# Patient Record
Sex: FEMALE | Race: WHITE | NOT HISPANIC OR LATINO | Employment: UNEMPLOYED | URBAN - METROPOLITAN AREA
[De-identification: names, ages, dates, MRNs, and addresses within clinical notes are randomized per-mention and may not be internally consistent; named-entity substitution may affect disease eponyms.]

---

## 2019-03-07 ENCOUNTER — OFFICE VISIT (OUTPATIENT)
Dept: OBSTETRICS AND GYNECOLOGY | Facility: CLINIC | Age: 29
End: 2019-03-07

## 2019-03-07 VITALS
HEIGHT: 70 IN | DIASTOLIC BLOOD PRESSURE: 62 MMHG | WEIGHT: 190.6 LBS | SYSTOLIC BLOOD PRESSURE: 110 MMHG | BODY MASS INDEX: 27.29 KG/M2

## 2019-03-07 DIAGNOSIS — Z00.00 ANNUAL PHYSICAL EXAM: Primary | ICD-10-CM

## 2019-03-07 DIAGNOSIS — Z30.432 ENCOUNTER FOR IUD REMOVAL: ICD-10-CM

## 2019-03-07 PROCEDURE — 99385 PREV VISIT NEW AGE 18-39: CPT | Performed by: OBSTETRICS & GYNECOLOGY

## 2019-03-07 PROCEDURE — 58301 REMOVE INTRAUTERINE DEVICE: CPT | Performed by: OBSTETRICS & GYNECOLOGY

## 2019-03-07 NOTE — PROGRESS NOTES
HPI   Benea Vincent  is a 28 y.o. female who presents for 2 reasons.  First, she would like to establish care and have a routine gynecologic exam.  Second, she would like her Mirena IUD removed.  It has been in place for 6 years.  We discussed contraceptive options after removal and the patient would like to use condoms.    Chief Complaint   Patient presents with   • New Gyn     Patient is here for a new gyn annual and discuss removing iud.       Past Medical History:   Diagnosis Date   • Asthma        History reviewed. No pertinent surgical history.    Social History     Socioeconomic History   • Marital status: Single     Spouse name: Not on file   • Number of children: Not on file   • Years of education: Not on file   • Highest education level: Not on file   Social Needs   • Financial resource strain: Not on file   • Food insecurity - worry: Not on file   • Food insecurity - inability: Not on file   • Transportation needs - medical: Not on file   • Transportation needs - non-medical: Not on file   Occupational History   • Not on file   Tobacco Use   • Smoking status: Current Every Day Smoker   Substance and Sexual Activity   • Alcohol use: Yes     Comment: socially   • Drug use: No   • Sexual activity: No   Other Topics Concern   • Not on file   Social History Narrative   • Not on file       The following portions of the patient's history were reviewed and updated as appropriate: allergies, current medications, past family history, past medical history, past social history, past surgical history and problem list.    Review of Systems   Constitutional: Negative.    HENT: Negative.    Respiratory: Negative.    Cardiovascular: Negative.    Gastrointestinal: Negative.    Genitourinary: Negative.    Musculoskeletal: Negative.    Skin: Negative.    Allergic/Immunologic: Negative.    Psychiatric/Behavioral: Negative.              Physical Exam   Constitutional: She is oriented to person, place, and time. She appears  well-developed and well-nourished.   HENT:   Head: Normocephalic and atraumatic.   Cardiovascular: Normal rate and regular rhythm.   Pulmonary/Chest: Effort normal and breath sounds normal. She has no wheezes. She has no rales.   The breasts are homogeneous.  There are no palpable lumps.  Nipple discharge and axillary adenopathy are absent.   Abdominal: Soft. She exhibits no distension. There is no tenderness.   Genitourinary: Vagina normal and uterus normal. There is no lesion on the right labia. There is no lesion on the left labia. Cervix exhibits no motion tenderness. Right adnexum displays no mass and no tenderness. Left adnexum displays no mass and no tenderness. No vaginal discharge found.   Genitourinary Comments: Mirena IUD was removed without difficulty.  The strings were grasped with a ring forceps and the IUD was gently removed.  It was examined and it was completely intact.  The patient tolerated the procedure well.   Neurological: She is alert and oriented to person, place, and time.   Skin: Skin is warm and dry.   Nursing note and vitals reviewed.      Assessment    Beena was seen today for new gyn.    Diagnoses and all orders for this visit:    Annual physical exam    Encounter for IUD removal        Plan  1. Normal gynecologic exam  2. Contraceptive counseling.  We discussed options.  The patient would like to use condoms.  We discussed the benefits and risks of this.  I answered the patient's questions and she would like to proceed.  3. Mirena IUD was removed.  The patient tolerated the procedure well.    4. Return in about 1 year (around 3/7/2020).    Social History     Tobacco Use   Smoking Status Current Every Day Smoker   5.     6.

## 2019-03-11 LAB
CONV .: ABNORMAL
CYTOLOGIST CVX/VAG CYTO: ABNORMAL
CYTOLOGY CVX/VAG DOC THIN PREP: ABNORMAL
DX ICD CODE: ABNORMAL
DX ICD CODE: ABNORMAL
HIV 1 & 2 AB SER-IMP: ABNORMAL
OTHER STN SPEC: ABNORMAL
PATH REPORT.FINAL DX SPEC: ABNORMAL
PATHOLOGIST CVX/VAG CYTO: ABNORMAL
STAT OF ADQ CVX/VAG CYTO-IMP: ABNORMAL

## 2019-03-12 ENCOUNTER — TELEPHONE (OUTPATIENT)
Dept: OBSTETRICS AND GYNECOLOGY | Facility: CLINIC | Age: 29
End: 2019-03-12

## 2019-03-12 NOTE — TELEPHONE ENCOUNTER
----- Message from Arsh Ray MD sent at 3/12/2019 12:49 PM EDT -----  Please contact the patient and let her know that her Pap showed a high-grade abnormality.  These help her to schedule a colposcopy.  Thank you.

## 2019-03-12 NOTE — TELEPHONE ENCOUNTER
Patient is returning a phone call. She would like a call back and also needs information about colposcopy.       Thank you

## 2019-03-13 ENCOUNTER — DOCUMENTATION (OUTPATIENT)
Dept: OBSTETRICS AND GYNECOLOGY | Facility: CLINIC | Age: 29
End: 2019-03-13

## 2019-03-13 ENCOUNTER — TELEPHONE (OUTPATIENT)
Dept: OBSTETRICS AND GYNECOLOGY | Facility: CLINIC | Age: 29
End: 2019-03-13

## 2019-03-13 RX ORDER — KETOROLAC TROMETHAMINE 15.75 MG/1
SPRAY, METERED NASAL
Qty: 5 EACH | Refills: 0 | Status: SHIPPED | OUTPATIENT
Start: 2019-03-13 | End: 2019-03-29 | Stop reason: SDUPTHER

## 2019-03-13 NOTE — TELEPHONE ENCOUNTER
I spoke to the patient and answered her questions.  She had to leave the conversation early because she was at work.  Please contact her and help her to schedule her colposcopy.  I will send a prescription for Sprix.  Please remind her to make contact with [facility] for delivery.  Thank you.

## 2019-03-13 NOTE — PROGRESS NOTES
Phone call.  Pap results were reviewed with the patient and her questions were answered.  We discussed my rationale for recommending colposcopy.  I described the procedure to the patient and answered her questions.  She would like to proceed.  She will call the office to schedule this.

## 2019-03-13 NOTE — TELEPHONE ENCOUNTER
Patient is waiting for Dr Castro call about colpo and would like to have sprix for pain before colpo.

## 2019-03-19 ENCOUNTER — TELEPHONE (OUTPATIENT)
Dept: OBSTETRICS AND GYNECOLOGY | Facility: CLINIC | Age: 29
End: 2019-03-19

## 2019-03-22 ENCOUNTER — PROCEDURE VISIT (OUTPATIENT)
Dept: OBSTETRICS AND GYNECOLOGY | Facility: CLINIC | Age: 29
End: 2019-03-22

## 2019-03-22 VITALS
WEIGHT: 190.8 LBS | SYSTOLIC BLOOD PRESSURE: 121 MMHG | BODY MASS INDEX: 27.32 KG/M2 | DIASTOLIC BLOOD PRESSURE: 76 MMHG | HEIGHT: 70 IN

## 2019-03-22 DIAGNOSIS — R87.613 HGSIL (HIGH GRADE SQUAMOUS INTRAEPITHELIAL LESION) ON PAP SMEAR OF CERVIX: ICD-10-CM

## 2019-03-22 PROCEDURE — 57455 BIOPSY OF CERVIX W/SCOPE: CPT | Performed by: OBSTETRICS & GYNECOLOGY

## 2019-03-22 NOTE — PROGRESS NOTES
HPI   Beena Vincent  is a 28 y.o. female who presents for colposcopy due to high-grade WILLIAM on Pap smear.  I counseled the patient regarding the pathophysiology of this finding.  We discussed my recommendation for colposcopy.  We discussed the procedure, its risks, benefits and alternatives.  The patient would like to proceed.    Chief Complaint   Patient presents with   • Follow-up     Patient is here for a colpo.       Past Medical History:   Diagnosis Date   • Asthma        History reviewed. No pertinent surgical history.    Social History     Socioeconomic History   • Marital status: Single     Spouse name: Not on file   • Number of children: Not on file   • Years of education: Not on file   • Highest education level: Not on file   Tobacco Use   • Smoking status: Current Every Day Smoker   Substance and Sexual Activity   • Alcohol use: Yes     Comment: socially   • Drug use: No   • Sexual activity: No       The following portions of the patient's history were reviewed and updated as appropriate: allergies, current medications, past family history, past medical history, past social history, past surgical history and problem list.    Review of Systems          Physical Exam   Constitutional: She is oriented to person, place, and time. She appears well-developed and well-nourished.   Genitourinary:       Genitourinary Comments: Colposcopy was performed with good visualization of the transformation zone.  There are 2 areas of acetowhite epithelium.  There is one at the 12 o'clock position with the appearance of atypical vessels.  There is also one at the 7 o'clock position.  Both lesions were biopsied.  They were treated with Monsel's solution to hemostasis.  Specimens to pathology.  The patient tolerated the procedure well.   Neurological: She is alert and oriented to person, place, and time.   Skin: Skin is warm and dry.   Nursing note and vitals reviewed.      Assessment    Beena was seen today for  follow-up.    Diagnoses and all orders for this visit:    HGSIL (high grade squamous intraepithelial lesion) on Pap smear of cervix  -     Colposcopy        Plan  1. Colposcopy performed as described above.  Further workup pending the results of the colposcopically directed biopsies.  The patient tolerated the procedure well.    Social History     Tobacco Use   Smoking Status Current Every Day Smoker   2.     3.

## 2019-03-28 LAB
DX ICD CODE: NORMAL
PATH REPORT.COMMENTS IMP SPEC: NORMAL
PATH REPORT.FINAL DX SPEC: NORMAL
PATH REPORT.GROSS SPEC: NORMAL
PATH REPORT.SITE OF ORIGIN SPEC: NORMAL
PATHOLOGIST NAME: NORMAL
PAYMENT PROCEDURE: NORMAL

## 2019-03-29 ENCOUNTER — TELEPHONE (OUTPATIENT)
Dept: OBSTETRICS AND GYNECOLOGY | Facility: CLINIC | Age: 29
End: 2019-03-29

## 2019-03-29 ENCOUNTER — DOCUMENTATION (OUTPATIENT)
Dept: OBSTETRICS AND GYNECOLOGY | Facility: CLINIC | Age: 29
End: 2019-03-29

## 2019-03-29 ENCOUNTER — PREP FOR SURGERY (OUTPATIENT)
Dept: OTHER | Facility: HOSPITAL | Age: 29
End: 2019-03-29

## 2019-03-29 DIAGNOSIS — D06.9 CIN III (CERVICAL INTRAEPITHELIAL NEOPLASIA GRADE III) WITH SEVERE DYSPLASIA: Primary | ICD-10-CM

## 2019-03-29 RX ORDER — SODIUM CHLORIDE 0.9 % (FLUSH) 0.9 %
3 SYRINGE (ML) INJECTION EVERY 12 HOURS SCHEDULED
Status: CANCELLED | OUTPATIENT
Start: 2019-04-25

## 2019-03-29 RX ORDER — SODIUM CHLORIDE 0.9 % (FLUSH) 0.9 %
3-10 SYRINGE (ML) INJECTION AS NEEDED
Status: CANCELLED | OUTPATIENT
Start: 2019-04-25

## 2019-03-29 RX ORDER — KETOROLAC TROMETHAMINE 15.75 MG/1
SPRAY, METERED NASAL
Qty: 5 EACH | Refills: 0 | Status: SHIPPED | OUTPATIENT
Start: 2019-03-29 | End: 2019-04-24

## 2019-03-29 NOTE — TELEPHONE ENCOUNTER
Phone call.  Unable to reach the patient.  Please contact the patient and ask when would be a good time to reach her by phone.  If it is difficult to talk by phone, I would be happy to see her in person to discuss treatment options.  Thank you.

## 2019-03-29 NOTE — TELEPHONE ENCOUNTER
Patient called back. I ask when would be a good time for her to answer phone. She stated today is good, she will make sure she is by her phone and it is on.       Thank you

## 2019-03-29 NOTE — PROGRESS NOTES
Phone call.  Cervical biopsy results were reviewed and discussed with the patient.  This shows KYRA-2 to KYRA-3.  We discussed the meaning of this finding and options for further management.  The benefits and risks of LEEP versus cervical cryotherapy were discussed and the patient's questions were answered.  She is leaning towards the LEEP, but will call with a final decision within the next several days.  Also, we discussed periprocedural pain control.  The patient would like to have a refill of her Sprix.  This has been sent to her pharmacy.

## 2019-04-03 PROBLEM — D06.9 CIN III (CERVICAL INTRAEPITHELIAL NEOPLASIA GRADE III) WITH SEVERE DYSPLASIA: Status: ACTIVE | Noted: 2019-04-03

## 2019-04-24 RX ORDER — ALBUTEROL SULFATE 90 UG/1
2 AEROSOL, METERED RESPIRATORY (INHALATION) EVERY 4 HOURS PRN
COMMUNITY
End: 2022-02-07 | Stop reason: SDUPTHER

## 2019-04-25 ENCOUNTER — HOSPITAL ENCOUNTER (OUTPATIENT)
Facility: HOSPITAL | Age: 29
Discharge: HOME | End: 2019-04-25
Attending: OBSTETRICS & GYNECOLOGY | Admitting: OBSTETRICS & GYNECOLOGY

## 2019-04-25 ENCOUNTER — ANESTHESIA EVENT (OUTPATIENT)
Dept: PERIOP | Facility: HOSPITAL | Age: 29
End: 2019-04-25

## 2019-04-25 ENCOUNTER — ANESTHESIA (OUTPATIENT)
Dept: PERIOP | Facility: HOSPITAL | Age: 29
End: 2019-04-25

## 2019-04-25 VITALS
BODY MASS INDEX: 27.27 KG/M2 | HEIGHT: 70 IN | SYSTOLIC BLOOD PRESSURE: 112 MMHG | OXYGEN SATURATION: 96 % | HEART RATE: 90 BPM | RESPIRATION RATE: 16 BRPM | TEMPERATURE: 98.2 F | WEIGHT: 190.48 LBS | DIASTOLIC BLOOD PRESSURE: 71 MMHG

## 2019-04-25 DIAGNOSIS — D06.9 CIN III (CERVICAL INTRAEPITHELIAL NEOPLASIA GRADE III) WITH SEVERE DYSPLASIA: ICD-10-CM

## 2019-04-25 LAB
B-HCG UR QL: NEGATIVE
INTERNAL NEGATIVE CONTROL: NEGATIVE
INTERNAL POSITIVE CONTROL: POSITIVE
Lab: NORMAL

## 2019-04-25 PROCEDURE — 25010000002 ONDANSETRON PER 1 MG: Performed by: NURSE ANESTHETIST, CERTIFIED REGISTERED

## 2019-04-25 PROCEDURE — S0260 H&P FOR SURGERY: HCPCS | Performed by: OBSTETRICS & GYNECOLOGY

## 2019-04-25 PROCEDURE — 25010000002 KETOROLAC TROMETHAMINE PER 15 MG: Performed by: NURSE ANESTHETIST, CERTIFIED REGISTERED

## 2019-04-25 PROCEDURE — 25010000002 PROPOFOL 10 MG/ML EMULSION: Performed by: NURSE ANESTHETIST, CERTIFIED REGISTERED

## 2019-04-25 PROCEDURE — 25010000002 MIDAZOLAM PER 1 MG: Performed by: ANESTHESIOLOGY

## 2019-04-25 PROCEDURE — 88305 TISSUE EXAM BY PATHOLOGIST: CPT | Performed by: OBSTETRICS & GYNECOLOGY

## 2019-04-25 PROCEDURE — 57522 CONIZATION OF CERVIX: CPT | Performed by: OBSTETRICS & GYNECOLOGY

## 2019-04-25 PROCEDURE — 25010000002 DEXAMETHASONE PER 1 MG: Performed by: NURSE ANESTHETIST, CERTIFIED REGISTERED

## 2019-04-25 PROCEDURE — 88307 TISSUE EXAM BY PATHOLOGIST: CPT | Performed by: OBSTETRICS & GYNECOLOGY

## 2019-04-25 PROCEDURE — 81025 URINE PREGNANCY TEST: CPT | Performed by: ANESTHESIOLOGY

## 2019-04-25 PROCEDURE — 25010000002 FENTANYL CITRATE (PF) 100 MCG/2ML SOLUTION: Performed by: ANESTHESIOLOGY

## 2019-04-25 RX ORDER — LIDOCAINE HYDROCHLORIDE 10 MG/ML
0.5 INJECTION, SOLUTION EPIDURAL; INFILTRATION; INTRACAUDAL; PERINEURAL ONCE AS NEEDED
Status: DISCONTINUED | OUTPATIENT
Start: 2019-04-25 | End: 2019-04-25 | Stop reason: HOSPADM

## 2019-04-25 RX ORDER — DIPHENHYDRAMINE HCL 25 MG
25 CAPSULE ORAL
Status: DISCONTINUED | OUTPATIENT
Start: 2019-04-25 | End: 2019-04-25 | Stop reason: HOSPADM

## 2019-04-25 RX ORDER — FENTANYL CITRATE 50 UG/ML
50 INJECTION, SOLUTION INTRAMUSCULAR; INTRAVENOUS
Status: DISCONTINUED | OUTPATIENT
Start: 2019-04-25 | End: 2019-04-25 | Stop reason: HOSPADM

## 2019-04-25 RX ORDER — LIDOCAINE HYDROCHLORIDE 20 MG/ML
INJECTION, SOLUTION INFILTRATION; PERINEURAL AS NEEDED
Status: DISCONTINUED | OUTPATIENT
Start: 2019-04-25 | End: 2019-04-25 | Stop reason: SURG

## 2019-04-25 RX ORDER — SODIUM CHLORIDE, SODIUM LACTATE, POTASSIUM CHLORIDE, CALCIUM CHLORIDE 600; 310; 30; 20 MG/100ML; MG/100ML; MG/100ML; MG/100ML
9 INJECTION, SOLUTION INTRAVENOUS CONTINUOUS
Status: DISCONTINUED | OUTPATIENT
Start: 2019-04-25 | End: 2019-04-25 | Stop reason: HOSPADM

## 2019-04-25 RX ORDER — PROPOFOL 10 MG/ML
VIAL (ML) INTRAVENOUS AS NEEDED
Status: DISCONTINUED | OUTPATIENT
Start: 2019-04-25 | End: 2019-04-25 | Stop reason: SURG

## 2019-04-25 RX ORDER — MIDAZOLAM HYDROCHLORIDE 1 MG/ML
2 INJECTION INTRAMUSCULAR; INTRAVENOUS
Status: DISCONTINUED | OUTPATIENT
Start: 2019-04-25 | End: 2019-04-25 | Stop reason: HOSPADM

## 2019-04-25 RX ORDER — ACETAMINOPHEN 650 MG/1
650 SUPPOSITORY RECTAL ONCE AS NEEDED
Status: DISCONTINUED | OUTPATIENT
Start: 2019-04-25 | End: 2019-04-25 | Stop reason: HOSPADM

## 2019-04-25 RX ORDER — MAGNESIUM HYDROXIDE 1200 MG/15ML
LIQUID ORAL AS NEEDED
Status: DISCONTINUED | OUTPATIENT
Start: 2019-04-25 | End: 2019-04-25 | Stop reason: HOSPADM

## 2019-04-25 RX ORDER — PROMETHAZINE HYDROCHLORIDE 25 MG/ML
12.5 INJECTION, SOLUTION INTRAMUSCULAR; INTRAVENOUS ONCE AS NEEDED
Status: DISCONTINUED | OUTPATIENT
Start: 2019-04-25 | End: 2019-04-25 | Stop reason: HOSPADM

## 2019-04-25 RX ORDER — ONDANSETRON 2 MG/ML
4 INJECTION INTRAMUSCULAR; INTRAVENOUS ONCE AS NEEDED
Status: DISCONTINUED | OUTPATIENT
Start: 2019-04-25 | End: 2019-04-25 | Stop reason: HOSPADM

## 2019-04-25 RX ORDER — SODIUM CHLORIDE 0.9 % (FLUSH) 0.9 %
3 SYRINGE (ML) INJECTION EVERY 12 HOURS SCHEDULED
Status: DISCONTINUED | OUTPATIENT
Start: 2019-04-25 | End: 2019-04-25 | Stop reason: HOSPADM

## 2019-04-25 RX ORDER — DIPHENHYDRAMINE HYDROCHLORIDE 50 MG/ML
12.5 INJECTION INTRAMUSCULAR; INTRAVENOUS
Status: DISCONTINUED | OUTPATIENT
Start: 2019-04-25 | End: 2019-04-25 | Stop reason: HOSPADM

## 2019-04-25 RX ORDER — FAMOTIDINE 10 MG/ML
20 INJECTION, SOLUTION INTRAVENOUS ONCE
Status: COMPLETED | OUTPATIENT
Start: 2019-04-25 | End: 2019-04-25

## 2019-04-25 RX ORDER — OXYCODONE AND ACETAMINOPHEN 7.5; 325 MG/1; MG/1
1 TABLET ORAL ONCE AS NEEDED
Status: DISCONTINUED | OUTPATIENT
Start: 2019-04-25 | End: 2019-04-25 | Stop reason: HOSPADM

## 2019-04-25 RX ORDER — EPHEDRINE SULFATE 50 MG/ML
5 INJECTION, SOLUTION INTRAVENOUS ONCE AS NEEDED
Status: DISCONTINUED | OUTPATIENT
Start: 2019-04-25 | End: 2019-04-25 | Stop reason: HOSPADM

## 2019-04-25 RX ORDER — FLUMAZENIL 0.1 MG/ML
0.2 INJECTION INTRAVENOUS AS NEEDED
Status: DISCONTINUED | OUTPATIENT
Start: 2019-04-25 | End: 2019-04-25 | Stop reason: HOSPADM

## 2019-04-25 RX ORDER — FENTANYL CITRATE 50 UG/ML
INJECTION, SOLUTION INTRAMUSCULAR; INTRAVENOUS
Status: COMPLETED
Start: 2019-04-25 | End: 2019-04-25

## 2019-04-25 RX ORDER — PROMETHAZINE HYDROCHLORIDE 25 MG/1
25 TABLET ORAL ONCE AS NEEDED
Status: DISCONTINUED | OUTPATIENT
Start: 2019-04-25 | End: 2019-04-25 | Stop reason: HOSPADM

## 2019-04-25 RX ORDER — HYDROMORPHONE HYDROCHLORIDE 1 MG/ML
0.5 INJECTION, SOLUTION INTRAMUSCULAR; INTRAVENOUS; SUBCUTANEOUS
Status: DISCONTINUED | OUTPATIENT
Start: 2019-04-25 | End: 2019-04-25 | Stop reason: HOSPADM

## 2019-04-25 RX ORDER — HYDROCODONE BITARTRATE AND ACETAMINOPHEN 5; 325 MG/1; MG/1
1 TABLET ORAL EVERY 6 HOURS PRN
Qty: 10 TABLET | Refills: 0 | Status: SHIPPED | OUTPATIENT
Start: 2019-04-25 | End: 2020-04-02

## 2019-04-25 RX ORDER — PROMETHAZINE HYDROCHLORIDE 25 MG/1
25 SUPPOSITORY RECTAL ONCE AS NEEDED
Status: DISCONTINUED | OUTPATIENT
Start: 2019-04-25 | End: 2019-04-25 | Stop reason: HOSPADM

## 2019-04-25 RX ORDER — KETOROLAC TROMETHAMINE 30 MG/ML
INJECTION, SOLUTION INTRAMUSCULAR; INTRAVENOUS AS NEEDED
Status: DISCONTINUED | OUTPATIENT
Start: 2019-04-25 | End: 2019-04-25 | Stop reason: SURG

## 2019-04-25 RX ORDER — LABETALOL HYDROCHLORIDE 5 MG/ML
5 INJECTION, SOLUTION INTRAVENOUS
Status: DISCONTINUED | OUTPATIENT
Start: 2019-04-25 | End: 2019-04-25 | Stop reason: HOSPADM

## 2019-04-25 RX ORDER — NALOXONE HCL 0.4 MG/ML
0.2 VIAL (ML) INJECTION AS NEEDED
Status: DISCONTINUED | OUTPATIENT
Start: 2019-04-25 | End: 2019-04-25 | Stop reason: HOSPADM

## 2019-04-25 RX ORDER — DEXAMETHASONE SODIUM PHOSPHATE 10 MG/ML
INJECTION INTRAMUSCULAR; INTRAVENOUS AS NEEDED
Status: DISCONTINUED | OUTPATIENT
Start: 2019-04-25 | End: 2019-04-25 | Stop reason: SURG

## 2019-04-25 RX ORDER — SODIUM CHLORIDE 0.9 % (FLUSH) 0.9 %
3-10 SYRINGE (ML) INJECTION AS NEEDED
Status: DISCONTINUED | OUTPATIENT
Start: 2019-04-25 | End: 2019-04-25 | Stop reason: HOSPADM

## 2019-04-25 RX ORDER — HYDRALAZINE HYDROCHLORIDE 20 MG/ML
5 INJECTION INTRAMUSCULAR; INTRAVENOUS
Status: DISCONTINUED | OUTPATIENT
Start: 2019-04-25 | End: 2019-04-25 | Stop reason: HOSPADM

## 2019-04-25 RX ORDER — ONDANSETRON 2 MG/ML
INJECTION INTRAMUSCULAR; INTRAVENOUS AS NEEDED
Status: DISCONTINUED | OUTPATIENT
Start: 2019-04-25 | End: 2019-04-25 | Stop reason: SURG

## 2019-04-25 RX ORDER — HYDROCODONE BITARTRATE AND ACETAMINOPHEN 7.5; 325 MG/1; MG/1
1 TABLET ORAL ONCE AS NEEDED
Status: COMPLETED | OUTPATIENT
Start: 2019-04-25 | End: 2019-04-25

## 2019-04-25 RX ORDER — SODIUM CHLORIDE 0.9 % (FLUSH) 0.9 %
1-10 SYRINGE (ML) INJECTION AS NEEDED
Status: DISCONTINUED | OUTPATIENT
Start: 2019-04-25 | End: 2019-04-25 | Stop reason: HOSPADM

## 2019-04-25 RX ORDER — ACETAMINOPHEN 325 MG/1
650 TABLET ORAL ONCE AS NEEDED
Status: DISCONTINUED | OUTPATIENT
Start: 2019-04-25 | End: 2019-04-25 | Stop reason: HOSPADM

## 2019-04-25 RX ORDER — LIDOCAINE HYDROCHLORIDE AND EPINEPHRINE 10; 10 MG/ML; UG/ML
INJECTION, SOLUTION INFILTRATION; PERINEURAL AS NEEDED
Status: DISCONTINUED | OUTPATIENT
Start: 2019-04-25 | End: 2019-04-25 | Stop reason: HOSPADM

## 2019-04-25 RX ORDER — MIDAZOLAM HYDROCHLORIDE 1 MG/ML
1 INJECTION INTRAMUSCULAR; INTRAVENOUS
Status: DISCONTINUED | OUTPATIENT
Start: 2019-04-25 | End: 2019-04-25 | Stop reason: HOSPADM

## 2019-04-25 RX ADMIN — FENTANYL CITRATE 100 MCG: 50 INJECTION INTRAMUSCULAR; INTRAVENOUS at 13:28

## 2019-04-25 RX ADMIN — HYDROCODONE BITARTRATE AND ACETAMINOPHEN 1 TABLET: 7.5; 325 TABLET ORAL at 14:15

## 2019-04-25 RX ADMIN — DEXAMETHASONE SODIUM PHOSPHATE 8 MG: 10 INJECTION INTRAMUSCULAR; INTRAVENOUS at 13:37

## 2019-04-25 RX ADMIN — LIDOCAINE HYDROCHLORIDE 40 MG: 20 INJECTION, SOLUTION INFILTRATION; PERINEURAL at 13:31

## 2019-04-25 RX ADMIN — KETOROLAC TROMETHAMINE 30 MG: 30 INJECTION, SOLUTION INTRAMUSCULAR; INTRAVENOUS at 13:37

## 2019-04-25 RX ADMIN — FAMOTIDINE 20 MG: 10 INJECTION, SOLUTION INTRAVENOUS at 13:03

## 2019-04-25 RX ADMIN — MIDAZOLAM 2 MG: 1 INJECTION INTRAMUSCULAR; INTRAVENOUS at 13:23

## 2019-04-25 RX ADMIN — FENTANYL CITRATE 50 MCG: 50 INJECTION INTRAMUSCULAR; INTRAVENOUS at 13:50

## 2019-04-25 RX ADMIN — SODIUM CHLORIDE, POTASSIUM CHLORIDE, SODIUM LACTATE AND CALCIUM CHLORIDE 9 ML/HR: 600; 310; 30; 20 INJECTION, SOLUTION INTRAVENOUS at 12:30

## 2019-04-25 RX ADMIN — PROPOFOL 200 MG: 10 INJECTION, EMULSION INTRAVENOUS at 13:31

## 2019-04-25 RX ADMIN — ONDANSETRON 4 MG: 2 INJECTION INTRAMUSCULAR; INTRAVENOUS at 13:37

## 2019-04-25 NOTE — ANESTHESIA POSTPROCEDURE EVALUATION
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      (KYRA III (cervical intraepithelial neoplasia grade III) with severe dysplasia [D06.9])    Surgeon:  Arsh Ray MD Provider:  Julio César Parker MD    Anesthesia Type:  general ASA Status:  2          Anesthesia Type: general  Last vitals  BP   117/66 (04/25/19 1415)   Temp   36.8 °C (98.2 °F) (04/25/19 1404)   Pulse   85 (04/25/19 1415)   Resp   16 (04/25/19 1415)     SpO2   100 % (04/25/19 1415)     Post Anesthesia Care and Evaluation    Patient location during evaluation: PACU  Patient participation: complete - patient participated  Level of consciousness: awake and alert  Pain management: adequate  Airway patency: patent  Anesthetic complications: No anesthetic complications    Cardiovascular status: acceptable  Respiratory status: acceptable  Hydration status: acceptable    Comments: /66   Pulse 85   Temp 36.8 °C (98.2 °F) (Temporal)   Resp 16   Ht 177.8 cm (70\")   Wt 86.4 kg (190 lb 7.6 oz)   LMP 04/13/2019 Comment: urine hcg negative on 4/25/19  SpO2 100%   Breastfeeding? No   BMI 27.33 kg/m²               "

## 2019-04-25 NOTE — ANESTHESIA PREPROCEDURE EVALUATION
Anesthesia Evaluation     Patient summary reviewed and Nursing notes reviewed   NPO Solid Status: > 8 hours  NPO Liquid Status: > 2 hours           Airway   Mallampati: II  TM distance: >3 FB  Neck ROM: full  No difficulty expected  Dental      Pulmonary    (+) a smoker Current Smoked day of surgery, asthma,   Cardiovascular   Exercise tolerance: good (4-7 METS)    (-) angina      Neuro/Psych  GI/Hepatic/Renal/Endo    (-) GERD, PUD, hypothyroidism    Musculoskeletal     Abdominal    Substance History   (-) drug use     OB/GYN          Other                        Anesthesia Plan    ASA 2     general   (Adhesive tape allergy)  intravenous induction   Anesthetic plan, all risks, benefits, and alternatives have been provided, discussed and informed consent has been obtained with: patient.

## 2019-04-25 NOTE — ANESTHESIA PROCEDURE NOTES
Airway  Urgency: elective    Airway not difficult    General Information and Staff    Patient location during procedure: OR  Anesthesiologist: Julio César Parker MD  CRNA: Leonor Garza CRNA    Indications and Patient Condition  Indications for airway management: airway protection    Preoxygenated: yes  Mask difficulty assessment: 1 - vent by mask    Final Airway Details  Final airway type: supraglottic airway      Successful airway: unique  Size 4    Number of attempts at approach: 1    Additional Comments  Smooth IV/mask induction and placement of LMA. Atraumatic, lips/teeth/mouth intact, as preop. +ETCO2, bilateral breath sounds and equal.

## 2019-04-26 LAB
CYTO UR: NORMAL
LAB AP CASE REPORT: NORMAL
LAB AP DIAGNOSIS COMMENT: NORMAL
PATH REPORT.FINAL DX SPEC: NORMAL
PATH REPORT.GROSS SPEC: NORMAL

## 2019-05-03 ENCOUNTER — TELEPHONE (OUTPATIENT)
Dept: OBSTETRICS AND GYNECOLOGY | Facility: CLINIC | Age: 29
End: 2019-05-03

## 2019-05-03 NOTE — TELEPHONE ENCOUNTER
Miri    That is typically normal in most patient's who have a LEEP.  She should observe for fevers or heavy bleeding or severe pelvic pain.  Otherwise, schedule her to see Flor next week.    Thanks    Domi              (Flor pt)  Patient called and stated she had LEEP on 04/25/2019. She is now experiencing yellow, smelly discharge. She is afraid it is infection. Please advise.       Pharmacy: [address] [facility] [address] [facility] [phone] [facility] [phone] FX        Thank you

## 2019-05-07 ENCOUNTER — OFFICE VISIT (OUTPATIENT)
Dept: OBSTETRICS AND GYNECOLOGY | Facility: CLINIC | Age: 29
End: 2019-05-07

## 2019-05-07 VITALS
WEIGHT: 189.6 LBS | SYSTOLIC BLOOD PRESSURE: 118 MMHG | DIASTOLIC BLOOD PRESSURE: 72 MMHG | HEIGHT: 70 IN | BODY MASS INDEX: 27.14 KG/M2

## 2019-05-07 DIAGNOSIS — Z09 POSTOP CHECK: Primary | ICD-10-CM

## 2019-05-07 PROCEDURE — 99024 POSTOP FOLLOW-UP VISIT: CPT | Performed by: OBSTETRICS & GYNECOLOGY

## 2019-05-07 NOTE — PROGRESS NOTES
HPI   Beena Vincent  is a 28 y.o. female who presents for a postoperative checkup.  She is 2 weeks out from a LEEP.  She is feeling well.  Bowels and bladder are functioning normally.  There is been minimal bleeding.  No fever or chills.  Pain has resolved.  Pathology report was reviewed with the patient.    Chief Complaint   Patient presents with   • Post-op     Patient is here for a 2 week postop for leep.       Past Medical History:   Diagnosis Date   • Asthma    • Cervical dysplasia        Past Surgical History:   Procedure Laterality Date   • KNEE ARTHROSCOPY      AGE 6   • LEEP N/A 4/25/2019    Procedure: LOOP ELECTROCAUTERY EXCISION PROCEDURE;  Surgeon: Arsh Ray MD;  [facility] [facility] [place] [facility] [place];  Service: Obstetrics/Gynecology       Social History     Socioeconomic History   • Marital status: Single     Spouse name: Not on file   • Number of children: Not on file   • Years of education: Not on file   • Highest education level: Not on file   Tobacco Use   • Smoking status: Current Every Day Smoker     Packs/day: 0.50   Substance and Sexual Activity   • Alcohol use: Yes     Comment: socially   • Drug use: No   • Sexual activity: No       The following portions of the patient's history were reviewed and updated as appropriate: allergies, current medications, past family history, past medical history, past social history, past surgical history and problem list.    Review of Systems          Physical Exam   Constitutional: She is oriented to person, place, and time. She appears well-developed and well-nourished.   Abdominal: Soft. She exhibits no distension. There is no tenderness.   Genitourinary:   Genitourinary Comments: Normal female external genitalia  The vagina is estrogenized and without lesion  Cervix is healing properly.  There is no evidence of inflammation or infection  The uterus is normal in size.  It is mobile within the pelvis and nontender  No adnexal masses are palpable   Neurological: She is  alert and oriented to person, place, and time.   Skin: Skin is warm and dry.   Psychiatric: She has a normal mood and affect.   Nursing note and vitals reviewed.      Assessment    Beena was seen today for post-op.    Diagnoses and all orders for this visit:    Postop check        Plan  1. Appropriate progress, 2 weeks postop  2. Pathology report was reviewed with the patient.  There was high-grade WILLIAM, but all margins were free.  Questions answered.  3.     4. Return in about 4 weeks (around 6/4/2019).    Social History     Tobacco Use   Smoking Status Current Every Day Smoker   • Packs/day: 0.50   5.     6.

## 2019-05-23 ENCOUNTER — OFFICE VISIT (OUTPATIENT)
Dept: OBSTETRICS AND GYNECOLOGY | Facility: CLINIC | Age: 29
End: 2019-05-23

## 2019-05-23 VITALS
BODY MASS INDEX: 26.92 KG/M2 | DIASTOLIC BLOOD PRESSURE: 62 MMHG | WEIGHT: 188 LBS | SYSTOLIC BLOOD PRESSURE: 102 MMHG | HEIGHT: 70 IN

## 2019-05-23 DIAGNOSIS — Z09 POSTOP CHECK: Primary | ICD-10-CM

## 2019-05-23 PROCEDURE — 99024 POSTOP FOLLOW-UP VISIT: CPT | Performed by: OBSTETRICS & GYNECOLOGY

## 2019-05-23 NOTE — PROGRESS NOTES
HPI   Beena Vincent  is a 28 y.o. female who presents for her second postoperative checkup after a LEEP.  She is feeling well.  Pain has resolved.  There is no bleeding.  Bowels and bladder are functioning normally.    Chief Complaint   Patient presents with   • Post-op     Patient is here for a 2 week postop for a leep.       Past Medical History:   Diagnosis Date   • Asthma    • Cervical dysplasia        Past Surgical History:   Procedure Laterality Date   • KNEE ARTHROSCOPY      AGE 6   • LEEP N/A 4/25/2019    Procedure: LOOP ELECTROCAUTERY EXCISION PROCEDURE;  Surgeon: Arsh Ray MD;  Location: [facility] OR [facility] [place];  Service: Obstetrics/Gynecology       Social History     Socioeconomic History   • Marital status: Single     Spouse name: Not on file   • Number of children: Not on file   • Years of education: Not on file   • Highest education level: Not on file   Tobacco Use   • Smoking status: Current Every Day Smoker     Packs/day: 0.50   Substance and Sexual Activity   • Alcohol use: Yes     Comment: socially   • Drug use: No   • Sexual activity: No       The following portions of the patient's history were reviewed and updated as appropriate: allergies, current medications, past family history, past medical history, past social history, past surgical history and problem list.    Review of Systems          Physical Exam   Constitutional: She is oriented to person, place, and time. She appears well-developed and well-nourished.   Abdominal: Soft. She exhibits no distension and no mass. There is no tenderness.   Genitourinary:   Genitourinary Comments: Normal female external genitalia  The vagina is estrogenized and without lesion  The LEEP bed has completely healed cervix is normal in appearance  The uterus is mobile within the pelvis it is nontender  No adnexal masses or tenderness are present   Neurological: She is alert and oriented to person, place, and time.   Skin: Skin is warm and dry.   Psychiatric: She has  a normal mood and affect. Her behavior is normal.   Nursing note and vitals reviewed.      Assessment    Beena was seen today for post-op.    Diagnoses and all orders for this visit:    Postop check        Plan  1. Appropriate postoperative progress.  Okay to resume all normal activities of daily living  2. Pathology report was reviewed and discussed with the patient.  High-grade WILLIAM was contained within the specimen, but there was some low-grade WILLIAM at the margin.  The cervix was destroyed beyond the margin of excision.  I recommend a repeat Pap in August.  If this is negative, the patient will follow-up in 6 months afterwards for a second repeat Pap.  I answered her questions and she agrees with these recommendations.    3. Return in about 3 months (around 8/23/2019).    Social History     Tobacco Use   Smoking Status Current Every Day Smoker   • Packs/day: 0.50   4.     5.

## 2019-08-22 ENCOUNTER — OFFICE VISIT (OUTPATIENT)
Dept: OBSTETRICS AND GYNECOLOGY | Facility: CLINIC | Age: 29
End: 2019-08-22

## 2019-08-22 VITALS
BODY MASS INDEX: 27.35 KG/M2 | SYSTOLIC BLOOD PRESSURE: 116 MMHG | WEIGHT: 191 LBS | HEIGHT: 70 IN | DIASTOLIC BLOOD PRESSURE: 64 MMHG

## 2019-08-22 DIAGNOSIS — N87.9 CERVICAL DYSPLASIA: Primary | ICD-10-CM

## 2019-08-22 PROCEDURE — 99212 OFFICE O/P EST SF 10 MIN: CPT | Performed by: OBSTETRICS & GYNECOLOGY

## 2019-08-22 NOTE — PROGRESS NOTES
HPI   Beena Vincent  is a 29 y.o. female who presents for a repeat Pap smear.  We discussed the results of her LEEP.  This showed KYRA 3 with a focal area of KYRA-1 near the margin.  Patient is doing well.  She presents today for a repeat Pap smear.    Chief Complaint   Patient presents with   • Follow-up     Patient is here for 3 month f/u for a repeat pap.       Past Medical History:   Diagnosis Date   • Asthma    • Cervical dysplasia        Past Surgical History:   Procedure Laterality Date   • KNEE ARTHROSCOPY      AGE 6   • LEEP N/A 4/25/2019    Procedure: LOOP ELECTROCAUTERY EXCISION PROCEDURE;  Surgeon: Arsh Ray MD;  [facility]: [facility] [place] [facility] [place]  Service: Obstetrics/Gynecology       Social History     Socioeconomic History   • Marital status: Single     Spouse name: Not on file   • Number of children: Not on file   • Years of education: Not on file   • Highest education level: Not on file   Tobacco Use   • Smoking status: Current Every Day Smoker     Packs/day: 0.50   Substance and Sexual Activity   • Alcohol use: Yes     Comment: socially   • Drug use: No   • Sexual activity: No       The following portions of the patient's history were reviewed and updated as appropriate: allergies, current medications, past family history, past medical history, past social history, past surgical history and problem list.    Review of Systems          Physical Exam   Constitutional: She is oriented to person, place, and time. She appears well-developed and well-nourished.   Abdominal: Soft. She exhibits no distension and no mass. There is no tenderness.   Genitourinary:   Genitourinary Comments: Normal female external genitalia  The vagina is estrogenized and without lesion  The cervix is normal in appearance  The uterus is mobile within the pelvis.  It is nontender  No adnexal masses or tenderness are palpable   Neurological: She is alert and oriented to person, place, and time.   Skin: Skin is warm and dry.   Psychiatric:  She has a normal mood and affect.   Nursing note and vitals reviewed.      Assessment    Beena was seen today for follow-up.    Diagnoses and all orders for this visit:    Cervical dysplasia        Plan  1. Pap was repeated today.  I counseled the patient regarding my rationale for this recommendation and answered her questions.  If the Pap is normal, I recommend a repeat in 6 months.  If the next Pap is also normal, the patient can return to yearly screening.    2. Return in about 6 months (around 2/22/2020).    Social History     Tobacco Use   Smoking Status Current Every Day Smoker   • Packs/day: 0.50   3.     4.

## 2019-08-27 LAB
CONV .: ABNORMAL
CYTOLOGIST CVX/VAG CYTO: ABNORMAL
CYTOLOGY CVX/VAG DOC CYTO: ABNORMAL
CYTOLOGY CVX/VAG DOC THIN PREP: ABNORMAL
DX ICD CODE: ABNORMAL
DX ICD CODE: ABNORMAL
HIV 1 & 2 AB SER-IMP: ABNORMAL
OTHER STN SPEC: ABNORMAL
PATHOLOGIST CVX/VAG CYTO: ABNORMAL
RECOM F/U CVX/VAG CYTO: ABNORMAL
STAT OF ADQ CVX/VAG CYTO-IMP: ABNORMAL

## 2019-09-04 ENCOUNTER — TELEPHONE (OUTPATIENT)
Dept: OBSTETRICS AND GYNECOLOGY | Facility: CLINIC | Age: 29
End: 2019-09-04

## 2019-09-04 ENCOUNTER — DOCUMENTATION (OUTPATIENT)
Dept: OBSTETRICS AND GYNECOLOGY | Facility: CLINIC | Age: 29
End: 2019-09-04

## 2019-09-04 NOTE — PROGRESS NOTES
Phone call.  Results were reviewed and discussed with the patient.  This is a low-grade WILLIAM after LEEP.  LEEP pathology report was also reviewed with the patient.  This showed high-grade WILLIAM with a small amount of low-grade at the margin.  We discussed options.  The benefits and risks of further evaluation with a repeat colposcopy versus cervical cryotherapy versus repeat LEEP were discussed.  I answered the patient's questions.  She will consider her options and call with a decision within the next 1 to 2 weeks.

## 2019-09-19 ENCOUNTER — TELEPHONE (OUTPATIENT)
Dept: OBSTETRICS AND GYNECOLOGY | Facility: CLINIC | Age: 29
End: 2019-09-19

## 2019-10-08 ENCOUNTER — OFFICE VISIT (OUTPATIENT)
Dept: OBSTETRICS AND GYNECOLOGY | Facility: CLINIC | Age: 29
End: 2019-10-08

## 2019-10-08 VITALS
SYSTOLIC BLOOD PRESSURE: 116 MMHG | DIASTOLIC BLOOD PRESSURE: 66 MMHG | WEIGHT: 191 LBS | HEIGHT: 70 IN | BODY MASS INDEX: 27.35 KG/M2

## 2019-10-08 DIAGNOSIS — N87.0 MILD DYSPLASIA OF CERVIX (CIN I): Primary | ICD-10-CM

## 2019-10-08 PROCEDURE — 57455 BIOPSY OF CERVIX W/SCOPE: CPT | Performed by: OBSTETRICS & GYNECOLOGY

## 2019-10-08 NOTE — PROGRESS NOTES
HPI   Beena Vincent  is a 29 y.o. female who presents for colposcopy.  She underwent a LEEP in April.  Pathology report showed a high-grade WILLIAM, but there was some low-grade WILLIAM at the margins.  Repeat Pap was positive for low-grade WILLIAM.  I counseled the patient regarding her options and she chose to proceed with a repeat colposcopy to plan further treatment.    Chief Complaint   Patient presents with   • Colposcopy     Patient is here for a colpo.       Past Medical History:   Diagnosis Date   • Asthma    • Cervical dysplasia        Past Surgical History:   Procedure Laterality Date   • KNEE ARTHROSCOPY      AGE 6   • LEEP N/A 4/25/2019    Procedure: LOOP ELECTROCAUTERY EXCISION PROCEDURE;  Surgeon: Arsh Ray MD;  [place]: [facility] [place] [facility] [place]  Service: Obstetrics/Gynecology       Social History     Socioeconomic History   • Marital status: Single     Spouse name: Not on file   • Number of children: Not on file   • Years of education: Not on file   • Highest education level: Not on file   Tobacco Use   • Smoking status: Current Every Day Smoker     Packs/day: 0.50   Substance and Sexual Activity   • Alcohol use: Yes     Comment: socially   • Drug use: No   • Sexual activity: No       The following portions of the patient's history were reviewed and updated as appropriate: allergies, current medications, past family history, past medical history, past social history, past surgical history and problem list.    Review of Systems          Physical Exam   Constitutional: She is oriented to person, place, and time. She appears well-developed and well-nourished.   Genitourinary:       Genitourinary Comments: Colposcopy was performed with good visualization of the transformation zone.  There was mild acetowhite epithelium at the 11 o'clock position.  Biopsy performed.  This was treated with Monsel solution to hemostasis.  The patient tolerated the procedure well.   Neurological: She is alert and oriented to person, place,  and time.   Skin: Skin is warm and dry.   Nursing note and vitals reviewed.      Assessment    Beena was seen today for colposcopy.    Diagnoses and all orders for this visit:    Mild dysplasia of cervix (KYRA I)        Plan  1. Colposcopy was performed as described above.  The patient tolerated the procedure well.    Social History     Tobacco Use   Smoking Status Current Every Day Smoker   • Packs/day: 0.50   2.     3.

## 2019-10-12 LAB
DX ICD CODE: NORMAL
PATH REPORT.FINAL DX SPEC: NORMAL
PATH REPORT.GROSS SPEC: NORMAL
PATH REPORT.MICROSCOPIC SPEC OTHER STN: NORMAL
PATH REPORT.RELEVANT HX SPEC: NORMAL
PATH REPORT.SITE OF ORIGIN SPEC: NORMAL
PATHOLOGIST NAME: NORMAL
PAYMENT PROCEDURE: NORMAL

## 2019-10-15 ENCOUNTER — TELEPHONE (OUTPATIENT)
Dept: OBSTETRICS AND GYNECOLOGY | Facility: CLINIC | Age: 29
End: 2019-10-15

## 2019-10-15 ENCOUNTER — DOCUMENTATION (OUTPATIENT)
Dept: OBSTETRICS AND GYNECOLOGY | Facility: CLINIC | Age: 29
End: 2019-10-15

## 2019-10-15 NOTE — PROGRESS NOTES
Biopsy results were reviewed and discussed with the patient.  I recommend a repeat Pap in April.  The patient will call to schedule this.

## 2019-10-16 ENCOUNTER — TELEPHONE (OUTPATIENT)
Dept: OBSTETRICS AND GYNECOLOGY | Facility: CLINIC | Age: 29
End: 2019-10-16

## 2019-10-16 NOTE — TELEPHONE ENCOUNTER
----- Message from Arsh Ray MD sent at 10/15/2019 11:53 AM EDT -----  I could not reach the patient by phone to review her results.  Please review these with her and help her to schedule a repeat Pap for 6 months.  Thank you.

## 2019-10-23 ENCOUNTER — TELEPHONE (OUTPATIENT)
Dept: OBSTETRICS AND GYNECOLOGY | Facility: CLINIC | Age: 29
End: 2019-10-23

## 2019-12-18 ENCOUNTER — PROCEDURE VISIT (OUTPATIENT)
Dept: OBSTETRICS AND GYNECOLOGY | Facility: CLINIC | Age: 29
End: 2019-12-18

## 2019-12-18 ENCOUNTER — INITIAL PRENATAL (OUTPATIENT)
Dept: OBSTETRICS AND GYNECOLOGY | Facility: CLINIC | Age: 29
End: 2019-12-18

## 2019-12-18 VITALS — DIASTOLIC BLOOD PRESSURE: 67 MMHG | SYSTOLIC BLOOD PRESSURE: 118 MMHG | WEIGHT: 186.6 LBS | BODY MASS INDEX: 26.77 KG/M2

## 2019-12-18 DIAGNOSIS — Z34.91 UNCERTAIN DATES, ANTEPARTUM, FIRST TRIMESTER: Primary | ICD-10-CM

## 2019-12-18 DIAGNOSIS — Z34.91 INITIAL OBSTETRIC VISIT IN FIRST TRIMESTER: Primary | ICD-10-CM

## 2019-12-18 LAB
GLUCOSE UR STRIP-MCNC: NEGATIVE MG/DL
PROT UR STRIP-MCNC: NEGATIVE MG/DL

## 2019-12-18 PROCEDURE — 0501F PRENATAL FLOW SHEET: CPT | Performed by: OBSTETRICS & GYNECOLOGY

## 2019-12-18 PROCEDURE — 76817 TRANSVAGINAL US OBSTETRIC: CPT | Performed by: OBSTETRICS & GYNECOLOGY

## 2019-12-18 NOTE — PROGRESS NOTES
CC: Initial obstetric visit    HPI: 29-year-old  2 para 1 presents at 5-1/7 weeks by her certain last menstrual period for her initial obstetric visit.  DALTON is confirmed by today's ultrasound.  The ultrasound shows a gestational sac and yolk sac.  It does not yet show a fetus with heartbeat.  The patient is feeling well.  She does not have nausea or vomiting.  Does not have abdominal or pelvic pain.  Does not have vaginal bleeding.  Last pregnancy resulted in a spontaneous vaginal delivery.  The patient was induced at 38 weeks due to preeclampsia.    Review of systems    This is negative for fever or chills.  It is negative for nausea or vomiting.  It is negative for abdominal or pelvic pain.  It is negative for vaginal bleeding.  It is positive for green vaginal discharge.  All other systems are reviewed and are negative    Assessment and plan    1.  Intrauterine pregnancy at 5-1/7 weeks  2.  Pregnancy related counseling was undertaken and questions were answered  3.  Prenatal labs.  Counseled.  The patient plans to do this at the next visit  4.  Counseled regarding the ultrasound results.  The patient would like to repeat this in 4 weeks.  5.  Vaginal discharge.  There is a greenish discharge on examination.  Otherwise, the patient is asymptomatic.  Counseled.  Cultures were performed.  Treatment pending the results of these cultures.

## 2019-12-20 LAB
BACTERIA UR CULT: NORMAL
BACTERIA UR CULT: NORMAL

## 2019-12-21 LAB
A VAGINAE DNA VAG QL NAA+PROBE: ABNORMAL SCORE
BVAB2 DNA VAG QL NAA+PROBE: ABNORMAL SCORE
C ALBICANS DNA VAG QL NAA+PROBE: NEGATIVE
C GLABRATA DNA VAG QL NAA+PROBE: NEGATIVE
C TRACH RRNA SPEC QL NAA+PROBE: NEGATIVE
MEGA1 DNA VAG QL NAA+PROBE: ABNORMAL SCORE
N GONORRHOEA RRNA SPEC QL NAA+PROBE: NEGATIVE
T VAGINALIS RRNA SPEC QL NAA+PROBE: POSITIVE

## 2019-12-23 RX ORDER — METRONIDAZOLE 500 MG/1
2000 TABLET ORAL ONCE
Qty: 4 TABLET | Refills: 0 | Status: SHIPPED | OUTPATIENT
Start: 2019-12-23 | End: 2019-12-23

## 2020-01-14 ENCOUNTER — PROCEDURE VISIT (OUTPATIENT)
Dept: OBSTETRICS AND GYNECOLOGY | Facility: CLINIC | Age: 30
End: 2020-01-14

## 2020-01-14 ENCOUNTER — ROUTINE PRENATAL (OUTPATIENT)
Dept: OBSTETRICS AND GYNECOLOGY | Facility: CLINIC | Age: 30
End: 2020-01-14

## 2020-01-14 VITALS — SYSTOLIC BLOOD PRESSURE: 118 MMHG | BODY MASS INDEX: 26.26 KG/M2 | DIASTOLIC BLOOD PRESSURE: 72 MMHG | WEIGHT: 183 LBS

## 2020-01-14 DIAGNOSIS — Z36.89 CONFIRM FETAL CARDIAC ACTIVITY USING ULTRASOUND: Primary | ICD-10-CM

## 2020-01-14 DIAGNOSIS — Z3A.09 9 WEEKS GESTATION OF PREGNANCY: Primary | ICD-10-CM

## 2020-01-14 LAB
GLUCOSE UR STRIP-MCNC: NEGATIVE MG/DL
PROT UR STRIP-MCNC: ABNORMAL MG/DL

## 2020-01-14 PROCEDURE — 90471 IMMUNIZATION ADMIN: CPT | Performed by: OBSTETRICS & GYNECOLOGY

## 2020-01-14 PROCEDURE — 90674 CCIIV4 VAC NO PRSV 0.5 ML IM: CPT | Performed by: OBSTETRICS & GYNECOLOGY

## 2020-01-14 PROCEDURE — 0502F SUBSEQUENT PRENATAL CARE: CPT | Performed by: OBSTETRICS & GYNECOLOGY

## 2020-01-14 PROCEDURE — 76817 TRANSVAGINAL US OBSTETRIC: CPT | Performed by: OBSTETRICS & GYNECOLOGY

## 2020-01-14 RX ORDER — VITAMIN A ACETATE, BETA CAROTENE, ASCORBIC ACID, CHOLECALCIFEROL, .ALPHA.-TOCOPHEROL ACETATE, DL-, THIAMINE MONONITRATE, RIBOFLAVIN, NIACINAMIDE, PYRIDOXINE HYDROCHLORIDE, FOLIC ACID, CYANOCOBALAMIN, CALCIUM CARBONATE, FERROUS FUMARATE, ZINC OXIDE, CUPRIC OXIDE 3080; 12; 120; 400; 1; 1.84; 3; 20; 22; 920; 25; 200; 27; 10; 2 [IU]/1; UG/1; MG/1; [IU]/1; MG/1; MG/1; MG/1; MG/1; MG/1; [IU]/1; MG/1; MG/1; MG/1; MG/1; MG/1
1 TABLET, FILM COATED ORAL DAILY
Qty: 30 TABLET | Refills: 11 | Status: ON HOLD | OUTPATIENT
Start: 2020-01-14 | End: 2021-10-01

## 2020-01-16 LAB
ABO GROUP BLD: ABNORMAL
BASOPHILS # BLD AUTO: 0 X10E3/UL (ref 0–0.2)
BASOPHILS NFR BLD AUTO: 0 %
BLD GP AB SCN SERPL QL: NEGATIVE
EOSINOPHIL # BLD AUTO: 0.2 X10E3/UL (ref 0–0.4)
EOSINOPHIL NFR BLD AUTO: 3 %
ERYTHROCYTE [DISTWIDTH] IN BLOOD BY AUTOMATED COUNT: 12.7 % (ref 11.7–15.4)
HBV SURFACE AG SERPL QL IA: NEGATIVE
HCT VFR BLD AUTO: 40.7 % (ref 34–46.6)
HCV AB S/CO SERPL IA: <0.1 S/CO RATIO (ref 0–0.9)
HGB BLD-MCNC: 13.7 G/DL (ref 11.1–15.9)
HIV 1+2 AB+HIV1 P24 AG SERPL QL IA: NON REACTIVE
IMM GRANULOCYTES # BLD AUTO: 0 X10E3/UL (ref 0–0.1)
IMM GRANULOCYTES NFR BLD AUTO: 0 %
LYMPHOCYTES # BLD AUTO: 2.3 X10E3/UL (ref 0.7–3.1)
LYMPHOCYTES NFR BLD AUTO: 33 %
MCH RBC QN AUTO: 28.4 PG (ref 26.6–33)
MCHC RBC AUTO-ENTMCNC: 33.7 G/DL (ref 31.5–35.7)
MCV RBC AUTO: 84 FL (ref 79–97)
MONOCYTES # BLD AUTO: 0.9 X10E3/UL (ref 0.1–0.9)
MONOCYTES NFR BLD AUTO: 12 %
NEUTROPHILS # BLD AUTO: 3.6 X10E3/UL (ref 1.4–7)
NEUTROPHILS NFR BLD AUTO: 52 %
PLATELET # BLD AUTO: 264 X10E3/UL (ref 150–450)
RBC # BLD AUTO: 4.82 X10E6/UL (ref 3.77–5.28)
RH BLD: NEGATIVE
RPR SER QL: NON REACTIVE
RUBV IGG SERPL IA-ACNC: <0.9 INDEX
WBC # BLD AUTO: 7 X10E3/UL (ref 3.4–10.8)

## 2020-02-11 ENCOUNTER — ROUTINE PRENATAL (OUTPATIENT)
Dept: OBSTETRICS AND GYNECOLOGY | Facility: CLINIC | Age: 30
End: 2020-02-11

## 2020-02-11 ENCOUNTER — PROCEDURE VISIT (OUTPATIENT)
Dept: OBSTETRICS AND GYNECOLOGY | Facility: CLINIC | Age: 30
End: 2020-02-11

## 2020-02-11 VITALS — SYSTOLIC BLOOD PRESSURE: 114 MMHG | DIASTOLIC BLOOD PRESSURE: 64 MMHG | BODY MASS INDEX: 26.29 KG/M2 | WEIGHT: 183.2 LBS

## 2020-02-11 DIAGNOSIS — O36.80X0 PREGNANCY WITH UNCERTAIN FETAL VIABILITY, SINGLE OR UNSPECIFIED FETUS: Primary | ICD-10-CM

## 2020-02-11 DIAGNOSIS — Z3A.13 13 WEEKS GESTATION OF PREGNANCY: Primary | ICD-10-CM

## 2020-02-11 LAB
GLUCOSE UR STRIP-MCNC: NEGATIVE MG/DL
PROT UR STRIP-MCNC: NEGATIVE MG/DL

## 2020-02-11 PROCEDURE — 0502F SUBSEQUENT PRENATAL CARE: CPT | Performed by: OBSTETRICS & GYNECOLOGY

## 2020-02-11 PROCEDURE — 76801 OB US < 14 WKS SINGLE FETUS: CPT | Performed by: OBSTETRICS & GYNECOLOGY

## 2020-02-11 NOTE — PROGRESS NOTES
CC: Obstetric visit    HPI: 29-year-old  2 para 1 presents at 13-0/7 weeks for scheduled obstetric visit.  She is feeling well.  She has some low back pain.  No nausea or vomiting.  No vaginal bleeding.    Assessment and plan    1.  Intrauterine pregnancy at 13-0/7 weeks  2.  Prenatal labs were reviewed and discussed with the patient.  The patient is rubella nonimmune.  Also, she has a negative.  She will attempt to determine the father of the baby's blood type.  If this cannot be determined, RhoGam could be given at 28 weeks.  3.  History of recent treatment for trichomonas.  Counseled.  Test of cure was performed today.  4.  Unable to Doppler fetal heart tones.  On examination, the uterus is retroverted.  I recommend an ultrasound to confirm fetal viability.

## 2020-02-14 LAB
A VAGINAE DNA VAG QL NAA+PROBE: NORMAL SCORE
BVAB2 DNA VAG QL NAA+PROBE: NORMAL SCORE
C ALBICANS DNA VAG QL NAA+PROBE: NEGATIVE
C GLABRATA DNA VAG QL NAA+PROBE: NEGATIVE
C TRACH RRNA SPEC QL NAA+PROBE: NEGATIVE
MEGA1 DNA VAG QL NAA+PROBE: NORMAL SCORE
N GONORRHOEA RRNA SPEC QL NAA+PROBE: NEGATIVE
T VAGINALIS RRNA SPEC QL NAA+PROBE: NEGATIVE

## 2020-03-11 ENCOUNTER — ROUTINE PRENATAL (OUTPATIENT)
Dept: OBSTETRICS AND GYNECOLOGY | Facility: CLINIC | Age: 30
End: 2020-03-11

## 2020-03-11 VITALS — SYSTOLIC BLOOD PRESSURE: 116 MMHG | WEIGHT: 191.4 LBS | DIASTOLIC BLOOD PRESSURE: 64 MMHG | BODY MASS INDEX: 27.46 KG/M2

## 2020-03-11 DIAGNOSIS — Z3A.17 17 WEEKS GESTATION OF PREGNANCY: Primary | ICD-10-CM

## 2020-03-11 LAB
GLUCOSE UR STRIP-MCNC: NEGATIVE MG/DL
PROT UR STRIP-MCNC: NEGATIVE MG/DL

## 2020-03-11 PROCEDURE — 0502F SUBSEQUENT PRENATAL CARE: CPT | Performed by: OBSTETRICS & GYNECOLOGY

## 2020-03-11 NOTE — PROGRESS NOTES
CC: Obstetric visit    HPI: 29-year-old  2 para 1 presents at 17-1/7 weeks for her scheduled obstetric visit.  She is feeling well.  She has not yet begun to appreciate fetal movement.    Assessment and plan    1.  Intrauterine pregnancy at 17-1/7 weeks  2.  Blood type is A-.  Counseled and questions answered.  The patient is not able to obtain the baby's father's blood type.  For this reason, I recommend treating her is positive and giving RhoGam at 28 weeks.  The patient agrees with this recommendation.  3.  Test of cure last month was negative.  Counseled and questions answered.  4.  Influenza vaccine has been given earlier this season.  5.  Screening ultrasound at the next visit.

## 2020-04-02 ENCOUNTER — ROUTINE PRENATAL (OUTPATIENT)
Dept: OBSTETRICS AND GYNECOLOGY | Facility: CLINIC | Age: 30
End: 2020-04-02

## 2020-04-02 ENCOUNTER — PROCEDURE VISIT (OUTPATIENT)
Dept: OBSTETRICS AND GYNECOLOGY | Facility: CLINIC | Age: 30
End: 2020-04-02

## 2020-04-02 VITALS — SYSTOLIC BLOOD PRESSURE: 123 MMHG | DIASTOLIC BLOOD PRESSURE: 77 MMHG | BODY MASS INDEX: 28.27 KG/M2 | WEIGHT: 197 LBS

## 2020-04-02 DIAGNOSIS — Z3A.20 20 WEEKS GESTATION OF PREGNANCY: Primary | ICD-10-CM

## 2020-04-02 DIAGNOSIS — Z36.89 ENCOUNTER FOR FETAL ANATOMIC SURVEY: Primary | ICD-10-CM

## 2020-04-02 LAB
GLUCOSE UR STRIP-MCNC: NEGATIVE MG/DL
PROT UR STRIP-MCNC: NEGATIVE MG/DL

## 2020-04-02 PROCEDURE — 76805 OB US >/= 14 WKS SNGL FETUS: CPT | Performed by: OBSTETRICS & GYNECOLOGY

## 2020-04-02 PROCEDURE — 0502F SUBSEQUENT PRENATAL CARE: CPT | Performed by: OBSTETRICS & GYNECOLOGY

## 2020-04-02 PROCEDURE — 81002 URINALYSIS NONAUTO W/O SCOPE: CPT | Performed by: OBSTETRICS & GYNECOLOGY

## 2020-04-02 NOTE — PROGRESS NOTES
CC: Obstetric visit    HPI: 29-year-old  2 para 1 presents at 20-2/7 weeks for her scheduled obstetric visit.  She has begun to appreciate fetal movement.  She reports ankle swelling at the end of a long shift at work.  Otherwise, she is feeling well.    Assessment and plan    1.  Intrauterine pregnancy at 20-2/7 weeks  2.  Screening ultrasound was reviewed and discussed with the patient.  This is a normal screen.  3.  1 hour glucose tolerance test at the next visit.  Counseled.  4.  Counseled regarding the pathophysiology of ankle swelling after standing for long periods of time.  I recommend frequent breaks at work, at least every 2 hours, with the opportunity to sit down or walk.  Also, we discussed the use of graduated pressure stockings.  I answered the patient's questions and she agrees with these recommendations.

## 2020-05-02 ENCOUNTER — RESULTS ENCOUNTER (OUTPATIENT)
Dept: OBSTETRICS AND GYNECOLOGY | Facility: CLINIC | Age: 30
End: 2020-05-02

## 2020-05-02 DIAGNOSIS — Z3A.20 20 WEEKS GESTATION OF PREGNANCY: ICD-10-CM

## 2020-05-05 ENCOUNTER — ROUTINE PRENATAL (OUTPATIENT)
Dept: OBSTETRICS AND GYNECOLOGY | Facility: CLINIC | Age: 30
End: 2020-05-05

## 2020-05-05 VITALS — WEIGHT: 214.4 LBS | SYSTOLIC BLOOD PRESSURE: 118 MMHG | DIASTOLIC BLOOD PRESSURE: 70 MMHG | BODY MASS INDEX: 30.76 KG/M2

## 2020-05-05 DIAGNOSIS — Z3A.25 25 WEEKS GESTATION OF PREGNANCY: Primary | ICD-10-CM

## 2020-05-05 LAB
GLUCOSE UR STRIP-MCNC: NEGATIVE MG/DL
PROT UR STRIP-MCNC: NEGATIVE MG/DL

## 2020-05-05 PROCEDURE — 0502F SUBSEQUENT PRENATAL CARE: CPT | Performed by: OBSTETRICS & GYNECOLOGY

## 2020-05-05 NOTE — PROGRESS NOTES
CC: Obstetric visit    HPI: 29-year-old  2 para 1 presents at 25-0/7 weeks for her scheduled obstetric visit.  She is feeling well.  She still has episodes of ankle swelling after a long day at work.  Otherwise, she is doing well.  Baby is moving actively.    Assessment and plan    1.  Intrauterine pregnancy at 25-0/7 weeks  2.  Episodes of bilateral ankle swelling after standing for long periods of time.  We discussed strategies to minimize this.  3.  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  We discussed the use of a mask and other strategies to minimize the risk of COVID.  4.  1 hour glucose tolerance test before the next visit.  5.  Rh- blood type.  RhoGam at the next visit.

## 2020-05-08 LAB
BASOPHILS # BLD AUTO: 0.04 10*3/MM3 (ref 0–0.2)
BASOPHILS NFR BLD AUTO: 0.3 % (ref 0–1.5)
BLD GP AB SCN SERPL QL: NEGATIVE
EOSINOPHIL # BLD AUTO: 0.28 10*3/MM3 (ref 0–0.4)
EOSINOPHIL NFR BLD AUTO: 2.3 % (ref 0.3–6.2)
ERYTHROCYTE [DISTWIDTH] IN BLOOD BY AUTOMATED COUNT: 12.6 % (ref 12.3–15.4)
GLUCOSE 1H P 50 G GLC PO SERPL-MCNC: 124 MG/DL (ref 65–179)
HCT VFR BLD AUTO: 30.9 % (ref 34–46.6)
HGB BLD-MCNC: 10.3 G/DL (ref 12–15.9)
IMM GRANULOCYTES # BLD AUTO: 0.06 10*3/MM3 (ref 0–0.05)
IMM GRANULOCYTES NFR BLD AUTO: 0.5 % (ref 0–0.5)
LYMPHOCYTES # BLD AUTO: 2.51 10*3/MM3 (ref 0.7–3.1)
LYMPHOCYTES NFR BLD AUTO: 20.9 % (ref 19.6–45.3)
MCH RBC QN AUTO: 28.9 PG (ref 26.6–33)
MCHC RBC AUTO-ENTMCNC: 33.3 G/DL (ref 31.5–35.7)
MCV RBC AUTO: 86.8 FL (ref 79–97)
MONOCYTES # BLD AUTO: 0.83 10*3/MM3 (ref 0.1–0.9)
MONOCYTES NFR BLD AUTO: 6.9 % (ref 5–12)
NEUTROPHILS # BLD AUTO: 8.3 10*3/MM3 (ref 1.7–7)
NEUTROPHILS NFR BLD AUTO: 69.1 % (ref 42.7–76)
NRBC BLD AUTO-RTO: 0 /100 WBC (ref 0–0.2)
PLATELET # BLD AUTO: 230 10*3/MM3 (ref 140–450)
RBC # BLD AUTO: 3.56 10*6/MM3 (ref 3.77–5.28)
WBC # BLD AUTO: 12.02 10*3/MM3 (ref 3.4–10.8)

## 2020-05-08 RX ORDER — FERROUS SULFATE 325(65) MG
325 TABLET ORAL
Qty: 30 TABLET | Refills: 6 | Status: ON HOLD | OUTPATIENT
Start: 2020-05-08 | End: 2021-10-01

## 2020-05-11 ENCOUNTER — TELEPHONE (OUTPATIENT)
Dept: OBSTETRICS AND GYNECOLOGY | Facility: CLINIC | Age: 30
End: 2020-05-11

## 2020-05-11 NOTE — TELEPHONE ENCOUNTER
Patient notified that 1 hour glucose was normal but that Hgb had dropped and rx for iron had been sent to the pharmacy per Dr. Ray.

## 2020-05-26 ENCOUNTER — ROUTINE PRENATAL (OUTPATIENT)
Dept: OBSTETRICS AND GYNECOLOGY | Facility: CLINIC | Age: 30
End: 2020-05-26

## 2020-05-26 VITALS — DIASTOLIC BLOOD PRESSURE: 73 MMHG | SYSTOLIC BLOOD PRESSURE: 107 MMHG | BODY MASS INDEX: 31.57 KG/M2 | WEIGHT: 220 LBS

## 2020-05-26 DIAGNOSIS — Z3A.28 28 WEEKS GESTATION OF PREGNANCY: Primary | ICD-10-CM

## 2020-05-26 PROCEDURE — 0502F SUBSEQUENT PRENATAL CARE: CPT | Performed by: OBSTETRICS & GYNECOLOGY

## 2020-05-26 PROCEDURE — 96372 THER/PROPH/DIAG INJ SC/IM: CPT | Performed by: OBSTETRICS & GYNECOLOGY

## 2020-05-26 NOTE — PROGRESS NOTES
CC: Obstetric visit    HPI: 29-year-old  2 para 1 presents at 28-0/7 weeks for her scheduled obstetric visit.  She is feeling well.  Her baby is moving actively.  Her lower extremity edema has improved with frequent breaks.    Assessment and plan    1.  Intrauterine pregnancy at 28-0/7 weeks  2.  RhoGam was given today.  3.  Anemia.  Counseled.  The patient is taking iron sulfate.  I recommend a hemoglobin and hematocrit at the next visit.

## 2020-06-08 ENCOUNTER — HOSPITAL ENCOUNTER (EMERGENCY)
Facility: HOSPITAL | Age: 30
Discharge: HOME | End: 2020-06-08
Attending: OBSTETRICS & GYNECOLOGY | Admitting: OBSTETRICS & GYNECOLOGY

## 2020-06-08 ENCOUNTER — TELEPHONE (OUTPATIENT)
Dept: OBSTETRICS AND GYNECOLOGY | Facility: CLINIC | Age: 30
End: 2020-06-08

## 2020-06-08 VITALS
DIASTOLIC BLOOD PRESSURE: 62 MMHG | BODY MASS INDEX: 30.64 KG/M2 | HEART RATE: 84 BPM | RESPIRATION RATE: 16 BRPM | TEMPERATURE: 98.1 F | HEIGHT: 70 IN | SYSTOLIC BLOOD PRESSURE: 113 MMHG | OXYGEN SATURATION: 98 % | WEIGHT: 214 LBS

## 2020-06-08 PROBLEM — O12.03: Status: ACTIVE | Noted: 2020-06-08

## 2020-06-08 LAB
ALBUMIN SERPL-MCNC: 3.7 G/DL (ref 3.5–5.2)
ALBUMIN/GLOB SERPL: 1.5 G/DL
ALP SERPL-CCNC: 51 U/L (ref 39–117)
ALT SERPL W P-5'-P-CCNC: 16 U/L (ref 1–33)
ANION GAP SERPL CALCULATED.3IONS-SCNC: 8.2 MMOL/L (ref 5–15)
AST SERPL-CCNC: 21 U/L (ref 1–32)
BASOPHILS # BLD AUTO: 0.03 10*3/MM3 (ref 0–0.2)
BASOPHILS NFR BLD AUTO: 0.3 % (ref 0–1.5)
BILIRUB SERPL-MCNC: 0.2 MG/DL (ref 0.2–1.2)
BILIRUB UR QL STRIP: NEGATIVE
BUN BLD-MCNC: 8 MG/DL (ref 6–20)
BUN/CREAT SERPL: 11 (ref 7–25)
CALCIUM SPEC-SCNC: 8.9 MG/DL (ref 8.6–10.5)
CHLORIDE SERPL-SCNC: 102 MMOL/L (ref 98–107)
CLARITY UR: ABNORMAL
CO2 SERPL-SCNC: 24.8 MMOL/L (ref 22–29)
COLOR UR: YELLOW
CREAT BLD-MCNC: 0.73 MG/DL (ref 0.57–1)
CREAT UR-MCNC: 94.7 MG/DL
DEPRECATED RDW RBC AUTO: 41.2 FL (ref 37–54)
EOSINOPHIL # BLD AUTO: 0.23 10*3/MM3 (ref 0–0.4)
EOSINOPHIL NFR BLD AUTO: 2 % (ref 0.3–6.2)
ERYTHROCYTE [DISTWIDTH] IN BLOOD BY AUTOMATED COUNT: 12.9 % (ref 12.3–15.4)
GFR SERPL CREATININE-BSD FRML MDRD: 96 ML/MIN/1.73
GLOBULIN UR ELPH-MCNC: 2.5 GM/DL
GLUCOSE BLD-MCNC: 111 MG/DL (ref 65–99)
GLUCOSE UR STRIP-MCNC: NEGATIVE MG/DL
HCT VFR BLD AUTO: 29.2 % (ref 34–46.6)
HGB BLD-MCNC: 10 G/DL (ref 12–15.9)
HGB UR QL STRIP.AUTO: NEGATIVE
IMM GRANULOCYTES # BLD AUTO: 0.08 10*3/MM3 (ref 0–0.05)
IMM GRANULOCYTES NFR BLD AUTO: 0.7 % (ref 0–0.5)
KETONES UR QL STRIP: NEGATIVE
LEUKOCYTE ESTERASE UR QL STRIP.AUTO: NEGATIVE
LYMPHOCYTES # BLD AUTO: 2.29 10*3/MM3 (ref 0.7–3.1)
LYMPHOCYTES NFR BLD AUTO: 20.2 % (ref 19.6–45.3)
MCH RBC QN AUTO: 29.7 PG (ref 26.6–33)
MCHC RBC AUTO-ENTMCNC: 34.2 G/DL (ref 31.5–35.7)
MCV RBC AUTO: 86.6 FL (ref 79–97)
MONOCYTES # BLD AUTO: 0.84 10*3/MM3 (ref 0.1–0.9)
MONOCYTES NFR BLD AUTO: 7.4 % (ref 5–12)
NEUTROPHILS # BLD AUTO: 7.86 10*3/MM3 (ref 1.7–7)
NEUTROPHILS NFR BLD AUTO: 69.4 % (ref 42.7–76)
NITRITE UR QL STRIP: NEGATIVE
NRBC BLD AUTO-RTO: 0 /100 WBC (ref 0–0.2)
PH UR STRIP.AUTO: 7.5 [PH] (ref 5–8)
PLATELET # BLD AUTO: 213 10*3/MM3 (ref 140–450)
PMV BLD AUTO: 9.5 FL (ref 6–12)
POTASSIUM BLD-SCNC: 3.7 MMOL/L (ref 3.5–5.2)
PROT SERPL-MCNC: 6.2 G/DL (ref 6–8.5)
PROT UR QL STRIP: NEGATIVE
PROT UR-MCNC: 10 MG/DL
PROT/CREAT UR: 105.6 MG/G CREA (ref 0–200)
RBC # BLD AUTO: 3.37 10*6/MM3 (ref 3.77–5.28)
SODIUM BLD-SCNC: 135 MMOL/L (ref 136–145)
SP GR UR STRIP: 1.02 (ref 1–1.03)
UROBILINOGEN UR QL STRIP: ABNORMAL
WBC NRBC COR # BLD: 11.33 10*3/MM3 (ref 3.4–10.8)

## 2020-06-08 PROCEDURE — 85025 COMPLETE CBC W/AUTO DIFF WBC: CPT | Performed by: OBSTETRICS & GYNECOLOGY

## 2020-06-08 PROCEDURE — 81003 URINALYSIS AUTO W/O SCOPE: CPT | Performed by: OBSTETRICS & GYNECOLOGY

## 2020-06-08 PROCEDURE — 87086 URINE CULTURE/COLONY COUNT: CPT | Performed by: OBSTETRICS & GYNECOLOGY

## 2020-06-08 PROCEDURE — 84156 ASSAY OF PROTEIN URINE: CPT | Performed by: OBSTETRICS & GYNECOLOGY

## 2020-06-08 PROCEDURE — 59025 FETAL NON-STRESS TEST: CPT

## 2020-06-08 PROCEDURE — 82570 ASSAY OF URINE CREATININE: CPT | Performed by: OBSTETRICS & GYNECOLOGY

## 2020-06-08 PROCEDURE — 99283 EMERGENCY DEPT VISIT LOW MDM: CPT

## 2020-06-08 PROCEDURE — 80053 COMPREHEN METABOLIC PANEL: CPT | Performed by: OBSTETRICS & GYNECOLOGY

## 2020-06-08 NOTE — TELEPHONE ENCOUNTER
Patient calling, she is having swelling in right side, foot and right hand, both hurt.  Has been swollen since yesterday.  What should she do?

## 2020-06-08 NOTE — OBED NOTES
[facility] Provider Note        2020 15:26    Beena Vincent is a 29 y.o.  at 29w6d DALTON  2020, by Last Menstrual Period who presents for : Swelling (Swelling started in R leg/arm/hand yesterday 6/7 evening. Pt denies pain, +FM, and denies LOF/VB. )          HPI:     30yo  at 29w6d presenting with complaint of swelling of RIGHT upper AND RIGHT lower extremities with some pain on the anterior lower leg near the ankle.  She was induced around 38 weeks in last pregnancy secondary to preeclampsia.  She has had no issues with hypertension this pregnancy and denies all preeclampsia signs, symptoms.     No VB/LOF/HA/visual changes/N/V/F/C/dysuria     Active fetus was   denies ROM  deniescontractions, cramping   denies bleeding    Prenatal care with Arsh Ray MD uncomplicated    Patient Active Problem List    Diagnosis   • KYRA III (cervical intraepithelial neoplasia grade III) with severe dysplasia [D06.9]         POB:   OB History    Para Term  AB Living   2 0 0 0 0 1   SAB TAB Ectopic Molar Multiple Live Births   0 0 0 0 0 0      # Outcome Date GA Lbr Matthias/2nd Weight Sex Delivery Anes PTL Lv   2 Current            1                Obstetric Comments    x1.  Induced for preeclampsia      PMH:   Past Medical History:   Diagnosis Date   • Asthma    • Cervical dysplasia      PSH:   Past Surgical History:   Procedure Laterality Date   • KNEE ARTHROSCOPY      AGE 6   • LEEP N/A 2019    Procedure: LOOP ELECTROCAUTERY EXCISION PROCEDURE;  Surgeon: Arsh Ray MD;  Location: [facility] [place];  Service: Obstetrics/Gynecology     FH:    Family History   Problem Relation Age of Onset   • Diabetes Father    • Malig Hyperthermia Neg Hx      SH:   Social History     Tobacco Use   • Smoking status: Former Smoker     Packs/day: 0.50     Last attempt to quit: 2020     Years since quittin.3   Substance Use Topics   • Alcohol use: Yes     Comment: socially   • Drug use: No      "      Allergies: Penicillins and Adhesive tape    Medications:   Medications Prior to Admission   Medication Sig Dispense Refill Last Dose   • albuterol sulfate  (90 Base) MCG/ACT inhaler Inhale 2 puffs Every 4 (Four) Hours As Needed for Wheezing.   Past Month at Unknown time   • ferrous sulfate 325 (65 FE) MG tablet Take 1 tablet by mouth Daily With Breakfast. 30 tablet 6 6/7/2020 at 1900   • prenatal vitamins (PRENATAL 27-1) 27-1 MG tablet tablet Take 1 tablet by mouth Daily. 30 tablet 11 6/7/2020 at 1900       Review of Systems  A 14 system review was completed and negative except for what is noted in the HPI or below  Pertinent items are noted in HPI, all other systems reviewed and negative  Denies facial droop, lid lag or difficulty swallowing/eating.  Denies foot droop or leg drag  Denies urinary or fecal incontinence.  Denies difficulty standing , walking or using extrremities though there is some subjective weakness of her right hand.    Physical exam    Temp:  [98.1 °F (36.7 °C)] 98.1 °F (36.7 °C)  Heart Rate:  [96] 96  Resp:  [16] 16  BP: (139)/(75) 139/75  Estimated body mass index is 30.71 kg/m² as calculated from the following:    Height as of this encounter: 177.8 cm (70\").    Weight as of this encounter: 97.1 kg (214 lb).    General appearance - alert, well appearing, and in no distress and oriented to person, place, and time  Eyes - pupils equal and reactive, extraocular eye movements intact  Mouth - mucous membranes moist, pharynx normal without lesions and mimic muscles are symmetric. No facial droop or lid lag  Neck - supple, no significant adenopathy  Chest - clear to auscultation, no wheezes, rales or rhonchi, symmetric air entry  Heart - normal rate, regular rhythm, normal S1, S2, no murmurs, rubs, clicks or gallops  Abdomen - soft, nontender, nondistended, no masses or organomegaly  Gravid, nontender.  Neurological - alert, oriented, normal speech, no focal findings or movement disorder " noted, cranial nerves II through XII intact, DTR's normal and symmetric, motor and sensory grossly normal bilaterally, normal muscle tone, no tremors, strength 5/5  Musculoskeletal - no joint tenderness, deformity or swelling  Extremities - pedal edema 1+ bilateral, Upper and lower extremities with NORMAL neurological examination.  Skin - normal coloration and turgor, no rashes, no suspicious skin lesions noted     Membranes: Intact                    U/S reviewed: not performed.    External Prenatal Results     Pregnancy Outside Results - Transcribed From Office Records - See Scanned Records For Details     Test Value Date Time    Hgb 10.3 g/dL 05/07/20 1058      13.7 g/dL 01/14/20 1155    Hct 30.9 % 05/07/20 1058      40.7 % 01/14/20 1155    ABO A  01/14/20 1155    Rh Negative  01/14/20 1155    Antibody Screen Negative  05/07/20 1058      Negative  01/14/20 1155    Glucose Fasting GTT       Glucose Tolerance Test 1 hour       Glucose Tolerance Test 3 hour       Gonorrhea (discrete) Negative  02/11/20 1228      Negative  12/18/19 1527    Chlamydia (discrete) Negative  02/11/20 1228      Negative  12/18/19 1527    RPR Non Reactive  01/14/20 1155    VDRL       Syphilis Antibody       Rubella <0.90 index 01/14/20 1155    HBsAg Negative  01/14/20 1155    Herpes Simplex Virus PCR       Herpes Simplex VIrus Culture       HIV Non Reactive  01/14/20 1155    Hep C RNA Quant PCR       Hep C Antibody <0.1 s/co ratio 01/14/20 1155    AFP       Group B Strep       GBS Susceptibility to Clindamycin       GBS Susceptibility to Erythromycin       Fetal Fibronectin       Genetic Testing, Maternal Blood             Drug Screening     Test Value Date Time    Urine Drug Screen       Amphetamine Screen       Barbiturate Screen       Benzodiazepine Screen       Methadone Screen       Phencyclidine Screen       Opiates Screen       THC Screen       Cocaine Screen       Propoxyphene Screen       Buprenorphine Screen       Methamphetamine  Screen       Oxycodone Screen       Tricyclic Antidepressants Screen                 CBC, CMP, UA, Urine Protein/Cr ratio reviewed.       NST: Reactive, no contractions    Impression:   @ 29w6d .  Final Diagnosis:    1. Right-sided swelling not substantiated on examination. Normal exam bilaterally  2. Borderline hypertension on arrival. Repeat Bps normal. NO PIH si/symptoms, normal labs           CBC, CMP, UA, Urine Protein/Cr ratio ordered - no evidence of hypertensive pathology.    Plan:  1. Discharge to home.    2. Plan of care has been reviewed with patient and mother  3.  Risks, benefits of treatment plan have been discussed.  4.  All questions have been answered.  5.  Call OB with any increasing signs or symptoms  6. Keep next scheduled prenatal appointment    Jakub Case MD  2020  15:26

## 2020-06-08 NOTE — NURSING NOTE
Discharge instructions reviewed with patient. Discussed fetal kick counts, LOF, and VB. Informed to keep all regularly scheduled appointments. Told patient to call MD or return back to L&D with any questions and concerns. Patient verbalizes understanding.

## 2020-06-09 LAB — BACTERIA SPEC AEROBE CULT: NO GROWTH

## 2020-06-23 ENCOUNTER — ROUTINE PRENATAL (OUTPATIENT)
Dept: OBSTETRICS AND GYNECOLOGY | Facility: CLINIC | Age: 30
End: 2020-06-23

## 2020-06-23 VITALS — DIASTOLIC BLOOD PRESSURE: 67 MMHG | BODY MASS INDEX: 33.12 KG/M2 | SYSTOLIC BLOOD PRESSURE: 118 MMHG | WEIGHT: 230.8 LBS

## 2020-06-23 DIAGNOSIS — O99.019 IRON DEFICIENCY ANEMIA DURING PREGNANCY: ICD-10-CM

## 2020-06-23 DIAGNOSIS — Z3A.32 32 WEEKS GESTATION OF PREGNANCY: Primary | ICD-10-CM

## 2020-06-23 DIAGNOSIS — D50.9 IRON DEFICIENCY ANEMIA DURING PREGNANCY: ICD-10-CM

## 2020-06-23 LAB
GLUCOSE UR STRIP-MCNC: NEGATIVE MG/DL
PROT UR STRIP-MCNC: NEGATIVE MG/DL

## 2020-06-23 PROCEDURE — 0502F SUBSEQUENT PRENATAL CARE: CPT | Performed by: OBSTETRICS & GYNECOLOGY

## 2020-06-23 NOTE — PROGRESS NOTES
CC: 29-year-old  2 para 1 presents at 32-0/7 weeks for her scheduled obstetric visit.  Her baby is moving actively.  She has no complaints today.  She was evaluated in labor and delivery for right-sided swelling on .  Work-up was negative.  The patient reports no further episodes.    Assessment and plan    1.  Intrauterine pregnancy at 32-0/7 weeks  2.  Iron deficiency anemia.  This has worsened despite oral iron replacement.  Counseled and questions answered.  I recommend a hematology consult regarding possible IV iron.  The patient agrees with this recommendation.  A referral has been sent.  3.  No further episodes of manual edema.

## 2020-06-29 ENCOUNTER — LAB (OUTPATIENT)
Dept: LAB | Facility: HOSPITAL | Age: 30
End: 2020-06-29

## 2020-06-29 ENCOUNTER — CONSULT (OUTPATIENT)
Dept: ONCOLOGY | Facility: CLINIC | Age: 30
End: 2020-06-29

## 2020-06-29 VITALS
TEMPERATURE: 97.3 F | HEIGHT: 69 IN | WEIGHT: 232 LBS | RESPIRATION RATE: 12 BRPM | OXYGEN SATURATION: 97 % | DIASTOLIC BLOOD PRESSURE: 77 MMHG | HEART RATE: 95 BPM | BODY MASS INDEX: 34.36 KG/M2 | SYSTOLIC BLOOD PRESSURE: 110 MMHG

## 2020-06-29 DIAGNOSIS — O99.019 ANTEPARTUM ANEMIA: Primary | ICD-10-CM

## 2020-06-29 DIAGNOSIS — O99.013 ANEMIA AFFECTING PREGNANCY IN THIRD TRIMESTER: Primary | ICD-10-CM

## 2020-06-29 LAB
BASOPHILS # BLD AUTO: 0.05 10*3/MM3 (ref 0–0.2)
BASOPHILS NFR BLD AUTO: 0.4 % (ref 0–1.5)
DEPRECATED RDW RBC AUTO: 38.3 FL (ref 37–54)
EOSINOPHIL # BLD AUTO: 0.27 10*3/MM3 (ref 0–0.4)
EOSINOPHIL NFR BLD AUTO: 2.1 % (ref 0.3–6.2)
ERYTHROCYTE [DISTWIDTH] IN BLOOD BY AUTOMATED COUNT: 12.6 % (ref 12.3–15.4)
FERRITIN SERPL-MCNC: 22.2 NG/ML (ref 13–150)
FOLATE SERPL-MCNC: >20 NG/ML (ref 4.78–24.2)
HCT VFR BLD AUTO: 33 % (ref 34–46.6)
HGB BLD-MCNC: 11.4 G/DL (ref 12–15.9)
IMM GRANULOCYTES # BLD AUTO: 0.17 10*3/MM3 (ref 0–0.05)
IMM GRANULOCYTES NFR BLD AUTO: 1.3 % (ref 0–0.5)
IRON 24H UR-MRATE: 90 MCG/DL (ref 37–145)
IRON SATN MFR SERPL: 15 % (ref 20–50)
LDH SERPL-CCNC: 180 U/L (ref 135–214)
LYMPHOCYTES # BLD AUTO: 3.06 10*3/MM3 (ref 0.7–3.1)
LYMPHOCYTES NFR BLD AUTO: 24 % (ref 19.6–45.3)
MCH RBC QN AUTO: 29.2 PG (ref 26.6–33)
MCHC RBC AUTO-ENTMCNC: 34.5 G/DL (ref 31.5–35.7)
MCV RBC AUTO: 84.6 FL (ref 79–97)
MONOCYTES # BLD AUTO: 0.98 10*3/MM3 (ref 0.1–0.9)
MONOCYTES NFR BLD AUTO: 7.7 % (ref 5–12)
NEUTROPHILS # BLD AUTO: 8.22 10*3/MM3 (ref 1.7–7)
NEUTROPHILS NFR BLD AUTO: 64.5 % (ref 42.7–76)
NRBC BLD AUTO-RTO: 0 /100 WBC (ref 0–0.2)
PLATELET # BLD AUTO: 211 10*3/MM3 (ref 140–450)
PMV BLD AUTO: 10.1 FL (ref 6–12)
RBC # BLD AUTO: 3.9 10*6/MM3 (ref 3.77–5.28)
TIBC SERPL-MCNC: 599 MCG/DL (ref 298–536)
TRANSFERRIN SERPL-MCNC: 402 MG/DL (ref 200–360)
VIT B12 BLD-MCNC: 238 PG/ML (ref 211–946)
WBC NRBC COR # BLD: 12.75 10*3/MM3 (ref 3.4–10.8)

## 2020-06-29 PROCEDURE — 36415 COLL VENOUS BLD VENIPUNCTURE: CPT

## 2020-06-29 PROCEDURE — 85025 COMPLETE CBC W/AUTO DIFF WBC: CPT

## 2020-06-29 PROCEDURE — 99244 OFF/OP CNSLTJ NEW/EST MOD 40: CPT | Performed by: INTERNAL MEDICINE

## 2020-06-29 PROCEDURE — 82728 ASSAY OF FERRITIN: CPT | Performed by: INTERNAL MEDICINE

## 2020-06-29 PROCEDURE — 82746 ASSAY OF FOLIC ACID SERUM: CPT | Performed by: INTERNAL MEDICINE

## 2020-06-29 PROCEDURE — 83615 LACTATE (LD) (LDH) ENZYME: CPT | Performed by: INTERNAL MEDICINE

## 2020-06-29 PROCEDURE — 84466 ASSAY OF TRANSFERRIN: CPT | Performed by: INTERNAL MEDICINE

## 2020-06-29 PROCEDURE — 82607 VITAMIN B-12: CPT | Performed by: INTERNAL MEDICINE

## 2020-06-29 PROCEDURE — 83540 ASSAY OF IRON: CPT | Performed by: INTERNAL MEDICINE

## 2020-06-29 NOTE — PROGRESS NOTES
Subjective     REASON FOR CONSULTATION: Anemia  Provide an opinion on any further workup or treatment                             REQUESTING PHYSICIAN:  Dr. Ray    RECORDS OBTAINED:  Records of the patients history including those obtained from the referring provider were reviewed and summarized in detail.    History of Present Illness   This is a 29-year-old woman currently 33 weeks gestation with her second pregnancy.  The patient is referred for evaluation of anemia.  Reviewing the CBCs available, the patient had a normal hemoglobin on 1/14/2020 at 13.7 with MCV 84.0.  On 5/7/2020 the hemoglobin declined to 10.3 and on 6/8/2020 10.0.  The MCV on those dates were 86.  She has had a mild leukocytosis during pregnancy with neutrophilia.  Her platelet count has been normal.  CMP on 6/8/2020 was normal except for glucose 111 and sodium 135.  The total protein was 6.2 and bilirubin 0.2.  No nutritional studies are available in her chart.    The patient has been taking oral iron tablet ferrous sulfate 325 mg daily for approximately 6 weeks with no significant nausea or constipation.  She denies lightheadedness, dizziness or shortness of breath.  She has mild ankle edema at the end of day.  She denies history of heavy menstrual cycles.    The patient has had 1 prior pregnancy uncomplicated by anemia or the need of blood transfusions or iron infusions.    Past Medical History:   Diagnosis Date   • Asthma    • Cervical dysplasia    • Iron deficiency anemia         Past Surgical History:   Procedure Laterality Date   • KNEE ARTHROSCOPY      AGE 6   • LEEP N/A 4/25/2019    Procedure: LOOP ELECTROCAUTERY EXCISION PROCEDURE;  Surgeon: Arsh Ray MD;  [place] [facility] [place] [facility] [place]  Service: Obstetrics/Gynecology        Current Outpatient Medications on File Prior to Visit   Medication Sig Dispense Refill   • albuterol sulfate  (90 Base) MCG/ACT inhaler Inhale 2 puffs Every 4 (Four) Hours As Needed for Wheezing.     •  "ferrous sulfate 325 (65 FE) MG tablet Take 1 tablet by mouth Daily With Breakfast. 30 tablet 6   • prenatal vitamins (PRENATAL 27-1) 27-1 MG tablet tablet Take 1 tablet by mouth Daily. 30 tablet 11     No current facility-administered medications on file prior to visit.         ALLERGIES:    Allergies   Allergen Reactions   • Penicillins Nausea And Vomiting   • Adhesive Tape Rash     \"SURGICAL TAPE\"  AS A CHILD        Social History     Socioeconomic History   • Marital status: Single     Spouse name: Not on file   • Number of children: Not on file   • Years of education: Not on file   • Highest education level: Not on file   Tobacco Use   • Smoking status: Former Smoker     Packs/day: 0.50     Years: 4.00     Pack years: 2.00     Last attempt to quit: 2020     Years since quittin.3   Substance and Sexual Activity   • Alcohol use: Yes     Comment: socially   • Drug use: No   • Sexual activity: Never        Family History   Problem Relation Age of Onset   • Diabetes Father    • Malig Hyperthermia Neg Hx         Review of Systems   HENT: Negative.    Eyes: Negative.    Respiratory: Negative.    Cardiovascular: Negative.    Gastrointestinal: Negative.    Genitourinary: Negative.    Musculoskeletal: Negative.    Skin: Negative.    Allergic/Immunologic: Negative.    Neurological: Negative.    Hematological: Negative.    Psychiatric/Behavioral: Negative.           Objective     Vitals:    20 0802   BP: 110/77   Pulse: 95   Resp: 12   Temp: 97.3 °F (36.3 °C)   TempSrc: Tympanic   SpO2: 97%   Weight: 105 kg (232 lb)   Height: 176.3 cm (69.41\")   PainSc: 0-No pain     Current Status 2020   ECOG score 0       Physical Exam    CON: pleasant well-developed adult young woman  HEENT: no icterus, no thrush, moist membranes  NECK: no jvd  LYMPH: no cervical or supraclavicular lad  CV: RRR, S1S2, no murmur  RESP: cta bilat, no wheezing, no rales  GI: soft, non-tender, no splenomegaly, +bs  : gravid uterus c/w " gestational age  MUSC: trace ankle edema, normal gait  NEURO: alert and oriented x3, normal strength  PSYCH: normal mood and affect    RECENT LABS:  Hematology WBC   Date Value Ref Range Status   06/29/2020 12.75 (H) 3.40 - 10.80 10*3/mm3 Final   05/07/2020 12.02 (H) 3.40 - 10.80 10*3/mm3 Final     RBC   Date Value Ref Range Status   06/29/2020 3.90 3.77 - 5.28 10*6/mm3 Final   05/07/2020 3.56 (L) 3.77 - 5.28 10*6/mm3 Final     Hemoglobin   Date Value Ref Range Status   06/29/2020 11.4 (L) 12.0 - 15.9 g/dL Final     Hematocrit   Date Value Ref Range Status   06/29/2020 33.0 (L) 34.0 - 46.6 % Final     Platelets   Date Value Ref Range Status   06/29/2020 211 140 - 450 10*3/mm3 Final          Assessment/Plan     Anemia affecting the third trimester of pregnancy: The patient has developed mild anemia during her pregnancy with the lowest hemoglobin 10.0 on 6/8/2020.  She has been taking ferrous sulfate 325 mg daily with good tolerance for approximately 6 weeks and also on a prenatal multivitamin.  Her hemoglobin has currently improved to 11.4.  She is asymptomatic from anemia.  Today I will check her ferritin, iron profile, folate and LDH.  She seems to be responding well to the oral iron and has minimal anemia at this point.  If she is severely iron deficient IV iron can be considered, but I think likely oral iron is going to be okay for her given her hemoglobin of 11.4.  Since she has a normal MCV also important to rule out B12 deficiency.    She has mild leukocytosis, likely physiologic of pregnancy.    Thank you for allowing me to participate in this pleasant patient's care.

## 2020-06-30 ENCOUNTER — TELEPHONE (OUTPATIENT)
Dept: ONCOLOGY | Facility: CLINIC | Age: 30
End: 2020-06-30

## 2020-06-30 NOTE — TELEPHONE ENCOUNTER
Called Ms. Vincent and let her know that her iron levels are low but her hemoglobin is improving and Dr. Schmitz feels it's okay to stay on the oral iron tablet for now.  He also recommends she stay on iron the rest of her pregnancy and then at least 3 months after delivery to replace her iron stores.  I let her know that her B12 was normal.    ----- Message from Arsh Schmitz MD sent at 6/29/2020 11:44 AM EDT -----  Please let the patient know that her iron levels are low but since her hemoglobin is improving I think it is okay to stay on the oral iron tablet for now.  I would recommend she stay on iron throughout the remainder of her pregnancy and then at least 3 months after delivery to replace her iron stores.  Her B12 level was normal.

## 2020-07-01 ENCOUNTER — APPOINTMENT (OUTPATIENT)
Dept: LAB | Facility: HOSPITAL | Age: 30
End: 2020-07-01

## 2020-07-07 ENCOUNTER — ROUTINE PRENATAL (OUTPATIENT)
Dept: OBSTETRICS AND GYNECOLOGY | Facility: CLINIC | Age: 30
End: 2020-07-07

## 2020-07-07 VITALS — DIASTOLIC BLOOD PRESSURE: 72 MMHG | BODY MASS INDEX: 34.59 KG/M2 | SYSTOLIC BLOOD PRESSURE: 118 MMHG | WEIGHT: 237 LBS

## 2020-07-07 DIAGNOSIS — Z3A.34 34 WEEKS GESTATION OF PREGNANCY: Primary | ICD-10-CM

## 2020-07-07 LAB
GLUCOSE UR STRIP-MCNC: NEGATIVE MG/DL
PROT UR STRIP-MCNC: NEGATIVE MG/DL

## 2020-07-07 PROCEDURE — 0502F SUBSEQUENT PRENATAL CARE: CPT | Performed by: OBSTETRICS & GYNECOLOGY

## 2020-07-07 NOTE — PROGRESS NOTES
CC: Obstetric visit    HPI: 29-year-old  2 para 1 presents at 34-0/7 weeks for her scheduled obstetric visit.  Her baby is moving actively.  She had a visit with hematology.  They felt that her hemoglobin was improving adequately with oral iron supplementation.    Assessment and plan    1.  Intrauterine pregnancy at 34-0/7 weeks  2.  Anemia.  Continue oral supplementation with iron.  3.  Ultrasound next visit to assess estimated fetal weight.  Fundal height is greater than dates.  4.  Group B strep cultures at the next visit.  Counseled and questions answered.  5.  Fatigue and swelling while at work.  Counseled.  The patient would like to continue working, but will need the opportunity to sit down more frequently.

## 2020-07-21 ENCOUNTER — ROUTINE PRENATAL (OUTPATIENT)
Dept: OBSTETRICS AND GYNECOLOGY | Facility: CLINIC | Age: 30
End: 2020-07-21

## 2020-07-21 ENCOUNTER — PROCEDURE VISIT (OUTPATIENT)
Dept: OBSTETRICS AND GYNECOLOGY | Facility: CLINIC | Age: 30
End: 2020-07-21

## 2020-07-21 VITALS — DIASTOLIC BLOOD PRESSURE: 72 MMHG | BODY MASS INDEX: 35.2 KG/M2 | WEIGHT: 241.2 LBS | SYSTOLIC BLOOD PRESSURE: 118 MMHG

## 2020-07-21 DIAGNOSIS — O26.849 FETAL SIZE INCONSISTENT WITH DATES: Primary | ICD-10-CM

## 2020-07-21 DIAGNOSIS — Z3A.36 36 WEEKS GESTATION OF PREGNANCY: Primary | ICD-10-CM

## 2020-07-21 PROCEDURE — 76816 OB US FOLLOW-UP PER FETUS: CPT | Performed by: OBSTETRICS & GYNECOLOGY

## 2020-07-21 PROCEDURE — 0502F SUBSEQUENT PRENATAL CARE: CPT | Performed by: OBSTETRICS & GYNECOLOGY

## 2020-07-21 NOTE — PROGRESS NOTES
CC: Obstetric visit    HPI: 29-year-old  2 para 1 presents at 36-0/7 weeks for her scheduled obstetric visit.  Her baby is moving actively.  She has no contractions.    Assessment and plan    1.  Intrauterine pregnancy at 36-0/7 weeks  2.  Ultrasound was reviewed and discussed with the patient.  Estimated fetal weight was 6 pounds 13 ounces.  Counseled and questions answered.  The patient's last baby was 7 pounds 13 ounces.  She did not have any difficulties with labor or delivery.  3.  Group B strep cultures were performed   normal...

## 2020-07-25 LAB
B-HEM STREP SPEC QL CULT: NEGATIVE
SPECIMEN STATUS: NORMAL

## 2020-07-29 ENCOUNTER — HOSPITAL ENCOUNTER (OUTPATIENT)
Facility: HOSPITAL | Age: 30
Setting detail: OBSERVATION
Discharge: HOME | End: 2020-07-29
Attending: OBSTETRICS & GYNECOLOGY | Admitting: OBSTETRICS & GYNECOLOGY

## 2020-07-29 ENCOUNTER — ROUTINE PRENATAL (OUTPATIENT)
Dept: OBSTETRICS AND GYNECOLOGY | Facility: CLINIC | Age: 30
End: 2020-07-29

## 2020-07-29 VITALS — BODY MASS INDEX: 36.22 KG/M2 | SYSTOLIC BLOOD PRESSURE: 128 MMHG | WEIGHT: 248.2 LBS | DIASTOLIC BLOOD PRESSURE: 88 MMHG

## 2020-07-29 VITALS
DIASTOLIC BLOOD PRESSURE: 82 MMHG | HEIGHT: 70 IN | HEART RATE: 95 BPM | TEMPERATURE: 98 F | SYSTOLIC BLOOD PRESSURE: 124 MMHG | RESPIRATION RATE: 16 BRPM | WEIGHT: 247.8 LBS | BODY MASS INDEX: 35.48 KG/M2

## 2020-07-29 DIAGNOSIS — Z3A.37 37 WEEKS GESTATION OF PREGNANCY: Primary | ICD-10-CM

## 2020-07-29 LAB
ALBUMIN SERPL-MCNC: 3.5 G/DL (ref 3.5–5.2)
ALBUMIN/GLOB SERPL: 1.2 G/DL
ALP SERPL-CCNC: 99 U/L (ref 39–117)
ALT SERPL W P-5'-P-CCNC: 20 U/L (ref 1–33)
ANION GAP SERPL CALCULATED.3IONS-SCNC: 11.5 MMOL/L (ref 5–15)
AST SERPL-CCNC: 25 U/L (ref 1–32)
BASOPHILS # BLD AUTO: 0.04 10*3/MM3 (ref 0–0.2)
BASOPHILS NFR BLD AUTO: 0.3 % (ref 0–1.5)
BILIRUB SERPL-MCNC: 0.3 MG/DL (ref 0–1.2)
BUN SERPL-MCNC: 6 MG/DL (ref 6–20)
BUN/CREAT SERPL: 9.7 (ref 7–25)
CALCIUM SPEC-SCNC: 9 MG/DL (ref 8.6–10.5)
CHLORIDE SERPL-SCNC: 100 MMOL/L (ref 98–107)
CO2 SERPL-SCNC: 20.5 MMOL/L (ref 22–29)
CREAT SERPL-MCNC: 0.62 MG/DL (ref 0.57–1)
CREAT UR-MCNC: 105.3 MG/DL
DEPRECATED RDW RBC AUTO: 39.7 FL (ref 37–54)
EOSINOPHIL # BLD AUTO: 0.22 10*3/MM3 (ref 0–0.4)
EOSINOPHIL NFR BLD AUTO: 1.8 % (ref 0.3–6.2)
ERYTHROCYTE [DISTWIDTH] IN BLOOD BY AUTOMATED COUNT: 12.9 % (ref 12.3–15.4)
GFR SERPL CREATININE-BSD FRML MDRD: 115 ML/MIN/1.73
GLOBULIN UR ELPH-MCNC: 2.9 GM/DL
GLUCOSE SERPL-MCNC: 106 MG/DL (ref 65–99)
GLUCOSE UR STRIP-MCNC: NEGATIVE MG/DL
HCT VFR BLD AUTO: 31.6 % (ref 34–46.6)
HGB BLD-MCNC: 10.8 G/DL (ref 12–15.9)
IMM GRANULOCYTES # BLD AUTO: 0.04 10*3/MM3 (ref 0–0.05)
IMM GRANULOCYTES NFR BLD AUTO: 0.3 % (ref 0–0.5)
LYMPHOCYTES # BLD AUTO: 2.51 10*3/MM3 (ref 0.7–3.1)
LYMPHOCYTES NFR BLD AUTO: 20.9 % (ref 19.6–45.3)
MCH RBC QN AUTO: 29.3 PG (ref 26.6–33)
MCHC RBC AUTO-ENTMCNC: 34.2 G/DL (ref 31.5–35.7)
MCV RBC AUTO: 85.9 FL (ref 79–97)
MONOCYTES # BLD AUTO: 0.82 10*3/MM3 (ref 0.1–0.9)
MONOCYTES NFR BLD AUTO: 6.8 % (ref 5–12)
NEUTROPHILS NFR BLD AUTO: 69.9 % (ref 42.7–76)
NEUTROPHILS NFR BLD AUTO: 8.36 10*3/MM3 (ref 1.7–7)
NRBC BLD AUTO-RTO: 0 /100 WBC (ref 0–0.2)
PLATELET # BLD AUTO: 208 10*3/MM3 (ref 140–450)
PMV BLD AUTO: 10.1 FL (ref 6–12)
POTASSIUM SERPL-SCNC: 3.3 MMOL/L (ref 3.5–5.2)
PROT SERPL-MCNC: 6.4 G/DL (ref 6–8.5)
PROT UR STRIP-MCNC: NEGATIVE MG/DL
PROT UR-MCNC: 18 MG/DL
PROT/CREAT UR: 170.9 MG/G CREA (ref 0–200)
RBC # BLD AUTO: 3.68 10*6/MM3 (ref 3.77–5.28)
SODIUM SERPL-SCNC: 132 MMOL/L (ref 136–145)
WBC # BLD AUTO: 11.99 10*3/MM3 (ref 3.4–10.8)

## 2020-07-29 PROCEDURE — 84156 ASSAY OF PROTEIN URINE: CPT | Performed by: OBSTETRICS & GYNECOLOGY

## 2020-07-29 PROCEDURE — 0502F SUBSEQUENT PRENATAL CARE: CPT | Performed by: OBSTETRICS & GYNECOLOGY

## 2020-07-29 PROCEDURE — 85025 COMPLETE CBC W/AUTO DIFF WBC: CPT | Performed by: OBSTETRICS & GYNECOLOGY

## 2020-07-29 PROCEDURE — G0378 HOSPITAL OBSERVATION PER HR: HCPCS

## 2020-07-29 PROCEDURE — 59025 FETAL NON-STRESS TEST: CPT

## 2020-07-29 PROCEDURE — 82570 ASSAY OF URINE CREATININE: CPT | Performed by: OBSTETRICS & GYNECOLOGY

## 2020-07-29 PROCEDURE — 80053 COMPREHEN METABOLIC PANEL: CPT | Performed by: OBSTETRICS & GYNECOLOGY

## 2020-07-29 NOTE — PROGRESS NOTES
CC: Obstetric visit    HPI: 29-year-old  2 para 1 presents at 37-1/7 weeks for her scheduled obstetric visit.  Her baby is moving actively.  She is not experiencing contractions.  She has no complaints today.    Assessment and plan    1.  Intrauterine pregnancy at 37-1/7 weeks  2.  Group B strep cultures are negative.  Counseled.  3.  Elevation in blood pressures to 128/88.  Counseled and questions answered.  The patient denies headaches or vision changes.  Denies upper abdominal pain.  No change in edema of the hands over the last several weeks.  We discussed the signs and symptoms of preeclampsia.  The patient was induced for preeclampsia 8 years ago.  We discussed the possibility of observation overnight in the hospital with serial blood pressures and a  24-hour urine.  The patient would like to avoid this if at all possible.  Instead, she will monitor her blood pressures at home.  She has a blood pressure cuff and will check her pressures 3 times daily.  She will call for systolics greater than 140 or diastolics greater than 90.  Also, she will call for symptoms as described above.  If none of this occurs, we will repeat the patient's physical examination and an office blood pressure check at the beginning of next week.  The patient agrees with this plan.

## 2020-08-04 ENCOUNTER — PROCEDURE VISIT (OUTPATIENT)
Dept: OBSTETRICS AND GYNECOLOGY | Facility: CLINIC | Age: 30
End: 2020-08-04

## 2020-08-04 ENCOUNTER — ROUTINE PRENATAL (OUTPATIENT)
Dept: OBSTETRICS AND GYNECOLOGY | Facility: CLINIC | Age: 30
End: 2020-08-04

## 2020-08-04 VITALS — BODY MASS INDEX: 35.38 KG/M2 | WEIGHT: 246.6 LBS | SYSTOLIC BLOOD PRESSURE: 118 MMHG | DIASTOLIC BLOOD PRESSURE: 70 MMHG

## 2020-08-04 DIAGNOSIS — Z3A.38 38 WEEKS GESTATION OF PREGNANCY: Primary | ICD-10-CM

## 2020-08-04 DIAGNOSIS — O36.8130 DECREASED FETAL MOVEMENTS IN THIRD TRIMESTER, SINGLE OR UNSPECIFIED FETUS: Primary | ICD-10-CM

## 2020-08-04 LAB
GLUCOSE UR STRIP-MCNC: NEGATIVE MG/DL
PROT UR STRIP-MCNC: NEGATIVE MG/DL

## 2020-08-04 PROCEDURE — 76819 FETAL BIOPHYS PROFIL W/O NST: CPT | Performed by: OBSTETRICS & GYNECOLOGY

## 2020-08-04 PROCEDURE — 0502F SUBSEQUENT PRENATAL CARE: CPT | Performed by: OBSTETRICS & GYNECOLOGY

## 2020-08-04 NOTE — PROGRESS NOTES
CC: Obstetric visit    HPI: 29-year-old  2 para 1 presents at 38-0/7 weeks for her scheduled obstetric visit.  She has been followed for elevated blood pressures.  She was observed in the hospital last week.  She checks her blood pressures at home.  They have been running mostly in the 120s to 130s over 80s.  She denies headaches or vision changes.  Denies upper abdominal pain.  Her baby is moving.    Assessment and plan    1.  Intrauterine pregnancy at 38-0/7 weeks  2.  Gestational hypertension.  Today, blood pressures are normal at 118/70.  The patient has actually lost 2 pounds since last week.  She is feeling well.  We discussed management of the remainder of the pregnancy.  Because the patient is not continuing to gain water weight and her blood pressures have improved, I do not feel that induction of labor is indicated.  Instead, the patient will continue checking her blood pressures at home.  She will call for elevations.  Preeclampsia warnings were also given.  The patient will call for headaches, vision changes, upper abdominal pain or any other concern.  We will reassess next week.  Biophysical profile will be performed today.  I answered the patient's questions and she agrees with these recommendations.

## 2020-08-06 ENCOUNTER — TELEPHONE (OUTPATIENT)
Dept: OBSTETRICS AND GYNECOLOGY | Facility: CLINIC | Age: 30
End: 2020-08-06

## 2020-08-06 NOTE — TELEPHONE ENCOUNTER
Patient states that her bottom number for her blood pressure has been over the 90's all day. She has not felt to well and states that she has had a headache all day. She would like to know if she needs to be seen, or what is your suggestion.  Please advise?

## 2020-08-07 ENCOUNTER — PROCEDURE VISIT (OUTPATIENT)
Dept: OBSTETRICS AND GYNECOLOGY | Facility: CLINIC | Age: 30
End: 2020-08-07

## 2020-08-07 ENCOUNTER — TELEPHONE (OUTPATIENT)
Dept: OBSTETRICS AND GYNECOLOGY | Facility: CLINIC | Age: 30
End: 2020-08-07

## 2020-08-07 ENCOUNTER — ROUTINE PRENATAL (OUTPATIENT)
Dept: OBSTETRICS AND GYNECOLOGY | Facility: CLINIC | Age: 30
End: 2020-08-07

## 2020-08-07 ENCOUNTER — DOCUMENTATION (OUTPATIENT)
Dept: OBSTETRICS AND GYNECOLOGY | Facility: CLINIC | Age: 30
End: 2020-08-07

## 2020-08-07 ENCOUNTER — HOSPITAL ENCOUNTER (OUTPATIENT)
Facility: HOSPITAL | Age: 30
Discharge: HOME | End: 2020-08-07
Attending: OBSTETRICS & GYNECOLOGY | Admitting: OBSTETRICS & GYNECOLOGY

## 2020-08-07 VITALS
HEIGHT: 70 IN | TEMPERATURE: 98.3 F | HEART RATE: 102 BPM | SYSTOLIC BLOOD PRESSURE: 114 MMHG | OXYGEN SATURATION: 99 % | BODY MASS INDEX: 35.24 KG/M2 | DIASTOLIC BLOOD PRESSURE: 71 MMHG | WEIGHT: 246.2 LBS | RESPIRATION RATE: 16 BRPM

## 2020-08-07 VITALS — DIASTOLIC BLOOD PRESSURE: 88 MMHG | BODY MASS INDEX: 35.53 KG/M2 | WEIGHT: 247.6 LBS | SYSTOLIC BLOOD PRESSURE: 128 MMHG

## 2020-08-07 DIAGNOSIS — O13.3 GESTATIONAL HYPERTENSION, THIRD TRIMESTER: ICD-10-CM

## 2020-08-07 DIAGNOSIS — O99.210 OBESITY IN PREGNANCY, ANTEPARTUM: ICD-10-CM

## 2020-08-07 DIAGNOSIS — Z3A.38 38 WEEKS GESTATION OF PREGNANCY: Primary | ICD-10-CM

## 2020-08-07 DIAGNOSIS — O13.3 GESTATIONAL HYPERTENSION, THIRD TRIMESTER: Primary | ICD-10-CM

## 2020-08-07 PROBLEM — Z34.90 PREGNANCY: Status: ACTIVE | Noted: 2020-08-07

## 2020-08-07 LAB
A1 MICROGLOB PLACENTAL VAG QL: NEGATIVE
ALBUMIN SERPL-MCNC: 3.2 G/DL (ref 3.5–5.2)
ALBUMIN/GLOB SERPL: 0.9 G/DL
ALP SERPL-CCNC: 123 U/L (ref 39–117)
ALT SERPL W P-5'-P-CCNC: 22 U/L (ref 1–33)
ANION GAP SERPL CALCULATED.3IONS-SCNC: 12.5 MMOL/L (ref 5–15)
AST SERPL-CCNC: 22 U/L (ref 1–32)
BASOPHILS # BLD AUTO: 0.04 10*3/MM3 (ref 0–0.2)
BASOPHILS NFR BLD AUTO: 0.3 % (ref 0–1.5)
BILIRUB SERPL-MCNC: 0.4 MG/DL (ref 0–1.2)
BUN SERPL-MCNC: 6 MG/DL (ref 6–20)
BUN/CREAT SERPL: 9.7 (ref 7–25)
CALCIUM SPEC-SCNC: 9.4 MG/DL (ref 8.6–10.5)
CHLORIDE SERPL-SCNC: 104 MMOL/L (ref 98–107)
CO2 SERPL-SCNC: 20.5 MMOL/L (ref 22–29)
CREAT SERPL-MCNC: 0.62 MG/DL (ref 0.57–1)
CREAT UR-MCNC: 49.6 MG/DL
DEPRECATED RDW RBC AUTO: 39.8 FL (ref 37–54)
EOSINOPHIL # BLD AUTO: 0.2 10*3/MM3 (ref 0–0.4)
EOSINOPHIL NFR BLD AUTO: 1.7 % (ref 0.3–6.2)
ERYTHROCYTE [DISTWIDTH] IN BLOOD BY AUTOMATED COUNT: 13 % (ref 12.3–15.4)
GFR SERPL CREATININE-BSD FRML MDRD: 115 ML/MIN/1.73
GLOBULIN UR ELPH-MCNC: 3.4 GM/DL
GLUCOSE SERPL-MCNC: 71 MG/DL (ref 65–99)
HCT VFR BLD AUTO: 33.3 % (ref 34–46.6)
HGB BLD-MCNC: 11.5 G/DL (ref 12–15.9)
IMM GRANULOCYTES # BLD AUTO: 0.08 10*3/MM3 (ref 0–0.05)
IMM GRANULOCYTES NFR BLD AUTO: 0.7 % (ref 0–0.5)
LYMPHOCYTES # BLD AUTO: 2.48 10*3/MM3 (ref 0.7–3.1)
LYMPHOCYTES NFR BLD AUTO: 20.5 % (ref 19.6–45.3)
MCH RBC QN AUTO: 29 PG (ref 26.6–33)
MCHC RBC AUTO-ENTMCNC: 34.5 G/DL (ref 31.5–35.7)
MCV RBC AUTO: 84.1 FL (ref 79–97)
MONOCYTES # BLD AUTO: 1.01 10*3/MM3 (ref 0.1–0.9)
MONOCYTES NFR BLD AUTO: 8.3 % (ref 5–12)
NEUTROPHILS NFR BLD AUTO: 68.5 % (ref 42.7–76)
NEUTROPHILS NFR BLD AUTO: 8.29 10*3/MM3 (ref 1.7–7)
NRBC BLD AUTO-RTO: 0 /100 WBC (ref 0–0.2)
PLATELET # BLD AUTO: 196 10*3/MM3 (ref 140–450)
PMV BLD AUTO: 10.3 FL (ref 6–12)
POTASSIUM SERPL-SCNC: 4.1 MMOL/L (ref 3.5–5.2)
PROT SERPL-MCNC: 6.6 G/DL (ref 6–8.5)
PROT UR-MCNC: 8 MG/DL
PROT/CREAT UR: 161.3 MG/G CREA (ref 0–200)
RBC # BLD AUTO: 3.96 10*6/MM3 (ref 3.77–5.28)
SODIUM SERPL-SCNC: 137 MMOL/L (ref 136–145)
WBC # BLD AUTO: 12.1 10*3/MM3 (ref 3.4–10.8)

## 2020-08-07 PROCEDURE — 85025 COMPLETE CBC W/AUTO DIFF WBC: CPT | Performed by: OBSTETRICS & GYNECOLOGY

## 2020-08-07 PROCEDURE — G0463 HOSPITAL OUTPT CLINIC VISIT: HCPCS

## 2020-08-07 PROCEDURE — 0502F SUBSEQUENT PRENATAL CARE: CPT | Performed by: OBSTETRICS & GYNECOLOGY

## 2020-08-07 PROCEDURE — 76816 OB US FOLLOW-UP PER FETUS: CPT | Performed by: OBSTETRICS & GYNECOLOGY

## 2020-08-07 PROCEDURE — G0378 HOSPITAL OBSERVATION PER HR: HCPCS

## 2020-08-07 PROCEDURE — 84156 ASSAY OF PROTEIN URINE: CPT | Performed by: OBSTETRICS & GYNECOLOGY

## 2020-08-07 PROCEDURE — 84112 EVAL AMNIOTIC FLUID PROTEIN: CPT | Performed by: OBSTETRICS & GYNECOLOGY

## 2020-08-07 PROCEDURE — 80053 COMPREHEN METABOLIC PANEL: CPT | Performed by: OBSTETRICS & GYNECOLOGY

## 2020-08-07 PROCEDURE — 82570 ASSAY OF URINE CREATININE: CPT | Performed by: OBSTETRICS & GYNECOLOGY

## 2020-08-07 PROCEDURE — 76819 FETAL BIOPHYS PROFIL W/O NST: CPT | Performed by: OBSTETRICS & GYNECOLOGY

## 2020-08-07 RX ORDER — ONDANSETRON 2 MG/ML
4 INJECTION INTRAMUSCULAR; INTRAVENOUS ONCE AS NEEDED
Status: DISCONTINUED | OUTPATIENT
Start: 2020-08-07 | End: 2020-08-07 | Stop reason: HOSPADM

## 2020-08-07 RX ORDER — EPHEDRINE SULFATE 50 MG/ML
5 INJECTION, SOLUTION INTRAVENOUS
Status: DISCONTINUED | OUTPATIENT
Start: 2020-08-07 | End: 2020-08-07 | Stop reason: HOSPADM

## 2020-08-07 RX ORDER — DIPHENHYDRAMINE HYDROCHLORIDE 50 MG/ML
12.5 INJECTION INTRAMUSCULAR; INTRAVENOUS EVERY 8 HOURS PRN
Status: DISCONTINUED | OUTPATIENT
Start: 2020-08-07 | End: 2020-08-07 | Stop reason: HOSPADM

## 2020-08-07 RX ORDER — FAMOTIDINE 10 MG/ML
20 INJECTION, SOLUTION INTRAVENOUS ONCE AS NEEDED
Status: DISCONTINUED | OUTPATIENT
Start: 2020-08-07 | End: 2020-08-07 | Stop reason: HOSPADM

## 2020-08-07 NOTE — NURSING NOTE
Discharge instructions reviewed with patient. Patient will return to labor and delivery with signs of elevated BPs/preeclampsia. Will return with decreased fetal movement, loss of fluid, signs of labor, vaginal bleeding, or any other concerns for self or baby. Patient ambulated off unit with mother. Appears comfortable. Will keep office visit for 8/11.

## 2020-08-07 NOTE — PROGRESS NOTES
Phone call.  Patient is feeling better this morning.  She has not yet checked her blood pressure, but no longer has a headache.  She will follow her blood pressures this morning and call if they remain elevated.  I offered her an office visit and biophysical profile this morning.  The patient declines this.  Instead, she will call if she has headaches, vision changes, abdominal pain or elevated blood pressures.

## 2020-08-07 NOTE — PROGRESS NOTES
CC: Obstetric visit    HPI: 29-year-old  2 para 1 presents at 38-3/7 weeks for follow-up of elevated blood pressures.  She reports that her diastolics have been in the 90s overnight.  She had a headache which did not resolve with Tylenol.  This did resolve overnight and the patient feels better this morning.  She reports active fetal movement.  Biophysical profile was performed today.  This shows an amniotic fluid index of 15.8 and a biophysical profile of 8 out of 8.  Estimated fetal weight is 7 pounds 8 ounces.  Also, the patient reports a gush of fluid earlier today.    Physical examination    The abdomen is soft and gravid.  There is no fundal tenderness.  No epigastric tenderness.  No CVA tenderness.  No suprapubic tenderness.  Speculum examination was performed.  There is no pooled in the vaginal vault.  There is no expression of fluid with Valsalva.  The cervix was checked without gel.  It is 0.5 cm dilated.  Extremities are equal in size with 1+ bipedal edema    Assessment and plan    1.  Intrauterine pregnancy at 38-3/7 weeks  2.  Gestational hypertension which has required observation in the hospital.  Most recently, diastolic blood pressures have been elevated and the patient has experienced headaches.  Also, the patient had a gush of fluid earlier today.  I counseled the patient and answered her questions.  On speculum examination there is not evidence of rupture of membranes and amniotic fluid index is normal.  I recommend that the patient go to labor and delivery from here.  AmniSure testing will be performed.  If the patient's water is broken, she will require induction.  Under any circumstances, because of gestational hypertension which appears to be worsening, I recommend that the patient have cervical ripening and induction of labor.  She understands the risk of prematurity at 38-1/2 weeks.  Also, we discussed risks associated with gestational hypertension.  The patient strongly desires  delivery.  If her water is not broken, she could have cervical ripening, followed by induction.

## 2020-08-07 NOTE — TELEPHONE ENCOUNTER
Pt called stating that she still was not feeling good. Dr delcid said it was ok for her to come into office for appt with him and requested BPP ultrasound. Ok with Luisa.  Pt was scheduled for US and fu and was told to come now.

## 2020-08-11 ENCOUNTER — HOSPITAL ENCOUNTER (OUTPATIENT)
Dept: LABOR AND DELIVERY | Facility: HOSPITAL | Age: 30
Discharge: HOME | End: 2020-08-11

## 2020-08-11 ENCOUNTER — PREP FOR SURGERY (OUTPATIENT)
Dept: OTHER | Facility: HOSPITAL | Age: 30
End: 2020-08-11

## 2020-08-11 ENCOUNTER — ROUTINE PRENATAL (OUTPATIENT)
Dept: OBSTETRICS AND GYNECOLOGY | Facility: CLINIC | Age: 30
End: 2020-08-11

## 2020-08-11 ENCOUNTER — HOSPITAL ENCOUNTER (INPATIENT)
Facility: HOSPITAL | Age: 30
LOS: 3 days | Discharge: HOME | End: 2020-08-14
Attending: OBSTETRICS & GYNECOLOGY | Admitting: OBSTETRICS & GYNECOLOGY

## 2020-08-11 VITALS — BODY MASS INDEX: 35.64 KG/M2 | WEIGHT: 248.4 LBS | SYSTOLIC BLOOD PRESSURE: 126 MMHG | DIASTOLIC BLOOD PRESSURE: 86 MMHG

## 2020-08-11 DIAGNOSIS — O13.3 GESTATIONAL HYPERTENSION, THIRD TRIMESTER: Primary | ICD-10-CM

## 2020-08-11 DIAGNOSIS — O13.3 GESTATIONAL HYPERTENSION, THIRD TRIMESTER: ICD-10-CM

## 2020-08-11 DIAGNOSIS — Z3A.39 39 WEEKS GESTATION OF PREGNANCY: Primary | ICD-10-CM

## 2020-08-11 PROBLEM — O13.9 GESTATIONAL HTN: Status: ACTIVE | Noted: 2020-08-11

## 2020-08-11 LAB
ABO GROUP BLD: NORMAL
BLD GP AB SCN SERPL QL: NEGATIVE
DEPRECATED RDW RBC AUTO: 39 FL (ref 37–54)
ERYTHROCYTE [DISTWIDTH] IN BLOOD BY AUTOMATED COUNT: 12.8 % (ref 12.3–15.4)
GLUCOSE UR STRIP-MCNC: NEGATIVE MG/DL
HCT VFR BLD AUTO: 33 % (ref 34–46.6)
HGB BLD-MCNC: 11.2 G/DL (ref 12–15.9)
MCH RBC QN AUTO: 28.9 PG (ref 26.6–33)
MCHC RBC AUTO-ENTMCNC: 33.9 G/DL (ref 31.5–35.7)
MCV RBC AUTO: 85.1 FL (ref 79–97)
PLATELET # BLD AUTO: 212 10*3/MM3 (ref 140–450)
PMV BLD AUTO: 10.3 FL (ref 6–12)
PROT UR STRIP-MCNC: NEGATIVE MG/DL
RBC # BLD AUTO: 3.88 10*6/MM3 (ref 3.77–5.28)
RH BLD: NEGATIVE
SARS-COV-2 RDRP RESP QL NAA+PROBE: NOT DETECTED
T&S EXPIRATION DATE: NORMAL
WBC # BLD AUTO: 10.77 10*3/MM3 (ref 3.4–10.8)

## 2020-08-11 PROCEDURE — 86850 RBC ANTIBODY SCREEN: CPT | Performed by: OBSTETRICS & GYNECOLOGY

## 2020-08-11 PROCEDURE — 85027 COMPLETE CBC AUTOMATED: CPT | Performed by: OBSTETRICS & GYNECOLOGY

## 2020-08-11 PROCEDURE — 86900 BLOOD TYPING SEROLOGIC ABO: CPT | Performed by: OBSTETRICS & GYNECOLOGY

## 2020-08-11 PROCEDURE — 0502F SUBSEQUENT PRENATAL CARE: CPT | Performed by: OBSTETRICS & GYNECOLOGY

## 2020-08-11 PROCEDURE — 86901 BLOOD TYPING SEROLOGIC RH(D): CPT | Performed by: OBSTETRICS & GYNECOLOGY

## 2020-08-11 PROCEDURE — 87635 SARS-COV-2 COVID-19 AMP PRB: CPT | Performed by: OBSTETRICS & GYNECOLOGY

## 2020-08-11 PROCEDURE — 3E0P7VZ INTRODUCTION OF HORMONE INTO FEMALE REPRODUCTIVE, VIA NATURAL OR ARTIFICIAL OPENING: ICD-10-PCS | Performed by: OBSTETRICS & GYNECOLOGY

## 2020-08-11 RX ORDER — MISOPROSTOL 100 MCG
25 TABLET ORAL
Status: COMPLETED | OUTPATIENT
Start: 2020-08-11 | End: 2020-08-12

## 2020-08-11 RX ORDER — OXYTOCIN-SODIUM CHLORIDE 0.9% IV SOLN 30 UNIT/500ML 30-0.9/5 UT/ML-%
125 SOLUTION INTRAVENOUS CONTINUOUS PRN
Status: CANCELLED | OUTPATIENT
Start: 2020-08-11

## 2020-08-11 RX ORDER — METHYLERGONOVINE MALEATE 0.2 MG/ML
200 INJECTION INTRAVENOUS ONCE AS NEEDED
Status: CANCELLED | OUTPATIENT
Start: 2020-08-11

## 2020-08-11 RX ORDER — OXYTOCIN-SODIUM CHLORIDE 0.9% IV SOLN 30 UNIT/500ML 30-0.9/5 UT/ML-%
999 SOLUTION INTRAVENOUS ONCE
Status: CANCELLED | OUTPATIENT
Start: 2020-08-11

## 2020-08-11 RX ORDER — MISOPROSTOL 100 MCG
25 TABLET ORAL
Status: CANCELLED | OUTPATIENT
Start: 2020-08-11 | End: 2020-08-12

## 2020-08-11 RX ORDER — MAGNESIUM CARB/ALUMINUM HYDROX 105-160MG
30 TABLET,CHEWABLE ORAL ONCE
Status: CANCELLED | OUTPATIENT
Start: 2020-08-11 | End: 2020-08-11

## 2020-08-11 RX ORDER — LIDOCAINE HYDROCHLORIDE 10 MG/ML
5 INJECTION, SOLUTION EPIDURAL; INFILTRATION; INTRACAUDAL; PERINEURAL AS NEEDED
Status: CANCELLED | OUTPATIENT
Start: 2020-08-11

## 2020-08-11 RX ORDER — MISOPROSTOL 200 UG/1
800 TABLET ORAL AS NEEDED
Status: CANCELLED | OUTPATIENT
Start: 2020-08-11

## 2020-08-11 RX ORDER — OXYTOCIN-SODIUM CHLORIDE 0.9% IV SOLN 30 UNIT/500ML 30-0.9/5 UT/ML-%
250 SOLUTION INTRAVENOUS CONTINUOUS
Status: CANCELLED | OUTPATIENT
Start: 2020-08-11 | End: 2020-08-11

## 2020-08-11 RX ORDER — SODIUM CHLORIDE 0.9 % (FLUSH) 0.9 %
10 SYRINGE (ML) INJECTION AS NEEDED
Status: CANCELLED | OUTPATIENT
Start: 2020-08-11

## 2020-08-11 RX ORDER — SODIUM CHLORIDE 0.9 % (FLUSH) 0.9 %
3 SYRINGE (ML) INJECTION EVERY 12 HOURS SCHEDULED
Status: DISCONTINUED | OUTPATIENT
Start: 2020-08-11 | End: 2020-08-12 | Stop reason: HOSPADM

## 2020-08-11 RX ORDER — SODIUM CHLORIDE 0.9 % (FLUSH) 0.9 %
3 SYRINGE (ML) INJECTION EVERY 12 HOURS SCHEDULED
Status: CANCELLED | OUTPATIENT
Start: 2020-08-11

## 2020-08-11 RX ORDER — CARBOPROST TROMETHAMINE 250 UG/ML
250 INJECTION, SOLUTION INTRAMUSCULAR AS NEEDED
Status: CANCELLED | OUTPATIENT
Start: 2020-08-11

## 2020-08-11 RX ORDER — LIDOCAINE HYDROCHLORIDE 10 MG/ML
5 INJECTION, SOLUTION EPIDURAL; INFILTRATION; INTRACAUDAL; PERINEURAL AS NEEDED
Status: DISCONTINUED | OUTPATIENT
Start: 2020-08-11 | End: 2020-08-12 | Stop reason: HOSPADM

## 2020-08-11 RX ORDER — SODIUM CHLORIDE 0.9 % (FLUSH) 0.9 %
10 SYRINGE (ML) INJECTION AS NEEDED
Status: DISCONTINUED | OUTPATIENT
Start: 2020-08-11 | End: 2020-08-12 | Stop reason: HOSPADM

## 2020-08-11 RX ORDER — SODIUM CHLORIDE, SODIUM LACTATE, POTASSIUM CHLORIDE, CALCIUM CHLORIDE 600; 310; 30; 20 MG/100ML; MG/100ML; MG/100ML; MG/100ML
125 INJECTION, SOLUTION INTRAVENOUS CONTINUOUS
Status: DISCONTINUED | OUTPATIENT
Start: 2020-08-11 | End: 2020-08-13

## 2020-08-11 RX ORDER — MAGNESIUM CARB/ALUMINUM HYDROX 105-160MG
30 TABLET,CHEWABLE ORAL ONCE
Status: DISCONTINUED | OUTPATIENT
Start: 2020-08-11 | End: 2020-08-12 | Stop reason: HOSPADM

## 2020-08-11 RX ORDER — FAMOTIDINE 10 MG/ML
20 INJECTION, SOLUTION INTRAVENOUS 2 TIMES DAILY PRN
Status: DISCONTINUED | OUTPATIENT
Start: 2020-08-11 | End: 2020-08-13

## 2020-08-11 RX ADMIN — SODIUM CHLORIDE, POTASSIUM CHLORIDE, SODIUM LACTATE AND CALCIUM CHLORIDE 125 ML/HR: 600; 310; 30; 20 INJECTION, SOLUTION INTRAVENOUS at 19:43

## 2020-08-11 RX ADMIN — MISOPROSTOL 25 MCG: 100 TABLET ORAL at 20:24

## 2020-08-11 NOTE — PROGRESS NOTES
C: Obstetric visit    HPI: 29-year-old  2 para 1 presents at 39-0/7 weeks for her scheduled obstetric visit.  Her blood pressures have been better since last week.  She does not have any further headaches.  She does complain of some persistent bipedal edema    Assessment and plan    1.  Intrauterine pregnancy at 39-0/7 weeks  2.  Gestational hypertension.  Blood pressures have improved recently.  Counseled regarding options.  The patient would like to be induced.  We discussed the benefits, risks and alternatives to induction of labor.  I answered the patient's questions.  She would like to proceed.  Because her cervix is unfavorable, I recommend Cytotec tonight for cervical ripening.  We discussed the off label nature of Cytotec for this purpose.  I answered the patient's questions.  She would like to proceed.

## 2020-08-12 ENCOUNTER — ANESTHESIA EVENT (OUTPATIENT)
Dept: LABOR AND DELIVERY | Facility: HOSPITAL | Age: 30
End: 2020-08-12

## 2020-08-12 ENCOUNTER — ANESTHESIA (OUTPATIENT)
Dept: LABOR AND DELIVERY | Facility: HOSPITAL | Age: 30
End: 2020-08-12

## 2020-08-12 PROCEDURE — S0260 H&P FOR SURGERY: HCPCS | Performed by: OBSTETRICS & GYNECOLOGY

## 2020-08-12 PROCEDURE — 88307 TISSUE EXAM BY PATHOLOGIST: CPT

## 2020-08-12 PROCEDURE — C1755 CATHETER, INTRASPINAL: HCPCS

## 2020-08-12 PROCEDURE — 25010000002 ROPIVACAINE PER 1 MG: Performed by: ANESTHESIOLOGY

## 2020-08-12 PROCEDURE — 0KQM0ZZ REPAIR PERINEUM MUSCLE, OPEN APPROACH: ICD-10-PCS | Performed by: OBSTETRICS & GYNECOLOGY

## 2020-08-12 PROCEDURE — 3E033VJ INTRODUCTION OF OTHER HORMONE INTO PERIPHERAL VEIN, PERCUTANEOUS APPROACH: ICD-10-PCS | Performed by: OBSTETRICS & GYNECOLOGY

## 2020-08-12 PROCEDURE — 25010000002 ONDANSETRON PER 1 MG: Performed by: ANESTHESIOLOGY

## 2020-08-12 PROCEDURE — C1755 CATHETER, INTRASPINAL: HCPCS | Performed by: ANESTHESIOLOGY

## 2020-08-12 PROCEDURE — 10907ZC DRAINAGE OF AMNIOTIC FLUID, THERAPEUTIC FROM PRODUCTS OF CONCEPTION, VIA NATURAL OR ARTIFICIAL OPENING: ICD-10-PCS | Performed by: OBSTETRICS & GYNECOLOGY

## 2020-08-12 PROCEDURE — 59400 OBSTETRICAL CARE: CPT | Performed by: OBSTETRICS & GYNECOLOGY

## 2020-08-12 RX ORDER — DIPHENHYDRAMINE HYDROCHLORIDE 50 MG/ML
12.5 INJECTION INTRAMUSCULAR; INTRAVENOUS EVERY 8 HOURS PRN
Status: DISCONTINUED | OUTPATIENT
Start: 2020-08-12 | End: 2020-08-12 | Stop reason: HOSPADM

## 2020-08-12 RX ORDER — HYDROCODONE BITARTRATE AND ACETAMINOPHEN 5; 325 MG/1; MG/1
1 TABLET ORAL EVERY 4 HOURS PRN
Status: DISCONTINUED | OUTPATIENT
Start: 2020-08-12 | End: 2020-08-14 | Stop reason: HOSPADM

## 2020-08-12 RX ORDER — ACETAMINOPHEN 325 MG/1
650 TABLET ORAL EVERY 4 HOURS PRN
Status: DISCONTINUED | OUTPATIENT
Start: 2020-08-12 | End: 2020-08-12 | Stop reason: HOSPADM

## 2020-08-12 RX ORDER — ONDANSETRON 2 MG/ML
4 INJECTION INTRAMUSCULAR; INTRAVENOUS ONCE AS NEEDED
Status: COMPLETED | OUTPATIENT
Start: 2020-08-12 | End: 2020-08-12

## 2020-08-12 RX ORDER — LIDOCAINE HYDROCHLORIDE AND EPINEPHRINE 15; 5 MG/ML; UG/ML
INJECTION, SOLUTION EPIDURAL AS NEEDED
Status: DISCONTINUED | OUTPATIENT
Start: 2020-08-12 | End: 2020-08-12 | Stop reason: SURG

## 2020-08-12 RX ORDER — OXYTOCIN-SODIUM CHLORIDE 0.9% IV SOLN 30 UNIT/500ML 30-0.9/5 UT/ML-%
250 SOLUTION INTRAVENOUS CONTINUOUS
Status: DISPENSED | OUTPATIENT
Start: 2020-08-12 | End: 2020-08-12

## 2020-08-12 RX ORDER — OXYTOCIN-SODIUM CHLORIDE 0.9% IV SOLN 30 UNIT/500ML 30-0.9/5 UT/ML-%
125 SOLUTION INTRAVENOUS CONTINUOUS PRN
Status: COMPLETED | OUTPATIENT
Start: 2020-08-12 | End: 2020-08-12

## 2020-08-12 RX ORDER — MISOPROSTOL 200 UG/1
800 TABLET ORAL AS NEEDED
Status: DISCONTINUED | OUTPATIENT
Start: 2020-08-12 | End: 2020-08-12 | Stop reason: HOSPADM

## 2020-08-12 RX ORDER — OXYTOCIN-SODIUM CHLORIDE 0.9% IV SOLN 30 UNIT/500ML 30-0.9/5 UT/ML-%
999 SOLUTION INTRAVENOUS ONCE
Status: COMPLETED | OUTPATIENT
Start: 2020-08-12 | End: 2020-08-12

## 2020-08-12 RX ORDER — CARBOPROST TROMETHAMINE 250 UG/ML
250 INJECTION, SOLUTION INTRAMUSCULAR AS NEEDED
Status: DISCONTINUED | OUTPATIENT
Start: 2020-08-12 | End: 2020-08-12 | Stop reason: HOSPADM

## 2020-08-12 RX ORDER — ERYTHROMYCIN 5 MG/G
OINTMENT OPHTHALMIC
Status: DISPENSED
Start: 2020-08-12 | End: 2020-08-13

## 2020-08-12 RX ORDER — HYDROCORTISONE 25 MG/G
1 CREAM TOPICAL AS NEEDED
Status: DISCONTINUED | OUTPATIENT
Start: 2020-08-12 | End: 2020-08-14 | Stop reason: HOSPADM

## 2020-08-12 RX ORDER — ROPIVACAINE HYDROCHLORIDE 2 MG/ML
INJECTION, SOLUTION EPIDURAL; INFILTRATION; PERINEURAL AS NEEDED
Status: DISCONTINUED | OUTPATIENT
Start: 2020-08-12 | End: 2020-08-12 | Stop reason: SURG

## 2020-08-12 RX ORDER — IBUPROFEN 600 MG/1
600 TABLET ORAL EVERY 8 HOURS PRN
Status: DISCONTINUED | OUTPATIENT
Start: 2020-08-12 | End: 2020-08-14 | Stop reason: HOSPADM

## 2020-08-12 RX ORDER — FAMOTIDINE 10 MG/ML
20 INJECTION, SOLUTION INTRAVENOUS ONCE AS NEEDED
Status: DISCONTINUED | OUTPATIENT
Start: 2020-08-12 | End: 2020-08-12 | Stop reason: HOSPADM

## 2020-08-12 RX ORDER — BISACODYL 10 MG
10 SUPPOSITORY, RECTAL RECTAL DAILY PRN
Status: DISCONTINUED | OUTPATIENT
Start: 2020-08-13 | End: 2020-08-14 | Stop reason: HOSPADM

## 2020-08-12 RX ORDER — MISOPROSTOL 200 UG/1
600 TABLET ORAL ONCE AS NEEDED
Status: DISCONTINUED | OUTPATIENT
Start: 2020-08-12 | End: 2020-08-14 | Stop reason: HOSPADM

## 2020-08-12 RX ORDER — SODIUM CHLORIDE 0.9 % (FLUSH) 0.9 %
1-10 SYRINGE (ML) INJECTION AS NEEDED
Status: DISCONTINUED | OUTPATIENT
Start: 2020-08-12 | End: 2020-08-14 | Stop reason: HOSPADM

## 2020-08-12 RX ORDER — OXYTOCIN-SODIUM CHLORIDE 0.9% IV SOLN 30 UNIT/500ML 30-0.9/5 UT/ML-%
2-20 SOLUTION INTRAVENOUS
Status: DISCONTINUED | OUTPATIENT
Start: 2020-08-12 | End: 2020-08-12 | Stop reason: HOSPADM

## 2020-08-12 RX ORDER — PRENATAL VIT/IRON FUM/FOLIC AC 27MG-0.8MG
1 TABLET ORAL DAILY
Status: DISCONTINUED | OUTPATIENT
Start: 2020-08-13 | End: 2020-08-14 | Stop reason: HOSPADM

## 2020-08-12 RX ORDER — DIPHENHYDRAMINE HCL 25 MG
25 CAPSULE ORAL NIGHTLY PRN
Status: DISCONTINUED | OUTPATIENT
Start: 2020-08-12 | End: 2020-08-14 | Stop reason: HOSPADM

## 2020-08-12 RX ORDER — METHYLERGONOVINE MALEATE 0.2 MG/ML
200 INJECTION INTRAVENOUS ONCE AS NEEDED
Status: DISCONTINUED | OUTPATIENT
Start: 2020-08-12 | End: 2020-08-12 | Stop reason: HOSPADM

## 2020-08-12 RX ORDER — DOCUSATE SODIUM 100 MG/1
100 CAPSULE, LIQUID FILLED ORAL 2 TIMES DAILY
Status: DISCONTINUED | OUTPATIENT
Start: 2020-08-13 | End: 2020-08-14 | Stop reason: HOSPADM

## 2020-08-12 RX ORDER — ONDANSETRON 4 MG/1
4 TABLET, FILM COATED ORAL EVERY 8 HOURS PRN
Status: DISCONTINUED | OUTPATIENT
Start: 2020-08-12 | End: 2020-08-14 | Stop reason: HOSPADM

## 2020-08-12 RX ORDER — LANOLIN
CREAM (ML) TOPICAL
Status: DISCONTINUED | OUTPATIENT
Start: 2020-08-12 | End: 2020-08-14 | Stop reason: HOSPADM

## 2020-08-12 RX ORDER — EPHEDRINE SULFATE 50 MG/ML
5 INJECTION, SOLUTION INTRAVENOUS AS NEEDED
Status: DISCONTINUED | OUTPATIENT
Start: 2020-08-12 | End: 2020-08-12 | Stop reason: HOSPADM

## 2020-08-12 RX ORDER — PHYTONADIONE 1 MG/.5ML
INJECTION, EMULSION INTRAMUSCULAR; INTRAVENOUS; SUBCUTANEOUS
Status: DISPENSED
Start: 2020-08-12 | End: 2020-08-13

## 2020-08-12 RX ORDER — ALBUTEROL SULFATE 2.5 MG/3ML
2.5 SOLUTION RESPIRATORY (INHALATION) EVERY 4 HOURS PRN
Status: DISCONTINUED | OUTPATIENT
Start: 2020-08-12 | End: 2020-08-14 | Stop reason: HOSPADM

## 2020-08-12 RX ADMIN — ACETAMINOPHEN 650 MG: 325 TABLET, FILM COATED ORAL at 18:22

## 2020-08-12 RX ADMIN — LIDOCAINE HYDROCHLORIDE AND EPINEPHRINE 3 ML: 15; 5 INJECTION, SOLUTION EPIDURAL at 13:12

## 2020-08-12 RX ADMIN — OXYTOCIN 2 MILLI-UNITS/MIN: 10 INJECTION INTRAVENOUS at 09:30

## 2020-08-12 RX ADMIN — ROPIVACAINE HYDROCHLORIDE 4 ML: 2 INJECTION, SOLUTION EPIDURAL; INFILTRATION at 13:15

## 2020-08-12 RX ADMIN — OXYTOCIN 125 ML/HR: 10 INJECTION INTRAVENOUS at 22:24

## 2020-08-12 RX ADMIN — SODIUM CHLORIDE, POTASSIUM CHLORIDE, SODIUM LACTATE AND CALCIUM CHLORIDE 125 ML/HR: 600; 310; 30; 20 INJECTION, SOLUTION INTRAVENOUS at 03:32

## 2020-08-12 RX ADMIN — OXYTOCIN 999 ML/HR: 10 INJECTION INTRAVENOUS at 20:56

## 2020-08-12 RX ADMIN — ROPIVACAINE HYDROCHLORIDE 5 ML: 2 INJECTION, SOLUTION EPIDURAL; INFILTRATION at 13:20

## 2020-08-12 RX ADMIN — SODIUM CHLORIDE, POTASSIUM CHLORIDE, SODIUM LACTATE AND CALCIUM CHLORIDE 999 ML/HR: 600; 310; 30; 20 INJECTION, SOLUTION INTRAVENOUS at 13:59

## 2020-08-12 RX ADMIN — Medication: at 23:37

## 2020-08-12 RX ADMIN — MISOPROSTOL 25 MCG: 100 TABLET ORAL at 04:49

## 2020-08-12 RX ADMIN — FAMOTIDINE 20 MG: 10 INJECTION INTRAVENOUS at 00:17

## 2020-08-12 RX ADMIN — SODIUM CHLORIDE, POTASSIUM CHLORIDE, SODIUM LACTATE AND CALCIUM CHLORIDE 125 ML/HR: 600; 310; 30; 20 INJECTION, SOLUTION INTRAVENOUS at 10:56

## 2020-08-12 RX ADMIN — ONDANSETRON 4 MG: 2 INJECTION INTRAMUSCULAR; INTRAVENOUS at 21:13

## 2020-08-12 RX ADMIN — IBUPROFEN 600 MG: 600 TABLET, FILM COATED ORAL at 23:37

## 2020-08-12 RX ADMIN — MISOPROSTOL 25 MCG: 100 TABLET ORAL at 00:24

## 2020-08-12 RX ADMIN — EPHEDRINE SULFATE 5 MG: 50 INJECTION INTRAVENOUS at 13:52

## 2020-08-12 RX ADMIN — Medication 9 ML/HR: at 13:20

## 2020-08-12 NOTE — ANESTHESIA PREPROCEDURE EVALUATION
Anesthesia Evaluation     Patient summary reviewed and Nursing notes reviewed   no history of anesthetic complications:               Airway   Mallampati: II  TM distance: >3 FB  Neck ROM: full  no difficulty expected  Dental - normal exam     Pulmonary     breath sounds clear to auscultation  (+) asthma,  (-) shortness of breath, sleep apnea, decreased breath sounds, wheezes  Cardiovascular - normal exam  Exercise tolerance: good (4-7 METS)    Rhythm: regular  Rate: normal    (+) hypertension,   (-) past MI, angina, CHF, orthopnea, PND, VELEZ, PVD      Neuro/Psych  (+) psychiatric history,     (-) seizures, neuromuscular disease, TIA, CVA, dizziness/light headedness, weakness, numbness  GI/Hepatic/Renal/Endo    (+) obesity,     (-) liver disease, diabetes    Musculoskeletal (-) negative ROS    Abdominal  - normal exam   Substance History - negative use  (-) alcohol use, drug use     OB/GYN    (+) Pregnant,         Other - negative ROS                       Anesthesia Plan    ASA 3     epidural   (  39w1d  )    Anesthetic plan, all risks, benefits, and alternatives have been provided, discussed and informed consent has been obtained with: patient and spouse.

## 2020-08-12 NOTE — PLAN OF CARE
Problem: Patient Care Overview  Goal: Plan of Care Review  Outcome: Ongoing (interventions implemented as appropriate)  Flowsheets (Taken 8/12/2020 0513 by Nimisha Church, RN)  Progress: improving  Plan of Care Reviewed With: patient  Outcome Summary: Pt admitted for term cytotec induction. Resting comfortably overnight. Continue toward vaginal delivery.  Goal: Individualization and Mutuality  Outcome: Ongoing (interventions implemented as appropriate)  Flowsheets (Taken 8/12/2020 0513 by Nimisha Church, RN)  Patient Specific Goals (Include Timeframe): healthy mom and baby at time of delivery  Patient Specific Interventions: epidural for pain, betsy, breastfeeding, maybe mirror for pushing  Patient Specific Preferences: vaginal delivery with epidural, betsy, breastfeeding  Goal: Discharge Needs Assessment  Outcome: Ongoing (interventions implemented as appropriate)  Flowsheets  Taken 8/12/2020 1830 by Kinga Santana RN  Equipment Needed After Discharge: none  Anticipated Changes Related to Illness: none  Concerns to be Addressed: no discharge needs identified  Readmission Within the Last 30 Days: no previous admission in last 30 days  Offered/Gave Vendor List: no  Taken 8/11/2020 1946 by Nimisha Church, RN  Equipment Currently Used at Home: none  Taken 8/11/2020 1943 by Nimisha Church, RN  Transportation Anticipated: family or friend will provide  Transportation Concerns: car, none  Patient/Family Anticipated Services at Transition: none  Patient/Family Anticipates Transition to: home  Goal: Interprofessional Rounds/Family Conf  Outcome: Ongoing (interventions implemented as appropriate)  Flowsheets (Taken 8/12/2020 1830)  Participants: family; patient; physician; nursing     Problem: Labor (Cervical Ripen, Induct, Augment) (Adult,Obstetrics,Pediatric)  Goal: Signs and Symptoms of Listed Potential Problems Will be Absent, Minimized or Managed (Labor)  Outcome: Ongoing (interventions implemented as appropriate)  Flowsheets (Taken  2020 0513 by Nimisha Church RN)  Problems Assessed (Labor): all  Problems Present (Labor): none     Problem: Fall Risk,  (Adult,Obstetrics,Pediatric)  Goal: Identify Related Risk Factors and Signs and Symptoms  Outcome: Ongoing (interventions implemented as appropriate)  Flowsheets (Taken 2020)  Related Risk Factors (Fall Risk, ): balance change; regional anesthesia; unable to visualize feet; pregnancy weight gain  Signs and Symptoms (Fall Risk, ): presence of fall risk factors  Goal: Absence of Maternal Fall  Outcome: Ongoing (interventions implemented as appropriate)  Flowsheets (Taken 2020)  Absence of Maternal Fall: achieves outcome     Problem: Skin Injury Risk (Adult)  Goal: Identify Related Risk Factors and Signs and Symptoms  Outcome: Ongoing (interventions implemented as appropriate)  Flowsheets (Taken 2020)  Related Risk Factors (Skin Injury Risk): moisture; medication; mobility impaired; tissue perfusion altered  Goal: Skin Health and Integrity  Outcome: Ongoing (interventions implemented as appropriate)  Flowsheets (Taken 2020)  Skin Health and Integrity: achieves outcome     Problem: Anesthesia/Analgesia, Neuraxial (Obstetrics)  Goal: Signs and Symptoms of Listed Potential Problems Will be Absent, Minimized or Managed (Anesthesia/Analgesia, Neuraxial)  Outcome: Ongoing (interventions implemented as appropriate)  Flowsheets (Taken 2020)  Problems Assessed (Neuraxial Anesthesia/Analgesia, OB): all  Problems Present (Neuraxial Anesth OB): none

## 2020-08-12 NOTE — PLAN OF CARE
Pt admitted for term cytotec induction. Resting comfortably overnight, reports mild intermittent cramping. Continue toward vaginal delivery.

## 2020-08-12 NOTE — NURSING NOTE
Patient reported feeling lightheaded and tingling to arms and shoulders.  Patient was lying to slight left tilt only so pt turned more to left side.  BP rechecked and was low.  IVF bolus started.

## 2020-08-12 NOTE — H&P
"H&P Note    Patient Identification:  Name: Beena Vincent  Age: 30 y.o.  Sex: female  :  1990  MRN: [id number]                       Chief Complaint: Induction of labor    History of Present Illness:   Ms. Espinosa is a 30-year-old  2 para 1 who presents at 39-1/7 weeks for induction of labor.  She has experienced gestational hypertension in the late third trimester.  Does not have preeclampsia.  Group B strep status is negative.    Problem List:  [facility]  Past Medical History:  Past Medical History:   Diagnosis Date   • Asthma    • Cervical dysplasia    • Depression    • Iron deficiency anemia      Past Surgical History:  Past Surgical History:   Procedure Laterality Date   • KNEE ARTHROSCOPY      AGE 6   • LEEP N/A 2019    Procedure: LOOP ELECTROCAUTERY EXCISION PROCEDURE;  Surgeon: Arsh Ray MD;  Location: [facility] [place];  Service: Obstetrics/Gynecology   • WISDOM TOOTH EXTRACTION        Home Meds:  Medications Prior to Admission   Medication Sig Dispense Refill Last Dose   • albuterol sulfate  (90 Base) MCG/ACT inhaler Inhale 2 puffs Every 4 (Four) Hours As Needed for Wheezing.   Taking   • ferrous sulfate 325 (65 FE) MG tablet Take 1 tablet by mouth Daily With Breakfast. 30 tablet 6 Taking   • prenatal vitamins (PRENATAL 27-1) 27-1 MG tablet tablet Take 1 tablet by mouth Daily. 30 tablet 11 Taking     Current Meds:   [unfilled]  Allergies:  Allergies   Allergen Reactions   • Penicillins Nausea And Vomiting   • Adhesive Tape Rash     \"SURGICAL TAPE\"  AS A CHILD     Immunizations:  Immunization History   Administered Date(s) Administered   • Rho (D) Immune Globulin 2020   • flucelvax quad pfs =>4 YRS 2020     Social History:   Social History     Tobacco Use   • Smoking status: Former Smoker     Packs/day: 0.50     Years: 4.00     Pack years: 2.00     Last attempt to quit: 2020     Years since quittin.5   • Smokeless tobacco: Never Used   Substance Use Topics   • " Alcohol use: Not Currently     Comment: socially      Family History:  Family History   Problem Relation Age of Onset   • Diabetes Father    • Arthritis Maternal Grandmother    • Lung cancer Maternal Grandfather    • Heart attack Maternal Grandfather    • Heart disease Maternal Grandfather    • Stroke Maternal Grandfather    • Hyperlipidemia Maternal Grandfather    • Malig Hyperthermia Neg Hx         Review of Systems  Pertinent items are noted in HPI.    Objective:  tMax 24 hrs: Temp (24hrs), Av.1 °F (36.7 °C), Min:98 °F (36.7 °C), Max:98.2 °F (36.8 °C)    Vitals Ranges:   Temp:  [98 °F (36.7 °C)-98.2 °F (36.8 °C)] 98.2 °F (36.8 °C)  Heart Rate:  [] 91  Resp:  [16] 16  BP: (118-133)/(73-85) 123/73  Intake and Output Last 3 Shifts:   I/O last 3 completed shifts:  In: -   Out: 800 [Urine:800]    Exam:     General Appearance:    Alert, cooperative, no distress, appears stated age   Head:    Normocephalic, without obvious abnormality, atraumatic   Back:     Symmetric, no curvature, ROM normal, no CVA tenderness   Lungs:     Clear to auscultation bilaterally, respirations unlabored   Chest Wall:    No tenderness or deformity    Heart:    Regular rate and rhythm, S1 and S2 normal, no murmur, rub   or gallop       Abdomen:     Soft, non-tender, bowel sounds active all four quadrants,     no masses, no organomegaly   Genitalia:   Cervix is 70% effaced and 1 cm dilated.  Amniotomy performed with return of clear fluid       Extremities:   Extremities normal, atraumatic, no cyanosis or edema   Skin:   Skin color, texture, turgor normal, no rashes or lesions   Lymph nodes:   Cervical, supraclavicular, and axillary nodes normal       Data Review:    Lab Results (last 24 hours)     Procedure Component Value Units Date/Time    COVID PRE-OP / PRE-PROCEDURE SCREENING ORDER (NO ISOLATION) - Swab, Nasopharynx [[id number]] Collected:  20    Specimen:  Swab from Nasopharynx Updated:  20    Narrative:          The following orders were created for panel order COVID PRE-OP / PRE-PROCEDURE SCREENING ORDER (NO ISOLATION) - Swab, Nasopharynx.  Procedure                               Abnormality         Status                     ---------                               -----------         ------                     COVID-19, [facility]BBOTT IN-HOUS...[[id number]]  Normal              Final result                 Please view results for these tests on the individual orders.    COVID-19, ABBOTT IN-HOUSE,NP Swab (NO TRANSPORT MEDIA) 2 HR TAT - Swab, Nasopharynx [[id number]]  (Normal) Collected:  08/11/20 1925    Specimen:  Swab from Nasopharynx Updated:  08/11/20 1958     COVID19 Not Detected    Narrative:       Fact sheet for providers: https://www.fda.gov/media/759369/download     Fact sheet for patients: https://www.fda.gov/media/125295/download    CBC (No Diff) [[id number]]  (Abnormal) Collected:  08/11/20 1941    Specimen:  Blood Updated:  08/11/20 1957     WBC 10.77 10*3/mm3      RBC 3.88 10*6/mm3      Hemoglobin 11.2 g/dL      Hematocrit 33.0 %      MCV 85.1 fL      MCH 28.9 pg      MCHC 33.9 g/dL      RDW 12.8 %      RDW-SD 39.0 fl      MPV 10.3 fL      Platelets 212 10*3/mm3         Assessment:    Gestational HTN      1.  Intrauterine pregnancy at 39-1/7 weeks  2.  Gestational hypertension    Plan:  Induction of labor.  The patient has been counseled regarding the benefits, risks and alternatives to the planned procedure.  She agrees to proceed with induction of labor.  Amniotomy was performed at 1 cm with return of clear fluid.    Arsh Ray MD  8/12/2020

## 2020-08-12 NOTE — NURSING NOTE
Patient reported arms and shoulders still tingling but patient denies any SOA.  Patient BP normotensive now.  Patient reported lightheaded feeling better now.  Patient turned to left lateral and HOB elevated.  Patient moving arms well.

## 2020-08-12 NOTE — ANESTHESIA PROCEDURE NOTES
Labor Epidural      Patient location during procedure: OB  Indication:at surgeon's request  Performed By  Anesthesiologist: Juan Pablo Victor MD  Preanesthetic Checklist  Completed: patient identified, site marked, surgical consent, pre-op evaluation, timeout performed, IV checked, risks and benefits discussed and monitors and equipment checked  Prep:  Pt Position:sitting  Sterile Tech:cap, gloves, mask and sterile barrier  Prep:chlorhexidine gluconate and isopropyl alcohol  Monitoring:blood pressure monitoring and EKG  Epidural Block Procedure:  Approach:midline  Guidance:landmark technique  Location:L2-L3  Needle Type:Tuohy  Needle Gauge:17  Loss of Resistance Medium: air  Cath Depth at skin:12 cm  Paresthesia: none  Aspiration:negative  Test Dose:negative  Number of Attempts: 2  Post Assessment:  Dressing:occlusive dressing applied, secured with tape and biopatch applied  Pt Tolerance:patient tolerated the procedure well with no apparent complications  Complications:no

## 2020-08-13 LAB
BASOPHILS # BLD AUTO: 0.04 10*3/MM3 (ref 0–0.2)
BASOPHILS NFR BLD AUTO: 0.2 % (ref 0–1.5)
DEPRECATED RDW RBC AUTO: 38.9 FL (ref 37–54)
EOSINOPHIL # BLD AUTO: 0.11 10*3/MM3 (ref 0–0.4)
EOSINOPHIL NFR BLD AUTO: 0.7 % (ref 0.3–6.2)
ERYTHROCYTE [DISTWIDTH] IN BLOOD BY AUTOMATED COUNT: 12.9 % (ref 12.3–15.4)
HCT VFR BLD AUTO: 29.5 % (ref 34–46.6)
HGB BLD-MCNC: 10.3 G/DL (ref 12–15.9)
IMM GRANULOCYTES # BLD AUTO: 0.09 10*3/MM3 (ref 0–0.05)
IMM GRANULOCYTES NFR BLD AUTO: 0.5 % (ref 0–0.5)
LYMPHOCYTES # BLD AUTO: 2.53 10*3/MM3 (ref 0.7–3.1)
LYMPHOCYTES NFR BLD AUTO: 15.4 % (ref 19.6–45.3)
MCH RBC QN AUTO: 29.3 PG (ref 26.6–33)
MCHC RBC AUTO-ENTMCNC: 34.9 G/DL (ref 31.5–35.7)
MCV RBC AUTO: 84 FL (ref 79–97)
MONOCYTES # BLD AUTO: 1.46 10*3/MM3 (ref 0.1–0.9)
MONOCYTES NFR BLD AUTO: 8.9 % (ref 5–12)
NEUTROPHILS NFR BLD AUTO: 12.18 10*3/MM3 (ref 1.7–7)
NEUTROPHILS NFR BLD AUTO: 74.3 % (ref 42.7–76)
NRBC BLD AUTO-RTO: 0 /100 WBC (ref 0–0.2)
PLATELET # BLD AUTO: 175 10*3/MM3 (ref 140–450)
PMV BLD AUTO: 10.7 FL (ref 6–12)
RBC # BLD AUTO: 3.51 10*6/MM3 (ref 3.77–5.28)
WBC # BLD AUTO: 16.41 10*3/MM3 (ref 3.4–10.8)

## 2020-08-13 PROCEDURE — 85025 COMPLETE CBC W/AUTO DIFF WBC: CPT | Performed by: OBSTETRICS & GYNECOLOGY

## 2020-08-13 PROCEDURE — 0503F POSTPARTUM CARE VISIT: CPT | Performed by: OBSTETRICS & GYNECOLOGY

## 2020-08-13 RX ADMIN — Medication 1 TABLET: at 09:13

## 2020-08-13 RX ADMIN — IBUPROFEN 600 MG: 600 TABLET, FILM COATED ORAL at 09:13

## 2020-08-13 RX ADMIN — HYDROCODONE BITARTRATE AND ACETAMINOPHEN 1 TABLET: 5; 325 TABLET ORAL at 16:11

## 2020-08-13 RX ADMIN — HYDROCODONE BITARTRATE AND ACETAMINOPHEN 1 TABLET: 5; 325 TABLET ORAL at 10:38

## 2020-08-13 RX ADMIN — HYDROCODONE BITARTRATE AND ACETAMINOPHEN 1 TABLET: 5; 325 TABLET ORAL at 21:04

## 2020-08-13 RX ADMIN — DOCUSATE SODIUM 100 MG: 100 CAPSULE, LIQUID FILLED ORAL at 11:28

## 2020-08-13 RX ADMIN — IBUPROFEN 600 MG: 600 TABLET, FILM COATED ORAL at 17:24

## 2020-08-13 RX ADMIN — DOCUSATE SODIUM 100 MG: 100 CAPSULE, LIQUID FILLED ORAL at 21:04

## 2020-08-13 NOTE — PROGRESS NOTES
"[facility]  Vaginal Delivery Progress Note    Subjective   Subjective  Postpartum Day 1: Vaginal Delivery    The patient feels well.  Her pain is well controlled with nonsteroidal anti-inflammatory drugs and opioid analgesics.   She is ambulating well.  Patient describes her bleeding as thin lochia.    Breastfeeding: infant latching without difficulty without pain.    Objective     Objective   Vital Signs Range for the last 24 hours  Temperature: Temp:  [97.8 °F (36.6 °C)-98.7 °F (37.1 °C)] 97.8 °F (36.6 °C)   Temp Source: Temp src: Oral   BP: BP: ()/(52-93) 119/79   Pulse: Heart Rate:  [] 88   Respirations: Resp:  [16-18] 18   SPO2:     O2 Amount (l/min):     O2 Devices Device (Oxygen Therapy): room air   Weight:       Admit Height:  Height: 177.8 cm (70\")    Physical Exam:  General:  no acute distresss.  Abdomen: abdomen is soft without significant tenderness, masses, organomegaly or guarding. Fundus: appropriate, firm, non tender  Extremities: normal, atraumatic, no cyanosis, and trace edema.       Lab results reviewed:  Yes   Rubella:  No results found for: RUBELLAIGGIN Nurse Transcribed from prenatal record --    Rubella Antibodies, IgG   Date Value Ref Range Status   2020 <0.90 (L) Immune >0.99 index Final     Comment:                                     Non-immune       <0.90                                  Equivocal  0.90 - 0.99                                  Immune           >0.99       Rh Status:    RH type   Date Value Ref Range Status   2020 Negative  Final     Rh Factor   Date Value Ref Range Status   2020 Negative  Final     Comment:     Please note: Prior records for this patient's ABO / Rh type are not  available for additional verification.       Immunizations:   Immunization History   Administered Date(s) Administered   • Rho (D) Immune Globulin 2020   • flucelvax quad pfs =>4 YRS 2020       Assessment/Plan   Assessment & Plan     (normal " spontaneous vaginal delivery)    Gestational HTN      Beena Vincent is Day 1  post-partum  Vaginal, Spontaneous    .      Plan:  Continue current care , baby A neg.  Home in am.      Ayo Mayers MD  8/13/2020  10:39

## 2020-08-13 NOTE — PLAN OF CARE
Problem: Patient Care Overview  Goal: Plan of Care Review  Outcome: Ongoing (interventions implemented as appropriate)  Flowsheets (Taken 2020 0542)  Progress: improving  Plan of Care Reviewed With: patient  Note:   Vitals WDL; patient voiding without difficulty and ambulating to the bathroom; uterus firm at U with light bleeding; pain controlled with PO Motrin at this time; +2/+3 pitting edema on ankles/feet; breastfeeding baby  Goal: Individualization and Mutuality  Outcome: Ongoing (interventions implemented as appropriate)  Goal: Discharge Needs Assessment  Outcome: Ongoing (interventions implemented as appropriate)  Goal: Interprofessional Rounds/Family Conf  Outcome: Ongoing (interventions implemented as appropriate)     Problem: Fall Risk,  (Adult,Obstetrics,Pediatric)  Goal: Identify Related Risk Factors and Signs and Symptoms  Outcome: Ongoing (interventions implemented as appropriate)  Goal: Absence of Maternal Fall  Outcome: Ongoing (interventions implemented as appropriate)  Goal: Absence of  Fall/Drop  Outcome: Ongoing (interventions implemented as appropriate)     Problem: Skin Injury Risk (Adult)  Goal: Identify Related Risk Factors and Signs and Symptoms  Outcome: Ongoing (interventions implemented as appropriate)  Goal: Skin Health and Integrity  Outcome: Ongoing (interventions implemented as appropriate)     Problem: Anesthesia/Analgesia, Neuraxial (Obstetrics)  Goal: Signs and Symptoms of Listed Potential Problems Will be Absent, Minimized or Managed (Anesthesia/Analgesia, Neuraxial)  Outcome: Ongoing (interventions implemented as appropriate)     Problem: Postpartum (Vaginal Delivery) (Adult,Obstetrics,Pediatric)  Goal: Signs and Symptoms of Listed Potential Problems Will be Absent, Minimized or Managed (Postpartum)  Outcome: Ongoing (interventions implemented as appropriate)

## 2020-08-13 NOTE — L&D DELIVERY NOTE
Delivery Note    Obstetrician:  Arsh Ray MD      Pre-Delivery Diagnosis: 1.  Intrauterine pregnancy at 39-1/7 weeks  2.  Gestational hypertension    Post-Delivery Diagnosis: Same    Procedure: Spontaneous vaginal delivery       30-year-old  2 para 1 presented for induction of labor at 39-1/7 weeks.  Fetal heart tones were reactive.  Group B strep status was negative.  Cervical ripening was undertaken with Cytotec.      Amniotomy was performed at 1 cm with return of clear fluid.  Induction was then continued with Pitocin.  An epidural catheter was placed for pain control.  The patient progressed along an adequate labor curve to complete effacement of agitation as well as +2 station of the presenting part.      She then began to push.  She pushed to spontaneous vaginal delivery of the fetal head from direct occiput anterior presentation.  Mouth and naris were suctioned with a bulb while on the perineum.  The shoulders were delivered without difficulty, followed by the remainder of the infant.  After delay of 30 seconds, the cord was doubly clamped and divided.  The infant was then placed on the mother's chest for Kangaroo care.  This is a vigorous female.  Apgars of 8 and 9 were assigned.  The infant has not been weighed at the time of this dictation.      There was a second-degree midline laceration.  This was repaired with 3-0 Monocryl in layers.      After approximately 25 minutes, the placenta  spontaneously.  It was intact and had a three-vessel cord.  The perineum was inspected.  It was hemostatic.  The cervix was examined.  It was also hemostatic, as was the rectum.  Estimated blood loss 400 cc.  Episiotomy or Incision: none        Apgars: 1' 8  /  5' 9     Placenta and Cord:          Mechanism: spontaneous        Description:  complete    Estimated Blood Loss:  400cc                             Complications:  None           Condition: Stable    Female       2020  Arsh FAIRCHILD  MD Flor

## 2020-08-13 NOTE — LACTATION NOTE
This note was copied from a baby's chart.  P2 term baby, first baby to breast feed per Mom. She reports he is nursing well. Baby started crying and offered assistance with nursing, she declines. She has breast pump at home. Encouraged to call for any assist and discussed ways to know baby is getting enough milk.

## 2020-08-13 NOTE — PLAN OF CARE
Problem: Patient Care Overview  Goal: Plan of Care Review  Outcome: Ongoing (interventions implemented as appropriate)  Flowsheets  Taken 8/12/2020 0513 by Nimisha Church RN  Progress: improving  Taken 8/12/2020 1910 by Rosalba Martinez RN  Plan of Care Reviewed With: patient;mother  Taken 8/12/2020 2208 by Rosalba Martinez RN  Outcome Summary: Patient delivered at 2031. VSS.  Goal: Individualization and Mutuality  Outcome: Ongoing (interventions implemented as appropriate)  Flowsheets (Taken 8/12/2020 2208)  Patient Specific Goals (Include Timeframe): Healthy mom and baby during stay  How to Address Anxieties/Fears: Not applicable  Patient Specific Interventions: HÉCTOR and breastfeeding  What Information Would Help Us Give You More Personalized Care?: First time trying to breastfeed. Will like lactation to help.  How Would You and/or Your Support Person Like to Participate in Your Care?: Remain involved in POC  What Anxieties, Fears, Concerns, or Questions Do You Have About Your Care?: None  Patient Specific Preferences: HÉCTOR, Breastfeeding.  Goal: Discharge Needs Assessment  Outcome: Ongoing (interventions implemented as appropriate)  Flowsheets  Taken 8/12/2020 1830 by Kinga Santana, RN  Equipment Needed After Discharge: none  Anticipated Changes Related to Illness: none  Concerns to be Addressed: no discharge needs identified  Readmission Within the Last 30 Days: no previous admission in last 30 days  Offered/Gave Vendor List: no  Taken 8/11/2020 1946 by Nimisha Church, RN  Equipment Currently Used at Home: none  Taken 8/11/2020 1943 by Nimisha Church, RN  Transportation Anticipated: family or friend will provide  Transportation Concerns: car, none  Patient/Family Anticipated Services at Transition: none  Patient/Family Anticipates Transition to: home  Goal: Interprofessional Rounds/Family Conf  Outcome: Ongoing (interventions implemented as appropriate)  Flowsheets (Taken 8/12/2020 1830 by Kinga Santana RN)  Participants:  family;patient;physician;nursing     Problem: Fall Risk,  (Adult,Obstetrics,Pediatric)  Goal: Identify Related Risk Factors and Signs and Symptoms  Outcome: Ongoing (interventions implemented as appropriate)  Flowsheets (Taken 2020 by Kinga Santana RN)  Related Risk Factors (Fall Risk, ): balance change;regional anesthesia;unable to visualize feet;pregnancy weight gain  Signs and Symptoms (Fall Risk, ): presence of fall risk factors  Goal: Absence of Maternal Fall  Outcome: Ongoing (interventions implemented as appropriate)  Flowsheets (Taken 2020)  Absence of Maternal Fall: making progress toward outcome  Goal: Absence of  Fall/Drop  Outcome: Ongoing (interventions implemented as appropriate)  Flowsheets (Taken 2020)  Absence of McNeil Fall/Drop: making progress toward outcome     Problem: Skin Injury Risk (Adult)  Goal: Identify Related Risk Factors and Signs and Symptoms  Outcome: Ongoing (interventions implemented as appropriate)  Flowsheets (Taken 2020 by Kinga Santana RN)  Related Risk Factors (Skin Injury Risk): moisture;medication;mobility impaired;tissue perfusion altered  Goal: Skin Health and Integrity  Outcome: Ongoing (interventions implemented as appropriate)  Flowsheets (Taken 2020)  Skin Health and Integrity: making progress toward outcome     Problem: Anesthesia/Analgesia, Neuraxial (Obstetrics)  Goal: Signs and Symptoms of Listed Potential Problems Will be Absent, Minimized or Managed (Anesthesia/Analgesia, Neuraxial)  Outcome: Ongoing (interventions implemented as appropriate)  Flowsheets (Taken 2020 by Kinga Santana RN)  Problems Assessed (Neuraxial Anesthesia/Analgesia, OB): all  Problems Present (Neuraxial Anesth OB): none

## 2020-08-14 VITALS
TEMPERATURE: 97.9 F | DIASTOLIC BLOOD PRESSURE: 84 MMHG | BODY MASS INDEX: 35.5 KG/M2 | HEIGHT: 70 IN | RESPIRATION RATE: 18 BRPM | OXYGEN SATURATION: 97 % | WEIGHT: 248 LBS | HEART RATE: 81 BPM | SYSTOLIC BLOOD PRESSURE: 128 MMHG

## 2020-08-14 PROCEDURE — 90471 IMMUNIZATION ADMIN: CPT | Performed by: NURSE PRACTITIONER

## 2020-08-14 PROCEDURE — 25010000002 TDAP 5-2.5-18.5 LF-MCG/0.5 SUSPENSION: Performed by: NURSE PRACTITIONER

## 2020-08-14 PROCEDURE — 90472 IMMUNIZATION ADMIN EACH ADD: CPT | Performed by: NURSE PRACTITIONER

## 2020-08-14 PROCEDURE — 90707 MMR VACCINE SC: CPT | Performed by: NURSE PRACTITIONER

## 2020-08-14 PROCEDURE — 90715 TDAP VACCINE 7 YRS/> IM: CPT | Performed by: NURSE PRACTITIONER

## 2020-08-14 PROCEDURE — 0503F POSTPARTUM CARE VISIT: CPT | Performed by: NURSE PRACTITIONER

## 2020-08-14 PROCEDURE — 25010000002 MEASLES, MUMPS & RUBELLA VAC RECONSTITUTED SOLUTION: Performed by: NURSE PRACTITIONER

## 2020-08-14 RX ORDER — HYDROCODONE BITARTRATE AND ACETAMINOPHEN 5; 325 MG/1; MG/1
1 TABLET ORAL EVERY 4 HOURS PRN
Qty: 10 TABLET | Refills: 0 | Status: SHIPPED | OUTPATIENT
Start: 2020-08-14 | End: 2020-08-22

## 2020-08-14 RX ORDER — IBUPROFEN 600 MG/1
600 TABLET ORAL EVERY 6 HOURS PRN
Qty: 45 TABLET | Refills: 0 | Status: ON HOLD | OUTPATIENT
Start: 2020-08-14 | End: 2021-10-01

## 2020-08-14 RX ADMIN — Medication 1 TABLET: at 08:41

## 2020-08-14 RX ADMIN — HYDROCODONE BITARTRATE AND ACETAMINOPHEN 1 TABLET: 5; 325 TABLET ORAL at 08:41

## 2020-08-14 RX ADMIN — TETANUS TOXOID, REDUCED DIPHTHERIA TOXOID AND ACELLULAR PERTUSSIS VACCINE, ADSORBED 0.5 ML: 5; 2.5; 8; 8; 2.5 SUSPENSION INTRAMUSCULAR at 12:06

## 2020-08-14 RX ADMIN — DOCUSATE SODIUM 100 MG: 100 CAPSULE, LIQUID FILLED ORAL at 08:41

## 2020-08-14 RX ADMIN — MEASLES, MUMPS, AND RUBELLA VIRUS VACCINE LIVE 0.5 ML: 1000; 12500; 1000 INJECTION, POWDER, LYOPHILIZED, FOR SUSPENSION SUBCUTANEOUS at 12:03

## 2020-08-14 RX ADMIN — IBUPROFEN 600 MG: 600 TABLET, FILM COATED ORAL at 12:29

## 2020-08-14 RX ADMIN — IBUPROFEN 600 MG: 600 TABLET, FILM COATED ORAL at 03:44

## 2020-08-14 RX ADMIN — HYDROCODONE BITARTRATE AND ACETAMINOPHEN 1 TABLET: 5; 325 TABLET ORAL at 03:44

## 2020-08-14 NOTE — PROGRESS NOTES
Postpartum Progress Note      Status post Vaginal Delivery: Doing well postoperatively.     1) postpartum care immediately following delivery :    Routine care, plan discharge home today  2) GHTN  BP normal range    Rh status: A+  Rubella: Not immune, give rubella vaccine prior to discharge  Gender: Female    Subjective     Postpartum Day 2: Vaginal delivery    The patient feels well. The patient denies emotional concerns. Pain is well controlled with current medications. The baby is well. The patient is ambulating well. The patient is tolerating a normal diet.     Objective     Vital signs in last 24 hours:  Temp:  [97.9 °F (36.6 °C)-98 °F (36.7 °C)] 97.9 °F (36.6 °C)  Heart Rate:  [80-99] 81  Resp:  [18] 18  BP: (128-136)/(82-86) 128/84      General:    alert, appears stated age and cooperative   CV: RRR, no m/r/g   Lungs: CTAB   Abdomen:  Soft, Non-tender, bowel sounds active   Lochia:  appropriate   Uterine Fundus:   firm   Ext    Edema +1   DVT Evaluation:  No evidence of DVT seen on physical exam.     Lab Results   Component Value Date    WBC 16.41 (H) 08/13/2020    HGB 10.3 (L) 08/13/2020    HCT 29.5 (L) 08/13/2020    MCV 84.0 08/13/2020     08/13/2020       Beena Villafana, APRN  8/14/2020  09:42

## 2020-09-23 ENCOUNTER — POSTPARTUM VISIT (OUTPATIENT)
Dept: OBSTETRICS AND GYNECOLOGY | Facility: CLINIC | Age: 30
End: 2020-09-23

## 2020-09-23 VITALS
SYSTOLIC BLOOD PRESSURE: 120 MMHG | WEIGHT: 221 LBS | BODY MASS INDEX: 31.64 KG/M2 | HEIGHT: 70 IN | DIASTOLIC BLOOD PRESSURE: 80 MMHG

## 2020-09-23 DIAGNOSIS — Z30.09 CONTRACEPTIVE EDUCATION: ICD-10-CM

## 2020-09-23 DIAGNOSIS — R87.612 LGSIL ON PAP SMEAR OF CERVIX: ICD-10-CM

## 2020-09-23 PROCEDURE — 0503F POSTPARTUM CARE VISIT: CPT | Performed by: OBSTETRICS & GYNECOLOGY

## 2020-09-23 RX ORDER — MEDROXYPROGESTERONE ACETATE 150 MG/ML
150 INJECTION, SUSPENSION INTRAMUSCULAR
Qty: 150 MG | Refills: 3 | Status: SHIPPED | OUTPATIENT
Start: 2020-09-23 | End: 2020-09-24 | Stop reason: SDUPTHER

## 2020-09-23 NOTE — PROGRESS NOTES
HPI   Beena Vincent  is a 30 y.o. female who presents for 6-week postpartum checkup.  She had a vaginal delivery of a vigorous .  Her baby is bottlefeeding.  The baby is doing well.  The patient is also doing well.  She had her last menstrual cycle last week.  Bowels and bladder functioning normally.  She does not have abdominal or pelvic pain.  Chief Complaint   Patient presents with   • Postpartum Care     Patient delivered 20-vag, female-bottlefeeding       Past Medical History:   Diagnosis Date   • Asthma    • Cervical dysplasia    • Depression    • Iron deficiency anemia        Past Surgical History:   Procedure Laterality Date   • KNEE ARTHROSCOPY      AGE 6   • LEEP N/A 2019    Procedure: LOOP ELECTROCAUTERY EXCISION PROCEDURE;  Surgeon: Arsh Ray MD;  Location: [facility] OR [facility];  Service: Obstetrics/Gynecology   • WISDOM TOOTH EXTRACTION         Social History     Socioeconomic History   • Marital status: Single     Spouse name: Not on file   • Number of children: Not on file   • Years of education: Not on file   • Highest education level: Not on file   Occupational History     Employer: [employer]   Tobacco Use   • Smoking status: Former Smoker     Packs/day: 0.50     Years: 4.00     Pack years: 2.00     Quit date: 2020     Years since quittin.6   • Smokeless tobacco: Never Used   Substance and Sexual Activity   • Alcohol use: Not Currently     Comment: socially   • Drug use: No   • Sexual activity: Not Currently       The following portions of the patient's history were reviewed and updated as appropriate: allergies, current medications, past family history, past medical history, past social history, past surgical history and problem list.    Review of Systems   Constitutional: Negative.    HENT: Negative.    Respiratory: Negative.    Cardiovascular: Negative.    Gastrointestinal: Negative.    Genitourinary: Negative.    Musculoskeletal: Negative.    Skin: Negative.     Allergic/Immunologic: Negative.    Psychiatric/Behavioral: Negative.              Physical Exam  Vitals signs and nursing note reviewed.   Constitutional:       Appearance: She is well-developed.   HENT:      Head: Normocephalic and atraumatic.   Cardiovascular:      Rate and Rhythm: Normal rate and regular rhythm.   Pulmonary:      Effort: Pulmonary effort is normal.      Breath sounds: Normal breath sounds. No wheezing or rales.   Chest:      Comments: The breasts are homogeneous.  There are no palpable lumps.  Nipple discharge and axillary adenopathy are absent.  Abdominal:      General: There is no distension.      Palpations: Abdomen is soft.      Tenderness: There is no abdominal tenderness.   Genitourinary:     Labia:         Right: No lesion.         Left: No lesion.       Vagina: Normal. No vaginal discharge.      Cervix: No cervical motion tenderness.      Uterus: Normal. Not enlarged and not tender.       Adnexa:         Right: No mass or tenderness.          Left: No mass or tenderness.     Skin:     General: Skin is warm and dry.   Neurological:      Mental Status: She is alert and oriented to person, place, and time.         Assessment    Beena was seen today for postpartum care.    Diagnoses and all orders for this visit:    Routine postpartum follow-up    LGSIL on Pap smear of cervix    Contraceptive education    Other orders  -     medroxyPROGESTERone (Depo-Provera) 150 MG/ML injection; Inject 1 mL into the appropriate muscle as directed by prescriber Every 3 (Three) Months.        Plan  1. Appropriate progress, 6 weeks postpartum.  Okay to resume all normal activities of daily living  2. History of low-grade WILLIAM.  Counseled and questions answered.  Pap was repeated today.  If it is negative, recheck in 1 year.  3. Contraceptive counseling.  Patient would like to consider Depo-Provera.  We discussed the benefits, risks and alternatives to this.  Answered the patient's questions and she would like  to proceed.  A prescription has been sent.  The patient will arrange injections at checkout today.    Return in about 1 year (around 2021).    Social History     Tobacco Use   Smoking Status Former Smoker   • Packs/day: 0.50   • Years: 4.00   • Pack years: 2.00   • Quit date: 2020   • Years since quittin.6

## 2020-09-24 ENCOUNTER — CLINICAL SUPPORT (OUTPATIENT)
Dept: OBSTETRICS AND GYNECOLOGY | Facility: CLINIC | Age: 30
End: 2020-09-24

## 2020-09-24 VITALS
WEIGHT: 221 LBS | SYSTOLIC BLOOD PRESSURE: 118 MMHG | HEIGHT: 70 IN | BODY MASS INDEX: 31.64 KG/M2 | DIASTOLIC BLOOD PRESSURE: 74 MMHG

## 2020-09-24 DIAGNOSIS — Z30.42 ENCOUNTER FOR MANAGEMENT AND INJECTION OF DEPO-PROVERA: Primary | ICD-10-CM

## 2020-09-24 PROCEDURE — 96372 THER/PROPH/DIAG INJ SC/IM: CPT | Performed by: OBSTETRICS & GYNECOLOGY

## 2020-09-24 RX ORDER — MEDROXYPROGESTERONE ACETATE 150 MG/ML
150 INJECTION, SUSPENSION INTRAMUSCULAR ONCE
Status: COMPLETED | OUTPATIENT
Start: 2020-09-24 | End: 2020-09-24

## 2020-09-24 RX ADMIN — MEDROXYPROGESTERONE ACETATE 150 MG: 150 INJECTION, SUSPENSION INTRAMUSCULAR at 12:15

## 2020-09-24 NOTE — PROGRESS NOTES
Patient here for DepoProvera 150mg injection-given right deltoid IM.  Patient tolerated well with no reaction. Patient supplied.  NDC: [address]-2  LOT [id number]  EXP 8/2022

## 2020-09-27 LAB
CONV .: NORMAL
CYTOLOGIST CVX/VAG CYTO: NORMAL
CYTOLOGY CVX/VAG DOC CYTO: NORMAL
CYTOLOGY CVX/VAG DOC THIN PREP: NORMAL
DX ICD CODE: NORMAL
HIV 1 & 2 AB SER-IMP: NORMAL
Lab: NORMAL
OTHER STN SPEC: NORMAL
STAT OF ADQ CVX/VAG CYTO-IMP: NORMAL

## 2020-09-29 ENCOUNTER — OFFICE VISIT (OUTPATIENT)
Dept: FAMILY MEDICINE CLINIC | Facility: CLINIC | Age: 30
End: 2020-09-29

## 2020-09-29 VITALS
DIASTOLIC BLOOD PRESSURE: 68 MMHG | SYSTOLIC BLOOD PRESSURE: 122 MMHG | WEIGHT: 219 LBS | BODY MASS INDEX: 31.35 KG/M2 | OXYGEN SATURATION: 99 % | HEART RATE: 66 BPM | TEMPERATURE: 97.7 F | HEIGHT: 70 IN | RESPIRATION RATE: 18 BRPM

## 2020-09-29 DIAGNOSIS — Z86.2 HISTORY OF ANEMIA: ICD-10-CM

## 2020-09-29 DIAGNOSIS — F41.8 ANXIETY WITH DEPRESSION: Primary | ICD-10-CM

## 2020-09-29 DIAGNOSIS — F41.1 GENERALIZED ANXIETY DISORDER: ICD-10-CM

## 2020-09-29 PROBLEM — J45.901 ASTHMA EXACERBATION: Status: ACTIVE | Noted: 2017-03-09

## 2020-09-29 PROBLEM — O12.03: Status: RESOLVED | Noted: 2020-06-08 | Resolved: 2020-09-29

## 2020-09-29 PROBLEM — Z34.90 PREGNANCY: Status: RESOLVED | Noted: 2020-08-07 | Resolved: 2020-09-29

## 2020-09-29 PROBLEM — O99.013 ANEMIA AFFECTING PREGNANCY IN THIRD TRIMESTER: Status: RESOLVED | Noted: 2020-06-29 | Resolved: 2020-09-29

## 2020-09-29 PROCEDURE — 99214 OFFICE O/P EST MOD 30 MIN: CPT | Performed by: FAMILY MEDICINE

## 2020-09-29 RX ORDER — ESCITALOPRAM OXALATE 10 MG/1
20 TABLET ORAL DAILY
Qty: 30 TABLET | Refills: 3 | Status: ON HOLD | OUTPATIENT
Start: 2020-09-29 | End: 2021-10-01

## 2020-09-29 RX ORDER — HYDROXYZINE HYDROCHLORIDE 25 MG/1
25 TABLET, FILM COATED ORAL 3 TIMES DAILY PRN
Qty: 30 TABLET | Refills: 3 | Status: ON HOLD | OUTPATIENT
Start: 2020-09-29 | End: 2021-10-01

## 2020-09-29 NOTE — PROGRESS NOTES
Subjective   Beena Vincent is a 30 y.o. female.     Vitals:    20 1245   BP: 122/68   Pulse: 66   Resp: 18   Temp: 97.7 °F (36.5 °C)   SpO2: 99%        Chief Complaint   Patient presents with   • Anxiety     patient is needing to discuss anxiety and depression after having a child.    • Depression        History of Present Illness    Here to get established and complaints of uncontrolled anxiety and depression  LOV with me at [facility] greater than 3 to 5 years ago, no records available as the chart has not been signed or released yet.    8 weeks S/P .  No complications except for gestational hypertension.  She has been seen back in follow-up for her 6-week postpartum check and cleared.  Prior history of anxiety with depression treated in past (approximately ) with Prozac, Xanax and gabapentin after the unexpected death of her son's father.  Apparently she had weaned herself off these medications several years ago and most recently had been self-medicating with THC until she found out she was pregnant in 2019.  No medications, drug use or abuse, or alcohol during her pregnancy.  Currently, she remains on no medications except for her asthma inhaler and iron.  Denies any drug or alcohol abuse.  She has noticed a return of her anxiety, panic, and depressed mood approximately 2 weeks after the birth of her  child.  Sleep is okay as she is exhausted.  Expresses decreased motivation.  Denies SI or HI.  Currently needing to file for unemployment due to no childcare/ given the pandemic and her older child (age 8) needing to stay home for NTI.  Not breast-feeding.    History of iron deficiency anemia.  Currently on ferrous sulfate 325 mg 1 daily.  Last LMP was last week, regular cycle.    The following portions of the patient's history were reviewed and updated as appropriate: allergies, current medications, past family history, past medical history, past social history, past surgical  history and problem list.    Review of Systems   Constitutional: Negative for fever, unexpected weight gain and unexpected weight loss.   Respiratory: Negative for shortness of breath.    Cardiovascular: Negative for chest pain.   Psychiatric/Behavioral: Positive for sleep disturbance and stress.   All other systems reviewed and are negative.      Objective   Physical Exam  Vitals signs and nursing note reviewed.   Constitutional:       Appearance: Normal appearance. She is well-developed. She is obese.   HENT:      Head: Normocephalic and atraumatic.      Nose: Nose normal.   Eyes:      Conjunctiva/sclera: Conjunctivae normal.      Pupils: Pupils are equal, round, and reactive to light.   Neck:      Musculoskeletal: Normal range of motion and neck supple.      Thyroid: No thyromegaly.   Cardiovascular:      Rate and Rhythm: Normal rate and regular rhythm.      Heart sounds: Normal heart sounds. No murmur.   Pulmonary:      Effort: Pulmonary effort is normal.      Breath sounds: Normal breath sounds.   Abdominal:      General: Abdomen is flat. Bowel sounds are normal. There is no distension.      Palpations: Abdomen is soft. There is no hepatomegaly, splenomegaly or mass.      Tenderness: There is no abdominal tenderness. There is no guarding or rebound.      Hernia: No hernia is present.   Musculoskeletal: Normal range of motion.      Right lower leg: No edema.      Left lower leg: No edema.   Lymphadenopathy:      Cervical: No cervical adenopathy.   Skin:     General: Skin is warm.   Neurological:      General: No focal deficit present.      Mental Status: She is alert.   Psychiatric:         Mood and Affect: Mood normal.         Behavior: Behavior normal.         Thought Content: Thought content normal.         Judgment: Judgment normal.          LABS/STUDIES --August 2020 hemoglobin 10.3    Procedures     Assessment/Plan   Beena was seen today for anxiety and depression.    Diagnoses and all orders for this  visit:    Anxiety with depression recurrent/uncontrolled.  Previous history of anxiety with depression compounded by postpartum depression?  Will start Lexapro 10 mg 1 p.o. daily.  Check TSH to rule out hypothyroidism compounding to uncontrolled anxiety/depression.  Have encouraged patient to try to obtain regular daily cardio exercise, with a goal of greater than 150 minutes weekly  -     TSH    Generalized anxiety disorder -uncontrolled.  Will check TSH.  Will prescribe Vistaril 25 mg 1 p.o. every 8 hours as needed anxiety, primarily to use during interim  While waiting for SSRI to work in approximately 3 to 4 weeks.  -     Comprehensive Metabolic Panel  -     TSH    History of anemia --history of iron deficiency anemia.  Recent .  Will recheck CBC today, may need increased dose of iron?  -     CBC & Differential  -     Comprehensive Metabolic Panel    Other orders  -     escitalopram (LEXAPRO) 10 MG tablet; Take 2 tablets by mouth Daily.  -     hydrOXYzine (ATARAX) 25 MG tablet; Take 1 tablet by mouth 3 (Three) Times a Day As Needed for Itching.                 Wore goggles and face mask during entire visit.    Return in about 3 months (around 2020) for Recheck.

## 2020-09-29 NOTE — PATIENT INSTRUCTIONS
Start Lexapro 10 mg daily  May start Vistaril as needed anxiety  Recommend low fat/low calorie diet and exercise greater than 150 minutes of cardio per week.   If not better follow-up sooner than next regular appointment.

## 2020-09-30 LAB
ALBUMIN SERPL-MCNC: 5.1 G/DL (ref 3.5–5.2)
ALBUMIN/GLOB SERPL: 2.3 G/DL
ALP SERPL-CCNC: 78 U/L (ref 39–117)
ALT SERPL-CCNC: 25 U/L (ref 1–33)
AST SERPL-CCNC: 26 U/L (ref 1–32)
BASOPHILS # BLD AUTO: 0.04 10*3/MM3 (ref 0–0.2)
BASOPHILS NFR BLD AUTO: 0.6 % (ref 0–1.5)
BILIRUB SERPL-MCNC: 0.8 MG/DL (ref 0–1.2)
BUN SERPL-MCNC: 12 MG/DL (ref 6–20)
BUN/CREAT SERPL: 13.2 (ref 7–25)
CALCIUM SERPL-MCNC: 9.5 MG/DL (ref 8.6–10.5)
CHLORIDE SERPL-SCNC: 100 MMOL/L (ref 98–107)
CO2 SERPL-SCNC: 24.3 MMOL/L (ref 22–29)
CREAT SERPL-MCNC: 0.91 MG/DL (ref 0.57–1)
EOSINOPHIL # BLD AUTO: 0.15 10*3/MM3 (ref 0–0.4)
EOSINOPHIL NFR BLD AUTO: 2.1 % (ref 0.3–6.2)
ERYTHROCYTE [DISTWIDTH] IN BLOOD BY AUTOMATED COUNT: 12.9 % (ref 12.3–15.4)
GLOBULIN SER CALC-MCNC: 2.2 GM/DL
GLUCOSE SERPL-MCNC: 84 MG/DL (ref 65–99)
HCT VFR BLD AUTO: 39.3 % (ref 34–46.6)
HGB BLD-MCNC: 13.2 G/DL (ref 12–15.9)
IMM GRANULOCYTES # BLD AUTO: 0.02 10*3/MM3 (ref 0–0.05)
IMM GRANULOCYTES NFR BLD AUTO: 0.3 % (ref 0–0.5)
LYMPHOCYTES # BLD AUTO: 3.13 10*3/MM3 (ref 0.7–3.1)
LYMPHOCYTES NFR BLD AUTO: 43.4 % (ref 19.6–45.3)
MCH RBC QN AUTO: 28.3 PG (ref 26.6–33)
MCHC RBC AUTO-ENTMCNC: 33.6 G/DL (ref 31.5–35.7)
MCV RBC AUTO: 84.2 FL (ref 79–97)
MONOCYTES # BLD AUTO: 0.61 10*3/MM3 (ref 0.1–0.9)
MONOCYTES NFR BLD AUTO: 8.4 % (ref 5–12)
NEUTROPHILS # BLD AUTO: 3.27 10*3/MM3 (ref 1.7–7)
NEUTROPHILS NFR BLD AUTO: 45.2 % (ref 42.7–76)
NRBC BLD AUTO-RTO: 0 /100 WBC (ref 0–0.2)
PLATELET # BLD AUTO: 295 10*3/MM3 (ref 140–450)
POTASSIUM SERPL-SCNC: 4.2 MMOL/L (ref 3.5–5.2)
PROT SERPL-MCNC: 7.3 G/DL (ref 6–8.5)
RBC # BLD AUTO: 4.67 10*6/MM3 (ref 3.77–5.28)
SODIUM SERPL-SCNC: 137 MMOL/L (ref 136–145)
TSH SERPL DL<=0.005 MIU/L-ACNC: 1.56 UIU/ML (ref 0.27–4.2)
WBC # BLD AUTO: 7.22 10*3/MM3 (ref 3.4–10.8)

## 2021-04-16 ENCOUNTER — BULK ORDERING (OUTPATIENT)
Dept: CASE MANAGEMENT | Facility: OTHER | Age: 31
End: 2021-04-16

## 2021-04-16 DIAGNOSIS — Z23 IMMUNIZATION DUE: ICD-10-CM

## 2021-10-01 ENCOUNTER — TELEPHONE (OUTPATIENT)
Dept: FAMILY MEDICINE CLINIC | Facility: CLINIC | Age: 31
End: 2021-10-01

## 2021-10-01 ENCOUNTER — ANESTHESIA EVENT (OUTPATIENT)
Dept: PERIOP | Facility: HOSPITAL | Age: 31
End: 2021-10-01

## 2021-10-01 ENCOUNTER — ANESTHESIA (OUTPATIENT)
Dept: PERIOP | Facility: HOSPITAL | Age: 31
End: 2021-10-01

## 2021-10-01 ENCOUNTER — APPOINTMENT (OUTPATIENT)
Dept: CT IMAGING | Facility: HOSPITAL | Age: 31
End: 2021-10-01

## 2021-10-01 ENCOUNTER — HOSPITAL ENCOUNTER (OUTPATIENT)
Facility: HOSPITAL | Age: 31
Discharge: HOME | End: 2021-10-02
Attending: EMERGENCY MEDICINE | Admitting: SURGERY

## 2021-10-01 DIAGNOSIS — K35.30 ACUTE APPENDICITIS WITH LOCALIZED PERITONITIS, WITHOUT PERFORATION, ABSCESS, OR GANGRENE: Primary | ICD-10-CM

## 2021-10-01 LAB
ALBUMIN SERPL-MCNC: 4.7 G/DL (ref 3.5–5.2)
ALBUMIN/GLOB SERPL: 1.9 G/DL
ALP SERPL-CCNC: 61 U/L (ref 39–117)
ALT SERPL W P-5'-P-CCNC: 30 U/L (ref 1–33)
ANION GAP SERPL CALCULATED.3IONS-SCNC: 12 MMOL/L (ref 5–15)
AST SERPL-CCNC: 30 U/L (ref 1–32)
BASOPHILS # BLD AUTO: 0.06 10*3/MM3 (ref 0–0.2)
BASOPHILS NFR BLD AUTO: 0.3 % (ref 0–1.5)
BILIRUB SERPL-MCNC: 0.5 MG/DL (ref 0–1.2)
BILIRUB UR QL STRIP: NEGATIVE
BUN SERPL-MCNC: 8 MG/DL (ref 6–20)
BUN/CREAT SERPL: 8.8 (ref 7–25)
CALCIUM SPEC-SCNC: 9.2 MG/DL (ref 8.6–10.5)
CHLORIDE SERPL-SCNC: 103 MMOL/L (ref 98–107)
CLARITY UR: ABNORMAL
CO2 SERPL-SCNC: 21 MMOL/L (ref 22–29)
COLOR UR: YELLOW
CREAT SERPL-MCNC: 0.91 MG/DL (ref 0.57–1)
DEPRECATED RDW RBC AUTO: 41.9 FL (ref 37–54)
EOSINOPHIL # BLD AUTO: 0.19 10*3/MM3 (ref 0–0.4)
EOSINOPHIL NFR BLD AUTO: 1.1 % (ref 0.3–6.2)
ERYTHROCYTE [DISTWIDTH] IN BLOOD BY AUTOMATED COUNT: 13.3 % (ref 12.3–15.4)
GFR SERPL CREATININE-BSD FRML MDRD: 73 ML/MIN/1.73
GLOBULIN UR ELPH-MCNC: 2.5 GM/DL
GLUCOSE SERPL-MCNC: 126 MG/DL (ref 65–99)
GLUCOSE UR STRIP-MCNC: NEGATIVE MG/DL
HCG SERPL QL: NEGATIVE
HCT VFR BLD AUTO: 40.5 % (ref 34–46.6)
HGB BLD-MCNC: 13.5 G/DL (ref 12–15.9)
HGB UR QL STRIP.AUTO: NEGATIVE
HOLD SPECIMEN: NORMAL
IMM GRANULOCYTES # BLD AUTO: 0.09 10*3/MM3 (ref 0–0.05)
IMM GRANULOCYTES NFR BLD AUTO: 0.5 % (ref 0–0.5)
KETONES UR QL STRIP: ABNORMAL
LEUKOCYTE ESTERASE UR QL STRIP.AUTO: NEGATIVE
LIPASE SERPL-CCNC: 26 U/L (ref 13–60)
LYMPHOCYTES # BLD AUTO: 1.8 10*3/MM3 (ref 0.7–3.1)
LYMPHOCYTES NFR BLD AUTO: 10.2 % (ref 19.6–45.3)
MCH RBC QN AUTO: 28.4 PG (ref 26.6–33)
MCHC RBC AUTO-ENTMCNC: 33.3 G/DL (ref 31.5–35.7)
MCV RBC AUTO: 85.1 FL (ref 79–97)
MONOCYTES # BLD AUTO: 0.94 10*3/MM3 (ref 0.1–0.9)
MONOCYTES NFR BLD AUTO: 5.3 % (ref 5–12)
NEUTROPHILS NFR BLD AUTO: 14.64 10*3/MM3 (ref 1.7–7)
NEUTROPHILS NFR BLD AUTO: 82.6 % (ref 42.7–76)
NITRITE UR QL STRIP: NEGATIVE
NRBC BLD AUTO-RTO: 0 /100 WBC (ref 0–0.2)
PH UR STRIP.AUTO: 5.5 [PH] (ref 5–8)
PLATELET # BLD AUTO: 273 10*3/MM3 (ref 140–450)
PMV BLD AUTO: 10.7 FL (ref 6–12)
POTASSIUM SERPL-SCNC: 3.6 MMOL/L (ref 3.5–5.2)
PROT SERPL-MCNC: 7.2 G/DL (ref 6–8.5)
PROT UR QL STRIP: ABNORMAL
RBC # BLD AUTO: 4.76 10*6/MM3 (ref 3.77–5.28)
SARS-COV-2 RNA RESP QL NAA+PROBE: NOT DETECTED
SODIUM SERPL-SCNC: 136 MMOL/L (ref 136–145)
SP GR UR STRIP: 1.03 (ref 1–1.03)
UROBILINOGEN UR QL STRIP: ABNORMAL
WBC # BLD AUTO: 17.72 10*3/MM3 (ref 3.4–10.8)
WHOLE BLOOD HOLD SPECIMEN: NORMAL
WHOLE BLOOD HOLD SPECIMEN: NORMAL

## 2021-10-01 PROCEDURE — 25010000002 NEOSTIGMINE 5 MG/10ML SOLUTION: Performed by: ANESTHESIOLOGY

## 2021-10-01 PROCEDURE — 99284 EMERGENCY DEPT VISIT MOD MDM: CPT

## 2021-10-01 PROCEDURE — 25010000002 DEXAMETHASONE PER 1 MG: Performed by: ANESTHESIOLOGY

## 2021-10-01 PROCEDURE — C1889 IMPLANT/INSERT DEVICE, NOC: HCPCS | Performed by: SURGERY

## 2021-10-01 PROCEDURE — 96365 THER/PROPH/DIAG IV INF INIT: CPT

## 2021-10-01 PROCEDURE — 25010000002 FENTANYL CITRATE (PF) 50 MCG/ML SOLUTION: Performed by: ANESTHESIOLOGY

## 2021-10-01 PROCEDURE — 25010000002 PIPERACILLIN SOD-TAZOBACTAM PER 1 G: Performed by: EMERGENCY MEDICINE

## 2021-10-01 PROCEDURE — 88304 TISSUE EXAM BY PATHOLOGIST: CPT | Performed by: SURGERY

## 2021-10-01 PROCEDURE — 25010000002 ONDANSETRON PER 1 MG: Performed by: PHYSICIAN ASSISTANT

## 2021-10-01 PROCEDURE — G0378 HOSPITAL OBSERVATION PER HR: HCPCS

## 2021-10-01 PROCEDURE — 25010000002 KETOROLAC TROMETHAMINE PER 15 MG: Performed by: ANESTHESIOLOGY

## 2021-10-01 PROCEDURE — 25010000002 IOPAMIDOL 61 % SOLUTION: Performed by: EMERGENCY MEDICINE

## 2021-10-01 PROCEDURE — U0003 INFECTIOUS AGENT DETECTION BY NUCLEIC ACID (DNA OR RNA); SEVERE ACUTE RESPIRATORY SYNDROME CORONAVIRUS 2 (SARS-COV-2) (CORONAVIRUS DISEASE [COVID-19]), AMPLIFIED PROBE TECHNIQUE, MAKING USE OF HIGH THROUGHPUT TECHNOLOGIES AS DESCRIBED BY CMS-2020-01-R: HCPCS | Performed by: EMERGENCY MEDICINE

## 2021-10-01 PROCEDURE — 99283 EMERGENCY DEPT VISIT LOW MDM: CPT

## 2021-10-01 PROCEDURE — 96376 TX/PRO/DX INJ SAME DRUG ADON: CPT

## 2021-10-01 PROCEDURE — 25010000002 MIDAZOLAM PER 1 MG: Performed by: ANESTHESIOLOGY

## 2021-10-01 PROCEDURE — C9803 HOPD COVID-19 SPEC COLLECT: HCPCS

## 2021-10-01 PROCEDURE — 25010000002 HYDROMORPHONE 1 MG/ML SOLUTION: Performed by: SURGERY

## 2021-10-01 PROCEDURE — 83690 ASSAY OF LIPASE: CPT | Performed by: EMERGENCY MEDICINE

## 2021-10-01 PROCEDURE — 25010000002 PROPOFOL 10 MG/ML EMULSION: Performed by: ANESTHESIOLOGY

## 2021-10-01 PROCEDURE — 99218 PR INITIAL OBSERVATION CARE/DAY 30 MINUTES: CPT | Performed by: SURGERY

## 2021-10-01 PROCEDURE — 74177 CT ABD & PELVIS W/CONTRAST: CPT

## 2021-10-01 PROCEDURE — 84703 CHORIONIC GONADOTROPIN ASSAY: CPT | Performed by: EMERGENCY MEDICINE

## 2021-10-01 PROCEDURE — 25010000002 MORPHINE PER 10 MG: Performed by: EMERGENCY MEDICINE

## 2021-10-01 PROCEDURE — 96375 TX/PRO/DX INJ NEW DRUG ADDON: CPT

## 2021-10-01 PROCEDURE — 81003 URINALYSIS AUTO W/O SCOPE: CPT | Performed by: EMERGENCY MEDICINE

## 2021-10-01 PROCEDURE — 44970 LAPAROSCOPY APPENDECTOMY: CPT | Performed by: SURGERY

## 2021-10-01 PROCEDURE — 85025 COMPLETE CBC W/AUTO DIFF WBC: CPT | Performed by: EMERGENCY MEDICINE

## 2021-10-01 PROCEDURE — 25010000002 ONDANSETRON PER 1 MG: Performed by: ANESTHESIOLOGY

## 2021-10-01 PROCEDURE — 80053 COMPREHEN METABOLIC PANEL: CPT | Performed by: EMERGENCY MEDICINE

## 2021-10-01 DEVICE — HORIZON TI ML 6 CLIPS/CART
Type: IMPLANTABLE DEVICE | Site: ABDOMEN | Status: FUNCTIONAL
Brand: WECK

## 2021-10-01 DEVICE — ETS45 RELOAD STANDARD 45MM
Type: IMPLANTABLE DEVICE | Site: ABDOMEN | Status: FUNCTIONAL
Brand: ENDOPATH

## 2021-10-01 RX ORDER — OXYCODONE HYDROCHLORIDE AND ACETAMINOPHEN 5; 325 MG/1; MG/1
1 TABLET ORAL EVERY 4 HOURS PRN
Status: DISCONTINUED | OUTPATIENT
Start: 2021-10-01 | End: 2021-10-02 | Stop reason: HOSPADM

## 2021-10-01 RX ORDER — HYDROMORPHONE HYDROCHLORIDE 1 MG/ML
0.5 INJECTION, SOLUTION INTRAMUSCULAR; INTRAVENOUS; SUBCUTANEOUS
Status: DISCONTINUED | OUTPATIENT
Start: 2021-10-01 | End: 2021-10-01 | Stop reason: HOSPADM

## 2021-10-01 RX ORDER — EPHEDRINE SULFATE 50 MG/ML
5 INJECTION, SOLUTION INTRAVENOUS ONCE AS NEEDED
Status: DISCONTINUED | OUTPATIENT
Start: 2021-10-01 | End: 2021-10-01 | Stop reason: HOSPADM

## 2021-10-01 RX ORDER — SODIUM CHLORIDE 0.9 % (FLUSH) 0.9 %
10 SYRINGE (ML) INJECTION AS NEEDED
Status: DISCONTINUED | OUTPATIENT
Start: 2021-10-01 | End: 2021-10-02 | Stop reason: HOSPADM

## 2021-10-01 RX ORDER — ESCITALOPRAM OXALATE 20 MG/1
20 TABLET ORAL NIGHTLY
COMMUNITY
End: 2021-10-08 | Stop reason: SDUPTHER

## 2021-10-01 RX ORDER — OXYCODONE AND ACETAMINOPHEN 10; 325 MG/1; MG/1
1 TABLET ORAL EVERY 4 HOURS PRN
Status: DISCONTINUED | OUTPATIENT
Start: 2021-10-01 | End: 2021-10-01 | Stop reason: HOSPADM

## 2021-10-01 RX ORDER — GLYCOPYRROLATE 0.2 MG/ML
INJECTION INTRAMUSCULAR; INTRAVENOUS AS NEEDED
Status: DISCONTINUED | OUTPATIENT
Start: 2021-10-01 | End: 2021-10-01 | Stop reason: SURG

## 2021-10-01 RX ORDER — ONDANSETRON 2 MG/ML
INJECTION INTRAMUSCULAR; INTRAVENOUS AS NEEDED
Status: DISCONTINUED | OUTPATIENT
Start: 2021-10-01 | End: 2021-10-01 | Stop reason: SURG

## 2021-10-01 RX ORDER — BUPIVACAINE HYDROCHLORIDE AND EPINEPHRINE 5; 5 MG/ML; UG/ML
INJECTION, SOLUTION EPIDURAL; INTRACAUDAL; PERINEURAL AS NEEDED
Status: DISCONTINUED | OUTPATIENT
Start: 2021-10-01 | End: 2021-10-01 | Stop reason: HOSPADM

## 2021-10-01 RX ORDER — DEXAMETHASONE SODIUM PHOSPHATE 10 MG/ML
INJECTION INTRAMUSCULAR; INTRAVENOUS AS NEEDED
Status: DISCONTINUED | OUTPATIENT
Start: 2021-10-01 | End: 2021-10-01 | Stop reason: SURG

## 2021-10-01 RX ORDER — FENTANYL CITRATE 50 UG/ML
50 INJECTION, SOLUTION INTRAMUSCULAR; INTRAVENOUS
Status: DISCONTINUED | OUTPATIENT
Start: 2021-10-01 | End: 2021-10-01 | Stop reason: HOSPADM

## 2021-10-01 RX ORDER — HYDROCODONE BITARTRATE AND ACETAMINOPHEN 7.5; 325 MG/1; MG/1
1 TABLET ORAL ONCE AS NEEDED
Status: DISCONTINUED | OUTPATIENT
Start: 2021-10-01 | End: 2021-10-01 | Stop reason: HOSPADM

## 2021-10-01 RX ORDER — NALOXONE HCL 0.4 MG/ML
0.2 VIAL (ML) INJECTION AS NEEDED
Status: DISCONTINUED | OUTPATIENT
Start: 2021-10-01 | End: 2021-10-01 | Stop reason: HOSPADM

## 2021-10-01 RX ORDER — MAGNESIUM HYDROXIDE 1200 MG/15ML
LIQUID ORAL AS NEEDED
Status: DISCONTINUED | OUTPATIENT
Start: 2021-10-01 | End: 2021-10-01 | Stop reason: HOSPADM

## 2021-10-01 RX ORDER — HYDRALAZINE HYDROCHLORIDE 20 MG/ML
5 INJECTION INTRAMUSCULAR; INTRAVENOUS
Status: DISCONTINUED | OUTPATIENT
Start: 2021-10-01 | End: 2021-10-01 | Stop reason: HOSPADM

## 2021-10-01 RX ORDER — ONDANSETRON 2 MG/ML
4 INJECTION INTRAMUSCULAR; INTRAVENOUS ONCE AS NEEDED
Status: DISCONTINUED | OUTPATIENT
Start: 2021-10-01 | End: 2021-10-01 | Stop reason: HOSPADM

## 2021-10-01 RX ORDER — PROMETHAZINE HYDROCHLORIDE 25 MG/1
25 SUPPOSITORY RECTAL ONCE AS NEEDED
Status: DISCONTINUED | OUTPATIENT
Start: 2021-10-01 | End: 2021-10-01 | Stop reason: HOSPADM

## 2021-10-01 RX ORDER — DIPHENHYDRAMINE HCL 25 MG
25 CAPSULE ORAL
Status: DISCONTINUED | OUTPATIENT
Start: 2021-10-01 | End: 2021-10-01 | Stop reason: HOSPADM

## 2021-10-01 RX ORDER — SODIUM CHLORIDE, SODIUM LACTATE, POTASSIUM CHLORIDE, CALCIUM CHLORIDE 600; 310; 30; 20 MG/100ML; MG/100ML; MG/100ML; MG/100ML
9 INJECTION, SOLUTION INTRAVENOUS CONTINUOUS
Status: DISCONTINUED | OUTPATIENT
Start: 2021-10-01 | End: 2021-10-01

## 2021-10-01 RX ORDER — ONDANSETRON 2 MG/ML
4 INJECTION INTRAMUSCULAR; INTRAVENOUS ONCE
Status: COMPLETED | OUTPATIENT
Start: 2021-10-01 | End: 2021-10-01

## 2021-10-01 RX ORDER — SODIUM CHLORIDE 0.9 % (FLUSH) 0.9 %
3 SYRINGE (ML) INJECTION EVERY 12 HOURS SCHEDULED
Status: DISCONTINUED | OUTPATIENT
Start: 2021-10-01 | End: 2021-10-01 | Stop reason: HOSPADM

## 2021-10-01 RX ORDER — PROPOFOL 10 MG/ML
VIAL (ML) INTRAVENOUS AS NEEDED
Status: DISCONTINUED | OUTPATIENT
Start: 2021-10-01 | End: 2021-10-01 | Stop reason: SURG

## 2021-10-01 RX ORDER — DIPHENHYDRAMINE HYDROCHLORIDE 50 MG/ML
12.5 INJECTION INTRAMUSCULAR; INTRAVENOUS
Status: DISCONTINUED | OUTPATIENT
Start: 2021-10-01 | End: 2021-10-01 | Stop reason: HOSPADM

## 2021-10-01 RX ORDER — ONDANSETRON 4 MG/1
4 TABLET, FILM COATED ORAL EVERY 6 HOURS PRN
Status: DISCONTINUED | OUTPATIENT
Start: 2021-10-01 | End: 2021-10-02 | Stop reason: HOSPADM

## 2021-10-01 RX ORDER — ROCURONIUM BROMIDE 10 MG/ML
INJECTION, SOLUTION INTRAVENOUS AS NEEDED
Status: DISCONTINUED | OUTPATIENT
Start: 2021-10-01 | End: 2021-10-01 | Stop reason: SURG

## 2021-10-01 RX ORDER — NEOSTIGMINE METHYLSULFATE 0.5 MG/ML
INJECTION, SOLUTION INTRAVENOUS AS NEEDED
Status: DISCONTINUED | OUTPATIENT
Start: 2021-10-01 | End: 2021-10-01 | Stop reason: SURG

## 2021-10-01 RX ORDER — IBUPROFEN 600 MG/1
600 TABLET ORAL ONCE AS NEEDED
Status: DISCONTINUED | OUTPATIENT
Start: 2021-10-01 | End: 2021-10-01 | Stop reason: HOSPADM

## 2021-10-01 RX ORDER — SODIUM CHLORIDE, SODIUM LACTATE, POTASSIUM CHLORIDE, CALCIUM CHLORIDE 600; 310; 30; 20 MG/100ML; MG/100ML; MG/100ML; MG/100ML
75 INJECTION, SOLUTION INTRAVENOUS CONTINUOUS
Status: DISCONTINUED | OUTPATIENT
Start: 2021-10-01 | End: 2021-10-02 | Stop reason: HOSPADM

## 2021-10-01 RX ORDER — FLUMAZENIL 0.1 MG/ML
0.2 INJECTION INTRAVENOUS AS NEEDED
Status: DISCONTINUED | OUTPATIENT
Start: 2021-10-01 | End: 2021-10-01 | Stop reason: HOSPADM

## 2021-10-01 RX ORDER — MORPHINE SULFATE 2 MG/ML
4 INJECTION, SOLUTION INTRAMUSCULAR; INTRAVENOUS ONCE
Status: COMPLETED | OUTPATIENT
Start: 2021-10-01 | End: 2021-10-01

## 2021-10-01 RX ORDER — LABETALOL HYDROCHLORIDE 5 MG/ML
5 INJECTION, SOLUTION INTRAVENOUS
Status: DISCONTINUED | OUTPATIENT
Start: 2021-10-01 | End: 2021-10-01 | Stop reason: HOSPADM

## 2021-10-01 RX ORDER — ALBUTEROL SULFATE 2.5 MG/3ML
2.5 SOLUTION RESPIRATORY (INHALATION) EVERY 4 HOURS PRN
Status: DISCONTINUED | OUTPATIENT
Start: 2021-10-01 | End: 2021-10-02 | Stop reason: HOSPADM

## 2021-10-01 RX ORDER — SODIUM CHLORIDE 0.9 % (FLUSH) 0.9 %
3-10 SYRINGE (ML) INJECTION AS NEEDED
Status: DISCONTINUED | OUTPATIENT
Start: 2021-10-01 | End: 2021-10-01 | Stop reason: HOSPADM

## 2021-10-01 RX ORDER — FAMOTIDINE 10 MG/ML
20 INJECTION, SOLUTION INTRAVENOUS ONCE
Status: COMPLETED | OUTPATIENT
Start: 2021-10-01 | End: 2021-10-01

## 2021-10-01 RX ORDER — PROMETHAZINE HYDROCHLORIDE 25 MG/1
25 TABLET ORAL ONCE AS NEEDED
Status: DISCONTINUED | OUTPATIENT
Start: 2021-10-01 | End: 2021-10-01 | Stop reason: HOSPADM

## 2021-10-01 RX ORDER — ONDANSETRON 2 MG/ML
4 INJECTION INTRAMUSCULAR; INTRAVENOUS EVERY 6 HOURS PRN
Status: DISCONTINUED | OUTPATIENT
Start: 2021-10-01 | End: 2021-10-02 | Stop reason: HOSPADM

## 2021-10-01 RX ORDER — LIDOCAINE HYDROCHLORIDE 10 MG/ML
0.5 INJECTION, SOLUTION EPIDURAL; INFILTRATION; INTRACAUDAL; PERINEURAL ONCE AS NEEDED
Status: DISCONTINUED | OUTPATIENT
Start: 2021-10-01 | End: 2021-10-01 | Stop reason: HOSPADM

## 2021-10-01 RX ORDER — ESCITALOPRAM OXALATE 20 MG/1
20 TABLET ORAL NIGHTLY
Status: DISCONTINUED | OUTPATIENT
Start: 2021-10-01 | End: 2021-10-02 | Stop reason: HOSPADM

## 2021-10-01 RX ORDER — MIDAZOLAM HYDROCHLORIDE 1 MG/ML
1 INJECTION INTRAMUSCULAR; INTRAVENOUS
Status: DISCONTINUED | OUTPATIENT
Start: 2021-10-01 | End: 2021-10-01 | Stop reason: HOSPADM

## 2021-10-01 RX ORDER — NALOXONE HCL 0.4 MG/ML
0.1 VIAL (ML) INJECTION
Status: DISCONTINUED | OUTPATIENT
Start: 2021-10-01 | End: 2021-10-02 | Stop reason: HOSPADM

## 2021-10-01 RX ORDER — ALBUTEROL SULFATE 2.5 MG/3ML
2.5 SOLUTION RESPIRATORY (INHALATION) ONCE
Status: DISCONTINUED | OUTPATIENT
Start: 2021-10-01 | End: 2021-10-01 | Stop reason: HOSPADM

## 2021-10-01 RX ORDER — LIDOCAINE HYDROCHLORIDE 20 MG/ML
INJECTION, SOLUTION INFILTRATION; PERINEURAL AS NEEDED
Status: DISCONTINUED | OUTPATIENT
Start: 2021-10-01 | End: 2021-10-01 | Stop reason: SURG

## 2021-10-01 RX ORDER — HYDROMORPHONE HYDROCHLORIDE 1 MG/ML
0.5 INJECTION, SOLUTION INTRAMUSCULAR; INTRAVENOUS; SUBCUTANEOUS
Status: DISCONTINUED | OUTPATIENT
Start: 2021-10-01 | End: 2021-10-02 | Stop reason: HOSPADM

## 2021-10-01 RX ORDER — KETOROLAC TROMETHAMINE 30 MG/ML
INJECTION, SOLUTION INTRAMUSCULAR; INTRAVENOUS AS NEEDED
Status: DISCONTINUED | OUTPATIENT
Start: 2021-10-01 | End: 2021-10-01 | Stop reason: SURG

## 2021-10-01 RX ADMIN — PROPOFOL 200 MG: 10 INJECTION, EMULSION INTRAVENOUS at 18:19

## 2021-10-01 RX ADMIN — FENTANYL CITRATE 50 MCG: 0.05 INJECTION, SOLUTION INTRAMUSCULAR; INTRAVENOUS at 16:48

## 2021-10-01 RX ADMIN — KETOROLAC TROMETHAMINE 30 MG: 30 INJECTION, SOLUTION INTRAMUSCULAR at 18:55

## 2021-10-01 RX ADMIN — ONDANSETRON 4 MG: 2 INJECTION INTRAMUSCULAR; INTRAVENOUS at 10:37

## 2021-10-01 RX ADMIN — ROCURONIUM BROMIDE 35 MG: 50 INJECTION INTRAVENOUS at 18:19

## 2021-10-01 RX ADMIN — HYDROMORPHONE HYDROCHLORIDE 0.5 MG: 1 INJECTION, SOLUTION INTRAMUSCULAR; INTRAVENOUS; SUBCUTANEOUS at 21:42

## 2021-10-01 RX ADMIN — DEXAMETHASONE SODIUM PHOSPHATE 8 MG: 10 INJECTION INTRAMUSCULAR; INTRAVENOUS at 18:21

## 2021-10-01 RX ADMIN — GLYCOPYRROLATE 0.4 MG: 0.2 INJECTION INTRAMUSCULAR; INTRAVENOUS at 19:18

## 2021-10-01 RX ADMIN — TAZOBACTAM SODIUM AND PIPERACILLIN SODIUM 4.5 G: 500; 4 INJECTION, SOLUTION INTRAVENOUS at 12:25

## 2021-10-01 RX ADMIN — MORPHINE SULFATE 4 MG: 2 INJECTION, SOLUTION INTRAMUSCULAR; INTRAVENOUS at 10:37

## 2021-10-01 RX ADMIN — ONDANSETRON 4 MG: 2 INJECTION INTRAMUSCULAR; INTRAVENOUS at 18:55

## 2021-10-01 RX ADMIN — FAMOTIDINE 20 MG: 10 INJECTION INTRAVENOUS at 16:48

## 2021-10-01 RX ADMIN — NEOSTIGMINE METHYLSULFATE 2 MG: 0.5 INJECTION INTRAVENOUS at 19:18

## 2021-10-01 RX ADMIN — LIDOCAINE HYDROCHLORIDE 80 MG: 20 INJECTION, SOLUTION INFILTRATION; PERINEURAL at 18:19

## 2021-10-01 RX ADMIN — MIDAZOLAM 1 MG: 1 INJECTION INTRAMUSCULAR; INTRAVENOUS at 16:48

## 2021-10-01 RX ADMIN — MORPHINE SULFATE 4 MG: 2 INJECTION, SOLUTION INTRAMUSCULAR; INTRAVENOUS at 13:11

## 2021-10-01 RX ADMIN — FENTANYL CITRATE 50 MCG: 0.05 INJECTION, SOLUTION INTRAMUSCULAR; INTRAVENOUS at 18:19

## 2021-10-01 RX ADMIN — SODIUM CHLORIDE, POTASSIUM CHLORIDE, SODIUM LACTATE AND CALCIUM CHLORIDE 9 ML/HR: 600; 310; 30; 20 INJECTION, SOLUTION INTRAVENOUS at 16:48

## 2021-10-01 RX ADMIN — SODIUM CHLORIDE 1000 ML: 9 INJECTION, SOLUTION INTRAVENOUS at 10:37

## 2021-10-01 RX ADMIN — IOPAMIDOL 85 ML: 612 INJECTION, SOLUTION INTRAVENOUS at 12:01

## 2021-10-01 NOTE — ANESTHESIA PREPROCEDURE EVALUATION
" Anesthesia Evaluation     Patient summary reviewed and Nursing notes reviewed   no history of anesthetic complications:  NPO Solid Status: > 8 hours  NPO Liquid Status: > 2 hours           Airway   Mallampati: II  TM distance: >3 FB  Neck ROM: full  No difficulty expected  Dental - normal exam     Pulmonary    (+) a smoker (vape with nictoine), asthma (freq inhaler use),wheezes,   Cardiovascular - normal exam    (+) hypertension,       Neuro/Psych  (+) psychiatric history Anxiety and Depression,     GI/Hepatic/Renal/Endo      Musculoskeletal     Abdominal   (+) obese,    Substance History      OB/GYN          Other                        Anesthesia Plan    ASA 2     general   (PONV prophylaxis    I have reviewed the patient's history with the patient and the chart, including all pertinent laboratory results and imaging. I have explained the risks of anesthesia including but not limited to dental damage, corneal abrasion, nerve injury, MI, stroke, and death.    /81 (BP Location: Left arm, Patient Position: Lying)   Pulse 71   Temp 36.1 °C (96.9 °F) (Tympanic)   Resp 22   Ht 177.8 cm (70\")   Wt 99.3 kg (219 lb)   LMP 09/13/2021 (Approximate)   SpO2 96%   Breastfeeding No   BMI 31.42 kg/m²   )  intravenous induction     Anesthetic plan, all risks, benefits, and alternatives have been provided, discussed and informed consent has been obtained with: patient.    Plan discussed with CRNA.      "

## 2021-10-01 NOTE — ED PROVIDER NOTES
Pt presents to the ED c/o  abdominal pain.  The pain began last night and is sharp and stabbing in nature.  It is localized to the right lower abdomen.  She has had vomiting and dry heaving.  No previous abdominal surgeries.     On exam,   Awake and alert, no acute distress.  Focal tenderness in the right lower quadrant with rebound tenderness present.      ED Course as of Oct 01 1942   Fri Oct 01, 2021   1020 The patient presents with acute right-sided abdominal pain.  On physical exam, she has right lower quadrant rebound tenderness with a positive obturator sign and positive Rovsing sign, concerning for acute appendicitis.  I will order a CT scan to evaluate for this.    [AR]   1031 WBC(!): 17.72 [AR]   1209 CT abdomen independently interpreted in PACS and demonstrates acute appendicitis.    [JR]   1229 Discussed with Dr. Paul Dong, general surgery, who will take the patient to the operating room today.    [JR]   1300 COVID19: Not Detected [JR]      ED Course User Index  [AR] Leonarda Redd PA  [JR] Seth Rivers MD          Plan: Sent to the OR    Final diagnoses:   Acute appendicitis with localized peritonitis, without perforation, abscess, or gangrene           I wore an N95 mask, face shield, and gloves during this patient encounter.  Patient also wearing a surgical mask.  Hand hygeine performed before and after seeing the patient.     Attestation:  The ESME and I have discussed this patient's history, physical exam, and treatment plan.  I have reviewed the documentation and personally had a face to face interaction with the patient. I affirm the documentation and agree with the treatment and plan.  The attached note describes my personal findings.            Seth Rivers MD  10/01/21 1943

## 2021-10-01 NOTE — TELEPHONE ENCOUNTER
Caller: DIEGO RASCON    Relationship to patient: Mother    Best call back number: [phone]     Chief complaint: SERVE VOMITING     PATIENTS MOTHER STATES THEY ARE GOING TO ER BUT WANTED RECOMMENDATIONS ON WHICH ER. I DIRECTED HER TO THE CLOSET ER AFTER CONTACTING THE PRACTICE   PATIENTS MOTHER WANTED TO INFORM NIKOLAY

## 2021-10-01 NOTE — ED PROVIDER NOTES
"EMERGENCY DEPARTMENT ENCOUNTER    Room Number: 25/25  Date seen: 10/1/2021  Time seen: 10:05 EDT  PCP: Sissy Garibay MD    Spoken Language:  English  Language interpretation services not needed     CHIEF COMPLAINT: Abdominal pain    HPI: Beena Vincent is a 31 y.o. female 2G2P with past medical history pertinent for anxiety and depression presenting to the ED for evaluation of abdominal pain. The history is being obtained by the patient and by review of the medical chart.  The patient complains of right-sided abdominal pain which onset last night.  She describes the pain as \"stabbing\" in nature.  It is constant.  She rates the pain is 8/10 in severity.  She admits to associated nausea and vomiting, as well as multiple episodes of dry heaving.  She estimates 4 episodes of vomiting since her pain onset.  The patient denies any specific aggravating or relieving factors.  She does admit to having diarrhea over the past week, but denies fever, sore throat, cough, chest pain or shortness of breath.  She denies any prior abdominal surgeries.  She denies any pelvic pain, abnormal vaginal bleeding, abnormal vaginal discharge or back pain.    MEDICAL RECORD REVIEW:  Reviewed in [place].      PAST MEDICAL HISTORY  Past Medical History:   Diagnosis Date   • Asthma    • Cervical dysplasia    • Depression    • Iron deficiency anemia        PAST SURGICAL HISTORY  Past Surgical History:   Procedure Laterality Date   • KNEE ARTHROSCOPY      AGE 6   • LEEP N/A 4/25/2019    Procedure: LOOP ELECTROCAUTERY EXCISION PROCEDURE;  Surgeon: Arsh Ray MD;  [facility]: [facility] OR [facility] [place];  Service: Obstetrics/Gynecology   • WISDOM TOOTH EXTRACTION         FAMILY HISTORY  Family History   Problem Relation Age of Onset   • Diabetes Father    • Arthritis Maternal Grandmother    • Lung cancer Maternal Grandfather    • Heart attack Maternal Grandfather    • Heart disease Maternal Grandfather    • Stroke Maternal Grandfather    • Hyperlipidemia " Maternal Grandfather    • Malig Hyperthermia Neg Hx    • Breast cancer Neg Hx    • Ovarian cancer Neg Hx    • Uterine cancer Neg Hx    • Colon cancer Neg Hx        SOCIAL HISTORY  Social History     Socioeconomic History   • Marital status: Single     Spouse name: Not on file   • Number of children: Not on file   • Years of education: Not on file   • Highest education level: Not on file   Tobacco Use   • Smoking status: Former Smoker     Packs/day: 0.50     Years: 4.00     Pack years: 2.00     Quit date: 2020     Years since quittin.6   • Smokeless tobacco: Never Used   Vaping Use   • Vaping Use: Every day   Substance and Sexual Activity   • Alcohol use: Not Currently     Comment: socially   • Drug use: No   • Sexual activity: Not Currently       CURRENT MEDICATIONS  Prior to Admission medications    Medication Sig Start Date End Date Taking? Authorizing Provider   albuterol sulfate  (90 Base) MCG/ACT inhaler Inhale 2 puffs Every 4 (Four) Hours As Needed for Wheezing.   Yes Provider, MD Rufina   escitalopram (LEXAPRO) 10 MG tablet Take 2 tablets by mouth Daily. 20  Yes Sissy Garibay MD   ferrous sulfate 325 (65 FE) MG tablet Take 1 tablet by mouth Daily With Breakfast. 20   Arsh Ray MD   hydrOXYzine (ATARAX) 25 MG tablet Take 1 tablet by mouth 3 (Three) Times a Day As Needed for Itching. 20   Sissy Garibay MD   ibuprofen (ADVIL,MOTRIN) 600 MG tablet Take 1 tablet by mouth Every 6 (Six) Hours As Needed for Mild Pain . 20   Beena Villafana, APRN   prenatal vitamins (PRENATAL 27-1) 27-1 MG tablet tablet Take 1 tablet by mouth Daily. 20   Arsh Ray MD       ALLERGIES  Penicillins and Adhesive tape    REVIEW OF SYSTEMS  All systems reviewed and negative except for those discussed in HPI.     PHYSICAL EXAM  ED Triage Vitals   Temp Heart Rate Resp BP SpO2   10/01/21 0940 10/01/21 0940 10/01/21 0940 10/01/21 1002 10/01/21 09   96.9 °F (36.1 °C) 73 18 131/87  98 %      Temp src Heart Rate Source Patient Position BP Location FiO2 (%)   10/01/21 0940 10/01/21 1002 10/01/21 1002 10/01/21 1002 --   Tympanic Monitor Lying Right arm        Physical Exam  Constitutional:       Appearance: Normal appearance.      Comments: Appears uncomfortable   HENT:      Head: Normocephalic and atraumatic.      Mouth/Throat:      Mouth: Mucous membranes are moist.   Eyes:      Extraocular Movements: Extraocular movements intact.      Pupils: Pupils are equal, round, and reactive to light.   Cardiovascular:      Rate and Rhythm: Normal rate and regular rhythm.   Pulmonary:      Effort: Pulmonary effort is normal.      Breath sounds: Normal breath sounds.   Abdominal:      General: There is no distension.      Palpations: Abdomen is soft.      Tenderness: There is abdominal tenderness in the right lower quadrant and suprapubic area. There is guarding. Positive signs include Rovsing's sign and psoas sign. Negative signs include Montano's sign.   Musculoskeletal:      Cervical back: Normal range of motion.   Skin:     General: Skin is warm and dry.   Neurological:      General: No focal deficit present.      Mental Status: She is alert and oriented to person, place, and time.   Psychiatric:         Mood and Affect: Mood normal.         Behavior: Behavior normal.         Thought Content: Thought content normal.         Judgment: Judgment normal.         PROCEDURES  Procedures  None    LABS  Recent Results (from the past 24 hour(s))   Urinalysis With Microscopic If Indicated (No Culture) - Urine, Clean Catch    Collection Time: 10/01/21  9:49 AM    Specimen: Urine, Clean Catch   Result Value Ref Range    Color, UA Yellow Yellow, Straw    Appearance, UA Turbid (A) Clear    pH, UA 5.5 5.0 - 8.0    Specific Gravity, UA 1.026 1.005 - 1.030    Glucose, UA Negative Negative    Ketones, UA Trace (A) Negative    Bilirubin, UA Negative Negative    Blood, UA Negative Negative    Protein, UA Trace (A) Negative     Leuk Esterase, UA Negative Negative    Nitrite, UA Negative Negative    Urobilinogen, UA 0.2 E.U./dL 0.2 - 1.0 E.U./dL   Comprehensive Metabolic Panel    Collection Time: 10/01/21  9:59 AM    Specimen: Blood   Result Value Ref Range    Glucose 126 (H) 65 - 99 mg/dL    BUN 8 6 - 20 mg/dL    Creatinine 0.91 0.57 - 1.00 mg/dL    Sodium 136 136 - 145 mmol/L    Potassium 3.6 3.5 - 5.2 mmol/L    Chloride 103 98 - 107 mmol/L    CO2 21.0 (L) 22.0 - 29.0 mmol/L    Calcium 9.2 8.6 - 10.5 mg/dL    Total Protein 7.2 6.0 - 8.5 g/dL    Albumin 4.70 3.50 - 5.20 g/dL    ALT (SGPT) 30 1 - 33 U/L    AST (SGOT) 30 1 - 32 U/L    Alkaline Phosphatase 61 39 - 117 U/L    Total Bilirubin 0.5 0.0 - 1.2 mg/dL    eGFR Non African Amer 73 >60 mL/min/1.73    Globulin 2.5 gm/dL    A/G Ratio 1.9 g/dL    BUN/Creatinine Ratio 8.8 7.0 - 25.0    Anion Gap 12.0 5.0 - 15.0 mmol/L   Lipase    Collection Time: 10/01/21  9:59 AM    Specimen: Blood   Result Value Ref Range    Lipase 26 13 - 60 U/L   hCG, Serum, Qualitative    Collection Time: 10/01/21  9:59 AM    Specimen: Blood   Result Value Ref Range    HCG Qualitative Negative Negative   Green Top (Gel)    Collection Time: 10/01/21  9:59 AM   Result Value Ref Range    Extra Tube Hold for add-ons.    Lavender Top    Collection Time: 10/01/21  9:59 AM   Result Value Ref Range    Extra Tube hold for add-on    Light Blue Top    Collection Time: 10/01/21  9:59 AM   Result Value Ref Range    Extra Tube hold for add-on    CBC Auto Differential    Collection Time: 10/01/21  9:59 AM    Specimen: Blood   Result Value Ref Range    WBC 17.72 (H) 3.40 - 10.80 10*3/mm3    RBC 4.76 3.77 - 5.28 10*6/mm3    Hemoglobin 13.5 12.0 - 15.9 g/dL    Hematocrit 40.5 34.0 - 46.6 %    MCV 85.1 79.0 - 97.0 fL    MCH 28.4 26.6 - 33.0 pg    MCHC 33.3 31.5 - 35.7 g/dL    RDW 13.3 12.3 - 15.4 %    RDW-SD 41.9 37.0 - 54.0 fl    MPV 10.7 6.0 - 12.0 fL    Platelets 273 140 - 450 10*3/mm3    Neutrophil % 82.6 (H) 42.7 - 76.0 %     Lymphocyte % 10.2 (L) 19.6 - 45.3 %    Monocyte % 5.3 5.0 - 12.0 %    Eosinophil % 1.1 0.3 - 6.2 %    Basophil % 0.3 0.0 - 1.5 %    Immature Grans % 0.5 0.0 - 0.5 %    Neutrophils, Absolute 14.64 (H) 1.70 - 7.00 10*3/mm3    Lymphocytes, Absolute 1.80 0.70 - 3.10 10*3/mm3    Monocytes, Absolute 0.94 (H) 0.10 - 0.90 10*3/mm3    Eosinophils, Absolute 0.19 0.00 - 0.40 10*3/mm3    Basophils, Absolute 0.06 0.00 - 0.20 10*3/mm3    Immature Grans, Absolute 0.09 (H) 0.00 - 0.05 10*3/mm3    nRBC 0.0 0.0 - 0.2 /100 WBC       RADIOLOGY  CT Abdomen Pelvis With Contrast    (Results Pending)       MEDICATIONS GIVEN IN THE ER  Medications   sodium chloride 0.9 % flush 10 mL (has no administration in time range)   piperacillin-tazobactam (ZOSYN) 4.5 g in iso-osmotic dextrose 100 mL IVPB (premix) (4.5 g Intravenous New Bag 10/1/21 1225)   morphine injection 4 mg (4 mg Intravenous Given 10/1/21 1037)   sodium chloride 0.9 % bolus 1,000 mL (1,000 mL Intravenous New Bag 10/1/21 1037)   ondansetron (ZOFRAN) injection 4 mg (4 mg Intravenous Given 10/1/21 1037)   iopamidol (ISOVUE-300) 61 % injection 100 mL (85 mL Intravenous Given by Other 10/1/21 1201)       MEDICAL DECISION MAKING, CONSULTS AND PROGRESS NOTES  All labs and all radiology studies were have been viewed and interpreted by me.   Discussion below represents my analysis of pertinent findings related to patient's condition, differential diagnosis, treatment plan and final disposition.    Differential diagnosis includes but is not limited to:  - Hepatobiliary pathology such as cholecystitis, cholangitis, and symptomatic cholelithiasis  - Pancreatitis  - Dyspepsia  - Small bowel obstruction  - Appendicitis  - Diverticulitis  - UTI including pyelonephritis  - Ureteral stone  - Zoster  - Colitis, including infectious and ischemic  - Atypical ACS    PPE: The patient was placed in a face mask in first look. Patient was wearing facemask when I entered the room and throughout our  encounter. I wore full protective equipment throughout this patient encounter including a face mask, eye protection and gloves. Hand hygiene was performed before donning protective equipment and after removal when leaving the room.    AS OF 12:33 EDT VITALS:    BP - 114/72  HR - 76  TEMP - 96.9 °F (36.1 °C) (Tympanic)  O2 SATS - 97%    ED Course as of Oct 01 1233   Fri Oct 01, 2021   1020 The patient presents with acute right-sided abdominal pain.  On physical exam, she has right lower quadrant rebound tenderness with a positive obturator sign and positive Rovsing sign, concerning for acute appendicitis.  I will order a CT scan to evaluate for this.    [AR]   1031 WBC(!): 17.72 [AR]   1209 CT abdomen independently interpreted in PACS and demonstrates acute appendicitis.    [JR]   1229 Discussed with Dr. Paul Dong, general surgery, who will take the patient to the operating room today.    [JR]      ED Course User Index  [AR] Leonarda Redd PA  [JR] Seth Rivers MD     Based on the patient's lab findings and presenting symptoms, the doctor and I feel it is appropriate to admit the patient for further management, evaluation, and treatment.  I have discussed this with the admitting team.  I have also discussed this with the patient/family.  They are in agreement with admission.      DIAGNOSIS   Diagnosis Plan   1. Acute appendicitis with localized peritonitis, without perforation, abscess, or gangrene  Case request    Case request       DISPOSITION  ED Disposition     ED Disposition Condition Comment    Send to OR          RX  Medications   sodium chloride 0.9 % flush 10 mL (has no administration in time range)   piperacillin-tazobactam (ZOSYN) 4.5 g in iso-osmotic dextrose 100 mL IVPB (premix) (4.5 g Intravenous New Bag 10/1/21 1225)   morphine injection 4 mg (4 mg Intravenous Given 10/1/21 1037)   sodium chloride 0.9 % bolus 1,000 mL (1,000 mL Intravenous New Bag 10/1/21 1037)   ondansetron  (ZOFRAN) injection 4 mg (4 mg Intravenous Given 10/1/21 1037)   iopamidol (ISOVUE-300) 61 % injection 100 mL (85 mL Intravenous Given by Other 10/1/21 1201)          Medication List      No changes were made to your prescriptions during this visit.       Provider Attestation:  I personally reviewed the past medical history, past surgical history, social history, family history, current medications and allergies as they appear in the chart. I reviewed the patient's history, physical, lab/imaging results and overall care with Dr. Rivers who is in agreement with the patient's treatment plan.    EMR Dragon/Transcription disclaimer:  Some of this encounter note is an electronic transcription/translation of spoken language to printed text. The electronic translation of spoken language may permit erroneous, or at times, nonsensical words or phrases to be inadvertently transcribed; Although I have reviewed the note for such errors, some may still exist.    Provider note signed by:         Leonarda Redd PA  10/01/21 1422

## 2021-10-01 NOTE — H&P
[facility]   HISTORY AND PHYSICAL    Patient Name: Beena Vincent  : 1990  MRN: [id number]  Primary Care Physician:  Sissy Garibay MD  Date of admission: 10/1/2021    Subjective   Subjective     Chief Complaint: Abdominal pain    History of Present Illness     The patient is a very pleasant 31-year-old female who presented to the emergency room with a 1 day history of abdominal pain.  She began having right lower quadrant abdominal pain yesterday afternoon and into the evening that was somewhat intermittent.  Over the course of the night the abdominal pain worsened and by this morning was severe.  She also had numerous episodes of nausea and vomiting.  A CT scan of the abdomen and pelvis was performed that shows acute appendicitis.    Review of Systems   Constitutional: Negative for fatigue and fever.   HENT: Negative for trouble swallowing and voice change.    Respiratory: Negative for chest tightness and shortness of breath.    Cardiovascular: Negative for chest pain and palpitations.   Gastrointestinal: Positive for abdominal pain, nausea and vomiting. Negative for blood in stool, constipation and diarrhea.   Psychiatric/Behavioral: Negative for agitation and confusion.        Personal History     Past Medical History:   Diagnosis Date   • Asthma    • Cervical dysplasia    • Depression    • Iron deficiency anemia        Past Surgical History:   Procedure Laterality Date   • KNEE ARTHROSCOPY      AGE 6   • LEEP N/A 2019    Procedure: LOOP ELECTROCAUTERY EXCISION PROCEDURE;  Surgeon: Arsh Ray MD;  Location: [facility] OR [facility] [place];  Service: Obstetrics/Gynecology   • WISDOM TOOTH EXTRACTION         Family History: family history includes Arthritis in her maternal grandmother; Diabetes in her father; Heart attack in her maternal grandfather; Heart disease in her maternal grandfather; Hyperlipidemia in her maternal grandfather; Lung cancer in her maternal grandfather; Stroke in her maternal  "grandfather. Otherwise pertinent FHx was reviewed and not pertinent to current issue.    Social History:  reports that she quit smoking about 19 months ago. She has a 2.00 pack-year smoking history. She has never used smokeless tobacco. She reports previous alcohol use. She reports that she does not use drugs.    Home Medications:  albuterol sulfate HFA, escitalopram, ferrous sulfate, hydrOXYzine, ibuprofen, and prenatal vitamins    Allergies:  Allergies   Allergen Reactions   • Penicillins Nausea And Vomiting   • Adhesive Tape Rash     \"SURGICAL TAPE\"  AS A CHILD       Objective    Objective     Vitals:   Temp:  [96.9 °F (36.1 °C)] 96.9 °F (36.1 °C)  Heart Rate:  [70-76] 76  Resp:  [14-18] 14  BP: (114-147)/(72-95) 114/72    Physical Exam  Constitutional:       Appearance: Normal appearance. She is well-developed. She is not toxic-appearing.   HENT:      Head: Normocephalic and atraumatic.   Eyes:      General: No scleral icterus.  Pulmonary:      Effort: Pulmonary effort is normal. No respiratory distress.   Abdominal:      Palpations: Abdomen is soft.      Tenderness: There is abdominal tenderness (Very tender) in the right lower quadrant. There is guarding. There is no rebound.   Skin:     General: Skin is warm and dry.   Neurological:      Mental Status: She is alert and oriented to person, place, and time.   Psychiatric:         Behavior: Behavior normal.         Thought Content: Thought content normal.         Judgment: Judgment normal.         Result Review    Result Review:  I have personally reviewed the results from the time of this admission to 10/1/2021 12:46 EDT and agree with these findings:  [x]  Laboratory  []  Microbiology  [x]  Radiology  []  EKG/Telemetry   []  Cardiology/Vascular   []  Pathology  [x]  Old records  []  Other:    Assessment / Plan     Brief Patient Summary:  Beena Vincent is a 31 y.o. female who presents to the emergency room with right lower quadrant abdominal pain.  CT scan of " the abdomen and pelvis shows acute appendicitis.    Active Hospital Problems:  Active Hospital Problems    Diagnosis    • Acute appendicitis with localized peritonitis, without perforation, abscess, or gangrene      Plan: Laparoscopic appendectomy.  The patient understands the indications, alternatives, risks, and benefits of the procedure and wishes to proceed.      Electronically signed by Ahmet Dong Jr., MD, 10/01/21, 12:46 PM EDT.

## 2021-10-01 NOTE — ED NOTES
Pt comes to ED from home for c/o abdominal pain and vomiting. Pain is 8/10, constant, stabbing pain on right anterior side. Pt started having stomach pain last night and has thrown up 4 times (mostly dry heaving). Pt has been feeling dizzy, hot flashes and chills. Pt denies SOA and CP.      Millie Whyte, RN  10/01/21 0913

## 2021-10-01 NOTE — ANESTHESIA PROCEDURE NOTES
Airway  Urgency: elective    Airway not difficult    General Information and Staff    Patient location during procedure: OR  Anesthesiologist: Tiana Helms MD    Indications and Patient Condition  Indications for airway management: airway protection    Preoxygenated: yes  Mask difficulty assessment: 1 - vent by mask    Final Airway Details  Final airway type: endotracheal airway      Successful airway: ETT  Cuffed: yes   Successful intubation technique: direct laryngoscopy  Endotracheal tube insertion site: oral  Blade: Aaron  Blade size: 3  ETT size (mm): 7.0  Cormack-Lehane Classification: grade I - full view of glottis  Placement verified by: chest auscultation and capnometry   Number of attempts at approach: 1  Assessment: lips, teeth, and gum same as pre-op and atraumatic intubation

## 2021-10-01 NOTE — OP NOTE
Surgeon: Ahmet Dong Jr., M.D.    Pre-Operative Diagnosis:     Acute appendicitis with localized peritonitis, without perforation, abscess, or gangrene [K30.45]    Post-Operative Diagnosis:    Acute appendicitis    Procedure Performed:     Laparoscopic appendectomy    Indications:     The patient is a pleasant 31-year-old female who presented to the emergency room with right lower quadrant abdominal pain.  CT scan of the abdomen and pelvis shows acute appendicitis.  She presents for laparoscopic appendectomy.  The patient understands the indications, alternatives, risks, and benefits of the procedure and wishes to proceed.    Procedure:     The patient was taken to the operating room where she was placed in the supine position on the operating table.  Monitors were placed and she underwent general endotracheal anesthesia and was appropriately monitored throughout the case by the anesthesia personnel.  The abdomen was prepped and draped in the standard surgical fashion.  Each incision was infiltrated with half percent Marcaine with epinephrine prior to making incision.  A supraumbilical incision was made with a scalpel carried through the skin into the subcutaneous tissue.  The abdominal wall was then elevated with skin hooks and a Verees needle was placed through the incision into the abdomen without difficulty.  The abdomen was then insufflated with low pressures encountered initially.  Once fully insufflated, the Veress needle was removed and a 5 mm port was placed through the same incision, again with traction applied to the abdominal wall using skin hooks.  The laparoscope was then inserted to the port and the area of the Veress needle and port insertion was inspected with no injury present.  Attention was turned to the left lower quadrant.  A 5 mm port was placed in the left lower quadrant by making a skin incision with a scalpel carried through the skin into the subcutaneous tissue and inserting the port  through the incision into the abdomen with direct visualization intra-abdominal using a laparoscope.  A 12 mm port was placed in the the epigastrium in the same fashion.  Attention was then turned to the right lower quadrant.  The appendix was visualized and consistent with a suppurative acute appendicitis.  There was no perforation nor gangrenous changes.  The appendix was elevated and the mesoappendix was divided using the Enseal device.  It was densely adherent to the terminal ileum and this was .  In the process of  the appendix from the terminal ileum, the avascular fold of Treves was removed from the terminal ileum and left attached to the appendix due to the density of the adhesions.  The appendix was mobilized to the cecum and then divided at the cecal margin with a SHERINE stapling device using a blue load.    The appendix was then placed in an Endo Catch bag and removed from the epigastric port site.  The appendiceal bed was then carefully inspected noted to be hemostatic.  The abdomen was deflated and the ports are removed.  All skin incisions were closed with a 4-0 Monocryl in a subcuticular fashion followed by Mastisol and Steri-Strips.  The sponge, needle, and instrument count were correct at the end the case.  The patient tolerated the procedure well and was transferred to the recovery room in stable condition.    Estimated Blood Loss:      minimal    Specimens:     Order Name Source Comment Collection Info Order Time   TISSUE PATHOLOGY EXAM Large Intestine, Appendix  Collected By: Ahmet Dong Jr., MD 10/1/2021  6:59 PM     Release to patient   Immediate            Ahmet Dong Jr., M.D.

## 2021-10-01 NOTE — ED NOTES
Told pt to undress completely for Pre Op. She is ready for her procedure.      Millie Whyte, RN  10/01/21 4748

## 2021-10-01 NOTE — ED TRIAGE NOTES
Pt report rt sided abd. Pain with nausea and vomiting started last night.    Pt arrives in triage with mask on. Triage staff wearing N95 masks and goggles.

## 2021-10-01 NOTE — DISCHARGE INSTRUCTIONS
Dr. Ahmet Dong  [address]  [place], [place] [address] [phone]    Discharge Instructions for Laparoscopic Appendectomy    1. Go home, rest and take it easy today; however, you should get up and move about several times today to reduce the risk of developing a clot in your legs.      2. You may experience some dizziness or memory loss from the anesthesia.  This may last for the next 24 hours.  Someone should plan on staying with you for the first 24 hours for your safety.    3. Do not make any important legal decisions or sign any legal papers for the next 24 hours.      4. Eat and drink lightly today.  Start off with liquids, jello, soup, crackers or other bland foods at first. You may advance your diet tomorrow as tolerated as long as you do not experience any nausea or vomiting.     5. You may remove your outer dressings in 2 days.  The white tapes called steri-strips should stay in place.  They will fall off on their own in 1-2 weeks.  Do not worry if they come off sooner.      6. You may notice some bleeding/drainage on your outer dressings. A little bloody drainage is normal. If the bleeding/drainage is such that the bandage cannot absorb it, remove the dressing, apply clean gauze and apply firm pressure for a full 15 minutes.  If the bleeding continues, please call me.    7. You may shower tomorrow.  No tub baths until your incisions are completely healed.         8. You have received a prescription for a narcotic pain medicine, as you will have some pain following surgery.   You will not be totally pain free, but your pain medicine should make the pain tolerable.  Please take your pain medicine as prescribed and always take your pills with food to prevent nausea. If you are having severe pain that cannot be controlled by the pain medicine, please contact me.      9. It is not unusual to experience pain/discomfort in your shoulders or under your ribs after surgery.  It is from the gas used  during the laparoscopic procedure and usually lasts 1-3 days.  The prescription pain medicine is used to treat the surgical pain and does not typically alleviate this “gassy” pain.     10. No driving for 24 hours and for as long as you are taking your prescription pain medicine.      11. You will need to call the office at [phone] to schedule a follow-up appointment in 2 weeks.     12. Remember to contact me for any of the following:    • Fever > 101 degrees  • Severe pain that cannot be controlled by taking your pain pills  • Severe nausea or vomiting   • Significant bleeding of your incisions  • Drainage that has a bad smell or is yellow or green in appearance  • Any other questions or concerns

## 2021-10-02 ENCOUNTER — READMISSION MANAGEMENT (OUTPATIENT)
Dept: CALL CENTER | Facility: HOSPITAL | Age: 31
End: 2021-10-02

## 2021-10-02 VITALS
HEIGHT: 70 IN | WEIGHT: 219 LBS | DIASTOLIC BLOOD PRESSURE: 75 MMHG | TEMPERATURE: 98.6 F | HEART RATE: 76 BPM | RESPIRATION RATE: 16 BRPM | OXYGEN SATURATION: 98 % | SYSTOLIC BLOOD PRESSURE: 115 MMHG | BODY MASS INDEX: 31.35 KG/M2

## 2021-10-02 LAB
ANION GAP SERPL CALCULATED.3IONS-SCNC: 10.8 MMOL/L (ref 5–15)
BASOPHILS # BLD AUTO: 0.01 10*3/MM3 (ref 0–0.2)
BASOPHILS NFR BLD AUTO: 0.1 % (ref 0–1.5)
BUN SERPL-MCNC: 7 MG/DL (ref 6–20)
BUN/CREAT SERPL: 9.2 (ref 7–25)
CALCIUM SPEC-SCNC: 8.9 MG/DL (ref 8.6–10.5)
CHLORIDE SERPL-SCNC: 103 MMOL/L (ref 98–107)
CO2 SERPL-SCNC: 23.2 MMOL/L (ref 22–29)
CREAT SERPL-MCNC: 0.76 MG/DL (ref 0.57–1)
DEPRECATED RDW RBC AUTO: 41.3 FL (ref 37–54)
EOSINOPHIL # BLD AUTO: 0 10*3/MM3 (ref 0–0.4)
EOSINOPHIL NFR BLD AUTO: 0 % (ref 0.3–6.2)
ERYTHROCYTE [DISTWIDTH] IN BLOOD BY AUTOMATED COUNT: 12.9 % (ref 12.3–15.4)
GFR SERPL CREATININE-BSD FRML MDRD: 90 ML/MIN/1.73
GLUCOSE SERPL-MCNC: 114 MG/DL (ref 65–99)
HCT VFR BLD AUTO: 38.5 % (ref 34–46.6)
HGB BLD-MCNC: 12.3 G/DL (ref 12–15.9)
IMM GRANULOCYTES # BLD AUTO: 0.05 10*3/MM3 (ref 0–0.05)
IMM GRANULOCYTES NFR BLD AUTO: 0.4 % (ref 0–0.5)
LYMPHOCYTES # BLD AUTO: 1.28 10*3/MM3 (ref 0.7–3.1)
LYMPHOCYTES NFR BLD AUTO: 11.3 % (ref 19.6–45.3)
MCH RBC QN AUTO: 27.8 PG (ref 26.6–33)
MCHC RBC AUTO-ENTMCNC: 31.9 G/DL (ref 31.5–35.7)
MCV RBC AUTO: 87.1 FL (ref 79–97)
MONOCYTES # BLD AUTO: 0.52 10*3/MM3 (ref 0.1–0.9)
MONOCYTES NFR BLD AUTO: 4.6 % (ref 5–12)
NEUTROPHILS NFR BLD AUTO: 83.6 % (ref 42.7–76)
NEUTROPHILS NFR BLD AUTO: 9.46 10*3/MM3 (ref 1.7–7)
NRBC BLD AUTO-RTO: 0 /100 WBC (ref 0–0.2)
PLATELET # BLD AUTO: 263 10*3/MM3 (ref 140–450)
PMV BLD AUTO: 11.1 FL (ref 6–12)
POTASSIUM SERPL-SCNC: 4.3 MMOL/L (ref 3.5–5.2)
RBC # BLD AUTO: 4.42 10*6/MM3 (ref 3.77–5.28)
SODIUM SERPL-SCNC: 137 MMOL/L (ref 136–145)
WBC # BLD AUTO: 11.32 10*3/MM3 (ref 3.4–10.8)

## 2021-10-02 PROCEDURE — 85025 COMPLETE CBC W/AUTO DIFF WBC: CPT | Performed by: SURGERY

## 2021-10-02 PROCEDURE — 25010000002 HYDROMORPHONE PER 4 MG: Performed by: SURGERY

## 2021-10-02 PROCEDURE — 80048 BASIC METABOLIC PNL TOTAL CA: CPT | Performed by: SURGERY

## 2021-10-02 PROCEDURE — G0378 HOSPITAL OBSERVATION PER HR: HCPCS

## 2021-10-02 PROCEDURE — 99024 POSTOP FOLLOW-UP VISIT: CPT | Performed by: SURGERY

## 2021-10-02 RX ORDER — OXYCODONE HYDROCHLORIDE AND ACETAMINOPHEN 5; 325 MG/1; MG/1
TABLET ORAL
Qty: 20 TABLET | Refills: 0 | Status: SHIPPED | OUTPATIENT
Start: 2021-10-02 | End: 2021-10-19

## 2021-10-02 RX ADMIN — OXYCODONE AND ACETAMINOPHEN 1 TABLET: 5; 325 TABLET ORAL at 12:31

## 2021-10-02 RX ADMIN — OXYCODONE AND ACETAMINOPHEN 1 TABLET: 5; 325 TABLET ORAL at 08:31

## 2021-10-02 RX ADMIN — ESCITALOPRAM 20 MG: 20 TABLET, FILM COATED ORAL at 00:42

## 2021-10-02 RX ADMIN — HYDROMORPHONE HYDROCHLORIDE 0.5 MG: 1 INJECTION, SOLUTION INTRAMUSCULAR; INTRAVENOUS; SUBCUTANEOUS at 00:42

## 2021-10-02 NOTE — PLAN OF CARE
Goal Outcome Evaluation:  Plan of Care Reviewed With: patient        Progress: improving  Outcome Summary: Went over the plan of care with the patient and answered all questions. Vitals stable and pain well controlled. All medications given without complications. No other issues this shift.

## 2021-10-02 NOTE — DISCHARGE SUMMARY
Discharge Summary    Date of admission: 10/1/2021    Date of discharge: 10/2/2021    Final diagnosis:    1.  Acute appendicitis    Procedures performed during admission:    1.  Laparoscopic appendectomy on 10/1/2021    Consulting physicians:    1.  None    Reason for admission:    The patient is a pleasant 31-year-old female who presented to the emergency room with abdominal pain.  CT scan of the abdomen and pelvis showed acute appendicitis.  She was admitted for further management.    Hospital course:    The patient was admitted on 10/1/2021 and taken to the operating room where she underwent a laparoscopic appendectomy.  Postoperatively she was transferred to the floor where she did very well.  She was afebrile and hemodynamically stable.  Her diet was advanced and she tolerated this well.  She was felt ready for discharge to home.    Prescriptions:    She was given a prescription for Percocet.    Follow-up:    She will follow-up with me in 2 weeks.    Ahmet Dong Jr., M.D.

## 2021-10-02 NOTE — ANESTHESIA POSTPROCEDURE EVALUATION
"Patient: Beena Vincent    Procedure Summary     Date: 10/01/21 Room / Location: [facility] OR [address] [place] [address] MAIN OR    Anesthesia Start: 1814 Anesthesia Stop: 1936    Procedure: APPENDECTOMY LAPAROSCOPIC (N/A Abdomen) Diagnosis:       Acute appendicitis with localized peritonitis, without perforation, abscess, or gangrene      (Acute appendicitis with localized peritonitis, without perforation, abscess, or gangrene [K35.30])    Surgeons: Ahmet Dong Jr., MD Provider: Tiana Helms MD    Anesthesia Type: general ASA Status: 2          Anesthesia Type: general    Vitals  Vitals Value Taken Time   /71 10/01/21 2011   Temp 37.6 °C (99.7 °F) 10/01/21 1933   Pulse 61 10/01/21 2015   Resp 18 10/01/21 2010   SpO2 97 % 10/01/21 2015   Vitals shown include unvalidated device data.        Post Anesthesia Care and Evaluation    Patient location during evaluation: bedside  Patient participation: complete - patient participated  Level of consciousness: awake and alert  Pain management: adequate  Airway patency: patent  Anesthetic complications: No anesthetic complications    Cardiovascular status: acceptable  Respiratory status: acceptable  Hydration status: acceptable    Comments: /71   Pulse 68   Temp 37.6 °C (99.7 °F) (Oral)   Resp 18   Ht 177.8 cm (70\")   Wt 99.3 kg (219 lb)   LMP 09/13/2021 (Approximate)   SpO2 98%   Breastfeeding No   BMI 31.42 kg/m²       "

## 2021-10-02 NOTE — PLAN OF CARE
Goal Outcome Evaluation:  Plan of Care Reviewed With: patient           Outcome Summary: VSS, pt arrived from PACU, c/o pain addressed with dilaudid per MAR, ambulated in the soares, voiding freely, no nausea, IVF infusing, 3 lap sites with steri strips (reinforcd navel with gauze), adjust diet as tolerated, slept between care.

## 2021-10-02 NOTE — OUTREACH NOTE
Prep Survey      Responses   [facility] patient discharged from?  [place]   Is LACE score < 7 ?  Yes   Emergency Room discharge w/ pulse ox?  No   Eligibility  [facility]   Date of Admission  10/01/21   Date of Discharge  10/02/21   Discharge Disposition  Home or Self Care   Discharge diagnosis  Laparoscopic appendectomy    Does the patient have one of the following disease processes/diagnoses(primary or secondary)?  General Surgery   Does the patient have Home health ordered?  No   Is there a DME ordered?  No   Prep survey completed?  Yes          Lexus Corrigan RN

## 2021-10-04 ENCOUNTER — TRANSITIONAL CARE MANAGEMENT TELEPHONE ENCOUNTER (OUTPATIENT)
Dept: CALL CENTER | Facility: HOSPITAL | Age: 31
End: 2021-10-04

## 2021-10-04 LAB
LAB AP CASE REPORT: NORMAL
PATH REPORT.FINAL DX SPEC: NORMAL
PATH REPORT.GROSS SPEC: NORMAL

## 2021-10-04 NOTE — OUTREACH NOTE
Call Center TCM Note      Responses   [facility] patient discharged from?  [place]   Does the patient have one of the following disease processes/diagnoses(primary or secondary)?  General Surgery   TCM attempt successful?  Yes   Call start time  1335   Call end time  1338   Discharge diagnosis  Laparoscopic appendectomy    Meds reviewed with patient/caregiver?  Yes   Is the patient having any side effects they believe may be caused by any medication additions or changes?  No   Does the patient have all medications related to this admission filled (includes all antibiotics, pain medications, etc.)  Yes   Is the patient taking all medications as directed (includes completed medication regime)?  Yes   Does the patient have a follow up appointment scheduled with their surgeon?  No   What is preventing the patient from scheduling follow up appointments?  Haven't had time   Nursing Interventions  Educated patient on importance of making appointment, Advised patient to make appointment   Has the patient kept scheduled appointments due by today?  N/A   Comments  ATTMPTED TO SCHEDULE PCP FOLLOW UP APPOINTMENT DURING THIS CALL. FIRST AVAILABLE APPOINTMENT WAS 11/2/21 AT 1300. PATIENT STATES SHE NEEDS AN EARLY MORNING APPOINTMENT. NO EARLY APPOINTMENTS NOTED IN [facility] THROUGH JANUARY 2022. CALL NOTE TO BE ROUTED TO OFFICE TO SCHEDULE PATIENT'S APPOINTMENT.    Has home health visited the patient within 72 hours of discharge?  N/A   Psychosocial issues?  No   Did the patient receive a copy of their discharge instructions?  Yes   Nursing interventions  Reviewed instructions with patient   What is the patient's perception of their health status since discharge?  Improving   Nursing interventions  Nurse provided patient education   Is the patient /caregiver able to teach back basic post-op care?  Practice 'cough and deep breath', Continue use of incentive spirometry at least 1 week post discharge, Drive as instructed by MD  in discharge instructions, Take showers only when approved by MD-sponge bathe until then, No tub bath, swimming, or hot tub until instructed by MD, Keep incision areas clean,dry and protected, Do not remove steri-strips, Lifting as instructed by MD in discharge instructions   Is the patient/caregiver able to teach back signs and symptoms of incisional infection?  Increased redness, swelling or pain at the incisonal site, Increased drainage or bleeding, Incisional warmth, Pus or odor from incision, Fever   Is the patient/caregiver able to teach back steps to recovery at home?  Set small, achievable goals for return to baseline health, Rest and rebuild strength, gradually increase activity, Make a list of questions for surgeon's appointment, Eat a well-balance diet, Practice good oral hygiene   If the patient is a current smoker, are they able to teach back resources for cessation?  Not a smoker   Is the patient/caregiver able to teach back the hierarchy of who to call/visit for symptoms/problems? PCP, Specialist, Home health nurse, Urgent Care, ED, 911  Yes   TCM call completed?  Yes          Kiana Herrera LPN    10/4/2021, 13:44 EDT

## 2021-10-04 NOTE — PAYOR COMM NOTE
"Beena Vincent (31 y.o. Female)     REQ FOR OBS AUTH FOR MEMBER [id number]    CONTACT CLAUDIA VILLARREAL  P# [phone]  F# [phone]      Date of Birth Social Security Number Address Home Phone MRN    1990  [id number] [id number] [phone] [id number]    Advent Marital Status          None Single       Admission Date Admission Type Admitting Provider Attending Provider Department, Room/Bed    10/1/21 Emergency Ahmet Dong Jr., MD  [facility] [address], P691/1    Discharge Date Discharge Disposition Discharge Destination        10/2/2021 Home or Self Care              Attending Provider: (none)   Allergies: Penicillins, Adhesive Tape    Isolation: None   Infection: None   Code Status: Prior    Ht: 177.8 cm (70\")   Wt: 99.3 kg (219 lb)    Admission Cmt: None   Principal Problem: None                Active Insurance as of 10/1/2021     Primary Coverage     Payor Plan Insurance Group Employer/Plan Group    [employer] [place] [employer] [place]ICA[place]      Payor Plan Address Payor Plan Phone Number Payor Plan Fax Number Effective Dates    [address] [address] [phone]  10/1/2021 - None Entered    [facility] [id number]       Subscriber Name Subscriber Birth Date Member ID       BEENA VINCENT 1990 [id number]                 Emergency Contacts      (Rel.) Home Phone Work Phone Mobile Phone    DIEGO VINCENT (Mother) [phone] -- [phone]               History & Physical      Ahmet Dong Jr., MD at 10/01/21 [address] [place] [address]   HISTORY AND PHYSICAL    Patient Name: Beena Vincent  : 1990  MRN: [id number]  Primary Care Physician:  Sissy Garibay MD  Date of admission: 10/1/2021    Subjective   Subjective     Chief Complaint: Abdominal pain    History of Present Illness     The patient is a very pleasant 31-year-old female who presented to the emergency room with a 1 day history of abdominal pain.  She began having right lower quadrant abdominal pain " yesterday afternoon and into the evening that was somewhat intermittent.  Over the course of the night the abdominal pain worsened and by this morning was severe.  She also had numerous episodes of nausea and vomiting.  A CT scan of the abdomen and pelvis was performed that shows acute appendicitis.    Review of Systems   Constitutional: Negative for fatigue and fever.   HENT: Negative for trouble swallowing and voice change.    Respiratory: Negative for chest tightness and shortness of breath.    Cardiovascular: Negative for chest pain and palpitations.   Gastrointestinal: Positive for abdominal pain, nausea and vomiting. Negative for blood in stool, constipation and diarrhea.   Psychiatric/Behavioral: Negative for agitation and confusion.        Personal History     Past Medical History:   Diagnosis Date   • Asthma    • Cervical dysplasia    • Depression    • Iron deficiency anemia        Past Surgical History:   Procedure Laterality Date   • KNEE ARTHROSCOPY      AGE 6   • LEEP N/A 4/25/2019    Procedure: LOOP ELECTROCAUTERY EXCISION PROCEDURE;  Surgeon: Arsh Ray MD;  [place] [facility] [place] [facility] [place]  Service: Obstetrics/Gynecology   • WISDOM TOOTH EXTRACTION         Family History: family history includes Arthritis in her maternal grandmother; Diabetes in her father; Heart attack in her maternal grandfather; Heart disease in her maternal grandfather; Hyperlipidemia in her maternal grandfather; Lung cancer in her maternal grandfather; Stroke in her maternal grandfather. Otherwise pertinent FHx was reviewed and not pertinent to current issue.    Social History:  reports that she quit smoking about 19 months ago. She has a 2.00 pack-year smoking history. She has never used smokeless tobacco. She reports previous alcohol use. She reports that she does not use drugs.    Home Medications:  albuterol sulfate HFA, escitalopram, ferrous sulfate, hydrOXYzine, ibuprofen, and prenatal vitamins    Allergies:  Allergies  "  Allergen Reactions   • Penicillins Nausea And Vomiting   • Adhesive Tape Rash     \"SURGICAL TAPE\"  AS A CHILD       Objective    Objective     Vitals:   Temp:  [96.9 °F (36.1 °C)] 96.9 °F (36.1 °C)  Heart Rate:  [70-76] 76  Resp:  [14-18] 14  BP: (114-147)/(72-95) 114/72    Physical Exam  Constitutional:       Appearance: Normal appearance. She is well-developed. She is not toxic-appearing.   HENT:      Head: Normocephalic and atraumatic.   Eyes:      General: No scleral icterus.  Pulmonary:      Effort: Pulmonary effort is normal. No respiratory distress.   Abdominal:      Palpations: Abdomen is soft.      Tenderness: There is abdominal tenderness (Very tender) in the right lower quadrant. There is guarding. There is no rebound.   Skin:     General: Skin is warm and dry.   Neurological:      Mental Status: She is alert and oriented to person, place, and time.   Psychiatric:         Behavior: Behavior normal.         Thought Content: Thought content normal.         Judgment: Judgment normal.         Result Review    Result Review:  I have personally reviewed the results from the time of this admission to 10/1/2021 12:46 EDT and agree with these findings:  [x]  Laboratory  []  Microbiology  [x]  Radiology  []  EKG/Telemetry   []  Cardiology/Vascular   []  Pathology  [x]  Old records  []  Other:    Assessment / Plan     Brief Patient Summary:  Beena Vincent is a 31 y.o. female who presents to the emergency room with right lower quadrant abdominal pain.  CT scan of the abdomen and pelvis shows acute appendicitis.    Active Hospital Problems:  Active Hospital Problems    Diagnosis    • Acute appendicitis with localized peritonitis, without perforation, abscess, or gangrene      Plan: Laparoscopic appendectomy.  The patient understands the indications, alternatives, risks, and benefits of the procedure and wishes to proceed.      Electronically signed by Ahmet Dong Jr., MD, 10/01/21, 12:46 PM EDT.    Electronically " "signed by Ahmet Dong Jr., MD at 10/01/21 1250          Emergency Department Notes      Nadir Garnett, RN at 10/01/21 0939        Pt report rt sided abd. Pain with nausea and vomiting started last night.    Pt arrives in triage with mask on. Triage staff wearing N95 masks and goggles.       Electronically signed by Nadir Garnett RN at 10/01/21 0939     Millie Whyte RN at 10/01/21 0916        Pt comes to ED from home for c/o abdominal pain and vomiting. Pain is 8/10, constant, stabbing pain on right anterior side. Pt started having stomach pain last night and has thrown up 4 times (mostly dry heaving). Pt has been feeling dizzy, hot flashes and chills. Pt denies SOA and CP.      Millie Whyte RN  10/01/21 0952      Electronically signed by Millie Whyte RN at 10/01/21 0952     Leonarda Redd PA at 10/01/21 1005     Attestation signed by Seth Rivers MD at 10/01/21 1946          For this patient encounter, I reviewed the NP or PA documentation, treatment plan, and medical decision making. Seth Rivers MD 10/1/2021 19:46 EDT                  EMERGENCY DEPARTMENT ENCOUNTER    Room Number: 25/25  Date seen: 10/1/2021  Time seen: 10:05 EDT  PCP: Sissy Garibay MD    Spoken Language:  English  Language interpretation services not needed     CHIEF COMPLAINT: Abdominal pain    HPI: Beena Vincent is a 31 y.o. female 2G2P with past medical history pertinent for anxiety and depression presenting to the ED for evaluation of abdominal pain. The history is being obtained by the patient and by review of the medical chart.  The patient complains of right-sided abdominal pain which onset last night.  She describes the pain as \"stabbing\" in nature.  It is constant.  She rates the pain is 8/10 in severity.  She admits to associated nausea and vomiting, as well as multiple episodes of dry heaving.  She estimates 4 episodes of vomiting since her pain onset.  The patient denies any specific aggravating " or relieving factors.  She does admit to having diarrhea over the past week, but denies fever, sore throat, cough, chest pain or shortness of breath.  She denies any prior abdominal surgeries.  She denies any pelvic pain, abnormal vaginal bleeding, abnormal vaginal discharge or back pain.    MEDICAL RECORD REVIEW:  Reviewed in [facility].      PAST MEDICAL HISTORY  Past Medical History:   Diagnosis Date   • Asthma    • Cervical dysplasia    • Depression    • Iron deficiency anemia        PAST SURGICAL HISTORY  Past Surgical History:   Procedure Laterality Date   • KNEE ARTHROSCOPY      AGE 6   • LEEP N/A 2019    Procedure: LOOP ELECTROCAUTERY EXCISION PROCEDURE;  Surgeon: Arsh Ray MD;  [place] [facility] [place] [facility] [place]  Service: Obstetrics/Gynecology   • WISDOM TOOTH EXTRACTION         FAMILY HISTORY  Family History   Problem Relation Age of Onset   • Diabetes Father    • Arthritis Maternal Grandmother    • Lung cancer Maternal Grandfather    • Heart attack Maternal Grandfather    • Heart disease Maternal Grandfather    • Stroke Maternal Grandfather    • Hyperlipidemia Maternal Grandfather    • Malig Hyperthermia Neg Hx    • Breast cancer Neg Hx    • Ovarian cancer Neg Hx    • Uterine cancer Neg Hx    • Colon cancer Neg Hx        SOCIAL HISTORY  Social History     Socioeconomic History   • Marital status: Single     Spouse name: Not on file   • Number of children: Not on file   • Years of education: Not on file   • Highest education level: Not on file   Tobacco Use   • Smoking status: Former Smoker     Packs/day: 0.50     Years: 4.00     Pack years: 2.00     Quit date: 2020     Years since quittin.6   • Smokeless tobacco: Never Used   Vaping Use   • Vaping Use: Every day   Substance and Sexual Activity   • Alcohol use: Not Currently     Comment: socially   • Drug use: No   • Sexual activity: Not Currently       CURRENT MEDICATIONS  Prior to Admission medications    Medication Sig Start Date End Date Taking?  Authorizing Provider   albuterol sulfate  (90 Base) MCG/ACT inhaler Inhale 2 puffs Every 4 (Four) Hours As Needed for Wheezing.   Yes Provider, MD Rufina   escitalopram (LEXAPRO) 10 MG tablet Take 2 tablets by mouth Daily. 9/29/20  Yes Sissy Garibay MD   ferrous sulfate 325 (65 FE) MG tablet Take 1 tablet by mouth Daily With Breakfast. 5/8/20   Arsh Ray MD   hydrOXYzine (ATARAX) 25 MG tablet Take 1 tablet by mouth 3 (Three) Times a Day As Needed for Itching. 9/29/20   Sissy Garibay MD   ibuprofen (ADVIL,MOTRIN) 600 MG tablet Take 1 tablet by mouth Every 6 (Six) Hours As Needed for Mild Pain . 8/14/20   Beena Villafana, BERTHA   prenatal vitamins (PRENATAL 27-1) 27-1 MG tablet tablet Take 1 tablet by mouth Daily. 1/14/20   Arsh Ray MD       ALLERGIES  Penicillins and Adhesive tape    REVIEW OF SYSTEMS  All systems reviewed and negative except for those discussed in HPI.     PHYSICAL EXAM  ED Triage Vitals   Temp Heart Rate Resp BP SpO2   10/01/21 0940 10/01/21 0940 10/01/21 0940 10/01/21 1002 10/01/21 0940   96.9 °F (36.1 °C) 73 18 131/87 98 %      Temp src Heart Rate Source Patient Position BP Location FiO2 (%)   10/01/21 0940 10/01/21 1002 10/01/21 1002 10/01/21 1002 --   Tympanic Monitor Lying Right arm        Physical Exam  Constitutional:       Appearance: Normal appearance.      Comments: Appears uncomfortable   HENT:      Head: Normocephalic and atraumatic.      Mouth/Throat:      Mouth: Mucous membranes are moist.   Eyes:      Extraocular Movements: Extraocular movements intact.      Pupils: Pupils are equal, round, and reactive to light.   Cardiovascular:      Rate and Rhythm: Normal rate and regular rhythm.   Pulmonary:      Effort: Pulmonary effort is normal.      Breath sounds: Normal breath sounds.   Abdominal:      General: There is no distension.      Palpations: Abdomen is soft.      Tenderness: There is abdominal tenderness in the right lower quadrant and suprapubic  area. There is guarding. Positive signs include Rovsing's sign and psoas sign. Negative signs include Montano's sign.   Musculoskeletal:      Cervical back: Normal range of motion.   Skin:     General: Skin is warm and dry.   Neurological:      General: No focal deficit present.      Mental Status: She is alert and oriented to person, place, and time.   Psychiatric:         Mood and Affect: Mood normal.         Behavior: Behavior normal.         Thought Content: Thought content normal.         Judgment: Judgment normal.         PROCEDURES  Procedures  None    LABS  Recent Results (from the past 24 hour(s))   Urinalysis With Microscopic If Indicated (No Culture) - Urine, Clean Catch    Collection Time: 10/01/21  9:49 AM    Specimen: Urine, Clean Catch   Result Value Ref Range    Color, UA Yellow Yellow, Straw    Appearance, UA Turbid (A) Clear    pH, UA 5.5 5.0 - 8.0    Specific Gravity, UA 1.026 1.005 - 1.030    Glucose, UA Negative Negative    Ketones, UA Trace (A) Negative    Bilirubin, UA Negative Negative    Blood, UA Negative Negative    Protein, UA Trace (A) Negative    Leuk Esterase, UA Negative Negative    Nitrite, UA Negative Negative    Urobilinogen, UA 0.2 E.U./dL 0.2 - 1.0 E.U./dL   Comprehensive Metabolic Panel    Collection Time: 10/01/21  9:59 AM    Specimen: Blood   Result Value Ref Range    Glucose 126 (H) 65 - 99 mg/dL    BUN 8 6 - 20 mg/dL    Creatinine 0.91 0.57 - 1.00 mg/dL    Sodium 136 136 - 145 mmol/L    Potassium 3.6 3.5 - 5.2 mmol/L    Chloride 103 98 - 107 mmol/L    CO2 21.0 (L) 22.0 - 29.0 mmol/L    Calcium 9.2 8.6 - 10.5 mg/dL    Total Protein 7.2 6.0 - 8.5 g/dL    Albumin 4.70 3.50 - 5.20 g/dL    ALT (SGPT) 30 1 - 33 U/L    AST (SGOT) 30 1 - 32 U/L    Alkaline Phosphatase 61 39 - 117 U/L    Total Bilirubin 0.5 0.0 - 1.2 mg/dL    eGFR Non African Amer 73 >60 mL/min/1.73    Globulin 2.5 gm/dL    A/G Ratio 1.9 g/dL    BUN/Creatinine Ratio 8.8 7.0 - 25.0    Anion Gap 12.0 5.0 - 15.0 mmol/L    Lipase    Collection Time: 10/01/21  9:59 AM    Specimen: Blood   Result Value Ref Range    Lipase 26 13 - 60 U/L   hCG, Serum, Qualitative    Collection Time: 10/01/21  9:59 AM    Specimen: Blood   Result Value Ref Range    HCG Qualitative Negative Negative   Green Top (Gel)    Collection Time: 10/01/21  9:59 AM   Result Value Ref Range    Extra Tube Hold for add-ons.    Lavender Top    Collection Time: 10/01/21  9:59 AM   Result Value Ref Range    Extra Tube hold for add-on    Light Blue Top    Collection Time: 10/01/21  9:59 AM   Result Value Ref Range    Extra Tube hold for add-on    CBC Auto Differential    Collection Time: 10/01/21  9:59 AM    Specimen: Blood   Result Value Ref Range    WBC 17.72 (H) 3.40 - 10.80 10*3/mm3    RBC 4.76 3.77 - 5.28 10*6/mm3    Hemoglobin 13.5 12.0 - 15.9 g/dL    Hematocrit 40.5 34.0 - 46.6 %    MCV 85.1 79.0 - 97.0 fL    MCH 28.4 26.6 - 33.0 pg    MCHC 33.3 31.5 - 35.7 g/dL    RDW 13.3 12.3 - 15.4 %    RDW-SD 41.9 37.0 - 54.0 fl    MPV 10.7 6.0 - 12.0 fL    Platelets 273 140 - 450 10*3/mm3    Neutrophil % 82.6 (H) 42.7 - 76.0 %    Lymphocyte % 10.2 (L) 19.6 - 45.3 %    Monocyte % 5.3 5.0 - 12.0 %    Eosinophil % 1.1 0.3 - 6.2 %    Basophil % 0.3 0.0 - 1.5 %    Immature Grans % 0.5 0.0 - 0.5 %    Neutrophils, Absolute 14.64 (H) 1.70 - 7.00 10*3/mm3    Lymphocytes, Absolute 1.80 0.70 - 3.10 10*3/mm3    Monocytes, Absolute 0.94 (H) 0.10 - 0.90 10*3/mm3    Eosinophils, Absolute 0.19 0.00 - 0.40 10*3/mm3    Basophils, Absolute 0.06 0.00 - 0.20 10*3/mm3    Immature Grans, Absolute 0.09 (H) 0.00 - 0.05 10*3/mm3    nRBC 0.0 0.0 - 0.2 /100 WBC       RADIOLOGY  CT Abdomen Pelvis With Contrast    (Results Pending)       MEDICATIONS GIVEN IN THE ER  Medications   sodium chloride 0.9 % flush 10 mL (has no administration in time range)   piperacillin-tazobactam (ZOSYN) 4.5 g in iso-osmotic dextrose 100 mL IVPB (premix) (4.5 g Intravenous New Bag 10/1/21 1225)   morphine injection 4 mg (4 mg  Intravenous Given 10/1/21 1037)   sodium chloride 0.9 % bolus 1,000 mL (1,000 mL Intravenous New Bag 10/1/21 1037)   ondansetron (ZOFRAN) injection 4 mg (4 mg Intravenous Given 10/1/21 1037)   iopamidol (ISOVUE-300) 61 % injection 100 mL (85 mL Intravenous Given by Other 10/1/21 1201)       MEDICAL DECISION MAKING, CONSULTS AND PROGRESS NOTES  All labs and all radiology studies were have been viewed and interpreted by me.   Discussion below represents my analysis of pertinent findings related to patient's condition, differential diagnosis, treatment plan and final disposition.    Differential diagnosis includes but is not limited to:  - Hepatobiliary pathology such as cholecystitis, cholangitis, and symptomatic cholelithiasis  - Pancreatitis  - Dyspepsia  - Small bowel obstruction  - Appendicitis  - Diverticulitis  - UTI including pyelonephritis  - Ureteral stone  - Zoster  - Colitis, including infectious and ischemic  - Atypical ACS    PPE: The patient was placed in a face mask in first look. Patient was wearing facemask when I entered the room and throughout our encounter. I wore full protective equipment throughout this patient encounter including a face mask, eye protection and gloves. Hand hygiene was performed before donning protective equipment and after removal when leaving the room.    AS OF 12:33 EDT VITALS:    BP - 114/72  HR - 76  TEMP - 96.9 °F (36.1 °C) (Tympanic)  O2 SATS - 97%    ED Course as of Oct 01 1233   Fri Oct 01, 2021   1020 The patient presents with acute right-sided abdominal pain.  On physical exam, she has right lower quadrant rebound tenderness with a positive obturator sign and positive Rovsing sign, concerning for acute appendicitis.  I will order a CT scan to evaluate for this.    [AR]   1031 WBC(!): 17.72 [AR]   1209 CT abdomen independently interpreted in PACS and demonstrates acute appendicitis.    [JR]   1229 Discussed with Dr. Paul Dong, general surgery, who will take the  patient to the operating room today.    [JR]      ED Course User Index  [AR] Leonarda Redd PA  [JR] Seth Rivers MD     Based on the patient's lab findings and presenting symptoms, the doctor and I feel it is appropriate to admit the patient for further management, evaluation, and treatment.  I have discussed this with the admitting team.  I have also discussed this with the patient/family.  They are in agreement with admission.      DIAGNOSIS   Diagnosis Plan   1. Acute appendicitis with localized peritonitis, without perforation, abscess, or gangrene  Case request    Case request       DISPOSITION  ED Disposition     ED Disposition Condition Comment    Send to OR          RX  Medications   sodium chloride 0.9 % flush 10 mL (has no administration in time range)   piperacillin-tazobactam (ZOSYN) 4.5 g in iso-osmotic dextrose 100 mL IVPB (premix) (4.5 g Intravenous New Bag 10/1/21 1225)   morphine injection 4 mg (4 mg Intravenous Given 10/1/21 1037)   sodium chloride 0.9 % bolus 1,000 mL (1,000 mL Intravenous New Bag 10/1/21 1037)   ondansetron (ZOFRAN) injection 4 mg (4 mg Intravenous Given 10/1/21 1037)   iopamidol (ISOVUE-300) 61 % injection 100 mL (85 mL Intravenous Given by Other 10/1/21 1201)          Medication List      No changes were made to your prescriptions during this visit.       Provider Attestation:  I personally reviewed the past medical history, past surgical history, social history, family history, current medications and allergies as they appear in the chart. I reviewed the patient's history, physical, lab/imaging results and overall care with Dr. Rivers who is in agreement with the patient's treatment plan.    EMR Dragon/Transcription disclaimer:  Some of this encounter note is an electronic transcription/translation of spoken language to printed text. The electronic translation of spoken language may permit erroneous, or at times, nonsensical words or phrases to be  inadvertently transcribed; Although I have reviewed the note for such errors, some may still exist.    Provider note signed by:         Leonarda Redd PA  10/01/21 1234      Electronically signed by Seth Rivers MD at 10/01/21 1946     Seth Rivers MD at 10/01/21 1134          Pt presents to the ED c/o  abdominal pain.  The pain began last night and is sharp and stabbing in nature.  It is localized to the right lower abdomen.  She has had vomiting and dry heaving.  No previous abdominal surgeries.     On exam,   Awake and alert, no acute distress.  Focal tenderness in the right lower quadrant with rebound tenderness present.      ED Course as of Oct 01 1942   Fri Oct 01, 2021   1020 The patient presents with acute right-sided abdominal pain.  On physical exam, she has right lower quadrant rebound tenderness with a positive obturator sign and positive Rovsing sign, concerning for acute appendicitis.  I will order a CT scan to evaluate for this.    [AR]   1031 WBC(!): 17.72 [AR]   1209 CT abdomen independently interpreted in PACS and demonstrates acute appendicitis.    [JR]   1229 Discussed with Dr. Paul Dong, general surgery, who will take the patient to the operating room today.    [JR]   1300 COVID19: Not Detected [JR]      ED Course User Index  [AR] Leonarda Redd PA  [JR] Seth Rivers MD          Plan: Sent to the OR    Final diagnoses:   Acute appendicitis with localized peritonitis, without perforation, abscess, or gangrene           I wore an N95 mask, face shield, and gloves during this patient encounter.  Patient also wearing a surgical mask.  Hand hygeine performed before and after seeing the patient.     Attestation:  The ESME and I have discussed this patient's history, physical exam, and treatment plan.  I have reviewed the documentation and personally had a face to face interaction with the patient. I affirm the documentation and agree with the treatment  and plan.  The attached note describes my personal findings.            Seth Rivers MD  10/01/21 1943      Electronically signed by Seth Rivers MD at 10/01/21 1943     Millie Whyte RN at 10/01/21 1328        Told pt to undress completely for Pre Op. She is ready for her procedure.      Millie Whyte RN  10/01/21 1328      Electronically signed by Millie Whyte RN at 10/01/21 1328          Operative/Procedure Notes (last 72 hours) (Notes from 10/01/21 1013 through 10/04/21 1013)      Ahmet Dong Jr., MD at 10/01/21 1842          Surgeon: Ahmet Dong Jr., M.D.    Pre-Operative Diagnosis:     Acute appendicitis with localized peritonitis, without perforation, abscess, or gangrene [K35.30]    Post-Operative Diagnosis:    Acute appendicitis    Procedure Performed:     Laparoscopic appendectomy    Indications:     The patient is a pleasant 31-year-old female who presented to the emergency room with right lower quadrant abdominal pain.  CT scan of the abdomen and pelvis shows acute appendicitis.  She presents for laparoscopic appendectomy.  The patient understands the indications, alternatives, risks, and benefits of the procedure and wishes to proceed.    Procedure:     The patient was taken to the operating room where she was placed in the supine position on the operating table.  Monitors were placed and she underwent general endotracheal anesthesia and was appropriately monitored throughout the case by the anesthesia personnel.  The abdomen was prepped and draped in the standard surgical fashion.  Each incision was infiltrated with half percent Marcaine with epinephrine prior to making incision.  A supraumbilical incision was made with a scalpel carried through the skin into the subcutaneous tissue.  The abdominal wall was then elevated with skin hooks and a Verees needle was placed through the incision into the abdomen without difficulty.  The abdomen was then insufflated with low  pressures encountered initially.  Once fully insufflated, the Veress needle was removed and a 5 mm port was placed through the same incision, again with traction applied to the abdominal wall using skin hooks.  The laparoscope was then inserted to the port and the area of the Veress needle and port insertion was inspected with no injury present.  Attention was turned to the left lower quadrant.  A 5 mm port was placed in the left lower quadrant by making a skin incision with a scalpel carried through the skin into the subcutaneous tissue and inserting the port through the incision into the abdomen with direct visualization intra-abdominal using a laparoscope.  A 12 mm port was placed in the the epigastrium in the same fashion.  Attention was then turned to the right lower quadrant.  The appendix was visualized and consistent with a suppurative acute appendicitis.  There was no perforation nor gangrenous changes.  The appendix was elevated and the mesoappendix was divided using the Enseal device.  It was densely adherent to the terminal ileum and this was .  In the process of  the appendix from the terminal ileum, the avascular fold of Treves was removed from the terminal ileum and left attached to the appendix due to the density of the adhesions.  The appendix was mobilized to the cecum and then divided at the cecal margin with a SHERINE stapling device using a blue load.    The appendix was then placed in an Endo Catch bag and removed from the epigastric port site.  The appendiceal bed was then carefully inspected noted to be hemostatic.  The abdomen was deflated and the ports are removed.  All skin incisions were closed with a 4-0 Monocryl in a subcuticular fashion followed by Mastisol and Steri-Strips.  The sponge, needle, and instrument count were correct at the end the case.  The patient tolerated the procedure well and was transferred to the recovery room in stable condition.    Estimated Blood  Loss:      minimal    Specimens:     Order Name Source Comment Collection Info Order Time   TISSUE PATHOLOGY EXAM Large Intestine, Appendix  Collected By: Ahmet Dong Jr., MD 10/1/2021  6:59 PM     Release to patient   Immediate            Ahmet Dong Jr., M.D.    Electronically signed by Ahmet Dong Jr., MD at 10/01/21 1938       Physician Progress Notes (last 72 hours) (Notes from 10/01/21 1013 through 10/04/21 1013)    No notes of this type exist for this encounter.         Consult Notes (last 72 hours) (Notes from 10/01/21 1013 through 10/04/21 1013)    No notes of this type exist for this encounter.            Discharge Summary      Ahmet Dong Jr., MD at 10/02/21 1012        Discharge Summary    Date of admission: 10/1/2021    Date of discharge: 10/2/2021    Final diagnosis:    1.  Acute appendicitis    Procedures performed during admission:    1.  Laparoscopic appendectomy on 10/1/2021    Consulting physicians:    1.  None    Reason for admission:    The patient is a pleasant 31-year-old female who presented to the emergency room with abdominal pain.  CT scan of the abdomen and pelvis showed acute appendicitis.  She was admitted for further management.    Hospital course:    The patient was admitted on 10/1/2021 and taken to the operating room where she underwent a laparoscopic appendectomy.  Postoperatively she was transferred to the floor where she did very well.  She was afebrile and hemodynamically stable.  Her diet was advanced and she tolerated this well.  She was felt ready for discharge to home.    Prescriptions:    She was given a prescription for Percocet.    Follow-up:    She will follow-up with me in 2 weeks.    Ahmet Dong Jr., M.D.    Electronically signed by Ahmet Dong Jr., MD at 10/02/21 1013

## 2021-10-04 NOTE — PROGRESS NOTES
Case Management Discharge Note      Final Note: Discharged home.   Brigitte Gallegos, IVONE         Transportation Services  Private: Car    Final Discharge Disposition Code: 01 - home or self-care

## 2021-10-05 ENCOUNTER — TELEPHONE (OUTPATIENT)
Dept: FAMILY MEDICINE CLINIC | Facility: CLINIC | Age: 31
End: 2021-10-05

## 2021-10-05 NOTE — TELEPHONE ENCOUNTER
Caller: Beena Vincent    Relationship to patient: Self    Best call back number: [phone]    Chief complaint: FOLLOW UP APPENDICITIS     Type of visit: HOSPITAL FOLLOW UP    Requested date: ASAP    If rescheduling, when is the original appointment: 10/05/2021    Additional notes:PATIENT WAS UNABLE TO MAKE THE APPOINTMENT TODAY.  IT WAS UNABLE TO BE SCHEDULED WITHIN A WEEK OF DISCHARGE WITH HER PRIMARY CARE PROVIDER.      ATTEMPTED TO WARM TRANSFER.      PLEASE CALL AND RESCHEDULE.

## 2021-10-08 ENCOUNTER — OFFICE VISIT (OUTPATIENT)
Dept: FAMILY MEDICINE CLINIC | Facility: CLINIC | Age: 31
End: 2021-10-08

## 2021-10-08 VITALS
HEART RATE: 74 BPM | RESPIRATION RATE: 16 BRPM | TEMPERATURE: 97.1 F | OXYGEN SATURATION: 97 % | WEIGHT: 203 LBS | BODY MASS INDEX: 29.06 KG/M2 | DIASTOLIC BLOOD PRESSURE: 80 MMHG | SYSTOLIC BLOOD PRESSURE: 108 MMHG | HEIGHT: 70 IN

## 2021-10-08 DIAGNOSIS — B37.9 ANTIBIOTIC-INDUCED YEAST INFECTION: ICD-10-CM

## 2021-10-08 DIAGNOSIS — F41.8 ANXIETY WITH DEPRESSION: ICD-10-CM

## 2021-10-08 DIAGNOSIS — T36.95XA ANTIBIOTIC-INDUCED YEAST INFECTION: ICD-10-CM

## 2021-10-08 DIAGNOSIS — D72.829 LEUKOCYTOSIS, UNSPECIFIED TYPE: ICD-10-CM

## 2021-10-08 DIAGNOSIS — R73.09 ELEVATED GLUCOSE: ICD-10-CM

## 2021-10-08 DIAGNOSIS — Z09 HOSPITAL DISCHARGE FOLLOW-UP: Primary | ICD-10-CM

## 2021-10-08 PROCEDURE — 99214 OFFICE O/P EST MOD 30 MIN: CPT

## 2021-10-08 RX ORDER — FLUCONAZOLE 150 MG/1
150 TABLET ORAL ONCE
Qty: 1 TABLET | Refills: 0 | Status: SHIPPED | OUTPATIENT
Start: 2021-10-08 | End: 2021-10-08

## 2021-10-08 RX ORDER — ESCITALOPRAM OXALATE 20 MG/1
20 TABLET ORAL NIGHTLY
Qty: 30 TABLET | Refills: 3 | Status: SHIPPED | OUTPATIENT
Start: 2021-10-08 | End: 2022-11-10

## 2021-10-08 NOTE — PROGRESS NOTES
Transitional Care Follow Up Visit  Subjective     Beena Vincent is a 31 y.o. female who presents for a transitional care management visit.    Within 48 business hours after discharge our office contacted her via telephone to coordinate her care and needs.      I reviewed and discussed the details of that call along with the discharge summary, hospital problems, inpatient lab results, inpatient diagnostic studies, and consultation reports with Beena.     Current outpatient and discharge medications have been reconciled for the patient.  Reviewed by: BERTHA Holden      Date of TCM Phone Call 10/2/2021   [facility]   Date of Admission 10/1/2021   Date of Discharge 10/2/2021   Discharge Disposition Home or Self Care     Risk for Readmission (LACE) Score: 3 (10/2/2021  6:01 AM)      History of Present Illness   Course During Hospital Stay:      Patient said that at the beginning of last week she started to have diarrhea and nausea but as the week progressed she felt ago starting it better.  However starting last Thursday night she started to have a stomachache and went to bed.  Woke up Friday, October 1 at 4 AM with severe abdominal pain and went to the bathroom and had to throw up.  Tried to go back to bed but then went back in the bathroom with nausea and vomiting.  Patient called her mother who immediately took her to the hospital.  Patient presented to the [facility] ER on 10/1/2021.  CT showed acute appendicitis and CBC with a white blood cell count of 17.  Patient was immediately referred to surgery and Dr. Ahmet Dong performed a laparoscopic appendectomy.  The next day follow-up lab work showed decreasing white blood cell count and patient was feeling better and vital signs were stable.  She was discharged on 10/2/2021.  Patient was sent home with Percocet for pain management.  Patient has a follow-up with Dr. Dong on 10/19/2021.    Overall right now patient feels much better.   A little bit sore but no general abdominal pain.  No fever, chills, muscle aches.  Has had some minor nausea but she is also still getting her appetite back.  Trying to minimize taking the pain pills as much as possible.  Patient has noticed that she has developed a yeast infection since she had the antibiotics in the hospital.  Patient said that she normally does get a yeast infection with antibiotic usage.  Patient has had normal bowel bladder habits.  No abnormal discharge.  Patient has 3 incisions on the abdomen.  Two to midline in the upper abdomen with sutures that are 2 cm in length and 1 left lower quadrant of the abdomen 2 cm in length.  Healing well and just a little bit tender to touch.  No signs of infection noted.    Patient also wants to discuss her anxiety and depression medication.  On 9/29/2020 patient was started on Lexapro 10 mg by Dr. Garibay.  At that time she said she was told that if she tolerates that dosage she could increase to 20 mg.  Patient said about 3 months ago she started taking the 20 mg and this has greatly helped her anxiety and depression.  No suicidal thoughts.  Anxiety is still there but much more manageable now.  Patient requesting refill of Lexapro 20 mg.       The following portions of the patient's history were reviewed and updated as appropriate: allergies, current medications, past family history, past medical history, past social history, past surgical history and problem list.    Review of Systems   Constitutional: Negative.    HENT: Negative.    Respiratory: Negative.    Cardiovascular: Negative.    Gastrointestinal: Positive for nausea. Negative for abdominal distention, abdominal pain, diarrhea and vomiting.   Endocrine: Negative.    Genitourinary: Negative.    Musculoskeletal: Negative.    Skin: Negative.    Allergic/Immunologic: Negative.    Hematological: Negative.    Psychiatric/Behavioral: Negative.        Objective   Physical Exam  Vitals reviewed.   HENT:       Head: Normocephalic.   Cardiovascular:      Rate and Rhythm: Normal rate and regular rhythm.      Pulses: Normal pulses.      Heart sounds: Normal heart sounds.   Pulmonary:      Effort: Pulmonary effort is normal.      Breath sounds: Normal breath sounds.   Abdominal:      General: Bowel sounds are normal.      Palpations: Abdomen is soft.          Comments: See diagram above for location of abdominal incisions.  All incisions sutured.  No redness or erythema noted.  No warmth port.  No discharge.  Seem to be healing well with no signs of infection.   Musculoskeletal:         General: Normal range of motion.      Cervical back: Normal range of motion.   Skin:     General: Skin is warm and dry.   Neurological:      Mental Status: She is alert.   Psychiatric:         Mood and Affect: Mood normal.         Assessment/Plan   Problems Addressed this Visit        Mental Health    Anxiety with depression    Relevant Medications    escitalopram (LEXAPRO) 20 MG tablet      Other Visit Diagnoses     Hospital discharge follow-up    -  Primary    Relevant Orders    CBC w AUTO Differential    Basic metabolic panel    Elevated glucose        Relevant Orders    Basic metabolic panel    Leukocytosis, unspecified type        Relevant Orders    CBC w AUTO Differential    Antibiotic-induced yeast infection        Relevant Medications    fluconazole (Diflucan) 150 MG tablet      Diagnoses       Codes Comments    Hospital discharge follow-up    -  Primary ICD-10-CM: Z09  ICD-9-CM: V67.59     Anxiety with depression     ICD-10-CM: F41.8  ICD-9-CM: 300.4     Elevated glucose     ICD-10-CM: R73.09  ICD-9-CM: 790.29     Leukocytosis, unspecified type     ICD-10-CM: D72.829  ICD-9-CM: 288.60     Antibiotic-induced yeast infection     ICD-10-CM: B37.9, T36.95XA  ICD-9-CM: 112.9, E930.9           Pleasant 31-year-old female presents to clinic today for hospital discharge follow-up.  Patient was admitted on 10/1/2021 and discharged on 10/2/2021  for acute appendicitis and post laparoscopic appendectomy.  Patient seems to be healing well with 3 incisions to the abdomen.  See diagram above.  No signs of infection at this time.  Patient did complain of yeast infection post antibiotic use.  Ordering one-time Diflucan to treat.  Following up on lab work since she did have elevated glucose in the hospital.  This is probably due to the infection and  the medication she was given in the hospital,  however patient was wanting follow up labs done to make sure that the levels have come back down.  Patient also wanted to get a refill on her Lexapro.  Patient is on Lexapro 20 mg.  She has been taking the 20 mg dose for the past few months. Patient said she was told on her prior visit on 9/29/2020 that she could increase to 20 mg if needed.  Patient is tolerating this dose well with no side effects.      Overall patient is doing well today.  Counseled patient if she starts to experience fever, chills, muscle aches to let us know or come back to the clinic.  Any signs of infection from the incisions to come back as well.  Otherwise follow-up on 10/19/2021 with Dr. Dong for surgery follow-up and in 3 months with Dr. Garibay to reassess anxiety and depression.    Medical assistant and I wore mask and eyewear protection during entire encounter.  Patient wore mask.      Electronically signed by BERTHA Holden, 10/08/21, 11:37 AM EDT.

## 2021-10-09 LAB
BASOPHILS # BLD AUTO: 0.06 10*3/MM3 (ref 0–0.2)
BASOPHILS NFR BLD AUTO: 0.6 % (ref 0–1.5)
BUN SERPL-MCNC: 11 MG/DL (ref 6–20)
BUN/CREAT SERPL: 11.8 (ref 7–25)
CALCIUM SERPL-MCNC: 10.2 MG/DL (ref 8.6–10.5)
CHLORIDE SERPL-SCNC: 102 MMOL/L (ref 98–107)
CO2 SERPL-SCNC: 23.6 MMOL/L (ref 22–29)
CREAT SERPL-MCNC: 0.93 MG/DL (ref 0.57–1)
EOSINOPHIL # BLD AUTO: 0.47 10*3/MM3 (ref 0–0.4)
EOSINOPHIL NFR BLD AUTO: 4.7 % (ref 0.3–6.2)
ERYTHROCYTE [DISTWIDTH] IN BLOOD BY AUTOMATED COUNT: 12.6 % (ref 12.3–15.4)
GLUCOSE SERPL-MCNC: 96 MG/DL (ref 65–99)
HCT VFR BLD AUTO: 40.9 % (ref 34–46.6)
HGB BLD-MCNC: 13.2 G/DL (ref 12–15.9)
IMM GRANULOCYTES # BLD AUTO: 0.04 10*3/MM3 (ref 0–0.05)
IMM GRANULOCYTES NFR BLD AUTO: 0.4 % (ref 0–0.5)
LYMPHOCYTES # BLD AUTO: 3.22 10*3/MM3 (ref 0.7–3.1)
LYMPHOCYTES NFR BLD AUTO: 32.4 % (ref 19.6–45.3)
MCH RBC QN AUTO: 27.3 PG (ref 26.6–33)
MCHC RBC AUTO-ENTMCNC: 32.3 G/DL (ref 31.5–35.7)
MCV RBC AUTO: 84.7 FL (ref 79–97)
MONOCYTES # BLD AUTO: 0.86 10*3/MM3 (ref 0.1–0.9)
MONOCYTES NFR BLD AUTO: 8.6 % (ref 5–12)
NEUTROPHILS # BLD AUTO: 5.3 10*3/MM3 (ref 1.7–7)
NEUTROPHILS NFR BLD AUTO: 53.3 % (ref 42.7–76)
NRBC BLD AUTO-RTO: 0 /100 WBC (ref 0–0.2)
PLATELET # BLD AUTO: 280 10*3/MM3 (ref 140–450)
POTASSIUM SERPL-SCNC: 4.2 MMOL/L (ref 3.5–5.2)
RBC # BLD AUTO: 4.83 10*6/MM3 (ref 3.77–5.28)
SODIUM SERPL-SCNC: 137 MMOL/L (ref 136–145)
WBC # BLD AUTO: 9.95 10*3/MM3 (ref 3.4–10.8)

## 2021-10-19 ENCOUNTER — OFFICE VISIT (OUTPATIENT)
Dept: SURGERY | Facility: CLINIC | Age: 31
End: 2021-10-19

## 2021-10-19 VITALS — WEIGHT: 203 LBS | BODY MASS INDEX: 29.06 KG/M2 | HEIGHT: 70 IN

## 2021-10-19 DIAGNOSIS — Z48.89 POSTOPERATIVE VISIT: Primary | ICD-10-CM

## 2021-10-19 PROCEDURE — 99024 POSTOP FOLLOW-UP VISIT: CPT | Performed by: SURGERY

## 2021-10-19 NOTE — PROGRESS NOTES
Subjective   Beena Vincent is a 31 y.o. female who returns to the office after undergoing a laparoscopic appendectomy on 10/1/2021.     History of Present Illness     The patient is recovering well with no significant postop symptoms.  She is having no abdominal pain.  She has a good appetite and normal bowel function.  Her energy level is good.      Review of Systems   Constitutional: Negative for activity change, appetite change, fatigue and fever.   Respiratory: Negative for chest tightness and shortness of breath.    Cardiovascular: Negative for chest pain and palpitations.   Gastrointestinal: Negative for abdominal pain, constipation, diarrhea and nausea.   Skin: Negative for rash and wound.   Psychiatric/Behavioral: Negative for agitation and confusion.       Objective   Physical Exam  Constitutional:       General: She is not in acute distress.     Appearance: Normal appearance. She is not ill-appearing or toxic-appearing.   Pulmonary:      Effort: Pulmonary effort is normal. No respiratory distress.   Abdominal:      Palpations: Abdomen is soft.      Tenderness: There is no abdominal tenderness.   Skin:     Comments: Incision: intact with no evidence of infection.   Neurological:      Mental Status: She is alert.   Psychiatric:         Behavior: Behavior normal.         Assessment/Plan   The encounter diagnosis was Postoperative visit.    The patient is recovering well from her laparoscopic appendectomy.  At this point she has no limitations.  She will follow-up on an as-needed basis.

## 2022-02-07 ENCOUNTER — OFFICE VISIT (OUTPATIENT)
Dept: FAMILY MEDICINE CLINIC | Facility: CLINIC | Age: 32
End: 2022-02-07

## 2022-02-07 VITALS
OXYGEN SATURATION: 98 % | DIASTOLIC BLOOD PRESSURE: 72 MMHG | HEIGHT: 70 IN | HEART RATE: 65 BPM | TEMPERATURE: 97 F | SYSTOLIC BLOOD PRESSURE: 110 MMHG | WEIGHT: 176 LBS | BODY MASS INDEX: 25.2 KG/M2

## 2022-02-07 DIAGNOSIS — M79.89 RIGHT LEG SWELLING: Primary | ICD-10-CM

## 2022-02-07 PROCEDURE — 99213 OFFICE O/P EST LOW 20 MIN: CPT | Performed by: NURSE PRACTITIONER

## 2022-02-07 RX ORDER — ALBUTEROL SULFATE 90 UG/1
2 AEROSOL, METERED RESPIRATORY (INHALATION) EVERY 4 HOURS PRN
Qty: 18 G | Refills: 0 | Status: SHIPPED | OUTPATIENT
Start: 2022-02-07

## 2022-02-07 RX ORDER — METHYLPREDNISOLONE 4 MG/1
TABLET ORAL
Qty: 21 EACH | Refills: 0 | Status: SHIPPED | OUTPATIENT
Start: 2022-02-07 | End: 2022-06-02

## 2022-02-07 NOTE — PROGRESS NOTES
"Chief Complaint  Mass (Inferior lateral lump R knee ~1.5m not tender to touch, uncomfortable when knee flexes )    Masks/face shield/appropriate PPE were worn for the entirety of the visit by the patient, MA, and provider.     Subjective          Beena Vincent presents to [facility]  History of Present Illness  Beena Vincent is a 31 y.o. female who presents to the clinic today with complaints of a mass behind her right knee. She first noticed this about a month and a half ago. This bump did not start bothering her until about 3 weeks ago. She denies an initial injury or changes in activity. She would rate her pain a 6/10. She states her knee is not more warm than her other leg and she has not noticed a change in color. She would describe her symptoms as tingling. She notices her symptoms most when she bends her legs and with driving she notices her symptoms more. Nothing really improves her symptoms. She would say the bump has gotten bigger. She is not exercising. She denies a personal or family history of clotting disorders. She did have surgery in September 2021 and had her appendix removed. She did have surgery on her right knee when she was 6 years old and also had staph and strep in that knee at the age of 6.     Review of Systems   Constitutional: Negative.    HENT: Negative.    Eyes: Negative.    Respiratory: Negative.    Cardiovascular: Negative.    Gastrointestinal: Negative.    Endocrine: Negative.    Genitourinary: Negative.    Musculoskeletal: Negative.    Skin: Negative.    Allergic/Immunologic: Negative.    Neurological: Negative.    Hematological: Negative.    Psychiatric/Behavioral: Negative.       Objective   Vital Signs:   /72   Pulse 65   Temp 97 °F (36.1 °C)   Ht 177.8 cm (70\")   Wt 79.8 kg (176 lb)   SpO2 98%   BMI 25.25 kg/m²     Physical Exam  Musculoskeletal:        Legs:         Result Review :                 Assessment and Plan    Diagnoses and all " orders for this visit:    1. Right leg swelling (Primary)  -     Duplex Venous Lower Extremity - Right CAR; Future  -     CBC w AUTO Differential  -     Sedimentation rate, automated  -     C-reactive protein    Other orders  -     albuterol sulfate  (90 Base) MCG/ACT inhaler; Inhale 2 puffs Every 4 (Four) Hours As Needed for Wheezing.  Dispense: 18 g; Refill: 0  -     methylPREDNISolone (MEDROL) 4 MG dose pack; Take as directed on package instructions.  Dispense: 21 each; Refill: 0      Based on patient's presentation and physical examination. I am suspecting a baker's cyst to her knee. An ultrasound has been ordered today. She was encouraged to use a compression sleeve, ice, rest, and the medrol dose prescribed. Will also likely need to refer her to orthopedic surgery fo evaluation. An ultrasound has been ordered today to further evaluate. Patient advised to go to the ED for worsening symptoms.     Follow Up   Return if symptoms worsen or fail to improve.  Patient was given instructions and counseling regarding her condition or for health maintenance advice. Please see specific information pulled into the AVS if appropriate.     Electronically signed by BERTHA Livingston, 02/14/22, 7:03 AM EST.

## 2022-02-08 LAB
BASOPHILS # BLD AUTO: 0.1 X10E3/UL (ref 0–0.2)
BASOPHILS NFR BLD AUTO: 1 %
CRP SERPL-MCNC: 6 MG/L (ref 0–10)
EOSINOPHIL # BLD AUTO: 0.3 X10E3/UL (ref 0–0.4)
EOSINOPHIL NFR BLD AUTO: 3 %
ERYTHROCYTE [DISTWIDTH] IN BLOOD BY AUTOMATED COUNT: 12.2 % (ref 11.7–15.4)
ERYTHROCYTE [SEDIMENTATION RATE] IN BLOOD BY WESTERGREN METHOD: 4 MM/HR (ref 0–32)
HCT VFR BLD AUTO: 42.9 % (ref 34–46.6)
HGB BLD-MCNC: 14.7 G/DL (ref 11.1–15.9)
IMM GRANULOCYTES # BLD AUTO: 0 X10E3/UL (ref 0–0.1)
IMM GRANULOCYTES NFR BLD AUTO: 0 %
LYMPHOCYTES # BLD AUTO: 2.8 X10E3/UL (ref 0.7–3.1)
LYMPHOCYTES NFR BLD AUTO: 34 %
MCH RBC QN AUTO: 28.3 PG (ref 26.6–33)
MCHC RBC AUTO-ENTMCNC: 34.3 G/DL (ref 31.5–35.7)
MCV RBC AUTO: 83 FL (ref 79–97)
MONOCYTES # BLD AUTO: 0.7 X10E3/UL (ref 0.1–0.9)
MONOCYTES NFR BLD AUTO: 8 %
NEUTROPHILS # BLD AUTO: 4.4 X10E3/UL (ref 1.4–7)
NEUTROPHILS NFR BLD AUTO: 54 %
PLATELET # BLD AUTO: 321 X10E3/UL (ref 150–450)
RBC # BLD AUTO: 5.19 X10E6/UL (ref 3.77–5.28)
WBC # BLD AUTO: 8.3 X10E3/UL (ref 3.4–10.8)

## 2022-02-11 DIAGNOSIS — M71.21 POPLITEAL CYST, RIGHT: Primary | ICD-10-CM

## 2022-02-16 ENCOUNTER — APPOINTMENT (OUTPATIENT)
Dept: CARDIOLOGY | Facility: HOSPITAL | Age: 32
End: 2022-02-16

## 2022-02-17 ENCOUNTER — OFFICE VISIT (OUTPATIENT)
Dept: ORTHOPEDIC SURGERY | Facility: CLINIC | Age: 32
End: 2022-02-17

## 2022-02-17 ENCOUNTER — APPOINTMENT (OUTPATIENT)
Dept: CARDIOLOGY | Facility: HOSPITAL | Age: 32
End: 2022-02-17

## 2022-02-17 VITALS — HEIGHT: 70 IN | BODY MASS INDEX: 25.2 KG/M2 | WEIGHT: 176 LBS | TEMPERATURE: 98.6 F

## 2022-02-17 DIAGNOSIS — M71.21 BAKER'S CYST OF KNEE, RIGHT: ICD-10-CM

## 2022-02-17 DIAGNOSIS — M25.561 RIGHT KNEE PAIN, UNSPECIFIED CHRONICITY: Primary | ICD-10-CM

## 2022-02-17 PROCEDURE — 73564 X-RAY EXAM KNEE 4 OR MORE: CPT | Performed by: ORTHOPAEDIC SURGERY

## 2022-02-17 PROCEDURE — 99203 OFFICE O/P NEW LOW 30 MIN: CPT | Performed by: ORTHOPAEDIC SURGERY

## 2022-02-17 NOTE — PROGRESS NOTES
Patient Name: Beena Vincent   YOB: 1990  Referring Primary Care Physician: Sissy Garibay MD  BMI: Body mass index is 25.25 kg/m².    Chief Complaint:    Chief Complaint   Patient presents with   • Right Knee - Pain        HPI:     Beena Vincent is a 31 y.o. female who presents today for evaluation of   Chief Complaint   Patient presents with   • Right Knee - Pain   . Beena is seen today complaining of some fullness posterior laterally in her right knee this been going on for quite some time. She says it does not really hurt is more of a nuisance than anything in the knee does not typically lock. But it does come and go. It hurts her when she has been up on it. She was formally working in [employer] but is currently between jobs. When she was up on it all day is when she would notice it sometimes it would count a buckle. History obtained also is that is a 6-year-old she had a staph and strep infection had arthroscopic surgery on her right knee and says is gotten little sore off and on over the years. Says her mother is currently dealing with an arthritic knee with medial lateral meniscus tears and she urged her to get her knee checked as well.      Subjective   Medications:   Home Medications:  Current Outpatient Medications on File Prior to Visit   Medication Sig   • albuterol sulfate  (90 Base) MCG/ACT inhaler Inhale 2 puffs Every 4 (Four) Hours As Needed for Wheezing.   • escitalopram (LEXAPRO) 20 MG tablet Take 1 tablet by mouth Every Night.   • methylPREDNISolone (MEDROL) 4 MG dose pack Take as directed on package instructions.     No current facility-administered medications on file prior to visit.     Current Medications:  Scheduled Meds:  Continuous Infusions:No current facility-administered medications for this visit.    PRN Meds:.    I have reviewed the patient's medical history in detail and updated the computerized patient record.  Review and summarization of old records includes:   "  Past Medical History:   Diagnosis Date   • Asthma    • Cervical dysplasia    • Depression    • Iron deficiency anemia         Past Surgical History:   Procedure Laterality Date   • APPENDECTOMY N/A 10/1/2021    Procedure: APPENDECTOMY LAPAROSCOPIC;  Surgeon: Ahmet Dong Jr., MD;  Location: [facility];  Service: General;  Laterality: N/A;   • KNEE ARTHROSCOPY      AGE 6   • LEEP N/A 2019    Procedure: LOOP ELECTROCAUTERY EXCISION PROCEDURE;  Surgeon: Arsh Ray MD;  Location: [facility];  Service: Obstetrics/Gynecology   • WISDOM TOOTH EXTRACTION          Social History     Occupational History     Employer: [employer]   Tobacco Use   • Smoking status: Former Smoker     Packs/day: 0.50     Years: 4.00     Pack years: 2.00     Quit date: 2020     Years since quittin.0   • Smokeless tobacco: Never Used   Vaping Use   • Vaping Use: Every day   • Substances: Nicotine   • Devices: Disposable   Substance and Sexual Activity   • Alcohol use: Not Currently     Comment: socially   • Drug use: No   • Sexual activity: Not Currently      Social History     Social History Narrative   • Not on file        Family History   Problem Relation Age of Onset   • Diabetes Father    • Arthritis Maternal Grandmother    • Lung cancer Maternal Grandfather    • Heart attack Maternal Grandfather    • Heart disease Maternal Grandfather    • Stroke Maternal Grandfather    • Hyperlipidemia Maternal Grandfather    • Malig Hyperthermia Neg Hx    • Breast cancer Neg Hx    • Ovarian cancer Neg Hx    • Uterine cancer Neg Hx    • Colon cancer Neg Hx        ROS: 14 point review of systems was performed and all other systems were reviewed and are negative except for documented findings in HPI and today's encounter.     Allergies:   Allergies   Allergen Reactions   • Penicillins Nausea And Vomiting   • Adhesive Tape Rash     \"SURGICAL TAPE\"  AS A CHILD     Constitutional:  Denies fever, shaking or chills   Eyes:  " "Denies change in visual acuity   HENT:  Denies nasal congestion or sore throat   Respiratory:  Denies cough or shortness of breath   Cardiovascular:  Denies chest pain or severe LE edema   GI:  Denies abdominal pain, nausea, vomiting, bloody stools or diarrhea   Musculoskeletal:  Numbness, tingling, pain, or loss of motor function only as noted above in history of present illness.  : Denies painful urination or hematuria  Integument:  Denies rash, lesion or ulceration   Neurologic:  Denies headache or focal weakness  Endocrine:  Denies lymphadenopathy  Psych:  Denies confusion or change in mental status   Hem:  Denies active bleeding    OBJECTIVE:  Physical Exam: 31 y.o. female  Wt Readings from Last 3 Encounters:   02/17/22 79.8 kg (176 lb)   02/07/22 79.8 kg (176 lb)   10/19/21 92.1 kg (203 lb)     Ht Readings from Last 1 Encounters:   02/17/22 177.8 cm (70\")     Body mass index is 25.25 kg/m².  Vitals:    02/17/22 1049   Temp: 98.6 °F (37 °C)     Vital signs reviewed.     General Appearance:    Alert, cooperative, in no acute distress                  Eyes: conjunctiva clear  ENT: external ears and nose atraumatic  CV: no peripheral edema  Resp: normal respiratory effort  Skin: no rashes or wounds; normal turgor  Psych: mood and affect appropriate  Lymph: no nodes appreciated  Neuro: gross sensation intact  Vascular:  Palpable peripheral pulse in noted extremity  Musculoskeletal Extremities: Examination today shows pleasant young woman she has good range of motion but has some Gee femoral crepitation she has posterior lateral swelling that is consistent with a Baker's cyst but also kind of wraps down near the fibular head posteriorly her ligamentous exam feels good she does have a little slight patellofemoral crepitation and Larissa's is equivocal    Radiology:   AP lateral 40 degree PA and sunrise view x-rays right knee taken the office today for complaints of pain without comparison views available do show " some narrowing in the patellofemoral joint with slight squaring of the condyles.        Assessment:     ICD-10-CM ICD-9-CM   1. Right knee pain, unspecified chronicity  M25.561 719.46   2. Baker's cyst of knee, right  M71.21 727.51        MDM/Plan:   The diagnosis(es), natural history, pathophysiology and treatment for diagnosis(es) were discussed. Opportunity given and questions answered.  Biomechanics of pertinent body areas discussed.  When appropriate, the use of ambulatory aids discussed.    BMI:  The concept of BMI body mass index and its importance and implications discussed.    MEDICATIONS:  The risks, benefits, warnings,side effects and alternatives of medications discussed.  Inflammation/pain control; with cold, heat, elevation and/or liniments discussed as appropriate  MRI.  With her history of fullness and giving way 1 to get an MRI of her knee. She said she supposed to have an ultrasound today but I think probably the MRI would give us the information we need with the detail we need. Have her follow-up thereafter.    2/17/2022    Dictated utilizing Dragon dictation

## 2022-03-11 ENCOUNTER — HOSPITAL ENCOUNTER (OUTPATIENT)
Dept: MRI IMAGING | Facility: HOSPITAL | Age: 32
Discharge: HOME | End: 2022-03-11
Admitting: ORTHOPAEDIC SURGERY

## 2022-03-11 DIAGNOSIS — M71.21 BAKER'S CYST OF KNEE, RIGHT: ICD-10-CM

## 2022-03-11 DIAGNOSIS — M25.561 RIGHT KNEE PAIN, UNSPECIFIED CHRONICITY: ICD-10-CM

## 2022-03-11 PROCEDURE — A9577 INJ MULTIHANCE: HCPCS | Performed by: ORTHOPAEDIC SURGERY

## 2022-03-11 PROCEDURE — 0 GADOBENATE DIMEGLUMINE 529 MG/ML SOLUTION: Performed by: ORTHOPAEDIC SURGERY

## 2022-03-11 PROCEDURE — 73723 MRI JOINT LWR EXTR W/O&W/DYE: CPT

## 2022-03-11 RX ADMIN — GADOBENATE DIMEGLUMINE 16 ML: 529 INJECTION, SOLUTION INTRAVENOUS at 11:25

## 2022-03-14 ENCOUNTER — TELEPHONE (OUTPATIENT)
Dept: ORTHOPEDIC SURGERY | Facility: CLINIC | Age: 32
End: 2022-03-14

## 2022-03-14 DIAGNOSIS — R22.41 MASS OF RIGHT KNEE: Primary | ICD-10-CM

## 2022-03-14 NOTE — TELEPHONE ENCOUNTER
Have left a message for the patient to contact me at the office regarding her appt with Dr. Chris Randall.

## 2022-03-14 NOTE — TELEPHONE ENCOUNTER
----- Message from Marika Paz MA sent at 3/14/2022 11:09 AM EDT -----  Regarding: Dr Randall  SPM would like to get this pt in to see Dr Randall for a soft tissue mass lesion.  CIM said something about you calling him directly???

## 2022-03-15 ENCOUNTER — TELEPHONE (OUTPATIENT)
Dept: ORTHOPEDIC SURGERY | Facility: CLINIC | Age: 32
End: 2022-03-15

## 2022-03-15 NOTE — TELEPHONE ENCOUNTER
Have spoken with the patient regarding her appointment with Dr. Randall.  Dr. Randall did call and stated that they would try to get her into the office today or later this week.  Patient states that she did have an appointment this morning however she had some car trouble and was not able to get there.  She is, call them to reschedule

## 2022-03-22 DIAGNOSIS — M79.89 SOFT TISSUE MASS: Primary | ICD-10-CM

## 2022-03-23 ENCOUNTER — HOSPITAL ENCOUNTER (OUTPATIENT)
Facility: HOSPITAL | Age: 32
Setting detail: HOSPITAL OUTPATIENT SURGERY
Discharge: HOME | End: 2022-03-23
Attending: ORTHOPAEDIC SURGERY | Admitting: ORTHOPAEDIC SURGERY

## 2022-03-23 ENCOUNTER — ANESTHESIA EVENT (OUTPATIENT)
Dept: PERIOP | Facility: HOSPITAL | Age: 32
End: 2022-03-23

## 2022-03-23 ENCOUNTER — ANESTHESIA (OUTPATIENT)
Dept: PERIOP | Facility: HOSPITAL | Age: 32
End: 2022-03-23

## 2022-03-23 VITALS
HEIGHT: 70 IN | OXYGEN SATURATION: 99 % | DIASTOLIC BLOOD PRESSURE: 78 MMHG | TEMPERATURE: 98.1 F | SYSTOLIC BLOOD PRESSURE: 113 MMHG | WEIGHT: 161.9 LBS | BODY MASS INDEX: 23.18 KG/M2 | RESPIRATION RATE: 16 BRPM | HEART RATE: 77 BPM

## 2022-03-23 DIAGNOSIS — M79.89 SOFT TISSUE MASS: ICD-10-CM

## 2022-03-23 LAB
B-HCG UR QL: NEGATIVE
EXPIRATION DATE: NORMAL
INTERNAL NEGATIVE CONTROL: NORMAL
INTERNAL POSITIVE CONTROL: NORMAL
Lab: NORMAL
SARS-COV-2 RNA PNL SPEC NAA+PROBE: NOT DETECTED

## 2022-03-23 PROCEDURE — 25010000002 CEFAZOLIN IN DEXTROSE 2-4 GM/100ML-% SOLUTION: Performed by: ORTHOPAEDIC SURGERY

## 2022-03-23 PROCEDURE — 88341 IMHCHEM/IMCYTCHM EA ADD ANTB: CPT | Performed by: ORTHOPAEDIC SURGERY

## 2022-03-23 PROCEDURE — 88305 TISSUE EXAM BY PATHOLOGIST: CPT | Performed by: ORTHOPAEDIC SURGERY

## 2022-03-23 PROCEDURE — C9803 HOPD COVID-19 SPEC COLLECT: HCPCS

## 2022-03-23 PROCEDURE — 88321 CONSLTJ&REPRT SLD PREP ELSWR: CPT

## 2022-03-23 PROCEDURE — 81025 URINE PREGNANCY TEST: CPT | Performed by: ANESTHESIOLOGY

## 2022-03-23 PROCEDURE — 25010000002 MIDAZOLAM PER 1 MG: Performed by: ANESTHESIOLOGY

## 2022-03-23 PROCEDURE — 88360 TUMOR IMMUNOHISTOCHEM/MANUAL: CPT | Performed by: ORTHOPAEDIC SURGERY

## 2022-03-23 PROCEDURE — 87635 SARS-COV-2 COVID-19 AMP PRB: CPT | Performed by: ORTHOPAEDIC SURGERY

## 2022-03-23 PROCEDURE — 25010000002 PROPOFOL 10 MG/ML EMULSION: Performed by: NURSE ANESTHETIST, CERTIFIED REGISTERED

## 2022-03-23 PROCEDURE — 88331 PATH CONSLTJ SURG 1 BLK 1SPC: CPT | Performed by: ORTHOPAEDIC SURGERY

## 2022-03-23 PROCEDURE — 88342 IMHCHEM/IMCYTCHM 1ST ANTB: CPT | Performed by: ORTHOPAEDIC SURGERY

## 2022-03-23 RX ORDER — CEFAZOLIN SODIUM 2 G/100ML
2 INJECTION, SOLUTION INTRAVENOUS ONCE
Status: COMPLETED | OUTPATIENT
Start: 2022-03-23 | End: 2022-03-23

## 2022-03-23 RX ORDER — NALOXONE HCL 0.4 MG/ML
0.2 VIAL (ML) INJECTION AS NEEDED
Status: DISCONTINUED | OUTPATIENT
Start: 2022-03-23 | End: 2022-03-23 | Stop reason: HOSPADM

## 2022-03-23 RX ORDER — EPHEDRINE SULFATE 50 MG/ML
INJECTION, SOLUTION INTRAVENOUS AS NEEDED
Status: DISCONTINUED | OUTPATIENT
Start: 2022-03-23 | End: 2022-03-23 | Stop reason: SURG

## 2022-03-23 RX ORDER — MIDAZOLAM HYDROCHLORIDE 1 MG/ML
1 INJECTION INTRAMUSCULAR; INTRAVENOUS
Status: DISCONTINUED | OUTPATIENT
Start: 2022-03-23 | End: 2022-03-23 | Stop reason: HOSPADM

## 2022-03-23 RX ORDER — PROMETHAZINE HYDROCHLORIDE 12.5 MG/1
25 TABLET ORAL ONCE AS NEEDED
Status: DISCONTINUED | OUTPATIENT
Start: 2022-03-23 | End: 2022-03-23 | Stop reason: HOSPADM

## 2022-03-23 RX ORDER — HYDROCODONE BITARTRATE AND ACETAMINOPHEN 7.5; 325 MG/1; MG/1
1 TABLET ORAL ONCE AS NEEDED
Status: DISCONTINUED | OUTPATIENT
Start: 2022-03-23 | End: 2022-03-23 | Stop reason: HOSPADM

## 2022-03-23 RX ORDER — FAMOTIDINE 10 MG/ML
20 INJECTION, SOLUTION INTRAVENOUS ONCE
Status: COMPLETED | OUTPATIENT
Start: 2022-03-23 | End: 2022-03-23

## 2022-03-23 RX ORDER — DIPHENHYDRAMINE HCL 25 MG
25 CAPSULE ORAL
Status: DISCONTINUED | OUTPATIENT
Start: 2022-03-23 | End: 2022-03-23 | Stop reason: HOSPADM

## 2022-03-23 RX ORDER — DIPHENHYDRAMINE HYDROCHLORIDE 50 MG/ML
12.5 INJECTION INTRAMUSCULAR; INTRAVENOUS
Status: DISCONTINUED | OUTPATIENT
Start: 2022-03-23 | End: 2022-03-23 | Stop reason: HOSPADM

## 2022-03-23 RX ORDER — HYDRALAZINE HYDROCHLORIDE 20 MG/ML
5 INJECTION INTRAMUSCULAR; INTRAVENOUS
Status: DISCONTINUED | OUTPATIENT
Start: 2022-03-23 | End: 2022-03-23 | Stop reason: HOSPADM

## 2022-03-23 RX ORDER — SODIUM CHLORIDE 0.9 % (FLUSH) 0.9 %
3-10 SYRINGE (ML) INJECTION AS NEEDED
Status: DISCONTINUED | OUTPATIENT
Start: 2022-03-23 | End: 2022-03-23 | Stop reason: HOSPADM

## 2022-03-23 RX ORDER — FLUMAZENIL 0.1 MG/ML
0.2 INJECTION INTRAVENOUS AS NEEDED
Status: DISCONTINUED | OUTPATIENT
Start: 2022-03-23 | End: 2022-03-23 | Stop reason: HOSPADM

## 2022-03-23 RX ORDER — IBUPROFEN 600 MG/1
600 TABLET ORAL ONCE AS NEEDED
Status: DISCONTINUED | OUTPATIENT
Start: 2022-03-23 | End: 2022-03-23 | Stop reason: HOSPADM

## 2022-03-23 RX ORDER — IBUPROFEN 600 MG/1
600 TABLET ORAL EVERY 6 HOURS PRN
Qty: 30 TABLET | Refills: 0 | Status: SHIPPED | OUTPATIENT
Start: 2022-03-23 | End: 2022-11-10

## 2022-03-23 RX ORDER — SODIUM CHLORIDE 0.9 % (FLUSH) 0.9 %
3 SYRINGE (ML) INJECTION EVERY 12 HOURS SCHEDULED
Status: DISCONTINUED | OUTPATIENT
Start: 2022-03-23 | End: 2022-03-23 | Stop reason: HOSPADM

## 2022-03-23 RX ORDER — PROMETHAZINE HYDROCHLORIDE 25 MG/1
25 SUPPOSITORY RECTAL ONCE AS NEEDED
Status: DISCONTINUED | OUTPATIENT
Start: 2022-03-23 | End: 2022-03-23 | Stop reason: HOSPADM

## 2022-03-23 RX ORDER — LABETALOL HYDROCHLORIDE 5 MG/ML
5 INJECTION, SOLUTION INTRAVENOUS
Status: DISCONTINUED | OUTPATIENT
Start: 2022-03-23 | End: 2022-03-23 | Stop reason: HOSPADM

## 2022-03-23 RX ORDER — ONDANSETRON 2 MG/ML
4 INJECTION INTRAMUSCULAR; INTRAVENOUS ONCE AS NEEDED
Status: DISCONTINUED | OUTPATIENT
Start: 2022-03-23 | End: 2022-03-23 | Stop reason: HOSPADM

## 2022-03-23 RX ORDER — EPHEDRINE SULFATE 50 MG/ML
5 INJECTION, SOLUTION INTRAVENOUS ONCE AS NEEDED
Status: DISCONTINUED | OUTPATIENT
Start: 2022-03-23 | End: 2022-03-23 | Stop reason: HOSPADM

## 2022-03-23 RX ORDER — PROPOFOL 10 MG/ML
VIAL (ML) INTRAVENOUS AS NEEDED
Status: DISCONTINUED | OUTPATIENT
Start: 2022-03-23 | End: 2022-03-23 | Stop reason: SURG

## 2022-03-23 RX ORDER — FENTANYL CITRATE 50 UG/ML
50 INJECTION, SOLUTION INTRAMUSCULAR; INTRAVENOUS
Status: DISCONTINUED | OUTPATIENT
Start: 2022-03-23 | End: 2022-03-23 | Stop reason: HOSPADM

## 2022-03-23 RX ORDER — LIDOCAINE HYDROCHLORIDE 10 MG/ML
0.5 INJECTION, SOLUTION EPIDURAL; INFILTRATION; INTRACAUDAL; PERINEURAL ONCE AS NEEDED
Status: DISCONTINUED | OUTPATIENT
Start: 2022-03-23 | End: 2022-03-23 | Stop reason: HOSPADM

## 2022-03-23 RX ORDER — SODIUM CHLORIDE, SODIUM LACTATE, POTASSIUM CHLORIDE, CALCIUM CHLORIDE 600; 310; 30; 20 MG/100ML; MG/100ML; MG/100ML; MG/100ML
9 INJECTION, SOLUTION INTRAVENOUS CONTINUOUS
Status: DISCONTINUED | OUTPATIENT
Start: 2022-03-23 | End: 2022-03-23 | Stop reason: HOSPADM

## 2022-03-23 RX ORDER — LIDOCAINE HYDROCHLORIDE 20 MG/ML
INJECTION, SOLUTION INFILTRATION; PERINEURAL AS NEEDED
Status: DISCONTINUED | OUTPATIENT
Start: 2022-03-23 | End: 2022-03-23 | Stop reason: SURG

## 2022-03-23 RX ORDER — OXYCODONE AND ACETAMINOPHEN 7.5; 325 MG/1; MG/1
1 TABLET ORAL EVERY 4 HOURS PRN
Status: DISCONTINUED | OUTPATIENT
Start: 2022-03-23 | End: 2022-03-23 | Stop reason: HOSPADM

## 2022-03-23 RX ORDER — HYDROMORPHONE HYDROCHLORIDE 1 MG/ML
0.5 INJECTION, SOLUTION INTRAMUSCULAR; INTRAVENOUS; SUBCUTANEOUS
Status: DISCONTINUED | OUTPATIENT
Start: 2022-03-23 | End: 2022-03-23 | Stop reason: HOSPADM

## 2022-03-23 RX ADMIN — MIDAZOLAM 1 MG: 1 INJECTION INTRAMUSCULAR; INTRAVENOUS at 13:29

## 2022-03-23 RX ADMIN — PROPOFOL 50 MG: 10 INJECTION, EMULSION INTRAVENOUS at 14:41

## 2022-03-23 RX ADMIN — PROPOFOL 50 MG: 10 INJECTION, EMULSION INTRAVENOUS at 14:59

## 2022-03-23 RX ADMIN — LIDOCAINE HYDROCHLORIDE 80 MG: 20 INJECTION, SOLUTION INFILTRATION; PERINEURAL at 14:41

## 2022-03-23 RX ADMIN — SODIUM CHLORIDE, POTASSIUM CHLORIDE, SODIUM LACTATE AND CALCIUM CHLORIDE 9 ML/HR: 600; 310; 30; 20 INJECTION, SOLUTION INTRAVENOUS at 12:28

## 2022-03-23 RX ADMIN — EPHEDRINE SULFATE 5 MG: 50 INJECTION INTRAVENOUS at 15:06

## 2022-03-23 RX ADMIN — FAMOTIDINE 20 MG: 10 INJECTION INTRAVENOUS at 12:44

## 2022-03-23 RX ADMIN — EPHEDRINE SULFATE 10 MG: 50 INJECTION INTRAVENOUS at 15:11

## 2022-03-23 RX ADMIN — PROPOFOL 50 MG: 10 INJECTION, EMULSION INTRAVENOUS at 14:45

## 2022-03-23 RX ADMIN — PROPOFOL 125 MCG/KG/MIN: 10 INJECTION, EMULSION INTRAVENOUS at 14:42

## 2022-03-23 RX ADMIN — CEFAZOLIN SODIUM 2 G: 2 INJECTION, SOLUTION INTRAVENOUS at 14:34

## 2022-03-23 NOTE — INTERVAL H&P NOTE
H&P reviewed. The patient was examined and there are no changes to the H&P.  Patient marked, consent verified.    Chris Randall M.D.

## 2022-03-23 NOTE — OP NOTE
Orthopedic Operative Note     Name: Beena Vincent   MRN: [id number]    :  1990    Date of Surgery: 2022     Pre-op Diagnosis:   Right lower extremity, knee soft tissue mass of uncertain biologic potential     Post-op Diagnosis:  Same       Procedure(s):  Open biopsy right knee soft tissue mass     Surgeon(s):  Chris Randall MD     Anesthesia:  MAC with local     Staff:   Circulator: Carey Holley RN; Misty Williamson RN  Scrub Person: Iraida Waterman    Estimated Blood Loss: minimal    Specimens:                Order Name Source Comment Collection Info Order Time   PREGNANCY, URINE Urine, Clean Catch   3/23/2022 11:15 AM     Release to patient   Immediate        TISSUE PATHOLOGY EXAM Knee, Right  Collected By: Chris Randall MD 3/23/2022  3:02 PM     Release to patient   Immediate            Complications:  None     Implants:  None     Indications: Beena is a 31 y.o. female soft tissue mass of uncertain biologic potential the posterior lateral aspect of the right knee.  Given the imaging findings and symptoms elected to proceed with biopsy for tissue diagnosis.  Risks of the procedure were discussed these included were not limited to bleeding, infection, damage to adjacent structures, DVT, PE, need for additional procedures. The patient understood the risks and elected to proceed.     Description of procedure:     Beena Vincent was seen and evaluated in preoperative holding area found to be neurovascularly intact the operative extremity was confirmed and marked as the operative extremity.  Consent was verified.     The patient  was transferred to the OR#6 at [facility], [place], operated by [facility].  Satisfactory anesthesia was induced and the patient was positioned on the operative table.  The operative extremity was preprepped with alcohol then prepped and draped normal sterile fashion with ChloraPrep.  Timeout was performed on agreement patient's identity, laterality procedure to be performed and 2 g  of Ancef was administered prior to incision.     Surgical incision for planned oncologic resection was outlined.  A roughly 5 mm incision was made distal limb of the planned incision overlying the mass.  Yinka-Cut biopsy needle was introduced into the mass and approximately 6 cores were obtained.  These cores were sent to pathology for frozen analysis.  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  No obvious malignancy was identified.    With this in mind the wounds were copiously irrigated with normal saline solution.  The incision was reapproximated with excision and dressed with 2 x 2 and a Tegaderm.    At end of the procedure all sponge needle counts were correct     I was present conduct the entirety of the procedure.          Chris Randall M.D.  3/23/2022

## 2022-03-23 NOTE — ANESTHESIA PREPROCEDURE EVALUATION
" Anesthesia Evaluation     Patient summary reviewed and Nursing notes reviewed   no history of anesthetic complications:  NPO Solid Status: > 8 hours  NPO Liquid Status: > 2 hours           Airway   Mallampati: II  TM distance: >3 FB  Neck ROM: full  No difficulty expected  Dental - normal exam     Pulmonary    (+) a smoker (vape with nictoine), asthma (freq inhaler use),wheezes,   Cardiovascular - normal exam    (+) hypertension,       Neuro/Psych  (+) psychiatric history Anxiety and Depression,    GI/Hepatic/Renal/Endo      Musculoskeletal     Abdominal   (+) obese,    Substance History      OB/GYN          Other                          Anesthesia Plan    ASA 2     MAC   (  I have reviewed the patient's history with the patient and the chart, including all pertinent laboratory results and imaging. I have explained the risks of anesthesia including but not limited to dental damage, corneal abrasion, nerve injury, MI, stroke, and death.    /81 (BP Location: Left arm, Patient Position: Lying)   Pulse 71   Temp 36.1 °C (96.9 °F) (Tympanic)   Resp 22   Ht 177.8 cm (70\")   Wt 99.3 kg (219 lb)   LMP 09/13/2021 (Approximate)   SpO2 96%   Breastfeeding No   BMI 31.42 kg/m²   )  intravenous induction     Anesthetic plan, all risks, benefits, and alternatives have been provided, discussed and informed consent has been obtained with: patient.      "

## 2022-03-23 NOTE — ANESTHESIA POSTPROCEDURE EVALUATION
Patient: Beena Vincent    Procedure Summary     Date: 03/23/22 Room / Location: [facility] OR  / [facility]    Anesthesia Start: 1436 Anesthesia Stop: 1525    Procedure: BIOPSY SOFT TISSUE THIGH / KNEE (Right ) Diagnosis:       Soft tissue mass      (Soft tissue mass [M79.89])    Surgeons: Chris Randall MD Provider: Raya Dao MD    Anesthesia Type: MAC ASA Status: 2          Anesthesia Type: MAC    Vitals  Vitals Value Taken Time   /78 03/23/22 1626   Temp     Pulse 85 03/23/22 1633   Resp 16 03/23/22 1610   SpO2 100 % 03/23/22 1633   Vitals shown include unvalidated device data.        Post Anesthesia Care and Evaluation    Patient location during evaluation: bedside  Patient participation: complete - patient participated  Level of consciousness: awake and alert  Pain management: adequate  Airway patency: patent  Anesthetic complications: No anesthetic complications  PONV Status: controlled  Cardiovascular status: blood pressure returned to baseline and acceptable  Respiratory status: acceptable  Hydration status: acceptable

## 2022-04-12 ENCOUNTER — TELEPHONE (OUTPATIENT)
Dept: FAMILY MEDICINE CLINIC | Facility: CLINIC | Age: 32
End: 2022-04-12

## 2022-04-12 DIAGNOSIS — M79.89 RIGHT LEG SWELLING: Primary | ICD-10-CM

## 2022-04-12 NOTE — TELEPHONE ENCOUNTER
Caller: Beena Vincent    Relationship: Self    Best call back number:  [phone]     What is the medical concern/diagnosis: TUMOR IN LEG     What specialty or service is being requested: ORTHOPEDIC SURGERY     What is the provider, practice or medical service name: DR ERMA MADDEN     What is the office location: [address] [place] [address]     What is the office phone number: [phone]    FAX:[phone] ATTN: MACHELLE    Any additional details: PATIENT STATES THAT SHE IS WANTING A SECOND OPINION ON THE TUMOR IN HER LEG. THE PATIENT STATES DR HATFIELD WILL NOT OPERATE ON IT AND IT IS GROWING RAPIDLY. 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

## 2022-04-14 ENCOUNTER — TELEPHONE (OUTPATIENT)
Dept: OBSTETRICS AND GYNECOLOGY | Facility: CLINIC | Age: 32
End: 2022-04-14

## 2022-04-15 LAB
BEAKER LAB AP INTRAOPERATIVE CONSULTATION: NORMAL
DX PRELIMINARY: NORMAL
LAB AP CASE REPORT: NORMAL
LAB AP DIAGNOSIS COMMENT: NORMAL
LAB AP SPECIAL STAINS: NORMAL
PATH REPORT.ADDENDUM SPEC: NORMAL
PATH REPORT.FINAL DX SPEC: NORMAL
PATH REPORT.GROSS SPEC: NORMAL

## 2022-04-21 ENCOUNTER — CONSULT (OUTPATIENT)
Dept: ONCOLOGY | Facility: CLINIC | Age: 32
End: 2022-04-21

## 2022-04-21 ENCOUNTER — LAB (OUTPATIENT)
Dept: LAB | Facility: HOSPITAL | Age: 32
End: 2022-04-21

## 2022-04-21 ENCOUNTER — APPOINTMENT (OUTPATIENT)
Dept: LAB | Facility: HOSPITAL | Age: 32
End: 2022-04-21

## 2022-04-21 VITALS
BODY MASS INDEX: 22.41 KG/M2 | HEIGHT: 69 IN | WEIGHT: 151.3 LBS | RESPIRATION RATE: 20 BRPM | TEMPERATURE: 97.3 F | DIASTOLIC BLOOD PRESSURE: 71 MMHG | HEART RATE: 103 BPM | OXYGEN SATURATION: 98 % | SYSTOLIC BLOOD PRESSURE: 98 MMHG

## 2022-04-21 DIAGNOSIS — R63.4 WEIGHT LOSS: ICD-10-CM

## 2022-04-21 DIAGNOSIS — D48.1 DESMOID TUMOR: ICD-10-CM

## 2022-04-21 DIAGNOSIS — D49.9 TUMOR: Primary | ICD-10-CM

## 2022-04-21 DIAGNOSIS — D48.1 DESMOID TUMOR: Primary | ICD-10-CM

## 2022-04-21 LAB
BASOPHILS # BLD AUTO: 0.08 10*3/MM3 (ref 0–0.2)
BASOPHILS NFR BLD AUTO: 0.8 % (ref 0–1.5)
DEPRECATED RDW RBC AUTO: 37.8 FL (ref 37–54)
EOSINOPHIL # BLD AUTO: 0.43 10*3/MM3 (ref 0–0.4)
EOSINOPHIL NFR BLD AUTO: 4.4 % (ref 0.3–6.2)
ERYTHROCYTE [DISTWIDTH] IN BLOOD BY AUTOMATED COUNT: 12.4 % (ref 12.3–15.4)
HCT VFR BLD AUTO: 45.4 % (ref 34–46.6)
HGB BLD-MCNC: 15.4 G/DL (ref 12–15.9)
IMM GRANULOCYTES # BLD AUTO: 0.03 10*3/MM3 (ref 0–0.05)
IMM GRANULOCYTES NFR BLD AUTO: 0.3 % (ref 0–0.5)
LYMPHOCYTES # BLD AUTO: 3.15 10*3/MM3 (ref 0.7–3.1)
LYMPHOCYTES NFR BLD AUTO: 32.2 % (ref 19.6–45.3)
MCH RBC QN AUTO: 28.2 PG (ref 26.6–33)
MCHC RBC AUTO-ENTMCNC: 33.9 G/DL (ref 31.5–35.7)
MCV RBC AUTO: 83 FL (ref 79–97)
MONOCYTES # BLD AUTO: 0.82 10*3/MM3 (ref 0.1–0.9)
MONOCYTES NFR BLD AUTO: 8.4 % (ref 5–12)
NEUTROPHILS NFR BLD AUTO: 5.27 10*3/MM3 (ref 1.7–7)
NEUTROPHILS NFR BLD AUTO: 53.9 % (ref 42.7–76)
NRBC BLD AUTO-RTO: 0 /100 WBC (ref 0–0.2)
PLATELET # BLD AUTO: 242 10*3/MM3 (ref 140–450)
PMV BLD AUTO: 10.6 FL (ref 6–12)
RBC # BLD AUTO: 5.47 10*6/MM3 (ref 3.77–5.28)
WBC NRBC COR # BLD: 9.78 10*3/MM3 (ref 3.4–10.8)

## 2022-04-21 PROCEDURE — 36415 COLL VENOUS BLD VENIPUNCTURE: CPT

## 2022-04-21 PROCEDURE — 85025 COMPLETE CBC W/AUTO DIFF WBC: CPT

## 2022-04-21 PROCEDURE — 99215 OFFICE O/P EST HI 40 MIN: CPT | Performed by: INTERNAL MEDICINE

## 2022-04-21 NOTE — PROGRESS NOTES
[facility]    CONSULTING IN BLOOD DISORDERS & CANCER      REASON FOR CONSULTATION/CHIEF COMPLAINT:     Evaluation & management for desmoid tumor                             REQUESTING PHYSICIAN: Chris Randall MD  RECORDS OBTAINED:  Records of the patients history including those from the electronic medical record were reviewed and summarized in detail.    HISTORY OF PRESENT ILLNESS:    The patient is a 31 y.o. year old female with medical history significant for asthma & depression who had first noted a swelling behind her right knee in December 2021. The swelling was progressive and became painful with knee flexion. A MRI right knee done 3/11/22 demonstrated a 10 x 3.5 x 4.4 cm enhancing soft tissue mass lesion along the posterolateral right knee interposed between the distal biceps femoris and the lateral gastrocnemius muscles. The mass appears to infiltrate or arise from the distal biceps femoris. The lesion displaces and closely abuts the common peroneal nerve.     Patient was seen by , simta - who obtained a biopsy of the mass on 3/23/22. 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      recommended against upfront resection at this time & referred to this clinic to discuss systemic therapy options.     Patient is accompanied by her mother. C/o pain & swelling behind right knee. Denies any association with menstrual cycles. She does report of > 20 lbs unintentional weight loss. Denies any abdominal pain, nausea or vomiting. Denies any other swelling anywhere. No family history of FAP, colon cancer or any malignancies.       Past Medical History:   Diagnosis Date   • Asthma    • Cervical dysplasia    • Depression    • Iron deficiency anemia    • Soft tissue mass     right leg/knee     Past Surgical History:   Procedure Laterality Date   • APPENDECTOMY N/A 10/1/2021    Procedure: APPENDECTOMY LAPAROSCOPIC;  Surgeon: Ahmet Dong Jr., MD;  Location:  "[facility] MAIN OR;  Service: General;  Laterality: N/A;   • KNEE ARTHROSCOPY      AGE 6   • LEEP N/A 2019    Procedure: LOOP ELECTROCAUTERY EXCISION PROCEDURE;  Surgeon: Arsh Ray MD;  Location: [facility];  Service: Obstetrics/Gynecology   • SOFT TISSUE BIOPSY Right 3/23/2022    Procedure: BIOPSY SOFT TISSUE THIGH / KNEE;  Surgeon: Chris Randall MD;  Location: [facility] OR;  Service: Orthopedics;  Laterality: Right;   • WISDOM TOOTH EXTRACTION         MEDICATIONS    Current Outpatient Medications:   •  albuterol sulfate  (90 Base) MCG/ACT inhaler, Inhale 2 puffs Every 4 (Four) Hours As Needed for Wheezing., Disp: 18 g, Rfl: 0  •  escitalopram (LEXAPRO) 20 MG tablet, Take 1 tablet by mouth Every Night., Disp: 30 tablet, Rfl: 3  •  ibuprofen (ADVIL,MOTRIN) 600 MG tablet, Take 1 tablet by mouth Every 6 (Six) Hours As Needed for Mild Pain ., Disp: 30 tablet, Rfl: 0  •  methylPREDNISolone (MEDROL) 4 MG dose pack, Take as directed on package instructions., Disp: 21 each, Rfl: 0    ALLERGIES:     Allergies   Allergen Reactions   • Penicillins Nausea And Vomiting   • Adhesive Tape Rash     \"SURGICAL TAPE\"  AS A CHILD       SOCIAL HISTORY:       Social History     Socioeconomic History   • Marital status: Single   Tobacco Use   • Smoking status: Former Smoker     Packs/day: 0.50     Years: 4.00     Pack years: 2.00     Quit date: 2020     Years since quittin.2   • Smokeless tobacco: Never Used   Vaping Use   • Vaping Use: Every day   • Substances: Nicotine   • Devices: Disposable   Substance and Sexual Activity   • Alcohol use: Yes     Comment: socially   • Drug use: Yes     Types: Marijuana     Comment: daily   • Sexual activity: Not Currently         FAMILY HISTORY:  Family History   Problem Relation Age of Onset   • Diabetes Father    • Arthritis Maternal Grandmother    • Lung cancer Maternal Grandfather    • Heart attack Maternal Grandfather    • Heart disease Maternal Grandfather    • " "Stroke Maternal Grandfather    • Hyperlipidemia Maternal Grandfather    • Malig Hyperthermia Neg Hx    • Breast cancer Neg Hx    • Ovarian cancer Neg Hx    • Uterine cancer Neg Hx    • Colon cancer Neg Hx        REVIEW OF SYSTEMS:  Constitutional: [No fevers, chills, sweats]  Eye: [No recent visual problems]  ENMT: [No ear pain, nasal congestion, sore throat]  Respiratory: [No shortness of breath, cough]  Cardiovascular: [No Chest pain, palpitations, syncope]  Gastrointestinal: [No nausea, vomiting, diarrhea]  Genitourinary: [No hematuria]  Hema/Lymph: [Negative for bruising tendency, swollen lymph glands]  Endocrine: [Negative for excessive thirst, excessive hunger]  Musculoskeletal: [c/o right knee pain & swelling]  Integumentary: [No rash, pruritus, abrasions]  Neurologic: [ No weakness or numbness, Alert & oriented X 4]       Vitals:    04/21/22 1050   BP: 98/71   Pulse: 103   Resp: 20   Temp: 97.3 °F (36.3 °C)   TempSrc: Temporal   SpO2: 98%   Weight: 68.6 kg (151 lb 4.8 oz)   Height: 176 cm (69.29\")   PainSc:   7   PainLoc: Leg  Comment: right     Current Status 4/21/2022   ECOG score 0      PHYSICAL EXAM:    CONSTITUTIONAL:  Vital signs reviewed.  No distress, looks comfortable.  EYES:  Conjunctiva and lids unremarkable.   EARS,NOSE,MOUTH,THROAT:  Ears and nose appear unremarkable.    RESPIRATORY:  Normal respiratory effort.  Lungs clear to auscultation bilaterally.  CARDIOVASCULAR:  Normal S1, S2.  No murmurs rubs or gallops.  No significant lower extremity edema.  GASTROINTESTINAL: Abdomen appears unremarkable.  Nondistended  LYMPHATIC:  No cervical, supraclavicular lymphadenopathy.  NEURO: cranial nerves 2-12 grossly intact.  No focal deficits.  Appears to have equal strength all 4 extremities.  MUSCULOSKELETAL:  Unremarkable digits/nails.  No cyanosis or clubbing.  No apparent joint deformities. A ~ 5 cm hard palpable swelling noted postero-lateral to the right knee  SKIN:  Warm.  No rashes.  PSYCHIATRIC: "  Normal judgment and insight.  Normal mood and affect.     RECENT LABS:        Lab on 04/21/2022   Component Date Value Ref Range Status   • WBC 04/21/2022 9.78  3.40 - 10.80 10*3/mm3 Final   • RBC 04/21/2022 5.47 (A) 3.77 - 5.28 10*6/mm3 Final   • Hemoglobin 04/21/2022 15.4  12.0 - 15.9 g/dL Final   • Hematocrit 04/21/2022 45.4  34.0 - 46.6 % Final   • MCV 04/21/2022 83.0  79.0 - 97.0 fL Final   • MCH 04/21/2022 28.2  26.6 - 33.0 pg Final   • MCHC 04/21/2022 33.9  31.5 - 35.7 g/dL Final   • RDW 04/21/2022 12.4  12.3 - 15.4 % Final   • RDW-SD 04/21/2022 37.8  37.0 - 54.0 fl Final   • MPV 04/21/2022 10.6  6.0 - 12.0 fL Final   • Platelets 04/21/2022 242  140 - 450 10*3/mm3 Final    SOME PLT CLUMPING   • Neutrophil % 04/21/2022 53.9  42.7 - 76.0 % Final   • Lymphocyte % 04/21/2022 32.2  19.6 - 45.3 % Final   • Monocyte % 04/21/2022 8.4  5.0 - 12.0 % Final   • Eosinophil % 04/21/2022 4.4  0.3 - 6.2 % Final   • Basophil % 04/21/2022 0.8  0.0 - 1.5 % Final   • Immature Grans % 04/21/2022 0.3  0.0 - 0.5 % Final   • Neutrophils, Absolute 04/21/2022 5.27  1.70 - 7.00 10*3/mm3 Final   • Lymphocytes, Absolute 04/21/2022 3.15 (A) 0.70 - 3.10 10*3/mm3 Final   • Monocytes, Absolute 04/21/2022 0.82  0.10 - 0.90 10*3/mm3 Final   • Eosinophils, Absolute 04/21/2022 0.43 (A) 0.00 - 0.40 10*3/mm3 Final   • Basophils, Absolute 04/21/2022 0.08  0.00 - 0.20 10*3/mm3 Final   • Immature Grans, Absolute 04/21/2022 0.03  0.00 - 0.05 10*3/mm3 Final   • nRBC 04/21/2022 0.0  0.0 - 0.2 /100 WBC Final         ASSESSMENT:   is a pleasant 30 y/o WF with medical history significant for asthma & depression who comes for 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     # Right knee desmoid tumor:  · Pt had first noted a swelling behind her right knee in December 2021. The swelling was progressive and became painful with knee flexion.   · A MRI right knee done 3/11/22 demonstrated a 10 x 3.5 x 4.4 cm enhancing soft tissue mass  lesion along the posterolateral right knee interposed between the distal biceps femoris and the lateral gastrocnemius muscles. The mass appears to infiltrate or arise from the distal biceps femoris. The lesion displaces and closely abuts the common peroneal nerve.   · Patient was seen by , ortho - who obtained a biopsy of the mass on 3/23/22. 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   ·  recommended against upfront resection at this time & referred to this clinic to discuss systemic therapy options.   · I discussed the natural history of desmoid tumors including high rates of local recurrence (~ 50% cases) , specially R1 resection. Also discussed ~ 25% cases have spontaneous regression. Discussed role of systemic therapy options with TKIs and tamoxifen/NSAIDS.   · Patient is exploring second opinion for surgical options at the [facility]   · Will plan to see her back after her visit with .     # Unintentional weight loss: Will obtain CT c/a/p to evaluate for any other sites of desmoid tumors    # Encounter for genetic & chromosomal abnormalities: Will her young age & desmoid tumors association with FAP and gardners syndrome, will check for inv[facility]ae germline testing.     PLAN:   1.  Discussed role of systemic therapy options with TKIs and tamoxifen/NSAIDS. Patient is exploring second opinion for surgical option at the [facility]   2. Will plan to see her back after her visit with .   3. CT c/a/p now. [facility] germline testing  4. F?u in 2-3 weeks or sooner if needed    Orders Placed This Encounter   Procedures   • CT Chest With Contrast Diagnostic     Standing Status:   Future     Standing Expiration Date:   4/21/2023     Order Specific Question:   Patient Pregnant     Answer:   No   • CT Abdomen Pelvis With Contrast     Standing Status:   Future     Standing Expiration Date:   4/21/2023     Order Specific Question:   Patient Pregnant     Answer:    No     Order Specific Question:   Will Oral Contrast be needed for this procedure?     Answer:   Yes   • invitae genetic test     Standing Status:   Future     Number of Occurrences:   1     Standing Expiration Date:   4/21/2023     Order Specific Question:   What is the name of the test you wish to perform?     Answer:   invitae genetic test     Order Specific Question:   Release to patient     Answer:   Immediate   Total time spent during this patient encounter is 65 minutes. The total time spent with the patient includes at least one or more of the following: preparing to see the patient by reviewing of tests, prior notes or other relevant information, performing appropriate independent examination & evaluation, counseling, ordering of medications, tests or procedures, communicating with other healthcare professionals, when appropriate to coordinate care, documenting clinic information in the electronic medical records or other health records, independently interpreting results of tests and communicating the results to the patient/family or caregiver.

## 2022-04-25 ENCOUNTER — TELEPHONE (OUTPATIENT)
Dept: ONCOLOGY | Facility: CLINIC | Age: 32
End: 2022-04-25

## 2022-04-25 NOTE — TELEPHONE ENCOUNTER
Clinical Case Management/[place]:  Telephone    Patient is a 31 year old woman who was seen by Dr. Molina on 4/21/22 for evaluation and management of desmoid tumor. Patient completed the [employer] Distress Thermometer and Problems List for Patient on which she scored 6/10 and marked the following concerns: housing, taking care of self and others, feelings of worthlessness/being a burden, and pain.     OSW called patient to introduce self/supportive oncology services, assess for needs, and provide support. Patient was unable to answer this call. OSW left a message with call back number for patient to return call at earliest convenience.     POLLY Winkler, CSW  Oncology Social Worker   [place]

## 2022-04-26 ENCOUNTER — HOSPITAL ENCOUNTER (OUTPATIENT)
Dept: PET IMAGING | Facility: HOSPITAL | Age: 32
Discharge: HOME | End: 2022-04-26
Admitting: INTERNAL MEDICINE

## 2022-04-26 DIAGNOSIS — R63.4 WEIGHT LOSS: ICD-10-CM

## 2022-04-26 DIAGNOSIS — D48.1 DESMOID TUMOR: ICD-10-CM

## 2022-04-26 PROCEDURE — 74177 CT ABD & PELVIS W/CONTRAST: CPT

## 2022-04-26 PROCEDURE — 71260 CT THORAX DX C+: CPT

## 2022-04-26 PROCEDURE — 25010000002 IOPAMIDOL 61 % SOLUTION: Performed by: INTERNAL MEDICINE

## 2022-04-26 PROCEDURE — 0 DIATRIZOATE MEGLUMINE & SODIUM PER 1 ML: Performed by: INTERNAL MEDICINE

## 2022-04-26 RX ADMIN — IOPAMIDOL 85 ML: 612 INJECTION, SOLUTION INTRAVENOUS at 12:30

## 2022-04-26 RX ADMIN — DIATRIZOATE MEGLUMINE AND DIATRIZOATE SODIUM 30 ML: 660; 100 LIQUID ORAL; RECTAL at 11:25

## 2022-05-10 LAB — REF LAB TEST RESULTS: NORMAL

## 2022-05-16 ENCOUNTER — TELEPHONE (OUTPATIENT)
Dept: ONCOLOGY | Facility: CLINIC | Age: 32
End: 2022-05-16

## 2022-05-16 ENCOUNTER — DOCUMENTATION (OUTPATIENT)
Dept: ONCOLOGY | Facility: CLINIC | Age: 32
End: 2022-05-16

## 2022-05-16 NOTE — PROGRESS NOTES
Have tried calling patient multiple times, but is going unanswered.  Have left a voice message for call back.     Recent CT scan from 4/26/2022 show an asymmetric 1 cm nodule on outer quadrant of the right breast.  Etiology unclear.  I had planned to do a breast exam and potentially obtain an ultrasound for further evaluation.  Patient missed her appointment with me last week.  Her inv[facility] genetic testing also showed some abnormalities for which I would like to refer her to genetic counselor.  I will continue to try contacting her to convey my recommendations.  We will also plan to send her letter.

## 2022-05-16 NOTE — TELEPHONE ENCOUNTER
Called pt per Dr. Molina's request re: missed appointment. Pt was appreciative of the call and apologized for not calling back to reschedule. I assured her that I would have Dr. Molina's  reach out to her to get her an appointment with him soon. Pt v/u.

## 2022-05-19 ENCOUNTER — TELEPHONE (OUTPATIENT)
Dept: ONCOLOGY | Facility: CLINIC | Age: 32
End: 2022-05-19

## 2022-05-19 NOTE — TELEPHONE ENCOUNTER
Caller: TIM    Relationship to patient: SELF    Best call back number: [phone]    Patient is needing: TO R/S MISSED 5- LAB AND F/U.

## 2022-05-27 ENCOUNTER — TRANSCRIBE ORDERS (OUTPATIENT)
Dept: ADMINISTRATIVE | Facility: HOSPITAL | Age: 32
End: 2022-05-27

## 2022-05-27 DIAGNOSIS — D48.4 NEOPLASM OF UNCERTAIN BEHAVIOR OF RETROPERITONEUM AND PERITONEUM: Primary | ICD-10-CM

## 2022-05-27 DIAGNOSIS — D48.3 NEOPLASM OF UNCERTAIN BEHAVIOR OF RETROPERITONEUM AND PERITONEUM: Primary | ICD-10-CM

## 2022-06-02 ENCOUNTER — LAB (OUTPATIENT)
Dept: LAB | Facility: HOSPITAL | Age: 32
End: 2022-06-02

## 2022-06-02 ENCOUNTER — OFFICE VISIT (OUTPATIENT)
Dept: ONCOLOGY | Facility: CLINIC | Age: 32
End: 2022-06-02

## 2022-06-02 VITALS
BODY MASS INDEX: 21.46 KG/M2 | TEMPERATURE: 98.2 F | WEIGHT: 144.9 LBS | OXYGEN SATURATION: 99 % | HEART RATE: 100 BPM | SYSTOLIC BLOOD PRESSURE: 110 MMHG | RESPIRATION RATE: 16 BRPM | HEIGHT: 69 IN | DIASTOLIC BLOOD PRESSURE: 73 MMHG

## 2022-06-02 DIAGNOSIS — D48.1 DESMOID TUMOR: ICD-10-CM

## 2022-06-02 DIAGNOSIS — N63.10 MASS OF RIGHT BREAST, UNSPECIFIED QUADRANT: Primary | ICD-10-CM

## 2022-06-02 DIAGNOSIS — M79.89 SOFT TISSUE MASS: Primary | ICD-10-CM

## 2022-06-02 LAB
BASOPHILS # BLD AUTO: 0.07 10*3/MM3 (ref 0–0.2)
BASOPHILS NFR BLD AUTO: 0.8 % (ref 0–1.5)
DEPRECATED RDW RBC AUTO: 38.5 FL (ref 37–54)
EOSINOPHIL # BLD AUTO: 0.33 10*3/MM3 (ref 0–0.4)
EOSINOPHIL NFR BLD AUTO: 3.7 % (ref 0.3–6.2)
ERYTHROCYTE [DISTWIDTH] IN BLOOD BY AUTOMATED COUNT: 12.6 % (ref 12.3–15.4)
HCT VFR BLD AUTO: 40.2 % (ref 34–46.6)
HGB BLD-MCNC: 13.7 G/DL (ref 12–15.9)
IMM GRANULOCYTES # BLD AUTO: 0.08 10*3/MM3 (ref 0–0.05)
IMM GRANULOCYTES NFR BLD AUTO: 0.9 % (ref 0–0.5)
LYMPHOCYTES # BLD AUTO: 3.55 10*3/MM3 (ref 0.7–3.1)
LYMPHOCYTES NFR BLD AUTO: 40.2 % (ref 19.6–45.3)
MCH RBC QN AUTO: 28.6 PG (ref 26.6–33)
MCHC RBC AUTO-ENTMCNC: 34.1 G/DL (ref 31.5–35.7)
MCV RBC AUTO: 83.9 FL (ref 79–97)
MONOCYTES # BLD AUTO: 0.56 10*3/MM3 (ref 0.1–0.9)
MONOCYTES NFR BLD AUTO: 6.3 % (ref 5–12)
NEUTROPHILS NFR BLD AUTO: 4.23 10*3/MM3 (ref 1.7–7)
NEUTROPHILS NFR BLD AUTO: 48.1 % (ref 42.7–76)
NRBC BLD AUTO-RTO: 0 /100 WBC (ref 0–0.2)
PLATELET # BLD AUTO: 187 10*3/MM3 (ref 140–450)
PMV BLD AUTO: 10.8 FL (ref 6–12)
RBC # BLD AUTO: 4.79 10*6/MM3 (ref 3.77–5.28)
WBC NRBC COR # BLD: 8.82 10*3/MM3 (ref 3.4–10.8)

## 2022-06-02 PROCEDURE — 99215 OFFICE O/P EST HI 40 MIN: CPT | Performed by: INTERNAL MEDICINE

## 2022-06-02 PROCEDURE — 85025 COMPLETE CBC W/AUTO DIFF WBC: CPT

## 2022-06-02 PROCEDURE — 36415 COLL VENOUS BLD VENIPUNCTURE: CPT

## 2022-06-02 NOTE — PROGRESS NOTES
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 provider found  RECORDS OBTAINED:  Records of the patients history including those from the electronic medical record were reviewed and summarized in detail.    HISTORY OF PRESENT ILLNESS:    The patient is a 31 y.o. year old female with medical history significant for asthma & depression who had first noted a swelling behind her right knee in December 2021. The swelling was progressive and became painful with knee flexion. A MRI right knee done 3/11/22 demonstrated a 10 x 3.5 x 4.4 cm enhancing soft tissue mass lesion along the posterolateral right knee interposed between the distal biceps femoris and the lateral gastrocnemius muscles. The mass appears to infiltrate or arise from the distal biceps femoris. The lesion displaces and closely abuts the common peroneal nerve.     Patient was seen by , ortho - who obtained a biopsy of the mass on 3/23/22. 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      recommended against upfront resection at this time & referred to this clinic to discuss systemic therapy options.     Patient is accompanied by her mother. C/o pain & swelling behind right knee. Denies any association with menstrual cycles. She does report of > 20 lbs unintentional weight loss. Denies any abdominal pain, nausea or vomiting. Denies any other swelling anywhere. No family history of FAP, colon cancer or any malignancies.     A CT chest abdomen and pelvis from 4/26/2020 showed an asymmetric 1 cm nodule in the outer quadrant of right breast, favoring benign etiology.  No other soft tissue masses noted in this patient with history of desmoid fibromatosis.    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 year old female who we are seeing in consultation for Acute kidney injury    This is a [de-identified] y.o. female past medical history of CAD (s/p PTCA/stent placement at [facility] in mid January 2016),GERD,C3-4 vertebral disc disease,type 2 DM (with diabetic retinopathy and proteinuria  and systemic hypertension. History of monoclonal gammopathy /multiple myeloma, cardiac amyloidosis, following up at [facility]. Patient presented to the hospital with complaints of chest pain that has been going on for few days patient feels it more on the left side L also felt chest tightness. Patient feels whenever she does walk or exertion she gets this pain  Patient had a CTA received IV contrast 1/11/2020 did not show any PE  Creatinine remained stable at 1.0 mg/dL, baseline serum creatinine is between 1.0 to 1.4 mg/dL     Interval History:  She has no chest pain or sob currently-her renal function is stable with creatinine of 1.15 mg/dl.     Objective/     Vitals:    01/12/20 2346 01/13/20 0430 01/13/20 0744 01/13/20 1248   BP: (!) 152/83  (!) 153/70 (!) 148/65   Pulse: 71  65 73   Resp: 16  17 17   Temp: 97.3 °F (36.3 °C)  98.3 °F (36.8 °C) 98.5 °F (36.9 °C)   TempSrc: Oral  Oral Oral   SpO2: 100%  97% 97%   Weight:  219 lb 5.7 oz (99.5 kg)     Height:         24HR INTAKE/OUTPUT:      Intake/Output Summary (Last 24 hours) at 1/13/2020 1501  Last data filed at 1/13/2020 7286  Gross per 24 hour   Intake 500 ml   Output 1450 ml   Net -950 ml     Patient Vitals for the past 96 hrs (Last 3 readings):   Weight   01/13/20 0430 219 lb 5.7 oz (99.5 kg)   01/12/20 0413 218 lb 14.7 oz (99.3 kg)   01/11/20 2342 218 lb (98.9 kg)       Constitutional:  Alert, awake, no apparent distress  Cardiovascular:  S1, S2 without m/r/g  Respiratory:  CTA B without w/r/r  Abdomen: +bs, soft, nt  Ext:  LE edema    Data/  Recent Labs     01/11/20 2010 01/12/20  0446 01/13/20  0915 in the MAX & PTCH1 genes.     INTERIM HISTORY:  Patient returns to the clinic for a follow-up visit.  She had recently met with Dr. Solis with [facility] surgical oncology.  Patient reports a plan has been made for active surveillance at this time with repeat MRI right knee in 3 to 4 months time.  Patient states right knee posterior mass has continued to increase in size and causes discomfort with walking, folding knee and climbing stairs.  Patient/mother are extremely worried about losing function in her right leg due to the tumor.    Past Medical History:   Diagnosis Date   • Asthma    • Cervical dysplasia    • Depression    • Iron deficiency anemia    • Soft tissue mass     right leg/knee     Past Surgical History:   Procedure Laterality Date   • APPENDECTOMY N/A 10/1/2021    Procedure: APPENDECTOMY LAPAROSCOPIC;  Surgeon: Ahmet Dong Jr., MD;  Location: [facility];  Service: General;  Laterality: N/A;   • KNEE ARTHROSCOPY      AGE 6   • LEEP N/A 4/25/2019    Procedure: LOOP ELECTROCAUTERY EXCISION PROCEDURE;  Surgeon: Arsh Ray MD;  Location: [facility];  Service: Obstetrics/Gynecology   • SOFT TISSUE BIOPSY Right 3/23/2022    Procedure: BIOPSY SOFT TISSUE THIGH / KNEE;  Surgeon: Chris Randall MD;  Location: [facility] OR;  Service: Orthopedics;  Laterality: Right;   • WISDOM TOOTH EXTRACTION         MEDICATIONS    Current Outpatient Medications:   •  albuterol sulfate  (90 Base) MCG/ACT inhaler, Inhale 2 puffs Every 4 (Four) Hours As Needed for Wheezing., Disp: 18 g, Rfl: 0  •  escitalopram (LEXAPRO) 20 MG tablet, Take 1 tablet by mouth Every Night., Disp: 30 tablet, Rfl: 3  •  ibuprofen (ADVIL,MOTRIN) 600 MG tablet, Take 1 tablet by mouth Every 6 (Six) Hours As Needed for Mild Pain ., Disp: 30 tablet, Rfl: 0  •  methylPREDNISolone (MEDROL) 4 MG dose pack, Take as directed on package instructions., Disp: 21 each, Rfl: 0    ALLERGIES:     Allergies   Allergen Reactions   • Penicillins Nausea  "And Vomiting   • Adhesive Tape Rash     \"SURGICAL TAPE\"  AS A CHILD       SOCIAL HISTORY:       Social History     Socioeconomic History   • Marital status: Single   Tobacco Use   • Smoking status: Former Smoker     Packs/day: 0.50     Years: 4.00     Pack years: 2.00     Quit date: 2020     Years since quittin.3   • Smokeless tobacco: Never Used   Vaping Use   • Vaping Use: Every day   • Substances: Nicotine   • Devices: Disposable   Substance and Sexual Activity   • Alcohol use: Yes     Comment: socially   • Drug use: Yes     Types: Marijuana     Comment: daily   • Sexual activity: Not Currently         FAMILY HISTORY:  Family History   Problem Relation Age of Onset   • Diabetes Father    • Arthritis Maternal Grandmother    • Lung cancer Maternal Grandfather    • Heart attack Maternal Grandfather    • Heart disease Maternal Grandfather    • Stroke Maternal Grandfather    • Hyperlipidemia Maternal Grandfather    • Malig Hyperthermia Neg Hx    • Breast cancer Neg Hx    • Ovarian cancer Neg Hx    • Uterine cancer Neg Hx    • Colon cancer Neg Hx        REVIEW OF SYSTEMS:  Constitutional: [No fevers, chills, sweats]  Eye: [No recent visual problems]  ENMT: [No ear pain, nasal congestion, sore throat]  Respiratory: [No shortness of breath, cough]  Cardiovascular: [No Chest pain, palpitations, syncope]  Gastrointestinal: [No nausea, vomiting, diarrhea]  Genitourinary: [No hematuria]  Hema/Lymph: [Negative for bruising tendency, swollen lymph glands]  Endocrine: [Negative for excessive thirst, excessive hunger]  Musculoskeletal: [c/o right knee pain & swelling]  Integumentary: [No rash, pruritus, abrasions]  Neurologic: [ No weakness or numbness, Alert & oriented X 4]       Vitals:    22 1047   BP: 110/73   Pulse: 100   Resp: 16   Temp: 98.2 °F (36.8 °C)   TempSrc: Temporal   SpO2: 99%   Weight: 65.7 kg (144 lb 14.4 oz)   Height: 176 cm (69.29\")   PainSc: 0-No pain     Current Status 2022   ECOG score 0 "      PHYSICAL EXAM:    CONSTITUTIONAL:  Vital signs reviewed.  No distress, looks comfortable.  EYES:  Conjunctiva and lids unremarkable.   EARS,NOSE,MOUTH,THROAT:  Ears and nose appear unremarkable.    RESPIRATORY:  Normal respiratory effort.  Lungs clear to auscultation bilaterally.  CARDIOVASCULAR:  Normal S1, S2.  No murmurs rubs or gallops.  No significant lower extremity edema.  GASTROINTESTINAL: Abdomen appears unremarkable.  Nondistended  LYMPHATIC:  No cervical, supraclavicular lymphadenopathy.  NEURO: AAO x 3, No focal deficits.  Appears to have equal strength all 4 extremities.  BREAST: No abnormal masses/lumps palpated in bilateral breasts or axilla.  MUSCULOSKELETAL:  Unremarkable digits/nails.  No cyanosis or clubbing.  No apparent joint deformities. A ~ 5 cm hard palpable swelling noted postero-lateral to the right knee  SKIN:  Warm.  No rashes.  PSYCHIATRIC:  Normal judgment and insight.  Normal mood and affect.     RECENT LABS:        Lab on 06/02/2022   Component Date Value Ref Range Status   • WBC 06/02/2022 8.82  3.40 - 10.80 10*3/mm3 Final   • RBC 06/02/2022 4.79  3.77 - 5.28 10*6/mm3 Final   • Hemoglobin 06/02/2022 13.7  12.0 - 15.9 g/dL Final   • Hematocrit 06/02/2022 40.2  34.0 - 46.6 % Final   • MCV 06/02/2022 83.9  79.0 - 97.0 fL Final   • MCH 06/02/2022 28.6  26.6 - 33.0 pg Final   • MCHC 06/02/2022 34.1  31.5 - 35.7 g/dL Final   • RDW 06/02/2022 12.6  12.3 - 15.4 % Final   • RDW-SD 06/02/2022 38.5  37.0 - 54.0 fl Final   • MPV 06/02/2022 10.8  6.0 - 12.0 fL Final   • Platelets 06/02/2022 187  140 - 450 10*3/mm3 Final   • Neutrophil % 06/02/2022 48.1  42.7 - 76.0 % Final   • Lymphocyte % 06/02/2022 40.2  19.6 - 45.3 % Final   • Monocyte % 06/02/2022 6.3  5.0 - 12.0 % Final   • Eosinophil % 06/02/2022 3.7  0.3 - 6.2 % Final   • Basophil % 06/02/2022 0.8  0.0 - 1.5 % Final   • Immature Grans % 06/02/2022 0.9 (A) 0.0 - 0.5 % Final   • Neutrophils, Absolute 06/02/2022 4.23  1.70 - 7.00  10*3/mm3 Final   • Lymphocytes, Absolute 06/02/2022 3.55 (A) 0.70 - 3.10 10*3/mm3 Final   • Monocytes, Absolute 06/02/2022 0.56  0.10 - 0.90 10*3/mm3 Final   • Eosinophils, Absolute 06/02/2022 0.33  0.00 - 0.40 10*3/mm3 Final   • Basophils, Absolute 06/02/2022 0.07  0.00 - 0.20 10*3/mm3 Final   • Immature Grans, Absolute 06/02/2022 0.08 (A) 0.00 - 0.05 10*3/mm3 Final   • nRBC 06/02/2022 0.0  0.0 - 0.2 /100 WBC Final         ASSESSMENT:   is a pleasant 32 y/o WF with medical history significant for asthma & depression who comes for 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     # Right knee desmoid tumor:  · Pt had first noted a swelling behind her right knee in December 2021. The swelling was progressive and became painful with knee flexion.   · A MRI right knee done 3/11/22 demonstrated a 10 x 3.5 x 4.4 cm enhancing soft tissue mass lesion along the posterolateral right knee interposed between the distal biceps femoris and the lateral gastrocnemius muscles. The mass appears to infiltrate or arise from the distal biceps femoris. The lesion displaces and closely abuts the common peroneal nerve.   · Patient was seen by , ortho - who obtained a biopsy of the mass on 3/23/22. 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   ·  recommended against upfront resection at this time & referred to this clinic to discuss systemic therapy options.   · I discussed the natural history of desmoid tumors including high rates of local recurrence (~ 50% cases) , specially R1 resection. Also discussed ~ 25% cases have spontaneous regression. Discussed role of systemic therapy options with TKIs and tamoxifen/NSAIDS.   · Patient is exploring second opinion for surgical options at the [facility].   · Will plan to see her back after her visit with .     # Unintentional weight loss: Cause unclear.  CT C/A/P performed 4/26/22 did not show any major  "abnormalities.  It did show a 1 cm right outer breast lesion, likely benign.  Patient was encouraged to increase her nutritional intake.  We will consider referral to nutrition.    #Right breast lesion: Will obtain diagnostic mammogram and ultrasound right breast.  If abnormal, will perform biopsy.    # Encounter for genetic & chromosomal abnormalities: Will her young age, family history (aunt) of breast cancer & desmoid tumors association with FAP and gardners syndrome,an [employer] germline testing was performed. 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   Will defer to genetic counseling.    PLAN:   1. Plan for active surveillance for right knee desmoid tumor.  Advised to discuss earlier MRI with  given increasing size.  We will plan to discuss her case with Dr. Solis.  If not a surgical candidate, will consider treatment with sulindac +/- low tamoxifen.    2. Right diagnostic mammogram and ultrasound  3. For further genetic counseling  4. With plan to call patient with the mammogram and ultrasound results.  5. F/u in 3-4 weeks or sooner if needed    Orders Placed This Encounter   Procedures   • US Breast Right Complete     Order Specific Question:   Reason for Exam:     Answer:   followup   • Mammo Diagnostic Right With CAD     Order Specific Question:   Is an Ultrasound Breast required?  If 'Yes\", Please enter an order for the US Breast as well.     Answer:   Yes     Order Specific Question:   Reason for Exam:     Answer:   followup     Order Specific Question:   Patient Pregnant     Answer:   No   • Ambulatory Referral to Multi-Disciplinary Clinic     Referral Priority:   Routine     Referral Type:   Consultation     Referral Reason:   Specialty Services Required     Number of Visits Requested:   1   Total time spent during this patient encounter is 45 minutes. The total time spent with the patient includes at least one or more of " the following: preparing to see the patient by reviewing of tests, prior notes or other relevant information, performing appropriate independent examination & evaluation, counseling, ordering of medications, tests or procedures, communicating with other healthcare professionals, when appropriate to coordinate care, documenting clinic information in the electronic medical records or other health records, independently interpreting results of tests and communicating the results to the patient/family or caregiver.

## 2022-06-14 ENCOUNTER — TELEPHONE (OUTPATIENT)
Dept: OTHER | Facility: HOSPITAL | Age: 32
End: 2022-06-14

## 2022-06-27 ENCOUNTER — HOSPITAL ENCOUNTER (OUTPATIENT)
Dept: ULTRASOUND IMAGING | Facility: HOSPITAL | Age: 32
End: 2022-06-27

## 2022-06-27 ENCOUNTER — HOSPITAL ENCOUNTER (OUTPATIENT)
Dept: MAMMOGRAPHY | Facility: HOSPITAL | Age: 32
End: 2022-06-27

## 2022-06-28 ENCOUNTER — APPOINTMENT (OUTPATIENT)
Dept: LAB | Facility: HOSPITAL | Age: 32
End: 2022-06-28

## 2022-07-09 ENCOUNTER — HOSPITAL ENCOUNTER (OUTPATIENT)
Dept: MRI IMAGING | Facility: HOSPITAL | Age: 32
End: 2022-07-09

## 2022-07-17 ENCOUNTER — HOSPITAL ENCOUNTER (EMERGENCY)
Facility: HOSPITAL | Age: 32
Discharge: HOME | End: 2022-07-17
Attending: EMERGENCY MEDICINE | Admitting: EMERGENCY MEDICINE

## 2022-07-17 VITALS
DIASTOLIC BLOOD PRESSURE: 76 MMHG | SYSTOLIC BLOOD PRESSURE: 113 MMHG | TEMPERATURE: 98.6 F | RESPIRATION RATE: 17 BRPM | HEART RATE: 86 BPM | OXYGEN SATURATION: 98 %

## 2022-07-17 DIAGNOSIS — U07.1 COVID-19 VIRUS INFECTION: Primary | ICD-10-CM

## 2022-07-17 LAB
B PARAPERT DNA SPEC QL NAA+PROBE: NOT DETECTED
B PERT DNA SPEC QL NAA+PROBE: NOT DETECTED
BILIRUB UR QL STRIP: NEGATIVE
C PNEUM DNA NPH QL NAA+NON-PROBE: NOT DETECTED
CLARITY UR: CLEAR
COLOR UR: YELLOW
FLUAV SUBTYP SPEC NAA+PROBE: NOT DETECTED
FLUBV RNA ISLT QL NAA+PROBE: NOT DETECTED
GLUCOSE UR STRIP-MCNC: NEGATIVE MG/DL
HADV DNA SPEC NAA+PROBE: NOT DETECTED
HCOV 229E RNA SPEC QL NAA+PROBE: NOT DETECTED
HCOV HKU1 RNA SPEC QL NAA+PROBE: NOT DETECTED
HCOV NL63 RNA SPEC QL NAA+PROBE: NOT DETECTED
HCOV OC43 RNA SPEC QL NAA+PROBE: NOT DETECTED
HGB UR QL STRIP.AUTO: NEGATIVE
HMPV RNA NPH QL NAA+NON-PROBE: NOT DETECTED
HPIV1 RNA ISLT QL NAA+PROBE: NOT DETECTED
HPIV2 RNA SPEC QL NAA+PROBE: NOT DETECTED
HPIV3 RNA NPH QL NAA+PROBE: NOT DETECTED
HPIV4 P GENE NPH QL NAA+PROBE: NOT DETECTED
KETONES UR QL STRIP: NEGATIVE
LEUKOCYTE ESTERASE UR QL STRIP.AUTO: NEGATIVE
M PNEUMO IGG SER IA-ACNC: NOT DETECTED
NITRITE UR QL STRIP: NEGATIVE
PH UR STRIP.AUTO: 6.5 [PH] (ref 5–8)
PROT UR QL STRIP: NEGATIVE
RHINOVIRUS RNA SPEC NAA+PROBE: NOT DETECTED
RSV RNA NPH QL NAA+NON-PROBE: NOT DETECTED
SARS-COV-2 RNA NPH QL NAA+NON-PROBE: DETECTED
SP GR UR STRIP: 1.01 (ref 1–1.03)
UROBILINOGEN UR QL STRIP: NORMAL

## 2022-07-17 PROCEDURE — 99283 EMERGENCY DEPT VISIT LOW MDM: CPT

## 2022-07-17 PROCEDURE — 0202U NFCT DS 22 TRGT SARS-COV-2: CPT | Performed by: EMERGENCY MEDICINE

## 2022-07-17 PROCEDURE — 81003 URINALYSIS AUTO W/O SCOPE: CPT | Performed by: EMERGENCY MEDICINE

## 2022-07-17 NOTE — ED PROVIDER NOTES
EMERGENCY DEPARTMENT ENCOUNTER    Room Number:  39/39  PCP: Sissy Garibay MD  Historian: Patient  History Limited By: Nothing      HPI  Chief Complaint: Cough congestion body aches  Context: Beena Vincent is a 31 y.o. female who presents to the ED c/o cough congestion body aches.  Patient states symptoms started yesterday.  Has sore throat cough congestion.  Has had some fevers and chills.  Has had body aches.  Patient has not taken a test.  Patient states she does have a recent positive pregnancy test.  Has had no abdominal pain or vomiting.  Has not seen anybody for that yet.      Location: [facility]   Radiation: Generalized  Character: Intermittent  Duration: Yesterday  Severity: Moderate  Progression: Wax and wane  Aggravating Factors: Nothing  Alleviating Factors: Nothing        MEDICAL RECORD REVIEW    Patient has been followed for right breast mass          PAST MEDICAL HISTORY  Active Ambulatory Problems     Diagnosis Date Noted   • KYRA III (cervical intraepithelial neoplasia grade III) with severe dysplasia 2019   • Gestational HTN 2020   •  (normal spontaneous vaginal delivery) 2020   • Asthma exacerbation 2017   • Anxiety with depression 2020   • Postpartum depression 2020   • Generalized anxiety disorder 2020   • History of anemia 2020   • Acute appendicitis with localized peritonitis, without perforation, abscess, or gangrene 10/01/2021   • Soft tissue mass 2022     Resolved Ambulatory Problems     Diagnosis Date Noted   • Edema of lower extremity, antepartum, third trimester 2020   • Anemia affecting pregnancy in third trimester 2020   • Pregnancy 2020   • Medial epicondylitis of left elbow 2013     Past Medical History:   Diagnosis Date   • Asthma    • Cervical dysplasia    • Depression    • Iron deficiency anemia          PAST SURGICAL HISTORY  Past Surgical History:   Procedure Laterality Date   • APPENDECTOMY N/A  10/1/2021    Procedure: APPENDECTOMY LAPAROSCOPIC;  Surgeon: Ahmet Dong Jr., MD;  Location: [facility] MAIN OR;  Service: General;  Laterality: N/A;   • KNEE ARTHROSCOPY      AGE 6   • LEEP N/A 2019    Procedure: LOOP ELECTROCAUTERY EXCISION PROCEDURE;  Surgeon: Arsh Ray MD;  Location: [facility] OR [facility];  Service: Obstetrics/Gynecology   • SOFT TISSUE BIOPSY Right 3/23/2022    Procedure: BIOPSY SOFT TISSUE THIGH / KNEE;  Surgeon: Chris Randall MD;  Location: [facility] MAIN OR;  Service: Orthopedics;  Laterality: Right;   • WISDOM TOOTH EXTRACTION           FAMILY HISTORY  Family History   Problem Relation Age of Onset   • Diabetes Father    • Arthritis Maternal Grandmother    • Lung cancer Maternal Grandfather    • Heart attack Maternal Grandfather    • Heart disease Maternal Grandfather    • Stroke Maternal Grandfather    • Hyperlipidemia Maternal Grandfather    • Malig Hyperthermia Neg Hx    • Breast cancer Neg Hx    • Ovarian cancer Neg Hx    • Uterine cancer Neg Hx    • Colon cancer Neg Hx          SOCIAL HISTORY  Social History     Socioeconomic History   • Marital status: Single   Tobacco Use   • Smoking status: Former Smoker     Packs/day: 0.50     Years: 4.00     Pack years: 2.00     Quit date: 2020     Years since quittin.4   • Smokeless tobacco: Never Used   Vaping Use   • Vaping Use: Every day   • Substances: Nicotine   • Devices: Disposable   Substance and Sexual Activity   • Alcohol use: Yes     Comment: socially   • Drug use: Yes     Types: Marijuana     Comment: daily   • Sexual activity: Not Currently         ALLERGIES  Penicillins and Adhesive tape        REVIEW OF SYSTEMS  Review of Systems   Constitutional: Positive for chills, fatigue and fever.   HENT: Positive for congestion and sore throat.    Eyes: Negative.    Respiratory: Positive for cough. Negative for shortness of breath.    Cardiovascular: Negative for chest pain.   Gastrointestinal: Negative for abdominal pain, diarrhea  and vomiting.   Genitourinary: Negative for dysuria.   Musculoskeletal: Positive for myalgias. Negative for neck pain.   Skin: Negative for rash.   Allergic/Immunologic: Negative.    Neurological: Negative for weakness, numbness and headaches.   Hematological: Negative.    Psychiatric/Behavioral: Negative.    All other systems reviewed and are negative.           PHYSICAL EXAM  ED Triage Vitals [07/17/22 1911]   Temp Heart Rate Resp BP SpO2   98.6 °F (37 °C) 119 17 -- 100 %      Temp src Heart Rate Source Patient Position BP Location FiO2 (%)   -- -- -- -- --       Physical Exam  Vitals and nursing note reviewed.   Constitutional:       General: She is not in acute distress.  HENT:      Head: Normocephalic and atraumatic.   Eyes:      Pupils: Pupils are equal, round, and reactive to light.   Cardiovascular:      Rate and Rhythm: Normal rate and regular rhythm.      Heart sounds: Normal heart sounds.   Pulmonary:      Effort: Pulmonary effort is normal. No respiratory distress.      Breath sounds: Normal breath sounds.   Abdominal:      Palpations: Abdomen is soft.      Tenderness: There is no abdominal tenderness. There is no guarding or rebound.   Musculoskeletal:         General: Normal range of motion.      Cervical back: Normal range of motion and neck supple.   Skin:     General: Skin is warm and dry.      Findings: No rash.   Neurological:      Mental Status: She is alert and oriented to person, place, and time.      Sensory: Sensation is intact. No sensory deficit.      Motor: No weakness.   Psychiatric:         Mood and Affect: Mood and affect normal.       Patient was wearing a face mask when I entered the room and they continued to wear a mask throughout their stay in the ED.  I wore PPE, including  gloves, face mask with shield or face mask with goggles whenever I was in the room with patient.       LAB RESULTS  Recent Results (from the past 24 hour(s))   Respiratory Panel PCR w/COVID-19(SARS-CoV-2)  ARIELA/JIMENEZ/SYMONE/PAD/COR/MAD/AGAPITO In-House, NP Swab in UTM/VTM, 3-4 HR TAT - Swab, Nasopharynx    Collection Time: 07/17/22  7:50 PM    Specimen: Nasopharynx; Swab   Result Value Ref Range    ADENOVIRUS, PCR Not Detected Not Detected    Coronavirus 229E Not Detected Not Detected    Coronavirus HKU1 Not Detected Not Detected    Coronavirus NL63 Not Detected Not Detected    Coronavirus OC43 Not Detected Not Detected    COVID19 Detected (C) Not Detected - Ref. Range    Human Metapneumovirus Not Detected Not Detected    Human Rhinovirus/Enterovirus Not Detected Not Detected    Influenza A PCR Not Detected Not Detected    Influenza B PCR Not Detected Not Detected    Parainfluenza Virus 1 Not Detected Not Detected    Parainfluenza Virus 2 Not Detected Not Detected    Parainfluenza Virus 3 Not Detected Not Detected    Parainfluenza Virus 4 Not Detected Not Detected    RSV, PCR Not Detected Not Detected    Bordetella pertussis pcr Not Detected Not Detected    Bordetella parapertussis PCR Not Detected Not Detected    Chlamydophila pneumoniae PCR Not Detected Not Detected    Mycoplasma pneumo by PCR Not Detected Not Detected   Urinalysis With Microscopic If Indicated (No Culture) - Urine, Clean Catch    Collection Time: 07/17/22  7:56 PM    Specimen: Urine, Clean Catch   Result Value Ref Range    Color, UA Yellow Yellow, Straw    Appearance, UA Clear Clear    pH, UA 6.5 5.0 - 8.0    Specific Gravity, UA 1.012 1.005 - 1.030    Glucose, UA Negative Negative    Ketones, UA Negative Negative    Bilirubin, UA Negative Negative    Blood, UA Negative Negative    Protein, UA Negative Negative    Leuk Esterase, UA Negative Negative    Nitrite, UA Negative Negative    Urobilinogen, UA 1.0 E.U./dL 0.2 - 1.0 E.U./dL       Ordered the above labs and reviewed the results.        RADIOLOGY  No orders to display                PROCEDURES  Procedures            MEDICATIONS GIVEN IN ER  Medications - No data to display          PROGRESS AND  CONSULTS  ED Course as of 07/17/22 2137   Sun Jul 17, 2022 2131 21:31 EDT  Patient presents for cough and congestion and is COVID-positive.  Patient's pulse oximetry is 100% and chest x-ray is normal.  Patient is pregnant.  She is to go home and quarantine.  Tylenol for the body aches.  Instructed to return here for worsening symptoms. [SL]      ED Course User Index  [SL] Ricardo Collier MD           MEDICAL DECISION MAKING      MDM  Number of Diagnoses or Management Options     Amount and/or Complexity of Data Reviewed  Clinical lab tests: reviewed and ordered (Respiratory viral panel positive for COVID)               DIAGNOSIS  Final diagnoses:   COVID-19 virus infection           DISPOSITION  DISCHARGE    Patient discharged in stable condition.    Reviewed implications of results, diagnosis, meds, responsibility to follow up, warning signs and symptoms of possible worsening, potential complications and reasons to return to ER, including worsening symptoms    Patient/Family voiced understanding of above instructions.    Discussed plan for discharge, as there is no emergent indication for admission. Patient referred to primary care provider for BP management due to today's BP. Pt/family is agreeable and understands need for follow up and repeat testing.  Pt is aware that discharge does not mean that nothing is wrong but it indicates no emergency is present that requires admission and they must continue care with follow-up as given below or physician of their choice.     FOLLOW-UP  Sissy Garibay MD  [address] [id number] [phone]    Schedule an appointment as soon as possible for a visit            Medication List      No changes were made to your prescriptions during this visit.             Latest Documented Vital Signs:  As of 21:37 EDT  BP- 113/76 HR- 86 Temp- 98.6 °F (37 °C) O2 sat- 98%                       Ricardo Collier MD  07/17/22 2138

## 2022-07-17 NOTE — ED NOTES
Patient from home via private vehicle reporting fatigue, body aches, chills, cough and sore throat that started yesterday. Patient states she had a positive  home pregnancy test last week.

## 2022-07-27 ENCOUNTER — APPOINTMENT (OUTPATIENT)
Dept: MRI IMAGING | Facility: HOSPITAL | Age: 32
End: 2022-07-27

## 2022-07-28 ENCOUNTER — INITIAL PRENATAL (OUTPATIENT)
Dept: OBSTETRICS AND GYNECOLOGY | Facility: CLINIC | Age: 32
End: 2022-07-28

## 2022-07-28 VITALS — SYSTOLIC BLOOD PRESSURE: 118 MMHG | DIASTOLIC BLOOD PRESSURE: 67 MMHG | WEIGHT: 146.8 LBS | BODY MASS INDEX: 21.5 KG/M2

## 2022-07-28 DIAGNOSIS — O09.32 INITIAL OBSTETRIC VISIT IN SECOND TRIMESTER: Primary | ICD-10-CM

## 2022-07-28 DIAGNOSIS — D48.1 DESMOID TUMOR: ICD-10-CM

## 2022-07-28 LAB
B-HCG UR QL: POSITIVE
EXPIRATION DATE: ABNORMAL
GLUCOSE UR STRIP-MCNC: NEGATIVE MG/DL
INTERNAL NEGATIVE CONTROL: ABNORMAL
INTERNAL POSITIVE CONTROL: ABNORMAL
Lab: ABNORMAL
PROT UR STRIP-MCNC: NEGATIVE MG/DL

## 2022-07-28 PROCEDURE — 99214 OFFICE O/P EST MOD 30 MIN: CPT | Performed by: OBSTETRICS & GYNECOLOGY

## 2022-07-28 PROCEDURE — 81025 URINE PREGNANCY TEST: CPT | Performed by: OBSTETRICS & GYNECOLOGY

## 2022-07-28 RX ORDER — BETA CAROTENE, CHOLECALCIFEROL, DL-ALPHA TOCOPHERYL ACETATE, ASCORBIC ACID, FOLIC ACID, THIAMIN MONONITRATE, RIBOFLAVIN, NIACINAMIDE, PYRIDOXINE HYDROCHLORIDE, CYANOCOBALAMIN, CALCIUM CARBONATE, POTAS 120; 4000; 200; 400; 8; 29; 1; 20; 150; 3; 3; 3; 15 MG/1; [IU]/1; MG/1; [IU]/1; UG/1; MG/1; MG/1; MG/1; UG/1; MG/1; MG/1; MG/1; MG/1
1 TABLET, FILM COATED ORAL DAILY
Qty: 30 TABLET | Refills: 11 | Status: SHIPPED | OUTPATIENT
Start: 2022-07-28

## 2022-07-28 NOTE — PROGRESS NOTES
CC: Initial obstetric visit    HPI: 32-year-old  3 para 2 presents at 13-3/7 weeks by a certain last menstrual period for her initial obstetric visit.  Her obstetric history is significant for 2 prior vaginal deliveries.  She had preeclampsia with her first pregnancy and gestational hypertension with her second pregnancy.  Her clinical course was complicated by 2 medical issues.  First, the patient has been diagnosed with a desmoid tumor of the knee.  Work-up is ongoing with orthopedic surgery and oncology.  Next, the patient had a CT scan of the chest, which demonstrated a right breast lesion.  Work-up of this is also ongoing.  The patient is recovered from a recent infection with COVID-19.  Overall, she is feeling well.    Review of systems    This is negative for fever or chills.  It is negative for a productive cough.  It is negative for vaginal bleeding.  Negative for abdominal or pelvic pain.  All other systems are reviewed and are negative.    Assessment and plan    1.  Intrauterine pregnancy at 13-3/7 weeks  2.  Desmoid tumor of the knee.  Work-up is ongoing with oncology and orthopedics.  We will also obtain a maternal-fetal medicine consult for further recommendations on this issue.  3.  Right breast lesion found on CT imaging which was performed for work-up of the patient's desmoid tumor.  Further work-up is planned with a mammogram and breast ultrasound.  I agree with these recommendations.  4.  Recent history of COVID-19.  The patient is fully recovered.  5.  Pregnancy related counseling was undertaken and questions were answered.  6.  Begin prenatal vitamins.  Prescription has been sent.  Prenatal labs will be obtained today.  7.  Ultrasound to confirm estimated gestational age, as it could be important if surgery is required in the second trimester for either of the patient's major medical conditions.  8.  Counseled regarding genetic screening.  The patient declines noninvasive or invasive  prenatal screening.

## 2022-07-29 ENCOUNTER — TELEPHONE (OUTPATIENT)
Dept: OBSTETRICS AND GYNECOLOGY | Facility: CLINIC | Age: 32
End: 2022-07-29

## 2022-07-29 RX ORDER — PROMETHAZINE HYDROCHLORIDE 12.5 MG/1
12.5 TABLET ORAL EVERY 6 HOURS PRN
Qty: 20 TABLET | Refills: 0 | Status: SHIPPED | OUTPATIENT
Start: 2022-07-29 | End: 2022-10-24 | Stop reason: SDUPTHER

## 2022-07-29 NOTE — TELEPHONE ENCOUNTER
Cristina    This was called in -- if it does not work in the next several days, she should check back with Dr Ray.    Domi

## 2022-07-29 NOTE — TELEPHONE ENCOUNTER
Provider: DR. VALDEZ    Caller: TIM RASCON  Relationship to Patient: SELF  Pharmacy: [facility]  Phone Number: [phone]  OK TO CALL ANYTIME/LVM  Reason for Call: PT IS EXTREMELY NAUSEOUS  When was the patient last seen: 7/28/2022    When did it start: HIT HER THIS MORNING    What makes it worse: SMELLS    PT IS ASKING FOR DR VALDEZ TO CALL SOMETHING IN FOR NAUSEA

## 2022-07-29 NOTE — TELEPHONE ENCOUNTER
Dr Ray is out of the office today, so Dr Duron has sent promethazine(phenergan) to your [facility] pharmacy at [address] If it does not work in the next several days, you should check back with Dr Ray. Hope you feel better and have a good weekend. Thank you.

## 2022-07-29 NOTE — TELEPHONE ENCOUNTER
Flor 13wk4d OB appt was yesterday. Requesting Rx for nausea be sent to her [facility] pharmacy on file. Thank you.

## 2022-07-30 LAB
ABO GROUP BLD: NORMAL
BACTERIA UR CULT: NO GROWTH
BACTERIA UR CULT: NORMAL
BASOPHILS # BLD AUTO: 0.1 X10E3/UL (ref 0–0.2)
BASOPHILS NFR BLD AUTO: 1 %
BLD GP AB SCN SERPL QL: NEGATIVE
EOSINOPHIL # BLD AUTO: 0.3 X10E3/UL (ref 0–0.4)
EOSINOPHIL NFR BLD AUTO: 3 %
ERYTHROCYTE [DISTWIDTH] IN BLOOD BY AUTOMATED COUNT: 13 % (ref 11.7–15.4)
HBV SURFACE AG SERPL QL IA: NEGATIVE
HCT VFR BLD AUTO: 39.2 % (ref 34–46.6)
HCV AB S/CO SERPL IA: 0.1 S/CO RATIO (ref 0–0.9)
HGB BLD-MCNC: 13.1 G/DL (ref 11.1–15.9)
HIV 1+2 AB+HIV1 P24 AG SERPL QL IA: NON REACTIVE
IMM GRANULOCYTES # BLD AUTO: 0 X10E3/UL (ref 0–0.1)
IMM GRANULOCYTES NFR BLD AUTO: 0 %
LYMPHOCYTES # BLD AUTO: 2.9 X10E3/UL (ref 0.7–3.1)
LYMPHOCYTES NFR BLD AUTO: 34 %
MCH RBC QN AUTO: 27.9 PG (ref 26.6–33)
MCHC RBC AUTO-ENTMCNC: 33.4 G/DL (ref 31.5–35.7)
MCV RBC AUTO: 84 FL (ref 79–97)
MONOCYTES # BLD AUTO: 0.7 X10E3/UL (ref 0.1–0.9)
MONOCYTES NFR BLD AUTO: 8 %
NEUTROPHILS # BLD AUTO: 4.6 X10E3/UL (ref 1.4–7)
NEUTROPHILS NFR BLD AUTO: 54 %
PLATELET # BLD AUTO: 316 X10E3/UL (ref 150–450)
RBC # BLD AUTO: 4.69 X10E6/UL (ref 3.77–5.28)
RH BLD: NEGATIVE
RPR SER QL: NON REACTIVE
RUBV IGG SERPL IA-ACNC: 1.28 INDEX
WBC # BLD AUTO: 8.5 X10E3/UL (ref 3.4–10.8)

## 2022-08-01 LAB
C TRACH RRNA CVX QL NAA+PROBE: NEGATIVE
CYTOLOGIST CVX/VAG CYTO: NORMAL
CYTOLOGY CVX/VAG DOC CYTO: NORMAL
CYTOLOGY CVX/VAG DOC THIN PREP: NORMAL
DX ICD CODE: NORMAL
HIV 1 & 2 AB SER-IMP: NORMAL
HPV I/H RISK 4 DNA CVX QL PROBE+SIG AMP: NEGATIVE
N GONORRHOEA RRNA CVX QL NAA+PROBE: NEGATIVE
OTHER STN SPEC: NORMAL
STAT OF ADQ CVX/VAG CYTO-IMP: NORMAL
T VAGINALIS RRNA SPEC QL NAA+PROBE: NEGATIVE

## 2022-08-02 ENCOUNTER — CLINICAL SUPPORT (OUTPATIENT)
Dept: OTHER | Facility: HOSPITAL | Age: 32
End: 2022-08-02

## 2022-08-02 DIAGNOSIS — Z80.3 FAMILY HISTORY OF BREAST CANCER: ICD-10-CM

## 2022-08-02 DIAGNOSIS — M79.89 SOFT TISSUE MASS: ICD-10-CM

## 2022-08-02 DIAGNOSIS — Z13.79 GENETIC TESTING: Primary | ICD-10-CM

## 2022-08-02 DIAGNOSIS — Z80.0 FAMILY HISTORY OF COLON CANCER: ICD-10-CM

## 2022-08-02 PROCEDURE — 96040: CPT | Performed by: GENETIC COUNSELOR, MS

## 2022-08-02 NOTE — PROGRESS NOTES
Beena Vincent is a 31-year-old female who was seen for genetic counseling to discuss prior genetic results. Ms. Vincent was found to have a desmoid tumor behind her right knee in March 2022. Surgery has been postponed and the tumor is just being monitored currently. Ms. Vincent is pre-menopausal and had her first child at age 21. She retains her uterus and ovaries. She has not had a colonoscopy yet. Ms. Vincent had genetic testing performed previously due to her personal history of a desmoid tumor. Testing was performed via the Multi-Cancer panel through [employer] which analyzes 84 genes associated with an increased risk for cancer. Genetic testing was negative for known deleterious/pathogenic (harmful) mutations by sequencing and rearrangement testing for the genes on this panel.  While no known deleterious mutations were identified, two variants of uncertain significance (VUS) were identified in the MAX gene and the PTCH1 gene. VUSs are changes in DNA that may or may not affect the function of the gene.   VUSs are frequently identified through multigene panel testing, given the number of genes being evaluated and the presence of genetic variation in the population. The majority (estimated to be >90%) of VUS’s are eventually reclassified as benign gene changes. It is not recommended that any unaffected relatives be tested for a VUS at this time, and management should not be altered based on a VUS.       PERTINENT FAMILY HISTORY: (See attached pedigree)  Mat Aunt:  Breast cancer, 61  Mat Grandfather: Lung cancer  Mat Grandmother: Left kidney tumor  Pat Uncle:  Lung cancer  Pat Aunt:  Skin/nasal cancer  Pat Cousin:  Colon cancer, 40s     Records were not able to be obtained to confirm these cancer diagnoses.      RISK ASSESSMENT:  At this time, Ms. Vincent’s lifetime risk for other cancers, such as ovarian or colon,should be based on her personal health and family history.     GENETIC COUNSELING (30 minutes): We  reviewed the family history information in detail.  Desmoid tumors are abnormal growths that arise from connective tissue and are rare, occurring in approximately 1-2 in 500,000 individuals. While the majority of desmoid tumors are sporadic, approximately 7-15% of desmoid tumors are associated with germline mutation in the APC gene, which causes FAP.     Family histories typical of hereditary cancer syndromes usually include multiple first- and second-degree relatives diagnosed with cancer types that define a syndrome.  These cases tend to be diagnosed at younger-than-expected ages and can be bilateral or multifocal.  The cancer in these families follows an autosomal dominant inheritance pattern, which indicates the likely presence of a mutation in a cancer susceptibility gene.  Children and siblings of an individual believed to carry this mutation have a 50% chance of inheriting that mutation, thereby inheriting the increased risk to develop cancer.  These mutations can be passed down from the maternal or the paternal lineage.    Familial adenomatous polyposis (FAP) accounts for less than 1% of all colorectal cancers and is inherited in a dominant manner. FAP is caused by mutations in the APC gene.  Typically, individuals with FAP have 100’s to 1000’s of colon polyps and polyps begin to appear, on average, at age 16 years.  Once polyps begin to appear, they rapidly increase in number and hundreds to thousands of adenomatous polyps are usually identified.  Without colectomy, colon cancer is very likely, given the large number of adenomatous polyps.  The average age of colon cancer diagnosis, if untreated, is 39 years of age.  Approximately 93% of individuals with FAP will develop colon cancer by age 50 if left untreated.       Individuals with FAP have an increased risk of extra-colonic cancers, however the largest cancer risk is for colon cancer.  There is up to a 12% risk to develop a small bowel cancer.  There is  a 1-2% lifetime risk to develop a pancreas, thyroid, CNS, liver, bile duct, or stomach cancer. Some individuals with FAP have extra-intestinal manifestations in addition to the colonic polyps.  These extra-intestinal manifestations include osteomas (bony growths), dental abnormalities, congenital hypertrophy of the retinal pigment epithelium (CHRPE), benign cutaneous lesions (cysts), desmoid tumors, and adrenal masses.        GENETIC TESTING:  The risks, benefits and limitations of genetic testing and implications for clinical management following testing were reviewed. DNA test results can influence decisions regarding screening and prevention.  Genetic testing can have significant psychological implications for both individuals and families.     We reviewed the possible results of genetic testing and the implications of both positive and negative findings. We discussed the possibility of identifying a variant of uncertain significance (VUS) via testing as well.     TEST RESULTS: Genetic testing was negative for known deleterious mutations by sequencing and rearrangement testing of the 84 genes on the Multi-Cancer panel including the APC gene.  This negative result greatly lowers the risk of a hereditary cancer syndrome for Ms. Vincent. This most likely represents a sporadic desmoid.  However, approximately 10% of individuals with a clinical diagnosis of FAP will not have a mutation detectable through genetic testing, therefore negative testing does not completely rule out FAP.  Colonoscopy may still be considered.  A clear colonoscopy in addition to negative genetic testing would rule out the diagnosis of FAP.    A VUS was identified in the MAX gene and in the PTCH1 gene. A VUS is a difference within a gene that may or may not impact the function of the gene.  VUSs are not clinically actionable findings, and therefore this result does not impact management in any way.  The majority of VUSs are ultimately  reclassified to benign variants.  The identification of a VUS is common in multigene panel testing, given the number of genes being evaluated and the presence of genetic variation in the population.  If this variant is ever reclassified, a new report will be issued by the laboratory and released directly to the ordering physician. This assessment is based on the information provided at the time of the consultation.     CANCER PREVENTION:  Options available to individuals with an elevated lifetime risk for various cancers were discussed. Ms. Vincent’s screening for things like colon cancer should be based on her personal health and family history. This assessment is based on the information provided at the time of the consultation.       PLAN: Genetic counseling remains available to Ms. Vincent and her family.  If she has any questions or concerns in the future, she is welcome to contact our genetics team at [phone].     Yen Chilel MS, [facility], [facility], [facility]  Licensed Certified Genetic Counselor    Cc: Beena Molina

## 2022-08-03 ENCOUNTER — TELEPHONE (OUTPATIENT)
Dept: OBSTETRICS AND GYNECOLOGY | Facility: CLINIC | Age: 32
End: 2022-08-03

## 2022-08-03 NOTE — TELEPHONE ENCOUNTER
L/m for pt to call regarding AGAPITO appt.     [facility] has made several attempts to schedule pt with no success.  Please inform pt she is scheduled at [facility] this Friday, 8/5/22 at 1:00 pm.  She should arrive 5 minutes early with her insurance card and a mask.  She may have ONE PERSON ONLY in the building with her.  The address is [address] [place] [address], [address] Floor, [address], [address].  If pt needs to r/s, she will need to call [facility] at [phone].    A [employer] msg was also sent to pt w/this information.     Pt # [phone]

## 2022-08-05 ENCOUNTER — OFFICE VISIT (OUTPATIENT)
Dept: OBSTETRICS AND GYNECOLOGY | Facility: CLINIC | Age: 32
End: 2022-08-05

## 2022-08-05 ENCOUNTER — HOSPITAL ENCOUNTER (OUTPATIENT)
Dept: ULTRASOUND IMAGING | Facility: HOSPITAL | Age: 32
Discharge: HOME | End: 2022-08-05
Admitting: OBSTETRICS & GYNECOLOGY

## 2022-08-05 ENCOUNTER — TRANSCRIBE ORDERS (OUTPATIENT)
Dept: ULTRASOUND IMAGING | Facility: HOSPITAL | Age: 32
End: 2022-08-05

## 2022-08-05 VITALS
HEART RATE: 85 BPM | WEIGHT: 150 LBS | TEMPERATURE: 98.3 F | SYSTOLIC BLOOD PRESSURE: 107 MMHG | BODY MASS INDEX: 21.97 KG/M2 | DIASTOLIC BLOOD PRESSURE: 67 MMHG

## 2022-08-05 DIAGNOSIS — D48.1 DESMOID TUMOR: Primary | ICD-10-CM

## 2022-08-05 DIAGNOSIS — D48.1 DESMOID TUMOR: ICD-10-CM

## 2022-08-05 DIAGNOSIS — O09.32 INITIAL OBSTETRIC VISIT IN SECOND TRIMESTER: ICD-10-CM

## 2022-08-05 PROCEDURE — 99213 OFFICE O/P EST LOW 20 MIN: CPT | Performed by: OBSTETRICS & GYNECOLOGY

## 2022-08-05 PROCEDURE — 76801 OB US < 14 WKS SINGLE FETUS: CPT

## 2022-08-05 PROCEDURE — 76801 OB US < 14 WKS SINGLE FETUS: CPT | Performed by: OBSTETRICS & GYNECOLOGY

## 2022-08-05 NOTE — PROGRESS NOTES
Pt reports that she is doing well and denies vaginal bleeding, cramping, contractions or LOF at this time. Reports vaginal bleeding last night on 8/4 after intercourse. Reports only one episode, no bleeding since and no abdominal discomfort noted. Notes frequent HAs, denies visual changes or epigastric pain. Next OB appointment is scheduled for 8/8.    Vitals:    08/05/22 1326   BP: 107/67   Pulse: 85   Temp: 98.3 °F (36.8 °C)

## 2022-08-05 NOTE — PROGRESS NOTES
Desmoid tumor    Beena Vincent is a 32 y.o.  @8w4d weeks who presents for consultation due to desmoid tumor behind her knee.  Old records reviewed and systemic therapy was recommended, but not specified    Denies nausea or vomiting  Denies abdominal pain/cramping/tenderness/contractions  Denies vaginal bleeding or leaking of fluid    OB History    Para Term  AB Living   3 2 2     2   SAB IAB Ectopic Molar Multiple Live Births           0 1      # Outcome Date GA Lbr Matthias/2nd Weight Sex Delivery Anes PTL Lv   3 Current            2 Term 20 39w1d 12:20 / 00:36 3780 g (8 lb 5.3 oz) F Vag-Spont EPI N JAMIE      Birth Comments: scale 1   1 Term 12 38w0d    Vag-Spont EPI        Birth Comments: Preeclampsia      Obstetric Comments    x2  Induced for preeclampsia with the first pregnancy and for gestational hypertension at 39 weeks with a second pregnancy.        has a past medical history of Asthma, Cervical dysplasia, Depression, Gestational hypertension (), Iron deficiency anemia, Preeclampsia (), and Soft tissue mass.     has a past surgical history that includes Knee arthroscopy; LEEP (N/A, 2019); [place] tooth extraction; Appendectomy (N/A, 10/1/2021); and Soft Tissue Biopsy (Right, 3/23/2022).      Current Outpatient Medications:   •  albuterol sulfate  (90 Base) MCG/ACT inhaler, Inhale 2 puffs Every 4 (Four) Hours As Needed for Wheezing., Disp: 18 g, Rfl: 0  •  promethazine (PHENERGAN) 12.5 MG tablet, Take 1 tablet by mouth Every 6 (Six) Hours As Needed for Nausea or Vomiting., Disp: 20 tablet, Rfl: 0  •  escitalopram (LEXAPRO) 20 MG tablet, Take 1 tablet by mouth Every Night., Disp: 30 tablet, Rfl: 3  •  ibuprofen (ADVIL,MOTRIN) 600 MG tablet, Take 1 tablet by mouth Every 6 (Six) Hours As Needed for Mild Pain ., Disp: 30 tablet, Rfl: 0  •  Prenatal Vit-Fe Fumarate-FA (Prenatabs FA) 29-1 MG tablet, Take 1 tablet by mouth Daily., Disp: 30 tablet, Rfl: 11    Allergies  "  Allergen Reactions   • Penicillins Nausea And Vomiting   • Adhesive Tape Rash     \"SURGICAL TAPE\"  AS A CHILD       family history includes Arthritis in her maternal grandmother; Diabetes in her father; Heart attack in her maternal grandfather; Heart disease in her maternal grandfather; Hyperlipidemia in her maternal grandfather; Lung cancer in her maternal grandfather; Stroke in her maternal grandfather.    Social History     Tobacco Use   • Smoking status: Former Smoker     Packs/day: 0.50     Years: 4.00     Pack years: 2.00     Quit date: 2020     Years since quittin.4   • Smokeless tobacco: Never Used   Vaping Use   • Vaping Use: Some days   • Substances: Nicotine   • Devices: Disposable   Substance Use Topics   • Alcohol use: Not Currently     Comment: socially   • Drug use: Yes     Types: Marijuana     Comment: daily       Review of Systems: unremarkable     Blood pressure 107/67, pulse 85, temperature 98.3 °F (36.8 °C), temperature source Temporal, weight 68 kg (150 lb), last menstrual period 2022, not currently breastfeeding.  PHYSICAL EXAM:  /67 (BP Location: Right arm, Patient Position: Sitting)   Pulse 85   Temp 98.3 °F (36.8 °C) (Temporal)   Wt 68 kg (150 lb)   LMP 2022 (Exact Date)   BMI 21.97 kg/m²     General: pleasant, alert and oriented  Abdomen: soft, non tender, gravid  Extremites: bilat lower extremities soft, non tender, no edema noted    Assessment:   Desmoid tumor, rapidly growing    Recommendations:  MRI is permitted in pregnancy  In general if chemotherapy can wait until the second trimester that is preferable, but would need to know which specific agents are being used  Any radiographic studies, attempt to shield the abdomen and limit the dose of radiation to the pelvis  Follow up ultrasound at 20 weeks for anatomy      Thank you for referring Beena Carlton for a Maternal Fetal Consult. If we can be of any further assistance to you, please do not hesitate to " contact us.  Total consult time minutes with greater than 50% spent in counseling and coordination of care.      Again thank you for requesting a MFM consult.  If we can be of any further assistance to you, please do not hesitate to contact us.    Lamar Diamond MD  MATERNAL FETAL MEDICINE              VISIT SYNOPSIS:    ASSESSMENT/PLAN:  MS. Beena Vincent is a 32 y.o.  at 8w4d with the following visit diagnosis    There are no diagnoses linked to this encounter.    The above diagnosis have been evaluated and determined to be stable    This note has been routed to Dr. Ray     At the end of this consultation all patient questions were answered, concerns addressed, and comprehensive management plan and follow up reviewed with patient.      I spent 45 minutes caring for Beena on this date of service. This time includes time spent by me in the following activities: preparing for the visit, reviewing tests, obtaining and/or reviewing a separately obtained history, performing a medically appropriate examination and/or evaluation, counseling and educating the patient/family/caregiver and independently interpreting results and communicating that information with the patient/family/caregiver with greater than 50% spent in counseling and coordination of care.

## 2022-08-08 ENCOUNTER — ROUTINE PRENATAL (OUTPATIENT)
Dept: OBSTETRICS AND GYNECOLOGY | Facility: CLINIC | Age: 32
End: 2022-08-08

## 2022-08-08 VITALS — BODY MASS INDEX: 21.67 KG/M2 | SYSTOLIC BLOOD PRESSURE: 116 MMHG | DIASTOLIC BLOOD PRESSURE: 62 MMHG | WEIGHT: 148 LBS

## 2022-08-08 DIAGNOSIS — D48.1 DESMOID TUMOR: ICD-10-CM

## 2022-08-08 DIAGNOSIS — Z3A.09 9 WEEKS GESTATION OF PREGNANCY: Primary | ICD-10-CM

## 2022-08-08 LAB
GLUCOSE UR STRIP-MCNC: NEGATIVE MG/DL
PROT UR STRIP-MCNC: NEGATIVE MG/DL

## 2022-08-08 PROCEDURE — 99214 OFFICE O/P EST MOD 30 MIN: CPT | Performed by: OBSTETRICS & GYNECOLOGY

## 2022-08-08 NOTE — PROGRESS NOTES
CC: Obstetric visit    HPI: 32-year-old  3 para 2 presents at 9-0/7 weeks for her obstetric visit.  She has some nausea and vomiting.  This has responded to Phenergan.  Overall, she is feeling well.    Review of systems    This is positive for knee pain.  It is positive for nausea with occasional vomiting.  It is negative for abdominal or pelvic pain.    Physical examination    The abdomen is soft and gravid  Extremities are equal in size with minimal pedal edema    Assessment and plan    1.  Intrauterine pregnancy at 9-0/7 weeks.  Prenatal labs were reviewed and discussed with the patient.  She is a negative.  She does not know the father of the baby's blood type.  She will attempt to determine this prior to 28 weeks.  2.  Nausea and vomiting of pregnancy.  The patient describes this as mild.  We discussed the benefits of frequent small meals and the use of ginger.  The patient does have a prescription for Phenergan to use as needed.  3.  Desmoid tumor of the knee.  Counseled and questions answered.  We reviewed the note from Dr. Norman.  She and the patient's medical oncologist will coordinate chemotherapy in the second trimester.  The patient has an MRI scan planned in the near future.  4.  Incidental finding of a breast lump on imaging.  Diagnostic breast imaging is recommended and ordered.  We discussed my recommendations that the patient proceed with this imaging.  We also discussed shielding that could be performed.  The patient plans to schedule this imaging.  5.  Counseled regarding prenatal screening.  The patient declines invasive or noninvasive prenatal screening.  She does wish to proceed with a screening ultrasound at 20 weeks.

## 2022-08-23 ENCOUNTER — TELEPHONE (OUTPATIENT)
Dept: OBSTETRICS AND GYNECOLOGY | Facility: CLINIC | Age: 32
End: 2022-08-23

## 2022-08-23 DIAGNOSIS — Z34.92 SECOND TRIMESTER PREGNANCY: Primary | ICD-10-CM

## 2022-08-23 NOTE — TELEPHONE ENCOUNTER
Provider: DR VALDEZ     Caller: TIM RICHEYS     Phone Number: [phone] [facility] TO [facility]     Reason for Call: ON LAST VISIT 8/8/22 DISCUSSED BLOOD WORK FOR BIRTH DEFECTS AT THE TIME PT DECLINED BUT HAS CHANGED HER MIND SINCE WOULD LIKE TO GET ORDERS PUT IN SO SHE CAN COME IN AND HAVE DONE BEFORE NEXT APPT 9/8/22

## 2022-08-23 NOTE — TELEPHONE ENCOUNTER
Dr. Ray, can an order be placed for the [id number] lab work? Patient would like to do it now.     Thank you

## 2022-09-08 ENCOUNTER — ROUTINE PRENATAL (OUTPATIENT)
Dept: OBSTETRICS AND GYNECOLOGY | Facility: CLINIC | Age: 32
End: 2022-09-08

## 2022-09-08 VITALS — BODY MASS INDEX: 22.43 KG/M2 | WEIGHT: 153.2 LBS | DIASTOLIC BLOOD PRESSURE: 70 MMHG | SYSTOLIC BLOOD PRESSURE: 118 MMHG

## 2022-09-08 DIAGNOSIS — Z3A.13 13 WEEKS GESTATION OF PREGNANCY: Primary | ICD-10-CM

## 2022-09-08 PROCEDURE — 99213 OFFICE O/P EST LOW 20 MIN: CPT | Performed by: OBSTETRICS & GYNECOLOGY

## 2022-09-08 NOTE — PROGRESS NOTES
CC: Obstetric visit    HPI: 32-year-old  3 para 2 presents at 13-3/7 weeks for her obstetric visit.  She reports that her nausea and vomiting have improved.  She has no complaints today.    Review of systems    This is positive for nausea and vomiting, now nearly resolved.  It is negative for abdominal pain.  Negative for vaginal bleeding.    Physical examination    The abdomen is soft and gravid.  There is no epigastric tenderness.  No fundal tenderness.  No suprapubic tenderness.  Extremities are equal in size with minimal pedal edema    Assessment and plan    1.  Intrauterine pregnancy at 13-3/7 weeks  2.  Desmoid tumor of the knee.  Counseled.  The patient has chosen to defer her chemotherapy or other treatments for now.  She is following this with an orthopedist and an oncologist.  3.  Incidental finding of a breast lump.  The patient plans to have breast imaging, but has not yet scheduled this.  I encouraged her to schedule it.

## 2022-09-30 ENCOUNTER — TELEPHONE (OUTPATIENT)
Dept: OBSTETRICS AND GYNECOLOGY | Facility: CLINIC | Age: 32
End: 2022-09-30

## 2022-10-06 ENCOUNTER — ROUTINE PRENATAL (OUTPATIENT)
Dept: OBSTETRICS AND GYNECOLOGY | Facility: CLINIC | Age: 32
End: 2022-10-06

## 2022-10-06 VITALS — SYSTOLIC BLOOD PRESSURE: 116 MMHG | WEIGHT: 156.8 LBS | BODY MASS INDEX: 22.96 KG/M2 | DIASTOLIC BLOOD PRESSURE: 64 MMHG

## 2022-10-06 DIAGNOSIS — Z34.92 SECOND TRIMESTER PREGNANCY: Primary | ICD-10-CM

## 2022-10-06 LAB
GLUCOSE UR STRIP-MCNC: NEGATIVE MG/DL
PROT UR STRIP-MCNC: NEGATIVE MG/DL

## 2022-10-06 PROCEDURE — 99214 OFFICE O/P EST MOD 30 MIN: CPT | Performed by: OBSTETRICS & GYNECOLOGY

## 2022-10-06 NOTE — PROGRESS NOTES
CC: Obstetric visit    HPI: 32-year-old  3 para 2 presents for her obstetric visit today.  She has no complaints today    Review of systems    This is negative for fever or chills.  Negative for nausea or vomiting.  Negative for vaginal bleeding.    Physical examination    The abdomen is soft and gravid.  There is no epigastric tenderness.  No fundal tenderness.  No CVA tenderness.  Extremities are equal in size with minimal pedal edema    Assessment and plan    1.  Intrauterine pregnancy at 17-3/7 weeks  2.  Desmoid tumor of the knee.  Counseled.  The patient has deferred treatment of this for now.  3.  Incidental finding of a breast lump.  The patient has not pursued imaging, but does plan to schedule this soon.  4.  The patient has chosen to do noninvasive prenatal screening.  Maternity 21 testing was negative.  I recommend AFP testing today and the patient agrees.  5.  Screening ultrasound at the next visit.  6.  Anxiety.  I counseled the patient regarding the benefits and risks of restarting her Lexapro.  I answered her questions over the phone earlier this week.  The patient has given further consideration to this.  She is going to start using coping mechanisms that she has learned.  Declines counseling and does not plan to start Lexapro at this time.

## 2022-10-24 ENCOUNTER — TELEPHONE (OUTPATIENT)
Dept: OBSTETRICS AND GYNECOLOGY | Facility: CLINIC | Age: 32
End: 2022-10-24

## 2022-10-24 RX ORDER — ONDANSETRON 4 MG/1
4 TABLET, FILM COATED ORAL EVERY 8 HOURS PRN
Qty: 30 TABLET | Refills: 3 | Status: SHIPPED | OUTPATIENT
Start: 2022-10-24

## 2022-10-24 RX ORDER — PROMETHAZINE HYDROCHLORIDE 12.5 MG/1
12.5 TABLET ORAL EVERY 6 HOURS PRN
Qty: 20 TABLET | Refills: 0 | Status: SHIPPED | OUTPATIENT
Start: 2022-10-24 | End: 2022-10-24 | Stop reason: SDUPTHER

## 2022-10-24 RX ORDER — PROMETHAZINE HYDROCHLORIDE 25 MG/1
25 TABLET ORAL EVERY 6 HOURS PRN
Qty: 30 TABLET | Refills: 2 | Status: SHIPPED | OUTPATIENT
Start: 2022-10-24 | End: 2022-11-10

## 2022-10-24 NOTE — TELEPHONE ENCOUNTER
Answering service call.    Nausea and vomiting since this morning.   Not really even drinking  Sent anti-nausea medication   Encouraged to hydrate with clear soda/sports drinks to start.   If unable to tolerate, may need IV hydration in ED    Mt Almazan MD  10/24/2022  17:22 EDT

## 2022-10-27 NOTE — PROGRESS NOTES
MATERNAL FETAL MEDICINE  Follow-up Note    Dear Dr Arsh Ray MD:    Thank you for your kind referral of Beena Vincent.  As you know, she is a 32 y.o.   at 20  4/7 weeks gestation (Estimated Date of Delivery: 3/13/23). This is a follow up    Her antepartum course is complicated by:  Desmoid tumor of the knee  Asthma    Aneuploidy Screening: low risk cell free DNA    HPI: Today, she denies headache, blurry vision, RUQ pain. No vaginal bleeding, no contractions.     Review of History:  Past Medical History:   Diagnosis Date   • Asthma     inhaler as needed   • Cervical dysplasia    • Depression    • Desmoid tumor     back of right knee- pt desires to proceed with treatments after delivery   • Gestational hypertension    • Iron deficiency anemia    • Preeclampsia      Past Surgical History:   Procedure Laterality Date   • APPENDECTOMY N/A 10/1/2021    Procedure: APPENDECTOMY LAPAROSCOPIC;  Surgeon: Ahmet Dong Jr., MD;  Location: [facility];  Service: General;  Laterality: N/A;   • KNEE ARTHROSCOPY      AGE 6   • LEEP N/A 2019    Procedure: LOOP ELECTROCAUTERY EXCISION PROCEDURE;  Surgeon: Arsh Ray MD;  Location: [facility];  Service: Obstetrics/Gynecology   • SOFT TISSUE BIOPSY Right 3/23/2022    Procedure: BIOPSY SOFT TISSUE THIGH / KNEE;  Surgeon: Chris Randall MD;  Location: [facility];  Service: Orthopedics;  Laterality: Right;   • WISDOM TOOTH EXTRACTION           Social History     Socioeconomic History   • Marital status: Single   Tobacco Use   • Smoking status: Former     Packs/day: 0.50     Years: 4.00     Pack years: 2.00     Types: Cigarettes     Quit date: 2020     Years since quittin.7   • Smokeless tobacco: Never   Vaping Use   • Vaping Use: Some days   • Substances: Nicotine   • Devices: Disposable   Substance and Sexual Activity   • Alcohol use: Not Currently     Comment: socially   • Drug use: Yes     Types: Marijuana     Comment: daily   •  "Sexual activity: Not Currently     Family History   Problem Relation Age of Onset   • Diabetes Father    • Arthritis Maternal Grandmother    • Lung cancer Maternal Grandfather    • Heart attack Maternal Grandfather    • Heart disease Maternal Grandfather    • Stroke Maternal Grandfather    • Hyperlipidemia Maternal Grandfather    • Malig Hyperthermia Neg Hx    • Breast cancer Neg Hx    • Ovarian cancer Neg Hx    • Uterine cancer Neg Hx    • Colon cancer Neg Hx       Allergies   Allergen Reactions   • Penicillins Nausea And Vomiting   • Adhesive Tape Rash     \"SURGICAL TAPE\"  AS A CHILD      Current Outpatient Medications on File Prior to Visit   Medication Sig Dispense Refill   • Prenatal Vit-Fe Fumarate-FA (Prenatabs FA) 29-1 MG tablet Take 1 tablet by mouth Daily. 30 tablet 11   • promethazine (PHENERGAN) 25 MG tablet Take 1 tablet by mouth Every 6 (Six) Hours As Needed for Nausea or Vomiting. 30 tablet 2   • albuterol sulfate  (90 Base) MCG/ACT inhaler Inhale 2 puffs Every 4 (Four) Hours As Needed for Wheezing. 18 g 0   • escitalopram (LEXAPRO) 20 MG tablet Take 1 tablet by mouth Every Night. 30 tablet 3   • ibuprofen (ADVIL,MOTRIN) 600 MG tablet Take 1 tablet by mouth Every 6 (Six) Hours As Needed for Mild Pain . 30 tablet 0   • ondansetron (ZOFRAN) 4 MG tablet Take 1 tablet by mouth Every 8 (Eight) Hours As Needed for Nausea or Vomiting. 30 tablet 3     No current facility-administered medications on file prior to visit.        Past obstetric, gynecological, medical, surgical, family and social history reviewed.  Relevant lab work and imaging reviewed.    Review of systems  Constitutional:  denies fever, chills, malaise.   ENT/Mouth:  denies sore throat, tinnitis  Eyes: denies vision changes/pain  CV:  denies chest pain  Respiratory:  denies cough/SOB  GI:  denies N/V, diarrhea, abdominal pain.    :   denies dysuria  Skin:  denies lesions or pruritis   Neuro:  denies weakness, focal neurologic " symptoms    Vitals:    10/28/22 1026   BP: 110/63   BP Location: Right arm   Patient Position: Sitting   Pulse: 72   Temp: 97.7 °F (36.5 °C)   TempSrc: Temporal   Weight: 77.7 kg (171 lb 6.4 oz)       PHYSICAL EXAM   GENERAL: Not in acute distress, AAOx3, pleasant  CARDIO: regular rate and rhythm  PULM: symmetric chest rise, speaking in complete sentences without difficulty  NEURO: awake, alert and oriented to person, place, and time  ABDOMINAL: No fundal tenderness, no rebound or guarding, gravid  EXTREMITIES: no bilateral lower extremity edema/tenderness  SKIN: Warm, well-perfused      ULTRASOUND   Please view full ultrasound note on Imaging tab in ViewPoint.  Cephalic presentation.  Anterior placenta.  MVP 6.0 cm, which is normal.   g (92% AC)  Anatomy normal.  Cervical length 4.4 cm, which is normal.    ASSESSMENT/COUNSELIN y.o.   at 20  4/7 weeks gestation (Estimated Date of Delivery: 3/13/23).    -Pregnancy  [ X ] stable  [   ] improving [  ] worsening    Diagnoses and all orders for this visit:    1. Desmoid tumor (Primary)    2. Mild asthma without complication, unspecified whether persistent         Desmoid tumor:  She was found to have a desmoid tumor behind her right knee in 2022. Surgery has been postponed and the tumor is just being monitored currently. Genetic testing was negative for known deleterious/pathogenic (harmful) mutations by sequencing and rearrangement testing for the genes on this panel.  While no known deleterious mutations were identified, two variants of uncertain significance (VUS) were identified in the MAX gene and the PTCH1 gene. She was counseled about this by genetics team.    Desmoid tumors are abnormal growths that arise from connective tissue and are rare, occurring in approximately 1-2 in 500,000 individuals. While the majority of desmoid tumors are sporadic, approximately 7-15% of desmoid tumors are associated with germline mutation in the APC gene  and can be associated with familial adenomatous polyposis, which she tested negative for.       A MRI right knee done 3/11/22 demonstrated a 10 x 3.5 x 4.4 cm enhancing soft tissue mass lesion along the posterolateral right knee interposed between the distal biceps femoris and the lateral gastrocnemius muscles. The mass appears to infiltrate or arise from the distal biceps femoris. The lesion displaces and closely abuts the common peroneal nerve.       Patient was seen by smita Che - who obtained a biopsy of the mass on 3/23/22. 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  She reports the current plan with her oncologist after r/b discussion is to wait until after delivery for chemotherapy agents to try and shrink it so it can be removed.  She feels it has not grown but does not want to get an MRI at this time.  I counseled her that an MRI without contrast was perfectly safe in pregnancy if she or her oncologist wanted her to have this and felt like it would impact her care.    I encouraged her to have an appointment with her oncologist to ask if earlier delivery to facilitate her treatment could make a difference based on rate of tumor growth and what is known about these tumors.  A late  or early term delivery would be very reasonable and have minimal impact on the baby's well-being/long term prognosis and the benefit of this if it would help facilitate maternal care of this tumor would certainly outweigh the risk.  She understands and will get an appointment to ask this question of her oncologist.      I will see her in 4 weeks for a growth and asked that she get this appointment, as well as do her follow up imaging on her breast in the meantime.  We will discuss delivery timing further at the next visit based on her oncologist's recommendations.      Asthma  Asthma has historically been associated with small increased risks of  birth, low birth  weight,  mortality, and preeclampsia, but these risks are probably associated with just the undertreatment of asthma. With adequate treatment, asthma is NOT associated with a significant increase in adverse  outcomes. Pregnancy has a variable effect on asthma severity, which may improve, worsen, or remain unchanged. In general, about two-thirds get better and one-third get worse. She feels well and uses her albuterol 1-2x per month.  Discussed if it increases to every week or more, we cad add a controller inhaler daily.  She understands.  Would do serial growths for asthma out of abundance of caution.      Summary of Plan  -Serial growth ultrasounds every 4 weeks  -Delivery timing up in the air--she will see oncologist and discuss if maternal care would be improved if she delivered early term/late .  We will discuss at next visit and do growth.      Follow-up: 1 month growth    Thank you for the consult and opportunity to care for this patient.  Please feel free to reach out with any questions or concerns.      I spent 20 minutes caring for this patient on this date of service. This time includes time spent by me in the following activities: preparing for the visit, reviewing tests, obtaining and/or reviewing a separately obtained history, performing a medically appropriate examination and/or evaluation, counseling and educating the patient/family/caregiver and independently interpreting results and communicating that information with the patient/family/caregiver with greater than 50% spent in counseling and coordination of care.     Beena Quezada MD FACOG  Maternal Fetal Medicine-[facility]  Office: [phone]  [email or web address]

## 2022-10-28 ENCOUNTER — HOSPITAL ENCOUNTER (OUTPATIENT)
Dept: ULTRASOUND IMAGING | Facility: HOSPITAL | Age: 32
Discharge: HOME | End: 2022-10-28
Admitting: OBSTETRICS & GYNECOLOGY

## 2022-10-28 ENCOUNTER — OFFICE VISIT (OUTPATIENT)
Dept: OBSTETRICS AND GYNECOLOGY | Facility: CLINIC | Age: 32
End: 2022-10-28

## 2022-10-28 ENCOUNTER — TRANSCRIBE ORDERS (OUTPATIENT)
Dept: ULTRASOUND IMAGING | Facility: HOSPITAL | Age: 32
End: 2022-10-28

## 2022-10-28 VITALS
BODY MASS INDEX: 25.1 KG/M2 | TEMPERATURE: 97.7 F | SYSTOLIC BLOOD PRESSURE: 110 MMHG | DIASTOLIC BLOOD PRESSURE: 63 MMHG | HEART RATE: 72 BPM | WEIGHT: 171.4 LBS

## 2022-10-28 DIAGNOSIS — M79.89 SOFT TISSUE MASS: Primary | ICD-10-CM

## 2022-10-28 DIAGNOSIS — J45.909 MILD ASTHMA WITHOUT COMPLICATION, UNSPECIFIED WHETHER PERSISTENT: ICD-10-CM

## 2022-10-28 DIAGNOSIS — D48.1 DESMOID TUMOR: Primary | ICD-10-CM

## 2022-10-28 DIAGNOSIS — D48.1 DESMOID TUMOR: ICD-10-CM

## 2022-10-28 PROCEDURE — 76817 TRANSVAGINAL US OBSTETRIC: CPT | Performed by: OBSTETRICS & GYNECOLOGY

## 2022-10-28 PROCEDURE — 76811 OB US DETAILED SNGL FETUS: CPT | Performed by: OBSTETRICS & GYNECOLOGY

## 2022-10-28 PROCEDURE — 76817 TRANSVAGINAL US OBSTETRIC: CPT

## 2022-10-28 PROCEDURE — 99213 OFFICE O/P EST LOW 20 MIN: CPT | Performed by: OBSTETRICS & GYNECOLOGY

## 2022-10-28 PROCEDURE — 76811 OB US DETAILED SNGL FETUS: CPT

## 2022-10-28 NOTE — PROGRESS NOTES
Pt reports that she is doing well and denies vaginal bleeding, cramping, contractions or LOF at this time. Notes feeling round ligament pain and occasional upper right sided discomfort. Reports active fetal movement. Denies HA, visual changes or increase in swelling. Denies any additional complaints at time of appointment. Next OB appointment scheduled for 11/3.    Vitals:    10/28/22 1026   BP: 110/63   Pulse: 72   Temp: 97.7 °F (36.5 °C)

## 2022-11-10 ENCOUNTER — ROUTINE PRENATAL (OUTPATIENT)
Dept: OBSTETRICS AND GYNECOLOGY | Facility: CLINIC | Age: 32
End: 2022-11-10

## 2022-11-10 VITALS — SYSTOLIC BLOOD PRESSURE: 111 MMHG | WEIGHT: 167 LBS | BODY MASS INDEX: 24.45 KG/M2 | DIASTOLIC BLOOD PRESSURE: 69 MMHG

## 2022-11-10 DIAGNOSIS — M79.89 SOFT TISSUE MASS: ICD-10-CM

## 2022-11-10 DIAGNOSIS — Z3A.22 22 WEEKS GESTATION OF PREGNANCY: Primary | ICD-10-CM

## 2022-11-10 DIAGNOSIS — J45.20 INTERMITTENT ASTHMA, UNSPECIFIED ASTHMA SEVERITY, UNSPECIFIED WHETHER COMPLICATED: ICD-10-CM

## 2022-11-10 DIAGNOSIS — Z23 FLU VACCINE NEED: ICD-10-CM

## 2022-11-10 LAB
GLUCOSE UR STRIP-MCNC: NEGATIVE MG/DL
PROT UR STRIP-MCNC: NEGATIVE MG/DL

## 2022-11-10 PROCEDURE — 99213 OFFICE O/P EST LOW 20 MIN: CPT | Performed by: NURSE PRACTITIONER

## 2022-11-10 PROCEDURE — 90471 IMMUNIZATION ADMIN: CPT | Performed by: NURSE PRACTITIONER

## 2022-11-10 PROCEDURE — 90686 IIV4 VACC NO PRSV 0.5 ML IM: CPT | Performed by: NURSE PRACTITIONER

## 2022-11-10 NOTE — PROGRESS NOTES
Chief Complaint   Patient presents with   • Routine Prenatal Visit       OB follow up     Beena Vincent is a 32 y.o.  22w3d being seen today for her obstetrical visit.  Patient reports fatigue. Fetal movement: normal. Has desmoid tumor in right knee. Denies pain, but has limited mobility in right knee.    Review of Systems  Cramping/contractions: denies  Vaginal bleeding: denies  Fetal movement: normal    /69   Wt 75.8 kg (167 lb)   LMP 2022 (Exact Date)   BMI 24.45 kg/m²     Assessment/Plan    Diagnoses and all orders for this visit:    1. 22 weeks gestation of pregnancy (Primary)    2. Soft tissue mass    3. Intermittent asthma, unspecified asthma severity, unspecified whether complicated       Reviewed fetal kick counts  She was seen by MFM 10/28/22, recommended f/u with oncology to see if early delivery is indicated. She has not yet scheduled this f/u. Has repeat growth scan with MFM scheduled in 3 weeks  Asthma is well controlled  Flu vaccine today  Reviewed this stage of pregnancy  Problem list updated     Follow up in 4 week(s) with Dr. Ray    I have spent 20 min in face to face time with the patient and 20 min of this time was spent in counseling on the above stated issues.    Beena R Broderick, APRN  11/10/2022  14:46 EST

## 2022-11-28 ENCOUNTER — APPOINTMENT (OUTPATIENT)
Dept: ULTRASOUND IMAGING | Facility: HOSPITAL | Age: 32
End: 2022-11-28

## 2022-12-07 ENCOUNTER — TELEPHONE (OUTPATIENT)
Dept: ULTRASOUND IMAGING | Facility: HOSPITAL | Age: 32
End: 2022-12-07

## 2022-12-07 NOTE — TELEPHONE ENCOUNTER
Patient did not show up for appointment today. I tried to call but  mailbox not set up, no msg could be left.  js

## 2022-12-09 ENCOUNTER — ROUTINE PRENATAL (OUTPATIENT)
Dept: OBSTETRICS AND GYNECOLOGY | Facility: CLINIC | Age: 32
End: 2022-12-09

## 2022-12-09 ENCOUNTER — TELEPHONE (OUTPATIENT)
Dept: ONCOLOGY | Facility: CLINIC | Age: 32
End: 2022-12-09

## 2022-12-09 VITALS — SYSTOLIC BLOOD PRESSURE: 118 MMHG | DIASTOLIC BLOOD PRESSURE: 67 MMHG | WEIGHT: 175.2 LBS | BODY MASS INDEX: 25.66 KG/M2

## 2022-12-09 DIAGNOSIS — Z3A.26 26 WEEKS GESTATION OF PREGNANCY: Primary | ICD-10-CM

## 2022-12-09 LAB
GLUCOSE UR STRIP-MCNC: NEGATIVE MG/DL
PROT UR STRIP-MCNC: NEGATIVE MG/DL

## 2022-12-09 PROCEDURE — 99213 OFFICE O/P EST LOW 20 MIN: CPT | Performed by: OBSTETRICS & GYNECOLOGY

## 2022-12-09 NOTE — TELEPHONE ENCOUNTER
Returned call and left message that her orders that she is requesting below are good for a year from the ordering date, so they will not  till May,  and 2023.  I asked patient if she has any trouble scheduling to have the departments contact our office.

## 2022-12-09 NOTE — PROGRESS NOTES
CC: 32-year-old  3 para 2 presents at 26-4/7 weeks for her obstetric visit.  Her baby is moving actively.  She has no complaints today.    Review of systems    This is negative for nausea or vomiting.  Negative for fever or chills.  Negative for abdominal or pelvic pain.    Physical examination    The abdomen is soft and gravid.  There is no epigastric tenderness.  No fundal tenderness.  No suprapubic tenderness.  Extremities are equal in size with minimal pedal edema    Assessment and plan    1.  Intrauterine pregnancy at 26-4/7 weeks  2.  Rh-.  The patient's partner is uncertain of his blood type and is not willing to test.  I recommend RhoGAM at 28 weeks due to unknown risk of isoimmunization in the fetus.  The patient agrees.  3.  Counseled regarding the importance of a 1 hour glucose tolerance test.  This will be performed at the next visit.  The patient agrees with this recommendation.  4.  Desmoid tumor.  The patient also has a breast lesion that was found on a CT scan.  She has breast imaging scheduled for January.  She has not yet followed up with her oncologist regarding any potential need to deliver early.  I encouraged her to do so.  Also, the patient had to reschedule her maternal-fetal medicine appointment.  She was encouraged to do this as well.

## 2022-12-09 NOTE — TELEPHONE ENCOUNTER
Returned call to patient who did not have the mammo/ultrasound of right breast or the MRI of her right knee.  When she called to get rescheduled, she was told the orders have .  I explained that Dr. Lyn ordered the MRI of her knee and that she would need to contact him for that order.  She stated that Dr. Molina wants the MRI done too and she would like Dr. Molina to order all of the tests if possible.

## 2022-12-09 NOTE — TELEPHONE ENCOUNTER
DELETE AFTER REVIEWING: Telephone encounter to be sent to the clinical pool     Caller: Beena Vincent    Relationship: Self    Best call back number: [phone]    What orders are you requesting (i.e. lab or imaging): MAMMO AND US OF RIGHT BREAST AND MRI OF THE RIGHT KNEE    In what timeframe would the patient need to come in: PATIENT CURRENT ORDERS HAS     Where will you receive your lab/imaging services: [facility]

## 2022-12-21 ENCOUNTER — REFERRAL TRIAGE (OUTPATIENT)
Dept: LABOR AND DELIVERY | Facility: HOSPITAL | Age: 32
End: 2022-12-21

## 2022-12-21 ENCOUNTER — PATIENT OUTREACH (OUTPATIENT)
Dept: LABOR AND DELIVERY | Facility: HOSPITAL | Age: 32
End: 2022-12-21

## 2022-12-21 NOTE — OUTREACH NOTE
[facility]  Enrollment    Current Estimated Gestational Age: 28w2d    Questions/Answers    Flowsheet Row Responses   Would like to participate? Yes   Date of Intake Visit 12/22/22          Shahla Hay RN  Maternity Nurse Navigator    12/21/2022, 15:16 EST

## 2022-12-22 ENCOUNTER — PATIENT OUTREACH (OUTPATIENT)
Dept: LABOR AND DELIVERY | Facility: HOSPITAL | Age: 32
End: 2022-12-22

## 2022-12-22 ENCOUNTER — ROUTINE PRENATAL (OUTPATIENT)
Dept: OBSTETRICS AND GYNECOLOGY | Facility: CLINIC | Age: 32
End: 2022-12-22

## 2022-12-22 VITALS — DIASTOLIC BLOOD PRESSURE: 62 MMHG | BODY MASS INDEX: 25.92 KG/M2 | SYSTOLIC BLOOD PRESSURE: 116 MMHG | WEIGHT: 177 LBS

## 2022-12-22 DIAGNOSIS — Z3A.26 26 WEEKS GESTATION OF PREGNANCY: Primary | ICD-10-CM

## 2022-12-22 DIAGNOSIS — Z3A.26 26 WEEKS GESTATION OF PREGNANCY: ICD-10-CM

## 2022-12-22 PROCEDURE — 96372 THER/PROPH/DIAG INJ SC/IM: CPT | Performed by: OBSTETRICS & GYNECOLOGY

## 2022-12-22 PROCEDURE — 99213 OFFICE O/P EST LOW 20 MIN: CPT | Performed by: OBSTETRICS & GYNECOLOGY

## 2022-12-22 NOTE — OUTREACH NOTE
[facility]  Unable to Reach       Questions/Answers    Flowsheet Row Responses   Pending Outreach Intake Visit   Call Attempt First   Outcome Left message   Next Call Attempt Date 12/28/22          Shahla Hay RN  Maternity Nurse Navigator    12/22/2022, 09:11 EST

## 2022-12-22 NOTE — PROGRESS NOTES
CC: Obstetric visit    HPI: 32-year-old  3 para 2 presents at 28-3/7 weeks for her obstetric is moving actively.  She has no complaints today.    Review of systems    This is negative for fever or chills.  Negative for nausea or vomiting.  Negative for abdominal or pelvic pain.  Negative for vaginal bleeding.    Physical examination    The abdomen is soft and gravid.  There is no epigastric tenderness.  No fundal tenderness.  No suprapubic tenderness.  Extremities are equal in size    Assessment and plan    1.  Intrauterine pregnancy at 28-3/7 weeks  2.  Rh- status.  The baby's father's blood type is unknown.  RhoGAM will be given today.  3.  Desmoid tumor as well as a breast lesion.  I counseled the patient and answered her questions.  She plans to have a work-up for the breast lesion in January.  She plans to see her oncologist directly after this and recommendations can be made regarding timing of delivery.  4.  1 hour glucose tolerance test today.

## 2022-12-23 ENCOUNTER — TELEPHONE (OUTPATIENT)
Dept: OBSTETRICS AND GYNECOLOGY | Facility: CLINIC | Age: 32
End: 2022-12-23

## 2022-12-23 LAB
ABO GROUP BLD: NORMAL
RH BLD: NEGATIVE

## 2022-12-23 RX ORDER — FERROUS SULFATE 325(65) MG
325 TABLET ORAL 2 TIMES DAILY WITH MEALS
Qty: 60 TABLET | Refills: 5 | Status: SHIPPED | OUTPATIENT
Start: 2022-12-23 | End: 2023-03-11 | Stop reason: HOSPADM

## 2022-12-23 NOTE — TELEPHONE ENCOUNTER
----- Message from Arsh Ray MD sent at 12/23/2022  1:22 PM EST -----  Please contact the patient and let her know that her 1 hour glucose tolerance test is elevated.  Please help her to schedule a 3-hour test.  Thank you.  Also, the patient has become anemic.  A prescription for iron has been sent to her pharmacy.  She should take it twice daily with meals.

## 2022-12-28 ENCOUNTER — PATIENT OUTREACH (OUTPATIENT)
Dept: LABOR AND DELIVERY | Facility: HOSPITAL | Age: 32
End: 2022-12-28

## 2022-12-28 NOTE — OUTREACH NOTE
[facility]  Unable to Reach       Questions/Answers    Flowsheet Row Responses   Pending Outreach Intake Visit   Call Attempt Second   Outcome Left message   Next Call Attempt Date 12/30/22          Shahla Hay RN  Maternity Nurse Navigator    12/28/2022, 10:13 EST

## 2022-12-29 LAB
ERYTHROCYTE [DISTWIDTH] IN BLOOD BY AUTOMATED COUNT: 12.3 % (ref 12.3–15.4)
HCT VFR BLD AUTO: 32.3 % (ref 34–46.6)
HGB BLD-MCNC: 10.9 G/DL (ref 12–15.9)
MCH RBC QN AUTO: 29.5 PG (ref 26.6–33)
MCHC RBC AUTO-ENTMCNC: 33.7 G/DL (ref 31.5–35.7)
MCV RBC AUTO: 87.3 FL (ref 79–97)
PLATELET # BLD AUTO: 251 10*3/MM3 (ref 140–450)
RBC # BLD AUTO: 3.7 10*6/MM3 (ref 3.77–5.28)
WBC # BLD AUTO: 9.43 10*3/MM3 (ref 3.4–10.8)

## 2022-12-30 ENCOUNTER — PATIENT OUTREACH (OUTPATIENT)
Dept: LABOR AND DELIVERY | Facility: HOSPITAL | Age: 32
End: 2022-12-30

## 2022-12-30 LAB
BLD GP AB SCN SERPL QL: NORMAL
BLD GP AB SCN SERPL QL: POSITIVE
BLOOD GROUP ANTIBODIES SERPL: ABNORMAL
GLUCOSE 1H P 100 G GLC PO SERPL-MCNC: 87 MG/DL (ref 65–179)
GLUCOSE 2H P 100 G GLC PO SERPL-MCNC: 107 MG/DL (ref 65–154)
GLUCOSE 3H P 100 G GLC PO SERPL-MCNC: 108 MG/DL (ref 65–139)
GLUCOSE P FAST SERPL-MCNC: 83 MG/DL (ref 65–94)
XXX BLOOD GROUP AB TITR SERPL AHG: ABNORMAL {TITER}

## 2022-12-30 NOTE — OUTREACH NOTE
[facility]  Unable to Reach       Questions/Answers    Flowsheet Row Responses   Pending Outreach Intake Visit   Call Attempt Third   Outcome Not available   Next Call Attempt Date 01/04/23   Unable to reach comments: pt said she would call back but did not          Shahlamónica Hay RN  Maternity Nurse Navigator    12/30/2022, 12:57 EST

## 2023-01-05 ENCOUNTER — ROUTINE PRENATAL (OUTPATIENT)
Dept: OBSTETRICS AND GYNECOLOGY | Facility: CLINIC | Age: 33
End: 2023-01-05
Payer: COMMERCIAL

## 2023-01-05 VITALS — BODY MASS INDEX: 26.24 KG/M2 | DIASTOLIC BLOOD PRESSURE: 62 MMHG | SYSTOLIC BLOOD PRESSURE: 112 MMHG | WEIGHT: 179.2 LBS

## 2023-01-05 DIAGNOSIS — Z3A.30 30 WEEKS GESTATION OF PREGNANCY: Primary | ICD-10-CM

## 2023-01-05 PROCEDURE — 99213 OFFICE O/P EST LOW 20 MIN: CPT | Performed by: OBSTETRICS & GYNECOLOGY

## 2023-01-05 NOTE — PROGRESS NOTES
CC: Obstetric visit    HPI: 32-year-old  3 para 2 presents at 30-3/7 weeks for her obstetric visit.  Her baby is moving actively.  She has no complaints today.    Review of systems    This is negative for nausea or vomiting.  Negative for vaginal bleeding.  Negative for abdominal or pelvic pain.    Physical examination    The abdomen is soft and gravid.  There is no epigastric tenderness.  No right upper quadrant tenderness.  No fundal tenderness.  No suprapubic tenderness.  Extremities are equal in size    Assessment and plan    1.  Intrauterine pregnancy at 30-3/7 weeks  2.  The patient has passed her 3-hour glucose tolerance test.  Counseled.  3.  Anemia.  There has been improvement on iron sulfate.  We discussed continuing this versus changing to IV iron infusions.  The patient would like to continue iron sulfate by mouth twice daily.  4.  Desmoid tumor of the knee as well as a breast issue.  The patient has her mammogram scheduled for Monday.  She plans to follow-up with her oncologist later next week.  Further plans pending recommendations from oncology.  It may be that oncology recommends a change in the timing of delivery due to this condition.

## 2023-01-09 ENCOUNTER — HOSPITAL ENCOUNTER (OUTPATIENT)
Dept: MAMMOGRAPHY | Facility: HOSPITAL | Age: 33
Discharge: HOME | End: 2023-01-09
Payer: COMMERCIAL

## 2023-01-09 ENCOUNTER — TELEPHONE (OUTPATIENT)
Dept: OBSTETRICS AND GYNECOLOGY | Facility: CLINIC | Age: 33
End: 2023-01-09
Payer: COMMERCIAL

## 2023-01-09 ENCOUNTER — HOSPITAL ENCOUNTER (OUTPATIENT)
Dept: ULTRASOUND IMAGING | Facility: HOSPITAL | Age: 33
Discharge: HOME | End: 2023-01-09
Payer: COMMERCIAL

## 2023-01-09 DIAGNOSIS — D48.1 DESMOID TUMOR: ICD-10-CM

## 2023-01-09 DIAGNOSIS — N63.10 MASS OF RIGHT BREAST, UNSPECIFIED QUADRANT: ICD-10-CM

## 2023-01-09 PROCEDURE — 76642 ULTRASOUND BREAST LIMITED: CPT

## 2023-01-09 NOTE — TELEPHONE ENCOUNTER
----- Message from Arsh Ray MD sent at 1/6/2023  2:27 PM EST -----  Please contact the patient and let her know that her hemoglobin is slightly worse than it was.  She can continue taking iron sulfate twice daily, or we could consider iron infusions.  If she would like to have infusions, please let me know so I can order it.  Thank you

## 2023-01-10 LAB
AFP INTERP SERPL-IMP: NORMAL
AFP INTERP SERPL-IMP: NORMAL
AFP MOM SERPL: NORMAL
AFP SERPL-MCNC: 246.3 NG/ML
AGE AT DELIVERY: 30.6 YR
GA METHOD: NORMAL
GA: 30.4 WEEKS
IDDM PATIENT QL: NORMAL
LABORATORY COMMENT REPORT: NORMAL
MULTIPLE PREGNANCY: NORMAL
NEURAL TUBE DEFECT RISK FETUS: NORMAL %
RESULT: NORMAL

## 2023-01-11 ENCOUNTER — TRANSCRIBE ORDERS (OUTPATIENT)
Dept: ADMINISTRATIVE | Facility: HOSPITAL | Age: 33
End: 2023-01-11
Payer: COMMERCIAL

## 2023-01-11 DIAGNOSIS — D48.1 DESMOID FIBROMATOSIS: Primary | ICD-10-CM

## 2023-01-12 ENCOUNTER — PATIENT OUTREACH (OUTPATIENT)
Dept: LABOR AND DELIVERY | Facility: HOSPITAL | Age: 33
End: 2023-01-12
Payer: COMMERCIAL

## 2023-01-12 NOTE — OUTREACH NOTE
UTR pt for intake with multiple attempts. Sent My chart message to her asking for a good time for a call, will plan to meet her at her next OB appointment if that is her preference.

## 2023-01-20 ENCOUNTER — ROUTINE PRENATAL (OUTPATIENT)
Dept: OBSTETRICS AND GYNECOLOGY | Facility: CLINIC | Age: 33
End: 2023-01-20
Payer: COMMERCIAL

## 2023-01-20 VITALS — SYSTOLIC BLOOD PRESSURE: 115 MMHG | BODY MASS INDEX: 27.06 KG/M2 | DIASTOLIC BLOOD PRESSURE: 74 MMHG | WEIGHT: 184.8 LBS

## 2023-01-20 DIAGNOSIS — O99.019 MATERNAL ANEMIA IN PREGNANCY, ANTEPARTUM: ICD-10-CM

## 2023-01-20 DIAGNOSIS — Z67.91 RH NEGATIVE STATUS DURING PREGNANCY IN THIRD TRIMESTER: ICD-10-CM

## 2023-01-20 DIAGNOSIS — D48.1 DESMOID TUMOR: ICD-10-CM

## 2023-01-20 DIAGNOSIS — O26.843 FUNDAL HEIGHT LOW FOR DATES IN THIRD TRIMESTER: ICD-10-CM

## 2023-01-20 DIAGNOSIS — D24.9 FIBROADENOMA OF BREAST, UNSPECIFIED LATERALITY: ICD-10-CM

## 2023-01-20 DIAGNOSIS — Z3A.32 32 WEEKS GESTATION OF PREGNANCY: Primary | ICD-10-CM

## 2023-01-20 DIAGNOSIS — O26.893 RH NEGATIVE STATUS DURING PREGNANCY IN THIRD TRIMESTER: ICD-10-CM

## 2023-01-20 LAB
GLUCOSE UR STRIP-MCNC: NEGATIVE MG/DL
PROT UR STRIP-MCNC: NEGATIVE MG/DL

## 2023-01-20 PROCEDURE — 99213 OFFICE O/P EST LOW 20 MIN: CPT | Performed by: NURSE PRACTITIONER

## 2023-01-20 NOTE — PROGRESS NOTES
Chief Complaint   Patient presents with   • Routine Prenatal Visit       OB follow up     Beena Vincent is a 32 y.o.  32w4d being seen today for her obstetrical visit.  Patient reports no complaints. Fetal movement: normal. Reports compliance with iron supplements.     Review of Systems  Cramping/contractions: occas norma cain  Vaginal bleeding: denies  Fetal movement: normal    /74   Wt 83.8 kg (184 lb 12.8 oz)   LMP 2022 (Exact Date)   BMI 27.06 kg/m²     Assessment/Plan    Diagnoses and all orders for this visit:    1. 32 weeks gestation of pregnancy (Primary)    2. Maternal anemia in pregnancy, antepartum    3. Desmoid tumor    4. Fibroadenoma of breast, unspecified laterality    5. Rh negative status during pregnancy in third trimester    6. Fundal height low for dates in third trimester  -     US ob follow up transabdominal approach; Future       Reviewed fetal kick counts  Reviewed S&S PTL  Fundal height low for date, growth scan at next visit  S/p rhogam  Continue oral iron  Asthma is well controlled  Anxiety and depression well controlled, no current medications  Desmoid tubmor: Has rpt MRI 23 and f/u with surgery 2/3/23  Had breast u/s which indicated fibroadenoma, recommend rpt breast u/s in 6 months  Reviewed this stage of pregnancy  Problem list updated     Follow up in 2 week(s) with Dr. Ray    I spent 25 minutes caring for Beena on this date of service. This time includes time spent by me in the following activities: preparing for the visit, reviewing tests, obtaining and/or reviewing a separately obtained history, performing a medically appropriate examination and/or evaluation, counseling and educating the patient/family/caregiver, ordering medications, tests, or procedures, referring and communicating with other health care professionals and documenting information in the medical record    Beena LINO Broderick, APRN  2023  11:47 EST

## 2023-01-29 ENCOUNTER — HOSPITAL ENCOUNTER (OUTPATIENT)
Dept: MRI IMAGING | Facility: HOSPITAL | Age: 33
Discharge: HOME | End: 2023-01-29
Admitting: SURGERY
Payer: COMMERCIAL

## 2023-01-29 DIAGNOSIS — D48.1 DESMOID FIBROMATOSIS: ICD-10-CM

## 2023-01-29 PROCEDURE — 73721 MRI JNT OF LWR EXTRE W/O DYE: CPT

## 2023-02-02 ENCOUNTER — ROUTINE PRENATAL (OUTPATIENT)
Dept: OBSTETRICS AND GYNECOLOGY | Facility: CLINIC | Age: 33
End: 2023-02-02
Payer: COMMERCIAL

## 2023-02-02 VITALS — WEIGHT: 186.4 LBS | DIASTOLIC BLOOD PRESSURE: 64 MMHG | SYSTOLIC BLOOD PRESSURE: 116 MMHG | BODY MASS INDEX: 27.3 KG/M2

## 2023-02-02 DIAGNOSIS — Z3A.34 34 WEEKS GESTATION OF PREGNANCY: Primary | ICD-10-CM

## 2023-02-02 LAB — HBA1C MFR BLD: 5.1 % (ref 4.8–5.6)

## 2023-02-02 PROCEDURE — 99213 OFFICE O/P EST LOW 20 MIN: CPT | Performed by: OBSTETRICS & GYNECOLOGY

## 2023-02-02 NOTE — PROGRESS NOTES
CC: Obstetric visit    HPI: 32-year-old  3 para 2 presents at 34-3/7 weeks for her obstetric visit.  Her baby is moving actively.  She has occasional [employer] contractions, but no regular contractions.    Review of systems    This is negative for fever or chills.  Negative for nausea or vomiting.  Abdominal or pelvic pain.  Negative for vaginal bleeding.    Physical examination    The abdomen is soft and gravid.  There is no epigastric tenderness.  No right upper quadrant tenderness.  No fundal tenderness.  No suprapubic tenderness.  Extremities are equal in size    Assessment and plan    1.  Intrauterine pregnancy at 34-3/7 weeks  2.  Desmoid tumor.  The patient had an MRI scan recently.  She has an appointment scheduled with her surgeon tomorrow.  If early delivery is recommended, further arrangements can be made.  3.  Recent breast imaging was consistent with fibroadenoma.  Counseled.  4.  Ultrasound today shows an estimated fetal weight in the 78th percentile with a fluid index of 20.  The patient passed her 3-hour glucose tolerance test, but may be a gestational diabetic.  I recommend a maternal-fetal medicine consult for education regarding glycemic monitoring and diabetic diet.  Further recommendations pending this visit.  Patient agrees with this recommendation.

## 2023-02-03 ENCOUNTER — TELEPHONE (OUTPATIENT)
Dept: OBSTETRICS AND GYNECOLOGY | Facility: CLINIC | Age: 33
End: 2023-02-03
Payer: COMMERCIAL

## 2023-02-03 NOTE — TELEPHONE ENCOUNTER
Pt informed, referral was entered on 2/2/23, so they should have seen it in her chart.  However, referral has now been set for scheduling and sent to [facility].  Pt aware they will call her to schedule, but she may call them back if she would like.  [phone]

## 2023-02-03 NOTE — TELEPHONE ENCOUNTER
Provider: DR VALDEZ  Caller: TIM RASCON  Relationship to Patient: SELF  Reason for Call: PT CALLED REGARDING REFERRAL TO FETAL MED SPEC - TIM RAMSEY.  PT ATTEMPTED TO SCHEDULE WITH DR RAMSEY AND THEY HAVE NOT RECEIVED THE REFERRAL.    PLS CALL PT TO CONFIRM THIS HAS BEEN SUBMITTED.

## 2023-02-06 ENCOUNTER — PATIENT OUTREACH (OUTPATIENT)
Dept: LABOR AND DELIVERY | Facility: HOSPITAL | Age: 33
End: 2023-02-06
Payer: COMMERCIAL

## 2023-02-06 ENCOUNTER — TELEPHONE (OUTPATIENT)
Dept: OBSTETRICS AND GYNECOLOGY | Facility: CLINIC | Age: 33
End: 2023-02-06
Payer: COMMERCIAL

## 2023-02-06 RX ORDER — LANCETS 28 GAUGE
EACH MISCELLANEOUS
Qty: 15 EACH | Refills: 12 | Status: SHIPPED | OUTPATIENT
Start: 2023-02-06

## 2023-02-06 RX ORDER — BLOOD-GLUCOSE METER
KIT MISCELLANEOUS
Qty: 1 EACH | Refills: 0 | Status: SHIPPED | OUTPATIENT
Start: 2023-02-06

## 2023-02-06 NOTE — TELEPHONE ENCOUNTER
Call made to patient to discuss MFM diabetes management as recommended by OB. Reviewed with patient checking blood sugar 7 times a day: before meals, one hour after meals and before bedtime snack. Reviewed blood sugar goals of 60-90 before meals and  one hour after eating. Will send her glucometer, test strips and lancets to the pharmacy today. Encouraged patient to start checking blood sugar as recommended above once she is able to  supplies. Pt verbalized understanding. Pt to call MFM office if she has not been able to  supplies by 2/8. [employer] message sent to patient with glucose log attached and all recommendations listed. MFM appointment scheduled for 2/15 at 11AM for US and to discuss blood sugar logs.

## 2023-02-06 NOTE — OUTREACH NOTE
[facility]  Intake    Current Estimated Gestational Age: 35w0d    Intake Assessment    Flowsheet Row Responses   Best Method for Contacting Cell   Currently Employed No   Able to keep appointments as scheduled Yes   Gender(s) and Name(s) boy   Do you have a dentist? No   Resources Presently Utilizing: WIC (Women, Infant, Children)   Maternal Warning Signs Provided   Other Education Birth Plan, Insurance benefits/Incentives, How to find a dentist          Learning Assessment    Flowsheet Row Responses   Relationship Patient   Learner Name Beena   Does the learner have any barriers to learning? No Barriers   What is the preferred language of the learner for medical teaching? English   Is an  required? No   How does the learner prefer to learn new concepts? Listening, Reading, Demonstration, Pictures/Video        [facility]  Check-In    Current Estimated Gestational Age: 35w0d    Questions/Answers    Flowsheet Row Responses   Best Method for Contacting Cell   Demographics Reviewed Yes   Currently Employed No   Able to keep appointments as scheduled Yes   Gender(s) and Name(s) boy   Baby Active/Feeling Fetal Movemen Yes   How are you presently feeling? ok, lots going on this pregnancy   May I ask you questions about your substance use? Yes   Other Comment denies   Supplies ready for baby Car Seat, Crib, Clothing, Diapers, Feeding Supplies   Resource/Environmental Concerns Financial   Do you have any questions related to your care experience, your pregnancy, plans for delivery, any concerns, etc? No   Other Education Birth Plan, Insurance benefits/Incentives, How to find a dentist        Intake completed today. Pt reports that she has a lot going on this pregnancy with her desmoid tumor and seeing MFM next week. She currently lives at her dad's house with her other kids and reports that this is stable but she would like to find a place of her own. Resources sent. She gets WIC and is  reapplying for SNAP. Unsure about being able to breastfeed because of the anti-hormonal treatment she will likely take for her tumor after the pregnancy. She has the necessary infant supplies but sent her info about the maternity pantry to get more clothing/diapers if desired. She will be seeing MFM next week and has transportation. Will f/u inpatient after delivery.     Shahla Hay RN  Maternity Nurse Navigator    2/6/2023, 11:41 EST

## 2023-02-07 DIAGNOSIS — O24.419 GESTATIONAL DIABETES MELLITUS (GDM) IN THIRD TRIMESTER, GESTATIONAL DIABETES METHOD OF CONTROL UNSPECIFIED: Primary | ICD-10-CM

## 2023-02-10 ENCOUNTER — HOSPITAL ENCOUNTER (OUTPATIENT)
Dept: DIABETES SERVICES | Facility: HOSPITAL | Age: 33
Discharge: HOME | End: 2023-02-10
Admitting: OBSTETRICS & GYNECOLOGY
Payer: COMMERCIAL

## 2023-02-10 PROCEDURE — G0108 DIAB MANAGE TRN  PER INDIV: HCPCS

## 2023-02-10 NOTE — CONSULTS
Patient met with RN CDCES for gestational diabetes education. Consistent with the ADA’s standards of care, a comprehensive assessment/training record has been sent to medical records to scan and associate with this encounter.     Patient has been encouraged to call our office with questions or additional education needs. Please place referral for additional services or follow-up should need arise.     Thank you for the referral.      Maddi West RN, CORBINES  Diabetes Management

## 2023-02-14 NOTE — PROGRESS NOTES
MATERNAL FETAL MEDICINE F/U  Note    Dear Dr Arsh Ray MD:    Thank you for your kind referral of Beena Vincent.  As you know, she is a 32 y.o.    at   36w1d  (Estimated Date of Delivery: 3/13/23). This is a follow-up consult.      Her antepartum course is complicated by:  Potentially GDM  Desmoid tumor of the knee  Asthma  Hx of Gestational HTN during 2nd pregnancy and induced at 39wks   Hx of Pre-eclampsia in first pregnancy @ 38wks    Aneuploidy Screening:  low risk cell free DNA       HPI: Today, she denies headache, blurry vision, RUQ pain. No vaginal bleeding, vaginal leakage, no contractions and states she is feeling her baby moving well.      Review of History:  Past Medical History:   Diagnosis Date   • Asthma     inhaler as needed   • Cervical dysplasia    • Depression    • Desmoid tumor     back of right knee- pt desires to proceed with treatments after delivery   • Gestational hypertension    • Iron deficiency anemia    • Preeclampsia      Past Surgical History:   Procedure Laterality Date   • APPENDECTOMY N/A 10/1/2021    Procedure: APPENDECTOMY LAPAROSCOPIC;  Surgeon: Ahmet Dong Jr., MD;  Location: [facility];  Service: General;  Laterality: N/A;   • KNEE ARTHROSCOPY      AGE 6   • LEEP N/A 2019    Procedure: LOOP ELECTROCAUTERY EXCISION PROCEDURE;  Surgeon: Arsh Ray MD;  Location: [facility];  Service: Obstetrics/Gynecology   • SOFT TISSUE BIOPSY Right 3/23/2022    Procedure: BIOPSY SOFT TISSUE THIGH / KNEE;  Surgeon: Chris Randall MD;  Location: [facility];  Service: Orthopedics;  Laterality: Right;   • WISDOM TOOTH EXTRACTION         OB Hx:       Social History     Socioeconomic History   • Marital status: Single   Tobacco Use   • Smoking status: Former     Packs/day: 0.50     Years: 4.00     Pack years: 2.00     Types: Cigarettes     Quit date: 2020     Years since quitting: 3.0   • Smokeless tobacco: Never   Vaping Use   • Vaping Use:  "Some days   • Substances: Nicotine   • Devices: Disposable   Substance and Sexual Activity   • Alcohol use: Not Currently     Comment: socially   • Drug use: Yes     Types: Marijuana     Comment: daily   • Sexual activity: Not Currently     Family History   Problem Relation Age of Onset   • Diabetes Father    • Arthritis Maternal Grandmother    • Lung cancer Maternal Grandfather    • Heart attack Maternal Grandfather    • Heart disease Maternal Grandfather    • Stroke Maternal Grandfather    • Hyperlipidemia Maternal Grandfather    • Malig Hyperthermia Neg Hx    • Breast cancer Neg Hx    • Ovarian cancer Neg Hx    • Uterine cancer Neg Hx    • Colon cancer Neg Hx       Allergies   Allergen Reactions   • Penicillins Nausea And Vomiting   • Adhesive Tape Rash     \"SURGICAL TAPE\"  AS A CHILD      Current Outpatient Medications on File Prior to Visit   Medication Sig Dispense Refill   • albuterol sulfate  (90 Base) MCG/ACT inhaler Inhale 2 puffs Every 4 (Four) Hours As Needed for Wheezing. 18 g 0   • ferrous sulfate 325 (65 FE) MG tablet Take 1 tablet by mouth 2 (Two) Times a Day With Meals. 60 tablet 5   • glucose blood test strip Use as instructed 7xs daily for blood sugar 200 each 5   • glucose monitor monitoring kit To check blood sugars 7x daily 1 each 0   • Lancets (freestyle) lancets Pt to check blood sugar 7xs daily 15 each 12   • Prenatal Vit-Fe Fumarate-FA (Prenatabs FA) 29-1 MG tablet Take 1 tablet by mouth Daily. 30 tablet 11   • ondansetron (ZOFRAN) 4 MG tablet Take 1 tablet by mouth Every 8 (Eight) Hours As Needed for Nausea or Vomiting. 30 tablet 3     No current facility-administered medications on file prior to visit.        Past obstetric, gynecological, medical, surgical, family and social history reviewed.  Relevant lab work and imaging reviewed.    Review of systems  Constitutional:  denies fever, chills, malaise.   ENT/Mouth:  denies sore throat, tinnitis  Eyes: denies vision " changes/pain  CV:  denies chest pain  Respiratory:  denies cough/SOB  GI:  denies N/V, diarrhea, abdominal pain.    :   denies dysuria  Skin:  denies lesions or pruritis   Neuro:  denies weakness, focal neurologic symptoms    Vitals:    02/15/23 1123   BP: 121/71   BP Location: Right arm   Patient Position: Sitting   Pulse: 94   Temp: 98.2 °F (36.8 °C)   TempSrc: Temporal   Weight: 86.2 kg (190 lb)       PHYSICAL EXAM   GENERAL: Not in acute distress, AAOx3, pleasant  CARDIO: regular rate and rhythm  PULM: symmetric chest rise, speaking in complete sentences without difficulty  NEURO: awake, alert and oriented to person, place, and time  ABDOMINAL: No fundal tenderness, no rebound or guarding, gravid  EXTREMITIES: no bilateral lower extremity edema/tenderness  SKIN: Warm, well-perfused      ULTRASOUND   Please view full ultrasound note on Imaging tab in ViewPoint.  Reviewed and signed by Dr. Beena Quezada  Impression  =========     Cephalic presentation.  Anterior placenta.  VALENTIN 16.6 cm, which is normal.  EFW 3429 g (84%, AC 96%)  BPP         Recommendation  ===============     See epic consult note.      ASSESSMENT/COUNSELIN y.o.  at  36w2d  (Estimated Date of Delivery: 3/13/23)    -Pregnancy  [ X ] stable  [   ] improving [  ] worsening    Diagnoses and all orders for this visit:    1. Gestational diabetes mellitus (GDM) affecting third pregnancy (Primary)  -     [facility]; Future    2. Hx of preeclampsia, prior pregnancy, currently pregnant    Other orders  -     metFORMIN ER (GLUCOPHAGE-XR) 500 MG 24 hr tablet; Take 1 tablet by mouth 2 (Two) Times a Day With Meals.  Dispense: 60 tablet; Refill: 2           Gestational Diabetes   [  ]  well-controlled  [  ] good-control  [ X] fair-control [  ] poorly-controlled  [  ] non-compliant  [ X ] stable  [   ] improving [  ] worsening    Glycemic profiling reviewed with pt today and after her diabetes education counseling, is  doing much better on controlling her blood sugars through her dietary intake.  Discussed with the pt how well she was doing, but will add Metformin XR, 500mg BID to be taken to add to the work she is currently doing.  Pt agrees.      Extensive diabetes counseling today -  morbidity associated with diabetes in pregnancy reviewed, including but not limited to: spontaneous , errors in organogenesis including increased risk of heart and skeletal defects, fetal macrosomia,  hypoglycemia, shoulder dystocia, increased risk of preeclampsia. We reviewed goals of fasting 60 - 90 and 1 hour postprandial< 120 throughout pregnancy.  I also counseled her regarding risk of hypoglycemia (maternal and fetal risks) and recommended bringing her log weekly for review to her primary OB office and to all [facility] office visits.    Hx of Preeclampsia   We reviewed signs and symptoms of preeclampsia which the pt is familiar with since she has had it in a previous pregnancy.  She denies any of those symptoms today, but will watch for them in the future.  Her blood pressure was in normal limits today.      Reviewed u/s with pt today.  Everything wnl today.    Plan  -Serial growth ultrasounds every 3 - 4 weeks   -Biweekly  Fetal Surveillance to be shared bt my office and OB ( with [facility], and Thursday with Dr. Ray)  -Bring blood sugar logs to all appts for review  -Starting at 28 weeks: Fetal movement instructions given continue daily until delivery; instructed to report to labor and delivery if cannot achieve more than 10 kicks in one hour or if she perceives a decrease in fetal movement      Follow-up:  Monday for BPP at [facility]       Thank you for the consult and opportunity to care for this patient.  Please feel free to reach out with any questions or concerns.      I spent 30 minutes caring for this patient on this date of service. This time includes time spent by me in the following activities: preparing  for the visit, reviewing tests, obtaining and/or reviewing a separately obtained history, performing a medically appropriate examination and/or evaluation, counseling and educating the patient/family/caregiver and independently interpreting results and communicating that information with the patient/family/caregiver with greater than 50% spent in counseling and coordination of care.     BERTHA Gonzalez, [facility]-BC  Maternal Fetal Medicine-[facility]  Office: [phone]  [email or web address]

## 2023-02-15 ENCOUNTER — OFFICE VISIT (OUTPATIENT)
Dept: OBSTETRICS AND GYNECOLOGY | Facility: CLINIC | Age: 33
End: 2023-02-15
Payer: COMMERCIAL

## 2023-02-15 ENCOUNTER — HOSPITAL ENCOUNTER (OUTPATIENT)
Dept: ULTRASOUND IMAGING | Facility: HOSPITAL | Age: 33
Discharge: HOME | End: 2023-02-15
Admitting: OBSTETRICS & GYNECOLOGY
Payer: COMMERCIAL

## 2023-02-15 VITALS
SYSTOLIC BLOOD PRESSURE: 121 MMHG | WEIGHT: 190 LBS | TEMPERATURE: 98.2 F | HEART RATE: 94 BPM | BODY MASS INDEX: 27.82 KG/M2 | DIASTOLIC BLOOD PRESSURE: 71 MMHG

## 2023-02-15 DIAGNOSIS — Z3A.34 34 WEEKS GESTATION OF PREGNANCY: ICD-10-CM

## 2023-02-15 DIAGNOSIS — O09.299 HX OF PREECLAMPSIA, PRIOR PREGNANCY, CURRENTLY PREGNANT: ICD-10-CM

## 2023-02-15 DIAGNOSIS — O24.419 GESTATIONAL DIABETES MELLITUS (GDM) AFFECTING THIRD PREGNANCY: Primary | ICD-10-CM

## 2023-02-15 LAB — GLUCOSE BLDC GLUCOMTR-MCNC: 113 MG/DL (ref 70–130)

## 2023-02-15 PROCEDURE — 82962 GLUCOSE BLOOD TEST: CPT

## 2023-02-15 PROCEDURE — 76816 OB US FOLLOW-UP PER FETUS: CPT

## 2023-02-15 PROCEDURE — 76816 OB US FOLLOW-UP PER FETUS: CPT | Performed by: OBSTETRICS & GYNECOLOGY

## 2023-02-15 PROCEDURE — 76819 FETAL BIOPHYS PROFIL W/O NST: CPT | Performed by: OBSTETRICS & GYNECOLOGY

## 2023-02-15 PROCEDURE — 76819 FETAL BIOPHYS PROFIL W/O NST: CPT

## 2023-02-15 PROCEDURE — 99214 OFFICE O/P EST MOD 30 MIN: CPT | Performed by: NURSE PRACTITIONER

## 2023-02-15 RX ORDER — METFORMIN HYDROCHLORIDE 500 MG/1
500 TABLET, EXTENDED RELEASE ORAL 2 TIMES DAILY WITH MEALS
Qty: 60 TABLET | Refills: 2 | Status: SHIPPED | OUTPATIENT
Start: 2023-02-15 | End: 2023-03-11 | Stop reason: HOSPADM

## 2023-02-15 NOTE — PROGRESS NOTES
Pt reports that she is doing well and denies vaginal bleeding, cramping, contractions or LOF at this time. Notes occasional abdominal tightness and cramping that is relieved with rest and hydration. Reports active fetal movement. Reviewed when to call OB office or present to L&D for evaluation with symptoms such as decreased fetal movement, vaginal bleeding, LOF or ctxs. Pt verbalized understanding. Denies HA, visual changes or epigastric pain. Next OB appointment scheduled for 2/16.  Blood sugar logs brought to appointment for MFM to review. Pt saw diabetic education on 2/10 and reported a helpful consult. Blood sugar taken at today's appointment with a value of 113 noted. Pt provided folder including MFM diabetes packet, hypoglycemia packet and extra glucose logs.     Vitals:    02/15/23 1123   BP: 121/71   Pulse: 94   Temp: 98.2 °F (36.8 °C)

## 2023-02-16 ENCOUNTER — ROUTINE PRENATAL (OUTPATIENT)
Dept: OBSTETRICS AND GYNECOLOGY | Facility: CLINIC | Age: 33
End: 2023-02-16
Payer: COMMERCIAL

## 2023-02-16 VITALS — DIASTOLIC BLOOD PRESSURE: 62 MMHG | SYSTOLIC BLOOD PRESSURE: 108 MMHG | BODY MASS INDEX: 27.85 KG/M2 | WEIGHT: 190.2 LBS

## 2023-02-16 DIAGNOSIS — Z3A.36 36 WEEKS GESTATION OF PREGNANCY: Primary | ICD-10-CM

## 2023-02-16 PROCEDURE — 99214 OFFICE O/P EST MOD 30 MIN: CPT | Performed by: OBSTETRICS & GYNECOLOGY

## 2023-02-16 NOTE — PROGRESS NOTES
CC: Obstetric visit    HPI: 32-year-old  3 para 2 presents at 36-3/7 weeks for her obstetric visit.  Her baby is moving actively.  She has rare contractions.  Started metformin over the weekend.  Reports that her fasting sugars are now in the 70s to 80s and 1 hour postprandials are approximately 120.    Review of systems    This is negative for fever or chills.  Negative for nausea or vomiting.  Negative for abdominal or pelvic pain.  Negative for vaginal bleeding.    Physical examination    The abdomen is soft and gravid.  There is no epigastric tenderness.  No fundal tenderness.  No suprapubic tenderness.  Pelvic examination reveals normal female external genitalia.  The vagina is estrogenized.  There is no blood in the vault.  The cervix is posterior and 0.5 cm.  Extremities are equal in size    Assessment and plan    1.  Intrauterine pregnancy at 36-3/7 weeks  2.  Group B strep cultures performed today.  Counseled.  3.  Desmoid tumor.  Counseled.  The patient reports that she has visited with her orthopedic surgeon who does not feel that early delivery would assist in care of this situation.  Records from that visit are not available for my review today.  The patient has an appointment scheduled with her oncologist for after delivery.  3.  Gestational diabetes.  Counseled.  The patient has started metformin.  She reports good glycemic control at this point.  She has a follow-up visit with maternal-fetal medicine early next week.  We discussed timing of delivery.  The patient does not wish to deliver early unless it is absolutely necessary.

## 2023-02-20 ENCOUNTER — TRANSCRIBE ORDERS (OUTPATIENT)
Dept: ULTRASOUND IMAGING | Facility: HOSPITAL | Age: 33
End: 2023-02-20
Payer: COMMERCIAL

## 2023-02-20 ENCOUNTER — OFFICE VISIT (OUTPATIENT)
Dept: OBSTETRICS AND GYNECOLOGY | Facility: CLINIC | Age: 33
End: 2023-02-20
Payer: COMMERCIAL

## 2023-02-20 ENCOUNTER — HOSPITAL ENCOUNTER (OUTPATIENT)
Dept: ULTRASOUND IMAGING | Facility: HOSPITAL | Age: 33
Discharge: HOME | End: 2023-02-20
Admitting: NURSE PRACTITIONER
Payer: COMMERCIAL

## 2023-02-20 VITALS
SYSTOLIC BLOOD PRESSURE: 118 MMHG | HEART RATE: 80 BPM | TEMPERATURE: 98.4 F | WEIGHT: 191.4 LBS | DIASTOLIC BLOOD PRESSURE: 78 MMHG | HEIGHT: 69 IN | BODY MASS INDEX: 28.35 KG/M2

## 2023-02-20 DIAGNOSIS — O09.299 HX OF PREECLAMPSIA, PRIOR PREGNANCY, CURRENTLY PREGNANT: ICD-10-CM

## 2023-02-20 DIAGNOSIS — O24.419 GESTATIONAL DIABETES MELLITUS (GDM) AFFECTING THIRD PREGNANCY: ICD-10-CM

## 2023-02-20 DIAGNOSIS — O24.419 GESTATIONAL DIABETES MELLITUS (GDM) AFFECTING THIRD PREGNANCY: Primary | ICD-10-CM

## 2023-02-20 DIAGNOSIS — O24.913 DIABETES MELLITUS DURING PREGNANCY IN THIRD TRIMESTER, UNSPECIFIED DIABETES MELLITUS TYPE: Primary | ICD-10-CM

## 2023-02-20 LAB — B-HEM STREP SPEC QL CULT: NEGATIVE

## 2023-02-20 PROCEDURE — 76819 FETAL BIOPHYS PROFIL W/O NST: CPT | Performed by: OBSTETRICS & GYNECOLOGY

## 2023-02-20 PROCEDURE — 76819 FETAL BIOPHYS PROFIL W/O NST: CPT

## 2023-02-20 PROCEDURE — 82962 GLUCOSE BLOOD TEST: CPT

## 2023-02-20 PROCEDURE — 99213 OFFICE O/P EST LOW 20 MIN: CPT | Performed by: NURSE PRACTITIONER

## 2023-02-20 NOTE — PROGRESS NOTES
MATERNAL FETAL MEDICINE F/U Note    Dear Dr Arsh Ray MD:    Thank you for your kind referral of Beena Vincent.  As you know, she is a 32 y.o.    at   37w0d  (Estimated Date of Delivery: 3/13/23). This is a follow-up consult.     Her antepartum course is complicated by:  Potentially GDM  Desmoid tumor of the knee  Asthma  Hx of Gestational HTN during 2nd pregnancy and induced at 39wks   Hx of Pre-eclampsia in first pregnancy @ 38wks    Aneuploidy Screening:  low risk cell free DNA       HPI: Today, she denies headache, blurry vision, RUQ pain. No vaginal bleeding, vaginal leakage, no contractions, and patient describes fetal movement as vigourous    Review of History:  Past Medical History:   Diagnosis Date   • Asthma     inhaler as needed   • Cervical dysplasia    • Depression    • Desmoid tumor     back of right knee- pt desires to proceed with treatments after delivery   • Gestational hypertension    • Iron deficiency anemia    • Preeclampsia      Past Surgical History:   Procedure Laterality Date   • APPENDECTOMY N/A 10/1/2021    Procedure: APPENDECTOMY LAPAROSCOPIC;  Surgeon: Ahmet Dong Jr., MD;  Location: [facility];  Service: General;  Laterality: N/A;   • KNEE ARTHROSCOPY      AGE 6   • LEEP N/A 2019    Procedure: LOOP ELECTROCAUTERY EXCISION PROCEDURE;  Surgeon: Arsh Ray MD;  Location: [facility];  Service: Obstetrics/Gynecology   • SOFT TISSUE BIOPSY Right 3/23/2022    Procedure: BIOPSY SOFT TISSUE THIGH / KNEE;  Surgeon: Chris Randall MD;  Location: [facility];  Service: Orthopedics;  Laterality: Right;   • WISDOM TOOTH EXTRACTION         OB Hx:     37w0d   OB History    Para Term  AB Living   3 2 2     2   SAB IAB Ectopic Molar Multiple Live Births           0 1      # Outcome Date GA Lbr Matthias/2nd Weight Sex Delivery Anes PTL Lv   3 Current            2 Term 20 39w1d 12:20 / 00:36 3780 g (8 lb 5.3 oz) F Vag-Spont EPI N JAMIE      " Birth Comments: scale 1   1 Term 12 38w0d    Vag-Spont EPI        Birth Comments: Preeclampsia      Obstetric Comments    x2  Induced for preeclampsia with the first pregnancy and for gestational hypertension at 39 weeks with a second pregnancy.         Social History     Socioeconomic History   • Marital status: Single   Tobacco Use   • Smoking status: Former     Packs/day: 0.50     Years: 4.00     Pack years: 2.00     Types: Cigarettes     Quit date: 2020     Years since quitting: 3.0   • Smokeless tobacco: Never   Vaping Use   • Vaping Use: Some days   • Substances: Nicotine   • Devices: Disposable   Substance and Sexual Activity   • Alcohol use: Not Currently     Comment: socially   • Drug use: Yes     Types: Marijuana     Comment: daily   • Sexual activity: Not Currently     Family History   Problem Relation Age of Onset   • Diabetes Father    • Arthritis Maternal Grandmother    • Lung cancer Maternal Grandfather    • Heart attack Maternal Grandfather    • Heart disease Maternal Grandfather    • Stroke Maternal Grandfather    • Hyperlipidemia Maternal Grandfather    • Malig Hyperthermia Neg Hx    • Breast cancer Neg Hx    • Ovarian cancer Neg Hx    • Uterine cancer Neg Hx    • Colon cancer Neg Hx       Allergies   Allergen Reactions   • Penicillins Nausea And Vomiting   • Adhesive Tape Rash     \"SURGICAL TAPE\"  AS A CHILD      Current Outpatient Medications on File Prior to Visit   Medication Sig Dispense Refill   • albuterol sulfate  (90 Base) MCG/ACT inhaler Inhale 2 puffs Every 4 (Four) Hours As Needed for Wheezing. 18 g 0   • ferrous sulfate 325 (65 FE) MG tablet Take 1 tablet by mouth 2 (Two) Times a Day With Meals. 60 tablet 5   • glucose blood test strip Use as instructed 7xs daily for blood sugar 200 each 5   • glucose monitor monitoring kit To check blood sugars 7x daily 1 each 0   • Lancets (freestyle) lancets Pt to check blood sugar 7xs daily 15 each 12   • metFORMIN ER " "(GLUCOPHAGE-XR) 500 MG 24 hr tablet Take 1 tablet by mouth 2 (Two) Times a Day With Meals. 60 tablet 2   • ondansetron (ZOFRAN) 4 MG tablet Take 1 tablet by mouth Every 8 (Eight) Hours As Needed for Nausea or Vomiting. 30 tablet 3   • Prenatal Vit-Fe Fumarate-FA (Prenatabs FA) 29-1 MG tablet Take 1 tablet by mouth Daily. 30 tablet 11     No current facility-administered medications on file prior to visit.        Past obstetric, gynecological, medical, surgical, family and social history reviewed.  Relevant lab work and imaging reviewed.    Review of systems  Constitutional:  denies fever, chills, malaise.   ENT/Mouth:  denies sore throat, tinnitis  Eyes: denies vision changes/pain  CV:  denies chest pain  Respiratory:  denies cough/SOB  GI:  denies N/V, diarrhea, abdominal pain.    :   denies dysuria  Skin:  denies lesions or pruritis   Neuro:  denies weakness, focal neurologic symptoms    Vitals:    23 1218   BP: 118/78   BP Location: Right arm   Patient Position: Sitting   Pulse: 80   Temp: 98.4 °F (36.9 °C)   TempSrc: Temporal   Weight: 86.8 kg (191 lb 6.4 oz)   Height: 175.3 cm (69\")       PHYSICAL EXAM   GENERAL: Not in acute distress, AAOx3, pleasant  CARDIO: regular rate and rhythm  PULM: symmetric chest rise, speaking in complete sentences without difficulty  NEURO: awake, alert and oriented to person, place, and time  ABDOMINAL: No fundal tenderness, no rebound or guarding, gravid  EXTREMITIES: no bilateral lower extremity edema/tenderness  SKIN: Warm, well-perfused      ULTRASOUND   Please view full ultrasound note on Imaging tab in ViewPoint.  Reviewed and signed by Dr. Beena Quezada    Impression  =========    Cephalic presentation.  Anterior placenta.  VALENTIN 15 cm, which is normal.  BPP 8/8      Recommendation  ===============    See epic consult note.          ASSESSMENT/COUNSELIN y.o.  at  37w0d  (Estimated Date of Delivery: 3/13/23)    -Pregnancy  [ X ] stable  [   ] improving [  ] " worsening    Diagnoses and all orders for this visit:    1. Gestational diabetes mellitus (GDM) affecting third pregnancy (Primary)    2. Hx of preeclampsia, prior pregnancy, currently pregnant       Gestational Diabetes   [  ]  well-controlled  [  X  ] good-control  [ ] fair-control [  ] poorly-controlled  [  ] non-compliant  [ X ] stable  [   ] improving [  ] worsening     Glycemic profiling reviewed with pt today and after her diabetes education counseling, is doing much better on controlling her blood sugars through her dietary intake.  Discussed with the pt how well she was doing, but I added Metformin XR, 500mg BID last week and she is doing well.       Extensive diabetes counseling today -  morbidity associated with diabetes in pregnancy reviewed, including but not limited to: spontaneous , errors in organogenesis including increased risk of heart and skeletal defects, fetal macrosomia,  hypoglycemia, shoulder dystocia, increased risk of preeclampsia. We reviewed goals of fasting 60 - 90 and 1 hour postprandial< 120 throughout pregnancy.  I also counseled her regarding risk of hypoglycemia (maternal and fetal risks) and recommended bringing her log weekly for review to her primary OB office and to all MFM office visits.     Hx of Preeclampsia   We reviewed signs and symptoms of preeclampsia which the pt is familiar with since she has had it in a previous pregnancy.  She denies any of those symptoms today, but will watch for them in the future.  Her blood pressure was in normal limits today.       Reviewed u/s with pt today.  Everything wnl today.      Plan  -Serial growth ultrasounds every 3 - 4 weeks   -Biweekly  Fetal Surveillance to be shared bt my office and OB ( with ANABELLA, and Thursday with Dr. Ray)  -Bring blood sugar logs to all appts for review  -Starting at 28 weeks: Fetal movement instructions given continue daily until delivery; instructed to report to  labor and delivery if cannot achieve more than 10 kicks in one hour or if she perceives a decrease in fetal movement  -Delivery recommendation between 38-39wks with good glycemic control.       Follow-up:  Monday for [facility]PP at [facility]       Thank you for the consult and opportunity to care for this patient.  Please feel free to reach out with any questions or concerns.      I spent 20 minutes caring for this patient on this date of service. This time includes time spent by me in the following activities: preparing for the visit, reviewing tests, obtaining and/or reviewing a separately obtained history, performing a medically appropriate examination and/or evaluation, counseling and educating the patient/family/caregiver and independently interpreting results and communicating that information with the patient/family/caregiver with greater than 50% spent in counseling and coordination of care.     BERTHA Gonzalez, BELLE-BC  Maternal Fetal Medicine-[facility]  Office: [phone]  [email or web address]

## 2023-02-20 NOTE — PROGRESS NOTES
Pt. Reports that she is doing well and denies vaginal bleeding, cramping, contractions or leaking of fluid at this time. Reports active fetal movement.  Denies headache, visual changes or epigastric pain.  Denies any additional complaints at time of appointment.  Next OB appointment scheduled for 2/25/23    Vitals:    02/20/23 1218   BP: 118/78   Pulse: 80   Temp: 98.4 °F (36.9 °C)

## 2023-02-22 ENCOUNTER — TELEPHONE (OUTPATIENT)
Dept: OBSTETRICS AND GYNECOLOGY | Facility: CLINIC | Age: 33
End: 2023-02-22
Payer: COMMERCIAL

## 2023-02-24 ENCOUNTER — ROUTINE PRENATAL (OUTPATIENT)
Dept: OBSTETRICS AND GYNECOLOGY | Facility: CLINIC | Age: 33
End: 2023-02-24
Payer: COMMERCIAL

## 2023-02-24 VITALS — SYSTOLIC BLOOD PRESSURE: 115 MMHG | WEIGHT: 191 LBS | DIASTOLIC BLOOD PRESSURE: 74 MMHG | BODY MASS INDEX: 28.21 KG/M2

## 2023-02-24 DIAGNOSIS — Z86.2 HISTORY OF ANEMIA: ICD-10-CM

## 2023-02-24 DIAGNOSIS — O24.419 GDM, CLASS A2: ICD-10-CM

## 2023-02-24 DIAGNOSIS — F41.8 ANXIETY WITH DEPRESSION: ICD-10-CM

## 2023-02-24 DIAGNOSIS — Z3A.37 37 WEEKS GESTATION OF PREGNANCY: Primary | ICD-10-CM

## 2023-02-24 DIAGNOSIS — D48.1 DESMOID TUMOR: ICD-10-CM

## 2023-02-24 LAB
GLUCOSE UR STRIP-MCNC: NEGATIVE MG/DL
PROT UR STRIP-MCNC: ABNORMAL MG/DL

## 2023-02-24 PROCEDURE — 99213 OFFICE O/P EST LOW 20 MIN: CPT | Performed by: NURSE PRACTITIONER

## 2023-02-24 RX ORDER — BLOOD-GLUCOSE METER
KIT MISCELLANEOUS
COMMUNITY
Start: 2023-02-06

## 2023-02-24 NOTE — PROGRESS NOTES
Chief Complaint   Patient presents with   • Routine Prenatal Visit     OB follow up     Beena Vincent is a 32 y.o.  37w4d being seen today for her obstetrical visit.  Patient reports no complaints. Fetal movement: normal. She continue metformin. She reports normal glucose results, this is followed by MFM weekly. She has f/u scheduled with them in 4 days.     Review of Systems  Cramping/contractions: rare  Vaginal bleeding: denies  Fetal movement: normal    /74   Wt 86.6 kg (191 lb)   LMP 2022 (Exact Date)   BMI 28.21 kg/m²     Assessment/Plan    Diagnoses and all orders for this visit:    1. 37 weeks gestation of pregnancy (Primary)    2. History of anemia    3. Anxiety with depression    4. Desmoid tumor    5. GDM, class A2       Reviewed fetal kick counts  Reviewed S&S labor  GBS negative  Reports normal glucose, continue metformin. Keep scheduled appt with MFM  Offered to repeat BPP today, pt does not have time to stay for this today  Continue oral iron  Anxiety is well controlled  Reviewed this stage of pregnancy  Problem list updated     Follow up in 1 week(s) with Dr. Ray    I spent 21 minutes caring for Beena on this date of service. This time includes time spent by me in the following activities: preparing for the visit, reviewing tests, obtaining and/or reviewing a separately obtained history, performing a medically appropriate examination and/or evaluation, counseling and educating the patient/family/caregiver, ordering medications, tests, or procedures, referring and communicating with other health care professionals and documenting information in the medical record    Beena R Broderick, APRN  2023  10:45 EST

## 2023-02-27 LAB — GLUCOSE BLDC GLUCOMTR-MCNC: 78 MG/DL (ref 70–130)

## 2023-02-27 NOTE — PROGRESS NOTES
MATERNAL FETAL MEDICINE  Follow-up Note    Dear Dr Arsh Ray MD:    Thank you for your kind referral of Beena Vincent.  As you know, she is a 32 y.o.   at 38 1/7 weeks gestation (Estimated Date of Delivery: 3/13/23). This is a follow up    Her antepartum course is complicated by:  Desmoid tumor of the knee  Asthma  GDMA2    Aneuploidy Screening: low risk cell free DNA    HPI: Today, she denies headache, blurry vision, RUQ pain. No vaginal bleeding, no contractions.     Review of History:  Past Medical History:   Diagnosis Date   • Asthma     inhaler as needed   • Cervical dysplasia    • Depression    • Desmoid tumor     back of right knee- pt desires to proceed with treatments after delivery   • Gestational hypertension    • Iron deficiency anemia    • Preeclampsia      Past Surgical History:   Procedure Laterality Date   • APPENDECTOMY N/A 10/1/2021    Procedure: APPENDECTOMY LAPAROSCOPIC;  Surgeon: Ahmet Dong Jr., MD;  Location: [facility];  Service: General;  Laterality: N/A;   • KNEE ARTHROSCOPY      AGE 6   • LEEP N/A 2019    Procedure: LOOP ELECTROCAUTERY EXCISION PROCEDURE;  Surgeon: Arsh Ray MD;  Location: [facility], [place];  Service: Obstetrics/Gynecology   • SOFT TISSUE BIOPSY Right 3/23/2022    Procedure: BIOPSY SOFT TISSUE THIGH / KNEE;  Surgeon: Chris Randall MD;  Location: [facility];  Service: Orthopedics;  Laterality: Right;   • WISDOM TOOTH EXTRACTION           Social History     Socioeconomic History   • Marital status: Single   Tobacco Use   • Smoking status: Former     Packs/day: 0.50     Years: 4.00     Pack years: 2.00     Types: Cigarettes     Quit date: 2020     Years since quitting: 3.0   • Smokeless tobacco: Never   Vaping Use   • Vaping Use: Some days   • Substances: Nicotine   • Devices: Disposable   Substance and Sexual Activity   • Alcohol use: Not Currently     Comment: socially   • Drug use: Yes     Types: Marijuana     Comment:  "daily   • Sexual activity: Not Currently     Family History   Problem Relation Age of Onset   • Diabetes Father    • Arthritis Maternal Grandmother    • Lung cancer Maternal Grandfather    • Heart attack Maternal Grandfather    • Heart disease Maternal Grandfather    • Stroke Maternal Grandfather    • Hyperlipidemia Maternal Grandfather    • Malig Hyperthermia Neg Hx    • Breast cancer Neg Hx    • Ovarian cancer Neg Hx    • Uterine cancer Neg Hx    • Colon cancer Neg Hx       Allergies   Allergen Reactions   • Penicillins Nausea And Vomiting   • Adhesive Tape Rash     \"SURGICAL TAPE\"  AS A CHILD      Current Outpatient Medications on File Prior to Visit   Medication Sig Dispense Refill   • albuterol sulfate  (90 Base) MCG/ACT inhaler Inhale 2 puffs Every 4 (Four) Hours As Needed for Wheezing. 18 g 0   • Blood Glucose Monitoring Suppl (FreeStyle Lite) w/Device kit USE 1 SEVEN TIMES DAILY     • ferrous sulfate 325 (65 FE) MG tablet Take 1 tablet by mouth 2 (Two) Times a Day With Meals. 60 tablet 5   • glucose blood test strip Use as instructed 7xs daily for blood sugar 200 each 5   • glucose monitor monitoring kit To check blood sugars 7x daily 1 each 0   • Lancets (freestyle) lancets Pt to check blood sugar 7xs daily 15 each 12   • metFORMIN ER (GLUCOPHAGE-XR) 500 MG 24 hr tablet Take 1 tablet by mouth 2 (Two) Times a Day With Meals. 60 tablet 2   • Prenatal Vit-Fe Fumarate-FA (Prenatabs FA) 29-1 MG tablet Take 1 tablet by mouth Daily. 30 tablet 11   • ondansetron (ZOFRAN) 4 MG tablet Take 1 tablet by mouth Every 8 (Eight) Hours As Needed for Nausea or Vomiting. 30 tablet 3     No current facility-administered medications on file prior to visit.        Past obstetric, gynecological, medical, surgical, family and social history reviewed.  Relevant lab work and imaging reviewed.    Review of systems  Constitutional:  denies fever, chills, malaise.   ENT/Mouth:  denies sore throat, tinnitis  Eyes: denies vision " changes/pain  CV:  denies chest pain  Respiratory:  denies cough/SOB  GI:  denies N/V, diarrhea, abdominal pain.    :   denies dysuria  Skin:  denies lesions or pruritis   Neuro:  denies weakness, focal neurologic symptoms    Vitals:    23 1025   BP: 116/74   BP Location: Right arm   Patient Position: Sitting   Pulse: 86   Temp: 98 °F (36.7 °C)   TempSrc: Temporal   Weight: 86.3 kg (190 lb 3.2 oz)       PHYSICAL EXAM   GENERAL: Not in acute distress, AAOx3, pleasant  CARDIO: regular rate and rhythm  PULM: symmetric chest rise, speaking in complete sentences without difficulty  NEURO: awake, alert and oriented to person, place, and time  ABDOMINAL: No fundal tenderness, no rebound or guarding, gravid  EXTREMITIES: no bilateral lower extremity edema/tenderness  SKIN: Warm, well-perfused      ULTRASOUND   Please view full ultrasound note on Imaging tab in ViewPoint.  Cephalic presentation  Anterior placenta.  VALENTIN 17.3 cm, which is normal.  BPP     ASSESSMENT/COUNSELIN y.o.   at 38 1/7 weeks gestation (Estimated Date of Delivery: 3/13/23).     -Pregnancy  [ X ] stable  [   ] improving [  ] worsening    Diagnoses and all orders for this visit:    1. GDM, class A2 (Primary)    2. Desmoid tumor        GDMA2  Glycemic profiling reviewed--pt doing well.  Some mildly elevated preprandials but dropping some in middle of the day with activity.  Discussed keeping metformin the same and doing a slightly larger snack in afternoon .  Recommend delivery 39 weeks.        Desmoid tumor:  She was found to have a desmoid tumor behind her right knee in 2022. Surgery has been postponed and the tumor is just being monitored currently. Genetic testing was negative for known deleterious/pathogenic (harmful) mutations by sequencing and rearrangement testing for the genes on this panel.  While no known deleterious mutations were identified, two variants of uncertain significance (VUS) were identified in the MAX  gene and the PTCH1 gene. She was counseled about this by genetics team.    Desmoid tumors are abnormal growths that arise from connective tissue and are rare, occurring in approximately 1-2 in 500,000 individuals. While the majority of desmoid tumors are sporadic, approximately 7-15% of desmoid tumors are associated with germline mutation in the APC gene and can be associated with familial adenomatous polyposis, which she tested negative for.       A MRI right knee done 3/11/22 demonstrated a 10 x 3.5 x 4.4 cm enhancing soft tissue mass lesion along the posterolateral right knee interposed between the distal biceps femoris and the lateral gastrocnemius muscles. The mass appears to infiltrate or arise from the distal biceps femoris. The lesion displaces and closely abuts the common peroneal nerve.       Patient was seen by , ortho - who obtained a biopsy of the mass on 3/23/22. 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  She reports the current plan with her oncologist after r/b discussion is to wait until after delivery for chemotherapy agents to try and shrink it so it can be removed.  She feels it has not grown but does not want to get an MRI at this time.  I counseled her that an MRI without contrast was perfectly safe in pregnancy if she or her oncologist wanted her to have this and felt like it would impact her care.    She met with Dr. Randall and early delivery was not thought to be helpful.  They will see after delivery.      Asthma  Controlled.       Summary of Plan  -Serial growth ultrasounds every 4 weeks  -Weekly ANFS for GDMA2 on Metformin  -Recommend delivery 39th week    Follow-up: 1 week if not delivered    Thank you for the consult and opportunity to care for this patient.  Please feel free to reach out with any questions or concerns.      I spent 10 minutes caring for this patient on this date of service. This time includes time spent by me in  the following activities: preparing for the visit, reviewing tests, obtaining and/or reviewing a separately obtained history, performing a medically appropriate examination and/or evaluation, counseling and educating the patient/family/caregiver and independently interpreting results and communicating that information with the patient/family/caregiver with greater than 50% spent in counseling and coordination of care.     3 min reading US.      Beena Quezada MD FACOG  Maternal Fetal Medicine-[facility]  Office: [phone]  [email or web address]

## 2023-02-28 ENCOUNTER — HOSPITAL ENCOUNTER (OUTPATIENT)
Dept: ULTRASOUND IMAGING | Facility: HOSPITAL | Age: 33
Discharge: HOME | End: 2023-02-28
Admitting: NURSE PRACTITIONER
Payer: COMMERCIAL

## 2023-02-28 ENCOUNTER — OFFICE VISIT (OUTPATIENT)
Dept: OBSTETRICS AND GYNECOLOGY | Facility: CLINIC | Age: 33
End: 2023-02-28
Payer: COMMERCIAL

## 2023-02-28 VITALS
BODY MASS INDEX: 28.09 KG/M2 | TEMPERATURE: 98 F | HEART RATE: 86 BPM | WEIGHT: 190.2 LBS | DIASTOLIC BLOOD PRESSURE: 74 MMHG | SYSTOLIC BLOOD PRESSURE: 116 MMHG

## 2023-02-28 DIAGNOSIS — O24.913 DIABETES MELLITUS DURING PREGNANCY IN THIRD TRIMESTER, UNSPECIFIED DIABETES MELLITUS TYPE: ICD-10-CM

## 2023-02-28 DIAGNOSIS — O24.419 GDM, CLASS A2: Primary | ICD-10-CM

## 2023-02-28 DIAGNOSIS — D48.1 DESMOID TUMOR: ICD-10-CM

## 2023-02-28 LAB — GLUCOSE BLDC GLUCOMTR-MCNC: 108 MG/DL (ref 70–130)

## 2023-02-28 PROCEDURE — 99213 OFFICE O/P EST LOW 20 MIN: CPT | Performed by: OBSTETRICS & GYNECOLOGY

## 2023-02-28 PROCEDURE — 76819 FETAL BIOPHYS PROFIL W/O NST: CPT | Performed by: OBSTETRICS & GYNECOLOGY

## 2023-02-28 PROCEDURE — 76819 FETAL BIOPHYS PROFIL W/O NST: CPT

## 2023-02-28 PROCEDURE — 82962 GLUCOSE BLOOD TEST: CPT

## 2023-02-28 NOTE — PROGRESS NOTES
Pt reports that she is doing well and denies vaginal bleeding or LOF at this time. Continues to report irregular ctxs and back pain. Reports active fetal movement. Reviewed when to call OB office or present to L&D for evaluation with symptoms such as decreased fetal movement, vaginal bleeding, LOF or ctxs. Pt verbalized understanding. Denies HA, visual changes or epigastric pain. Next OB appointment scheduled for 3/2.   Blood sugar logs on chart for Dr. Quezada to review. Blood sugar taken at time of appointment with a value of 108 noted.     Vitals:    02/28/23 1025   BP: 116/74   Pulse: 86   Temp: 98 °F (36.7 °C)

## 2023-03-02 ENCOUNTER — ROUTINE PRENATAL (OUTPATIENT)
Dept: OBSTETRICS AND GYNECOLOGY | Facility: CLINIC | Age: 33
End: 2023-03-02
Payer: COMMERCIAL

## 2023-03-02 VITALS — BODY MASS INDEX: 28.21 KG/M2 | SYSTOLIC BLOOD PRESSURE: 112 MMHG | WEIGHT: 191 LBS | DIASTOLIC BLOOD PRESSURE: 69 MMHG

## 2023-03-02 DIAGNOSIS — O24.410 DIET CONTROLLED GESTATIONAL DIABETES MELLITUS (GDM), ANTEPARTUM: Primary | ICD-10-CM

## 2023-03-02 LAB
GLUCOSE UR STRIP-MCNC: NEGATIVE MG/DL
PROT UR STRIP-MCNC: NEGATIVE MG/DL

## 2023-03-02 PROCEDURE — 99213 OFFICE O/P EST LOW 20 MIN: CPT | Performed by: OBSTETRICS & GYNECOLOGY

## 2023-03-02 NOTE — PROGRESS NOTES
CC: Obstetric visit    HPI: 32-year-old  3 para 2 presents at 38-3/7 weeks for obstetric visit.  Her baby is moving actively.  She has rare contractions.  Sugars are well controlled.    Review of systems    This is negative for fever or chills.  Negative for nausea or vomiting.  Negative for vaginal bleeding.    Physical examination    The abdomen is soft and gravid.  There is no epigastric tenderness.  No fundal tenderness.  No suprapubic tenderness.  Pelvic examination reveals normal female external genitalia.  There is no blood in the vaginal vault.  The cervix is 70% effaced, 1.5 cm dilated  Extremities are equal in size    Assessment and plan    1.  Intrauterine pregnancy at 38-3/7 weeks  2.  Gestational diabetes.  The patient would like to be induced next week.  This is also consistent with M recommendations.  We discussed the benefits and risks of an induction.  The patient has been scheduled for an induction with Pitocin on Wednesday.

## 2023-03-07 ENCOUNTER — PREP FOR SURGERY (OUTPATIENT)
Dept: OTHER | Facility: HOSPITAL | Age: 33
End: 2023-03-07
Payer: COMMERCIAL

## 2023-03-07 ENCOUNTER — HOSPITAL ENCOUNTER (OUTPATIENT)
Dept: ULTRASOUND IMAGING | Facility: HOSPITAL | Age: 33
Discharge: HOME | End: 2023-03-07
Admitting: FAMILY MEDICINE
Payer: COMMERCIAL

## 2023-03-07 ENCOUNTER — OFFICE VISIT (OUTPATIENT)
Dept: OBSTETRICS AND GYNECOLOGY | Facility: CLINIC | Age: 33
End: 2023-03-07
Payer: COMMERCIAL

## 2023-03-07 VITALS
RESPIRATION RATE: 16 BRPM | WEIGHT: 190.6 LBS | SYSTOLIC BLOOD PRESSURE: 121 MMHG | BODY MASS INDEX: 28.15 KG/M2 | DIASTOLIC BLOOD PRESSURE: 72 MMHG | TEMPERATURE: 98.4 F

## 2023-03-07 DIAGNOSIS — O24.410 GDM (GESTATIONAL DIABETES MELLITUS), CLASS A1: Primary | ICD-10-CM

## 2023-03-07 DIAGNOSIS — O24.410 DIET CONTROLLED GESTATIONAL DIABETES MELLITUS (GDM), ANTEPARTUM: Primary | ICD-10-CM

## 2023-03-07 DIAGNOSIS — D48.1 DESMOID TUMOR: ICD-10-CM

## 2023-03-07 LAB — GLUCOSE BLDC GLUCOMTR-MCNC: 84 MG/DL (ref 70–130)

## 2023-03-07 PROCEDURE — 76818 FETAL BIOPHYS PROFILE W/NST: CPT | Performed by: OBSTETRICS & GYNECOLOGY

## 2023-03-07 PROCEDURE — 82962 GLUCOSE BLOOD TEST: CPT

## 2023-03-07 PROCEDURE — 76819 FETAL BIOPHYS PROFIL W/O NST: CPT

## 2023-03-07 PROCEDURE — 99213 OFFICE O/P EST LOW 20 MIN: CPT | Performed by: OBSTETRICS & GYNECOLOGY

## 2023-03-07 RX ORDER — LIDOCAINE HYDROCHLORIDE 10 MG/ML
5 INJECTION, SOLUTION EPIDURAL; INFILTRATION; INTRACAUDAL; PERINEURAL AS NEEDED
Status: CANCELLED | OUTPATIENT
Start: 2023-03-07

## 2023-03-07 RX ORDER — MISOPROSTOL 200 UG/1
800 TABLET ORAL ONCE AS NEEDED
Status: CANCELLED | OUTPATIENT
Start: 2023-03-07

## 2023-03-07 RX ORDER — METHYLERGONOVINE MALEATE 0.2 MG/ML
200 INJECTION INTRAVENOUS ONCE AS NEEDED
Status: CANCELLED | OUTPATIENT
Start: 2023-03-07

## 2023-03-07 RX ORDER — SODIUM CHLORIDE, SODIUM LACTATE, POTASSIUM CHLORIDE, CALCIUM CHLORIDE 600; 310; 30; 20 MG/100ML; MG/100ML; MG/100ML; MG/100ML
125 INJECTION, SOLUTION INTRAVENOUS CONTINUOUS
Status: CANCELLED | OUTPATIENT
Start: 2023-03-07

## 2023-03-07 RX ORDER — OXYTOCIN/0.9 % SODIUM CHLORIDE 30/500 ML
2-20 PLASTIC BAG, INJECTION (ML) INTRAVENOUS
Status: CANCELLED | OUTPATIENT
Start: 2023-03-08

## 2023-03-07 RX ORDER — MAGNESIUM CARB/ALUMINUM HYDROX 105-160MG
30 TABLET,CHEWABLE ORAL ONCE
Status: CANCELLED | OUTPATIENT
Start: 2023-03-07 | End: 2023-03-07

## 2023-03-07 RX ORDER — CARBOPROST TROMETHAMINE 250 UG/ML
250 INJECTION, SOLUTION INTRAMUSCULAR
Status: CANCELLED | OUTPATIENT
Start: 2023-03-07

## 2023-03-07 RX ORDER — SODIUM CHLORIDE 0.9 % (FLUSH) 0.9 %
10 SYRINGE (ML) INJECTION EVERY 12 HOURS SCHEDULED
Status: CANCELLED | OUTPATIENT
Start: 2023-03-07

## 2023-03-07 RX ORDER — SODIUM CHLORIDE 0.9 % (FLUSH) 0.9 %
10 SYRINGE (ML) INJECTION AS NEEDED
Status: CANCELLED | OUTPATIENT
Start: 2023-03-07

## 2023-03-07 NOTE — PROGRESS NOTES
Pt reports that she is doing well and denies vaginal bleeding, cramping, contractions or LOF at this time. Reports active fetal movement. Denies any additional complaints at time of appointment. Will be induced tomorrow morning     Vitals:    03/07/23 1300   BP: 121/72   Resp: 16   Temp: 98.4 °F (36.9 °C)

## 2023-03-07 NOTE — PROGRESS NOTES
MATERNAL FETAL MEDICINE  Follow-up Note    Dear Dr Arsh Ray MD:    Thank you for your kind referral of Beena Vincent.  As you know, she is a 32 y.o.   at 39 1/7 weeks gestation (Estimated Date of Delivery: 3/13/23). This is a follow up    Her antepartum course is complicated by:  Desmoid tumor of the knee  Asthma  GDMA2    Aneuploidy Screening: low risk cell free DNA    HPI: Today, she denies headache, blurry vision, RUQ pain. No vaginal bleeding, no contractions.     Review of History:  Past Medical History:   Diagnosis Date   • Asthma     inhaler as needed   • Cervical dysplasia    • Depression    • Desmoid tumor     back of right knee- pt desires to proceed with treatments after delivery   • Gestational hypertension    • Iron deficiency anemia    • Preeclampsia      Past Surgical History:   Procedure Laterality Date   • APPENDECTOMY N/A 10/1/2021    Procedure: APPENDECTOMY LAPAROSCOPIC;  Surgeon: Ahmet Dong Jr., MD;  Location: [facility];  Service: General;  Laterality: N/A;   • KNEE ARTHROSCOPY      AGE 6   • LEEP N/A 2019    Procedure: LOOP ELECTROCAUTERY EXCISION PROCEDURE;  Surgeon: Arsh Ray MD;  Location: [facility];  Service: Obstetrics/Gynecology   • SOFT TISSUE BIOPSY Right 3/23/2022    Procedure: BIOPSY SOFT TISSUE THIGH / KNEE;  Surgeon: Chris Randall MD;  Location: [facility];  Service: Orthopedics;  Laterality: Right;   • WISDOM TOOTH EXTRACTION           Social History     Socioeconomic History   • Marital status: Single   Tobacco Use   • Smoking status: Former     Packs/day: 0.50     Years: 4.00     Pack years: 2.00     Types: Cigarettes     Quit date: 2020     Years since quitting: 3.0   • Smokeless tobacco: Never   Vaping Use   • Vaping Use: Some days   • Substances: Nicotine   • Devices: Disposable   Substance and Sexual Activity   • Alcohol use: Not Currently     Comment: socially   • Drug use: Not Currently     Types: Marijuana      "Comment: daily   • Sexual activity: Not Currently     Family History   Problem Relation Age of Onset   • Diabetes Father    • Arthritis Maternal Grandmother    • Lung cancer Maternal Grandfather    • Heart attack Maternal Grandfather    • Heart disease Maternal Grandfather    • Stroke Maternal Grandfather    • Hyperlipidemia Maternal Grandfather    • Malig Hyperthermia Neg Hx    • Breast cancer Neg Hx    • Ovarian cancer Neg Hx    • Uterine cancer Neg Hx    • Colon cancer Neg Hx       Allergies   Allergen Reactions   • Penicillins Nausea And Vomiting   • Adhesive Tape Rash     \"SURGICAL TAPE\"  AS A CHILD      Current Outpatient Medications on File Prior to Visit   Medication Sig Dispense Refill   • albuterol sulfate  (90 Base) MCG/ACT inhaler Inhale 2 puffs Every 4 (Four) Hours As Needed for Wheezing. 18 g 0   • Blood Glucose Monitoring Suppl (FreeStyle Lite) w/Device kit USE 1 SEVEN TIMES DAILY     • ferrous sulfate 325 (65 FE) MG tablet Take 1 tablet by mouth 2 (Two) Times a Day With Meals. 60 tablet 5   • glucose blood test strip Use as instructed 7xs daily for blood sugar 200 each 5   • glucose monitor monitoring kit To check blood sugars 7x daily 1 each 0   • Lancets (freestyle) lancets Pt to check blood sugar 7xs daily 15 each 12   • metFORMIN ER (GLUCOPHAGE-XR) 500 MG 24 hr tablet Take 1 tablet by mouth 2 (Two) Times a Day With Meals. 60 tablet 2   • ondansetron (ZOFRAN) 4 MG tablet Take 1 tablet by mouth Every 8 (Eight) Hours As Needed for Nausea or Vomiting. 30 tablet 3   • Prenatal Vit-Fe Fumarate-FA (Prenatabs FA) 29-1 MG tablet Take 1 tablet by mouth Daily. 30 tablet 11     No current facility-administered medications on file prior to visit.        Past obstetric, gynecological, medical, surgical, family and social history reviewed.  Relevant lab work and imaging reviewed.    Review of systems  Constitutional:  denies fever, chills, malaise.   ENT/Mouth:  denies sore throat, tinnitis  Eyes: denies " vision changes/pain  CV:  denies chest pain  Respiratory:  denies cough/SOB  GI:  denies N/V, diarrhea, abdominal pain.    :   denies dysuria  Skin:  denies lesions or pruritis   Neuro:  denies weakness, focal neurologic symptoms    Vitals:    23 1300   BP: 121/72   BP Location: Right arm   Patient Position: Sitting   Resp: 16   Temp: 98.4 °F (36.9 °C)   TempSrc: Temporal   Weight: 86.5 kg (190 lb 9.6 oz)       PHYSICAL EXAM   GENERAL: Not in acute distress, AAOx3, pleasant  CARDIO: regular rate and rhythm  PULM: symmetric chest rise, speaking in complete sentences without difficulty  NEURO: awake, alert and oriented to person, place, and time  ABDOMINAL: No fundal tenderness, no rebound or guarding, gravid  EXTREMITIES: no bilateral lower extremity edema/tenderness  SKIN: Warm, well-perfused      ULTRASOUND   Please view full ultrasound note on Imaging tab in ViewPoint.  Cephalic presentation.  Anterior placenta.  VALENTIN 16.9 cm, which is normal.  BP     ASSESSMENT/COUNSELIN y.o.   at 39 1/7 weeks gestation (Estimated Date of Delivery: 3/13/23).     -Pregnancy  [ X ] stable  [   ] improving [  ] worsening    Diagnoses and all orders for this visit:    1. Diet controlled gestational diabetes mellitus (GDM), antepartum (Primary)  -     [facility]; Standing    2. Desmoid tumor        GDMA2  Glycemic profiling reviewed--pt doing well.    Recommend delivery 39 weeks--going in tomorrow.        Desmoid tumor:  She was found to have a desmoid tumor behind her right knee in 2022. Surgery has been postponed and the tumor is just being monitored currently. Genetic testing was negative for known deleterious/pathogenic (harmful) mutations by sequencing and rearrangement testing for the genes on this panel.  While no known deleterious mutations were identified, two variants of uncertain significance (VUS) were identified in the MAX gene and the PTCH1 gene. She was counseled  about this by genetics team.    Desmoid tumors are abnormal growths that arise from connective tissue and are rare, occurring in approximately 1-2 in 500,000 individuals. While the majority of desmoid tumors are sporadic, approximately 7-15% of desmoid tumors are associated with germline mutation in the APC gene and can be associated with familial adenomatous polyposis, which she tested negative for.       A MRI right knee done 3/11/22 demonstrated a 10 x 3.5 x 4.4 cm enhancing soft tissue mass lesion along the posterolateral right knee interposed between the distal biceps femoris and the lateral gastrocnemius muscles. The mass appears to infiltrate or arise from the distal biceps femoris. The lesion displaces and closely abuts the common peroneal nerve.       Patient was seen by , ortho - who obtained a biopsy of the mass on 3/23/22. 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  She reports the current plan with her oncologist after r/b discussion is to wait until after delivery for chemotherapy agents to try and shrink it so it can be removed.  She feels it has not grown but does not want to get an MRI at this time.  I counseled her that an MRI without contrast was perfectly safe in pregnancy if she or her oncologist wanted her to have this and felt like it would impact her care.    She met with Dr. Randall and early delivery was not thought to be helpful.  They will see after delivery.      Asthma  Controlled.     Low baseline FHT on US:  , then 108 and 109 on US.  NST done and beautifully reactive.    NST Findings  125/mod/+acc/no dec  Irreg ctx    Duration: 20 min  Indication: GDM      Summary of Plan  -Serial growth ultrasounds every 4 weeks  -Weekly ANFS for GDMA2 on Metformin  -Coming in tomorrow for delivery    Follow-up: No follow up scheduled    Thank you for the consult and opportunity to care for this patient.  Please feel free to reach out with  any questions or concerns.      I spent 10 minutes caring for this patient on this date of service. This time includes time spent by me in the following activities: preparing for the visit, reviewing tests, obtaining and/or reviewing a separately obtained history, performing a medically appropriate examination and/or evaluation, counseling and educating the patient/family/caregiver and independently interpreting results and communicating that information with the patient/family/caregiver with greater than 50% spent in counseling and coordination of care.     3 min reading US.      Beena Quezada MD FACOG  Maternal Fetal Medicine-[facility]  Office: [phone]  [email or web address]

## 2023-03-08 ENCOUNTER — ANESTHESIA EVENT (OUTPATIENT)
Dept: LABOR AND DELIVERY | Facility: HOSPITAL | Age: 33
End: 2023-03-08
Payer: COMMERCIAL

## 2023-03-08 ENCOUNTER — HOSPITAL ENCOUNTER (INPATIENT)
Facility: HOSPITAL | Age: 33
LOS: 3 days | Discharge: HOME | End: 2023-03-11
Attending: OBSTETRICS & GYNECOLOGY | Admitting: OBSTETRICS & GYNECOLOGY
Payer: COMMERCIAL

## 2023-03-08 ENCOUNTER — ANESTHESIA (OUTPATIENT)
Dept: LABOR AND DELIVERY | Facility: HOSPITAL | Age: 33
End: 2023-03-08
Payer: COMMERCIAL

## 2023-03-08 ENCOUNTER — HOSPITAL ENCOUNTER (INPATIENT)
Dept: LABOR AND DELIVERY | Facility: HOSPITAL | Age: 33
Discharge: HOME | End: 2023-03-08
Payer: COMMERCIAL

## 2023-03-08 DIAGNOSIS — O24.410 GDM (GESTATIONAL DIABETES MELLITUS), CLASS A1: ICD-10-CM

## 2023-03-08 PROBLEM — O24.419 GESTATIONAL DIABETES: Status: ACTIVE | Noted: 2023-03-08

## 2023-03-08 LAB
ABO GROUP BLD: NORMAL
BLD GP AB SCN SERPL QL: NEGATIVE
DEPRECATED RDW RBC AUTO: 37.3 FL (ref 37–54)
ERYTHROCYTE [DISTWIDTH] IN BLOOD BY AUTOMATED COUNT: 12.5 % (ref 12.3–15.4)
GLUCOSE BLDC GLUCOMTR-MCNC: 87 MG/DL (ref 70–130)
HCT VFR BLD AUTO: 34 % (ref 34–46.6)
HGB BLD-MCNC: 12 G/DL (ref 12–15.9)
MCH RBC QN AUTO: 29.2 PG (ref 26.6–33)
MCHC RBC AUTO-ENTMCNC: 35.3 G/DL (ref 31.5–35.7)
MCV RBC AUTO: 82.7 FL (ref 79–97)
PLATELET # BLD AUTO: 219 10*3/MM3 (ref 140–450)
PMV BLD AUTO: 10.6 FL (ref 6–12)
RBC # BLD AUTO: 4.11 10*6/MM3 (ref 3.77–5.28)
RH BLD: NEGATIVE
T&S EXPIRATION DATE: NORMAL
WBC NRBC COR # BLD: 9.02 10*3/MM3 (ref 3.4–10.8)

## 2023-03-08 PROCEDURE — 82962 GLUCOSE BLOOD TEST: CPT

## 2023-03-08 PROCEDURE — S0260 H&P FOR SURGERY: HCPCS | Performed by: OBSTETRICS & GYNECOLOGY

## 2023-03-08 PROCEDURE — 86901 BLOOD TYPING SEROLOGIC RH(D): CPT | Performed by: OBSTETRICS & GYNECOLOGY

## 2023-03-08 PROCEDURE — 3E033VJ INTRODUCTION OF OTHER HORMONE INTO PERIPHERAL VEIN, PERCUTANEOUS APPROACH: ICD-10-PCS | Performed by: OBSTETRICS & GYNECOLOGY

## 2023-03-08 PROCEDURE — 25010000002 ONDANSETRON PER 1 MG: Performed by: ANESTHESIOLOGY

## 2023-03-08 PROCEDURE — 86900 BLOOD TYPING SEROLOGIC ABO: CPT | Performed by: OBSTETRICS & GYNECOLOGY

## 2023-03-08 PROCEDURE — 85027 COMPLETE CBC AUTOMATED: CPT | Performed by: OBSTETRICS & GYNECOLOGY

## 2023-03-08 PROCEDURE — 86850 RBC ANTIBODY SCREEN: CPT | Performed by: OBSTETRICS & GYNECOLOGY

## 2023-03-08 RX ORDER — FAMOTIDINE 10 MG/ML
20 INJECTION, SOLUTION INTRAVENOUS ONCE AS NEEDED
Status: COMPLETED | OUTPATIENT
Start: 2023-03-08 | End: 2023-03-08

## 2023-03-08 RX ORDER — ONDANSETRON 2 MG/ML
4 INJECTION INTRAMUSCULAR; INTRAVENOUS ONCE AS NEEDED
Status: COMPLETED | OUTPATIENT
Start: 2023-03-08 | End: 2023-03-08

## 2023-03-08 RX ORDER — SODIUM CHLORIDE 0.9 % (FLUSH) 0.9 %
10 SYRINGE (ML) INJECTION AS NEEDED
Status: DISCONTINUED | OUTPATIENT
Start: 2023-03-08 | End: 2023-03-09 | Stop reason: HOSPADM

## 2023-03-08 RX ORDER — MAGNESIUM CARB/ALUMINUM HYDROX 105-160MG
30 TABLET,CHEWABLE ORAL ONCE AS NEEDED
Status: DISCONTINUED | OUTPATIENT
Start: 2023-03-08 | End: 2023-03-09 | Stop reason: HOSPADM

## 2023-03-08 RX ORDER — SODIUM CHLORIDE, SODIUM LACTATE, POTASSIUM CHLORIDE, CALCIUM CHLORIDE 600; 310; 30; 20 MG/100ML; MG/100ML; MG/100ML; MG/100ML
125 INJECTION, SOLUTION INTRAVENOUS CONTINUOUS
Status: DISCONTINUED | OUTPATIENT
Start: 2023-03-08 | End: 2023-03-09

## 2023-03-08 RX ORDER — ONDANSETRON 2 MG/ML
4 INJECTION INTRAMUSCULAR; INTRAVENOUS EVERY 6 HOURS PRN
Status: DISCONTINUED | OUTPATIENT
Start: 2023-03-08 | End: 2023-03-09 | Stop reason: HOSPADM

## 2023-03-08 RX ORDER — MAGNESIUM CARB/ALUMINUM HYDROX 105-160MG
30 TABLET,CHEWABLE ORAL ONCE
Status: DISCONTINUED | OUTPATIENT
Start: 2023-03-08 | End: 2023-03-09 | Stop reason: HOSPADM

## 2023-03-08 RX ORDER — DIPHENHYDRAMINE HYDROCHLORIDE 50 MG/ML
12.5 INJECTION INTRAMUSCULAR; INTRAVENOUS EVERY 8 HOURS PRN
Status: DISCONTINUED | OUTPATIENT
Start: 2023-03-08 | End: 2023-03-09 | Stop reason: HOSPADM

## 2023-03-08 RX ORDER — FAMOTIDINE 10 MG/ML
20 INJECTION, SOLUTION INTRAVENOUS 2 TIMES DAILY PRN
Status: DISCONTINUED | OUTPATIENT
Start: 2023-03-08 | End: 2023-03-09 | Stop reason: HOSPADM

## 2023-03-08 RX ORDER — SODIUM CHLORIDE 0.9 % (FLUSH) 0.9 %
10 SYRINGE (ML) INJECTION EVERY 12 HOURS SCHEDULED
Status: DISCONTINUED | OUTPATIENT
Start: 2023-03-08 | End: 2023-03-09 | Stop reason: HOSPADM

## 2023-03-08 RX ORDER — FAMOTIDINE 20 MG/1
20 TABLET, FILM COATED ORAL 2 TIMES DAILY PRN
Status: DISCONTINUED | OUTPATIENT
Start: 2023-03-08 | End: 2023-03-09 | Stop reason: HOSPADM

## 2023-03-08 RX ORDER — OXYTOCIN/0.9 % SODIUM CHLORIDE 30/500 ML
2-20 PLASTIC BAG, INJECTION (ML) INTRAVENOUS
Status: DISCONTINUED | OUTPATIENT
Start: 2023-03-08 | End: 2023-03-09 | Stop reason: HOSPADM

## 2023-03-08 RX ORDER — ONDANSETRON 4 MG/1
4 TABLET, FILM COATED ORAL EVERY 6 HOURS PRN
Status: DISCONTINUED | OUTPATIENT
Start: 2023-03-08 | End: 2023-03-09 | Stop reason: HOSPADM

## 2023-03-08 RX ORDER — ACETAMINOPHEN 325 MG/1
650 TABLET ORAL EVERY 4 HOURS PRN
Status: DISCONTINUED | OUTPATIENT
Start: 2023-03-08 | End: 2023-03-09 | Stop reason: HOSPADM

## 2023-03-08 RX ORDER — LIDOCAINE HYDROCHLORIDE 10 MG/ML
5 INJECTION, SOLUTION EPIDURAL; INFILTRATION; INTRACAUDAL; PERINEURAL AS NEEDED
Status: DISCONTINUED | OUTPATIENT
Start: 2023-03-08 | End: 2023-03-09 | Stop reason: HOSPADM

## 2023-03-08 RX ORDER — TERBUTALINE SULFATE 1 MG/ML
0.25 INJECTION, SOLUTION SUBCUTANEOUS AS NEEDED
Status: DISCONTINUED | OUTPATIENT
Start: 2023-03-08 | End: 2023-03-09 | Stop reason: HOSPADM

## 2023-03-08 RX ORDER — EPHEDRINE SULFATE 50 MG/ML
5 INJECTION, SOLUTION INTRAVENOUS AS NEEDED
Status: DISCONTINUED | OUTPATIENT
Start: 2023-03-08 | End: 2023-03-09 | Stop reason: HOSPADM

## 2023-03-08 RX ADMIN — SODIUM CHLORIDE, POTASSIUM CHLORIDE, SODIUM LACTATE AND CALCIUM CHLORIDE 125 ML/HR: 600; 310; 30; 20 INJECTION, SOLUTION INTRAVENOUS at 19:45

## 2023-03-08 RX ADMIN — ONDANSETRON 4 MG: 2 INJECTION INTRAMUSCULAR; INTRAVENOUS at 21:15

## 2023-03-08 RX ADMIN — Medication 2 MILLI-UNITS/MIN: at 20:40

## 2023-03-08 RX ADMIN — SODIUM CHLORIDE, POTASSIUM CHLORIDE, SODIUM LACTATE AND CALCIUM CHLORIDE 125 ML/HR: 600; 310; 30; 20 INJECTION, SOLUTION INTRAVENOUS at 22:48

## 2023-03-08 RX ADMIN — FAMOTIDINE 20 MG: 10 INJECTION INTRAVENOUS at 20:42

## 2023-03-09 ENCOUNTER — PATIENT OUTREACH (OUTPATIENT)
Dept: LABOR AND DELIVERY | Facility: HOSPITAL | Age: 33
End: 2023-03-09
Payer: COMMERCIAL

## 2023-03-09 LAB — GLUCOSE BLDC GLUCOMTR-MCNC: 93 MG/DL (ref 70–130)

## 2023-03-09 PROCEDURE — 25010000002 FENTANYL CITRATE (PF) 50 MCG/ML SOLUTION: Performed by: ANESTHESIOLOGY

## 2023-03-09 PROCEDURE — 25010000002 BUTORPHANOL PER 1 MG: Performed by: OBSTETRICS & GYNECOLOGY

## 2023-03-09 PROCEDURE — 82962 GLUCOSE BLOOD TEST: CPT

## 2023-03-09 PROCEDURE — 88307 TISSUE EXAM BY PATHOLOGIST: CPT

## 2023-03-09 PROCEDURE — C1755 CATHETER, INTRASPINAL: HCPCS | Performed by: ANESTHESIOLOGY

## 2023-03-09 PROCEDURE — 10907ZC DRAINAGE OF AMNIOTIC FLUID, THERAPEUTIC FROM PRODUCTS OF CONCEPTION, VIA NATURAL OR ARTIFICIAL OPENING: ICD-10-PCS | Performed by: OBSTETRICS & GYNECOLOGY

## 2023-03-09 PROCEDURE — 25010000002 ROPIVACAINE PER 1 MG: Performed by: ANESTHESIOLOGY

## 2023-03-09 PROCEDURE — 59410 OBSTETRICAL CARE: CPT | Performed by: OBSTETRICS & GYNECOLOGY

## 2023-03-09 PROCEDURE — 0HQ9XZZ REPAIR PERINEUM SKIN, EXTERNAL APPROACH: ICD-10-PCS | Performed by: OBSTETRICS & GYNECOLOGY

## 2023-03-09 RX ORDER — MISOPROSTOL 200 UG/1
800 TABLET ORAL ONCE AS NEEDED
Status: DISCONTINUED | OUTPATIENT
Start: 2023-03-09 | End: 2023-03-09 | Stop reason: HOSPADM

## 2023-03-09 RX ORDER — ACETAMINOPHEN 325 MG/1
650 TABLET ORAL EVERY 6 HOURS PRN
Status: DISCONTINUED | OUTPATIENT
Start: 2023-03-09 | End: 2023-03-11 | Stop reason: HOSPADM

## 2023-03-09 RX ORDER — ALBUTEROL SULFATE 90 UG/1
2 AEROSOL, METERED RESPIRATORY (INHALATION) EVERY 4 HOURS PRN
Status: DISCONTINUED | OUTPATIENT
Start: 2023-03-09 | End: 2023-03-10 | Stop reason: SDUPTHER

## 2023-03-09 RX ORDER — HYDROCORTISONE 25 MG/G
1 CREAM TOPICAL AS NEEDED
Status: DISCONTINUED | OUTPATIENT
Start: 2023-03-09 | End: 2023-03-11 | Stop reason: HOSPADM

## 2023-03-09 RX ORDER — ONDANSETRON 4 MG/1
4 TABLET, FILM COATED ORAL EVERY 8 HOURS PRN
Status: DISCONTINUED | OUTPATIENT
Start: 2023-03-09 | End: 2023-03-11 | Stop reason: HOSPADM

## 2023-03-09 RX ORDER — CARBOPROST TROMETHAMINE 250 UG/ML
250 INJECTION, SOLUTION INTRAMUSCULAR
Status: DISCONTINUED | OUTPATIENT
Start: 2023-03-09 | End: 2023-03-09 | Stop reason: HOSPADM

## 2023-03-09 RX ORDER — HYDROCODONE BITARTRATE AND ACETAMINOPHEN 10; 325 MG/1; MG/1
1 TABLET ORAL EVERY 4 HOURS PRN
Status: DISCONTINUED | OUTPATIENT
Start: 2023-03-09 | End: 2023-03-11 | Stop reason: HOSPADM

## 2023-03-09 RX ORDER — HYDROCODONE BITARTRATE AND ACETAMINOPHEN 5; 325 MG/1; MG/1
1 TABLET ORAL EVERY 4 HOURS PRN
Status: DISCONTINUED | OUTPATIENT
Start: 2023-03-09 | End: 2023-03-11 | Stop reason: HOSPADM

## 2023-03-09 RX ORDER — IBUPROFEN 600 MG/1
600 TABLET ORAL EVERY 6 HOURS PRN
Status: DISCONTINUED | OUTPATIENT
Start: 2023-03-09 | End: 2023-03-11 | Stop reason: HOSPADM

## 2023-03-09 RX ORDER — OXYTOCIN/0.9 % SODIUM CHLORIDE 30/500 ML
125 PLASTIC BAG, INJECTION (ML) INTRAVENOUS CONTINUOUS PRN
Status: COMPLETED | OUTPATIENT
Start: 2023-03-09 | End: 2023-03-09

## 2023-03-09 RX ORDER — BISACODYL 10 MG
10 SUPPOSITORY, RECTAL RECTAL DAILY PRN
Status: DISCONTINUED | OUTPATIENT
Start: 2023-03-10 | End: 2023-03-11 | Stop reason: HOSPADM

## 2023-03-09 RX ORDER — SODIUM CHLORIDE 0.9 % (FLUSH) 0.9 %
1-10 SYRINGE (ML) INJECTION AS NEEDED
Status: DISCONTINUED | OUTPATIENT
Start: 2023-03-09 | End: 2023-03-11 | Stop reason: HOSPADM

## 2023-03-09 RX ORDER — TRANEXAMIC ACID 10 MG/ML
1000 INJECTION, SOLUTION INTRAVENOUS ONCE AS NEEDED
Status: DISCONTINUED | OUTPATIENT
Start: 2023-03-09 | End: 2023-03-11 | Stop reason: HOSPADM

## 2023-03-09 RX ORDER — OXYTOCIN/0.9 % SODIUM CHLORIDE 30/500 ML
125 PLASTIC BAG, INJECTION (ML) INTRAVENOUS CONTINUOUS PRN
Status: DISCONTINUED | OUTPATIENT
Start: 2023-03-09 | End: 2023-03-11 | Stop reason: HOSPADM

## 2023-03-09 RX ORDER — LIDOCAINE HYDROCHLORIDE AND EPINEPHRINE 15; 5 MG/ML; UG/ML
INJECTION, SOLUTION EPIDURAL AS NEEDED
Status: DISCONTINUED | OUTPATIENT
Start: 2023-03-09 | End: 2023-03-09 | Stop reason: SURG

## 2023-03-09 RX ORDER — OXYTOCIN/0.9 % SODIUM CHLORIDE 30/500 ML
999 PLASTIC BAG, INJECTION (ML) INTRAVENOUS ONCE
Status: DISCONTINUED | OUTPATIENT
Start: 2023-03-09 | End: 2023-03-09 | Stop reason: HOSPADM

## 2023-03-09 RX ORDER — METHYLERGONOVINE MALEATE 0.2 MG/ML
200 INJECTION INTRAVENOUS ONCE AS NEEDED
Status: DISCONTINUED | OUTPATIENT
Start: 2023-03-09 | End: 2023-03-09 | Stop reason: HOSPADM

## 2023-03-09 RX ORDER — DOCUSATE SODIUM 100 MG/1
100 CAPSULE, LIQUID FILLED ORAL 2 TIMES DAILY
Status: DISCONTINUED | OUTPATIENT
Start: 2023-03-09 | End: 2023-03-11 | Stop reason: HOSPADM

## 2023-03-09 RX ORDER — HYDROXYZINE 50 MG/1
50 TABLET, FILM COATED ORAL NIGHTLY PRN
Status: DISCONTINUED | OUTPATIENT
Start: 2023-03-09 | End: 2023-03-11 | Stop reason: HOSPADM

## 2023-03-09 RX ORDER — OXYTOCIN/0.9 % SODIUM CHLORIDE 30/500 ML
250 PLASTIC BAG, INJECTION (ML) INTRAVENOUS CONTINUOUS
Status: ACTIVE | OUTPATIENT
Start: 2023-03-09 | End: 2023-03-09

## 2023-03-09 RX ADMIN — Medication 250 ML/HR: at 05:21

## 2023-03-09 RX ADMIN — LIDOCAINE HYDROCHLORIDE AND EPINEPHRINE 3 ML: 15; 5 INJECTION, SOLUTION EPIDURAL at 03:50

## 2023-03-09 RX ADMIN — HYDROCODONE BITARTRATE AND ACETAMINOPHEN 1 TABLET: 5; 325 TABLET ORAL at 09:43

## 2023-03-09 RX ADMIN — BUTORPHANOL TARTRATE 2 MG: 2 INJECTION, SOLUTION INTRAMUSCULAR; INTRAVENOUS at 00:56

## 2023-03-09 RX ADMIN — Medication: at 08:34

## 2023-03-09 RX ADMIN — HYDROCODONE BITARTRATE AND ACETAMINOPHEN 1 TABLET: 10; 325 TABLET ORAL at 17:48

## 2023-03-09 RX ADMIN — Medication 125 ML/HR: at 06:05

## 2023-03-09 RX ADMIN — SODIUM CHLORIDE, POTASSIUM CHLORIDE, SODIUM LACTATE AND CALCIUM CHLORIDE 999 ML/HR: 600; 310; 30; 20 INJECTION, SOLUTION INTRAVENOUS at 04:00

## 2023-03-09 RX ADMIN — DOCUSATE SODIUM 100 MG: 100 CAPSULE, LIQUID FILLED ORAL at 08:34

## 2023-03-09 RX ADMIN — HYDROCODONE BITARTRATE AND ACETAMINOPHEN 1 TABLET: 5; 325 TABLET ORAL at 23:14

## 2023-03-09 RX ADMIN — IBUPROFEN 600 MG: 600 TABLET, FILM COATED ORAL at 08:34

## 2023-03-09 RX ADMIN — IBUPROFEN 600 MG: 600 TABLET, FILM COATED ORAL at 20:21

## 2023-03-09 RX ADMIN — DOCUSATE SODIUM 100 MG: 100 CAPSULE, LIQUID FILLED ORAL at 20:21

## 2023-03-09 RX ADMIN — Medication 10 ML/HR: at 03:54

## 2023-03-09 NOTE — OUTREACH NOTE
[facility]  IP Postpartum    Questions/Answers    Flowsheet Row Responses   Best Method for Contacting Cell   Support Person Present Yes   Does the patient have a car seat at the hospital Yes  [in car]   Delivery Note Reviewed Reviewed   Were birth expectations met? Yes   Is there a need for additional support/resources? No   Lactation Note Reviewed Other   Note Reviewed Other Comment bottle feeding only due to PP meds   Is additional support needed? No   Any questions or concerns? No   Is the patient going to use Meds to Beds? Yes   Any concerns related discharge meds/ability to  prescriptions? No   Confirm Postpartum OB appointment --  [reviewed ]   Confirm initial well-child Pediatrician appointment date/time: --  [reviewed ]   Does patient have transportation to appointments? Yes   Any other assistance needed to ensure she is able to attend appointments? No   Does patient have supplies needed at home for  care? Clothing, Crib, Diapers, Formula        Visited patient at the bedside, FOB present and involved. Pt planning to bottle feed due to meds required for tumor treatment in the PP period. Reviewed WIC benefits and f/u appts for mom and infant. She has all necessary infant items including a car seat. F/u in one week.     Shahla Álvarez RN  Maternity Nurse Navigator    3/9/2023, 09:56 EST

## 2023-03-09 NOTE — PLAN OF CARE
Problem: Adult Inpatient Plan of Care  Goal: Plan of Care Review  Outcome: Ongoing, Progressing  Flowsheets (Taken 3/9/2023 0612)  Progress: improving  Outcome Evaluation: s/p  of male infant over 1st degree laceration that was repaired, fundus remains firm, bleeding and vitals wnl, pt reportsno pain, infant in transition nursery, stable for transfer     Problem: Bleeding (Labor)  Goal: Hemostasis  Outcome: Met     Problem: Change in Fetal Wellbeing (Labor)  Goal: Stable Fetal Wellbeing  Outcome: Met     Problem: Delayed Labor Progression (Labor)  Goal: Effective Progression to Delivery  Outcome: Met     Problem: Infection (Labor)  Goal: Absence of Infection Signs and Symptoms  Outcome: Met     Problem: Labor Pain (Labor)  Goal: Acceptable Pain Control  Outcome: Met     Problem: Uterine Tachysystole (Labor)  Goal: Normal Uterine Contraction Pattern  Outcome: Met     Problem: Adult Inpatient Plan of Care  Goal: Optimal Comfort and Wellbeing  Intervention: Monitor Pain and Promote Comfort  Recent Flowsheet Documentation  Taken 3/9/2023 033 by Aurea Marshall RN  Pain Management Interventions: (epidural requested, IV fluids bolusing) --  Intervention: Provide Person-Centered Care  Recent Flowsheet Documentation  Taken 3/9/2023 0515 by Aurea Marshall RN  Trust Relationship/Rapport:   care explained   choices provided   emotional support provided   empathic listening provided   questions answered   questions encouraged   reassurance provided   thoughts/feelings acknowledged     Problem: Pain (Anesthesia/Analgesia, Neuraxial)  Goal: Effective Pain Control  Intervention: Prevent or Manage Pain  Recent Flowsheet Documentation  Taken 3/9/2023 033 by Aurea Marshall RN  Pain Management Interventions: (epidural requested, IV fluids bolusing) --     Problem: Sensorimotor Impairment (Anesthesia/Analgesia, Neuraxial)  Goal: Baseline Motor Function  Intervention: Optimize Sensorimotor Function  Recent Flowsheet  Documentation  Taken 3/9/2023 0508 by Aurea Marshall, RN  Safety Promotion/Fall Prevention: room organization consistent  Taken 3/9/2023 0330 by Aurea Marshall, RN  Safety Promotion/Fall Prevention: safety round/check completed  Taken 3/9/2023 0100 by Aurea Marshall, RN  Safety Promotion/Fall Prevention: safety round/check completed   Goal Outcome Evaluation:           Progress: improving  Outcome Evaluation: s/p  of male infant over 1st degree laceration that was repaired, fundus remains firm, bleeding and vitals wnl, pt reportsno pain, infant in transition nursery, stable for transfer

## 2023-03-09 NOTE — PAYOR COMM NOTE
"Beena Vincent (32 y.o. Female)   [facility]    [address] [place] [address]  Facility NPI: [id number]    Collin Jernigan  Fax: [phone]  Phone: [phone] (Cathy: [phone])    Subject: ADMISSION NOTIFICATION AUTH CLINICALS   Reference #: [id number]  Please don't hesitate to contact me with any questions or concerns.    Date of Birth   1990    Social Security Number       Address   [address] [facility] [address]    Home Phone   [phone]    MRN   [id number]       Yazidi   Judaism    Marital Status   Single                            Admission Date   3/8/23    Admission Type   Elective    Admitting Provider   Arsh Ray MD    Attending Provider   Arsh Ray MD    Department, Room/Bed   [facility] 3 EAST, E365/1       Discharge Date       Discharge Disposition       Discharge Destination                               Attending Provider: Arsh Ray MD    Allergies: Penicillins, Adhesive Tape    Isolation: None   Infection: None   Code Status: CPR    Ht: 177.8 cm (70\")   Wt: 86.5 kg (190 lb 9.6 oz)    Admission Cmt: None   Principal Problem: Gestational diabetes [O24.419]                 Active Insurance as of 3/8/2023     Primary Coverage     Payor Plan Insurance Group Employer/Plan Group    [employer] [place] [employer] [place]      Payor Plan Address Payor Plan Phone Number Payor Plan Fax Number Effective Dates    [address] [address] [phone]  10/1/2021 - None Entered    [facility] [id number]       Subscriber Name Subscriber Birth Date Member ID       BEENA VINCENT 1990 [id number]                 Emergency Contacts      (Rel.) Home Phone Work Phone Mobile Phone    DIEGO VINCENT (Mother) [phone] -- [phone]            Insurance Information                [facility] MEDICAID Phone: [phone]    Subscriber: Beena Vincent Subscriber#: [id number]    Group#: -- Precert#: --             History & Physical    "   Arsh Ray MD at 23 2240          H&P Note    Patient Identification:  Name: Beena Vincent  Age: 32 y.o.  Sex: female  :  1990  MRN: [id number]                       Chief Complaint: Gestational diabetes    History of Present Illness:   32-year-old  3 para 2 presents at 39-1/7 weeks for an induction of labor due to gestational diabetes, type A2.  Group B strep status is negative.  Fetal heart tones are reactive.    Problem List:  [unfilled]  Past Medical History:  Past Medical History:   Diagnosis Date   • Asthma     inhaler as needed   • Cervical dysplasia    • Depression    • Desmoid tumor     back of right knee- pt desires to proceed with treatments after delivery   • Gestational diabetes    • Gestational hypertension    • Iron deficiency anemia    • Preeclampsia      Past Surgical History:  Past Surgical History:   Procedure Laterality Date   • APPENDECTOMY N/A 10/1/2021    Procedure: APPENDECTOMY LAPAROSCOPIC;  Surgeon: Ahmet Dong Jr., MD;  Location: [facility];  Service: General;  Laterality: N/A;   • KNEE ARTHROSCOPY      AGE 6   • LEEP N/A 2019    Procedure: LOOP ELECTROCAUTERY EXCISION PROCEDURE;  Surgeon: Arsh Ray MD;  Location: [facility];  Service: Obstetrics/Gynecology   • SOFT TISSUE BIOPSY Right 3/23/2022    Procedure: BIOPSY SOFT TISSUE THIGH / KNEE;  Surgeon: Chris Randall MD;  Location: [facility];  Service: Orthopedics;  Laterality: Right;   • WISDOM TOOTH EXTRACTION        Home Meds:  Medications Prior to Admission   Medication Sig Dispense Refill Last Dose   • Blood Glucose Monitoring Suppl (FreeStyle Lite) w/Device kit USE 1 SEVEN TIMES DAILY   3/8/2023   • ferrous sulfate 325 (65 FE) MG tablet Take 1 tablet by mouth 2 (Two) Times a Day With Meals. 60 tablet 5 3/8/2023   • glucose blood test strip Use as instructed 7xs daily for blood sugar 200 each 5 3/8/2023   • glucose monitor monitoring kit To check blood sugars 7x daily 1  "each 0 3/8/2023   • Lancets (freestyle) lancets Pt to check blood sugar 7xs daily 15 each 12 3/8/2023   • metFORMIN ER (GLUCOPHAGE-XR) 500 MG 24 hr tablet Take 1 tablet by mouth 2 (Two) Times a Day With Meals. 60 tablet 2 3/8/2023   • Prenatal Vit-Fe Fumarate-FA (Prenatabs FA) 29-1 MG tablet Take 1 tablet by mouth Daily. 30 tablet 11 3/8/2023   • albuterol sulfate  (90 Base) MCG/ACT inhaler Inhale 2 puffs Every 4 (Four) Hours As Needed for Wheezing. 18 g 0 More than a month   • ondansetron (ZOFRAN) 4 MG tablet Take 1 tablet by mouth Every 8 (Eight) Hours As Needed for Nausea or Vomiting. 30 tablet 3 More than a month     Current Meds:   [unfilled]  Allergies:  Allergies   Allergen Reactions   • Penicillins Nausea And Vomiting   • Adhesive Tape Rash     \"SURGICAL TAPE\"  AS A CHILD     Immunizations:  Immunization History   Administered Date(s) Administered   • FLUAD TRI 65YR+ 12/08/2011   • Flu Vaccine Intradermal Quad 18-64YR 12/08/2011   • FluLaval/Fluzone >6mos 11/10/2022   • MMR 08/14/2020   • Rho (D) Immune Globulin 05/26/2020, 12/22/2022   • Tdap 08/14/2020   • flucelvax quad pfs =>4 YRS 01/14/2020     Social History:   Social History     Tobacco Use   • Smoking status: Former     Packs/day: 0.50     Years: 4.00     Pack years: 2.00     Types: Cigarettes     Quit date: 2/8/2020     Years since quitting: 3.0   • Smokeless tobacco: Never   Substance Use Topics   • Alcohol use: Not Currently     Comment: socially      Family History:  Family History   Problem Relation Age of Onset   • Diabetes Father    • Arthritis Maternal Grandmother    • Lung cancer Maternal Grandfather    • Heart attack Maternal Grandfather    • Heart disease Maternal Grandfather    • Stroke Maternal Grandfather    • Hyperlipidemia Maternal Grandfather    • Malig Hyperthermia Neg Hx    • Breast cancer Neg Hx    • Ovarian cancer Neg Hx    • Uterine cancer Neg Hx    • Colon cancer Neg Hx         Review of Systems  A comprehensive review of " systems was negative.    Objective:  tMax 24 hrs: Temp (24hrs), Av.2 °F (36.8 °C), Min:98.2 °F (36.8 °C), Max:98.2 °F (36.8 °C)    Vitals Ranges:   Temp:  [98.2 °F (36.8 °C)] 98.2 °F (36.8 °C)  Resp:  [16] 16  Intake and Output Last 3 Shifts:   No intake/output data recorded.    Exam:     General Appearance:    Alert, cooperative, no distress, appears stated age   Head:    Normocephalic, without obvious abnormality, atraumatic   Back:     Symmetric, no curvature, ROM normal, no CVA tenderness   Lungs:     Clear to auscultation bilaterally, respirations unlabored   Chest Wall:    No tenderness or deformity    Heart:    Regular rate and rhythm, S1 and S2 normal, no murmur, rub   or gallop       Abdomen:    Soft, gravid.  There is no epigastric tenderness.  No fundal tenderness.  No suprapubic tenderness.   Genitalia:   60% effaced and 1.5 cm dilated at admission   Rectal:   Not performed today.   Extremities:  Equal in size   Skin:   Skin color, texture, turgor normal, no rashes or lesions   Lymph nodes:   Cervical, supraclavicular, and axillary nodes normal       Data Review:  Nonstress test is reactive  Lab Results (last 24 hours)     Procedure Component Value Units Date/Time    CBC (No Diff) [[id number]]  (Normal) Collected: 23    Specimen: Blood from Arm, Right Updated: 23     WBC 9.02 10*3/mm3      RBC 4.11 10*6/mm3      Hemoglobin 12.0 g/dL      Hematocrit 34.0 %      MCV 82.7 fL      MCH 29.2 pg      MCHC 35.3 g/dL      RDW 12.5 %      RDW-SD 37.3 fl      MPV 10.6 fL      Platelets 219 10*3/mm3         Assessment:    Gestational diabetes      1.  Intrauterine pregnancy at 39-1/7 weeks  2.  Gestational diabetes type A2    Plan:  Induction of labor.    Arsh Ray MD  3/8/2023    Electronically signed by Arsh Ray MD at 23 2242         Facility-Administered Medications as of 3/9/2023   Medication Dose Route Frequency Provider Last Rate Last Admin   • acetaminophen  (TYLENOL) tablet 650 mg  650 mg Oral Q6H PRN Arsh Ray MD       • albuterol sulfate HFA (PROVENTIL HFA;VENTOLIN HFA;PROAIR HFA) inhaler 2 puff  2 puff Inhalation Q4H PRN Arsh Ray MD       • benzocaine (AMERICAINE) 20 % rectal ointment 1 application  1 application Rectal PRN Arsh Ray MD       • benzocaine-menthol (DERMOPLAST) 20-0.5 % topical spray   Topical PRN Arsh Ray MD   Given at 2334   • [START ON 3/10/2023] bisacodyl (DULCOLAX) suppository 10 mg  10 mg Rectal Daily PRN Arsh Ray MD       • docusate sodium (COLACE) capsule 100 mg  100 mg Oral BID Arsh Ray MD   100 mg at 23   • [COMPLETED] famotidine (PEPCID) injection 20 mg  20 mg Intravenous Once PRN Haim Paz MD   20 mg at 23   • HYDROcodone-acetaminophen (NORCO) 5-325 MG per tablet 1 tablet  1 tablet Oral Q4H PRN Arsh Ray MD   1 tablet at 23 0943    Or   • HYDROcodone-acetaminophen (NORCO)  MG per tablet 1 tablet  1 tablet Oral Q4H PRN Arsh Ray MD       • Hydrocort-Pramoxine (Perianal) (PROCTOFOAM-HS) 1-1 % rectal foam 1 application  1 application Topical PRN Arsh Ray MD       • Hydrocortisone (Perianal) (ANUSOL-HC) 2.5 % rectal cream 1 application  1 application Rectal PRN Arsh Ray MD       • hydrOXYzine (ATARAX) tablet 50 mg  50 mg Oral Nightly PRN Arsh Ray MD       • ibuprofen (ADVIL,MOTRIN) tablet 600 mg  600 mg Oral Q6H PRN Arsh Ray MD   600 mg at 2334   • lanolin topical 1 application  1 application Topical Q1H PRN Arsh Ray MD       • magnesium hydroxide (MILK OF MAGNESIA) suspension 10 mL  10 mL Oral Daily PRN Arsh Ray MD       • [COMPLETED] ondansetron (ZOFRAN) injection 4 mg  4 mg Intravenous Once PRN Haim Paz MD   4 mg at 23   • ondansetron (ZOFRAN) tablet 4 mg  4 mg Oral Q8H PRN Arsh Ray MD       • [] oxytocin (PITOCIN) 30 units in 0.9% sodium chloride 500 mL  (premix)  250 mL/hr Intravenous Continuous Arsh Ray MD   Stopped at 03/09/23 0604   • oxytocin (PITOCIN) 30 units in 0.9% sodium chloride 500 mL (premix)  125 mL/hr Intravenous Continuous PRN Arsh Ray MD       • [COMPLETED] oxytocin (PITOCIN) 30 units in 0.9% sodium chloride 500 mL (premix)  125 mL/hr Intravenous Continuous PRN Arsh Ray  mL/hr at 03/09/23 0605 125 mL/hr at 03/09/23 0605   • sodium chloride 0.9 % flush 1-10 mL  1-10 mL Intravenous PRN Arsh Ray MD       • tranexamic acid 1000 mg in 100 mL 0.7% NaCl infusion (premix)  1,000 mg Intravenous Once PRN Arsh Ray MD         Lab Results (last 72 hours)     Procedure Component Value Units Date/Time    POC Glucose Once [[id number]]  (Normal) Collected: 03/09/23 0333    Specimen: Blood Updated: 03/09/23 0333     Glucose 93 mg/dL      Comment: Meter: [id number] : [id number] Rolando Villar RN       POC Glucose Once [[id number]]  (Normal) Collected: 03/08/23 2308    Specimen: Blood Updated: 03/08/23 2309     Glucose 87 mg/dL      Comment: Meter: [id number] : [id number] Eileen Pinto RN       CBC (No Diff) [[id number]]  (Normal) Collected: 03/08/23 1947    Specimen: Blood from Arm, Right Updated: 03/08/23 2018     WBC 9.02 10*3/mm3      RBC 4.11 10*6/mm3      Hemoglobin 12.0 g/dL      Hematocrit 34.0 %      MCV 82.7 fL      MCH 29.2 pg      MCHC 35.3 g/dL      RDW 12.5 %      RDW-SD 37.3 fl      MPV 10.6 fL      Platelets 219 10*3/mm3           Imaging Results (Last 72 Hours)     ** No results found for the last 72 hours. **        Orders (last 72 hrs)      Start     Ordered    03/10/23 0800  Sitz Bath  3 Times Daily        Comments: PRN    03/09/23 0809    03/10/23 0600  CBC & Differential  Morning Draw        Comments: Postpartum Day 1      03/09/23 0809    03/10/23 0000  bisacodyl (DULCOLAX) suppository 10 mg  Daily PRN         03/09/23 0809    03/09/23 1127  Inpatient Case Management  Consult  Once         Provider:  (Not yet assigned)    03/09/23 1127    03/09/23 0900  docusate sodium (COLACE) capsule 100 mg  2 Times Daily         03/09/23 0809    03/09/23 0810  Transfer to postpartum when discharge criteria met.  Until Discontinued         03/09/23 0809    03/09/23 0810  Transfer Patient  Once         03/09/23 0809    03/09/23 0810  Code Status and Medical Interventions:  Continuous         03/09/23 0809    03/09/23 0810  Vital Signs Per Hospital Policy  Per Hospital Policy         03/09/23 0809    03/09/23 0810  Notify Physician  Until Discontinued         03/09/23 0809    03/09/23 0810  Up Ad Isa  Until Discontinued         03/09/23 0809    03/09/23 0810  Ambulate Patient  Every Shift       03/09/23 0809    03/09/23 0810  Advance Diet as Tolerated  Until Discontinued         03/09/23 0809    03/09/23 0810  Fundal and Lochia Check  Per Order Details        Comments: Every 30 minutes x2, then Every Shift    03/09/23 0809    03/09/23 0810  RN to Assess Rh Status & Place RhIG Evaluation Order if Indicated  Continuous         03/09/23 0809    03/09/23 0810  Bladder Assessment  Per Order Details        Comments: Postpartum 1) Upon Admission to Unit & Every 4 Hours PRN Until Voiding. 2) Out of Bed to Void in 8 Hours.    03/09/23 0809    03/09/23 0810  Straight Cath  Per Order Details        Comments: Postpartum: If Distended & Unable to Void, May Repeat Once.    03/09/23 0809    03/09/23 0810  Indwelling Urinary Catheter  Per Order Details        Comments: Postpartum : After Straight Cathed x2 or if Greater Than 1000mL Residual, Insert Indwelling Urinary Catheter Until Further MD Order.    03/09/23 0809    03/09/23 0810  Remove Vaginal Packing  Once         03/09/23 0809    03/09/23 0810  Apply Ice to Perineum  Per Order Details        Comments: For 20 Minutes Every 2 Hours    03/09/23 0809    03/09/23 0810  Waffle Cushion  Per Order Details        Comments: For Perineal Discomfort    03/09/23 0809    03/09/23 0810   Donut Ring  Per Order Details        Comments: For Perineal Pain    03/09/23 0809    03/09/23 0810  Kpad  Per Order Details        Comments: For Pain    03/09/23 0809    03/09/23 0810  Breast pump to bed  Once         03/09/23 0809    03/09/23 0810  If indicated -- Please administer RH Immunoglobulin based on results of cord blood evaluation and fetal screen lab tests, pharmacy to dispense  Per Order Details        Comments: See Process Instructions For Reference Range Details.    03/09/23 0809    03/09/23 0810  Place Venous Foot Pump  Once         03/09/23 0809    03/09/23 0810  Maintain Venous Foot Pump  Once         03/09/23 0809    03/09/23 0810  Check a fasting blood sugar each morning.  Call if it is less than 70 or greater than 150.  Nursing Communication  Once        Comments: Check a fasting blood sugar each morning.  Call if it is less than 70 or greater than 150.    03/09/23 0809    03/09/23 0809  tranexamic acid 1000 mg in 100 mL 0.7% NaCl infusion (premix)  Once As Needed         03/09/23 0809    03/09/23 0809  sodium chloride 0.9 % flush 1-10 mL  As Needed         03/09/23 0809    03/09/23 0809  ibuprofen (ADVIL,MOTRIN) tablet 600 mg  Every 6 Hours PRN         03/09/23 0809    03/09/23 0809  acetaminophen (TYLENOL) tablet 650 mg  Every 6 Hours PRN         03/09/23 0809    03/09/23 0809  HYDROcodone-acetaminophen (NORCO) 5-325 MG per tablet 1 tablet  Every 4 Hours PRN        See Hyperspace for full Linked Orders Report.    03/09/23 0809    03/09/23 0809  HYDROcodone-acetaminophen (NORCO)  MG per tablet 1 tablet  Every 4 Hours PRN        See Hyperspace for full Linked Orders Report.    03/09/23 0809    03/09/23 0809  magnesium hydroxide (MILK OF MAGNESIA) suspension 10 mL  Daily PRN         03/09/23 0809    03/09/23 0809  benzocaine-menthol (DERMOPLAST) 20-0.5 % topical spray  As Needed         03/09/23 0809    03/09/23 0809  Hydrocort-Pramoxine (Perianal) (PROCTOFOAM-HS) 1-1 % rectal foam 1  application  As Needed         03/09/23 0809    03/09/23 0809  Hydrocortisone (Perianal) (ANUSOL-HC) 2.5 % rectal cream 1 application  As Needed         03/09/23 0809    03/09/23 0809  benzocaine (AMERICAINE) 20 % rectal ointment 1 application  As Needed         03/09/23 0809    03/09/23 0809  oxytocin (PITOCIN) 30 units in 0.9% sodium chloride 500 mL (premix)  Continuous PRN         03/09/23 0809    03/09/23 0809  hydrOXYzine (ATARAX) tablet 50 mg  Nightly PRN         03/09/23 0809    03/09/23 0809  ondansetron (ZOFRAN) tablet 4 mg  Every 8 Hours PRN         03/09/23 0809    03/09/23 0809  lanolin topical 1 application  Every 1 Hour PRN         03/09/23 0809    03/09/23 0809  albuterol sulfate HFA (PROVENTIL HFA;VENTOLIN HFA;PROAIR HFA) inhaler 2 puff  Every 4 Hours PRN         03/09/23 0809    03/09/23 0630  oxytocin (PITOCIN) 30 units in 0.9% sodium chloride 500 mL (premix)  Continuous        See Hyperspace for full Linked Orders Report.    03/09/23 0518    03/09/23 0615  oxytocin (PITOCIN) 30 units in 0.9% sodium chloride 500 mL (premix)  Once,   Status:  Discontinued        See Hyperspace for full Linked Orders Report.    03/09/23 0518    03/09/23 0531  oxytocin (PITOCIN) 30 units in 0.9% sodium chloride 500 mL (premix)  Continuous PRN         03/09/23 0531    03/09/23 0519  Vital Signs Per Hospital Policy  Per Hospital Policy,   Status:  Canceled         03/09/23 0518    03/09/23 0519  Fundal & Lochia Check  Per Order Details,   Status:  Canceled        Comments: Every 15 Minutes x4, Then Every 30 Minutes x2, Then Every Shift    03/09/23 0518    03/09/23 0519  Notify Provider (Specified)  Until Discontinued,   Status:  Canceled         03/09/23 0518    03/09/23 0519  Diet: Regular/House Diet; Texture: Regular Texture (IDDSI 7); Fluid Consistency: Thin (IDDSI 0)  Diet Effective Now         03/09/23 0518    03/09/23 0518  Fundal & Lochia Check  Every Shift,   Status:  Canceled       03/09/23 0518    03/09/23  0518  methylergonovine (METHERGINE) injection 200 mcg  Once As Needed,   Status:  Discontinued         03/09/23 0518    03/09/23 0518  carboprost (HEMABATE) injection 250 mcg  Every 15 Minutes PRN,   Status:  Discontinued         03/09/23 0518    03/09/23 0518  miSOPROStol (CYTOTEC) tablet 800 mcg  Once As Needed,   Status:  Discontinued         03/09/23 0518    03/09/23 0512  Specimen To Pathology  Once,   Status:  Canceled         03/09/23 0511    03/09/23 0334  POC Glucose Once  PROCEDURE ONCE         03/09/23 0333    03/08/23 2310  POC Glucose Once  PROCEDURE ONCE         03/08/23 2308    03/08/23 2259  POC Glucose Finger Q4H  Every 4 Hours,   Status:  Canceled       03/08/23 2258 03/08/23 2130  fentaNYL (2 mcg/mL) and ropivacaine (0.2%) in 100 mL epidural  Continuous,   Status:  Discontinued         03/08/23 2034 03/08/23 2100  sodium chloride 0.9 % flush 10 mL  Every 12 Hours Scheduled,   Status:  Discontinued         03/08/23 1921 03/08/23 2036  VTE Prophylaxis Not Indicated: No Risk Factors (0); </= 3 (Low Risk)  Once         03/08/23 2035 03/08/23 2035  Vital Signs Per Anesthesia Guidelines  Continuous,   Status:  Canceled        Comments: Every 2 Minutes x5, Every 5 Minutes x4, then if Stable Every 15 Minutes    03/08/23 2034 03/08/23 2035  Start IV with #16 or #18 gauge angiocath.  Once         03/08/23 2034 03/08/23 2035  Cardiac Monitoring  Continuous,   Status:  Canceled         03/08/23 2034 03/08/23 2035  Fetal Heart Rate Monitor  Once         03/08/23 2034 03/08/23 2035  Nurse or anesthesiologist to remain with patient for 15 minutes following dosing.  Until Discontinued,   Status:  Canceled         03/08/23 2034 03/08/23 2035  Facilitate maternal postion on side and maintain uterine displacement.  Until Discontinued,   Status:  Canceled         03/08/23 2034 03/08/23 2035  Consult anesthesia services prior to changing epidural infusion/rate.  Until Discontinued,    Status:  Canceled         03/08/23 2034 03/08/23 2035  Nurse may remove epidural catheter after delivery.  Until Discontinued,   Status:  Canceled         03/08/23 2034 03/08/23 2035  Insert Indwelling Urinary Catheter  Once,   Status:  Canceled        See Hyperspace for full Linked Orders Report.    03/08/23 2034 03/08/23 2035  Assess Need for Indwelling Urinary Catheter - Follow Removal Protocol  Continuous,   Status:  Canceled        Comments: Indwelling Urinary Catheter Removal Criteria  Discontinue Indwelling Urinary Catheter Unless One of the Following is Present:  Urinary Retention or Obstruction  Chronic Urinary Catheter Use  End of Life  Critical Illness with Strict I/O   Tract or Abdominal Surgery  Stage 3/4 Sacral / Perineal Wound  Required Activity Restriction: Trauma  Required Activity Restriction: Spine Surgery  If Patient is Being Followed by Urology Contact Them PRIOR to Removal  Do Not Remove Indwelling Urinary Catheter Order is Present with a CLINICAL REASON to Maintain the Catheter. Provider is Required to Include a Clinical Reason to Maintain a Urinary Catheter    Patient Admitted With Indwelling Urinary Catheter (Not Placed at [facility])  Assess for Continued Need & Document Medical Necessity  If Infection is Suspected, Contact the Provider       See Hyperspace for full Linked Orders Report.    03/08/23 2034 03/08/23 2035  Urinary Catheter Care  Every Shift,   Status:  Canceled      See Hyperspace for full Linked Orders Report.    03/08/23 2034 03/08/23 2035  Notify physician for the following conditions:  Until Discontinued,   Status:  Canceled         03/08/23 2034 03/08/23 2034  ePHEDrine injection 5 mg  As Needed,   Status:  Discontinued         03/08/23 2034 03/08/23 2034  diphenhydrAMINE (BENADRYL) injection 12.5 mg  Every 8 Hours PRN,   Status:  Discontinued         03/08/23 2034 03/08/23 2034  ondansetron (ZOFRAN) injection 4 mg  Once As Needed          03/08/23 2034 03/08/23 2034  famotidine (PEPCID) injection 20 mg  Once As Needed         03/08/23 2034 03/08/23 2032  Inpatient Anesthesiology Consult  Once,   Status:  Canceled        Specialty:  Anesthesiology  Provider:  (Not yet assigned)    03/08/23 2035 03/08/23 2031  acetaminophen (TYLENOL) tablet 650 mg  Every 4 Hours PRN,   Status:  Discontinued         03/08/23 2035 03/08/23 2031  butorphanol (STADOL) injection 2 mg  Every 3 Hours PRN,   Status:  Discontinued         03/08/23 2035 03/08/23 2031  ondansetron (ZOFRAN) tablet 4 mg  Every 6 Hours PRN,   Status:  Discontinued        See Hyperspace for full Linked Orders Report.    03/08/23 2035 03/08/23 2031  ondansetron (ZOFRAN) injection 4 mg  Every 6 Hours PRN,   Status:  Discontinued        See Hyperspace for full Linked Orders Report.    03/08/23 2035 03/08/23 2031  terbutaline (BRETHINE) injection 0.25 mg  As Needed,   Status:  Discontinued         03/08/23 2035 03/08/23 2031  mineral oil liquid 30 mL  Once As Needed,   Status:  Discontinued         03/08/23 2035 03/08/23 2031  famotidine (PEPCID) injection 20 mg  2 Times Daily PRN,   Status:  Discontinued        See Hyperspace for full Linked Orders Report.    03/08/23 2035 03/08/23 2031  famotidine (PEPCID) tablet 20 mg  2 Times Daily PRN,   Status:  Discontinued        See Hyperspace for full Linked Orders Report.    03/08/23 2035 03/08/23 2015  lactated ringers bolus 1,000 mL  Once,   Status:  Discontinued         03/08/23 1921 03/08/23 2015  lactated ringers infusion  Continuous,   Status:  Discontinued         03/08/23 1921 03/08/23 2015  mineral oil liquid 30 mL  Once,   Status:  Discontinued         03/08/23 1921 03/08/23 2015  oxytocin (PITOCIN) 30 units in 0.9% sodium chloride 500 mL (premix)  Titrated,   Status:  Discontinued         03/08/23 1921 03/08/23 1922  Admit To Obstetrics Inpatient  Once         03/08/23 1921 03/08/23 1922  Obtain  Informed Consent  Once         23  Vital Signs Per Hospital Policy  Per Hospital Policy,   Status:  Canceled         23  Initiate Group Beta Strep (GBS) Prophylaxis Protocol, If Criteria Met  Continuous,   Status:  Canceled        Comments: NO TREATMENT RECOMMENDED IF: 1) Maternal GBS Status Known Negative 2) Scheduled  Birth With Intact Membranes, Not in Labor 3) Maternal GBS Status Unknown, No Risk Factors  TREAT WITH ANTIBIOTICS IF:  1) Maternal GBS Status Known Positive 2) Maternal GBS Status Unknown With Risk Factors: a)  Previous Infant Affected By GBS Infection b) GBS Urinary Tract Infection (UTI) or Bacteriuria During Pregnancy c) Unexplained Maternal Fever (100.4F (38C) or Greater) During Labor d)  Prolonged Rupture of Membranes (18 or More Hours) e) Gestational Age Less Than 37 Weeks    23  Mini-Prep Prior to Delivery  Once,   Status:  Canceled         23  Continuous Fetal Monitoring With NST on Admission and Prior to Initiation of Oxytocin.  Per Order Details,   Status:  Canceled        Comments: Continuous Fetal Monitoring With NST on Admission & Prior to Initiation of Oxytocin.    23  External Uterine Contraction Monitoring  Per Hospital Policy,   Status:  Canceled         231    23  Notify Provider (Specified)  Until Discontinued,   Status:  Canceled         23  Notify Provider of Tachysystole (Per Hospital Algorithm)  Until Discontinued,   Status:  Canceled         23  Notify Provider if Membranes Ruptured, Bleeding Greater Than 1 Pad Per Hour, Fetal Heart Tone Abnormality or Severe Pain  Until Discontinued,   Status:  Canceled         23  NPO Diet NPO Type: Ice Chips  Diet Effective Now,   Status:  Canceled         23  CBC  (No Diff)  Once         23 1921    23  Type & Screen  Once         23 1921    23  Insert Peripheral IV  Once         23  Saline Lock & Maintain IV Access  Continuous,   Status:  Canceled         23  Place Sequential Compression Device  Once,   Status:  Canceled         23  Maintain Sequential Compression Device  Continuous,   Status:  Canceled         23  Position Change - For Intra-Uterine Resusitation for Hypertonus, HyperStimulation or Non-Reassuring Fetal Status  As Needed,   Status:  Canceled       23  lidocaine PF 1% (XYLOCAINE) injection 5 mL  As Needed,   Status:  Discontinued         23  sodium chloride 0.9 % flush 10 mL  As Needed,   Status:  Discontinued         23    Unscheduled  Apply witch hazel pads / TUCKS to perineum as needed for comfort PRN  As Needed       23 0809    Unscheduled  Warm compress  As Needed       23 0809    Unscheduled  Apply ace wrap, tight bra, or binder  As Needed       23 0809    Unscheduled  Apply ice packs  As Needed       23 0809                   Operative/Procedure Notes (last 72 hours)      Arsh Ray MD at 23 0527        Delivery Note    Obstetrician:  Arsh Ray MD      Pre-Delivery Diagnosis: 1.  Intrauterine pregnancy at 39-2/7 weeks, delivered  2.  Gestational diabetes, A2    Post-Delivery Diagnosis: Same    Procedure: Spontaneous vaginal delivery     Episiotomy or Incision: none    32-year-old  3 para 2 presented at 39-2/7 weeks for an induction of labor.  This was due to gestational diabetes type A2.  Fetal heart tones were reactive.  Group B strep status was negative.      The patient was admitted.  Induction of labor was undertaken using Pitocin.  Amniotomy was performed with return of clear fluid.  An  epidural catheter was placed for pain control.      The patient progressed along an adequate labor curve to complete effacement and dilation as well as +2 station of the presenting part.  She then began to push.  She pushed to spontaneous vaginal delivery of the fetal head from direct occiput anterior presentation.  The mouth and naris were suctioned with a bulb while on the perineum.      The shoulders were delivered without difficulty, followed by the remainder of the infant.  After delay of 30 seconds, the cord was doubly clamped and divided.  The infant was then placed on the mother's chest and ultimately evaluated by the delivery team in the warmer.  This is a vigorous male .  Apgars of 8 and 8 were assigned.      There was a small first-degree perineal laceration.  This was repaired with 3-0 Monocryl.  The placenta  spontaneously.  It was intact and had a three-vessel cord.            Apgars: 1' 8  /  5' 8     Placenta and Cord:          Mechanism: spontaneous        Description:  complete    Estimated Blood Loss:  230cc                             Complications:  None           Condition: Stable    Male       3/9/2023  Arsh Ray MD    Electronically signed by Arsh Ray MD at 23 0532          Physician Progress Notes (last 72 hours)      Mt Almazan MD at 23 0880          Postpartum Progress Note      Status post Vaginal Delivery: Doing well postoperatively.     1) postpartum care immediately following delivery : Doing well, routine care.   2) Gestational DM (was on metformin). Plan to check fasting BS in AM       Rh status: A negative (cord blood pending)  Rubella: immune  Gender: Male     Subjective     Postpartum Day 0: Vaginal delivery    The patient feels well. The patient denies emotional concerns. Pain is well controlled with current medications. The baby is well. The patient is ambulating well. The patient is tolerating a normal diet.      Objective     Vital signs in last 24 hours:  Temp:  [97.5 °F (36.4 °C)-98.7 °F (37.1 °C)] 97.7 °F (36.5 °C)  Heart Rate:  [] 71  Resp:  [15-20] 16  BP: (102-147)/(54-94) 121/79      General:    alert, appears stated age and cooperative   Abdomen:  Soft, Non-tender    Lochia:  appropriate   Uterine Fundus:   firm   Ext    Edema 1+   DVT Evaluation:  No evidence of DVT seen on physical exam.     Lab Results   Component Value Date    WBC 9.02 03/08/2023    HGB 12.0 03/08/2023    HCT 34.0 03/08/2023    MCV 82.7 03/08/2023     03/08/2023       Mt Almazan MD  3/9/2023  08:41 EST      Electronically signed by Mt Almazan MD at 03/09/23 0846     Arsh Ray MD at 03/08/23 2256        The patient is beginning to feel some contractions, but is overall comfortable  Fetal heart tones are category 1  Cervix is 70% effaced and 1 to 2 cm dilated.  Amniotomy performed with return of clear fluid.    Electronically signed by Arsh Ray MD at 03/08/23 2256       Consult Notes (last 72 hours)  Notes from 03/06/23 1727 through 03/09/23 1727   No notes of this type exist for this encounter.

## 2023-03-09 NOTE — ANESTHESIA POSTPROCEDURE EVALUATION
"Patient: Beena Vincent    Procedure Summary     Date: 03/09/23 Room / Location:     Anesthesia Start: 0344 Anesthesia Stop: 0500    Procedure: LABOR ANALGESIA Diagnosis:     Scheduled Providers:  Provider: Richard Villegas MD    Anesthesia Type: epidural ASA Status: 2          Anesthesia Type: epidural    Vitals  Vitals Value Taken Time   BP 93/65 03/09/23 0646   Temp 37.1 °C (98.7 °F) 03/09/23 0515   Pulse 165 03/09/23 0646   Resp 16 03/09/23 0630   SpO2 100 % 03/09/23 0540   Vitals shown include unvalidated device data.        Post Anesthesia Care and Evaluation    Patient location during evaluation: bedside  Patient participation: complete - patient participated  Level of consciousness: awake and alert  Pain management: adequate    Airway patency: patent  Anesthetic complications: No anesthetic complications    Cardiovascular status: acceptable  Respiratory status: acceptable  Hydration status: acceptable    Comments: /73   Pulse 77   Temp 37.1 °C (98.7 °F) (Oral)   Resp 16   Ht 177.8 cm (70\")   LMP 04/25/2022 (Exact Date)   SpO2 100%   Breastfeeding Unknown   BMI 27.35 kg/m²       "

## 2023-03-09 NOTE — ANESTHESIA PROCEDURE NOTES
Labor Epidural      Patient location during procedure: OB  Performed By  Anesthesiologist: Richard Villegas MD  Preanesthetic Checklist  Completed: patient identified, IV checked, site marked, risks and benefits discussed, surgical consent, monitors and equipment checked, pre-op evaluation and timeout performed  Prep:  Pt Position:sitting  Sterile Tech:cap, gloves and mask  Prep:chlorhexidine gluconate and isopropyl alcohol  Monitoring:blood pressure monitoring, continuous pulse oximetry and EKG  Epidural Block Procedure:  Approach:midline  Guidance:landmark technique and palpation technique  Location:L4-L5  Needle Type:Tuohy  Needle Gauge:18 G  Loss of Resistance Medium: saline  Paresthesia: none  Aspiration:negative  Test Dose:negative  Number of Attempts: 1  Post Assessment:  Dressing:occlusive dressing applied and secured with tape  Pt Tolerance:patient tolerated the procedure well with no apparent complications  Complications:no

## 2023-03-09 NOTE — L&D DELIVERY NOTE
Delivery Note    Obstetrician:  Arsh Ray MD      Pre-Delivery Diagnosis: 1.  Intrauterine pregnancy at 39-2/7 weeks, delivered  2.  Gestational diabetes, A2    Post-Delivery Diagnosis: Same    Procedure: Spontaneous vaginal delivery     Episiotomy or Incision: none    32-year-old  3 para 2 presented at 39-2/7 weeks for an induction of labor.  This was due to gestational diabetes type A2.  Fetal heart tones were reactive.  Group B strep status was negative.      The patient was admitted.  Induction of labor was undertaken using Pitocin.  Amniotomy was performed with return of clear fluid.  An epidural catheter was placed for pain control.      The patient progressed along an adequate labor curve to complete effacement and dilation as well as +2 station of the presenting part.  She then began to push.  She pushed to spontaneous vaginal delivery of the fetal head from direct occiput anterior presentation.  The mouth and naris were suctioned with a bulb while on the perineum.      The shoulders were delivered without difficulty, followed by the remainder of the infant.  After delay of 30 seconds, the cord was doubly clamped and divided.  The infant was then placed on the mother's chest and ultimately evaluated by the delivery team in the warmer.  This is a vigorous male .  Apgars of 8 and 8 were assigned.      There was a small first-degree perineal laceration.  This was repaired with 3-0 Monocryl.  The placenta  spontaneously.  It was intact and had a three-vessel cord.            Apgars: 1' 8  /  5' 8     Placenta and Cord:          Mechanism: spontaneous        Description:  complete    Estimated Blood Loss:  230cc                             Complications:  None           Condition: Stable    Male       3/9/2023  Arsh Ray MD

## 2023-03-09 NOTE — LACTATION NOTE
Patient reports she is formula feeding baby. She denies questions on milk suppression      Lactation Consult Note    Evaluation Completed: 3/9/2023 14:29 EST  Patient Name: Beena Vincent  :  1990  MRN:  [id number]     REFERRAL  INFORMATION:                                         DELIVERY HISTORY:        Skin to skin initiation date/time:      Skin to skin end date/time:           MATERNAL ASSESSMENT:                               INFANT ASSESSMENT:  Information for the patient's :  Jennifer Vincent [[id number]]   No past medical history on file.                                                                                                     MATERNAL INFANT FEEDING:                                                                       EQUIPMENT TYPE:                                 BREAST PUMPING:          LACTATION REFERRALS:

## 2023-03-09 NOTE — ANESTHESIA PREPROCEDURE EVALUATION
Anesthesia Evaluation     Patient summary reviewed and Nursing notes reviewed   no history of anesthetic complications:  NPO Solid Status: > 8 hours  NPO Liquid Status: > 2 hours           Airway   Mallampati: II  TM distance: >3 FB  Neck ROM: full  No difficulty expected  Dental - normal exam     Pulmonary    (+) a smoker (vape with nictoine), asthma (freq inhaler use),wheezes,   Cardiovascular - normal exam    (+) hypertension,       Neuro/Psych  (+) psychiatric history Anxiety and Depression,    GI/Hepatic/Renal/Endo      Musculoskeletal     Abdominal    Substance History      OB/GYN          Other                        Anesthesia Plan    ASA 2     epidural       Anesthetic plan, risks, benefits, and alternatives have been provided, discussed and informed consent has been obtained with: patient.

## 2023-03-09 NOTE — PROGRESS NOTES
Postpartum Progress Note      Status post Vaginal Delivery: Doing well postoperatively.     1) postpartum care immediately following delivery : Doing well, routine care.   2) Gestational DM (was on metformin). Plan to check fasting BS in AM       Rh status: A negative (cord blood pending)  Rubella: immune  Gender: Male     Subjective     Postpartum Day 0: Vaginal delivery    The patient feels well. The patient denies emotional concerns. Pain is well controlled with current medications. The baby is well. The patient is ambulating well. The patient is tolerating a normal diet.     Objective     Vital signs in last 24 hours:  Temp:  [97.5 °F (36.4 °C)-98.7 °F (37.1 °C)] 97.7 °F (36.5 °C)  Heart Rate:  [] 71  Resp:  [15-20] 16  BP: (102-147)/(54-94) 121/79      General:    alert, appears stated age and cooperative   Abdomen:  Soft, Non-tender    Lochia:  appropriate   Uterine Fundus:   firm   Ext    Edema 1+   DVT Evaluation:  No evidence of DVT seen on physical exam.     Lab Results   Component Value Date    WBC 9.02 03/08/2023    HGB 12.0 03/08/2023    HCT 34.0 03/08/2023    MCV 82.7 03/08/2023     03/08/2023       Mt Almazan MD  3/9/2023  08:41 EST

## 2023-03-09 NOTE — PROGRESS NOTES
The patient is beginning to feel some contractions, but is overall comfortable  Fetal heart tones are category 1  Cervix is 70% effaced and 1 to 2 cm dilated.  Amniotomy performed with return of clear fluid.

## 2023-03-09 NOTE — PLAN OF CARE
Problem: Adult Inpatient Plan of Care  Goal: Plan of Care Review  Outcome: Ongoing, Progressing  Flowsheets (Taken 3/9/2023 1711)  Progress: improving  Plan of Care Reviewed With: patient  Outcome Evaluation: VSS. Voiding spontaneously. Fundus firm and midline, bleeding scant to light. Some difficulty ambulating after fundal checks completed, but is now ambulating well ad shante. Bottle feeding infant. Bonding appropriately with infant. Currently resting while infant is in NB NSY.  Goal: Patient-Specific Goal (Individualized)  Outcome: Ongoing, Progressing  Goal: Absence of Hospital-Acquired Illness or Injury  Outcome: Ongoing, Progressing  Intervention: Identify and Manage Fall Risk  Recent Flowsheet Documentation  Taken 3/9/2023 1625 by Juany Benjamin RN  Safety Promotion/Fall Prevention: safety round/check completed  Taken 3/9/2023 1435 by Juany Benjamin RN  Safety Promotion/Fall Prevention: safety round/check completed  Taken 3/9/2023 1210 by Juany Benjamin RN  Safety Promotion/Fall Prevention: safety round/check completed  Taken 3/9/2023 1045 by Juany Benjamin RN  Safety Promotion/Fall Prevention: safety round/check completed  Taken 3/9/2023 0834 by Juany Benjamin RN  Safety Promotion/Fall Prevention: safety round/check completed  Intervention: Prevent Skin Injury  Recent Flowsheet Documentation  Taken 3/9/2023 1625 by Juany Benjamin RN  Body Position:   side-lying   right  Taken 3/9/2023 1435 by Juany Benjamin RN  Body Position:   sitting up in bed   position changed independently  Taken 3/9/2023 1210 by Juany Benjamin RN  Body Position:   sitting up in bed   position changed independently  Taken 3/9/2023 1045 by Juany Benjamin RN  Body Position:   sitting up in bed   position changed independently  Taken 3/9/2023 0834 by Juany Benjamin RN  Body Position:   sitting up in bed   position changed independently  Intervention: Prevent and Manage VTE (Venous  Thromboembolism) Risk  Recent Flowsheet Documentation  Taken 3/9/2023 1625 by Juany Benjamin RN  Activity Management:   up ad shante   activity encouraged  Taken 3/9/2023 1435 by Juany Benjamin RN  Activity Management:   up ad shante   activity encouraged  Taken 3/9/2023 1210 by Juany Benjamin RN  Activity Management:   activity adjusted per tolerance   bedrest  Taken 3/9/2023 1045 by Juany Benjamin RN  Activity Management:   bedrest   activity adjusted per tolerance  Taken 3/9/2023 0943 by Juany Benjamin RN  Activity Management: (pt unable to ambulate to bathroom, unable to stand at bedside for more than 5 seconds. bedpan utilized.)   activity adjusted per tolerance   standing at bedside  Taken 3/9/2023 0834 by Juany Benjamin RN  Activity Management:   activity adjusted per tolerance   bedrest  Goal: Optimal Comfort and Wellbeing  Outcome: Ongoing, Progressing  Intervention: Monitor Pain and Promote Comfort  Recent Flowsheet Documentation  Taken 3/9/2023 0943 by Juany Benjamin RN  Pain Management Interventions: see MAR  Taken 3/9/2023 0834 by Juany Benjamin RN  Pain Management Interventions: see MAR  Intervention: Provide Person-Centered Care  Recent Flowsheet Documentation  Taken 3/9/2023 0834 by Juany Benjamin RN  Trust Relationship/Rapport:   care explained   choices provided  Goal: Readiness for Transition of Care  Outcome: Ongoing, Progressing     Problem: Skin Injury Risk Increased  Goal: Skin Health and Integrity  Outcome: Ongoing, Progressing     Problem: Device-Related Complication Risk (Anesthesia/Analgesia, Neuraxial)  Goal: Safe Infusion Delivery Completion  Outcome: Ongoing, Progressing     Problem: Infection (Anesthesia/Analgesia, Neuraxial)  Goal: Absence of Infection Signs and Symptoms  Outcome: Ongoing, Progressing     Problem: Nausea and Vomiting (Anesthesia/Analgesia, Neuraxial)  Goal: Nausea and Vomiting Relief  Outcome: Ongoing, Progressing      Problem: Pain (Anesthesia/Analgesia, Neuraxial)  Goal: Effective Pain Control  Outcome: Ongoing, Progressing  Intervention: Prevent or Manage Pain  Recent Flowsheet Documentation  Taken 3/9/2023 0943 by Juany Benjamin RN  Pain Management Interventions: see MAR  Taken 3/9/2023 0834 by Juany Benjamin RN  Pain Management Interventions: see MAR     Problem: Respiratory Compromise (Anesthesia/Analgesia, Neuraxial)  Goal: Effective Oxygenation and Ventilation  Outcome: Ongoing, Progressing     Problem: Sensorimotor Impairment (Anesthesia/Analgesia, Neuraxial)  Goal: Baseline Motor Function  Outcome: Ongoing, Progressing  Intervention: Optimize Sensorimotor Function  Recent Flowsheet Documentation  Taken 3/9/2023 1625 by Juany Benjamin RN  Safety Promotion/Fall Prevention: safety round/check completed  Taken 3/9/2023 1435 by Juany Benjamin RN  Safety Promotion/Fall Prevention: safety round/check completed  Taken 3/9/2023 1210 by Juany Benjamin RN  Safety Promotion/Fall Prevention: safety round/check completed  Taken 3/9/2023 1045 by Juany Benjamin RN  Safety Promotion/Fall Prevention: safety round/check completed  Taken 3/9/2023 0834 by Juany Benjamin RN  Safety Promotion/Fall Prevention: safety round/check completed     Problem: Urinary Retention (Anesthesia/Analgesia, Neuraxial)  Goal: Effective Urinary Elimination  Outcome: Ongoing, Progressing     Problem: Fall Injury Risk  Goal: Absence of Fall and Fall-Related Injury  Outcome: Ongoing, Progressing  Intervention: Promote Injury-Free Environment  Recent Flowsheet Documentation  Taken 3/9/2023 1625 by Juany Benjamin RN  Safety Promotion/Fall Prevention: safety round/check completed  Taken 3/9/2023 1435 by Juany Benjamin RN  Safety Promotion/Fall Prevention: safety round/check completed  Taken 3/9/2023 1210 by Juany Benjamin RN  Safety Promotion/Fall Prevention: safety round/check completed  Taken 3/9/2023  1045 by Juany Benjamin, RN  Safety Promotion/Fall Prevention: safety round/check completed  Taken 3/9/2023 0834 by Juany Benjamin, RN  Safety Promotion/Fall Prevention: safety round/check completed   Goal Outcome Evaluation:  Plan of Care Reviewed With: patient        Progress: improving  Outcome Evaluation: VSS. Voiding spontaneously. Fundus firm and midline, bleeding scant to light. Some difficulty ambulating after fundal checks completed, but is now ambulating well ad shante. Bottle feeding infant. Bonding appropriately with infant. Currently resting while infant is in NB NSY.

## 2023-03-10 LAB
BASOPHILS # BLD AUTO: 0.03 10*3/MM3 (ref 0–0.2)
BASOPHILS NFR BLD AUTO: 0.3 % (ref 0–1.5)
DEPRECATED RDW RBC AUTO: 37.5 FL (ref 37–54)
EOSINOPHIL # BLD AUTO: 0.27 10*3/MM3 (ref 0–0.4)
EOSINOPHIL NFR BLD AUTO: 2.7 % (ref 0.3–6.2)
ERYTHROCYTE [DISTWIDTH] IN BLOOD BY AUTOMATED COUNT: 12.2 % (ref 12.3–15.4)
GLUCOSE BLDC GLUCOMTR-MCNC: 133 MG/DL (ref 70–130)
HCT VFR BLD AUTO: 30.1 % (ref 34–46.6)
HGB BLD-MCNC: 10.1 G/DL (ref 12–15.9)
IMM GRANULOCYTES # BLD AUTO: 0.04 10*3/MM3 (ref 0–0.05)
IMM GRANULOCYTES NFR BLD AUTO: 0.4 % (ref 0–0.5)
LYMPHOCYTES # BLD AUTO: 3.2 10*3/MM3 (ref 0.7–3.1)
LYMPHOCYTES NFR BLD AUTO: 32.4 % (ref 19.6–45.3)
MCH RBC QN AUTO: 28.4 PG (ref 26.6–33)
MCHC RBC AUTO-ENTMCNC: 33.6 G/DL (ref 31.5–35.7)
MCV RBC AUTO: 84.6 FL (ref 79–97)
MONOCYTES # BLD AUTO: 0.71 10*3/MM3 (ref 0.1–0.9)
MONOCYTES NFR BLD AUTO: 7.2 % (ref 5–12)
NEUTROPHILS NFR BLD AUTO: 5.63 10*3/MM3 (ref 1.7–7)
NEUTROPHILS NFR BLD AUTO: 57 % (ref 42.7–76)
NRBC BLD AUTO-RTO: 0 /100 WBC (ref 0–0.2)
PLATELET # BLD AUTO: 184 10*3/MM3 (ref 140–450)
PMV BLD AUTO: 10.5 FL (ref 6–12)
RBC # BLD AUTO: 3.56 10*6/MM3 (ref 3.77–5.28)
WBC NRBC COR # BLD: 9.88 10*3/MM3 (ref 3.4–10.8)

## 2023-03-10 PROCEDURE — 85025 COMPLETE CBC W/AUTO DIFF WBC: CPT | Performed by: OBSTETRICS & GYNECOLOGY

## 2023-03-10 PROCEDURE — 82962 GLUCOSE BLOOD TEST: CPT

## 2023-03-10 PROCEDURE — 0503F POSTPARTUM CARE VISIT: CPT | Performed by: NURSE PRACTITIONER

## 2023-03-10 RX ORDER — ALBUTEROL SULFATE 2.5 MG/3ML
2.5 SOLUTION RESPIRATORY (INHALATION) EVERY 6 HOURS PRN
Status: DISCONTINUED | OUTPATIENT
Start: 2023-03-10 | End: 2023-03-11 | Stop reason: HOSPADM

## 2023-03-10 RX ADMIN — IBUPROFEN 600 MG: 600 TABLET, FILM COATED ORAL at 12:50

## 2023-03-10 RX ADMIN — HYDROCODONE BITARTRATE AND ACETAMINOPHEN 1 TABLET: 5; 325 TABLET ORAL at 09:25

## 2023-03-10 RX ADMIN — HYDROCODONE BITARTRATE AND ACETAMINOPHEN 1 TABLET: 5; 325 TABLET ORAL at 21:55

## 2023-03-10 RX ADMIN — HYDROCODONE BITARTRATE AND ACETAMINOPHEN 1 TABLET: 5; 325 TABLET ORAL at 04:23

## 2023-03-10 RX ADMIN — IBUPROFEN 600 MG: 600 TABLET, FILM COATED ORAL at 04:23

## 2023-03-10 RX ADMIN — ACETAMINOPHEN 650 MG: 325 TABLET, FILM COATED ORAL at 12:50

## 2023-03-10 RX ADMIN — HYDROCODONE BITARTRATE AND ACETAMINOPHEN 1 TABLET: 5; 325 TABLET ORAL at 14:51

## 2023-03-10 RX ADMIN — DOCUSATE SODIUM 100 MG: 100 CAPSULE, LIQUID FILLED ORAL at 09:25

## 2023-03-10 RX ADMIN — IBUPROFEN 600 MG: 600 TABLET, FILM COATED ORAL at 20:21

## 2023-03-10 RX ADMIN — DOCUSATE SODIUM 100 MG: 100 CAPSULE, LIQUID FILLED ORAL at 20:21

## 2023-03-10 NOTE — PROGRESS NOTES
Discharge Planning Assessment  [facility]     Patient Name: Beena Vincent  MRN: [id number]  Today's Date: 3/10/2023    Admit Date: 3/8/2023    Plan: Infant may discharge to mother when medically ready; CSW will follow cord. PADMINI Kendrick.   Discharge Needs Assessment    No documentation.                Discharge Plan     Row Name 03/10/23 1326       Plan    Plan Infant may discharge to mother when medically ready; CSW will follow cord. PADMINI Kendrick.    Plan Comments Mother: Beena Vincent, MRN: [id number]; infant: Jennifer “Xavier” Carlton, MRN: [id number]. CSW consulted for “cord sent.” Of note, mother’s UDS was not collected on admit. Infant’s UDS was missed; cord toxicology sent. CSW met with mother at bedside while father of infant/ significant other sat on the couch. Mother gave consent for father to be present during assessment. Mother verified address, phone number and insurance. Mother confirmed [employer] has spoken to her about adding infant to health insurance. Mother reports having a car seat, crib/bassinet, clothes, and diapers for infant. Mother and father have two other children: 10yr male & 2yr female, who are being care for by maternal grandparents during hospital stay. Mother reports maternal grandparents, paternal grandparents, friends, cousins, and other family members are available for support as needed. Mother reports infant is following up with Dr. Cody after discharge; mother is comfortable scheduling appointments for infant and has transportation. Mother is current with WIC and plans to add infant onto her plan following discharge. CSW spoke to mother about the HANDS program and mother declined CSW making a referral today. CSW provided mother with a packet of resources including: WIC, HANDS, transportation, infant supplies, counseling, online support groups, postpartum mood and anxiety resources, and general community resources. CSW spent time building rapport with mother, and offered  validation, support, and encouragement to mother throughout assessment. Mother and father were polite and appropriate, and denied having unmet needs or concerns at this time. CSW will follow cord toxicology and report to CPS if warranted. PADMINI Kendrick.              Continued Care and Services - Admitted Since 3/8/2023    Coordination has not been started for this encounter.     Selected Continued Care - Episodes Includes continued care and service providers with selected services from the active episodes listed below    Mother[facility] Episode start date: 12/21/2022   There are no active outsourced providers for this episode.                  Demographic Summary     Row Name 03/10/23 1325       General Information    Admission Type inpatient    Arrived From home    Referral Source nursing    Reason for Consult other (see comments);substance use concerns    General Information Comments Cord sent.               Functional Status     Row Name 03/10/23 1325       Functional Status, IADL    Medications independent    Meal Preparation independent    Housekeeping independent    Laundry independent    Shopping independent       Mental Status    General Appearance WDL WDL       Mental Status Summary    Recent Changes in Mental Status/Cognitive Functioning no changes       Employment/    Employment Status unemployed               Psychosocial     Row Name 03/10/23 1326       Behavior WDL    Behavior WDL WDL       Emotion Mood WDL    Emotion/Mood/Affect WDL WDL       Speech WDL    Speech WDL WDL       Perceptual State WDL    Perceptual State WDL WDL       Thought Process WDL    Thought Process WDL WDL       Intellectual Performance WDL    Intellectual Performance WDL WDL       Coping/Stress    Major Change/Loss/Stressor birth    Patient Personal Strengths future/goal oriented;motivated;positive attitude;strong support system    Sources of Support other family members;parent(s);significant other;friend(s)                Abuse/Neglect     Row Name 03/10/23 1326       Personal Safety    Physical Signs of Abuse Present no               Legal    No documentation.                Substance Abuse     Row Name 03/10/23 1326       Substance Use    Substance Use Comment No UDS mom or infant; cord tox sent.               Patient Forms    No documentation.                   CHARLETTE Gutiérrez

## 2023-03-10 NOTE — PROGRESS NOTES
Postpartum Progress Note      Status post Vaginal Delivery: Doing well postoperatively.     1) Postpartum care immediately following delivery :    Routine care, she is planning for discharge home tomorrow.     2) GDM: She was on metformin antepartum. No current medications FBS this AM was 133, she reports this was not fasting, she had recently eaten swiss cake roll. FBS normal yesterday    3) History of asthma: Inspiratory wheezes noted. She denies SOA. Does not have inhaler with her. Will order albuterol inhaler. Reviewed use of incentive spirometer.     Rh status: A-, Infant Rh -, rhogam not indicated  Rubella: Immune  Gender: Male, desires circumcision     Subjective     Postpartum Day 1: Vaginal delivery    The patient feels well. The patient denies emotional concerns. Pain is well controlled with current medications. The baby is well. The patient is ambulating well. The patient is tolerating a normal diet.     Objective     Vital signs in last 24 hours:  Temp:  [97.1 °F (36.2 °C)-98.2 °F (36.8 °C)] 98.2 °F (36.8 °C)  Heart Rate:  [71-87] 83  Resp:  [16] 16  BP: (111-128)/(69-83) 123/74      General:    alert, appears stated age and cooperative   CV: RRR, no m/r/g   Lungs: Inspiratory wheeze throughout   Abdomen:  Soft, Non-tender    Lochia:  appropriate   Uterine Fundus:   firm   Ext    Edema trace   DVT Evaluation:  No evidence of DVT seen on physical exam.     Lab Results   Component Value Date    WBC 9.88 03/10/2023    HGB 10.1 (L) 03/10/2023    HCT 30.1 (L) 03/10/2023    MCV 84.6 03/10/2023     03/10/2023       Beena Villafana, APRN  3/10/2023  08:47 EST

## 2023-03-11 VITALS
RESPIRATION RATE: 16 BRPM | OXYGEN SATURATION: 100 % | TEMPERATURE: 98 F | DIASTOLIC BLOOD PRESSURE: 88 MMHG | SYSTOLIC BLOOD PRESSURE: 135 MMHG | HEIGHT: 70 IN | HEART RATE: 77 BPM | BODY MASS INDEX: 27.35 KG/M2

## 2023-03-11 LAB — GLUCOSE BLDC GLUCOMTR-MCNC: 85 MG/DL (ref 70–130)

## 2023-03-11 PROCEDURE — 0503F POSTPARTUM CARE VISIT: CPT | Performed by: OBSTETRICS & GYNECOLOGY

## 2023-03-11 PROCEDURE — 82962 GLUCOSE BLOOD TEST: CPT

## 2023-03-11 RX ORDER — HYDROCODONE BITARTRATE AND ACETAMINOPHEN 5; 325 MG/1; MG/1
1 TABLET ORAL EVERY 8 HOURS PRN
Qty: 10 TABLET | Refills: 0 | Status: SHIPPED | OUTPATIENT
Start: 2023-03-11

## 2023-03-11 RX ORDER — IBUPROFEN 600 MG/1
600 TABLET ORAL EVERY 6 HOURS PRN
Qty: 50 TABLET | Refills: 3 | Status: SHIPPED | OUTPATIENT
Start: 2023-03-11

## 2023-03-11 RX ADMIN — IBUPROFEN 600 MG: 600 TABLET, FILM COATED ORAL at 02:28

## 2023-03-11 RX ADMIN — HYDROCODONE BITARTRATE AND ACETAMINOPHEN 1 TABLET: 5; 325 TABLET ORAL at 06:32

## 2023-03-11 RX ADMIN — DOCUSATE SODIUM 100 MG: 100 CAPSULE, LIQUID FILLED ORAL at 08:14

## 2023-03-11 RX ADMIN — IBUPROFEN 600 MG: 600 TABLET, FILM COATED ORAL at 11:54

## 2023-03-11 RX ADMIN — HYDROCODONE BITARTRATE AND ACETAMINOPHEN 1 TABLET: 5; 325 TABLET ORAL at 11:54

## 2023-03-11 RX ADMIN — HYDROCODONE BITARTRATE AND ACETAMINOPHEN 1 TABLET: 5; 325 TABLET ORAL at 02:28

## 2023-03-11 NOTE — PROGRESS NOTES
Postpartum Progress Note      Status post Vaginal Delivery: Doing well postoperatively.     1) postpartum care immediately following delivery : Doing well, routine care. Discharge home.   2) Gestational DM - Fasting normal today     Rh status: A negative (infant Rh negative, no Rhogam)  Rubella: immune  Gender: Male     Desires circumcision   R/B/A reviewed   Voiced understanding   Desires to proceed as planned.     Subjective     Postpartum Day 2: Vaginal delivery    The patient feels well. The patient denies emotional concerns. Pain is well controlled with current medications. The baby is well. The patient is ambulating well. The patient is tolerating a normal diet.     Objective     Vital signs in last 24 hours:  Temp:  [97.4 °F (36.3 °C)-98.1 °F (36.7 °C)] 98 °F (36.7 °C)  Heart Rate:  [77-80] 77  Resp:  [16-18] 16  BP: (126-135)/(72-88) 135/88      General:    alert, appears stated age and cooperative   Abdomen:  Soft, Non-tender    Lochia:  appropriate   Uterine Fundus:   firm   Ext    Edema 1+   DVT Evaluation:  No evidence of DVT seen on physical exam.     Lab Results   Component Value Date    WBC 9.88 03/10/2023    HGB 10.1 (L) 03/10/2023    HCT 30.1 (L) 03/10/2023    MCV 84.6 03/10/2023     03/10/2023       Mt Almazan MD  3/11/2023  12:33 EST

## 2023-03-11 NOTE — DISCHARGE SUMMARY
Date of Discharge:  3/11/2023    Discharge Diagnosis: 1) postpartum care immediately after delivery     Presenting Problem/History of Present Illness  Gestational diabetes [O24.419]       Hospital Course  Patient is a 32 y.o. female  39w3d presented with scheduled induction for Gest DM.  For events surrounding her delivery please see delivery/op note.  Her postpartum course was uneventful and today PPD#2, she is ready for discharge.  She meets all milestones and criteria for discharge and instructions were reviewed and she voiced understanding.     Procedures Performed  Vaginal discharge       Consults:   Consults     No orders found from 2023 to 3/9/2023.          Pertinent Test Results: none    Condition on Discharge:  Stable     Discharge Disposition  Home or Self Care    Discharge Medications     Discharge Medications      New Medications      Instructions Start Date   HYDROcodone-acetaminophen 5-325 MG per tablet  Commonly known as: NORCO   1 tablet, Oral, Every 8 Hours PRN      ibuprofen 600 MG tablet  Commonly known as: ADVIL,MOTRIN   600 mg, Oral, Every 6 Hours PRN         Continue These Medications      Instructions Start Date   albuterol sulfate  (90 Base) MCG/ACT inhaler  Commonly known as: PROVENTIL HFA;VENTOLIN HFA;PROAIR HFA   2 puffs, Inhalation, Every 4 Hours PRN      freestyle lancets   Pt to check blood sugar 7xs daily      FreeStyle Lite w/Device kit   USE 1 SEVEN TIMES DAILY      glucose monitor monitoring kit   To check blood sugars 7x daily      ondansetron 4 MG tablet  Commonly known as: ZOFRAN   4 mg, Oral, Every 8 Hours PRN      Prenatabs FA 29-1 MG tablet   1 tablet, Oral, Daily         Stop These Medications    ferrous sulfate 325 (65 FE) MG tablet     glucose blood test strip     metFORMIN  MG 24 hr tablet  Commonly known as: GLUCOPHAGE-XR            Discharge Diet:   Diet Instructions     Advance Diet As Tolerated        Regular diet as tolerated            Activity at Discharge:   Activity Instructions     Pelvic Rest     Additional Activity Instructions:    Pelvic rest x6 weeks.            Follow-up Appointments  Future Appointments   Date Time Provider Department Center   3/23/2023  1:40 PM LAB CHAIR 3 JULIANNA MORENO  [facility]   3/23/2023  2:00 PM Colin Molina MD [facility] LUIS Villegas     Additional Instructions for the Follow-ups that You Need to Schedule     Discharge Follow-up with Specialty: Dr. Ray; 6 Weeks   As directed      Specialty: Dr. Ray    Follow Up: 6 Weeks               Test Results Pending at Discharge       Mt Almazan MD  03/11/23  12:38 EST

## 2023-03-13 NOTE — PROGRESS NOTES
Continued Stay Note  [facility]     Patient Name: Beena Vincent  MRN: [id number]  Today's Date: 3/13/2023    Admit Date: 3/8/2023    Plan: Infant may discharge to mother when medically ready; CSW will follow cord. PADMINI Kendrick.   Discharge Plan     Row Name 03/13/23 1545       Plan    Plan Comments Mother: Beena Vincent, MRN: [id number]; infant: Jennifer “Xavier” Carlton, MRN: [id number]. CSW has reviewed infant’s cord toxicology results and infant’s cord was negative for substances. Mandated CPS reporting is not required at this time. Dee W[place].               Discharge Codes    No documentation.               Expected Discharge Date and Time     Expected Discharge Date Expected Discharge Time    Mar 11, 2023             CHARLETTE Gutiérrez

## 2023-03-16 ENCOUNTER — PATIENT OUTREACH (OUTPATIENT)
Dept: LABOR AND DELIVERY | Facility: HOSPITAL | Age: 33
End: 2023-03-16
Payer: COMMERCIAL

## 2023-03-16 NOTE — OUTREACH NOTE
[facility]  Unable to Reach       Questions/Answers    Flowsheet Row Responses   Pending Outreach Postpartum Check-in   Call Attempt First   Outcome Left message   Next Call Attempt Date 03/17/23          Shahla KINSEY - RN  Maternity Nurse Navigator    3/16/2023, 09:55 EDT

## 2023-03-17 ENCOUNTER — PATIENT OUTREACH (OUTPATIENT)
Dept: LABOR AND DELIVERY | Facility: HOSPITAL | Age: 33
End: 2023-03-17
Payer: COMMERCIAL

## 2023-03-17 NOTE — OUTREACH NOTE
[facility]  Postpartum Check-In    Questions/Answers    Flowsheet Row Responses   Visit Setting In Person   Best Method for Contacting Cell    discharged home with mother? Yes   Current Pain Levels 0-10 0   At Rest Pain Levels 0-10 0   Pain level with activity 0-10 0   Acceptable Pain Level 0-10 0   Verbalized Emotional State Acceptance   Family/Support Network Father, Mother   Level of Involvement in Care Attentive, Interactive, Supportive   Do you feel comfortable in your relationship with your baby? Yes   Have members of your household adjusted to your baby? Yes   Is the baby's father supportive and/or involved with the baby? Yes   How does your partner feel about the baby? Happy   Do you feel safe at home, school and work? Yes   Are you in a relationship with someone who threatens you or hurts you? No   Do you have the resources to keep yourself and your baby healthy and safe? Yes   Lochia (per patient report) Brown-brendan Red   Amount Scant   Number of pads per day 2   Lochia Odor None   Is patient breastfeeding? No   How is breast suppression going? feeling better   Postpartum Depression Screening Education Education Provided   Doctor Appointments: Education Provided   Postpartum Care Education Education Provided   S & S to report Education Provided   Followup Appointments Made No  [will call ]   Well Child Visit Appointments Made Yes   Well Child Checkup Provider Name Dr Cody   Well Child Check Up Date: 23   Did you complete the visit? Yes   Were there any specific concerns? No   Umbilical Cord No reported signs or symptoms   Was the baby circumcised? Yes   Circumcision care and signs/symptoms to report Reviewed   Feeding Readiness Cues: Crying, Eager   Infant Feeding Method Formula   Formula Type --  [similac sensitive ]   Formula PO (mL) 2-3 oz   Formula/Expressed Milk frequency of feedings: 3-4 hours   Number of wet diapers x 24 hours 7   Last BM x 24 hours 1   Emesis (Unmeasured  Occurence) none   What safe sleep surface is available? Bassinet   Are there stuffed animals, toys, pillows, quilts, blankets, wedges, positioners, bumpers or other loose bedding in the infant's sleeping environment? No   Where does the baby usually sleep? Bassinet   Does the baby ever share a sleep surface with a sibling, adult or pet? No   Does the baby ever share a sleep surface in a bed, couch, recliner or other? No   What position do you place your baby to sleep for naps? Back   What position do you place your baby to sleep at night Back   Are you and/or other caregivers smoking inside or outside the baby's home? No   Is the infant dressed appropiately for the temperature of the home? Yes   Do you use a clean, dry pacifier that is not attached to a string or stuffed animal? Yes          Review of Systems    [place]  Depression Score: 2 (3/17/2023  1:52 PM)    Pt doing well, reports no pain and scant bleeding. She did have strep this week but is starting to feel better. Peds visit without complications and infant is doing well with formula. Reminded to call WIC and also to schedule PP appt. She is seeing the MD next week to discuss treatment for her tumor. Denies resource needs at this time. Will send to RN call center.     Shahla Álvarez RN  Maternity Nurse Navigator    3/17/2023, 13:59 EDT

## 2023-03-23 ENCOUNTER — SPECIALTY PHARMACY (OUTPATIENT)
Dept: PHARMACY | Facility: HOSPITAL | Age: 33
End: 2023-03-23
Payer: COMMERCIAL

## 2023-03-23 ENCOUNTER — OFFICE VISIT (OUTPATIENT)
Dept: ONCOLOGY | Facility: CLINIC | Age: 33
End: 2023-03-23
Payer: COMMERCIAL

## 2023-03-23 ENCOUNTER — LAB (OUTPATIENT)
Dept: LAB | Facility: HOSPITAL | Age: 33
End: 2023-03-23
Payer: COMMERCIAL

## 2023-03-23 VITALS
TEMPERATURE: 97.7 F | RESPIRATION RATE: 16 BRPM | HEART RATE: 84 BPM | WEIGHT: 173.1 LBS | BODY MASS INDEX: 24.78 KG/M2 | OXYGEN SATURATION: 97 % | HEIGHT: 70 IN | SYSTOLIC BLOOD PRESSURE: 107 MMHG | DIASTOLIC BLOOD PRESSURE: 73 MMHG

## 2023-03-23 DIAGNOSIS — D48.1 DESMOID TUMOR: Primary | ICD-10-CM

## 2023-03-23 DIAGNOSIS — D48.1 DESMOID TUMOR: ICD-10-CM

## 2023-03-23 LAB
ALBUMIN SERPL-MCNC: 3.9 G/DL (ref 3.5–5.2)
ALBUMIN/GLOB SERPL: 1.5 G/DL (ref 1.1–2.4)
ALP SERPL-CCNC: 86 U/L (ref 38–116)
ALT SERPL W P-5'-P-CCNC: 14 U/L (ref 0–33)
ANION GAP SERPL CALCULATED.3IONS-SCNC: 13.3 MMOL/L (ref 5–15)
AST SERPL-CCNC: 19 U/L (ref 0–32)
BASOPHILS # BLD AUTO: 0.03 10*3/MM3 (ref 0–0.2)
BASOPHILS NFR BLD AUTO: 0.3 % (ref 0–1.5)
BILIRUB SERPL-MCNC: 0.5 MG/DL (ref 0.2–1.2)
BUN SERPL-MCNC: 7 MG/DL (ref 6–20)
BUN/CREAT SERPL: 8.5 (ref 7.3–30)
CALCIUM SPEC-SCNC: 8.9 MG/DL (ref 8.5–10.2)
CHLORIDE SERPL-SCNC: 104 MMOL/L (ref 98–107)
CO2 SERPL-SCNC: 23.7 MMOL/L (ref 22–29)
CREAT SERPL-MCNC: 0.82 MG/DL (ref 0.6–1.1)
DEPRECATED RDW RBC AUTO: 36 FL (ref 37–54)
EGFRCR SERPLBLD CKD-EPI 2021: 98.8 ML/MIN/1.73
EOSINOPHIL # BLD AUTO: 0.26 10*3/MM3 (ref 0–0.4)
EOSINOPHIL NFR BLD AUTO: 2.6 % (ref 0.3–6.2)
ERYTHROCYTE [DISTWIDTH] IN BLOOD BY AUTOMATED COUNT: 11.8 % (ref 12.3–15.4)
GLOBULIN UR ELPH-MCNC: 2.6 GM/DL (ref 1.8–3.5)
GLUCOSE SERPL-MCNC: 113 MG/DL (ref 74–124)
HCT VFR BLD AUTO: 38.1 % (ref 34–46.6)
HGB BLD-MCNC: 12.3 G/DL (ref 12–15.9)
IMM GRANULOCYTES # BLD AUTO: 0.03 10*3/MM3 (ref 0–0.05)
IMM GRANULOCYTES NFR BLD AUTO: 0.3 % (ref 0–0.5)
LYMPHOCYTES # BLD AUTO: 2.27 10*3/MM3 (ref 0.7–3.1)
LYMPHOCYTES NFR BLD AUTO: 22.7 % (ref 19.6–45.3)
MCH RBC QN AUTO: 27.3 PG (ref 26.6–33)
MCHC RBC AUTO-ENTMCNC: 32.3 G/DL (ref 31.5–35.7)
MCV RBC AUTO: 84.5 FL (ref 79–97)
MONOCYTES # BLD AUTO: 0.55 10*3/MM3 (ref 0.1–0.9)
MONOCYTES NFR BLD AUTO: 5.5 % (ref 5–12)
NEUTROPHILS NFR BLD AUTO: 6.86 10*3/MM3 (ref 1.7–7)
NEUTROPHILS NFR BLD AUTO: 68.6 % (ref 42.7–76)
NRBC BLD AUTO-RTO: 0 /100 WBC (ref 0–0.2)
PLATELET # BLD AUTO: 352 10*3/MM3 (ref 140–450)
PMV BLD AUTO: 9.1 FL (ref 6–12)
POTASSIUM SERPL-SCNC: 3.5 MMOL/L (ref 3.5–4.7)
PROT SERPL-MCNC: 6.5 G/DL (ref 6.3–8)
RBC # BLD AUTO: 4.51 10*6/MM3 (ref 3.77–5.28)
SODIUM SERPL-SCNC: 141 MMOL/L (ref 134–145)
WBC NRBC COR # BLD: 10 10*3/MM3 (ref 3.4–10.8)

## 2023-03-23 PROCEDURE — 99215 OFFICE O/P EST HI 40 MIN: CPT | Performed by: INTERNAL MEDICINE

## 2023-03-23 PROCEDURE — 1126F AMNT PAIN NOTED NONE PRSNT: CPT | Performed by: INTERNAL MEDICINE

## 2023-03-23 PROCEDURE — 85025 COMPLETE CBC W/AUTO DIFF WBC: CPT

## 2023-03-23 PROCEDURE — 36415 COLL VENOUS BLD VENIPUNCTURE: CPT

## 2023-03-23 PROCEDURE — 80053 COMPREHEN METABOLIC PANEL: CPT | Performed by: INTERNAL MEDICINE

## 2023-03-23 NOTE — PROGRESS NOTES
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    HISTORY OF PRESENT ILLNESS:    The patient is a 32 y.o. year old female with medical history significant for asthma & depression who had first noted a swelling behind her right knee in December 2021. The swelling was progressive and became painful with knee flexion. A MRI right knee done 3/11/22 demonstrated a 10 x 3.5 x 4.4 cm enhancing soft tissue mass lesion along the posterolateral right knee interposed between the distal biceps femoris and the lateral gastrocnemius muscles. The mass appears to infiltrate or arise from the distal biceps femoris. The lesion displaces and closely abuts the common peroneal nerve.     Patient was seen by , smita - who obtained a biopsy of the mass on 3/23/22. 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      recommended against upfront resection at this time & referred to this clinic to discuss systemic therapy options.     Patient is accompanied by her mother. C/o pain & swelling behind right knee. Denies any association with menstrual cycles. She does report of > 20 lbs unintentional weight loss. Denies any abdominal pain, nausea or vomiting. Denies any other swelling anywhere. No family history of FAP, colon cancer or any malignancies.     A CT chest abdomen and pelvis from 4/26/2020 showed an asymmetric 1 cm nodule in the outer quadrant of right breast, favoring benign etiology.  No other soft tissue masses noted in this patient with history of desmoid fibromatosis.    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     Patient was lost to follow-up after the June 2022 visit.  She was seen again in March 2023.  Patient states she had become pregnant and delivered a baby boy on 3/9/2023.  Normal vaginal delivery.  Normal postpartum period.     She did get an MRI of her right knee performed 1/29/2023, which noted significant increase in size of the right knee posterolateral mass, now measuring 8 x 6 x 18 cm.    INTERIM HISTORY:  Patient returns to the clinic for a follow-up visit.  She is recovering well after recent delivery of her child.  She reports of persistent discomfort in her right knee region.  Reports of having numbness in the knee and leg region.  It is unable to bend her knee completely.  Also has difficulty walking.    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- pt desires to proceed with treatments after delivery   • Gestational diabetes    • Gestational hypertension 2020   • Iron deficiency anemia    • Preeclampsia 2012     Past Surgical History:   Procedure Laterality Date   • APPENDECTOMY N/A 10/1/2021    Procedure: APPENDECTOMY LAPAROSCOPIC;  Surgeon: Ahmet Dong Jr., MD;  Location: [facility];  Service: General;  Laterality: N/A;   • KNEE ARTHROSCOPY      AGE 6   • LEEP N/A 4/25/2019    Procedure: LOOP ELECTROCAUTERY EXCISION PROCEDURE;  Surgeon: Arsh Ray MD;  Location: [facility];  Service: Obstetrics/Gynecology   • SOFT TISSUE BIOPSY Right 3/23/2022    Procedure: BIOPSY SOFT TISSUE THIGH / KNEE;  Surgeon: Chris Ranadll MD;  Location: [facility];  Service: Orthopedics;  Laterality: Right;   • WISDOM TOOTH EXTRACTION         MEDICATIONS    Current Outpatient Medications:   •  albuterol sulfate  (90 Base) MCG/ACT inhaler, Inhale 2 puffs Every 4 (Four) Hours As Needed for Wheezing., Disp: 18 g, Rfl: 0  •  amoxicillin-clavulanate (AUGMENTIN)  "875-125 MG per tablet, Take one tablet po bid x 10 days., Disp: 20 tablet, Rfl: 0  •  Blood Glucose Monitoring Suppl (FreeStyle Lite) w/Device kit, USE 1 SEVEN TIMES DAILY, Disp: , Rfl:   •  glucose monitor monitoring kit, To check blood sugars 7x daily, Disp: 1 each, Rfl: 0  •  HYDROcodone-acetaminophen (NORCO) 5-325 MG per tablet, Take 1 tablet by mouth Every 8 (Eight) Hours As Needed for Moderate Pain., Disp: 10 tablet, Rfl: 0  •  ibuprofen (ADVIL,MOTRIN) 600 MG tablet, Take 1 tablet by mouth Every 6 (Six) Hours As Needed for Mild Pain, Disp: 50 tablet, Rfl: 3  •  Lancets (freestyle) lancets, Pt to check blood sugar 7xs daily, Disp: 15 each, Rfl: 12  •  ondansetron (ZOFRAN) 4 MG tablet, Take 1 tablet by mouth Every 8 (Eight) Hours As Needed for Nausea or Vomiting., Disp: 30 tablet, Rfl: 3  •  Prenatal Vit-Fe Fumarate-FA (Prenatabs FA) 29-1 MG tablet, Take 1 tablet by mouth Daily., Disp: 30 tablet, Rfl: 11    ALLERGIES:     Allergies   Allergen Reactions   • Penicillins Nausea And Vomiting   • Adhesive Tape Rash     \"SURGICAL TAPE\"  AS A CHILD       SOCIAL HISTORY:       Social History     Socioeconomic History   • Marital status: Single   Tobacco Use   • Smoking status: Former     Packs/day: 0.50     Years: 4.00     Pack years: 2.00     Types: Cigarettes     Quit date: 2/8/2020     Years since quitting: 3.1     Passive exposure: Never   • Smokeless tobacco: Never   Vaping Use   • Vaping Use: Former   • Substances: Nicotine   • Devices: Disposable   Substance and Sexual Activity   • Alcohol use: Not Currently     Comment: socially   • Drug use: Not Currently     Types: Marijuana     Comment: daily, did not smoke during pregnancy   • Sexual activity: Not Currently         FAMILY HISTORY:  Family History   Problem Relation Age of Onset   • Diabetes Father    • Arthritis Maternal Grandmother    • Lung cancer Maternal Grandfather    • Heart attack Maternal Grandfather    • Heart disease Maternal Grandfather    • " "Stroke Maternal Grandfather    • Hyperlipidemia Maternal Grandfather    • Malig Hyperthermia Neg Hx    • Breast cancer Neg Hx    • Ovarian cancer Neg Hx    • Uterine cancer Neg Hx    • Colon cancer Neg Hx        REVIEW OF SYSTEMS:  As per HPI       Vitals:    03/23/23 1405   BP: 107/73   Pulse: 84   Resp: 16   Temp: 97.7 °F (36.5 °C)   TempSrc: Temporal   SpO2: 97%   Weight: 78.5 kg (173 lb 1.6 oz)   Height: 177.8 cm (70\")   PainSc: 0-No pain     Current Status 3/23/2023   ECOG score 0      PHYSICAL EXAM:    CONSTITUTIONAL:  Vital signs reviewed.  No distress, looks comfortable.  EYES:  Conjunctiva and lids unremarkable.   EARS,NOSE,MOUTH,THROAT:  Ears and nose appear unremarkable.    RESPIRATORY:  Normal respiratory effort.  Lungs clear to auscultation bilaterally.  CARDIOVASCULAR:  Normal S1, S2.  No murmurs rubs or gallops.  No significant lower extremity edema.  GASTROINTESTINAL: Abdomen appears unremarkable.  Nondistended  LYMPHATIC:  No cervical, supraclavicular lymphadenopathy.  NEURO: AAO x 3, No focal deficits.  Appears to have equal strength all 4 extremities.  BREAST: No abnormal masses/lumps palpated in bilateral breasts or axilla.  MUSCULOSKELETAL:  Unremarkable digits/nails.  No cyanosis or clubbing.  No apparent joint deformities. A ~10-15 cm hard palpable swelling noted postero-lateral to the right knee  SKIN:  Warm.  No rashes.  PSYCHIATRIC:  Normal judgment and insight.  Normal mood and affect.     RECENT LABS:        Lab on 03/23/2023   Component Date Value Ref Range Status   • WBC 03/23/2023 10.00  3.40 - 10.80 10*3/mm3 Final   • RBC 03/23/2023 4.51  3.77 - 5.28 10*6/mm3 Final   • Hemoglobin 03/23/2023 12.3  12.0 - 15.9 g/dL Final   • Hematocrit 03/23/2023 38.1  34.0 - 46.6 % Final   • MCV 03/23/2023 84.5  79.0 - 97.0 fL Final   • MCH 03/23/2023 27.3  26.6 - 33.0 pg Final   • MCHC 03/23/2023 32.3  31.5 - 35.7 g/dL Final   • RDW 03/23/2023 11.8 (L)  12.3 - 15.4 % Final   • RDW-SD 03/23/2023 36.0 " (L)  37.0 - 54.0 fl Final   • MPV 03/23/2023 9.1  6.0 - 12.0 fL Final   • Platelets 03/23/2023 352  140 - 450 10*3/mm3 Final   • Neutrophil % 03/23/2023 68.6  42.7 - 76.0 % Final   • Lymphocyte % 03/23/2023 22.7  19.6 - 45.3 % Final   • Monocyte % 03/23/2023 5.5  5.0 - 12.0 % Final   • Eosinophil % 03/23/2023 2.6  0.3 - 6.2 % Final   • Basophil % 03/23/2023 0.3  0.0 - 1.5 % Final   • Immature Grans % 03/23/2023 0.3  0.0 - 0.5 % Final   • Neutrophils, Absolute 03/23/2023 6.86  1.70 - 7.00 10*3/mm3 Final   • Lymphocytes, Absolute 03/23/2023 2.27  0.70 - 3.10 10*3/mm3 Final   • Monocytes, Absolute 03/23/2023 0.55  0.10 - 0.90 10*3/mm3 Final   • Eosinophils, Absolute 03/23/2023 0.26  0.00 - 0.40 10*3/mm3 Final   • Basophils, Absolute 03/23/2023 0.03  0.00 - 0.20 10*3/mm3 Final   • Immature Grans, Absolute 03/23/2023 0.03  0.00 - 0.05 10*3/mm3 Final   • nRBC 03/23/2023 0.0  0.0 - 0.2 /100 WBC Final       ASSESSMENT:   is a pleasant 32 y/o WF with medical history significant for asthma & depression who comes for 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     # Right knee desmoid tumor:  · Pt had first noted a swelling behind her right knee in December 2021. The swelling was progressive and became painful with knee flexion.   · A MRI right knee done 3/11/22 demonstrated a 10 x 3.5 x 4.4 cm enhancing soft tissue mass lesion along the posterolateral right knee interposed between the distal biceps femoris and the lateral gastrocnemius muscles. The mass appears to infiltrate or arise from the distal biceps femoris. The lesion displaces and closely abuts the common peroneal nerve.   · Patient was seen by , ortho - who obtained a biopsy of the mass on 3/23/22. 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   ·  recommended against upfront resection at this time & referred to this clinic to discuss systemic therapy options.   · I discussed the  natural history of desmoid tumors including high rates of local recurrence (~ 50% cases) , specially R1 resection. Also discussed ~ 25% cases have spontaneous regression. Discussed role of systemic therapy options with TKIs and tamoxifen/NSAIDS.   · Patient was then seen by Dr. Solis, surgical oncology with the [facility].  Plan at that time was made for surveillance.  Patient then became pregnant, hence plan for any systemic treatment was deferred until delivery.  · Patient delivered her baby boy on 3/9/2023.  A MRI of her right knee performed 1/29/2023, which noted significant increase in size of the right knee posterolateral mass, now measuring 8 x 6 x 18 cm.  · 3/23/2023: Patient seen back in the clinic today.  The right knee posterolateral mass has significantly increased since last evaluation.  Patient reports of discomfort and numbness around the knee.  She has difficulty with walking and bending her knees.  Reviewed treatment options with tamoxifen + sulindac vs a TKI.  Given recent delivery and potential adverse effects related to TKI while managing 3 young children at home, will not start TKI at this time and consider it down the line. As this tumor appears to be driven/propagated by estrogen and recent pregnancy, plan made to start treatment with tamoxifen 20 mg daily and sulindac 300 mg daily.  I reviewed the risks associated with tamoxifen and sulindac, including increased risk of blood clots, gastritis/PUD.  Patient is agreeable to proceed with the treatment.    # Unintentional weight loss: Cause unclear.  CT C/A/P performed 4/26/22 did not show any major abnormalities.  It did show a 1 cm right outer breast lesion, likely benign.  Patient was encouraged to increase her nutritional intake.  We will consider referral to nutrition.    #Right breast lesion:   · US right breast performed 1/9/2023 noted a 1.1 cm probable benign fibroadenoma at 8 o'clock position, 4 cm from the nipple.  6  months sonographic follow-up is recommended.    # Encounter for genetic & chromosomal abnormalities: Will her young age, family history (aunt) of breast cancer & desmoid tumors association with FAP and gardners syndrome,an [employer] germline testing was performed. 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   Will refer to genetic counseling.    PLAN:   1. Repeat MRI right knee now  2. Plan to start tamoxifen 20 mg daily and sulindac 300 mg daily  3. Right breast ultrasound will be due in July 2023  4. Plan to refer to genetic counseling  5. F/u in 4-5 weeks or sooner if needed    Orders Placed This Encounter   Procedures   • MRI Knee Right With & Without Contrast     Standing Status:   Future     Standing Expiration Date:   3/23/2024     Order Specific Question:   Release to patient     Answer:   Routine Release     Order Specific Question:   Patient Pregnant     Answer:   No   • Comprehensive Metabolic Panel     Order Specific Question:   Release to patient     Answer:   Routine Release   Total time spent during this patient encounter is 45 minutes. The total time spent with the patient includes at least one or more of the following: preparing to see the patient by reviewing of tests, prior notes or other relevant information, performing appropriate independent examination & evaluation, counseling, ordering of medications, tests or procedures, communicating with other healthcare professionals, when appropriate to coordinate care, documenting clinic information in the electronic medical records or other health records, independently interpreting results of tests and communicating the results to the patient/family or caregiver.

## 2023-03-24 ENCOUNTER — PATIENT OUTREACH (OUTPATIENT)
Dept: CALL CENTER | Facility: HOSPITAL | Age: 33
End: 2023-03-24
Payer: COMMERCIAL

## 2023-03-24 ENCOUNTER — SPECIALTY PHARMACY (OUTPATIENT)
Dept: PHARMACY | Facility: HOSPITAL | Age: 33
End: 2023-03-24
Payer: COMMERCIAL

## 2023-03-24 NOTE — PROGRESS NOTES
The following staff message was forwarded to my attention:      From: Colin Molina MD   Sent: 3/23/2023   4:32 PM EDT   To: Edith Chen [facility], *     I saw this patient today for desmoid tumor & planning to start her on tamoxifen 20 mg daily along with sulindac 300 mg daily.     Can we please enter a treatment plan and request PA?     Thanks,      Tamoxifen does not require a Prior Authorization. It can be filled at [facility] pharmacy.    Sarah Bryson - Care Coordinator   3/24/2023  08:15 EDT

## 2023-03-24 NOTE — OUTREACH NOTE
Motherhood Connection Survey    Flowsheet Row Responses   [facility]   [facility] attempt successful? Yes   Call start time 1519   Call end time 1523   Baby sex Boy    discharged home with mother? Yes   Baby sex Boy   Delivery type Vaginal   Emotional state Acceptance   Family support Yes   Do you have all necessary resources to care for you and your baby?  Yes   Have members of your household adjusted to your baby? Yes   Did you have any problems with pre-eclampsia during this pregnancy? No   Did you have blood glucose issues during this pregnancy No   Lochia amount None   Did you have an episiotomy/tear/abdominal incision? Yes   Additional comments lac repair healing   Feeding Method Bottle   Frequency q 3-4 hrs   Amount 3 oz   Breast Condition No   Nipple Condition No   Number of wet diapers x 24 hours --  [6-8]   Last BM x 24 hours q other day   Umbilical Cord No reported signs or symptoms   Umbilical cord comments cord off   Was the baby circumcised? Yes   Circumcision comments healed   Where does the baby usually sleep? Bassinet   Are there stuffed animals, toys, pillows, quilts, blankets, wedges, positioners, bumpers or other loose bedding in the infant's sleeping environment? No   Does the baby ever share a sleep surface in a bed, couch, recliner or other? No   What position do you lay your baby down to sleep? Back   Are you and/or other caregivers smoking inside or outside the baby's home? No   Mom appointment comments: PP f/u to be scheduled   Baby appointment comments: Peds visits x2, has gained past BW   Additional comments eager to eat   Call completed? Yes   How satisfied were you with the Motherhood Connection Program? 5            Laura KINSEY - Registered Nurse

## 2023-03-24 NOTE — OUTREACH NOTE
Motherhood Connection Survey    Flowsheet Row Responses   [facility]   [facility] attempt successful? No   Unsuccessful attempts Attempt 1   Reschedule Today            Laura KINSEY - Registered Nurse

## 2023-03-27 ENCOUNTER — OFFICE VISIT (OUTPATIENT)
Dept: ONCOLOGY | Facility: CLINIC | Age: 33
End: 2023-03-27
Payer: COMMERCIAL

## 2023-03-27 ENCOUNTER — SPECIALTY PHARMACY (OUTPATIENT)
Dept: ONCOLOGY | Facility: HOSPITAL | Age: 33
End: 2023-03-27
Payer: COMMERCIAL

## 2023-03-27 ENCOUNTER — SPECIALTY PHARMACY (OUTPATIENT)
Dept: PHARMACY | Facility: HOSPITAL | Age: 33
End: 2023-03-27
Payer: COMMERCIAL

## 2023-03-27 VITALS
SYSTOLIC BLOOD PRESSURE: 110 MMHG | DIASTOLIC BLOOD PRESSURE: 72 MMHG | RESPIRATION RATE: 16 BRPM | HEIGHT: 70 IN | HEART RATE: 85 BPM | WEIGHT: 174.2 LBS | TEMPERATURE: 97.1 F | BODY MASS INDEX: 24.94 KG/M2 | OXYGEN SATURATION: 97 %

## 2023-03-27 DIAGNOSIS — D48.1 DESMOID TUMOR: Primary | ICD-10-CM

## 2023-03-27 DIAGNOSIS — M79.89 SOFT TISSUE MASS: Primary | ICD-10-CM

## 2023-03-27 PROCEDURE — 1126F AMNT PAIN NOTED NONE PRSNT: CPT | Performed by: NURSE PRACTITIONER

## 2023-03-27 PROCEDURE — 99212 OFFICE O/P EST SF 10 MIN: CPT | Performed by: NURSE PRACTITIONER

## 2023-03-27 RX ORDER — SULINDAC 150 MG/1
300 TABLET ORAL DAILY
Qty: 60 TABLET | Refills: 5 | Status: SHIPPED | OUTPATIENT
Start: 2023-03-27

## 2023-03-27 RX ORDER — TAMOXIFEN CITRATE 20 MG/1
20 TABLET ORAL DAILY
Qty: 30 TABLET | Refills: 5 | Status: SHIPPED | OUTPATIENT
Start: 2023-03-27

## 2023-03-27 NOTE — PROGRESS NOTES
Specialty Pharmacy Initial Fill Coordination Note     New Start - Tamoxifen & Sulindac      Delivery Questions    Flowsheet Row Most Recent Value   Delivery method [employer]Ex  [[facility] priority - sig on 3/28 to arrive 3/29. Address confirmed. $0 co-pay.]   Delivery address correct? Yes   Delivery phone number [phone]   Preferred delivery time? AM   Number of medications in delivery 2   Medication being filled and delivered Tamoxifen and Sulindac   Doses left of specialty medications N/A- NEW START   Supplies needed? No supplies needed   Cooler needed? No   Do any medications need mixed or dated? No   Copay form of payment Payment plan already set up   Additional comments N/A   Questions or concerns for the pharmacist? No   Explain any questions or concerns for the pharmacist N/A   Are any medications first time fills? Yes  [New Start Tamoxifen & Sulindac]                 Follow-up: 23 day(s)     Sarah Bryson, Pharmacy Technician  Specialty Pharmacy Technician

## 2023-03-27 NOTE — PROGRESS NOTES
Specialty Pharmacy Patient Management Program  Oncology Initial Assessment       Beena Vincent is a 32 y.o. female with desmoid tumor seen by an Oncology provider and enrolled in the Oncology Patient Management program offered by [facility].  An initial outreach was conducted, including assessment of therapy appropriateness and specialty medication education for Tamoxifen + Sulindac. The patient was introduced to services offered by [facility], including: regular assessments, refill coordination, mail order delivery options, prior authorization maintenance, and financial assistance programs as applicable. The patient was also provided with contact information for the pharmacy team.     Diagnosis: desmoid tumor Stage: NA    Goal of chemotherapy: disease control    Treatment Medication(s) / Frequency and Dosing    1. Tamoxifen 20 mg daily  2. Sulindac 300 mg daily    Number of cycles: TBD by provider    Start date of oral specialty medication: As soon as oral specialty medication is available.    Follow-up Testing to be determined after TBD cycles by MD.     Items for home use: none    Completing Pharmacist: Paula Bear RPH             Date/time: 03/27/2023 10:43 EDT         Relevant Past Medical History, Comorbidities, and Vaccines  Relevant medical history and concomitant health conditions were discussed with the patient. The patient's chart has been reviewed for relevant past medical history and comorbid health conditions and updated as necessary.  Vaccines are coordinated by the patient's oncologist and primary care provider.  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- pt desires to proceed with treatments after delivery   • Gestational diabetes    • Gestational hypertension 2020   • Iron deficiency anemia    • Preeclampsia 2012     Social History  "    Socioeconomic History   • Marital status: Single   Tobacco Use   • Smoking status: Former     Packs/day: 0.50     Years: 4.00     Pack years: 2.00     Types: Cigarettes     Quit date: 2/8/2020     Years since quitting: 3.1     Passive exposure: Never   • Smokeless tobacco: Never   Vaping Use   • Vaping Use: Former   • Substances: Nicotine   • Devices: Disposable   Substance and Sexual Activity   • Alcohol use: Not Currently     Comment: socially   • Drug use: Not Currently     Types: Marijuana     Comment: daily, did not smoke during pregnancy   • Sexual activity: Not Currently       Allergies  Known allergies and reactions were discussed with the patient. The patient's chart has been reviewed for allergy information and updated as necessary.   Allergies   Allergen Reactions   • Penicillins Nausea And Vomiting   • Adhesive Tape Rash     \"SURGICAL TAPE\"  AS A CHILD        Current Medication List  This medication list has been reviewed with the patient and evaluated for any interactions or necessary modifications/recommendations, and updated to include all prescription medications, OTC medications, and supplements the patient is currently taking.  This list reflects what is contained in the patient's profile, which has also been marked as reviewed to communicate to other providers it is the most up to date version of the patient's current medication therapy.   Prior to Admission medications    Medication Sig Start Date End Date Taking? Authorizing Provider   albuterol sulfate  (90 Base) MCG/ACT inhaler Inhale 2 puffs Every 4 (Four) Hours As Needed for Wheezing. 2/7/22   Ce Dietrich APRN   amoxicillin-clavulanate (AUGMENTIN) 875-125 MG per tablet Take one tablet po bid x 10 days. 3/15/23   Belle Barker MD   Blood Glucose Monitoring Suppl (FreeStyle Lite) w/Device kit USE 1 SEVEN TIMES DAILY 2/6/23   Provider, MD Rufina   fluconazole (DIFLUCAN) 150 MG tablet Take 1 tablet by mouth 1 (One) Time " for 1 dose. 3/15/23 3/15/23  Belle Barker MD   glucose monitor monitoring kit To check blood sugars 7x daily 2/6/23   Leona Harkins APRN   HYDROcodone-acetaminophen (NORCO) 5-325 MG per tablet Take 1 tablet by mouth Every 8 (Eight) Hours As Needed for Moderate Pain.  Patient not taking: Reported on 3/27/2023 3/11/23   Mt Almazan MD   ibuprofen (ADVIL,MOTRIN) 600 MG tablet Take 1 tablet by mouth Every 6 (Six) Hours As Needed for Mild Pain 3/11/23   Mt Almazan MD   Lancets (freestyle) lancets Pt to check blood sugar 7xs daily 2/6/23   Leona Harkins APRN   ondansetron (ZOFRAN) 4 MG tablet Take 1 tablet by mouth Every 8 (Eight) Hours As Needed for Nausea or Vomiting.  Patient not taking: Reported on 3/27/2023 10/24/22   Mt Almazan MD   Prenatal Vit-Fe Fumarate-FA (Prenatabs FA) 29-1 MG tablet Take 1 tablet by mouth Daily. 7/28/22   Arsh Ray MD   ferrous sulfate 325 (65 FE) MG tablet Take 1 tablet by mouth 2 (Two) Times a Day With Meals. 12/23/22 3/11/23  Arsh Ray MD   glucose blood test strip Use as instructed 7xs daily for blood sugar 2/6/23 3/11/23  Leona Harkins APRN   metFORMIN ER (GLUCOPHAGE-XR) 500 MG 24 hr tablet Take 1 tablet by mouth 2 (Two) Times a Day With Meals. 2/15/23 3/11/23  Leona Harkins APRN       Drug Interactions  • Reviewed concomitant medications, allergies, labs, comorbidities/medical history, quality of life, and immunization history.   • Drug-drug interactions noted and discussed during education: no significant drug interactions noted. . Reminded the patient to let us know before making any changes or starting any new prescription or OTC medications so we can first assess drug interactions.  • Drug-food interactions noted and discussed during education: Patient was instructed to avoid none    Recommended Medications Assessment  • Bone Health (such as calcium/vitamin D, bisphosphonate, RANKL inhibitor) - Not Indicated   • VTE  prophylaxis - Not Indicated   Prophylactic antimicrobials - Not Indicated     Relevant Laboratory Values  Lab Results   Component Value Date    GLUCOSE 113 03/23/2023    CALCIUM 8.9 03/23/2023     03/23/2023    K 3.5 03/23/2023    CO2 23.7 03/23/2023     03/23/2023    BUN 7 03/23/2023    CREATININE 0.82 03/23/2023    EGFRIFAFRI 86 10/08/2021    EGFRIFNONA 71 10/08/2021    BCR 8.5 03/23/2023    ANIONGAP 13.3 03/23/2023     Lab Results   Component Value Date    WBC 10.00 03/23/2023    RBC 4.51 03/23/2023    HGB 12.3 03/23/2023    HCT 38.1 03/23/2023    MCV 84.5 03/23/2023    MCH 27.3 03/23/2023    MCHC 32.3 03/23/2023    RDW 11.8 (L) 03/23/2023    RDWSD 36.0 (L) 03/23/2023    MPV 9.1 03/23/2023     03/23/2023    NEUTRORELPCT 68.6 03/23/2023    LYMPHORELPCT 22.7 03/23/2023    MONORELPCT 5.5 03/23/2023    EOSRELPCT 2.6 03/23/2023    BASORELPCT 0.3 03/23/2023    AUTOIGPER 0.3 03/23/2023    NEUTROABS 6.86 03/23/2023    LYMPHSABS 2.27 03/23/2023    MONOSABS 0.55 03/23/2023    EOSABS 0.26 03/23/2023    BASOSABS 0.03 03/23/2023    AUTOIGNUM 0.03 03/23/2023    NRBC 0.0 03/23/2023       The above labs have been reviewed. All labs are within normal limits.    Initial Education Provided for Specialty Medication  The patient has been provided with the following education. All questions and concerns have been addressed prior to the patient receiving the medication, and the patient has verbalized understanding of the education and any materials provided.  Additional patient education shall be provided and documented upon request by the patient, provider or payer.      Provided patient with:   Education sheets about the medication, 24-hour clinic phone number and my contact information and instructions to call should additional questions arise.     Medication Education Sheets Provided: (select all that apply)  • Oral Specialty Medication: Soltamox (tamoxifen), Sulindac      TOPICS COMMENTS   Storage and Handling  of Oral Specialty Medication Store in the original container, in a dry location out of direct sunlight, and out of reach of children or pets. and Store at room temperature.  Discussed safe handling and what to do with any unused medication.   Administration of Oral Specialty Medication Take Sulindac with food.  Take Tamoxifen with or without food daily, but keep consistent.   Adherence to Oral Specialty Regimen and Handling Missed Doses Patient is likely to have good treatment adherence; reinforced the importance of adherence. Reviewed how to address missed doses and to let us know of any missed doses.   GI Toxicity Discussed risk of GI toxicity, including constipation, diarrhea, heartburn, GI upset.  Recommend patient take Sulindac with food to minimize toxicity.  Instructed patient to call our office with further issues.    Nervous System Changes: causes, s/s, neuropathies, cognitive changes, ways to manage discussed the possibility of hot flashes as well as mitigation strategies   Miscellaneous • Financial Issues: none  • Lab Draws: TBD by provider  • Miscellaneous: Blood Clots: Explained the rare, but possible risk of VTE or PE.  Reviewed the signs and symptoms and stressed the urgency to call 911 immediately.   Infertility and Sexuality:  causes, fertility preservation options, sexuality changes, ways to manage, importance of birth control The patient is of childbearing potential.  Two methods of birth control were recommended, including birth control and barrier methods   Home Care: how to manage bodily fluids Counseled on management of soiled linens and proper flush technique.  Discussed how to manage all the side effects at home and advised when to contact the MD office   Survivorship: distress, distress assessment, secondary malignancies, early/late effects, follow-up, social issues, social support Discussed the rare, but possible risk of secondary malignancies months to years after treatment, most commonly  endometrial cancer     Adherence and Self-Administration  • Barriers to Patient Adherence and/or Self-Administration: none  • Methods for Supporting Patient Adherence and/or Self-Administration: none  • Expected duration of therapy: TBD by provider    Goals of Therapy  • Patient Goals of Therapy:   o Consistently take medications as prescribed  o Patient will adhere to medication regimen  o Patient will report any medication side effects to healthcare provider  • Clinical Goals:    Goals     • Specialty Pharmacy General Goal      Control disease          o Support patient understanding of medication regimen  o Ensure patient knows the pharmacy contact information  o Schedule regular follow-up to monitor the treatment serious adverse events  o Schedule regular follow-up to confirm medication adherence  o Schedule regular follow-up to monitor disease progression or stability    Quality of Life Assessment   The patient's current (pre-therapy) quality of life was discussed with the patient. The QOL segment of this outreach has been reviewed and updated.   • Quality of Life Score: 5/10    Wrap up  Discussed aforementioned material with patient in person, face-to-face, in clinic.   Chemo consents/CCA were signed at today's visit.   Medication availability: patient will receive medication from [facility] pharmacy on: 3/29/23  Patient expressed understanding.   Patient demonstrates ability to self-administer medication. No barriers to adherence identified.  All questions and concerns addressed.     Reassessment Plan & Follow-Up  1. Pharmacist to perform regular reassessments no more than (6) months from the previous assessment.  2. Welcome information and patient satisfaction survey to be sent by retail team with patient's initial fill.  3. Care Coordinator to set up future refill outreaches, coordinate prescription delivery, and escalate clinical questions to pharmacist.     Additional Plans, Therapy Recommendations or Therapy  Problems to Be Addressed: none     Attestation  I attest that the initiated specialty medication(s) are appropriate for the patient based on my assessment.  If the prescribed therapy is at any point deemed not appropriate based on the current or future assessments, a consultation will be initiated with the patient's specialty care provider to determine the best course of action. The revised plan of therapy will be documented along with any additional patient education provided.     Name/Credentials: Ranjan Bear PharmD, BCOP    Date and Time: 3/27/2023  10:43 EDT

## 2023-03-27 NOTE — PROGRESS NOTES
TREATMENT  PREPARATION    Beena Vincent  [id number]  1990    Chief Complaint: Treatment preparation and needs assessment    History of present illness:  Beena Vincent is a 32 y.o. year old female who is here today for treatment preparation and needs assessment.  The patient has been diagnosed with   Encounter Diagnosis   Name Primary?   • Desmoid tumor Yes    and is scheduled to begin treatment with:     Oncology History:    Oncology/Hematology History    No history exists.       The current medication list and allergy list were reviewed and reconciled.     Past Medical History, Past Surgical History, Social History, Family History have been reviewed and are without significant changes except as mentioned.    Physical Exam:    Vitals:    03/27/23 1012   BP: 110/72   Pulse: 85   Resp: 16   Temp: 97.1 °F (36.2 °C)   SpO2: 97%     Vitals:    03/27/23 1012   PainSc: 0-No pain        ECOG score: 0         Physical Exam  HENT:      Head: Normocephalic and atraumatic.   Eyes:      Extraocular Movements: Extraocular movements intact.      Conjunctiva/sclera: Conjunctivae normal.   Pulmonary:      Effort: Pulmonary effort is normal. No respiratory distress.   Neurological:      General: No focal deficit present.      Mental Status: She is alert and oriented to person, place, and time.   Psychiatric:         Mood and Affect: Mood normal.         Behavior: Behavior normal.     I have reexamined the patient and the results are consistent with the previously documented exam. BERTHA Danielle      NEEDS ASSESSMENTS    Genetics  The patient's new diagnosis and family history have been reviewed for genetic counseling needs. The patient has been evaluated..     Psychosocial and Barriers to care  The patient has completed a PHQ-9 Depression Screening and the Distress Thermometer (DT) today.  PHQ-9 results show PHQ-2 Total Score: 0 PHQ-9 Total Score: PHQ-9 Total Score: 0     The patient scored their distress today as  Distress Level: 0-No distress on a scale of 0-10 with 0 being no distress and 10 being extreme distress. Problems marked by the patient as being an issue for them within the last week include   .      Results were reviewed along with psychosocial resources offered by our cancer center.  Our Supportive Oncology team will be flagged for a score of 4 or above, and a same day call will be made for a score of 9 or 10.  A mental health referral is offered at that time. Patients who score less than 4 have been educated on our support services and can be referred to our  upon request.  The patient will not be referred to our .       Nutrition  The patient has completed the malnutrition screening today. They scored Malnutrition Screening Tool  Have you recently lost weight without trying?  If yes, how much weight have you lost?: 0--> No   with a score of 0-1 meaning not at risk in a score of 2 or greater meaning at risk.  Patients with a score of 3 or higher will be referred to our oncology dietitian for support. Patients beginning at risk treatment regimens or who have dietary concerns will also be referred to our oncology dietitian. The patient will not be referred.    Functional Assessment  Persons who are age 70 or greater will be screened for qualification of a comprehensive geriatric assessment by our survivorship nurse practitioner.  Older adults with cancer face unique challenges. These may include an increased risk of drug reactions, financial burdens, and caregiver stress. The patient scored   . Patients scoring 14 or lower will referred for an older adult functional assessment with the survivorship advanced practice registered nurse to ensure all needed support is provided as patients plan for their treatments. NOT APPLICABLE    Intravenous Access Assessment  The patient and I discussed planned intravenous chemo/biotherapy as well as other IV treatments that are often needed throughout  "the course of treatment. These may include, but are not limited to blood transfusions, antibiotics, and IV hydration. Discussed that depending on selected treatment and vein assessment, patient may require venous access device (VAD) which could include but not limited to a Mediport or PICC line. Risks and benefits of VADs reviewed. The patient will be treated via Oral Treatment.    Reproductive/Sexual Activity   People should avoid becoming pregnant and should not get a partner pregnant while undergoing chemo/biotherapy.  People of childbearing age should use effective contraception during active therapy. The best recommendation for all people is to use a barrier method for a minimum of 1 week after the last infusion of chemo/biotherapy to prevent your partner being exposed to byproducts from treatment medications in bodily fluids. Effective contraception should be discussed with your oncology team to make sure it is safe to take based on your diagnosis. Possible options include oral contraceptives, barrier methods. Chemo/biotherapy can change your ability to reproduce children in the future.  There are options for fertility preservation. The patient will not be referred.    Advanced Care Planning  Advance Care Planning   The patient and I discussed advanced care planning, \"Conversations that Matter\".   This service is offered for development of advance directives with a certified ACP facilitator.  The patient does not have an up-to-date advanced directive. This document is not on file with our office. The patient is not interested in an appointment with one of our facilitators to create or update their advanced directives.     Smoking cessation  Tobacco Use: Medium Risk   • Smoking Tobacco Use: Former   • Smokeless Tobacco Use: Never   • Passive Exposure: Never       Patient and I discussed their tobacco use history. Referral will not be made for smoking cessation.      Palliative Care  When appropriate, the " patient and I discussed the availability palliative care services and when appropriate Hospice care. Palliative care is not the same as Hospice care which was explained to the patient.NOT APPLICABLE.    Survivorship   When appropriate, we discussed that we will refer the patient to survivorship clinic to discuss next steps following completion of planned treatment.  Reviewed this visit will include assessment of your physical, psychological, functional, and spiritual needs as a survivor and the need at attend this visit when scheduled.    TREATMENT EDUCATION    Today I met with the patient to discuss the chemo/biotherapy regimen recommended for treatment of Desmoid tumor  .  The patient was given explanation of treatment premed side effects including office policy that prohibits patients to drive if sedating medications are administered, MD explanation given regarding benefits, side effects, toxicities and goals of treatment.  The patient received a Chemotherapy/Biotherapy Plan Summary including diagnosis and explanation of specific treatment plan.    SIDE EFFECTS:  Common side effects were discussed with the patient and/or significant other.  Discussion included where applicable hair loss/discoloration, anemia/fatigue, infection/chills/fever, appetite, bleeding risk/precautions, constipation, diarrhea, mouth sores, taste alteration, loss of appetite, nausea/vomiting, peripheral neuropathy, skin/nail changes, rash, muscle aches/weakness, photosensitivity, weight gain/loss, hearing loss, dizziness, menopausal symptoms, menstrual irregularity, sterility, high blood pressure, heart damage, liver damage, lung damage, kidney damage, DVT/PE risk, fluid retention, pleural/pericardial effusion, somnolence, electrolyte/LFT imbalance, vein exercises and/or the possible need for vascular access/port placement.  The patient was advised that although uncommon, leakage of an infused medication from the vein or venous access  device may lead to skin breakdown and/or other tissue damage.  The patient was advised that he/she may have pain, bleeding, and/or bruising from the insertion of a needle in their vein or venous access device (port).  The patient was further advised that, in spite of proper technique, infection with redness and irritation may rarely occur at the site where the needle was inserted.      Discussion also included side effects specific to drugs in the treatment plan, specifically:    Treatment Plans     Name Type Plan Dates Plan Provider         Active    OP Desmoid Tumor Tamoxifen ONCOLOGY TREATMENT  3/22/2023 - Present Colin Molina MD                    Assessment and Plan:    Diagnoses and all orders for this visit:    1. Desmoid tumor (Primary)      No orders of the defined types were placed in this encounter.    1. The patient and I have reviewed their diagnosis and scheduled treatment plan. Needs assessment was completed where applicable including genetics, psychosocial needs, barriers to care, VAD evaluation, advanced care planning, survivorship, and palliative care services where indicated. Referrals have been ordered as appropriate based upon evaluation today and patient desires.   2. Chemo/biotherapy teaching was completed today and consent obtained. See separate documentation for further details.  3. Adequate time was given to answer questions.  Patient made aware of their care team members and contact information if they have questions or problems throughout the treatment course.  4. Discussion held and written information provided describing frequency of office visits and ongoing monitoring throughout the treatment plan.     5. Reviewed with patient any prescribed medication sent to pharmacy.  Education provided regarding proper storage, safe handling, and proper disposal of unused medication.  6. Proper handling of body fluids and waste discussed and written information provided.  7. If appropriate,  patient had pretreatment labs drawn today.    Learning assessment completed at initial patient encounter. See separate flowsheet. Chemo/biotherapy education comprehension assessed at today's visit.    I spent 10 minutes caring for Beena on this date of service. This time includes time spent by me in the following activities: preparing for the visit, reviewing tests, counseling and educating the patient/family/caregiver, documenting information in the medical record and care coordination.     Torie Matt, APRN   03/27/23

## 2023-03-28 ENCOUNTER — SPECIALTY PHARMACY (OUTPATIENT)
Dept: PHARMACY | Facility: HOSPITAL | Age: 33
End: 2023-03-28
Payer: COMMERCIAL

## 2023-04-06 ENCOUNTER — SPECIALTY PHARMACY (OUTPATIENT)
Dept: PHARMACY | Facility: HOSPITAL | Age: 33
End: 2023-04-06
Payer: COMMERCIAL

## 2023-04-13 ENCOUNTER — HOSPITAL ENCOUNTER (OUTPATIENT)
Dept: MRI IMAGING | Facility: HOSPITAL | Age: 33
Discharge: HOME | End: 2023-04-13
Admitting: INTERNAL MEDICINE
Payer: COMMERCIAL

## 2023-04-13 DIAGNOSIS — D48.1 DESMOID TUMOR: ICD-10-CM

## 2023-04-13 PROCEDURE — 0 GADOBENATE DIMEGLUMINE 529 MG/ML SOLUTION: Performed by: INTERNAL MEDICINE

## 2023-04-13 PROCEDURE — 73723 MRI JOINT LWR EXTR W/O&W/DYE: CPT

## 2023-04-13 PROCEDURE — A9577 INJ MULTIHANCE: HCPCS | Performed by: INTERNAL MEDICINE

## 2023-04-13 RX ADMIN — GADOBENATE DIMEGLUMINE 16 ML: 529 INJECTION, SOLUTION INTRAVENOUS at 11:41

## 2023-04-20 ENCOUNTER — OFFICE VISIT (OUTPATIENT)
Dept: ONCOLOGY | Facility: CLINIC | Age: 33
End: 2023-04-20
Payer: COMMERCIAL

## 2023-04-20 ENCOUNTER — SPECIALTY PHARMACY (OUTPATIENT)
Dept: PHARMACY | Facility: HOSPITAL | Age: 33
End: 2023-04-20
Payer: COMMERCIAL

## 2023-04-20 ENCOUNTER — LAB (OUTPATIENT)
Dept: LAB | Facility: HOSPITAL | Age: 33
End: 2023-04-20
Payer: COMMERCIAL

## 2023-04-20 VITALS
RESPIRATION RATE: 16 BRPM | BODY MASS INDEX: 25.34 KG/M2 | TEMPERATURE: 98.3 F | OXYGEN SATURATION: 97 % | HEART RATE: 68 BPM | SYSTOLIC BLOOD PRESSURE: 113 MMHG | DIASTOLIC BLOOD PRESSURE: 71 MMHG | WEIGHT: 177 LBS | HEIGHT: 70 IN

## 2023-04-20 DIAGNOSIS — D48.1 DESMOID TUMOR: ICD-10-CM

## 2023-04-20 DIAGNOSIS — N63.10 MASS OF RIGHT BREAST, UNSPECIFIED QUADRANT: Primary | ICD-10-CM

## 2023-04-20 LAB
ALBUMIN SERPL-MCNC: 4.3 G/DL (ref 3.5–5.2)
ALBUMIN/GLOB SERPL: 1.7 G/DL (ref 1.1–2.4)
ALP SERPL-CCNC: 71 U/L (ref 38–116)
ALT SERPL W P-5'-P-CCNC: 10 U/L (ref 0–33)
ANION GAP SERPL CALCULATED.3IONS-SCNC: 10.2 MMOL/L (ref 5–15)
AST SERPL-CCNC: 16 U/L (ref 0–32)
BASOPHILS # BLD AUTO: 0.04 10*3/MM3 (ref 0–0.2)
BASOPHILS NFR BLD AUTO: 0.6 % (ref 0–1.5)
BILIRUB SERPL-MCNC: 0.3 MG/DL (ref 0.2–1.2)
BUN SERPL-MCNC: 12 MG/DL (ref 6–20)
BUN/CREAT SERPL: 13.5 (ref 7.3–30)
CALCIUM SPEC-SCNC: 10.2 MG/DL (ref 8.5–10.2)
CHLORIDE SERPL-SCNC: 104 MMOL/L (ref 98–107)
CO2 SERPL-SCNC: 25.8 MMOL/L (ref 22–29)
CREAT SERPL-MCNC: 0.89 MG/DL (ref 0.6–1.1)
DEPRECATED RDW RBC AUTO: 36.6 FL (ref 37–54)
EGFRCR SERPLBLD CKD-EPI 2021: 89.6 ML/MIN/1.73
EOSINOPHIL # BLD AUTO: 0.45 10*3/MM3 (ref 0–0.4)
EOSINOPHIL NFR BLD AUTO: 6.6 % (ref 0.3–6.2)
ERYTHROCYTE [DISTWIDTH] IN BLOOD BY AUTOMATED COUNT: 12 % (ref 12.3–15.4)
GLOBULIN UR ELPH-MCNC: 2.5 GM/DL (ref 1.8–3.5)
GLUCOSE SERPL-MCNC: 96 MG/DL (ref 74–124)
HCT VFR BLD AUTO: 36.1 % (ref 34–46.6)
HGB BLD-MCNC: 11.8 G/DL (ref 12–15.9)
IMM GRANULOCYTES # BLD AUTO: 0.02 10*3/MM3 (ref 0–0.05)
IMM GRANULOCYTES NFR BLD AUTO: 0.3 % (ref 0–0.5)
LYMPHOCYTES # BLD AUTO: 2.81 10*3/MM3 (ref 0.7–3.1)
LYMPHOCYTES NFR BLD AUTO: 40.9 % (ref 19.6–45.3)
MCH RBC QN AUTO: 27.6 PG (ref 26.6–33)
MCHC RBC AUTO-ENTMCNC: 32.7 G/DL (ref 31.5–35.7)
MCV RBC AUTO: 84.5 FL (ref 79–97)
MONOCYTES # BLD AUTO: 0.44 10*3/MM3 (ref 0.1–0.9)
MONOCYTES NFR BLD AUTO: 6.4 % (ref 5–12)
NEUTROPHILS NFR BLD AUTO: 3.11 10*3/MM3 (ref 1.7–7)
NEUTROPHILS NFR BLD AUTO: 45.2 % (ref 42.7–76)
NRBC BLD AUTO-RTO: 0 /100 WBC (ref 0–0.2)
PLATELET # BLD AUTO: 255 10*3/MM3 (ref 140–450)
PMV BLD AUTO: 9.4 FL (ref 6–12)
POTASSIUM SERPL-SCNC: 4.3 MMOL/L (ref 3.5–4.7)
PROT SERPL-MCNC: 6.8 G/DL (ref 6.3–8)
RBC # BLD AUTO: 4.27 10*6/MM3 (ref 3.77–5.28)
SODIUM SERPL-SCNC: 140 MMOL/L (ref 134–145)
WBC NRBC COR # BLD: 6.87 10*3/MM3 (ref 3.4–10.8)

## 2023-04-20 PROCEDURE — 85025 COMPLETE CBC W/AUTO DIFF WBC: CPT

## 2023-04-20 PROCEDURE — 80053 COMPREHEN METABOLIC PANEL: CPT

## 2023-04-20 PROCEDURE — 36415 COLL VENOUS BLD VENIPUNCTURE: CPT

## 2023-04-20 NOTE — PROGRESS NOTES
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 provider found  RECORDS OBTAINED:  Records of the patients history including those from the electronic medical record were reviewed and summarized in detail.    HISTORY OF PRESENT ILLNESS:    The patient is a 32 y.o. year old female with medical history significant for asthma & depression who had first noted a swelling behind her right knee in December 2021. The swelling was progressive and became painful with knee flexion. A MRI right knee done 3/11/22 demonstrated a 10 x 3.5 x 4.4 cm enhancing soft tissue mass lesion along the posterolateral right knee interposed between the distal biceps femoris and the lateral gastrocnemius muscles. The mass appears to infiltrate or arise from the distal biceps femoris. The lesion displaces and closely abuts the common peroneal nerve.     Patient was seen by , ortho - who obtained a biopsy of the mass on 3/23/22. 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      recommended against upfront resection at this time & referred to this clinic to discuss systemic therapy options.     Patient is accompanied by her mother. C/o pain & swelling behind right knee. Denies any association with menstrual cycles. She does report of > 20 lbs unintentional weight loss. Denies any abdominal pain, nausea or vomiting. Denies any other swelling anywhere. No family history of FAP, colon cancer or any malignancies.     A CT chest abdomen and pelvis from 4/26/2020 showed an asymmetric 1 cm nodule in the outer quadrant of right breast, favoring benign etiology.  No other soft tissue masses noted in this patient with history of desmoid fibromatosis.    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     Patient was lost to follow-up after the June 2022 visit.  She was seen again in March 2023.  Patient states she had become pregnant and delivered a baby boy on 3/9/2023.  Normal vaginal delivery.  Normal postpartum period.     She did get an MRI of her right knee performed 1/29/2023, which noted significant increase in size of the right knee posterolateral mass, now measuring 8 x 6 x 18 cm.    4/13/2023: MRI Right knee demonstrates mild increase in size compared to January 2023.  4/17/2023: Started on tamoxifen 20 mg daily and sulindac 300 mg daily    INTERIM HISTORY:  Patient returns to the clinic for a follow-up visit.  She had been taking tamoxifen/sulindac for last 3 days. Tolerating it well.  She reports of persistent discomfort in her right knee region.  Reports of having numbness in the knee and leg region.  It is unable to bend her knee completely.  Also has difficulty walking.    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- pt desires to proceed with treatments after delivery   • Gestational diabetes    • Gestational hypertension 2020   • Iron deficiency anemia    • Preeclampsia 2012     Past Surgical History:   Procedure Laterality Date   • APPENDECTOMY N/A 10/1/2021    Procedure: APPENDECTOMY LAPAROSCOPIC;  Surgeon: Ahmet Dong Jr., MD;  Location: [facility];  Service: General;  Laterality: N/A;   • KNEE ARTHROSCOPY      AGE 6   • LEEP N/A 4/25/2019    Procedure: LOOP ELECTROCAUTERY EXCISION PROCEDURE;  Surgeon: Arsh Ray MD;  Location: [facility];  Service: Obstetrics/Gynecology   • SOFT TISSUE BIOPSY Right 3/23/2022    Procedure: BIOPSY SOFT TISSUE THIGH / KNEE;  Surgeon: Chris Randall MD;  Location: [facility];  Service: Orthopedics;  Laterality: Right;   • WISDOM TOOTH EXTRACTION         MEDICATIONS    Current Outpatient Medications:   •   "albuterol sulfate  (90 Base) MCG/ACT inhaler, Inhale 2 puffs Every 4 (Four) Hours As Needed for Wheezing., Disp: 18 g, Rfl: 0  •  Blood Glucose Monitoring Suppl (FreeStyle Lite) w/Device kit, USE 1 SEVEN TIMES DAILY, Disp: , Rfl:   •  glucose monitor monitoring kit, To check blood sugars 7x daily, Disp: 1 each, Rfl: 0  •  ibuprofen (ADVIL,MOTRIN) 600 MG tablet, Take 1 tablet by mouth Every 6 (Six) Hours As Needed for Mild Pain, Disp: 50 tablet, Rfl: 3  •  Lancets (freestyle) lancets, Pt to check blood sugar 7xs daily, Disp: 15 each, Rfl: 12  •  Prenatal Vit-Fe Fumarate-FA (Prenatabs FA) 29-1 MG tablet, Take 1 tablet by mouth Daily., Disp: 30 tablet, Rfl: 11  •  sulindac (CLINORIL) 150 MG tablet, Take 2 tablets by mouth Daily., Disp: 60 tablet, Rfl: 5  •  tamoxifen (NOLVADEX) 20 MG chemo tablet, Take 1 tablet by mouth Daily., Disp: 30 tablet, Rfl: 5  •  amoxicillin-clavulanate (AUGMENTIN) 875-125 MG per tablet, Take one tablet po bid x 10 days. (Patient not taking: Reported on 4/20/2023), Disp: 20 tablet, Rfl: 0  •  HYDROcodone-acetaminophen (NORCO) 5-325 MG per tablet, Take 1 tablet by mouth Every 8 (Eight) Hours As Needed for Moderate Pain. (Patient not taking: Reported on 3/27/2023), Disp: 10 tablet, Rfl: 0  •  ondansetron (ZOFRAN) 4 MG tablet, Take 1 tablet by mouth Every 8 (Eight) Hours As Needed for Nausea or Vomiting. (Patient not taking: Reported on 3/27/2023), Disp: 30 tablet, Rfl: 3    ALLERGIES:     Allergies   Allergen Reactions   • Penicillins Nausea And Vomiting   • Adhesive Tape Rash     \"SURGICAL TAPE\"  AS A CHILD       SOCIAL HISTORY:       Social History     Socioeconomic History   • Marital status: Single   Tobacco Use   • Smoking status: Former     Packs/day: 0.50     Years: 4.00     Pack years: 2.00     Types: Cigarettes     Quit date: 2/8/2020     Years since quitting: 3.1     Passive exposure: Never   • Smokeless tobacco: Never   Vaping Use   • Vaping Use: Former   • Substances: Nicotine " "  • Devices: Disposable   Substance and Sexual Activity   • Alcohol use: Not Currently     Comment: socially   • Drug use: Not Currently     Types: Marijuana     Comment: daily, did not smoke during pregnancy   • Sexual activity: Not Currently         FAMILY HISTORY:  Family History   Problem Relation Age of Onset   • Diabetes Father    • Arthritis Maternal Grandmother    • Lung cancer Maternal Grandfather    • Heart attack Maternal Grandfather    • Heart disease Maternal Grandfather    • Stroke Maternal Grandfather    • Hyperlipidemia Maternal Grandfather    • Malig Hyperthermia Neg Hx    • Breast cancer Neg Hx    • Ovarian cancer Neg Hx    • Uterine cancer Neg Hx    • Colon cancer Neg Hx        REVIEW OF SYSTEMS:  As per HPI       Vitals:    04/20/23 1126   BP: 113/71   Pulse: 68   Resp: 16   Temp: 98.3 °F (36.8 °C)   TempSrc: Temporal   SpO2: 97%   Weight: 80.3 kg (177 lb)   Height: 177.8 cm (70\")   PainSc: 0-No pain         4/20/2023    11:27 AM   Current Status   ECOG score 0      PHYSICAL EXAM:    CONSTITUTIONAL:  Vital signs reviewed.  No distress, looks comfortable.  EYES:  Conjunctiva and lids unremarkable.   EARS,NOSE,MOUTH,THROAT:  Ears and nose appear unremarkable.    RESPIRATORY:  Normal respiratory effort.  Lungs clear to auscultation bilaterally.  CARDIOVASCULAR:  Normal S1, S2.  No murmurs rubs or gallops.  No significant lower extremity edema.  GASTROINTESTINAL: Abdomen appears unremarkable.  Nondistended  LYMPHATIC:  No cervical, supraclavicular lymphadenopathy.  NEURO: AAO x 3, No focal deficits.  Appears to have equal strength all 4 extremities.  BREAST: No abnormal masses/lumps palpated in bilateral breasts or axilla  MUSCULOSKELETAL:  Unremarkable digits/nails.  No cyanosis or clubbing.  No apparent joint deformities. A ~10-15 cm hard palpable swelling noted postero-lateral to the right knee  SKIN:  Warm.  No rashes.  PSYCHIATRIC:  Normal judgment and insight.  Normal mood and affect.   "   RECENT LABS:        Lab on 04/20/2023   Component Date Value Ref Range Status   • WBC 04/20/2023 6.87  3.40 - 10.80 10*3/mm3 Final   • RBC 04/20/2023 4.27  3.77 - 5.28 10*6/mm3 Final   • Hemoglobin 04/20/2023 11.8 (L)  12.0 - 15.9 g/dL Final   • Hematocrit 04/20/2023 36.1  34.0 - 46.6 % Final   • MCV 04/20/2023 84.5  79.0 - 97.0 fL Final   • MCH 04/20/2023 27.6  26.6 - 33.0 pg Final   • MCHC 04/20/2023 32.7  31.5 - 35.7 g/dL Final   • RDW 04/20/2023 12.0 (L)  12.3 - 15.4 % Final   • RDW-SD 04/20/2023 36.6 (L)  37.0 - 54.0 fl Final   • MPV 04/20/2023 9.4  6.0 - 12.0 fL Final   • Platelets 04/20/2023 255  140 - 450 10*3/mm3 Final   • Neutrophil % 04/20/2023 45.2  42.7 - 76.0 % Final   • Lymphocyte % 04/20/2023 40.9  19.6 - 45.3 % Final   • Monocyte % 04/20/2023 6.4  5.0 - 12.0 % Final   • Eosinophil % 04/20/2023 6.6 (H)  0.3 - 6.2 % Final   • Basophil % 04/20/2023 0.6  0.0 - 1.5 % Final   • Immature Grans % 04/20/2023 0.3  0.0 - 0.5 % Final   • Neutrophils, Absolute 04/20/2023 3.11  1.70 - 7.00 10*3/mm3 Final   • Lymphocytes, Absolute 04/20/2023 2.81  0.70 - 3.10 10*3/mm3 Final   • Monocytes, Absolute 04/20/2023 0.44  0.10 - 0.90 10*3/mm3 Final   • Eosinophils, Absolute 04/20/2023 0.45 (H)  0.00 - 0.40 10*3/mm3 Final   • Basophils, Absolute 04/20/2023 0.04  0.00 - 0.20 10*3/mm3 Final   • Immature Grans, Absolute 04/20/2023 0.02  0.00 - 0.05 10*3/mm3 Final   • nRBC 04/20/2023 0.0  0.0 - 0.2 /100 WBC Final       ASSESSMENT:   is a pleasant 31 y/o WF with medical history significant for asthma & depression who comes for 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     # Right knee desmoid tumor:  · Pt had first noted a swelling behind her right knee in December 2021. The swelling was progressive and became painful with knee flexion.   · A MRI right knee done 3/11/22 demonstrated a 10 x 3.5 x 4.4 cm enhancing soft tissue mass lesion along the posterolateral right knee interposed between the distal  biceps femoris and the lateral gastrocnemius muscles. The mass appears to infiltrate or arise from the distal biceps femoris. The lesion displaces and closely abuts the common peroneal nerve.   · Patient was seen by , ortho - who obtained a biopsy of the mass on 3/23/22. 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   ·  recommended against upfront resection at this time & referred to this clinic to discuss systemic therapy options.   · I discussed the natural history of desmoid tumors including high rates of local recurrence (~ 50% cases) , specially R1 resection. Also discussed ~ 25% cases have spontaneous regression. Discussed role of systemic therapy options with TKIs and tamoxifen/NSAIDS.   · Patient was then seen by Dr. Solis, surgical oncology with the [facility].  Plan at that time was made for surveillance.  Patient then became pregnant, hence plan for any systemic treatment was deferred until delivery.  · Patient delivered her baby boy on 3/9/2023.  A MRI of her right knee performed 1/29/2023, which noted significant increase in size of the right knee posterolateral mass, now measuring 8 x 6 x 18 cm.  · 3/23/2023: Patient seen back in the clinic today.  The right knee posterolateral mass has significantly increased since last evaluation.  Patient reports of discomfort and numbness around the knee.  She has difficulty with walking and bending her knees.  Reviewed treatment options with tamoxifen + sulindac vs a TKI.  Given recent delivery and potential adverse effects related to TKI while managing 3 young children at home, will not start TKI at this time and consider it down the line. As this tumor appears to be driven/propagated by estrogen and recent pregnancy, plan made to start treatment with tamoxifen 20 mg daily and sulindac 300 mg daily.  I reviewed the risks associated with tamoxifen and sulindac, including increased risk of  blood clots, gastritis/PUD.  Patient is agreeable to proceed with the treatment.   · Patient started taking tamoxifen/sulindac on 4/17/23.  · 4/20/23: Seen for a f/u. No significant change in her symptoms. Continue current treatment. Re-evaluate in 4-5 weeks. Consider switch to a TKI if no relief.     # Unintentional weight loss: Cause unclear.  CT C/A/P performed 4/26/22 did not show any major abnormalities.  It did show a 1 cm right outer breast lesion, likely benign.  Patient was encouraged to increase her nutritional intake.  We will consider referral to nutrition.    #Right breast lesion:   · US right breast performed 1/9/2023 noted a 1.1 cm probable benign fibroadenoma at 8 o'clock position, 4 cm from the nipple.  6 months sonographic follow-up is recommended.    # Encounter for genetic & chromosomal abnormalities: Will her young age, family history (aunt) of breast cancer & desmoid tumors association with FAP and gardners syndrome,an [employer] germline testing was performed. 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   Will refer to genetic counseling.    PLAN:   1. Continue tamoxifen 20 mg daily and sulindac 300 mg daily  2. Right breast ultrasound will be due in July 2023  3. Plan to refer to genetic counseling  4. F/u in 4-5 weeks or sooner if needed    Orders Placed This Encounter   Procedures   • Comprehensive Metabolic Panel     Standing Status:   Future     Number of Occurrences:   1     Standing Expiration Date:   4/19/2024     Order Specific Question:   Release to patient     Answer:   Immediate   • CBC & Differential     Standing Status:   Future     Number of Occurrences:   1     Standing Expiration Date:   4/19/2024     Order Specific Question:   Manual Differential     Answer:   No   Total time spent during this patient encounter is 45 minutes. The total time spent with the patient includes at least one or more of the  following: preparing to see the patient by reviewing of tests, prior notes or other relevant information, performing appropriate independent examination & evaluation, counseling, ordering of medications, tests or procedures, communicating with other healthcare professionals, when appropriate to coordinate care, documenting clinic information in the electronic medical records or other health records, independently interpreting results of tests and communicating the results to the patient/family or caregiver.

## 2023-04-21 ENCOUNTER — SPECIALTY PHARMACY (OUTPATIENT)
Dept: PHARMACY | Facility: HOSPITAL | Age: 33
End: 2023-04-21
Payer: COMMERCIAL

## 2023-04-24 ENCOUNTER — SPECIALTY PHARMACY (OUTPATIENT)
Dept: PHARMACY | Facility: HOSPITAL | Age: 33
End: 2023-04-24
Payer: COMMERCIAL

## 2023-04-24 NOTE — PROGRESS NOTES
Specialty Note    Call placed to assess adherence and side effects of Tamoxifen and sulindac. VM at home  is not set up and mobile was not working.

## 2023-04-28 ENCOUNTER — POSTPARTUM VISIT (OUTPATIENT)
Dept: OBSTETRICS AND GYNECOLOGY | Facility: CLINIC | Age: 33
End: 2023-04-28
Payer: COMMERCIAL

## 2023-04-28 VITALS
SYSTOLIC BLOOD PRESSURE: 118 MMHG | HEIGHT: 70 IN | DIASTOLIC BLOOD PRESSURE: 67 MMHG | BODY MASS INDEX: 25.57 KG/M2 | WEIGHT: 178.6 LBS

## 2023-04-28 NOTE — PROGRESS NOTES
HPI   Beena Vincent  is a 32 y.o. female who presents for a 6-week postpartum checkup.  She has visited with her oncologist and is taking tamoxifen for her tumor.  Her baby is doing well.  The baby is bottlefeeding.    Chief Complaint   Patient presents with   • Postpartum Care     Patient is here for a 6 week postpartum.       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- pt desires to proceed with treatments after delivery   • Gestational diabetes    • Gestational hypertension 2020   • Iron deficiency anemia    • Preeclampsia 2012       Past Surgical History:   Procedure Laterality Date   • APPENDECTOMY N/A 10/1/2021    Procedure: APPENDECTOMY LAPAROSCOPIC;  Surgeon: Ahmet Dong Jr., MD;  Location: [facility] OR;  Service: General;  Laterality: N/A;   • KNEE ARTHROSCOPY      AGE 6   • LEEP N/A 4/25/2019    Procedure: LOOP ELECTROCAUTERY EXCISION PROCEDURE;  Surgeon: Arsh Ray MD;  Location: [facility];  Service: Obstetrics/Gynecology   • SOFT TISSUE BIOPSY Right 3/23/2022    Procedure: BIOPSY SOFT TISSUE THIGH / KNEE;  Surgeon: Chris Randall MD;  Location: [facility] OR;  Service: Orthopedics;  Laterality: Right;   • WISDOM TOOTH EXTRACTION         Social History     Socioeconomic History   • Marital status: Single   Tobacco Use   • Smoking status: Former     Packs/day: 0.50     Years: 4.00     Pack years: 2.00     Types: Cigarettes     Quit date: 2/8/2020     Years since quitting: 3.2     Passive exposure: Never   • Smokeless tobacco: Never   Vaping Use   • Vaping Use: Former   • Substances: Nicotine   • Devices: Disposable   Substance and Sexual Activity   • Alcohol use: Not Currently     Comment: socially   • Drug use: Not Currently     Types: Marijuana     Comment: daily, did not smoke during pregnancy   • Sexual activity: Not Currently       The following portions of the patient's  history were reviewed and updated as appropriate: allergies, current medications, past family history, past medical history, past social history, past surgical history and problem list.    Review of Systems   Constitutional: Negative.    HENT: Negative.    Respiratory: Negative.    Cardiovascular: Negative.    Gastrointestinal: Negative.    Genitourinary: Negative.    Musculoskeletal: Negative.    Skin: Negative.    Allergic/Immunologic: Negative.    Psychiatric/Behavioral: Negative.              Physical Exam  Vitals and nursing note reviewed.   Constitutional:       Appearance: She is well-developed.   HENT:      Head: Normocephalic and atraumatic.   Cardiovascular:      Rate and Rhythm: Normal rate and regular rhythm.   Pulmonary:      Effort: Pulmonary effort is normal.      Breath sounds: Normal breath sounds. No wheezing or rales.   Chest:      Comments: The breasts are homogeneous.  There are no palpable lumps.  Nipple discharge and axillary adenopathy are absent.  Abdominal:      General: There is no distension.      Palpations: Abdomen is soft.      Tenderness: There is no abdominal tenderness.   Genitourinary:     Labia:         Right: No lesion.         Left: No lesion.       Vagina: Normal. No vaginal discharge.      Cervix: No cervical motion tenderness.      Uterus: Normal. Not enlarged and not tender.       Adnexa:         Right: No mass or tenderness.          Left: No mass or tenderness.     Skin:     General: Skin is warm and dry.   Neurological:      Mental Status: She is alert and oriented to person, place, and time.         Assessment    Diagnoses and all orders for this visit:    1. Routine postpartum follow-up (Primary)        Plan  1. Appropriate progress, 6 weeks postpartum.  Okay to resume all normal activities of daily living  2. Last Pap was normal in July  3. Contraceptive counseling.  Because the patient is taking tamoxifen, she needs a nonhormonal method of contraception.  We discussed  the benefits and risks of using condoms versus a ParaGard versus tubal ligation.  The patient would like to consider a ParaGard.  We discussed the procedure itself as well as its benefits, risks and alternatives.  Also, a brochure will be given.  The patient would like to review this with her oncologist and plans to proceed if the oncologist also improves    4. No follow-ups on file.    Social History     Tobacco Use   Smoking Status Former   • Packs/day: 0.50   • Years: 4.00   • Pack years: 2.00   • Types: Cigarettes   • Quit date: 2/8/2020   • Years since quitting: 3.2   • Passive exposure: Never   Smokeless Tobacco Never   5.

## 2023-05-03 ENCOUNTER — SPECIALTY PHARMACY (OUTPATIENT)
Dept: PHARMACY | Facility: HOSPITAL | Age: 33
End: 2023-05-03
Payer: COMMERCIAL

## 2023-05-18 ENCOUNTER — SPECIALTY PHARMACY (OUTPATIENT)
Dept: PHARMACY | Facility: HOSPITAL | Age: 33
End: 2023-05-18
Payer: COMMERCIAL

## 2023-05-18 ENCOUNTER — DOCUMENTATION (OUTPATIENT)
Dept: PHARMACY | Facility: HOSPITAL | Age: 33
End: 2023-05-18
Payer: COMMERCIAL

## 2023-05-18 NOTE — PROGRESS NOTES
Today I received a secure chat message from Alyx Davis CPhT with [facility], stating that Ms. Vincent requested refills of her tamoxifen and sulindac through [employer].    I called the patient to complete the refill coordination but I was unable to get an answer or leave a VM so I sent a [employer] message asking that she call me at [phone].      Sarah Bryson - Care Coordinator   5/18/2023  11:42 EDT

## 2023-05-19 ENCOUNTER — SPECIALTY PHARMACY (OUTPATIENT)
Dept: PHARMACY | Facility: HOSPITAL | Age: 33
End: 2023-05-19
Payer: COMMERCIAL

## 2023-05-19 ENCOUNTER — OFFICE VISIT (OUTPATIENT)
Dept: ONCOLOGY | Facility: CLINIC | Age: 33
End: 2023-05-19
Payer: COMMERCIAL

## 2023-05-19 ENCOUNTER — LAB (OUTPATIENT)
Dept: LAB | Facility: HOSPITAL | Age: 33
End: 2023-05-19
Payer: COMMERCIAL

## 2023-05-19 ENCOUNTER — DOCUMENTATION (OUTPATIENT)
Dept: PHARMACY | Facility: HOSPITAL | Age: 33
End: 2023-05-19
Payer: COMMERCIAL

## 2023-05-19 VITALS
RESPIRATION RATE: 18 BRPM | OXYGEN SATURATION: 97 % | HEIGHT: 70 IN | WEIGHT: 177.1 LBS | TEMPERATURE: 98 F | DIASTOLIC BLOOD PRESSURE: 78 MMHG | SYSTOLIC BLOOD PRESSURE: 123 MMHG | BODY MASS INDEX: 25.35 KG/M2 | HEART RATE: 73 BPM

## 2023-05-19 DIAGNOSIS — N63.10 MASS OF RIGHT BREAST, UNSPECIFIED QUADRANT: ICD-10-CM

## 2023-05-19 DIAGNOSIS — D48.1 DESMOID TUMOR: ICD-10-CM

## 2023-05-19 DIAGNOSIS — M79.89 SOFT TISSUE MASS: Primary | ICD-10-CM

## 2023-05-19 PROBLEM — D48.119 DESMOID TUMOR: Status: ACTIVE | Noted: 2023-05-19

## 2023-05-19 LAB
ALBUMIN SERPL-MCNC: 4.3 G/DL (ref 3.5–5.2)
ALBUMIN/GLOB SERPL: 1.7 G/DL (ref 1.1–2.4)
ALP SERPL-CCNC: 49 U/L (ref 38–116)
ALT SERPL W P-5'-P-CCNC: 19 U/L (ref 0–33)
ANION GAP SERPL CALCULATED.3IONS-SCNC: 10.4 MMOL/L (ref 5–15)
AST SERPL-CCNC: 22 U/L (ref 0–32)
BASOPHILS # BLD AUTO: 0.05 10*3/MM3 (ref 0–0.2)
BASOPHILS NFR BLD AUTO: 0.8 % (ref 0–1.5)
BILIRUB SERPL-MCNC: 0.5 MG/DL (ref 0.2–1.2)
BUN SERPL-MCNC: 11 MG/DL (ref 6–20)
BUN/CREAT SERPL: 11.8 (ref 7.3–30)
CALCIUM SPEC-SCNC: 9.2 MG/DL (ref 8.5–10.2)
CHLORIDE SERPL-SCNC: 105 MMOL/L (ref 98–107)
CO2 SERPL-SCNC: 24.6 MMOL/L (ref 22–29)
CREAT SERPL-MCNC: 0.93 MG/DL (ref 0.6–1.1)
DEPRECATED RDW RBC AUTO: 38.8 FL (ref 37–54)
EGFRCR SERPLBLD CKD-EPI 2021: 85 ML/MIN/1.73
EOSINOPHIL # BLD AUTO: 0.3 10*3/MM3 (ref 0–0.4)
EOSINOPHIL NFR BLD AUTO: 4.5 % (ref 0.3–6.2)
ERYTHROCYTE [DISTWIDTH] IN BLOOD BY AUTOMATED COUNT: 12.4 % (ref 12.3–15.4)
GLOBULIN UR ELPH-MCNC: 2.6 GM/DL (ref 1.8–3.5)
GLUCOSE SERPL-MCNC: 105 MG/DL (ref 74–124)
HCT VFR BLD AUTO: 34.4 % (ref 34–46.6)
HGB BLD-MCNC: 11.3 G/DL (ref 12–15.9)
IMM GRANULOCYTES # BLD AUTO: 0.02 10*3/MM3 (ref 0–0.05)
IMM GRANULOCYTES NFR BLD AUTO: 0.3 % (ref 0–0.5)
LYMPHOCYTES # BLD AUTO: 2.65 10*3/MM3 (ref 0.7–3.1)
LYMPHOCYTES NFR BLD AUTO: 40.2 % (ref 19.6–45.3)
MCH RBC QN AUTO: 28.1 PG (ref 26.6–33)
MCHC RBC AUTO-ENTMCNC: 32.8 G/DL (ref 31.5–35.7)
MCV RBC AUTO: 85.6 FL (ref 79–97)
MONOCYTES # BLD AUTO: 0.44 10*3/MM3 (ref 0.1–0.9)
MONOCYTES NFR BLD AUTO: 6.7 % (ref 5–12)
NEUTROPHILS NFR BLD AUTO: 3.14 10*3/MM3 (ref 1.7–7)
NEUTROPHILS NFR BLD AUTO: 47.5 % (ref 42.7–76)
NRBC BLD AUTO-RTO: 0 /100 WBC (ref 0–0.2)
PLATELET # BLD AUTO: 241 10*3/MM3 (ref 140–450)
PMV BLD AUTO: 9.7 FL (ref 6–12)
POTASSIUM SERPL-SCNC: 3.8 MMOL/L (ref 3.5–4.7)
PROT SERPL-MCNC: 6.9 G/DL (ref 6.3–8)
RBC # BLD AUTO: 4.02 10*6/MM3 (ref 3.77–5.28)
SODIUM SERPL-SCNC: 140 MMOL/L (ref 134–145)
WBC NRBC COR # BLD: 6.6 10*3/MM3 (ref 3.4–10.8)

## 2023-05-19 PROCEDURE — 36415 COLL VENOUS BLD VENIPUNCTURE: CPT

## 2023-05-19 PROCEDURE — 85025 COMPLETE CBC W/AUTO DIFF WBC: CPT

## 2023-05-19 PROCEDURE — 80053 COMPREHEN METABOLIC PANEL: CPT

## 2023-05-19 NOTE — PROGRESS NOTES
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 provider found  RECORDS OBTAINED:  Records of the patients history including those from the electronic medical record were reviewed and summarized in detail.    HISTORY OF PRESENT ILLNESS:    The patient is a 32 y.o. year old female with medical history significant for asthma & depression who had first noted a swelling behind her right knee in December 2021. The swelling was progressive and became painful with knee flexion. A MRI right knee done 3/11/22 demonstrated a 10 x 3.5 x 4.4 cm enhancing soft tissue mass lesion along the posterolateral right knee interposed between the distal biceps femoris and the lateral gastrocnemius muscles. The mass appears to infiltrate or arise from the distal biceps femoris. The lesion displaces and closely abuts the common peroneal nerve.     Patient was seen by , ortho - who obtained a biopsy of the mass on 3/23/22. 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      recommended against upfront resection at this time & referred to this clinic to discuss systemic therapy options.     Patient is accompanied by her mother. C/o pain & swelling behind right knee. Denies any association with menstrual cycles. She does report of > 20 lbs unintentional weight loss. Denies any abdominal pain, nausea or vomiting. Denies any other swelling anywhere. No family history of FAP, colon cancer or any malignancies.     A CT chest abdomen and pelvis from 4/26/2020 showed an asymmetric 1 cm nodule in the outer quadrant of right breast, favoring benign etiology.  No other soft tissue masses noted in this patient with history of desmoid fibromatosis.    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     Patient was lost to follow-up after the June 2022 visit.  She was seen again in March 2023.  Patient states she had become pregnant and delivered a baby boy on 3/9/2023.  Normal vaginal delivery.  Normal postpartum period.     She did get an MRI of her right knee performed 1/29/2023, which noted significant increase in size of the right knee posterolateral mass, now measuring 8 x 6 x 18 cm.    4/13/2023: MRI Right knee demonstrates mild increase in size compared to January 2023.  4/17/2023: Started on tamoxifen 20 mg daily and sulindac 300 mg daily    INTERIM HISTORY:  Patient returns to the clinic for a follow-up visit.  She had been taking tamoxifen/sulindac for the last ~4 weeks.  Tolerating it well.  She reports of persistent discomfort in her right knee region.  Patient states that she has been having more cramps and tingling in her right lower extremity.  She is unable to fully bend her knee completely.  Also has difficulty walking.    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- pt desires to proceed with treatments after delivery   • Gestational diabetes    • Gestational hypertension 2020   • Iron deficiency anemia    • Preeclampsia 2012     Past Surgical History:   Procedure Laterality Date   • APPENDECTOMY N/A 10/1/2021    Procedure: APPENDECTOMY LAPAROSCOPIC;  Surgeon: Ahmet Dong Jr., MD;  Location: [facility];  Service: General;  Laterality: N/A;   • KNEE ARTHROSCOPY      AGE 6   • LEEP N/A 4/25/2019    Procedure: LOOP ELECTROCAUTERY EXCISION PROCEDURE;  Surgeon: Arsh Ray MD;  Location: [facility];  Service: Obstetrics/Gynecology   • SOFT TISSUE BIOPSY Right 3/23/2022    Procedure: BIOPSY SOFT TISSUE THIGH / KNEE;  Surgeon: Chris Randall MD;  Location: [facility];  Service: Orthopedics;  Laterality: Right;   • WISDOM TOOTH EXTRACTION    "      MEDICATIONS    Current Outpatient Medications:   •  albuterol sulfate  (90 Base) MCG/ACT inhaler, Inhale 2 puffs Every 4 (Four) Hours As Needed for Wheezing., Disp: 18 g, Rfl: 0  •  Blood Glucose Monitoring Suppl (FreeStyle Lite) w/Device kit, USE 1 SEVEN TIMES DAILY, Disp: , Rfl:   •  glucose monitor monitoring kit, To check blood sugars 7x daily, Disp: 1 each, Rfl: 0  •  ibuprofen (ADVIL,MOTRIN) 600 MG tablet, Take 1 tablet by mouth Every 6 (Six) Hours As Needed for Mild Pain, Disp: 50 tablet, Rfl: 3  •  Lancets (freestyle) lancets, Pt to check blood sugar 7xs daily, Disp: 15 each, Rfl: 12  •  Prenatal Vit-Fe Fumarate-FA (Prenatabs FA) 29-1 MG tablet, Take 1 tablet by mouth Daily., Disp: 30 tablet, Rfl: 11  •  sulindac (CLINORIL) 150 MG tablet, Take 2 tablets by mouth Daily., Disp: 60 tablet, Rfl: 5  •  tamoxifen (NOLVADEX) 20 MG chemo tablet, Take 1 tablet by mouth Daily., Disp: 30 tablet, Rfl: 5    ALLERGIES:     Allergies   Allergen Reactions   • Penicillins Nausea And Vomiting   • Adhesive Tape Rash     \"SURGICAL TAPE\"  AS A CHILD       SOCIAL HISTORY:       Social History     Socioeconomic History   • Marital status: Single   Tobacco Use   • Smoking status: Former     Packs/day: 0.50     Years: 4.00     Pack years: 2.00     Types: Cigarettes     Quit date: 2/8/2020     Years since quitting: 3.2     Passive exposure: Never   • Smokeless tobacco: Never   Vaping Use   • Vaping Use: Former   • Substances: Nicotine   • Devices: Disposable   Substance and Sexual Activity   • Alcohol use: Not Currently     Comment: socially   • Drug use: Not Currently     Types: Marijuana     Comment: daily, did not smoke during pregnancy   • Sexual activity: Not Currently         FAMILY HISTORY:  Family History   Problem Relation Age of Onset   • Diabetes Father    • Arthritis Maternal Grandmother    • Lung cancer Maternal Grandfather    • Heart attack Maternal Grandfather    • Heart disease Maternal Grandfather    • " "Stroke Maternal Grandfather    • Hyperlipidemia Maternal Grandfather    • Malig Hyperthermia Neg Hx    • Breast cancer Neg Hx    • Ovarian cancer Neg Hx    • Uterine cancer Neg Hx    • Colon cancer Neg Hx        REVIEW OF SYSTEMS:  As per HPI       Vitals:    05/19/23 1144   BP: 123/78   Pulse: 73   Resp: 18   Temp: 98 °F (36.7 °C)   TempSrc: Temporal   SpO2: 97%   Weight: 80.3 kg (177 lb 1.6 oz)   Height: 177.8 cm (70\")   PainSc:   2   PainLoc: Foot         5/19/2023    11:45 AM   Current Status   ECOG score 0      PHYSICAL EXAM:    CONSTITUTIONAL:  Vital signs reviewed.  No distress, looks comfortable.  EYES:  Conjunctiva and lids unremarkable.   EARS,NOSE,MOUTH,THROAT:  Ears and nose appear unremarkable.    RESPIRATORY:  Normal respiratory effort.  Lungs clear to auscultation bilaterally.  CARDIOVASCULAR:  Normal S1, S2.  No murmurs rubs or gallops.  No significant lower extremity edema.  GASTROINTESTINAL: Abdomen appears unremarkable.  Nondistended  LYMPHATIC:  No cervical, supraclavicular lymphadenopathy.  NEURO: AAO x 3, No focal deficits.  Appears to have equal strength all 4 extremities.  BREAST: No abnormal masses/lumps palpated in bilateral breasts or axilla  MUSCULOSKELETAL:  Unremarkable digits/nails.  No cyanosis or clubbing.  No apparent joint deformities. A ~10-15 cm hard palpable swelling noted postero-lateral to the right knee  SKIN:  Warm.  No rashes.  PSYCHIATRIC:  Normal judgment and insight.  Normal mood and affect.     RECENT LABS:        Lab on 05/19/2023   Component Date Value Ref Range Status   • WBC 05/19/2023 6.60  3.40 - 10.80 10*3/mm3 Final   • RBC 05/19/2023 4.02  3.77 - 5.28 10*6/mm3 Final   • Hemoglobin 05/19/2023 11.3 (L)  12.0 - 15.9 g/dL Final   • Hematocrit 05/19/2023 34.4  34.0 - 46.6 % Final   • MCV 05/19/2023 85.6  79.0 - 97.0 fL Final   • MCH 05/19/2023 28.1  26.6 - 33.0 pg Final   • MCHC 05/19/2023 32.8  31.5 - 35.7 g/dL Final   • RDW 05/19/2023 12.4  12.3 - 15.4 % Final   • " RDW-SD 05/19/2023 38.8  37.0 - 54.0 fl Final   • MPV 05/19/2023 9.7  6.0 - 12.0 fL Final   • Platelets 05/19/2023 241  140 - 450 10*3/mm3 Final   • Neutrophil % 05/19/2023 47.5  42.7 - 76.0 % Final   • Lymphocyte % 05/19/2023 40.2  19.6 - 45.3 % Final   • Monocyte % 05/19/2023 6.7  5.0 - 12.0 % Final   • Eosinophil % 05/19/2023 4.5  0.3 - 6.2 % Final   • Basophil % 05/19/2023 0.8  0.0 - 1.5 % Final   • Immature Grans % 05/19/2023 0.3  0.0 - 0.5 % Final   • Neutrophils, Absolute 05/19/2023 3.14  1.70 - 7.00 10*3/mm3 Final   • Lymphocytes, Absolute 05/19/2023 2.65  0.70 - 3.10 10*3/mm3 Final   • Monocytes, Absolute 05/19/2023 0.44  0.10 - 0.90 10*3/mm3 Final   • Eosinophils, Absolute 05/19/2023 0.30  0.00 - 0.40 10*3/mm3 Final   • Basophils, Absolute 05/19/2023 0.05  0.00 - 0.20 10*3/mm3 Final   • Immature Grans, Absolute 05/19/2023 0.02  0.00 - 0.05 10*3/mm3 Final   • nRBC 05/19/2023 0.0  0.0 - 0.2 /100 WBC Final       ASSESSMENT:   is a pleasant 31 y/o WF with medical history significant for asthma & depression who comes for 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     # Right knee desmoid tumor:  · Pt had first noted a swelling behind her right knee in December 2021. The swelling was progressive and became painful with knee flexion.   · A MRI right knee done 3/11/22 demonstrated a 10 x 3.5 x 4.4 cm enhancing soft tissue mass lesion along the posterolateral right knee interposed between the distal biceps femoris and the lateral gastrocnemius muscles. The mass appears to infiltrate or arise from the distal biceps femoris. The lesion displaces and closely abuts the common peroneal nerve.   · Patient was seen by , ortho - who obtained a biopsy of the mass on 3/23/22. 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   ·  recommended against upfront resection at this time & referred to this clinic to discuss systemic therapy options.   · I  discussed the natural history of desmoid tumors including high rates of local recurrence (~ 50% cases) , specially R1 resection. Also discussed ~ 25% cases have spontaneous regression. Discussed role of systemic therapy options with TKIs and tamoxifen/NSAIDS.   · Patient was then seen by Dr. Solis, surgical oncology with the [facility].  Plan at that time was made for surveillance.  Patient then became pregnant, hence plan for any systemic treatment was deferred until delivery.  · Patient delivered her baby boy on 3/9/2023.  A MRI of her right knee performed 1/29/2023, which noted significant increase in size of the right knee posterolateral mass, now measuring 8 x 6 x 18 cm.  · 3/23/2023: Patient seen back in the clinic today.  The right knee posterolateral mass has significantly increased since last evaluation.  Patient reports of discomfort and numbness around the knee.  She has difficulty with walking and bending her knees.  Reviewed treatment options with tamoxifen + sulindac vs a TKI.  Given recent delivery and potential adverse effects related to TKI while managing 3 young children at home, will not start TKI at this time and consider it down the line. As this tumor appears to be driven/propagated by estrogen and recent pregnancy, plan made to start treatment with tamoxifen 20 mg daily and sulindac 300 mg daily.  I reviewed the risks associated with tamoxifen and sulindac, including increased risk of blood clots, gastritis/PUD.  Patient is agreeable to proceed with the treatment.   · Patient started taking tamoxifen/sulindac on 4/17/23.  · 5/19/2023: Seen for a follow-up.  Patient has been compliant with tamoxifen and sulindac.  Patient reports of no significant improvement in her symptoms and thinks the appear to be slightly worse than last month.  Discussed plan to switch treatment to pazopanib 800 mg daily.  Follow-up in 4 weeks or sooner if needed.    # Unintentional weight loss: Cause  unclear.  CT C/A/P performed 4/26/22 did not show any major abnormalities.  It did show a 1 cm right outer breast lesion, likely benign.  Patient was encouraged to increase her nutritional intake.  We will consider referral to nutrition.    #Right breast lesion:   · US right breast performed 1/9/2023 noted a 1.1 cm probable benign fibroadenoma at 8 o'clock position, 4 cm from the nipple.  6 months sonographic follow-up is recommended.    # Encounter for genetic & chromosomal abnormalities: Will her young age, family history (aunt) of breast cancer & desmoid tumors association with FAP and gardners syndrome,an [employer] germline testing was performed. 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   Will refer to genetic counseling.    PLAN:   1. Discontinue tamoxifen 20 mg daily and sulindac 300 mg daily.  Switch to pazopanib 800 mg daily  2. Right breast ultrasound will be due in July 2023  3. Plan to refer to genetic counseling at follow-up  4. F/u in 4-5 weeks or sooner if needed    Orders Placed This Encounter   Procedures   • Comprehensive Metabolic Panel     Standing Status:   Future     Standing Expiration Date:   5/18/2024     Order Specific Question:   Release to patient     Answer:   Routine Release   • CBC & Differential     Standing Status:   Future     Order Specific Question:   Manual Differential     Answer:   No   Total time spent during this patient encounter is 45 minutes. The total time spent with the patient includes at least one or more of the following: preparing to see the patient by reviewing of tests, prior notes or other relevant information, performing appropriate independent examination & evaluation, counseling, ordering of medications, tests or procedures, communicating with other healthcare professionals, when appropriate to coordinate care, documenting clinic information in the electronic medical records or other  health records, independently interpreting results of tests and communicating the results to the patient/family or caregiver.

## 2023-05-19 NOTE — PROGRESS NOTES
I have received Sulindac and Tamoxifen from [facility] 5/19/2023. Pt notified they have arrived and she will come pick them up.     Sarah Chilel  Specialty Pharmacy Technician

## 2023-05-19 NOTE — PROGRESS NOTES
Sulindac and Tamoxifen supply from [facility] picked up by pt on 5/19/2023.     Sarah Chilel  Specialty Pharmacy Technician

## 2023-05-22 ENCOUNTER — TELEPHONE (OUTPATIENT)
Dept: ONCOLOGY | Facility: CLINIC | Age: 33
End: 2023-05-22
Payer: COMMERCIAL

## 2023-05-22 ENCOUNTER — DOCUMENTATION (OUTPATIENT)
Dept: PHARMACY | Facility: HOSPITAL | Age: 33
End: 2023-05-22
Payer: COMMERCIAL

## 2023-05-23 ENCOUNTER — SPECIALTY PHARMACY (OUTPATIENT)
Dept: PHARMACY | Facility: HOSPITAL | Age: 33
End: 2023-05-23
Payer: COMMERCIAL

## 2023-05-23 DIAGNOSIS — D48.1 DESMOID TUMOR: Primary | ICD-10-CM

## 2023-05-23 DIAGNOSIS — M79.89 SOFT TISSUE MASS: ICD-10-CM

## 2023-05-23 RX ORDER — ONDANSETRON HYDROCHLORIDE 8 MG/1
8 TABLET, FILM COATED ORAL 3 TIMES DAILY PRN
Qty: 30 TABLET | Refills: 5 | Status: SHIPPED | OUTPATIENT
Start: 2023-05-23

## 2023-05-23 NOTE — PROGRESS NOTES
Oral Chemotherapy - New Referral    Received a referral from Dr. Molina    Treatment Plan: Votrient (pazopanib)  Start date of treatment planned for: As soon as oral specialty medication is available.  Indication: Desmoid Tumor  Relevant past treatments: Sulindac and Tamoxifen  Is the therapy appropriate based on treatment guidelines and FDA labeling?: yes  Therapeutic Goals: Continue treatment until progression or intolerable toxicity  Patient can self-administer oral medications: Yes    • Drug-Drug Interactions: The current medication list was reviewed and there are no relevant drug-drug interactions.  • Medication Allergies: The patient has no relevant allergies as it relates to their oral specialty medication  • Review of Labs/Dose Adjustments: The patient's most recent labs were reviewed and all are WNL to start treatment at this dose.     A prescription was released to [facility] specialty pharmacy for   Drug: Votrient (pazopanib)  Strength: 200 mg  Directions: Take 4 tablets by mouth daily  Quantity: 120  Refills: 5    Pharmacy education today    Name/Credentials Edith Chen RPh, BCOP    5/23/2023  09:14 EDT    Completed independent double check on medication order/RX.    Thanks,   Josie Chen, PharmD, BCPS

## 2023-05-24 ENCOUNTER — SPECIALTY PHARMACY (OUTPATIENT)
Dept: ONCOLOGY | Facility: HOSPITAL | Age: 33
End: 2023-05-24
Payer: COMMERCIAL

## 2023-05-24 ENCOUNTER — SPECIALTY PHARMACY (OUTPATIENT)
Dept: PHARMACY | Facility: HOSPITAL | Age: 33
End: 2023-05-24
Payer: COMMERCIAL

## 2023-05-24 ENCOUNTER — CLINICAL SUPPORT (OUTPATIENT)
Dept: ONCOLOGY | Facility: HOSPITAL | Age: 33
End: 2023-05-24
Payer: COMMERCIAL

## 2023-05-24 ENCOUNTER — OFFICE VISIT (OUTPATIENT)
Dept: ONCOLOGY | Facility: CLINIC | Age: 33
End: 2023-05-24
Payer: COMMERCIAL

## 2023-05-24 VITALS
OXYGEN SATURATION: 96 % | HEIGHT: 70 IN | BODY MASS INDEX: 25.05 KG/M2 | DIASTOLIC BLOOD PRESSURE: 74 MMHG | TEMPERATURE: 97.8 F | SYSTOLIC BLOOD PRESSURE: 110 MMHG | RESPIRATION RATE: 16 BRPM | WEIGHT: 175 LBS | HEART RATE: 71 BPM

## 2023-05-24 DIAGNOSIS — D48.1 DESMOID TUMOR: Primary | ICD-10-CM

## 2023-05-24 NOTE — PROGRESS NOTES
Specialty Pharmacy Patient Management Program  Oncology Initial Assessment       Beena Vincent is a 32 y.o. 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  An initial outreach was conducted, including assessment of therapy appropriateness and specialty medication education for Pazopanib (Votrient). The patient was introduced to services offered by [facility], including: regular assessments, refill coordination, curbside pick-up or mail order delivery options, prior authorization maintenance, and financial assistance programs as applicable. The patient was also provided with contact information for the pharmacy team.     Regimen: Votrient 800 mg po daily    Start date of oral specialty medication: 5/26/23    Relevant Past Medical History, Comorbidities, and Vaccines  Relevant medical history and concomitant health conditions were discussed with the patient. The patient's chart has been reviewed for relevant past medical history and comorbid health conditions and updated as necessary.  Vaccines are coordinated by the patient's oncologist and primary care provider.  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- pt desires to proceed with treatments after delivery   • Gestational diabetes    • Gestational hypertension 2020   • Iron deficiency anemia    • Preeclampsia 2012     Social History     Socioeconomic History   • Marital status: Single   Tobacco Use   • Smoking status: Former     Packs/day: 0.50     Years: 4.00     Pack years: 2.00     Types: Cigarettes     Quit date: 2/8/2020     Years since quitting: 3.2     Passive exposure: Never   • Smokeless tobacco: Never   Vaping Use   • Vaping Use: Former   • Substances: Nicotine   • Devices: Disposable   Substance and Sexual  "Activity   • Alcohol use: Not Currently     Comment: socially   • Drug use: Not Currently     Types: Marijuana     Comment: daily, did not smoke during pregnancy   • Sexual activity: Not Currently       Allergies  Known allergies and reactions were discussed with the patient. The patient's chart has been reviewed for allergy information and updated as necessary.   Allergies   Allergen Reactions   • Penicillins Nausea And Vomiting   • Adhesive Tape Rash     \"SURGICAL TAPE\"  AS A CHILD        Current Medication List  This medication list has been reviewed with the patient and evaluated for any interactions or necessary modifications/recommendations, and updated to include all prescription medications, OTC medications, and supplements the patient is currently taking.  This list reflects what is contained in the patient's profile, which has also been marked as reviewed to communicate to other providers it is the most up to date version of the patient's current medication therapy.   Prior to Admission medications    Medication Sig Start Date End Date Taking? Authorizing Provider   albuterol sulfate  (90 Base) MCG/ACT inhaler Inhale 2 puffs Every 4 (Four) Hours As Needed for Wheezing. 2/7/22  Yes Ce Dietrich APRN   ibuprofen (ADVIL,MOTRIN) 600 MG tablet Take 1 tablet by mouth Every 6 (Six) Hours As Needed for Mild Pain 3/11/23  Yes Mt Almazan MD   Prenatal Vit-Fe Fumarate-FA (Prenatabs FA) 29-1 MG tablet Take 1 tablet by mouth Daily. 7/28/22  Yes Arsh Ray MD   Blood Glucose Monitoring Suppl (FreeStyle Lite) w/Device kit USE 1 SEVEN TIMES DAILY 2/6/23   ProviderRufina MD   glucose monitor monitoring kit To check blood sugars 7x daily 2/6/23   Leona Harkins APRN   Lancets (freestyle) lancets Pt to check blood sugar 7xs daily 2/6/23   Leona Harkins APRN   ondansetron (ZOFRAN) 8 MG tablet Take 1 tablet by mouth 3 (Three) Times a Day As Needed for Nausea or Vomiting. 5/23/23   Jesse, " Colin Cooley MD   PAZOPanib (VOTRIENT) 200 MG chemo tablet Take 4 tablets by mouth Daily. Take on an empty stomach: 1 hour before or 2 hours after a meal. Swallow whole, do not chew, crush or break tablet. 5/23/23   Colin Molina MD   sulindac (CLINORIL) 150 MG tablet Take 2 tablets by mouth Daily. 3/27/23 5/19/23  Colin Molina MD   tamoxifen (NOLVADEX) 20 MG chemo tablet Take 1 tablet by mouth Daily. 3/27/23 5/19/23  Colin Molina MD       Drug Interactions  • Reviewed concomitant medications, allergies, labs, comorbidities/medical history, quality of life ( she did state she would like to restart Lexapro), and immunization history.   • Drug-drug interactions noted and discussed during education: no significant drug interactions noted. Patient did ask about restarting Lexapro- we discussed it has a level c ddi with Votrient and monitoring of EKG would be advised.  She also asked about the addition of birth control.  She was advised to call us when the doctor prescribed one and that the [facility] office would run a ddi report. Reminded the patient to let us know before making any changes or starting any new prescription or OTC medications so we can first assess drug interactions.  • Drug-food interactions noted and discussed during education: Patient was instructed to avoid eating grapefruit and drinking grapefruit juice    Recommended Medications Assessment  • Bone Health (such as calcium/vitamin D, bisphosphonate, RANKL inhibitor) - Not Indicated  • VTE prophylaxis - Not Indicated   • Prophylactic antimicrobials - Not Indicated     Relevant Laboratory Values  Lab Results   Component Value Date    GLUCOSE 105 05/19/2023    CALCIUM 9.2 05/19/2023     05/19/2023    K 3.8 05/19/2023    CO2 24.6 05/19/2023     05/19/2023    BUN 11 05/19/2023    CREATININE 0.93 05/19/2023    EGFRIFAFRI 86 10/08/2021    EGFRIFNONA 71 10/08/2021    BCR 11.8 05/19/2023    ANIONGAP 10.4 05/19/2023     Lab Results    Component Value Date    WBC 6.60 05/19/2023    RBC 4.02 05/19/2023    HGB 11.3 (L) 05/19/2023    HCT 34.4 05/19/2023    MCV 85.6 05/19/2023    MCH 28.1 05/19/2023    MCHC 32.8 05/19/2023    RDW 12.4 05/19/2023    RDWSD 38.8 05/19/2023    MPV 9.7 05/19/2023     05/19/2023    NEUTRORELPCT 47.5 05/19/2023    LYMPHORELPCT 40.2 05/19/2023    MONORELPCT 6.7 05/19/2023    EOSRELPCT 4.5 05/19/2023    BASORELPCT 0.8 05/19/2023    AUTOIGPER 0.3 05/19/2023    NEUTROABS 3.14 05/19/2023    LYMPHSABS 2.65 05/19/2023    MONOSABS 0.44 05/19/2023    EOSABS 0.30 05/19/2023    BASOSABS 0.05 05/19/2023    AUTOIGNUM 0.02 05/19/2023    NRBC 0.0 05/19/2023       The above labs have been reviewed. No dose adjustments are needed for the oral specialty medication(s) based on the labs.    Initial Education Provided for Specialty Medication  The patient has been provided with the following education. All questions and concerns have been addressed prior to the patient receiving the medication, and the patient has verbalized understanding of the education and any materials provided.  Additional patient education shall be provided and documented upon request by the patient, provider or payer.      Provided patient with:   Education sheets about the medication, 24-hour clinic phone number and my contact information and instructions to call should additional questions arise.     Medication Education Sheets Provided: (select all that apply)  • Oral Specialty Medication: Votrient (pazopanib)          TOPICS COMMENTS   Storage and Handling of Oral Specialty Medication Store in the original container, in a dry location out of direct sunlight, and out of reach of children or pets. and Store at room temperature.  Discussed safe handling and what to do with any unused medication.   Administration of Oral Specialty Medication Take on an empty stomach, 1 hour(s) before and 2 hour(s) after eating.   Adherence to Oral Specialty Regimen and Handling  Missed Doses Patient is likely to have good treatment adherence; reinforced the importance of adherence. Reviewed how to address missed doses and to let us know of any missed doses.   Anemia: role of RBC, cause, s/s, ways to manage, role of transfusion Reviewed the role of RBC and the use of transfusions if hemoglobin decreases too much.  Patient to notify us if they experience shortness of breath, dizziness, or palpitations.  Also let patient know they could feel more tired than usual and to try to stay active, but rest if they need to.    Thrombocytopenia: role of platelet, cause, s/s, ways to prevent bleeding, things to avoid, when to seek help Reviewed the role of platelets in blood clotting and when to call clinic (bloody nose that bleeds for 5 mins despite pressure, a cut that won't stop bleeding despite pressure, gums that bleed excessively with brushing or flossing). Recommended using an electric razor, soft bristle toothbrush, and blowing your nose gently.    Neutropenia: role of WBC, cause, infection precautions, s/s of infection, when to call MD Reviewed the role of WBC, good infection prevention practices, and when to call the clinic (temperature 100.4F, sore throat, burning urination, etc)   Nutrition and Appetite Changes:  importance of maintaining healthy diet & weight, ways to manage to improve intake, dietary consult, exercise regimen, electrolyte and/or blood glucose abnormalities Decreased Appetite: Discussed the risk of decreased appetite. Recommended eating smaller, more frequent meals. Instructed the patient to contact the clinic if losing weight or having difficulty eating enough to maintain energy level.   Diarrhea: causes, s/s of dehydration, ways to manage, dietary changes, when to call MD Monitor how many bowel movements you have each day.   • Drink 8-10 glasses of water or fluid each day unless your care provider has instructed you to   limit your fluid intake because of some other health  problem.  • Eat small, frequent meals throughout the day rather than a few large meals.   • Eat bland, low-fiber foods (e.g., bananas, applesauce, potatoes, chicken, rice, toast).  • Avoid high-fiber foods (e.g., raw vegetables, raw fruits, whole grains).  • Avoid foods that cause gas (e.g., broccoli, beans).   • Avoid lactose-containing foods (e.g., yogurt, milk).   • Avoid spicy, fried, and greasy foods.   Contact your provider if any of the following occur:   • The number of bowel movements you have in a day increases by four or more.   • You feel dizzy or lightheaded.  Your care provider may recommend an over-the-counter medication called loperamide   (Imodium®) to help with your diarrhea, but talk to your care provider before starting this   medication.   Nausea/Vomiting: cause, use of antiemetics, dietary changes, when to call MD • Emetic risk: Low-Minimal  • PRN home meds: Ondansetron  • Pharmacy home meds sent to: [facility]    Instructed the patient to take a dose of the PRN medication at the first onset of nausea and if it's not working to call us for additional medications.  Also provided non-drug measures to mitigate nausea.   Changes in   electrolytes and other   laboratory values  • High glucose levels  • Low albumin levels  • Low phosphate levels  • Low sodium levels Changes in some laboratory values may occur and should be monitored by a simple blood test.   • You may not feel any symptoms if the changes are mild, and they usually are not a sign of a   serious problem.  • More severe changes may occur, which can be a sign of a serious problem.  Notify your care provider if you have any of the following:   • Shortness of breath  • Chest discomfort  • Weakness or fatigue  • New aches and pains  • Headaches  • Dizziness  • Swelling of your legs or feet  • Red- or brown-colored urine   Fatigue You may be more tired than usual or have less energy.  • Stay as active as possible, but know it is okay to rest  as needed.  • Try to do some activity every day.  • Plan your activities, and do them at a time of day when you feel a bit more energetic.  • Avoid operating heavy machinery if you feel too tired   Hair color changes: cause, ways to manage, resources Changes to your hair color may occur during treatment. The hair usually returns to normal after   treatment; for some, the change is permanent.   Organ Toxicities: cause, s/s, need for diagnostic tests, labs, when to notify MD Discussed potential effects on organ systems, monitoring, diagnostic tests, labs, and when to notify their MD. Discussed the signs/symptoms of the following: cardiotoxicity, GI perforation, hepatotoxicity, hormone gland changes (most commonly the thyroid), hypertension - and explained how to track values, lung changes and nephrotoxicity   Miscellaneous • Financial Issues: Copay is $0 at [facility]  • Lab Draws: per Dr Molina's directives  • Miscellaneous: Fluid Retention: Explained the signs/symptoms of fluid retention around ankles, feet, and possibly in the hands.  Reviewed strategies to minimize this and recommended that the patient call the clinic if there is a gain of 5 or more pounds in 1 week or experiences shortness of breath without exertion  and Blood Clots: Explained the rare, but possible risk of DVT or PE.  Reviewed the signs and symptoms of VTE and stressed the urgency to call 911 immediately.   Infertility and Sexuality:  causes, fertility preservation options, sexuality changes, ways to manage, importance of birth control Oral Oncology Therapy: Reviewed safe sex practices and the importance of minimizing exposure to body fluids while on oral oncology therapy.   Home Care: how to manage bodily fluids Counseled on management of soiled linens and proper flush technique.  Discussed how to manage all the side effects at home and advised when to contact the MD office       Be aware of changes in the electrical activity of your heart,  called QT prolongation. Tell your healthcare provider right away   if you feel faint, lightheaded, or dizzy, or if you feel your heart beating irregularly or fast, while taking pazopanib.  o Pazopanib can cause heart failure. Report symptoms related to swelling and shortness of breath to your healthcare   provider.  o Pazopanib can increase your risk of having a heart attack. Be aware of the signs of a heart attack and seek medical   attention immediately at the first sign of a heart attack.  o Pazopanib can increase your risk of having a stroke. Be aware of the signs of a stroke and seek medical attention   immediately at the first sign of a stroke.  o A condition called reversible posterior leukoencephalopathy syndrome can occur while taking pazopanib. Call your   healthcare provider immediately if you have headaches, seizures, confusion or changes in vision.  o If you need to have a surgical or dental procedure, tell your doctor that you are taking pazopanib. Pazopanib may need to   be stopped until your wound heals after some types of surgery.  o Pazopanib may rarely cause a tear or hole in your intestine called bowel perforation. Speak to your care team right away if   you have a high fever, nausea, vomiting, or severe stomach pain.   o Pazopanib may cause problems with your thyroid. Speak to your care team to know when you need to have blood tests to   monitor your thyroid. Your provider may prescribe medication to keep your thyroid working properly.   o Your care provider will monitor your kidney function by checking the amount of protein in your urine.   o Pazopanib may cause your lung tissue to scar, which is called interstitial lung disease. Call your care team if you are feeling   short of breath, have a fever, or have a lasting dry cough.      Adherence and Self-Administration  • Barriers to Patient Adherence and/or Self-Administration: none  • Methods for Supporting Patient Adherence and/or  Self-Administration: directed education  • Expected duration of therapy: Until disease progression or intolerable toxicity    Goals of Therapy  • Patient Goals of Therapy:   o Consistently take medications as prescribed  o Patient will adhere to medication regimen  o Patient will report any medication side effects to healthcare provider  • Clinical Goals:    Goals     • Specialty Pharmacy General Goal      Control disease          o Support patient understanding of medication regimen  o Ensure patient knows the pharmacy contact information  o Schedule regular follow-up to monitor the treatment serious adverse events  o Schedule regular follow-up to confirm medication adherence  o Schedule regular follow-up to monitor disease progression or stability    Quality of Life Assessment   The patient's current (pre-therapy) quality of life was discussed with the patient. The QOL segment of this outreach has been reviewed and updated.   • Quality of Life Score: 8/10    Reassessment Plan & Follow-Up  1. Pharmacist to perform regular reassessments no more than (6) months from the previous assessment.  2. Welcome information and patient satisfaction survey to be sent by retail team with patient's initial fill.  3. Care Coordinator to set up future refill outreaches, coordinate prescription delivery, and escalate clinical questions to pharmacist.     Additional Plans, Therapy Recommendations or Therapy Problems to Be Addressed: none     CCA and consents were signed today.    Attestation  I attest that the initiated specialty medication(s) are appropriate for the patient based on my assessment.  If the prescribed therapy is at any point deemed not appropriate based on the current or future assessments, a consultation will be initiated with the patient's specialty care provider to determine the best course of action. The revised plan of therapy will be documented along with any additional patient education provided.      Name/Credentials Edith Chen, Lucero, ALEKSANDAR  In person encounter    Date and Time: 5/24/2023  13:25 EDT

## 2023-05-24 NOTE — PROGRESS NOTES
Specialty Pharmacy Initial Fill Coordination Note     Beena is a 32 y.o. female contacted today regarding the initial fill of  VOTRIENT specialty medication(s).    Reviewed and verified with patient:       Specialty medication(s) and dose(s) confirmed: yes (during her education)        Delivery Questions    Flowsheet Row Most Recent Value   Delivery method Other (Comment)  [shp sig-required to the home on 5/25 to arrive 5/26. Address confirmed. $0 co-pay.]   Delivery address correct? Yes   Delivery phone number [phone]   Preferred delivery time? Anytime   Number of medications in delivery 1   Medication being filled and delivered VOTRIENT   Doses left of specialty medications N/A - [facility]   Is there any medication that is due not being filled? No   Supplies needed? No supplies needed   Cooler needed? No   Do any medications need mixed or dated? No   Copay form of payment Payment plan already set up   Additional comments N/A   Questions or concerns for the pharmacist? No   Explain any questions or concerns for the pharmacist N/A   Are any medications first time fills? Yes  [VOTRIENT - [facility]]                 Follow-up: 23 day(s)     Sarah Bryson, Pharmacy Technician  Specialty Pharmacy Technician

## 2023-06-01 ENCOUNTER — SPECIALTY PHARMACY (OUTPATIENT)
Dept: PHARMACY | Facility: HOSPITAL | Age: 33
End: 2023-06-01

## 2023-06-01 NOTE — PROGRESS NOTES
Specialty Npte ( Votrient)    Beena called the [facility] office today to report ongoing fatigue that is impeding her ability to care for her 3 small children after starting Votrient 800 mg po daily last week.  .  She stated that she has noted the tumor is shrinking in size, and wants to keep taking the medication- she is wondering if an additional medication to improve energy levels could be added.   She stated she has had a few loose stools and some mild nausea, but both were manageable for her.  In basket message sent to Dr Molina for further directives.

## 2023-06-02 ENCOUNTER — SPECIALTY PHARMACY (OUTPATIENT)
Dept: PHARMACY | Facility: HOSPITAL | Age: 33
End: 2023-06-02

## 2023-06-05 ENCOUNTER — SPECIALTY PHARMACY (OUTPATIENT)
Dept: PHARMACY | Facility: HOSPITAL | Age: 33
End: 2023-06-05
Payer: COMMERCIAL

## 2023-06-08 ENCOUNTER — HOSPITAL ENCOUNTER (OUTPATIENT)
Facility: HOSPITAL | Age: 33
Setting detail: OBSERVATION
Discharge: HOME | End: 2023-06-10
Attending: EMERGENCY MEDICINE | Admitting: INTERNAL MEDICINE
Payer: COMMERCIAL

## 2023-06-08 ENCOUNTER — SPECIALTY PHARMACY (OUTPATIENT)
Dept: PHARMACY | Facility: HOSPITAL | Age: 33
End: 2023-06-08
Payer: COMMERCIAL

## 2023-06-08 ENCOUNTER — APPOINTMENT (OUTPATIENT)
Dept: CT IMAGING | Facility: HOSPITAL | Age: 33
End: 2023-06-08
Payer: COMMERCIAL

## 2023-06-08 DIAGNOSIS — R19.7 NAUSEA VOMITING AND DIARRHEA: ICD-10-CM

## 2023-06-08 DIAGNOSIS — D72.829 LEUKOCYTOSIS, UNSPECIFIED TYPE: ICD-10-CM

## 2023-06-08 DIAGNOSIS — R10.84 GENERALIZED ABDOMINAL PAIN: Primary | ICD-10-CM

## 2023-06-08 DIAGNOSIS — R11.2 NAUSEA VOMITING AND DIARRHEA: ICD-10-CM

## 2023-06-08 DIAGNOSIS — N17.9 AKI (ACUTE KIDNEY INJURY): ICD-10-CM

## 2023-06-08 LAB
ALBUMIN SERPL-MCNC: 5.4 G/DL (ref 3.5–5.2)
ALBUMIN/GLOB SERPL: 1.5 G/DL
ALP SERPL-CCNC: 71 U/L (ref 39–117)
ALT SERPL W P-5'-P-CCNC: 21 U/L (ref 1–33)
ANION GAP SERPL CALCULATED.3IONS-SCNC: 16.9 MMOL/L (ref 5–15)
AST SERPL-CCNC: 24 U/L (ref 1–32)
BACTERIA UR QL AUTO: ABNORMAL /HPF
BASOPHILS # BLD AUTO: 0.09 10*3/MM3 (ref 0–0.2)
BASOPHILS NFR BLD AUTO: 0.4 % (ref 0–1.5)
BILIRUB SERPL-MCNC: 1.5 MG/DL (ref 0–1.2)
BILIRUB UR QL STRIP: NEGATIVE
BUN SERPL-MCNC: 13 MG/DL (ref 6–20)
BUN/CREAT SERPL: 10.7 (ref 7–25)
CALCIUM SPEC-SCNC: 10.8 MG/DL (ref 8.6–10.5)
CHLORIDE SERPL-SCNC: 99 MMOL/L (ref 98–107)
CLARITY UR: ABNORMAL
CO2 SERPL-SCNC: 22.1 MMOL/L (ref 22–29)
COLOR UR: ABNORMAL
CREAT SERPL-MCNC: 1.22 MG/DL (ref 0.57–1)
DEPRECATED RDW RBC AUTO: 41.8 FL (ref 37–54)
EGFRCR SERPLBLD CKD-EPI 2021: 61.4 ML/MIN/1.73
EOSINOPHIL # BLD AUTO: 0.21 10*3/MM3 (ref 0–0.4)
EOSINOPHIL NFR BLD AUTO: 0.9 % (ref 0.3–6.2)
ERYTHROCYTE [DISTWIDTH] IN BLOOD BY AUTOMATED COUNT: 14 % (ref 12.3–15.4)
GLOBULIN UR ELPH-MCNC: 3.5 GM/DL
GLUCOSE SERPL-MCNC: 126 MG/DL (ref 65–99)
GLUCOSE UR STRIP-MCNC: NEGATIVE MG/DL
HCG SERPL QL: NEGATIVE
HCT VFR BLD AUTO: 49.9 % (ref 34–46.6)
HGB BLD-MCNC: 17.2 G/DL (ref 12–15.9)
HGB UR QL STRIP.AUTO: ABNORMAL
HYALINE CASTS UR QL AUTO: ABNORMAL /LPF
IMM GRANULOCYTES # BLD AUTO: 0.11 10*3/MM3 (ref 0–0.05)
IMM GRANULOCYTES NFR BLD AUTO: 0.5 % (ref 0–0.5)
KETONES UR QL STRIP: ABNORMAL
LEUKOCYTE ESTERASE UR QL STRIP.AUTO: NEGATIVE
LIPASE SERPL-CCNC: 25 U/L (ref 13–60)
LYMPHOCYTES # BLD AUTO: 3.18 10*3/MM3 (ref 0.7–3.1)
LYMPHOCYTES NFR BLD AUTO: 14.1 % (ref 19.6–45.3)
MCH RBC QN AUTO: 28.7 PG (ref 26.6–33)
MCHC RBC AUTO-ENTMCNC: 34.5 G/DL (ref 31.5–35.7)
MCV RBC AUTO: 83.3 FL (ref 79–97)
MONOCYTES # BLD AUTO: 1.03 10*3/MM3 (ref 0.1–0.9)
MONOCYTES NFR BLD AUTO: 4.6 % (ref 5–12)
NEUTROPHILS NFR BLD AUTO: 18.01 10*3/MM3 (ref 1.7–7)
NEUTROPHILS NFR BLD AUTO: 79.5 % (ref 42.7–76)
NITRITE UR QL STRIP: POSITIVE
NRBC BLD AUTO-RTO: 0 /100 WBC (ref 0–0.2)
PH UR STRIP.AUTO: 5.5 [PH] (ref 5–8)
PLATELET # BLD AUTO: 283 10*3/MM3 (ref 140–450)
PMV BLD AUTO: 10.6 FL (ref 6–12)
POTASSIUM SERPL-SCNC: 4.2 MMOL/L (ref 3.5–5.2)
PROT SERPL-MCNC: 8.9 G/DL (ref 6–8.5)
PROT UR QL STRIP: ABNORMAL
RBC # BLD AUTO: 5.99 10*6/MM3 (ref 3.77–5.28)
RBC # UR STRIP: ABNORMAL /HPF
REF LAB TEST METHOD: ABNORMAL
SODIUM SERPL-SCNC: 138 MMOL/L (ref 136–145)
SP GR UR STRIP: >=1.03 (ref 1–1.03)
SQUAMOUS #/AREA URNS HPF: ABNORMAL /HPF
UROBILINOGEN UR QL STRIP: ABNORMAL
WBC # UR STRIP: ABNORMAL /HPF
WBC NRBC COR # BLD: 22.63 10*3/MM3 (ref 3.4–10.8)

## 2023-06-08 PROCEDURE — 96376 TX/PRO/DX INJ SAME DRUG ADON: CPT

## 2023-06-08 PROCEDURE — 25010000002 ONDANSETRON PER 1 MG: Performed by: PHYSICIAN ASSISTANT

## 2023-06-08 PROCEDURE — 25010000002 MORPHINE PER 10 MG: Performed by: NURSE PRACTITIONER

## 2023-06-08 PROCEDURE — 83690 ASSAY OF LIPASE: CPT | Performed by: PHYSICIAN ASSISTANT

## 2023-06-08 PROCEDURE — 99285 EMERGENCY DEPT VISIT HI MDM: CPT

## 2023-06-08 PROCEDURE — G0378 HOSPITAL OBSERVATION PER HR: HCPCS

## 2023-06-08 PROCEDURE — 96375 TX/PRO/DX INJ NEW DRUG ADDON: CPT

## 2023-06-08 PROCEDURE — 96361 HYDRATE IV INFUSION ADD-ON: CPT

## 2023-06-08 PROCEDURE — 74177 CT ABD & PELVIS W/CONTRAST: CPT

## 2023-06-08 PROCEDURE — 25010000002 MORPHINE PER 10 MG: Performed by: EMERGENCY MEDICINE

## 2023-06-08 PROCEDURE — 84703 CHORIONIC GONADOTROPIN ASSAY: CPT | Performed by: PHYSICIAN ASSISTANT

## 2023-06-08 PROCEDURE — 96374 THER/PROPH/DIAG INJ IV PUSH: CPT

## 2023-06-08 PROCEDURE — 81001 URINALYSIS AUTO W/SCOPE: CPT | Performed by: PHYSICIAN ASSISTANT

## 2023-06-08 PROCEDURE — 85025 COMPLETE CBC W/AUTO DIFF WBC: CPT | Performed by: PHYSICIAN ASSISTANT

## 2023-06-08 PROCEDURE — 80053 COMPREHEN METABOLIC PANEL: CPT | Performed by: PHYSICIAN ASSISTANT

## 2023-06-08 PROCEDURE — 25510000001 IOPAMIDOL 61 % SOLUTION: Performed by: EMERGENCY MEDICINE

## 2023-06-08 RX ORDER — ACETAMINOPHEN 160 MG/5ML
650 SOLUTION ORAL EVERY 4 HOURS PRN
Status: DISCONTINUED | OUTPATIENT
Start: 2023-06-08 | End: 2023-06-10 | Stop reason: HOSPADM

## 2023-06-08 RX ORDER — AMOXICILLIN 250 MG
2 CAPSULE ORAL 2 TIMES DAILY
Status: DISCONTINUED | OUTPATIENT
Start: 2023-06-08 | End: 2023-06-10 | Stop reason: HOSPADM

## 2023-06-08 RX ORDER — ONDANSETRON 2 MG/ML
4 INJECTION INTRAMUSCULAR; INTRAVENOUS EVERY 6 HOURS PRN
Status: DISCONTINUED | OUTPATIENT
Start: 2023-06-08 | End: 2023-06-10 | Stop reason: HOSPADM

## 2023-06-08 RX ORDER — POLYETHYLENE GLYCOL 3350 17 G/17G
17 POWDER, FOR SOLUTION ORAL DAILY PRN
Status: DISCONTINUED | OUTPATIENT
Start: 2023-06-08 | End: 2023-06-10 | Stop reason: HOSPADM

## 2023-06-08 RX ORDER — BISACODYL 5 MG/1
5 TABLET, DELAYED RELEASE ORAL DAILY PRN
Status: DISCONTINUED | OUTPATIENT
Start: 2023-06-08 | End: 2023-06-10 | Stop reason: HOSPADM

## 2023-06-08 RX ORDER — PROCHLORPERAZINE MALEATE 10 MG
10 TABLET ORAL EVERY 6 HOURS PRN
Status: DISCONTINUED | OUTPATIENT
Start: 2023-06-08 | End: 2023-06-10 | Stop reason: HOSPADM

## 2023-06-08 RX ORDER — PROCHLORPERAZINE MALEATE 10 MG
10 TABLET ORAL EVERY 6 HOURS PRN
Qty: 30 TABLET | Refills: 3 | Status: SHIPPED | OUTPATIENT
Start: 2023-06-08

## 2023-06-08 RX ORDER — SODIUM CHLORIDE 9 MG/ML
125 INJECTION, SOLUTION INTRAVENOUS CONTINUOUS
Status: DISCONTINUED | OUTPATIENT
Start: 2023-06-08 | End: 2023-06-10 | Stop reason: HOSPADM

## 2023-06-08 RX ORDER — BISACODYL 10 MG
10 SUPPOSITORY, RECTAL RECTAL DAILY PRN
Status: DISCONTINUED | OUTPATIENT
Start: 2023-06-08 | End: 2023-06-10 | Stop reason: HOSPADM

## 2023-06-08 RX ORDER — ACETAMINOPHEN 325 MG/1
650 TABLET ORAL EVERY 4 HOURS PRN
Status: DISCONTINUED | OUTPATIENT
Start: 2023-06-08 | End: 2023-06-10 | Stop reason: HOSPADM

## 2023-06-08 RX ORDER — MORPHINE SULFATE 2 MG/ML
2 INJECTION, SOLUTION INTRAMUSCULAR; INTRAVENOUS
Status: DISCONTINUED | OUTPATIENT
Start: 2023-06-08 | End: 2023-06-10 | Stop reason: HOSPADM

## 2023-06-08 RX ORDER — FAMOTIDINE 10 MG/ML
20 INJECTION, SOLUTION INTRAVENOUS ONCE
Status: COMPLETED | OUTPATIENT
Start: 2023-06-08 | End: 2023-06-08

## 2023-06-08 RX ORDER — ALBUTEROL SULFATE 2.5 MG/3ML
2.5 SOLUTION RESPIRATORY (INHALATION) EVERY 6 HOURS PRN
Status: DISCONTINUED | OUTPATIENT
Start: 2023-06-08 | End: 2023-06-10 | Stop reason: HOSPADM

## 2023-06-08 RX ORDER — MORPHINE SULFATE 2 MG/ML
4 INJECTION, SOLUTION INTRAMUSCULAR; INTRAVENOUS ONCE
Status: COMPLETED | OUTPATIENT
Start: 2023-06-08 | End: 2023-06-08

## 2023-06-08 RX ORDER — PRENATAL VIT/IRON FUM/FOLIC AC 27MG-0.8MG
1 TABLET ORAL DAILY
Status: DISCONTINUED | OUTPATIENT
Start: 2023-06-09 | End: 2023-06-10 | Stop reason: HOSPADM

## 2023-06-08 RX ORDER — ONDANSETRON 2 MG/ML
4 INJECTION INTRAMUSCULAR; INTRAVENOUS ONCE
Status: COMPLETED | OUTPATIENT
Start: 2023-06-08 | End: 2023-06-08

## 2023-06-08 RX ORDER — ACETAMINOPHEN 650 MG/1
650 SUPPOSITORY RECTAL EVERY 4 HOURS PRN
Status: DISCONTINUED | OUTPATIENT
Start: 2023-06-08 | End: 2023-06-10 | Stop reason: HOSPADM

## 2023-06-08 RX ADMIN — SODIUM CHLORIDE 125 ML/HR: 9 INJECTION, SOLUTION INTRAVENOUS at 22:31

## 2023-06-08 RX ADMIN — MORPHINE SULFATE 4 MG: 2 INJECTION, SOLUTION INTRAMUSCULAR; INTRAVENOUS at 18:18

## 2023-06-08 RX ADMIN — ONDANSETRON 4 MG: 2 INJECTION INTRAMUSCULAR; INTRAVENOUS at 15:25

## 2023-06-08 RX ADMIN — IOPAMIDOL 85 ML: 612 INJECTION, SOLUTION INTRAVENOUS at 16:07

## 2023-06-08 RX ADMIN — MORPHINE SULFATE 2 MG: 2 INJECTION, SOLUTION INTRAMUSCULAR; INTRAVENOUS at 23:44

## 2023-06-08 RX ADMIN — FAMOTIDINE 20 MG: 10 INJECTION INTRAVENOUS at 15:25

## 2023-06-08 RX ADMIN — MORPHINE SULFATE 4 MG: 2 INJECTION, SOLUTION INTRAMUSCULAR; INTRAVENOUS at 15:41

## 2023-06-08 RX ADMIN — SODIUM CHLORIDE 1000 ML: 9 INJECTION, SOLUTION INTRAVENOUS at 15:25

## 2023-06-08 RX ADMIN — SODIUM CHLORIDE 1000 ML: 9 INJECTION, SOLUTION INTRAVENOUS at 18:14

## 2023-06-08 NOTE — ED NOTES
"Nursing report ED to floor  Beena Vincent  32 y.o.  female    HPI :   Chief Complaint   Patient presents with    Vomiting    Nausea       Admitting doctor:   Soo Mast MD    Admitting diagnosis:   The primary encounter diagnosis was Generalized abdominal pain. Diagnoses of Nausea vomiting and diarrhea, LOUIE (acute kidney injury), and Leukocytosis, unspecified type were also pertinent to this visit.    Code status:   Current Code Status       Date Active Code Status Order ID Comments User Context       6/8/2023 1852 CPR (Attempt to Resuscitate) [id number]  Soo Mast MD ED        Question Answer    Code Status (Patient has no pulse and is not breathing) CPR (Attempt to Resuscitate)    Medical Interventions (Patient has pulse or is breathing) Full Support                    Allergies:   Penicillins and Adhesive tape    Isolation:   No active isolations    Intake and Output  No intake or output data in the 24 hours ending 06/08/23 1912    Weight:       06/08/23  1527   Weight: 77.1 kg (170 lb)       Most recent vitals:   Vitals:    06/08/23 1516 06/08/23 1527 06/08/23 1528 06/08/23 1716   BP: 142/100   121/80   Pulse:   90 57   Resp:       Temp:       SpO2:   94% 97%   Weight:  77.1 kg (170 lb)     Height:  177.8 cm (70\")         Active LDAs/IV Access:   Lines, Drains & Airways       Active LDAs       Name Placement date Placement time Site Days    Peripheral IV 06/08/23 1524 Right Antecubital 06/08/23  1524  Antecubital  less than 1                    Labs (abnormal labs have a star):   Labs Reviewed   COMPREHENSIVE METABOLIC PANEL - Abnormal; Notable for the following components:       Result Value    Glucose 126 (*)     Creatinine 1.22 (*)     Calcium 10.8 (*)     Total Protein 8.9 (*)     Albumin 5.4 (*)     Total Bilirubin 1.5 (*)     Anion Gap 16.9 (*)     All other components within normal limits    Narrative:     GFR Normal >60  Chronic Kidney Disease <60  Kidney Failure <15     URINALYSIS " W/ MICROSCOPIC IF INDICATED (NO CULTURE) - Abnormal; Notable for the following components:    Color, UA Dark Yellow (*)     Appearance, UA Slightly Cloudy (*)     Ketones, UA 15 mg/dL (1+) (*)     Blood, UA Trace (*)     Protein,  mg/dL (2+) (*)     Nitrite, UA Positive (*)     All other components within normal limits   CBC WITH AUTO DIFFERENTIAL - Abnormal; Notable for the following components:    WBC 22.63 (*)     RBC 5.99 (*)     Hemoglobin 17.2 (*)     Hematocrit 49.9 (*)     Neutrophil % 79.5 (*)     Lymphocyte % 14.1 (*)     Monocyte % 4.6 (*)     Neutrophils, Absolute 18.01 (*)     Lymphocytes, Absolute 3.18 (*)     Monocytes, Absolute 1.03 (*)     Immature Grans, Absolute 0.11 (*)     All other components within normal limits   URINALYSIS, MICROSCOPIC ONLY - Abnormal; Notable for the following components:    Bacteria, UA Trace (*)     All other components within normal limits   LIPASE - Normal   HCG, SERUM, QUALITATIVE - Normal   GASTROINTESTINAL PANEL, PCR (PREFERRED) DOES NOT INCLUDE CDIFF   CLOSTRIDIOIDES DIFFICILE TOXIN    Narrative:     The following orders were created for panel order Clostridioides difficile Toxin - Stool, Per Rectum.  Procedure                               Abnormality         Status                     ---------                               -----------         ------                     Clostridioides difficile...[[id number]]                                                   Please view results for these tests on the individual orders.   CLOSTRIDIOIDES DIFFICILE TOXIN, PCR   CBC AND DIFFERENTIAL    Narrative:     The following orders were created for panel order CBC & Differential.  Procedure                               Abnormality         Status                     ---------                               -----------         ------                     CBC Auto Differential[[id number]]        Abnormal            Final result                 Please view results for these tests  on the individual orders.       EKG:   No orders to display       Meds given in ED:   Medications   acetaminophen (TYLENOL) tablet 650 mg (has no administration in time range)     Or   acetaminophen (TYLENOL) 160 MG/5ML solution 650 mg (has no administration in time range)     Or   acetaminophen (TYLENOL) suppository 650 mg (has no administration in time range)   sennosides-docusate (PERICOLACE) 8.6-50 MG per tablet 2 tablet (has no administration in time range)     And   polyethylene glycol (MIRALAX) packet 17 g (has no administration in time range)     And   bisacodyl (DULCOLAX) EC tablet 5 mg (has no administration in time range)     And   bisacodyl (DULCOLAX) suppository 10 mg (has no administration in time range)   ondansetron (ZOFRAN) injection 4 mg (has no administration in time range)   sodium chloride 0.9 % bolus 1,000 mL (1,000 mL Intravenous New Bag 6/8/23 1525)   ondansetron (ZOFRAN) injection 4 mg (4 mg Intravenous Given 6/8/23 1525)   famotidine (PEPCID) injection 20 mg (20 mg Intravenous Given 6/8/23 1525)   morphine injection 4 mg (4 mg Intravenous Given 6/8/23 1541)   iopamidol (ISOVUE-300) 61 % injection 100 mL (85 mL Intravenous Given 6/8/23 1607)   morphine injection 4 mg (4 mg Intravenous Given 6/8/23 1818)   sodium chloride 0.9 % bolus 1,000 mL (1,000 mL Intravenous New Bag 6/8/23 1814)       Imaging results:  CT Abdomen Pelvis With Contrast    Result Date: 6/8/2023  1. Fluid-filled colon with no evidence for colitis. Please correlate clinically for gastroenteritis. 2. New tiny midline upper abdominal ventral hernia containing fat.  This report was finalized on 6/8/2023 6:34 PM by Dr. Dalila Fontana M.D.       Ambulatory status:   Partial assist    Social issues:   Social History     Socioeconomic History    Marital status: Single   Tobacco Use    Smoking status: Former     Packs/day: 0.50     Years: 4.00     Pack years: 2.00     Types: Cigarettes     Quit date: 2/8/2020     Years since quitting:  3.3     Passive exposure: Never    Smokeless tobacco: Never   Vaping Use    Vaping Use: Former    Substances: Nicotine    Devices: Disposable   Substance and Sexual Activity    Alcohol use: Not Currently     Comment: socially    Drug use: Not Currently     Types: Marijuana     Comment: daily, did not smoke during pregnancy    Sexual activity: Not Currently       NIH Stroke Scale:         Arlyn Palomo RN  06/08/23 19:12 EDT

## 2023-06-08 NOTE — ED PROVIDER NOTES
EMERGENCY DEPARTMENT ENCOUNTER    Room Number:  P777/1  Date seen:  2023  PCP: Sissy Garibay MD  Discussed/ obtained information from independent historians: patient      HPI:  Chief Complaint: abdominal pain, vomiting/diarrhea  A complete HPI/ROS/PMH/PSH/SH/FH are unobtainable due to: none  Context: Beena Vincent is a 32 y.o. female who presents to the ED via EMS c/o acute nausea vomiting diarrhea and upper abdominal pain that started today a.m. and is constant.  Too numerous to count episodes of vomiting and diarrhea, emesis and stools nonbloody and nonbilious.  She denies any fevers chills or upper respiratory symptoms, no one else in the household is ill.  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      External (non-ED) record review: Patient called into her specialty pharmacist due to her symptoms today.  It was noted that she has been on an reduced dose of Votrient for the last 1 week.  She was encouraged to take Zofran and Imodium and Compazine was called into her pharmacy.  She did not improve and was referred to the ER for further evaluation.      PAST MEDICAL HISTORY  Active Ambulatory Problems     Diagnosis Date Noted    KYRA III (cervical intraepithelial neoplasia grade III) with severe dysplasia 2019    Gestational HTN 2020     (normal spontaneous vaginal delivery) 2020    Asthma exacerbation 2017    Anxiety with depression 2020    Postpartum depression 2020    Generalized anxiety disorder 2020    History of anemia 2020    Acute appendicitis with localized peritonitis, without perforation, abscess, or gangrene 10/01/2021    Soft tissue mass 2022    Gestational diabetes 2023    Desmoid tumor 2023     Resolved Ambulatory Problems     Diagnosis Date Noted    Edema of lower extremity, antepartum, third trimester 2020    Anemia affecting pregnancy in third trimester  06/29/2020    Pregnancy 08/07/2020    Medial epicondylitis of left elbow 05/30/2013     Past Medical History:   Diagnosis Date    Asthma     Cervical dysplasia     Depression     Gestational hypertension 2020    Iron deficiency anemia     Preeclampsia 2012         PAST SURGICAL HISTORY  Past Surgical History:   Procedure Laterality Date    APPENDECTOMY N/A 10/1/2021    Procedure: APPENDECTOMY LAPAROSCOPIC;  Surgeon: Ahmet Dong Jr., MD;  Location: [facility] MAIN OR;  Service: General;  Laterality: N/A;    KNEE ARTHROSCOPY      AGE 6    LEEP N/A 4/25/2019    Procedure: LOOP ELECTROCAUTERY EXCISION PROCEDURE;  Surgeon: Arsh Ray MD;  Location: [facility] OR [facility];  Service: Obstetrics/Gynecology    SOFT TISSUE BIOPSY Right 3/23/2022    Procedure: BIOPSY SOFT TISSUE THIGH / KNEE;  Surgeon: Chris Randall MD;  Location: [facility] OR;  Service: Orthopedics;  Laterality: Right;    WISDOM TOOTH EXTRACTION           FAMILY HISTORY  Family History   Problem Relation Age of Onset    Diabetes Father     Arthritis Maternal Grandmother     Lung cancer Maternal Grandfather     Heart attack Maternal Grandfather     Heart disease Maternal Grandfather     Stroke Maternal Grandfather     Hyperlipidemia Maternal Grandfather     Malig Hyperthermia Neg Hx     Breast cancer Neg Hx     Ovarian cancer Neg Hx     Uterine cancer Neg Hx     Colon cancer Neg Hx          SOCIAL HISTORY  Social History     Socioeconomic History    Marital status: Single   Tobacco Use    Smoking status: Former     Packs/day: 0.50     Years: 4.00     Pack years: 2.00     Types: Cigarettes     Quit date: 2/8/2020     Years since quitting: 3.3     Passive exposure: Never    Smokeless tobacco: Never   Vaping Use    Vaping Use: Former    Substances: Nicotine    Devices: Disposable   Substance and Sexual Activity    Alcohol use: Not Currently     Comment: socially    Drug use: Not Currently     Types: Marijuana     Comment: daily, did not smoke during pregnancy     Sexual activity: Not Currently         ALLERGIES  Penicillins and Adhesive tape        REVIEW OF SYSTEMS  Review of Systems         PHYSICAL EXAM  ED Triage Vitals [06/08/23 1456]   Temp Heart Rate Resp BP SpO2   97.2 °F (36.2 °C) 70 18 136/99 98 %      Temp src Heart Rate Source Patient Position BP Location FiO2 (%)   -- -- -- -- --       Physical Exam      GENERAL: no acute distress  HENT: normocephalic, atraumatic  EYES: no scleral icterus  CV: regular rhythm, normal rate  RESPIRATORY: normal effort CTA B  ABDOMEN: nondistended soft mild generalized tenderness, normal bowel sounds no guarding or rigidity, no CVA tenderness this  MUSCULOSKELETAL: no deformity  NEURO: alert, moves all extremities, follows commands  PSYCH:  calm, cooperative  SKIN: warm, dry    Vital signs and nursing notes reviewed.          LAB RESULTS  Recent Results (from the past 24 hour(s))   Comprehensive Metabolic Panel    Collection Time: 06/08/23  3:16 PM    Specimen: Blood   Result Value Ref Range    Glucose 126 (H) 65 - 99 mg/dL    BUN 13 6 - 20 mg/dL    Creatinine 1.22 (H) 0.57 - 1.00 mg/dL    Sodium 138 136 - 145 mmol/L    Potassium 4.2 3.5 - 5.2 mmol/L    Chloride 99 98 - 107 mmol/L    CO2 22.1 22.0 - 29.0 mmol/L    Calcium 10.8 (H) 8.6 - 10.5 mg/dL    Total Protein 8.9 (H) 6.0 - 8.5 g/dL    Albumin 5.4 (H) 3.5 - 5.2 g/dL    ALT (SGPT) 21 1 - 33 U/L    AST (SGOT) 24 1 - 32 U/L    Alkaline Phosphatase 71 39 - 117 U/L    Total Bilirubin 1.5 (H) 0.0 - 1.2 mg/dL    Globulin 3.5 gm/dL    A/G Ratio 1.5 g/dL    BUN/Creatinine Ratio 10.7 7.0 - 25.0    Anion Gap 16.9 (H) 5.0 - 15.0 mmol/L    eGFR 61.4 >60.0 mL/min/1.73   Lipase    Collection Time: 06/08/23  3:16 PM    Specimen: Blood   Result Value Ref Range    Lipase 25 13 - 60 U/L   hCG, Serum, Qualitative    Collection Time: 06/08/23  3:16 PM    Specimen: Blood   Result Value Ref Range    HCG Qualitative Negative Negative   CBC Auto Differential    Collection Time: 06/08/23  3:16 PM     Specimen: Blood   Result Value Ref Range    WBC 22.63 (H) 3.40 - 10.80 10*3/mm3    RBC 5.99 (H) 3.77 - 5.28 10*6/mm3    Hemoglobin 17.2 (H) 12.0 - 15.9 g/dL    Hematocrit 49.9 (H) 34.0 - 46.6 %    MCV 83.3 79.0 - 97.0 fL    MCH 28.7 26.6 - 33.0 pg    MCHC 34.5 31.5 - 35.7 g/dL    RDW 14.0 12.3 - 15.4 %    RDW-SD 41.8 37.0 - 54.0 fl    MPV 10.6 6.0 - 12.0 fL    Platelets 283 140 - 450 10*3/mm3    Neutrophil % 79.5 (H) 42.7 - 76.0 %    Lymphocyte % 14.1 (L) 19.6 - 45.3 %    Monocyte % 4.6 (L) 5.0 - 12.0 %    Eosinophil % 0.9 0.3 - 6.2 %    Basophil % 0.4 0.0 - 1.5 %    Immature Grans % 0.5 0.0 - 0.5 %    Neutrophils, Absolute 18.01 (H) 1.70 - 7.00 10*3/mm3    Lymphocytes, Absolute 3.18 (H) 0.70 - 3.10 10*3/mm3    Monocytes, Absolute 1.03 (H) 0.10 - 0.90 10*3/mm3    Eosinophils, Absolute 0.21 0.00 - 0.40 10*3/mm3    Basophils, Absolute 0.09 0.00 - 0.20 10*3/mm3    Immature Grans, Absolute 0.11 (H) 0.00 - 0.05 10*3/mm3    nRBC 0.0 0.0 - 0.2 /100 WBC   Urinalysis With Microscopic If Indicated (No Culture) - Urine, Clean Catch    Collection Time: 06/08/23  3:18 PM    Specimen: Urine, Clean Catch   Result Value Ref Range    Color, UA Dark Yellow (A) Yellow, Straw    Appearance, UA Slightly Cloudy (A) Clear    pH, UA 5.5 5.0 - 8.0    Specific Gravity, UA >=1.030 1.005 - 1.030    Glucose, UA Negative Negative    Ketones, UA 15 mg/dL (1+) (A) Negative    Bilirubin, UA Negative Negative    Blood, UA Trace (A) Negative    Protein,  mg/dL (2+) (A) Negative    Leuk Esterase, UA Negative Negative    Nitrite, UA Positive (A) Negative    Urobilinogen, UA 1.0 E.U./dL 0.2 - 1.0 E.U./dL   Urinalysis, Microscopic Only - Urine, Clean Catch    Collection Time: 06/08/23  3:18 PM    Specimen: Urine, Clean Catch   Result Value Ref Range    RBC, UA None Seen None Seen, 0-2 /HPF    WBC, UA 0-2 None Seen, 0-2 /HPF    Bacteria, UA Trace (A) None Seen /HPF    Squamous Epithelial Cells, UA 0-2 None Seen, 0-2 /HPF    Hyaline Casts, UA 0-2  None Seen /LPF    Methodology Automated Microscopy        Ordered the above labs and reviewed the results.        RADIOLOGY  CT Abdomen Pelvis With Contrast    Result Date: 6/8/2023  CT ABDOMEN AND PELVIS WITH IV CONTRAST  HISTORY: 32-year-old female with nausea and vomiting. Upper abdominal pain. Leukocytosis. Appendectomy in the past.  TECHNIQUE: Radiation dose reduction techniques were utilized, including automated exposure control and exposure modulation based on body size. 3 mm images were obtained through the abdomen and pelvis after the administration of IV contrast. Compared with previous CT 10/01/2021.  FINDINGS: The liver, gallbladder, spleen, pancreas, adrenals, and kidneys appear unremarkable. There is no formed stool within the colon which is either fluid-filled or collapsed. There is no definitive colonic thickening. There is also fluid-filled ileum without definitive thickening of the wall. There is no free fluid or lymphadenopathy. Uterus and adnexa appear unremarkable. There is a stable small periumbilical ventral hernia containing fat. There is a new tiny midline upper abdominal ventral hernia containing fat, series 2 image 42.      1. Fluid-filled colon with no evidence for colitis. Please correlate clinically for gastroenteritis. 2. New tiny midline upper abdominal ventral hernia containing fat.  This report was finalized on 6/8/2023 6:34 PM by Dr. Dalila Fontana M.D.       Ordered the above noted radiological studies. Reviewed by me in PACS.            PROCEDURES  Procedures              MEDICATIONS GIVEN IN ER  Medications   acetaminophen (TYLENOL) tablet 650 mg (has no administration in time range)     Or   acetaminophen (TYLENOL) 160 MG/5ML solution 650 mg (has no administration in time range)     Or   acetaminophen (TYLENOL) suppository 650 mg (has no administration in time range)   sennosides-docusate (PERICOLACE) 8.6-50 MG per tablet 2 tablet (2 tablets Oral Not Given 6/8/23 2230)     And    polyethylene glycol (MIRALAX) packet 17 g (has no administration in time range)     And   bisacodyl (DULCOLAX) EC tablet 5 mg (has no administration in time range)     And   bisacodyl (DULCOLAX) suppository 10 mg (has no administration in time range)   ondansetron (ZOFRAN) injection 4 mg (has no administration in time range)   prochlorperazine (COMPAZINE) tablet 10 mg (has no administration in time range)   albuterol (PROVENTIL) nebulizer solution 0.083% 2.5 mg/3mL (has no administration in time range)   prenatal vitamin tablet 1 tablet (has no administration in time range)   sodium chloride 0.9 % infusion (125 mL/hr Intravenous New Bag 6/8/23 2231)   sodium chloride 0.9 % bolus 1,000 mL (0 mL Intravenous Stopped 6/8/23 1912)   ondansetron (ZOFRAN) injection 4 mg (4 mg Intravenous Given 6/8/23 1525)   famotidine (PEPCID) injection 20 mg (20 mg Intravenous Given 6/8/23 1525)   morphine injection 4 mg (4 mg Intravenous Given 6/8/23 1541)   iopamidol (ISOVUE-300) 61 % injection 100 mL (85 mL Intravenous Given 6/8/23 1607)   morphine injection 4 mg (4 mg Intravenous Given 6/8/23 1818)   sodium chloride 0.9 % bolus 1,000 mL (1,000 mL Intravenous New Bag 6/8/23 1814)                   MEDICAL DECISION MAKING, PROGRESS, and CONSULTS    All labs have been independently reviewed by me.  All radiology studies have been reviewed by me and I have also reviewed the radiology report.   EKG's independently viewed and interpreted by me.  Discussion below represents my analysis of pertinent findings related to patient's condition, differential diagnosis, treatment plan and final disposition.      Additional sources:    - Chronic or social conditions impacting care: on chemotherapy        Orders placed during this visit:  Orders Placed This Encounter   Procedures    Gastrointestinal Panel, PCR - Stool, Per Rectum    Clostridioides difficile Toxin - Stool, Per Rectum    Clostridioides difficile Toxin, PCR - Stool, Per Rectum    CT  Abdomen Pelvis With Contrast    Comprehensive Metabolic Panel    Lipase    Urinalysis With Microscopic If Indicated (No Culture) - Urine, Clean Catch    hCG, Serum, Qualitative    CBC Auto Differential    Urinalysis, Microscopic Only - Urine, Clean Catch    Basic Metabolic Panel    CBC Auto Differential    Diet: Regular/House Diet; Texture: Regular Texture (IDDSI 7); Fluid Consistency: Thin (IDDSI 0)    Monitor Blood Pressure    Pulse Oximetry, Continuous    Discontinue Cardiac Monitoring    Vital Signs    Up With Assistance    Intake & Output    Oral Care    Place Sequential Compression Device    Maintain Sequential Compression Device    Code Status and Medical Interventions:    LHBRUNILDA (on-call MD unless specified) Details    Inpatient Hematology & Oncology Consult    Patient Isolation Contact Spore    Initiate Observation Status    CBC & Differential           Differential diagnosis:  Differential diagnosis includes but is not limited to:  - hepatobiliary pathology such as cholecystitis, cholangitis, and symptomatic cholelithiasis  - Pancreatitis  - Dyspepsia  - Small bowel obstruction  - Appendicitis  - Diverticulitis  - UTI including pyelonephritis  - Ureteral stone  - Zoster  - Colitis, including infectious and ischemic  - Atypical ACS        Independent interpretation of labs, radiology studies, and discussions with consultants:  ED Course as of 06/08/23 2257   u Jun 08, 2023   1534 WBC(!): 22.63  6.6 two weeks ago.  I recommended a CT of the abdomen and pelvis for further evaluation and she is agreeable [KA]   1534 Leukocytes, UA: Negative [KA]   1534 Nitrite, UA(!): Positive [KA]   1534 Blood, UA(!): Trace [KA]   1718 Creatinine(!): 1.22  0.93 two weeks ago, mild john   [KA]   1803 I reassessed the patient.  She did have improvement in her symptoms with initial treatment however pain is returning and now a 7 out of 10 again.  We reviewed all of her lab and imaging results including all incidental findings.   She does have evidence of a pretty significant leukocytosis as well as LOUIE, complicated by current chemotherapy.  She is right at 3 months postpartum, was hospitalized for her pregnancy March 9.  I have added a GI panel and C. difficile testing to further evaluate for directed treatment.  She has not had a bowel movement or any additional vomiting since my initial evaluation.  Due to persistent symptoms, significant leukocytosis and LOUIE recommended admission for further evaluation and treatment and she is agreeable. [KA]   1826 I discussed the patient with Dr. Mast, hospitalist.  We discussed patient's history presentation labs and imaging and she agrees to admit for further evaluation and treatment. [KA]      ED Course User Index  [KA] Jammie Alejandra PA             Patient was wearing a face mask when I entered the room and they continued to wear a mask throughout their stay in the ED.  I wore PPE, including  gloves, face mask with shield or face mask with goggles whenever I was in the room with patient.     DIAGNOSIS  Final diagnoses:   Generalized abdominal pain   Nausea vomiting and diarrhea   LOUIE (acute kidney injury)   Leukocytosis, unspecified type             Latest Documented Vital Signs:  As of 22:57 EDT  BP- 144/81 HR- 68 Temp- 97.4 °F (36.3 °C) (Oral) O2 sat- 99%              --    Please note that portions of this were completed with a voice recognition program.       Note Disclaimer: At [facility], we believe that sharing information builds trust and better relationships. You are receiving this note because you are receiving care at [facility] or recently visited. It is possible you will see health information before a provider has talked with you about it. This kind of information can be easy to misunderstand. To help you fully understand what it means for your health, we urge you to discuss this note with your provider.             Jammie Alejandra PA  06/08/23 8724

## 2023-06-08 NOTE — ED NOTES
Patient having stomach pains with nausea and vomiting since 8 am-patient is on chemo pills for a tumor in her leg. Patient took her medication but it is not helping at this time.

## 2023-06-08 NOTE — PROGRESS NOTES
Patient's mother called [place] office today to report patient has been experiencing severe nausea/vomiting and diarrhea since this morning.  She confirms patient is taking reduced dose of 600 mg daily for about a week now.  This morning patient woke up with severe symptoms.  She states patient did take a dose of Zofran ~20 minutes ago and has not thrown up since.  She denies taking anything for diarrhea.  Discussed patient should take 2 tablets of Imodium right away and 1 additional tablet after each loose stool, up to 8 tablets per day.  Also discussed [place] pharmacist will discuss anti-emetics with Dr. Molina and potentially send in additional medications.  Also discussed patient should try to stay hydrated with water or electrolyte replacement.      Message sent to Dr. Molina reporting symptoms and he agrees to send Compazine 10 mg q6h prn nausea/vomiting.  Prescription sent in to patient's local pharmacy.  Returned call to patient's mother to let her know prescription was sent and to call us with any additional questions or concerns.     Ranjan Bear PharmD, BCOP          ADDENDUM:    Patient's mother called back [place] office to let us know patient has not improved with Imodium and Compazine.  She states patient is unable to keep anything down; fluids or medications.  She is questioning if she should take patient to emergency department for evaluation.  Discussed with Dr. Molina and recommend patient be evaluated in ED for n/v/d and possible dehydration.  Also recommend patient hold Votrient until n/v/d resolved. Discussed with patient's mother, who states she will call for ambulance, as patient is not able to travel via car.  Informed her I would pass along to Dr. Molina's team and we would be following patient's progress.     Ranjan Bear PharmD, BCOP

## 2023-06-09 ENCOUNTER — APPOINTMENT (OUTPATIENT)
Dept: ULTRASOUND IMAGING | Facility: HOSPITAL | Age: 33
End: 2023-06-09
Payer: COMMERCIAL

## 2023-06-09 PROBLEM — N17.9 ACUTE KIDNEY INJURY: Status: ACTIVE | Noted: 2023-06-09

## 2023-06-09 PROBLEM — R11.2 NAUSEA VOMITING AND DIARRHEA: Status: ACTIVE | Noted: 2023-06-09

## 2023-06-09 PROBLEM — D72.829 LEUKOCYTOSIS: Status: ACTIVE | Noted: 2023-06-09

## 2023-06-09 PROBLEM — R19.7 NAUSEA VOMITING AND DIARRHEA: Status: ACTIVE | Noted: 2023-06-09

## 2023-06-09 LAB
ANION GAP SERPL CALCULATED.3IONS-SCNC: 6.7 MMOL/L (ref 5–15)
BASOPHILS # BLD AUTO: 0.02 10*3/MM3 (ref 0–0.2)
BASOPHILS NFR BLD AUTO: 0.3 % (ref 0–1.5)
BUN SERPL-MCNC: 9 MG/DL (ref 6–20)
BUN/CREAT SERPL: 10.3 (ref 7–25)
CALCIUM SPEC-SCNC: 8.7 MG/DL (ref 8.6–10.5)
CHLORIDE SERPL-SCNC: 106 MMOL/L (ref 98–107)
CO2 SERPL-SCNC: 25.3 MMOL/L (ref 22–29)
CREAT SERPL-MCNC: 0.87 MG/DL (ref 0.57–1)
DEPRECATED RDW RBC AUTO: 39.8 FL (ref 37–54)
EGFRCR SERPLBLD CKD-EPI 2021: 92.1 ML/MIN/1.73
EOSINOPHIL # BLD AUTO: 0.37 10*3/MM3 (ref 0–0.4)
EOSINOPHIL NFR BLD AUTO: 5.3 % (ref 0.3–6.2)
ERYTHROCYTE [DISTWIDTH] IN BLOOD BY AUTOMATED COUNT: 13.5 % (ref 12.3–15.4)
GLUCOSE SERPL-MCNC: 102 MG/DL (ref 65–99)
HCT VFR BLD AUTO: 34.3 % (ref 34–46.6)
HGB BLD-MCNC: 11.8 G/DL (ref 12–15.9)
IMM GRANULOCYTES # BLD AUTO: 0.02 10*3/MM3 (ref 0–0.05)
IMM GRANULOCYTES NFR BLD AUTO: 0.3 % (ref 0–0.5)
LYMPHOCYTES # BLD AUTO: 3.11 10*3/MM3 (ref 0.7–3.1)
LYMPHOCYTES NFR BLD AUTO: 44.5 % (ref 19.6–45.3)
MCH RBC QN AUTO: 28.4 PG (ref 26.6–33)
MCHC RBC AUTO-ENTMCNC: 34.4 G/DL (ref 31.5–35.7)
MCV RBC AUTO: 82.5 FL (ref 79–97)
MONOCYTES # BLD AUTO: 0.42 10*3/MM3 (ref 0.1–0.9)
MONOCYTES NFR BLD AUTO: 6 % (ref 5–12)
NEUTROPHILS NFR BLD AUTO: 3.05 10*3/MM3 (ref 1.7–7)
NEUTROPHILS NFR BLD AUTO: 43.6 % (ref 42.7–76)
NRBC BLD AUTO-RTO: 0.1 /100 WBC (ref 0–0.2)
PLATELET # BLD AUTO: 172 10*3/MM3 (ref 140–450)
PMV BLD AUTO: 10.5 FL (ref 6–12)
POTASSIUM SERPL-SCNC: 4.2 MMOL/L (ref 3.5–5.2)
RBC # BLD AUTO: 4.16 10*6/MM3 (ref 3.77–5.28)
SODIUM SERPL-SCNC: 138 MMOL/L (ref 136–145)
WBC NRBC COR # BLD: 6.99 10*3/MM3 (ref 3.4–10.8)

## 2023-06-09 PROCEDURE — 99222 1ST HOSP IP/OBS MODERATE 55: CPT | Performed by: INTERNAL MEDICINE

## 2023-06-09 PROCEDURE — 85025 COMPLETE CBC W/AUTO DIFF WBC: CPT | Performed by: INTERNAL MEDICINE

## 2023-06-09 PROCEDURE — 25010000002 MORPHINE PER 10 MG: Performed by: NURSE PRACTITIONER

## 2023-06-09 PROCEDURE — 80048 BASIC METABOLIC PNL TOTAL CA: CPT | Performed by: INTERNAL MEDICINE

## 2023-06-09 PROCEDURE — 96361 HYDRATE IV INFUSION ADD-ON: CPT

## 2023-06-09 PROCEDURE — 36415 COLL VENOUS BLD VENIPUNCTURE: CPT | Performed by: INTERNAL MEDICINE

## 2023-06-09 PROCEDURE — G0378 HOSPITAL OBSERVATION PER HR: HCPCS

## 2023-06-09 PROCEDURE — 76642 ULTRASOUND BREAST LIMITED: CPT

## 2023-06-09 PROCEDURE — 96376 TX/PRO/DX INJ SAME DRUG ADON: CPT

## 2023-06-09 RX ADMIN — SODIUM CHLORIDE 125 ML/HR: 9 INJECTION, SOLUTION INTRAVENOUS at 17:39

## 2023-06-09 RX ADMIN — SODIUM CHLORIDE 125 ML/HR: 9 INJECTION, SOLUTION INTRAVENOUS at 06:03

## 2023-06-09 RX ADMIN — MORPHINE SULFATE 2 MG: 2 INJECTION, SOLUTION INTRAMUSCULAR; INTRAVENOUS at 10:30

## 2023-06-09 RX ADMIN — Medication 1 TABLET: at 09:19

## 2023-06-09 RX ADMIN — MORPHINE SULFATE 2 MG: 2 INJECTION, SOLUTION INTRAMUSCULAR; INTRAVENOUS at 14:54

## 2023-06-09 RX ADMIN — MORPHINE SULFATE 2 MG: 2 INJECTION, SOLUTION INTRAMUSCULAR; INTRAVENOUS at 21:02

## 2023-06-09 RX ADMIN — MORPHINE SULFATE 2 MG: 2 INJECTION, SOLUTION INTRAMUSCULAR; INTRAVENOUS at 04:32

## 2023-06-09 RX ADMIN — DOCUSATE SODIUM 50MG AND SENNOSIDES 8.6MG 2 TABLET: 8.6; 5 TABLET, FILM COATED ORAL at 21:01

## 2023-06-09 NOTE — PAYOR COMM NOTE
"Beena Vincent (32 y.o. Female)                  ATTENTION; PC REQUEST FOR OBSERVATION SERVICES, TESTING                AND TREATMENT  - ICD 10 - R10.84                   [facility]AC[facility] N[facility] [id number] [facility] [address] [facility] [id number]                PHYSICIAN NPI - [id number] [address] [facility] [address]              REPLY TO UR DEPT  [phone] OR CALL   DRE ROSENBERG LPN           Date of Birth   1990    Social Security Number       Address   [address] [facility] [address]    Home Phone       MRN   [id number]       Oriental orthodox   Confucianism    Marital Status   Single                            Admission Date   6/8/23    Admission Type   Emergency    Admitting Provider   Soo Mast MD    Attending Provider   Yordan Odom MD    Department, Room/Bed   [address], P777/1       Discharge Date       Discharge Disposition       Discharge Destination                                 Attending Provider: Yordan Odom MD    Allergies: Penicillins, Adhesive Tape    Isolation: Spore   Infection: C.difficile (rule out) (06/08/23)   Code Status: CPR    Ht: 177.8 cm (70\")   Wt: 77.1 kg (170 lb)    Admission Cmt: None   Principal Problem: Generalized abdominal pain [R10.84]                   Active Insurance as of 6/8/2023       Primary Coverage       Payor Plan Insurance Group Employer/Plan Group    [employer] [place] [employer] MEDICA[place]        Payor Plan Address Payor Plan Phone Number Payor Plan Fax Number Effective Dates    [address] [address] [phone]  10/1/2021 - None Entered    [facility] [id number]         Subscriber Name Subscriber Birth Date Member ID       BEENA VINCENT 1990 [id number]                     Emergency Contacts        (Rel.) Home Phone Work Phone Mobile Phone    DIEGO VINCENT (Mother) [phone] -- [phone]                 History & Physical        Soo Mast MD at 06/08/23 2146          HISTORY AND " PHYSICAL   [facility]        Date of Admission: 2023  Patient Identification:  Name: Beena Vincent  Age: 32 y.o.  Sex: female  :  1990  MRN: [id number]                     Primary Care Physician: Sissy Garibay MD    Chief Complaint:  32 year old female who presented to the emergency room with nausea, vomiting, diarrhea and abdominal pain which started this morning; no feer or cills; no sick contacts; she is taken an oral chemotherapy drug for a desmoid tumor; she has tried to take po antiemetics with no success    History of Present Illness:   As above    Past Medical History:  Past Medical History:   Diagnosis Date    Asthma     inhaler as needed    Cervical dysplasia     Depression     Desmoid tumor     back of right knee- pt desires to proceed with treatments after delivery    Gestational diabetes     Gestational hypertension     Iron deficiency anemia     Preeclampsia      Past Surgical History:  Past Surgical History:   Procedure Laterality Date    APPENDECTOMY N/A 10/1/2021    Procedure: APPENDECTOMY LAPAROSCOPIC;  Surgeon: Ahmet Dong Jr., MD;  Location: [facility];  Service: General;  Laterality: N/A;    KNEE ARTHROSCOPY      AGE 6    LEEP N/A 2019    Procedure: LOOP ELECTROCAUTERY EXCISION PROCEDURE;  Surgeon: Arsh Ray MD;  Location: [facility], [place];  Service: Obstetrics/Gynecology    SOFT TISSUE BIOPSY Right 3/23/2022    Procedure: BIOPSY SOFT TISSUE THIGH / KNEE;  Surgeon: Chris Randall MD;  Location: [facility];  Service: Orthopedics;  Laterality: Right;    WISDOM TOOTH EXTRACTION        Home Meds:  Medications Prior to Admission   Medication Sig Dispense Refill Last Dose    albuterol sulfate  (90 Base) MCG/ACT inhaler Inhale 2 puffs Every 4 (Four) Hours As Needed for Wheezing. 18 g 0     Blood Glucose Monitoring Suppl (FreeStyle Lite) w/Device kit USE 1 SEVEN TIMES DAILY       glucose monitor monitoring kit To check blood sugars 7x  "daily 1 each 0     ibuprofen (ADVIL,MOTRIN) 600 MG tablet Take 1 tablet by mouth Every 6 (Six) Hours As Needed for Mild Pain 50 tablet 3     Lancets (freestyle) lancets Pt to check blood sugar 7xs daily 15 each 12     ondansetron (ZOFRAN) 8 MG tablet Take 1 tablet by mouth 3 (Three) Times a Day As Needed for Nausea or Vomiting. 30 tablet 5     PAZOPanib (Votrient) 200 MG chemo tablet Take 3 tablets by mouth Daily. 90 tablet 5     Prenatal Vit-Fe Fumarate-FA (Prenatabs FA) 29-1 MG tablet Take 1 tablet by mouth Daily. 30 tablet 11     prochlorperazine (COMPAZINE) 10 MG tablet Take 1 tablet by mouth Every 6 (Six) Hours As Needed for Nausea or Vomiting. 30 tablet 3        Allergies:  Allergies   Allergen Reactions    Penicillins Nausea And Vomiting    Adhesive Tape Rash     \"SURGICAL TAPE\"  AS A CHILD     Immunizations:  Immunization History   Administered Date(s) Administered    FLUAD TRI 65YR+ 12/08/2011    Flu Vaccine Intradermal Quad 18-64YR 12/08/2011    FluLaval/Fluzone >6mos 11/10/2022    Influenza MDCK Quadrivalent with Preserative 12/08/2011    Influenza Seasonal Injectable 12/08/2011    MMR 08/14/2020    RHO (D) IG IV 05/26/2020, 12/22/2022    Rho (D) Immune Globulin 05/26/2020, 12/22/2022    Tdap 08/14/2020    flucelvax quad pfs =>4 YRS 01/14/2020     Social History:   Social History     Social History Narrative    Not on file     Social History     Socioeconomic History    Marital status: Single   Tobacco Use    Smoking status: Former     Packs/day: 0.50     Years: 4.00     Pack years: 2.00     Types: Cigarettes     Quit date: 2/8/2020     Years since quitting: 3.3     Passive exposure: Never    Smokeless tobacco: Never   Vaping Use    Vaping Use: Former    Substances: Nicotine    Devices: Disposable   Substance and Sexual Activity    Alcohol use: Not Currently     Comment: socially    Drug use: Not Currently     Types: Marijuana     Comment: daily, did not smoke during pregnancy    Sexual activity: Not " "Currently       Family History:  Family History   Problem Relation Age of Onset    Diabetes Father     Arthritis Maternal Grandmother     Lung cancer Maternal Grandfather     Heart attack Maternal Grandfather     Heart disease Maternal Grandfather     Stroke Maternal Grandfather     Hyperlipidemia Maternal Grandfather     Malig Hyperthermia Neg Hx     Breast cancer Neg Hx     Ovarian cancer Neg Hx     Uterine cancer Neg Hx     Colon cancer Neg Hx         Review of Systems  See history of present illness and past medical history.  Patient denies headache, dizziness, syncope, falls, trauma, change in vision, change in hearing, change in taste, changes in weight, changes in appetite, focal weakness, numbness, or paresthesia.  Patient denies chest pain, palpitations, dyspnea, orthopnea, PND, cough, sinus pressure, rhinorrhea, epistaxis, hemoptysis,  hematemesis,  constipation or hematochezia.  Denies cold or heat intolerance, polydipsia, polyuria, polyphagia. Denies hematuria, pyuria, dysuria, hesitancy, frequency or urgency. Denies consumption of raw and under cooked meats foods or change in water source.  Denies fever, chills, sweats, night sweats.  Denies missing any routine medications. Remainder of ROS is negative.    Objective:  T Max 24 hrs: Temp (24hrs), Av.2 °F (36.2 °C), Min:97.2 °F (36.2 °C), Max:97.2 °F (36.2 °C)    Vitals Ranges:   Temp:  [97.2 °F (36.2 °C)] 97.2 °F (36.2 °C)  Heart Rate:  [48-90] 57  Resp:  [18] 18  BP: (120-142)/() 138/75      Exam:  /75   Pulse 57   Temp 97.2 °F (36.2 °C)   Resp 18   Ht 177.8 cm (70\")   Wt 77.1 kg (170 lb)   SpO2 96%   BMI 24.39 kg/m²     General Appearance:    Alert, cooperative, no distress, appears stated age   Head:    Normocephalic, without obvious abnormality, atraumatic   Eyes:    PERRL, conjunctivae/corneas clear, EOM's intact, both eyes   Ears:    Normal external ear canals, both ears   Nose:   Nares normal, septum midline, mucosa normal, " no drainage    or sinus tenderness   Throat:   Lips, mucosa, and tongue normal   Neck:   Supple, symmetrical, trachea midline, no adenopathy;     thyroid:  no enlargement/tenderness/nodules; no carotid    bruit or JVD   Back:     Symmetric, no curvature, ROM normal, no CVA tenderness   Lungs:     Decreased breath sounds bilaterally, respirations unlabored   Chest Wall:    No tenderness or deformity    Heart:    Regular rate and rhythm, S1 and S2 normal, no murmur, rub   or gallop   Abdomen:     Soft, nontender, bowel sounds active all four quadrants,     no masses, no hepatomegaly, no splenomegaly   Extremities:   Extremities normal, atraumatic, no cyanosis or edema   Pulses:   2+ and symmetric all extremities   Skin:   Skin color, texture, turgor normal, no rashes or lesions               .    Data Review:  WBC   Date Value Ref Range Status   06/08/2023 22.63 (H) 3.40 - 10.80 10*3/mm3 Final     Hemoglobin   Date Value Ref Range Status   06/08/2023 17.2 (H) 12.0 - 15.9 g/dL Final     Hematocrit   Date Value Ref Range Status   06/08/2023 49.9 (H) 34.0 - 46.6 % Final     Platelets   Date Value Ref Range Status   06/08/2023 283 140 - 450 10*3/mm3 Final     Sodium   Date Value Ref Range Status   06/08/2023 138 136 - 145 mmol/L Final     Potassium   Date Value Ref Range Status   06/08/2023 4.2 3.5 - 5.2 mmol/L Final     Chloride   Date Value Ref Range Status   06/08/2023 99 98 - 107 mmol/L Final     CO2   Date Value Ref Range Status   06/08/2023 22.1 22.0 - 29.0 mmol/L Final     BUN   Date Value Ref Range Status   06/08/2023 13 6 - 20 mg/dL Final     Creatinine   Date Value Ref Range Status   06/08/2023 1.22 (H) 0.57 - 1.00 mg/dL Final     Glucose   Date Value Ref Range Status   06/08/2023 126 (H) 65 - 99 mg/dL Final     Calcium   Date Value Ref Range Status   06/08/2023 10.8 (H) 8.6 - 10.5 mg/dL Final     AST (SGOT)   Date Value Ref Range Status   06/08/2023 24 1 - 32 U/L Final     ALT (SGPT)   Date Value Ref Range  Status   06/08/2023 21 1 - 33 U/L Final     Alkaline Phosphatase   Date Value Ref Range Status   06/08/2023 71 39 - 117 U/L Final                Imaging Results (All)       Procedure Component Value Units Date/Time    CT Abdomen Pelvis With Contrast [[id number]] Collected: 06/08/23 1656     Updated: 06/08/23 1837    Narrative:      CT ABDOMEN AND PELVIS WITH IV CONTRAST     HISTORY: 32-year-old female with nausea and vomiting. Upper abdominal  pain. Leukocytosis. Appendectomy in the past.     TECHNIQUE: Radiation dose reduction techniques were utilized, including  automated exposure control and exposure modulation based on body size.   3 mm images were obtained through the abdomen and pelvis after the  administration of IV contrast. Compared with previous CT 10/01/2021.     FINDINGS: The liver, gallbladder, spleen, pancreas, adrenals, and  kidneys appear unremarkable. There is no formed stool within the colon  which is either fluid-filled or collapsed. There is no definitive  colonic thickening. There is also fluid-filled ileum without definitive  thickening of the wall. There is no free fluid or lymphadenopathy.  Uterus and adnexa appear unremarkable. There is a stable small  periumbilical ventral hernia containing fat. There is a new tiny midline  upper abdominal ventral hernia containing fat, series 2 image 42.       Impression:      1. Fluid-filled colon with no evidence for colitis. Please correlate  clinically for gastroenteritis.  2. New tiny midline upper abdominal ventral hernia containing fat.     This report was finalized on 6/8/2023 6:34 PM by Dr. Dalila Fontana M.D.                 Assessment:  Active Hospital Problems    Diagnosis  POA    **Generalized abdominal pain [R10.84]  Yes      Resolved Hospital Problems   No resolved problems to display.   Nausea and vomiting  Diarrhea  Hyperglycemia  Acute kidney injury      Plan:  Will ask oncology to see her  Iv fluids  Trend labs  Stool studies ordered  Dw  patient and ed provider    Soo Mast MD  2023  21:46 EDT      Electronically signed by Soo Msat MD at 23 2151          Emergency Department Notes        Kathryn Hunt RN at 23 1455          Patient having stomach pains with nausea and vomiting since 8 am-patient is on chemo pills for a tumor in her leg. Patient took her medication but it is not helping at this time.     Electronically signed by Kathryn Hunt RN at 23 [phone]       Jammie Alejandra PA at 23 1505           EMERGENCY DEPARTMENT ENCOUNTER    Room Number:  P777/1  Date seen:  2023  PCP: Sissy Garibay MD  Discussed/ obtained information from independent historians: patient      HPI:  Chief Complaint: abdominal pain, vomiting/diarrhea  A complete HPI/ROS/PMH/PSH/SH/FH are unobtainable due to: none  Context: Beena Vincent is a 32 y.o. female who presents to the ED via EMS c/o acute nausea vomiting diarrhea and upper abdominal pain that started today a.m. and is constant.  Too numerous to count episodes of vomiting and diarrhea, emesis and stools nonbloody and nonbilious.  She denies any fevers chills or upper respiratory symptoms, no one else in the household is ill.  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      External (non-ED) record review: Patient called into her specialty pharmacist due to her symptoms today.  It was noted that she has been on an reduced dose of Votrient for the last 1 week.  She was encouraged to take Zofran and Imodium and Compazine was called into her pharmacy.  She did not improve and was referred to the ER for further evaluation.      PAST MEDICAL HISTORY  Active Ambulatory Problems     Diagnosis Date Noted    KYRA III (cervical intraepithelial neoplasia grade III) with severe dysplasia 2019    Gestational HTN 2020     (normal spontaneous vaginal delivery) 2020    Asthma  exacerbation 03/09/2017    Anxiety with depression 09/29/2020    Postpartum depression 09/29/2020    Generalized anxiety disorder 09/29/2020    History of anemia 09/29/2020    Acute appendicitis with localized peritonitis, without perforation, abscess, or gangrene 10/01/2021    Soft tissue mass 03/22/2022    Gestational diabetes 03/08/2023    Desmoid tumor 05/19/2023     Resolved Ambulatory Problems     Diagnosis Date Noted    Edema of lower extremity, antepartum, third trimester 06/08/2020    Anemia affecting pregnancy in third trimester 06/29/2020    Pregnancy 08/07/2020    Medial epicondylitis of left elbow 05/30/2013     Past Medical History:   Diagnosis Date    Asthma     Cervical dysplasia     Depression     Gestational hypertension 2020    Iron deficiency anemia     Preeclampsia 2012         PAST SURGICAL HISTORY  Past Surgical History:   Procedure Laterality Date    APPENDECTOMY N/A 10/1/2021    Procedure: APPENDECTOMY LAPAROSCOPIC;  Surgeon: Ahmet Dong Jr., MD;  Location: [facility];  Service: General;  Laterality: N/A;    KNEE ARTHROSCOPY      AGE 6    LEEP N/A 4/25/2019    Procedure: LOOP ELECTROCAUTERY EXCISION PROCEDURE;  Surgeon: Arsh Ray MD;  Location: [facility];  Service: Obstetrics/Gynecology    SOFT TISSUE BIOPSY Right 3/23/2022    Procedure: BIOPSY SOFT TISSUE THIGH / KNEE;  Surgeon: Chris Randall MD;  Location: [facility];  Service: Orthopedics;  Laterality: Right;    WISDOM TOOTH EXTRACTION           FAMILY HISTORY  Family History   Problem Relation Age of Onset    Diabetes Father     Arthritis Maternal Grandmother     Lung cancer Maternal Grandfather     Heart attack Maternal Grandfather     Heart disease Maternal Grandfather     Stroke Maternal Grandfather     Hyperlipidemia Maternal Grandfather     Malig Hyperthermia Neg Hx     Breast cancer Neg Hx     Ovarian cancer Neg Hx     Uterine cancer Neg Hx     Colon cancer Neg Hx          SOCIAL HISTORY  Social History      Socioeconomic History    Marital status: Single   Tobacco Use    Smoking status: Former     Packs/day: 0.50     Years: 4.00     Pack years: 2.00     Types: Cigarettes     Quit date: 2/8/2020     Years since quitting: 3.3     Passive exposure: Never    Smokeless tobacco: Never   Vaping Use    Vaping Use: Former    Substances: Nicotine    Devices: Disposable   Substance and Sexual Activity    Alcohol use: Not Currently     Comment: socially    Drug use: Not Currently     Types: Marijuana     Comment: daily, did not smoke during pregnancy    Sexual activity: Not Currently         ALLERGIES  Penicillins and Adhesive tape        REVIEW OF SYSTEMS  Review of Systems         PHYSICAL EXAM  ED Triage Vitals [06/08/23 1456]   Temp Heart Rate Resp BP SpO2   97.2 °F (36.2 °C) 70 18 136/99 98 %      Temp src Heart Rate Source Patient Position BP Location FiO2 (%)   -- -- -- -- --       Physical Exam      GENERAL: no acute distress  HENT: normocephalic, atraumatic  EYES: no scleral icterus  CV: regular rhythm, normal rate  RESPIRATORY: normal effort CTA B  ABDOMEN: nondistended soft mild generalized tenderness, normal bowel sounds no guarding or rigidity, no CVA tenderness this  MUSCULOSKELETAL: no deformity  NEURO: alert, moves all extremities, follows commands  PSYCH:  calm, cooperative  SKIN: warm, dry    Vital signs and nursing notes reviewed.          LAB RESULTS  Recent Results (from the past 24 hour(s))   Comprehensive Metabolic Panel    Collection Time: 06/08/23  3:16 PM    Specimen: Blood   Result Value Ref Range    Glucose 126 (H) 65 - 99 mg/dL    BUN 13 6 - 20 mg/dL    Creatinine 1.22 (H) 0.57 - 1.00 mg/dL    Sodium 138 136 - 145 mmol/L    Potassium 4.2 3.5 - 5.2 mmol/L    Chloride 99 98 - 107 mmol/L    CO2 22.1 22.0 - 29.0 mmol/L    Calcium 10.8 (H) 8.6 - 10.5 mg/dL    Total Protein 8.9 (H) 6.0 - 8.5 g/dL    Albumin 5.4 (H) 3.5 - 5.2 g/dL    ALT (SGPT) 21 1 - 33 U/L    AST (SGOT) 24 1 - 32 U/L    Alkaline  Phosphatase 71 39 - 117 U/L    Total Bilirubin 1.5 (H) 0.0 - 1.2 mg/dL    Globulin 3.5 gm/dL    A/G Ratio 1.5 g/dL    BUN/Creatinine Ratio 10.7 7.0 - 25.0    Anion Gap 16.9 (H) 5.0 - 15.0 mmol/L    eGFR 61.4 >60.0 mL/min/1.73   Lipase    Collection Time: 06/08/23  3:16 PM    Specimen: Blood   Result Value Ref Range    Lipase 25 13 - 60 U/L   hCG, Serum, Qualitative    Collection Time: 06/08/23  3:16 PM    Specimen: Blood   Result Value Ref Range    HCG Qualitative Negative Negative   CBC Auto Differential    Collection Time: 06/08/23  3:16 PM    Specimen: Blood   Result Value Ref Range    WBC 22.63 (H) 3.40 - 10.80 10*3/mm3    RBC 5.99 (H) 3.77 - 5.28 10*6/mm3    Hemoglobin 17.2 (H) 12.0 - 15.9 g/dL    Hematocrit 49.9 (H) 34.0 - 46.6 %    MCV 83.3 79.0 - 97.0 fL    MCH 28.7 26.6 - 33.0 pg    MCHC 34.5 31.5 - 35.7 g/dL    RDW 14.0 12.3 - 15.4 %    RDW-SD 41.8 37.0 - 54.0 fl    MPV 10.6 6.0 - 12.0 fL    Platelets 283 140 - 450 10*3/mm3    Neutrophil % 79.5 (H) 42.7 - 76.0 %    Lymphocyte % 14.1 (L) 19.6 - 45.3 %    Monocyte % 4.6 (L) 5.0 - 12.0 %    Eosinophil % 0.9 0.3 - 6.2 %    Basophil % 0.4 0.0 - 1.5 %    Immature Grans % 0.5 0.0 - 0.5 %    Neutrophils, Absolute 18.01 (H) 1.70 - 7.00 10*3/mm3    Lymphocytes, Absolute 3.18 (H) 0.70 - 3.10 10*3/mm3    Monocytes, Absolute 1.03 (H) 0.10 - 0.90 10*3/mm3    Eosinophils, Absolute 0.21 0.00 - 0.40 10*3/mm3    Basophils, Absolute 0.09 0.00 - 0.20 10*3/mm3    Immature Grans, Absolute 0.11 (H) 0.00 - 0.05 10*3/mm3    nRBC 0.0 0.0 - 0.2 /100 WBC   Urinalysis With Microscopic If Indicated (No Culture) - Urine, Clean Catch    Collection Time: 06/08/23  3:18 PM    Specimen: Urine, Clean Catch   Result Value Ref Range    Color, UA Dark Yellow (A) Yellow, Straw    Appearance, UA Slightly Cloudy (A) Clear    pH, UA 5.5 5.0 - 8.0    Specific Gravity, UA >=1.030 1.005 - 1.030    Glucose, UA Negative Negative    Ketones, UA 15 mg/dL (1+) (A) Negative    Bilirubin, UA Negative  Negative    Blood, UA Trace (A) Negative    Protein,  mg/dL (2+) (A) Negative    Leuk Esterase, UA Negative Negative    Nitrite, UA Positive (A) Negative    Urobilinogen, UA 1.0 E.U./dL 0.2 - 1.0 E.U./dL   Urinalysis, Microscopic Only - Urine, Clean Catch    Collection Time: 06/08/23  3:18 PM    Specimen: Urine, Clean Catch   Result Value Ref Range    RBC, UA None Seen None Seen, 0-2 /HPF    WBC, UA 0-2 None Seen, 0-2 /HPF    Bacteria, UA Trace (A) None Seen /HPF    Squamous Epithelial Cells, UA 0-2 None Seen, 0-2 /HPF    Hyaline Casts, UA 0-2 None Seen /LPF    Methodology Automated Microscopy        Ordered the above labs and reviewed the results.        RADIOLOGY  CT Abdomen Pelvis With Contrast    Result Date: 6/8/2023  CT ABDOMEN AND PELVIS WITH IV CONTRAST  HISTORY: 32-year-old female with nausea and vomiting. Upper abdominal pain. Leukocytosis. Appendectomy in the past.  TECHNIQUE: Radiation dose reduction techniques were utilized, including automated exposure control and exposure modulation based on body size. 3 mm images were obtained through the abdomen and pelvis after the administration of IV contrast. Compared with previous CT 10/01/2021.  FINDINGS: The liver, gallbladder, spleen, pancreas, adrenals, and kidneys appear unremarkable. There is no formed stool within the colon which is either fluid-filled or collapsed. There is no definitive colonic thickening. There is also fluid-filled ileum without definitive thickening of the wall. There is no free fluid or lymphadenopathy. Uterus and adnexa appear unremarkable. There is a stable small periumbilical ventral hernia containing fat. There is a new tiny midline upper abdominal ventral hernia containing fat, series 2 image 42.      1. Fluid-filled colon with no evidence for colitis. Please correlate clinically for gastroenteritis. 2. New tiny midline upper abdominal ventral hernia containing fat.  This report was finalized on 6/8/2023 6:34 PM by   Dalila Fontana M.D.       Ordered the above noted radiological studies. Reviewed by me in PACS.            PROCEDURES  Procedures              MEDICATIONS GIVEN IN ER  Medications   acetaminophen (TYLENOL) tablet 650 mg (has no administration in time range)     Or   acetaminophen (TYLENOL) 160 MG/5ML solution 650 mg (has no administration in time range)     Or   acetaminophen (TYLENOL) suppository 650 mg (has no administration in time range)   sennosides-docusate (PERICOLACE) 8.6-50 MG per tablet 2 tablet (2 tablets Oral Not Given 6/8/23 2230)     And   polyethylene glycol (MIRALAX) packet 17 g (has no administration in time range)     And   bisacodyl (DULCOLAX) EC tablet 5 mg (has no administration in time range)     And   bisacodyl (DULCOLAX) suppository 10 mg (has no administration in time range)   ondansetron (ZOFRAN) injection 4 mg (has no administration in time range)   prochlorperazine (COMPAZINE) tablet 10 mg (has no administration in time range)   albuterol (PROVENTIL) nebulizer solution 0.083% 2.5 mg/3mL (has no administration in time range)   prenatal vitamin tablet 1 tablet (has no administration in time range)   sodium chloride 0.9 % infusion (125 mL/hr Intravenous New Bag 6/8/23 2231)   sodium chloride 0.9 % bolus 1,000 mL (0 mL Intravenous Stopped 6/8/23 1912)   ondansetron (ZOFRAN) injection 4 mg (4 mg Intravenous Given 6/8/23 1525)   famotidine (PEPCID) injection 20 mg (20 mg Intravenous Given 6/8/23 1525)   morphine injection 4 mg (4 mg Intravenous Given 6/8/23 1541)   iopamidol (ISOVUE-300) 61 % injection 100 mL (85 mL Intravenous Given 6/8/23 1607)   morphine injection 4 mg (4 mg Intravenous Given 6/8/23 1818)   sodium chloride 0.9 % bolus 1,000 mL (1,000 mL Intravenous New Bag 6/8/23 1814)                   MEDICAL DECISION MAKING, PROGRESS, and CONSULTS    All labs have been independently reviewed by me.  All radiology studies have been reviewed by me and I have also reviewed the radiology report.    EKG's independently viewed and interpreted by me.  Discussion below represents my analysis of pertinent findings related to patient's condition, differential diagnosis, treatment plan and final disposition.      Additional sources:    - Chronic or social conditions impacting care: on chemotherapy        Orders placed during this visit:  Orders Placed This Encounter   Procedures    Gastrointestinal Panel, PCR - Stool, Per Rectum    Clostridioides difficile Toxin - Stool, Per Rectum    Clostridioides difficile Toxin, PCR - Stool, Per Rectum    CT Abdomen Pelvis With Contrast    Comprehensive Metabolic Panel    Lipase    Urinalysis With Microscopic If Indicated (No Culture) - Urine, Clean Catch    hCG, Serum, Qualitative    CBC Auto Differential    Urinalysis, Microscopic Only - Urine, Clean Catch    Basic Metabolic Panel    CBC Auto Differential    Diet: Regular/House Diet; Texture: Regular Texture (IDDSI 7); Fluid Consistency: Thin (IDDSI 0)    Monitor Blood Pressure    Pulse Oximetry, Continuous    Discontinue Cardiac Monitoring    Vital Signs    Up With Assistance    Intake & Output    Oral Care    Place Sequential Compression Device    Maintain Sequential Compression Device    Code Status and Medical Interventions:    [facility] (on-call MD unless specified) Details    Inpatient Hematology & Oncology Consult    Patient Isolation Contact Spore    Initiate Observation Status    CBC & Differential           Differential diagnosis:  Differential diagnosis includes but is not limited to:  - hepatobiliary pathology such as cholecystitis, cholangitis, and symptomatic cholelithiasis  - Pancreatitis  - Dyspepsia  - Small bowel obstruction  - Appendicitis  - Diverticulitis  - UTI including pyelonephritis  - Ureteral stone  - Zoster  - Colitis, including infectious and ischemic  - Atypical ACS        Independent interpretation of labs, radiology studies, and discussions with consultants:  ED Course as of 06/08/23 0489   Th Leonard  08, 2023   1534 WBC(!): 22.63  6.6 two weeks ago.  I recommended a CT of the abdomen and pelvis for further evaluation and she is agreeable [KA]   1534 Leukocytes, UA: Negative [KA]   1534 Nitrite, UA(!): Positive [KA]   1534 Blood, UA(!): Trace [KA]   1718 Creatinine(!): 1.22  0.93 two weeks ago, mild louie   [KA]   1803 I reassessed the patient.  She did have improvement in her symptoms with initial treatment however pain is returning and now a 7 out of 10 again.  We reviewed all of her lab and imaging results including all incidental findings.  She does have evidence of a pretty significant leukocytosis as well as LOUIE, complicated by current chemotherapy.  She is right at 3 months postpartum, was hospitalized for her pregnancy March 9.  I have added a GI panel and C. difficile testing to further evaluate for directed treatment.  She has not had a bowel movement or any additional vomiting since my initial evaluation.  Due to persistent symptoms, significant leukocytosis and LOUIE recommended admission for further evaluation and treatment and she is agreeable. [KA]   1826 I discussed the patient with Dr. Mast, hospitalist.  We discussed patient's history presentation labs and imaging and she agrees to admit for further evaluation and treatment. [KA]      ED Course User Index  [KA] Jammie Alejandra PA             Patient was wearing a face mask when I entered the room and they continued to wear a mask throughout their stay in the ED.  I wore PPE, including  gloves, face mask with shield or face mask with goggles whenever I was in the room with patient.     DIAGNOSIS  Final diagnoses:   Generalized abdominal pain   Nausea vomiting and diarrhea   LOUIE (acute kidney injury)   Leukocytosis, unspecified type             Latest Documented Vital Signs:  As of 22:57 EDT  BP- 144/81 HR- 68 Temp- 97.4 °F (36.3 °C) (Oral) O2 sat- 99%              --    Please note that portions of this were completed with a voice recognition  program.       Note Disclaimer: At [facility], we believe that sharing information builds trust and better relationships. You are receiving this note because you are receiving care at [facility] or recently visited. It is possible you will see health information before a provider has talked with you about it. This kind of information can be easy to misunderstand. To help you fully understand what it means for your health, we urge you to discuss this note with your provider.             Jammie Alejandra PA  06/08/23 0777      Electronically signed by Jammie Alejandra PA at 06/08/23 8684

## 2023-06-09 NOTE — H&P
HISTORY AND PHYSICAL   [facility]        Date of Admission: 2023  Patient Identification:  Name: Beena Vincent  Age: 32 y.o.  Sex: female  :  1990  MRN: [id number]                     Primary Care Physician: Sissy Garibay MD    Chief Complaint:  32 year old female who presented to the emergency room with nausea, vomiting, diarrhea and abdominal pain which started this morning; no feer or cills; no sick contacts; she is taken an oral chemotherapy drug for a desmoid tumor; she has tried to take po antiemetics with no success    History of Present Illness:   As above    Past Medical History:  Past Medical History:   Diagnosis Date    Asthma     inhaler as needed    Cervical dysplasia     Depression     Desmoid tumor     back of right knee- pt desires to proceed with treatments after delivery    Gestational diabetes     Gestational hypertension     Iron deficiency anemia     Preeclampsia      Past Surgical History:  Past Surgical History:   Procedure Laterality Date    APPENDECTOMY N/A 10/1/2021    Procedure: APPENDECTOMY LAPAROSCOPIC;  Surgeon: Ahmet Dong Jr., MD;  Location: [facility];  Service: General;  Laterality: N/A;    KNEE ARTHROSCOPY      AGE 6    LEEP N/A 2019    Procedure: LOOP ELECTROCAUTERY EXCISION PROCEDURE;  Surgeon: Arsh Ray MD;  Location: [facility];  Service: Obstetrics/Gynecology    SOFT TISSUE BIOPSY Right 3/23/2022    Procedure: BIOPSY SOFT TISSUE THIGH / KNEE;  Surgeon: Chris Randall MD;  Location: [facility] OR;  Service: Orthopedics;  Laterality: Right;    WISDOM TOOTH EXTRACTION        Home Meds:  Medications Prior to Admission   Medication Sig Dispense Refill Last Dose    albuterol sulfate  (90 Base) MCG/ACT inhaler Inhale 2 puffs Every 4 (Four) Hours As Needed for Wheezing. 18 g 0     Blood Glucose Monitoring Suppl (FreeStyle Lite) w/Device kit USE 1 SEVEN TIMES DAILY       glucose monitor monitoring kit To check  "blood sugars 7x daily 1 each 0     ibuprofen (ADVIL,MOTRIN) 600 MG tablet Take 1 tablet by mouth Every 6 (Six) Hours As Needed for Mild Pain 50 tablet 3     Lancets (freestyle) lancets Pt to check blood sugar 7xs daily 15 each 12     ondansetron (ZOFRAN) 8 MG tablet Take 1 tablet by mouth 3 (Three) Times a Day As Needed for Nausea or Vomiting. 30 tablet 5     PAZOPanib (Votrient) 200 MG chemo tablet Take 3 tablets by mouth Daily. 90 tablet 5     Prenatal Vit-Fe Fumarate-FA (Prenatabs FA) 29-1 MG tablet Take 1 tablet by mouth Daily. 30 tablet 11     prochlorperazine (COMPAZINE) 10 MG tablet Take 1 tablet by mouth Every 6 (Six) Hours As Needed for Nausea or Vomiting. 30 tablet 3        Allergies:  Allergies   Allergen Reactions    Penicillins Nausea And Vomiting    Adhesive Tape Rash     \"SURGICAL TAPE\"  AS A CHILD     Immunizations:  Immunization History   Administered Date(s) Administered    FLUAD TRI 65YR+ 12/08/2011    Flu Vaccine Intradermal Quad 18-64YR 12/08/2011    FluLaval/Fluzone >6mos 11/10/2022    Influenza MDCK Quadrivalent with Preserative 12/08/2011    Influenza Seasonal Injectable 12/08/2011    MMR 08/14/2020    RHO (D) IG IV 05/26/2020, 12/22/2022    Rho (D) Immune Globulin 05/26/2020, 12/22/2022    Tdap 08/14/2020    flucelvax quad pfs =>4 YRS 01/14/2020     Social History:   Social History     Social History Narrative    Not on file     Social History     Socioeconomic History    Marital status: Single   Tobacco Use    Smoking status: Former     Packs/day: 0.50     Years: 4.00     Pack years: 2.00     Types: Cigarettes     Quit date: 2/8/2020     Years since quitting: 3.3     Passive exposure: Never    Smokeless tobacco: Never   Vaping Use    Vaping Use: Former    Substances: Nicotine    Devices: Disposable   Substance and Sexual Activity    Alcohol use: Not Currently     Comment: socially    Drug use: Not Currently     Types: Marijuana     Comment: daily, did not smoke during pregnancy    Sexual " "activity: Not Currently       Family History:  Family History   Problem Relation Age of Onset    Diabetes Father     Arthritis Maternal Grandmother     Lung cancer Maternal Grandfather     Heart attack Maternal Grandfather     Heart disease Maternal Grandfather     Stroke Maternal Grandfather     Hyperlipidemia Maternal Grandfather     Malig Hyperthermia Neg Hx     Breast cancer Neg Hx     Ovarian cancer Neg Hx     Uterine cancer Neg Hx     Colon cancer Neg Hx         Review of Systems  See history of present illness and past medical history.  Patient denies headache, dizziness, syncope, falls, trauma, change in vision, change in hearing, change in taste, changes in weight, changes in appetite, focal weakness, numbness, or paresthesia.  Patient denies chest pain, palpitations, dyspnea, orthopnea, PND, cough, sinus pressure, rhinorrhea, epistaxis, hemoptysis,  hematemesis,  constipation or hematochezia.  Denies cold or heat intolerance, polydipsia, polyuria, polyphagia. Denies hematuria, pyuria, dysuria, hesitancy, frequency or urgency. Denies consumption of raw and under cooked meats foods or change in water source.  Denies fever, chills, sweats, night sweats.  Denies missing any routine medications. Remainder of ROS is negative.    Objective:  T Max 24 hrs: Temp (24hrs), Av.2 °F (36.2 °C), Min:97.2 °F (36.2 °C), Max:97.2 °F (36.2 °C)    Vitals Ranges:   Temp:  [97.2 °F (36.2 °C)] 97.2 °F (36.2 °C)  Heart Rate:  [48-90] 57  Resp:  [18] 18  BP: (120-142)/() 138/75      Exam:  /75   Pulse 57   Temp 97.2 °F (36.2 °C)   Resp 18   Ht 177.8 cm (70\")   Wt 77.1 kg (170 lb)   SpO2 96%   BMI 24.39 kg/m²     General Appearance:    Alert, cooperative, no distress, appears stated age   Head:    Normocephalic, without obvious abnormality, atraumatic   Eyes:    PERRL, conjunctivae/corneas clear, EOM's intact, both eyes   Ears:    Normal external ear canals, both ears   Nose:   Nares normal, septum midline, " mucosa normal, no drainage    or sinus tenderness   Throat:   Lips, mucosa, and tongue normal   Neck:   Supple, symmetrical, trachea midline, no adenopathy;     thyroid:  no enlargement/tenderness/nodules; no carotid    bruit or JVD   Back:     Symmetric, no curvature, ROM normal, no CVA tenderness   Lungs:     Decreased breath sounds bilaterally, respirations unlabored   Chest Wall:    No tenderness or deformity    Heart:    Regular rate and rhythm, S1 and S2 normal, no murmur, rub   or gallop   Abdomen:     Soft, nontender, bowel sounds active all four quadrants,     no masses, no hepatomegaly, no splenomegaly   Extremities:   Extremities normal, atraumatic, no cyanosis or edema   Pulses:   2+ and symmetric all extremities   Skin:   Skin color, texture, turgor normal, no rashes or lesions               .    Data Review:  WBC   Date Value Ref Range Status   06/08/2023 22.63 (H) 3.40 - 10.80 10*3/mm3 Final     Hemoglobin   Date Value Ref Range Status   06/08/2023 17.2 (H) 12.0 - 15.9 g/dL Final     Hematocrit   Date Value Ref Range Status   06/08/2023 49.9 (H) 34.0 - 46.6 % Final     Platelets   Date Value Ref Range Status   06/08/2023 283 140 - 450 10*3/mm3 Final     Sodium   Date Value Ref Range Status   06/08/2023 138 136 - 145 mmol/L Final     Potassium   Date Value Ref Range Status   06/08/2023 4.2 3.5 - 5.2 mmol/L Final     Chloride   Date Value Ref Range Status   06/08/2023 99 98 - 107 mmol/L Final     CO2   Date Value Ref Range Status   06/08/2023 22.1 22.0 - 29.0 mmol/L Final     BUN   Date Value Ref Range Status   06/08/2023 13 6 - 20 mg/dL Final     Creatinine   Date Value Ref Range Status   06/08/2023 1.22 (H) 0.57 - 1.00 mg/dL Final     Glucose   Date Value Ref Range Status   06/08/2023 126 (H) 65 - 99 mg/dL Final     Calcium   Date Value Ref Range Status   06/08/2023 10.8 (H) 8.6 - 10.5 mg/dL Final     AST (SGOT)   Date Value Ref Range Status   06/08/2023 24 1 - 32 U/L Final     ALT (SGPT)   Date Value  Ref Range Status   06/08/2023 21 1 - 33 U/L Final     Alkaline Phosphatase   Date Value Ref Range Status   06/08/2023 71 39 - 117 U/L Final                Imaging Results (All)       Procedure Component Value Units Date/Time    CT Abdomen Pelvis With Contrast [[id number]] Collected: 06/08/23 1656     Updated: 06/08/23 1837    Narrative:      CT ABDOMEN AND PELVIS WITH IV CONTRAST     HISTORY: 32-year-old female with nausea and vomiting. Upper abdominal  pain. Leukocytosis. Appendectomy in the past.     TECHNIQUE: Radiation dose reduction techniques were utilized, including  automated exposure control and exposure modulation based on body size.   3 mm images were obtained through the abdomen and pelvis after the  administration of IV contrast. Compared with previous CT 10/01/2021.     FINDINGS: The liver, gallbladder, spleen, pancreas, adrenals, and  kidneys appear unremarkable. There is no formed stool within the colon  which is either fluid-filled or collapsed. There is no definitive  colonic thickening. There is also fluid-filled ileum without definitive  thickening of the wall. There is no free fluid or lymphadenopathy.  Uterus and adnexa appear unremarkable. There is a stable small  periumbilical ventral hernia containing fat. There is a new tiny midline  upper abdominal ventral hernia containing fat, series 2 image 42.       Impression:      1. Fluid-filled colon with no evidence for colitis. Please correlate  clinically for gastroenteritis.  2. New tiny midline upper abdominal ventral hernia containing fat.     This report was finalized on 6/8/2023 6:34 PM by Dr. Dalila Fontana M.D.                 Assessment:  Active Hospital Problems    Diagnosis  POA    **Generalized abdominal pain [R10.84]  Yes      Resolved Hospital Problems   No resolved problems to display.   Nausea and vomiting  Diarrhea  Hyperglycemia  Acute kidney injury      Plan:  Will ask oncology to see her  Iv fluids  Trend labs  Stool studies  ordered  Dw patient and ed provider    Soo Mast MD  6/8/2023  21:46 EDT

## 2023-06-09 NOTE — PLAN OF CARE
Goal Outcome Evaluation:  Plan of Care Reviewed With: patient           Outcome Evaluation: Patient arrived from ER around 2145 on wheelchair admitted for abdonimal pain, alert and oriented x 4, vital signs stable, no complaint of nausea and vomiting, no diarrhea/BM at this time, complaint of pain refused to take PRN Tylenol per pt will not help much, requested for Morphine, APRN informed with orders. oriented to room and call light, needs attended.

## 2023-06-09 NOTE — PROGRESS NOTES
Nutrition Services    Patient Name:  Beena Vincent  YOB: 1990  MRN: [id number]  Admit Date:  6/8/2023    Assessment Date:  06/09/23    Comment: Visited patient at bedside who reports she has an appetite but nothing tastes good due to the chemo pills she has been taking for the desmoid tumor behind her R knee cap. She reports being on chemo medication for 3 weeks. She reports abdominal pain today. Patient attributes her 20# wt loss to having a baby 3 months ago. Per EMR she ate 25 % of breakfast and states that actually tasted good but her burger for lunch did not. RD suggested a baking soda mouthwash TID to help her taste better at meal times. She declined ONS. Will continue to follow       CLINICAL NUTRITION ASSESSMENT      Reason for Assessment MST score 2+     Diagnosis/Problem   Generalized abdominal pain    Medical/Surgical History Past Medical History:   Diagnosis Date    Asthma     inhaler as needed    Cervical dysplasia     Depression     Desmoid tumor 2022    back of right knee- pt desires to proceed with treatments after delivery    Gestational diabetes     Gestational hypertension 2020    Iron deficiency anemia     Preeclampsia 2012       Past Surgical History:   Procedure Laterality Date    APPENDECTOMY N/A 10/1/2021    Procedure: APPENDECTOMY LAPAROSCOPIC;  Surgeon: Ahmet Dong Jr., MD;  Location: [facility];  Service: General;  Laterality: N/A;    KNEE ARTHROSCOPY      AGE 6    LEEP N/A 4/25/2019    Procedure: LOOP ELECTROCAUTERY EXCISION PROCEDURE;  Surgeon: Arsh Ray MD;  Location: [facility];  Service: Obstetrics/Gynecology    SOFT TISSUE BIOPSY Right 3/23/2022    Procedure: BIOPSY SOFT TISSUE THIGH / KNEE;  Surgeon: Chris Randall MD;  Location: [facility];  Service: Orthopedics;  Laterality: Right;    WISDOM TOOTH EXTRACTION          Encounter Information        Nutrition History:     Food Preferences:    Supplements:    Factors Affecting Intake: abdominal  "pain, taste changes     Anthropometrics        Current Height  Current Weight  BMI kg/m2 Height: 177.8 cm (70\")  Weight: 77.1 kg (170 lb) (06/08/23 1527)  Body mass index is 24.39 kg/m².   Adjusted BMI (if applicable)    BMI Category Normal/Healthy (18.4 - 24.9)       Admission Weight 170# (77.1 kg)        Ideal Body Weight (IBW) 151#    Adjusted IBW (if applicable)        Usual Body Weight (UBW)    Weight Change/Trend Loss, Amount/Timeframe:  20# wt loss to having a baby 3 months ago       Weight History Wt Readings from Last 30 Encounters:   06/08/23 1527 77.1 kg (170 lb)   05/24/23 1231 79.4 kg (175 lb)   05/19/23 1144 80.3 kg (177 lb 1.6 oz)   04/28/23 1150 81 kg (178 lb 9.6 oz)   04/20/23 1126 80.3 kg (177 lb)   03/27/23 1012 79 kg (174 lb 3.2 oz)   03/23/23 1405 78.5 kg (173 lb 1.6 oz)   03/15/23 1709 86.2 kg (190 lb)   03/07/23 1300 86.5 kg (190 lb 9.6 oz)   03/02/23 1027 86.6 kg (191 lb)   02/28/23 1025 86.3 kg (190 lb 3.2 oz)   02/24/23 1036 86.6 kg (191 lb)   02/20/23 1218 86.8 kg (191 lb 6.4 oz)   02/16/23 1040 86.3 kg (190 lb 3.2 oz)   02/15/23 1123 86.2 kg (190 lb)   02/02/23 1134 84.6 kg (186 lb 6.4 oz)   01/20/23 1131 83.8 kg (184 lb 12.8 oz)   01/05/23 1016 81.3 kg (179 lb 3.2 oz)   12/22/22 1146 80.3 kg (177 lb)   12/09/22 1055 79.5 kg (175 lb 3.2 oz)   11/10/22 1428 75.8 kg (167 lb)   10/28/22 1026 77.7 kg (171 lb 6.4 oz)   10/06/22 1041 71.1 kg (156 lb 12.8 oz)   09/08/22 1126 69.5 kg (153 lb 3.2 oz)   08/08/22 0924 67.1 kg (148 lb)   08/05/22 1326 68 kg (150 lb)   07/28/22 0958 66.6 kg (146 lb 12.8 oz)   06/02/22 1047 65.7 kg (144 lb 14.4 oz)   04/21/22 1050 68.6 kg (151 lb 4.8 oz)   03/23/22 1157 73.4 kg (161 lb 14.4 oz)   03/22/22 1608 79.8 kg (176 lb)           --  Estimated/Assessed Needs        Current Weight  Weight: 77.1 kg (170 lb) (06/08/23 1527)       Energy Requirements    Weight for Calculation 170# (77.1 kg)    Method for Estimation  25 kcal/kg, 30 kcal/kg   EST Needs (kcal/day) " 1878-2313       Protein Requirements    Weight for Calculation 170# (77.1 kg)    EST Protein Needs (g/kg) 1.0 - 1.2 gm/kg   EST Daily Needs (g/day) 77-93       Fluid Requirements     Method for Estimation 1 mL/kcal    EST Needs (mL/day) 1878-2313     Tests/Procedures        Tests/Procedures CT scan, Ultrasound     Labs       Pertinent Labs    Results from last 7 days   Lab Units 06/09/23  0547 06/08/23  1516   SODIUM mmol/L 138 138   POTASSIUM mmol/L 4.2 4.2   CHLORIDE mmol/L 106 99   CO2 mmol/L 25.3 22.1   BUN mg/dL 9 13   CREATININE mg/dL 0.87 1.22*   CALCIUM mg/dL 8.7 10.8*   BILIRUBIN mg/dL  --  1.5*   ALK PHOS U/L  --  71   ALT (SGPT) U/L  --  21   AST (SGOT) U/L  --  24   GLUCOSE mg/dL 102* 126*     Results from last 7 days   Lab Units 06/09/23  0547 06/08/23  1516   HEMOGLOBIN g/dL 11.8* 17.2*   HEMATOCRIT % 34.3 49.9*   WBC 10*3/mm3 6.99 22.63*   ALBUMIN g/dL  --  5.4*     Results from last 7 days   Lab Units 06/09/23  0547 06/08/23  1516   PLATELETS 10*3/mm3 172 283     COVID19   Date Value Ref Range Status   07/17/2022 Detected (C) Not Detected - Ref. Range Final     Lab Results   Component Value Date    HGBA1C 5.10 02/02/2023          Medications           Scheduled Medications prenatal vitamin, 1 tablet, Oral, Daily  senna-docusate sodium, 2 tablet, Oral, BID       Infusions sodium chloride, 125 mL/hr, Last Rate: Stopped (06/09/23 1030)       PRN Medications   acetaminophen **OR** acetaminophen **OR** acetaminophen    albuterol    senna-docusate sodium **AND** polyethylene glycol **AND** bisacodyl **AND** bisacodyl    Morphine    ondansetron    prochlorperazine     Physical Findings          Physical Appearance alert, oriented   Oral/Mouth Cavity WNL   Edema  no edema   Gastrointestinal last bowel movement:6/8   Skin  skin intact   Tubes/Drains/Lines none   NFPE Not indicated at this time   --  Current Nutrition Orders & Evaluation of Intake       Oral Nutrition     Food Allergies NKFA   Current PO Diet  Diet: Regular/House Diet; Texture: Regular Texture (IDDSI 7); Fluid Consistency: Thin (IDDSI 0)   Supplement n/a   PO Evaluation     % PO Intake     # of Days Evaluated    --  PES STATEMENT / NUTRITION DIAGNOSIS      Nutrition Dx Problem  Problem: Inadequate Nutrient Intake  Etiology: Factors Affecting Nutrition and MNT for Treatment/Condition taste changes   Signs/Symptoms: PO intake and Report of Minimal PO Intake    Comment:    --  NUTRITION INTERVENTION / PLAN OF CARE      Intervention Goal(s) Reduce/improve symptoms, Meet estimated needs, Disease management/therapy, Tolerate PO , Increase intake, and No significant weight loss         RD Intervention/Action Interview for preferences, Supplement offered/refused, Encourage intake, Follow Tx Progress, and Care plan reviewed         Prescription/Orders:       PO Diet       Supplements       Snacks       Enteral Nutrition       Parenteral Nutrition    New Prescription Ordered? No changes at this time   --      Monitor/Evaluation Per protocol, I&O, PO intake, Pertinent labs, Weight, Skin status, GI status, Symptoms, POC/GOC   Discharge Plan/Needs No discharge needs identified at this time   Education Will instruct as appropriate   --    RD to follow per protocol.      Electronically signed by:  Avani Smith RD  06/09/23 15:46 EDT

## 2023-06-09 NOTE — CONSULTS
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    HISTORY OF PRESENT ILLNESS:  The patient is a 32 y.o. year old female who is here for an opinion about the above issue.  The patient is a 32-year-old female who presented to the ER 6/8/2023 with nausea, vomiting diarrhea as well as abdominal pain.  Antiemetics were not successful and she was admitted for symptomatic control.    Her medical history significant for asthma & depression who had first noted a swelling behind her right knee in December 2021. The swelling was progressive and became painful with knee flexion. A MRI right knee done 3/11/22 demonstrated a 10 x 3.5 x 4.4 cm enhancing soft tissue mass lesion along the posterolateral right knee interposed between the distal biceps femoris and the lateral gastrocnemius muscles. The mass appears to infiltrate or arise from the distal biceps femoris. The lesion displaces and closely abuts the common peroneal nerve.      Patient was seen by , ortho - who obtained a biopsy of the mass on 3/23/22. 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       recommended against upfront resection at this time & referred to this clinic to discuss systemic therapy options.      Patient is accompanied by her mother. C/o pain & swelling behind right knee. Denies any association with menstrual cycles. She does report of > 20 lbs unintentional weight loss. Denies any abdominal pain, nausea or vomiting. Denies any other swelling anywhere. No family history of FAP, colon cancer or any malignancies.      A CT chest abdomen and pelvis from 4/26/2020 showed an asymmetric 1 cm nodule in the outer quadrant of right breast,  favoring benign etiology.  No other soft tissue masses noted in this patient with history of desmoid fibromatosis.     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      Patient was lost to follow-up after the June 2022 visit.  She was seen again in March 2023.  Patient states she had become pregnant and delivered a baby boy on 3/9/2023.  Normal vaginal delivery.  Normal postpartum period.      She did get an MRI of her right knee performed 1/29/2023, which noted significant increase in size of the right knee posterolateral mass, now measuring 8 x 6 x 18 cm.     4/13/2023: MRI Right knee demonstrates mild increase in size compared to January 2023.  4/17/2023: Started on tamoxifen 20 mg daily and sulindac 300 mg daily.    The patient last been seen 5/19/2023 and felt to be progressing with discomfort in the right knee region and difficulty walking.  This led to a switch from tamoxifen and sulindac to pazopanib 800 mg daily.  Concurrent issues at that point included CT chest and pelvis 4/26/2022 without new abnormalities except a 1 cm right outer breast lesion thought to be benign and plans to undergo a right breast ultrasound in July 2023 as well as have her undergo genetic counseling.  Records indicate by June 1 and follow-up with the pharmacist that the patient had noted that her tumor mass was shrinking in size but when she began to have nausea and vomiting 6/8/2023 despite having reduced her dose to 600 mg she was still having the same symptoms.  When she did not improve she presented to the emergency room.  Her studies at that point failed to show any abnormalities on CT concerning colitis, CBC with H&H of 17.2 and 49.9 white count of 22,630 and platelet count of 283,000, BUN/creatinine of 13 and 1.22, glucose 126, calcium 10.8, total protein 8.9 albumin 5.4, normal transaminases, total bilirubin 1.5, lipase of 25 negative pregnancy testing.   Patient was admitted for IV fluids, antiemetics, pazaponib held.    Interval history:  6/9/2023  T97.6, pulse 54, respiratory 16, /69  Patient feeling improved with less nausea, discussed her status with her primary oncologist-Dr. Molina-and would plan to restart  Pazapanib at 200 mg upon discharge and slow escalation thereafter with follow-up appointment already scheduled in office mid June 16 2023  H&H 11.8 and 34.3, white count 6990, platelet count of 172,000, BUN/creatinine of 9 and 0.87          Past Medical History:   Diagnosis Date    Asthma     inhaler as needed    Cervical dysplasia     Depression     Desmoid tumor 2022    back of right knee- pt desires to proceed with treatments after delivery    Gestational diabetes     Gestational hypertension 2020    Iron deficiency anemia     Preeclampsia 2012        Past Surgical History:   Procedure Laterality Date    APPENDECTOMY N/A 10/1/2021    Procedure: APPENDECTOMY LAPAROSCOPIC;  Surgeon: Ahmet Dong Jr., MD;  Location: [facility];  Service: General;  Laterality: N/A;    KNEE ARTHROSCOPY      AGE 6    LEEP N/A 4/25/2019    Procedure: LOOP ELECTROCAUTERY EXCISION PROCEDURE;  Surgeon: Arsh Ray MD;  Location: [facility];  Service: Obstetrics/Gynecology    SOFT TISSUE BIOPSY Right 3/23/2022    Procedure: BIOPSY SOFT TISSUE THIGH / KNEE;  Surgeon: Chris Randall MD;  Location: [facility];  Service: Orthopedics;  Laterality: Right;    WISDOM TOOTH EXTRACTION          No current facility-administered medications on file prior to encounter.     Current Outpatient Medications on File Prior to Encounter   Medication Sig Dispense Refill    ondansetron (ZOFRAN) 8 MG tablet Take 1 tablet by mouth 3 (Three) Times a Day As Needed for Nausea or Vomiting. 30 tablet 5    PAZOPanib (Votrient) 200 MG chemo tablet Take 3 tablets by mouth Daily. 90 tablet 5    albuterol sulfate  (90 Base) MCG/ACT inhaler Inhale 2 puffs Every 4 (Four) Hours As  "Needed for Wheezing. 18 g 0    Blood Glucose Monitoring Suppl (FreeStyle Lite) w/Device kit USE 1 SEVEN TIMES DAILY      glucose monitor monitoring kit To check blood sugars 7x daily 1 each 0    ibuprofen (ADVIL,MOTRIN) 600 MG tablet Take 1 tablet by mouth Every 6 (Six) Hours As Needed for Mild Pain 50 tablet 3    Lancets (freestyle) lancets Pt to check blood sugar 7xs daily 15 each 12    Prenatal Vit-Fe Fumarate-FA (Prenatabs FA) 29-1 MG tablet Take 1 tablet by mouth Daily. 30 tablet 11    prochlorperazine (COMPAZINE) 10 MG tablet Take 1 tablet by mouth Every 6 (Six) Hours As Needed for Nausea or Vomiting. 30 tablet 3        ALLERGIES:    Allergies   Allergen Reactions    Penicillins Nausea And Vomiting    Adhesive Tape Rash     \"SURGICAL TAPE\"  AS A CHILD        Social History     Socioeconomic History    Marital status: Single   Tobacco Use    Smoking status: Former     Packs/day: 0.50     Years: 4.00     Pack years: 2.00     Types: Cigarettes     Quit date: 2/8/2020     Years since quitting: 3.3     Passive exposure: Never    Smokeless tobacco: Never   Vaping Use    Vaping Use: Former    Substances: Nicotine    Devices: Disposable   Substance and Sexual Activity    Alcohol use: Not Currently     Comment: socially    Drug use: Not Currently     Types: Marijuana     Comment: daily, did not smoke during pregnancy    Sexual activity: Not Currently        Family History   Problem Relation Age of Onset    Diabetes Father     Arthritis Maternal Grandmother     Lung cancer Maternal Grandfather     Heart attack Maternal Grandfather     Heart disease Maternal Grandfather     Stroke Maternal Grandfather     Hyperlipidemia Maternal Grandfather     Malig Hyperthermia Neg Hx     Breast cancer Neg Hx     Ovarian cancer Neg Hx     Uterine cancer Neg Hx     Colon cancer Neg Hx         Review of Systems   See history of present illness, nausea and vomiting on current medication.  Objective     Vitals:    06/08/23 2016 06/08/23 " 2046 06/08/23 2211 06/09/23 0545   BP: 128/98 138/75 144/81 122/69   BP Location:   Left arm Left arm   Patient Position:   Lying Lying   Pulse: (!) 49 57 68 54   Resp:   16 16   Temp:   97.4 °F (36.3 °C) 97.6 °F (36.4 °C)   TempSrc:   Oral Oral   SpO2: 97% 96% 99% 94%   Weight:       Height:             5/19/2023    11:45 AM   Current Status   ECOG score 0       Physical Exam  Constitutional:       Appearance: Normal appearance. She is normal weight.   HENT:      Head: Normocephalic and atraumatic.      Mouth/Throat:      Mouth: Mucous membranes are moist.      Pharynx: Oropharynx is clear.   Eyes:      Extraocular Movements: Extraocular movements intact.      Conjunctiva/sclera: Conjunctivae normal.      Pupils: Pupils are equal, round, and reactive to light.   Cardiovascular:      Rate and Rhythm: Normal rate and regular rhythm.   Pulmonary:      Effort: Pulmonary effort is normal.      Breath sounds: Normal breath sounds.   Abdominal:      General: Bowel sounds are normal.      Palpations: Abdomen is soft.   Musculoskeletal:         General: Swelling (Right knee with swelling noted, reduced according to patient's understanding) present.      Cervical back: Normal range of motion and neck supple.   Skin:     General: Skin is warm and dry.   Neurological:      General: No focal deficit present.      Mental Status: She is oriented to person, place, and time.   Psychiatric:         Mood and Affect: Mood normal.         Behavior: Behavior normal.         RECENT LABS:  Hematology WBC   Date Value Ref Range Status   06/09/2023 6.99 3.40 - 10.80 10*3/mm3 Final     RBC   Date Value Ref Range Status   06/09/2023 4.16 3.77 - 5.28 10*6/mm3 Final     Hemoglobin   Date Value Ref Range Status   06/09/2023 11.8 (L) 12.0 - 15.9 g/dL Final     Hematocrit   Date Value Ref Range Status   06/09/2023 34.3 34.0 - 46.6 % Final     Platelets   Date Value Ref Range Status   06/09/2023 172 140 - 450 10*3/mm3 Final          Assessment &  Plan     # Right knee desmoid tumor:  Pt had first noted a swelling behind her right knee in December 2021. The swelling was progressive and became painful with knee flexion.   A MRI right knee done 3/11/22 demonstrated a 10 x 3.5 x 4.4 cm enhancing soft tissue mass lesion along the posterolateral right knee interposed between the distal biceps femoris and the lateral gastrocnemius muscles. The mass appears to infiltrate or arise from the distal biceps femoris. The lesion displaces and closely abuts the common peroneal nerve.   Patient was seen by , ortho - who obtained a biopsy of the mass on 3/23/22. 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    recommended against upfront resection at this time & referred to this clinic to discuss systemic therapy options.   I discussed the natural history of desmoid tumors including high rates of local recurrence (~ 50% cases) , specially R1 resection. Also discussed ~ 25% cases have spontaneous regression. Discussed role of systemic therapy options with TKIs and tamoxifen/NSAIDS.   Patient was then seen by Dr. Solis, surgical oncology with the [facility].  Plan at that time was made for surveillance.  Patient then became pregnant, hence plan for any systemic treatment was deferred until delivery.  Patient delivered her baby boy on 3/9/2023.  A MRI of her right knee performed 1/29/2023, which noted significant increase in size of the right knee posterolateral mass, now measuring 8 x 6 x 18 cm.  3/23/2023: Patient seen back in the clinic today.  The right knee posterolateral mass has significantly increased since last evaluation.  Patient reports of discomfort and numbness around the knee.  She has difficulty with walking and bending her knees.  Reviewed treatment options with tamoxifen + sulindac vs a TKI.  Given recent delivery and potential adverse effects related to TKI while managing 3 young  children at home, will not start TKI at this time and consider it down the line. As this tumor appears to be driven/propagated by estrogen and recent pregnancy, plan made to start treatment with tamoxifen 20 mg daily and sulindac 300 mg daily.  I reviewed the risks associated with tamoxifen and sulindac, including increased risk of blood clots, gastritis/PUD.  Patient is agreeable to proceed with the treatment.   Patient started taking tamoxifen/sulindac on 4/17/23.  5/19/2023: Seen for a follow-up.  Patient has been compliant with tamoxifen and sulindac.  Patient reports of no significant improvement in her symptoms and thinks the appear to be slightly worse than last month.  Discussed plan to switch treatment to pazopanib 800 mg daily.  Follow-up in 4 weeks or sooner if needed.  Patient intolerant to 800 mg dosing of panzopanib, virtual intolerant to 600 mg daily.  Patient seen in the hospital with improvement according to patient and decreased range of motion.  We have discussed restarting the medication after discharge - pazapanib at 200 mg daily and slow escalation.     # Unintentional weight loss: Cause unclear.  CT C/A/P performed 4/26/22 did not show any major abnormalities.  It did show a 1 cm right outer breast lesion, likely benign.  Patient was encouraged to increase her nutritional intake.  We will consider referral to nutrition.     #Right breast lesion:   US right breast performed 1/9/2023 noted a 1.1 cm probable benign fibroadenoma at 8 o'clock position, 4 cm from the nipple.  6 months sonographic follow-up is recommended.  We will proceed with ultrasound right breast during this hospitalization     # Encounter for genetic & chromosomal abnormalities: Will her young age, family history (aunt) of breast cancer & desmoid tumors association with FAP and gardners syndrome,an [employer] germline testing was performed. 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   Will refer to genetic counseling.     PLAN:     Right breast ultrasound  Hold pazapanib only restarting upon discharge  Complete GI assessment  Expected discharge to the weekend considering rapid improvement in her symptoms  Follow-up in [facility] office 6/16/2023-discussed with Dr. Molina-at that point she will be on lower dose pazapanib at 200 mg daily.  Slow escalation thereafter.  She is responding to this medication.

## 2023-06-09 NOTE — PROGRESS NOTES
Name: Beena Vincent ADMIT: 2023   : 1990  PCP: Sissy Garibay MD    MRN: [id number] LOS: 0 days   AGE/SEX: 32 y.o. female  ROOM: Count includes the [facility]     Subjective   Subjective   Patient appears comfortable and in no apparent distress.  States feeling much better.  Tolerated full regular breakfast today.  Voices no new concerns.    Denies recent exposure to illness, chest pain, shortness of breath.  Reports nausea, vomiting, & diarrhea resolved.       Objective   Objective   Vital Signs  Temp:  [97.2 °F (36.2 °C)-98.3 °F (36.8 °C)] 98.3 °F (36.8 °C)  Heart Rate:  [48-90] 65  Resp:  [16-18] 16  BP: (120-144)/() 134/68  SpO2:  [93 %-99 %] 99 %  on   ;   Device (Oxygen Therapy): room air  Body mass index is 24.39 kg/m².    Physical Exam  Constitutional:       General: She is not in acute distress.     Appearance: She is not toxic-appearing.   Cardiovascular:      Rate and Rhythm: Normal rate.      Heart sounds: Normal heart sounds.   Pulmonary:      Effort: Pulmonary effort is normal.      Breath sounds: Normal breath sounds.   Abdominal:      General: Bowel sounds are normal. There is no distension.      Palpations: Abdomen is soft.      Tenderness: There is no abdominal tenderness. There is no guarding.   Musculoskeletal:      Right lower leg: No edema.      Left lower leg: No edema.   Skin:     General: Skin is warm and dry.   Neurological:      Mental Status: She is alert.     Results Review     I reviewed the patient's new clinical results.  Results from last 7 days   Lab Units 23  0547 23  1516   WBC 10*3/mm3 6.99 22.63*   HEMOGLOBIN g/dL 11.8* 17.2*   PLATELETS 10*3/mm3 172 283     Results from last 7 days   Lab Units 23  0547 23  1516   SODIUM mmol/L 138 138   POTASSIUM mmol/L 4.2 4.2   CHLORIDE mmol/L 106 99   CO2 mmol/L 25.3 22.1   BUN mg/dL 9 13   CREATININE mg/dL 0.87 1.22*   GLUCOSE mg/dL 102* 126*   EGFR mL/min/1.73 92.1 61.4     Results from last 7 days   Lab Units  06/08/23  1516   ALBUMIN g/dL 5.4*   BILIRUBIN mg/dL 1.5*   ALK PHOS U/L 71   AST (SGOT) U/L 24   ALT (SGPT) U/L 21     Results from last 7 days   Lab Units 06/09/23  0547 06/08/23  1516   CALCIUM mg/dL 8.7 10.8*   ALBUMIN g/dL  --  5.4*       No results found for: HGBA1C, POCGLU    CT Abdomen Pelvis With Contrast    Result Date: 6/8/2023  1. Fluid-filled colon with no evidence for colitis. Please correlate clinically for gastroenteritis. 2. New tiny midline upper abdominal ventral hernia containing fat.  This report was finalized on 6/8/2023 6:34 PM by Dr. Dalila Fontana M.D.      US Breast Right Limited    Result Date: 6/9/2023  Stable 1 cm probable benign fibroadenoma in the right breast at the 8-o'clock position on the order of 4 cm from the nipple. Short-term sonographic follow-up at 6-9 month intervals is recommended through 01/09/2025 to demonstrate 2-year stability.  BI-RADS Category 3: Probably benign finding.  This report was finalized on 6/9/2023 12:54 PM by Dr. Jesus Parsons M.D.     I have personally reviewed all medications:  Scheduled Medications  prenatal vitamin, 1 tablet, Oral, Daily  senna-docusate sodium, 2 tablet, Oral, BID    Infusions  sodium chloride, 125 mL/hr, Last Rate: Stopped (06/09/23 1030)    Diet  Diet: Regular/House Diet; Texture: Regular Texture (IDDSI 7); Fluid Consistency: Thin (IDDSI 0)    I have personally reviewed:  [x]  Laboratory   []  Microbiology   [x]  Radiology   [x]  EKG/Telemetry  []  Cardiology/Vascular   []  Pathology    []  Records       Assessment/Plan     Active Hospital Problems    Diagnosis  POA    **Generalized abdominal pain [R10.84]  Yes    Nausea vomiting and diarrhea [R11.2, R19.7]  Yes    Acute kidney injury [N17.9]  Yes    Leukocytosis [D72.829]  Yes    Desmoid tumor [D48.1]  Yes      Resolved Hospital Problems   No resolved problems to display.       32 y.o. female admitted with Generalized abdominal pain.    Reports acute onset nausea, vomiting, &  diarrhea (resolved today) beginning on 6/8/2023 with inability to tolerate chemotherapy drug (started approximately 3 weeks ago) for desmoid tumor; therefore, went to [facility] ER for further evaluation.    Leukocytosis 22,000 normalized following 2 L IV fluid bolus and empiric antibiotic therapy.  Suspect leukemoid reaction to possible viral gastroenteritis and hemoconcentration due to nausea, vomiting, diarrhea given serum hemoglobin 17 normalized to 11.8 g/dL as well.  IV fluids continued.  Regular diet tolerated on 6/9/2023.  Continue for now.  Antiemetics as needed.  Stool studies ordered.    LOUIE:  Prerenal due to above.  Serum creatinine 1.2 increased from 0.9 & normalized with IVF.    CT abdomen pelvis unremarkable for colitis.    HCG negative.    Oncology following plan right breast ultrasound, hold pazapanib for now, and potential discharge over the weekend with follow-up CBC office on 6/16/2023 with anticipated low dose of pazapanib 200 mg daily.    SCDs for DVT prophylaxis.  Full code.  Discussed with patient and nursing staff.  Anticipate discharge home when cleared by consultants..      BERTHA Chadwick  [facility]  06/09/23  14:18 EDT

## 2023-06-10 ENCOUNTER — READMISSION MANAGEMENT (OUTPATIENT)
Dept: CALL CENTER | Facility: HOSPITAL | Age: 33
End: 2023-06-10
Payer: COMMERCIAL

## 2023-06-10 VITALS
RESPIRATION RATE: 16 BRPM | TEMPERATURE: 97.8 F | BODY MASS INDEX: 24.34 KG/M2 | DIASTOLIC BLOOD PRESSURE: 86 MMHG | HEART RATE: 53 BPM | WEIGHT: 170 LBS | SYSTOLIC BLOOD PRESSURE: 134 MMHG | HEIGHT: 70 IN | OXYGEN SATURATION: 98 %

## 2023-06-10 PROCEDURE — 99232 SBSQ HOSP IP/OBS MODERATE 35: CPT | Performed by: INTERNAL MEDICINE

## 2023-06-10 PROCEDURE — G0378 HOSPITAL OBSERVATION PER HR: HCPCS

## 2023-06-10 PROCEDURE — 96361 HYDRATE IV INFUSION ADD-ON: CPT

## 2023-06-10 PROCEDURE — 25010000002 MORPHINE PER 10 MG: Performed by: NURSE PRACTITIONER

## 2023-06-10 PROCEDURE — 96376 TX/PRO/DX INJ SAME DRUG ADON: CPT

## 2023-06-10 RX ADMIN — MORPHINE SULFATE 2 MG: 2 INJECTION, SOLUTION INTRAMUSCULAR; INTRAVENOUS at 11:38

## 2023-06-10 RX ADMIN — MORPHINE SULFATE 2 MG: 2 INJECTION, SOLUTION INTRAMUSCULAR; INTRAVENOUS at 04:58

## 2023-06-10 RX ADMIN — ACETAMINOPHEN 650 MG: 325 TABLET, FILM COATED ORAL at 09:17

## 2023-06-10 RX ADMIN — DOCUSATE SODIUM 50MG AND SENNOSIDES 8.6MG 2 TABLET: 8.6; 5 TABLET, FILM COATED ORAL at 08:37

## 2023-06-10 RX ADMIN — MORPHINE SULFATE 2 MG: 2 INJECTION, SOLUTION INTRAMUSCULAR; INTRAVENOUS at 08:37

## 2023-06-10 RX ADMIN — MORPHINE SULFATE 2 MG: 2 INJECTION, SOLUTION INTRAMUSCULAR; INTRAVENOUS at 00:54

## 2023-06-10 RX ADMIN — SODIUM CHLORIDE 125 ML/HR: 9 INJECTION, SOLUTION INTRAVENOUS at 03:32

## 2023-06-10 NOTE — DISCHARGE SUMMARY
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     N/V/D resolved on admission and has not recurred. Suspect due to either viral GE or as side effect of pazapanib. WBC has normalized and Cr has normalized. Tolerating regular diet. Eager to go home. Heme/Onc okay with her dc home this weekend on much lower dose of pazapanib and f/u with Dr. Molina on .     She c/o some mild bilateral lower back pain that started yesterday. No injury. No radiation into either lower extremity. No urinary symptoms. I suspect she has muscle strain due to vomiting prior to admission. Recommend rest, ice, and OTC NSAIDs for the next couple days. If persists or worsens she can return to ER or f/u with PCP or UCC.    Day of Discharge     Subjective:  Feeling much better today. Tolerating regular diet. No N/V/D. No F/C/NS. Voiding well. C/o some mild bilateral low back pain since yesterday (see above).    Physical Exam:  Temp:  [97.4 °F (36.3 °C)-98.4 °F (36.9 °C)] 97.6 °F (36.4 °C)  Heart Rate:  [48-69] 48  Resp:  [16] 16  BP: (134-146)/(68-88) 135/88  Body mass index is 24.39 kg/m².  Physical Exam  Vitals and nursing note reviewed.   Constitutional:       General: She is not in acute distress.     Appearance: She is not  ill-appearing, toxic-appearing or diaphoretic.   HENT:      Head: Normocephalic.      Nose: Nose normal.      Mouth/Throat:      Mouth: Mucous membranes are moist.      Pharynx: Oropharynx is clear.   Eyes:      General: No scleral icterus.        Right eye: No discharge.         Left eye: No discharge.      Extraocular Movements: Extraocular movements intact.      Conjunctiva/sclera: Conjunctivae normal.   Cardiovascular:      Rate and Rhythm: Normal rate and regular rhythm.      Pulses: Normal pulses.   Pulmonary:      Effort: Pulmonary effort is normal. No respiratory distress.      Breath sounds: Normal breath sounds. No wheezing or rales.   Abdominal:      General: Bowel sounds are normal. There is no distension.      Palpations: Abdomen is soft.      Tenderness: There is no abdominal tenderness.   Musculoskeletal:         General: No swelling or deformity. Normal range of motion.      Cervical back: Neck supple. No rigidity.      Comments: Mild TTP over paraspinous musculature in lumbar area, no spinous process TTP or stepoff, no spasm   Lymphadenopathy:      Cervical: No cervical adenopathy.   Skin:     General: Skin is warm and dry.      Capillary Refill: Capillary refill takes less than 2 seconds.      Coloration: Skin is not jaundiced.   Neurological:      General: No focal deficit present.      Mental Status: She is alert and oriented to person, place, and time. Mental status is at baseline.      Cranial Nerves: No cranial nerve deficit.      Coordination: Coordination normal.   Psychiatric:         Mood and Affect: Mood normal.         Behavior: Behavior normal.         Thought Content: Thought content normal.       Consultants     Consult Orders (all) (From admission, onward)       Start     Ordered    06/08/23 2149  Inpatient Hematology & Oncology Consult  Once        Specialty:  Hematology and Oncology  Provider:  Arsh Black II, MD    06/08/23 2148 06/08/23 1804  A (on-call MD unless  specified) Details  Once        Specialty:  Hospitalist  Provider:  (Not yet assigned)    06/08/23 1803                  Procedures     * Surgery not found *      Imaging Results (All)       Procedure Component Value Units Date/Time    US Breast Right Limited [[id number]] Collected: 06/09/23 1246     Updated: 06/09/23 1257    Narrative:      LIMITED RIGHT BREAST SONOGRAPHY     HISTORY: 32-year-old female undergoing short-term follow-up evaluation  of a right breast lesion.     FINDINGS: Targeted sonographic evaluation of the right breast was  performed through the 8-o'clock position on the order of 4 cm from the  nipple. Comparison is made to prior right breast sonography dated  01/09/2023.     At this location there is a 1.0 x 1.0 x 0.4 cm oval circumscribed  hypoechoic mass that previously measured 1.1 x 1.1 x 0.4 cm. No  detectable internal or peripheral vascularity is appreciated. The  imaging features are most consistent with a benign fibroadenoma.       Impression:      Stable 1 cm probable benign fibroadenoma in the right breast  at the 8-o'clock position on the order of 4 cm from the nipple.  Short-term sonographic follow-up at 6-9 month intervals is recommended  through 01/09/2025 to demonstrate 2-year stability.     BI-RADS Category 3: Probably benign finding.     This report was finalized on 6/9/2023 12:54 PM by Dr. Jesus Parsons M.D.       CT Abdomen Pelvis With Contrast [[id number]] Collected: 06/08/23 1656     Updated: 06/08/23 1837    Narrative:      CT ABDOMEN AND PELVIS WITH IV CONTRAST     HISTORY: 32-year-old female with nausea and vomiting. Upper abdominal  pain. Leukocytosis. Appendectomy in the past.     TECHNIQUE: Radiation dose reduction techniques were utilized, including  automated exposure control and exposure modulation based on body size.   3 mm images were obtained through the abdomen and pelvis after the  administration of IV contrast. Compared with previous CT 10/01/2021.      FINDINGS: The liver, gallbladder, spleen, pancreas, adrenals, and  kidneys appear unremarkable. There is no formed stool within the colon  which is either fluid-filled or collapsed. There is no definitive  colonic thickening. There is also fluid-filled ileum without definitive  thickening of the wall. There is no free fluid or lymphadenopathy.  Uterus and adnexa appear unremarkable. There is a stable small  periumbilical ventral hernia containing fat. There is a new tiny midline  upper abdominal ventral hernia containing fat, series 2 image 42.       Impression:      1. Fluid-filled colon with no evidence for colitis. Please correlate  clinically for gastroenteritis.  2. New tiny midline upper abdominal ventral hernia containing fat.     This report was finalized on 6/8/2023 6:34 PM by Dr. Dalila Fontana M.D.                 Pertinent Labs     Results from last 7 days   Lab Units 06/09/23  0547 06/08/23  1516   WBC 10*3/mm3 6.99 22.63*   HEMOGLOBIN g/dL 11.8* 17.2*   PLATELETS 10*3/mm3 172 283     Results from last 7 days   Lab Units 06/09/23  0547 06/08/23  1516   SODIUM mmol/L 138 138   POTASSIUM mmol/L 4.2 4.2   CHLORIDE mmol/L 106 99   CO2 mmol/L 25.3 22.1   BUN mg/dL 9 13   CREATININE mg/dL 0.87 1.22*   GLUCOSE mg/dL 102* 126*   EGFR mL/min/1.73 92.1 61.4     Results from last 7 days   Lab Units 06/08/23  1516   ALBUMIN g/dL 5.4*   BILIRUBIN mg/dL 1.5*   ALK PHOS U/L 71   AST (SGOT) U/L 24   ALT (SGPT) U/L 21     Results from last 7 days   Lab Units 06/09/23  0547 06/08/23  1516   CALCIUM mg/dL 8.7 10.8*   ALBUMIN g/dL  --  5.4*     Results from last 7 days   Lab Units 06/08/23  1516   LIPASE U/L 25             Invalid input(s): LDLCALC          Test Results Pending at Discharge       Discharge Details        Discharge Medications        Changes to Medications        Instructions Start Date   PAZOPanib 200 MG chemo tablet  Commonly known as: Votrient  What changed: how much to take   200 mg, Oral, Daily          "    Continue These Medications        Instructions Start Date   albuterol sulfate  (90 Base) MCG/ACT inhaler  Commonly known as: PROVENTIL HFA;VENTOLIN HFA;PROAIR HFA   2 puffs, Inhalation, Every 4 Hours PRN      freestyle lancets   Pt to check blood sugar 7xs daily      FreeStyle Lite w/Device kit   USE 1 SEVEN TIMES DAILY      glucose monitor monitoring kit   To check blood sugars 7x daily      ibuprofen 600 MG tablet  Commonly known as: ADVILMOTRIN   Take 1 tablet by mouth Every 6 (Six) Hours As Needed for Mild Pain      ondansetron 8 MG tablet  Commonly known as: ZOFRAN   8 mg, Oral, 3 Times Daily PRN      Prenatabs FA 29-1 MG tablet   1 tablet, Oral, Daily      prochlorperazine 10 MG tablet  Commonly known as: COMPAZINE   10 mg, Oral, Every 6 Hours PRN               Allergies   Allergen Reactions    Penicillins Nausea And Vomiting    Adhesive Tape Rash     \"SURGICAL TAPE\"  AS A CHILD       Discharge Disposition:  Home or Self Care      Discharge Diet:  Diet Order   Procedures    Diet: Regular/House Diet; Texture: Regular Texture (IDDSI 7); Fluid Consistency: Thin (IDDSI 0)       Discharge Activity:   As tolerated  Apply ice to back twice a day    CODE STATUS:    Code Status and Medical Interventions:   Ordered at: 06/08/23 1852     Code Status (Patient has no pulse and is not breathing):    CPR (Attempt to Resuscitate)     Medical Interventions (Patient has pulse or is breathing):    Full Support       Future Appointments   Date Time Provider Department Center   6/16/2023  1:40 PM LAB CHAIR 1 JULIANNA MORENO  [facility]   6/16/2023  2:00 PM Colin Molina MD [facility]     Additional Instructions for the Follow-ups that You Need to Schedule       Discharge Follow-up with PCP   As directed       Currently Documented PCP:    Sissy Garibay MD    PCP Phone Number:    [phone]     Follow Up Details: Dr. Garibay (PCP) in 1 week regarding low back pain         Discharge Follow-up with " Specified Provider: Dr. Molina ([facility]) on June 16th   As directed      To: Dr. Molina ([facility]) on June 16th                Follow-up Information       Sissy Garibay MD .    Specialty: Family Medicine  Why: Dr. Garibay (PCP) in 1 week regarding low back pain  Contact information:  [address] [facility] [address]  [phone]                             Additional Instructions for the Follow-ups that You Need to Schedule       Discharge Follow-up with PCP   As directed       Currently Documented PCP:    Sissy Garibay MD    PCP Phone Number:    [phone]     Follow Up Details: Dr. Garibay (PCP) in 1 week regarding low back pain         Discharge Follow-up with Specified Provider: Dr. Molina ([facility]) on June 16th   As directed      To: Dr. Molina ([facility]) on June 16th             Time Spent on Discharge:  Greater than 30 minutes      Yordan Tavares MD  [facility]  06/10/23  08:14 EDT

## 2023-06-10 NOTE — OUTREACH NOTE
Prep Survey      Flowsheet Row Responses   [facility] facility patient discharged from? [facility]   Is LACE score < 7 ? Yes   Eligibility [facility]   Date of Admission 06/08/23   Date of Discharge 06/10/23   Discharge Disposition Home or Self Care   Discharge diagnosis Generalized abdominal pain   Does the patient have one of the following disease processes/diagnoses(primary or secondary)? Other   Does the patient have Home health ordered? No   Is there a DME ordered? No   Prep survey completed? Yes            RIVER WORLEY - Registered Nurse

## 2023-06-10 NOTE — PROGRESS NOTES
Subjective   REASON FOR CONSULTATION: Desmoid tumor recently transitioned to pazapanib    History of Present Illness  The patient is a 32 y.o. year old female who is here for an opinion about the above issue.  The patient is a 32-year-old female who presented to the ER 6/8/2023 with nausea, vomiting diarrhea as well as abdominal pain.  Antiemetics were not successful and she was admitted for symptomatic control.     Her medical history significant for asthma & depression who had first noted a swelling behind her right knee in December 2021. The swelling was progressive and became painful with knee flexion. A MRI right knee done 3/11/22 demonstrated a 10 x 3.5 x 4.4 cm enhancing soft tissue mass lesion along the posterolateral right knee interposed between the distal biceps femoris and the lateral gastrocnemius muscles. The mass appears to infiltrate or arise from the distal biceps femoris. The lesion displaces and closely abuts the common peroneal nerve.      Patient was seen by , ortho - who obtained a biopsy of the mass on 3/23/22. 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       recommended against upfront resection at this time & referred to this clinic to discuss systemic therapy options.      Patient is accompanied by her mother. C/o pain & swelling behind right knee. Denies any association with menstrual cycles. She does report of > 20 lbs unintentional weight loss. Denies any abdominal pain, nausea or vomiting. Denies any other swelling anywhere. No family history of FAP, colon cancer or any malignancies.      A CT chest abdomen and pelvis from 4/26/2020 showed an asymmetric 1 cm nodule in the outer quadrant of right breast, favoring benign etiology.  No other soft tissue masses noted in this patient with history of desmoid fibromatosis.     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      Patient was lost to follow-up after the June 2022 visit.  She was seen again in March 2023.  Patient states she had become pregnant and delivered a baby boy on 3/9/2023.  Normal vaginal delivery.  Normal postpartum period.      She did get an MRI of her right knee performed 1/29/2023, which noted significant increase in size of the right knee posterolateral mass, now measuring 8 x 6 x 18 cm.     4/13/2023: MRI Right knee demonstrates mild increase in size compared to January 2023.  4/17/2023: Started on tamoxifen 20 mg daily and sulindac 300 mg daily.     The patient last been seen 5/19/2023 and felt to be progressing with discomfort in the right knee region and difficulty walking.  This led to a switch from tamoxifen and sulindac to pazopanib 800 mg daily.  Concurrent issues at that point included CT chest and pelvis 4/26/2022 without new abnormalities except a 1 cm right outer breast lesion thought to be benign and plans to undergo a right breast ultrasound in July 2023 as well as have her undergo genetic counseling.  Records indicate by June 1 and follow-up with the pharmacist that the patient had noted that her tumor mass was shrinking in size but when she began to have nausea and vomiting 6/8/2023 despite having reduced her dose to 600 mg she was still having the same symptoms.  When she did not improve she presented to the emergency room.  Her studies at that point failed to show any abnormalities on CT concerning colitis, CBC with H&H of 17.2 and 49.9 white count of 22,630 and platelet count of 283,000, BUN/creatinine of 13 and 1.22, glucose 126, calcium 10.8, total protein 8.9 albumin 5.4, normal transaminases, total bilirubin 1.5, lipase of 25 negative pregnancy testing.  Patient was admitted for IV fluids, antiemetics, pazaponib held.    6/10  Afebrile vital signs stable no further nausea or vomiting or abdominal pain  Mild paraspinal back pain persists CBC stable creatinine 0.8    Past  "Medical History, Past Surgical History, Social History, Family History have been reviewed and are without significant changes except as mentioned.    Review of Systems   A comprehensive 14 point review of systems was performed and was negative except as mentioned.    Medications:  The current medication list was reviewed in the EMR    ALLERGIES:    Allergies   Allergen Reactions    Penicillins Nausea And Vomiting    Adhesive Tape Rash     \"SURGICAL TAPE\"  AS A CHILD       Objective      Vitals:    06/09/23 1400 06/09/23 1806 06/09/23 2155 06/10/23 0529   BP: 134/68 146/80 137/82 135/88   BP Location: Left arm Left arm Left arm Left arm   Patient Position: Lying Lying Lying Lying   Pulse: 65 69 50 (!) 48   Resp: 16 16 16 16   Temp: 98.3 °F (36.8 °C) 98.4 °F (36.9 °C) 97.4 °F (36.3 °C) 97.6 °F (36.4 °C)   TempSrc: Oral Oral Oral Oral   SpO2: 99% 100% 99% 98%   Weight:       Height:             5/19/2023    11:45 AM   Current Status   ECOG score 0       Physical Exam      CONSTITUTIONAL:  Vital signs reviewed.  No distress, looks comfortable.  EYES:  Conjunctivae and lids unremarkable.  PERRLA  EARS,NOSE,MOUTH,THROAT:  Ears and nose appear unremarkable.  Lips, teeth, gums appear unremarkable.  RESPIRATORY:  Normal respiratory effort.  Lungs clear to auscultation bilaterally.  CARDIOVASCULAR:  Normal S1, S2.  No murmurs rubs or gallops.  No significant lower extremity edema.  Right leg mass reportedly smaller with ability to flex the knee  GASTROINTESTINAL: Abdomen appears unremarkable.  Nontender.  No hepatomegaly.  No splenomegaly.  LYMPHATIC:  No cervical, supraclavicular, axillary lymphadenopathy.  SKIN:  Warm.  No rashes.  PSYCHIATRIC:  Normal judgment and insight.  Normal mood and affect.      RECENT LABS:  Hematology WBC   Date Value Ref Range Status   06/09/2023 6.99 3.40 - 10.80 10*3/mm3 Final     RBC   Date Value Ref Range Status   06/09/2023 4.16 3.77 - 5.28 10*6/mm3 Final     Hemoglobin   Date Value Ref " Range Status   06/09/2023 11.8 (L) 12.0 - 15.9 g/dL Final     Hematocrit   Date Value Ref Range Status   06/09/2023 34.3 34.0 - 46.6 % Final     Platelets   Date Value Ref Range Status   06/09/2023 172 140 - 450 10*3/mm3 Final              Assessment & Plan     # Right knee desmoid tumor:  Pt had first noted a swelling behind her right knee in December 2021. The swelling was progressive and became painful with knee flexion.   A MRI right knee done 3/11/22 demonstrated a 10 x 3.5 x 4.4 cm enhancing soft tissue mass lesion along the posterolateral right knee interposed between the distal biceps femoris and the lateral gastrocnemius muscles. The mass appears to infiltrate or arise from the distal biceps femoris. The lesion displaces and closely abuts the common peroneal nerve.   Patient was seen by , ortho - who obtained a biopsy of the mass on 3/23/22. 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    recommended against upfront resection at this time & referred to this clinic to discuss systemic therapy options.   I discussed the natural history of desmoid tumors including high rates of local recurrence (~ 50% cases) , specially R1 resection. Also discussed ~ 25% cases have spontaneous regression. Discussed role of systemic therapy options with TKIs and tamoxifen/NSAIDS.   Patient was then seen by Dr. Solis, surgical oncology with the [facility].  Plan at that time was made for surveillance.  Patient then became pregnant, hence plan for any systemic treatment was deferred until delivery.  Patient delivered her baby boy on 3/9/2023.  A MRI of her right knee performed 1/29/2023, which noted significant increase in size of the right knee posterolateral mass, now measuring 8 x 6 x 18 cm.  3/23/2023: Patient seen back in the clinic today.  The right knee posterolateral mass has significantly increased since last evaluation.  Patient reports of  discomfort and numbness around the knee.  She has difficulty with walking and bending her knees.  Reviewed treatment options with tamoxifen + sulindac vs a TKI.  Given recent delivery and potential adverse effects related to TKI while managing 3 young children at home, will not start TKI at this time and consider it down the line. As this tumor appears to be driven/propagated by estrogen and recent pregnancy, plan made to start treatment with tamoxifen 20 mg daily and sulindac 300 mg daily.  I reviewed the risks associated with tamoxifen and sulindac, including increased risk of blood clots, gastritis/PUD.  Patient is agreeable to proceed with the treatment.   Patient started taking tamoxifen/sulindac on 4/17/23.  5/19/2023: Seen for a follow-up.  Patient has been compliant with tamoxifen and sulindac.  Patient reports of no significant improvement in her symptoms and thinks the appear to be slightly worse than last month.  Discussed plan to switch treatment to pazopanib 800 mg daily.  Follow-up in 4 weeks or sooner if needed.  Patient intolerant to 800 mg dosing of panzopanib, virtual intolerant to 600 mg daily.  Patient seen in the hospital with improvement according to patient and decreased range of motion.  We have discussed restarting the medication after discharge - pazapanib at 200 mg daily and slow escalation.     # Unintentional weight loss: Cause unclear.  CT C/A/P performed 4/26/22 did not show any major abnormalities.  It did show a 1 cm right outer breast lesion, likely benign.  Patient was encouraged to increase her nutritional intake.  We will consider referral to nutrition.     #Right breast lesion:   US right breast performed 1/9/2023 noted a 1.1 cm probable benign fibroadenoma at 8 o'clock position, 4 cm from the nipple.  6 months sonographic follow-up is recommended.  We will proceed with ultrasound right breast during this hospitalization     # Encounter for genetic & chromosomal abnormalities:  Will her young age, family history (aunt) of breast cancer & desmoid tumors association with FAP and gardners syndrome,an [employer] germline testing was performed. 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   Will refer to genetic counseling.     PLAN:      Hold pazapanib only restarting upon discharge at lower dose on Monday  follow-up in [facility] office 6/16/2023-discussed with Dr. Molina- she will be on lower dose pazapanib at 200 mg daily.  Slow escalation thereafter.  She is responding to this medication.                     6/10/2023      CC:

## 2023-06-10 NOTE — PLAN OF CARE
A/Ox4. No s/s distress. IVF NS 125ml/hr, infusing well at the R [facility]. PRN Morphine for c/o low back pain. Pt denies N/V at this time. Up ad shante. Last BM on 06/08/23. Plan to d/c home this weekend.

## 2023-06-12 ENCOUNTER — TRANSITIONAL CARE MANAGEMENT TELEPHONE ENCOUNTER (OUTPATIENT)
Dept: CALL CENTER | Facility: HOSPITAL | Age: 33
End: 2023-06-12
Payer: COMMERCIAL

## 2023-06-12 RX ORDER — ONDANSETRON 8 MG/1
8 TABLET, ORALLY DISINTEGRATING ORAL EVERY 8 HOURS PRN
Qty: 60 TABLET | Refills: 5 | Status: SHIPPED | OUTPATIENT
Start: 2023-06-12

## 2023-06-12 NOTE — OUTREACH NOTE
Call Center TCM Note      Flowsheet Row Responses   [facility] patient discharged from? [place]   Does the patient have one of the following disease processes/diagnoses(primary or secondary)? Other   TCM attempt successful? Yes   Call start time 1224   Call end time 1225   Discharge diagnosis Generalized abdominal pain   Person spoke with today (if not patient) and relationship patient   Meds reviewed with patient/caregiver? Yes   Prescription comments no concerns or questions noted.   Is the patient taking all medications as directed (includes completed medication regime)? Yes   Comments Scheduled hospital fu with pcp on 06/23 @ 1p   Does the patient have an appointment with their PCP within 7 days of discharge? No   Nursing Interventions Assisted patient with making appointment per protocol   Has home health visited the patient within 72 hours of discharge? N/A   Psychosocial issues? No   Did the patient receive a copy of their discharge instructions? Yes   Nursing interventions Reviewed instructions with patient   What is the patient's perception of their health status since discharge? Improving   Is the patient/caregiver able to teach back signs and symptoms related to disease process for when to call PCP? Yes   Is the patient/caregiver able to teach back signs and symptoms related to disease process for when to call 911? Yes   Is the patient/caregiver able to teach back the hierarchy of who to call/visit for symptoms/problems? PCP, Specialist, Home health nurse, Urgent Care, ED, 911 Yes   TCM call completed? Yes   Wrap up additional comments Paitent doing well, no concerns or questions at this time.   Call end time 1225   Would this patient benefit from a Referral to Amb Social Work? No   Is the patient interested in additional calls from an ambulatory ?  NOTE:  applies to high risk patients requiring additional follow-up. No            Raya Monroy RN    6/12/2023, 12:26 EDT

## 2023-06-12 NOTE — CASE MANAGEMENT/SOCIAL WORK
Case Management Discharge Note      Final Note: dc'd home.         Selected Continued Care - Discharged on 6/10/2023 Admission date: 6/8/2023 - Discharge disposition: Home or Self Care    Destination    No services have been selected for the patient.              Durable Medical Equipment    No services have been selected for the patient.              Dialysis/Infusion    No services have been selected for the patient.              Home Medical Care    No services have been selected for the patient.              Therapy    No services have been selected for the patient.              Community Resources    No services have been selected for the patient.              Community & DME    No services have been selected for the patient.                Selected Continued Care - Episodes Includes continued care and service providers with selected services from the active episodes listed below    Oncology Episode start date: 3/23/2023   There are no active outsourced providers for this episode.               Transportation Services  Private: Car    Final Discharge Disposition Code: 01 - home or self-care

## 2023-06-16 ENCOUNTER — OFFICE VISIT (OUTPATIENT)
Dept: ONCOLOGY | Facility: CLINIC | Age: 33
End: 2023-06-16
Payer: COMMERCIAL

## 2023-06-16 ENCOUNTER — LAB (OUTPATIENT)
Dept: LAB | Facility: HOSPITAL | Age: 33
End: 2023-06-16
Payer: COMMERCIAL

## 2023-06-16 VITALS
RESPIRATION RATE: 16 BRPM | SYSTOLIC BLOOD PRESSURE: 128 MMHG | DIASTOLIC BLOOD PRESSURE: 87 MMHG | BODY MASS INDEX: 24.22 KG/M2 | HEART RATE: 65 BPM | HEIGHT: 70 IN | TEMPERATURE: 98.2 F | WEIGHT: 169.2 LBS | OXYGEN SATURATION: 99 %

## 2023-06-16 DIAGNOSIS — D48.1 DESMOID TUMOR: ICD-10-CM

## 2023-06-16 DIAGNOSIS — D48.1 DESMOID TUMOR: Primary | ICD-10-CM

## 2023-06-16 DIAGNOSIS — M79.89 SOFT TISSUE MASS: ICD-10-CM

## 2023-06-16 LAB
ALBUMIN SERPL-MCNC: 4.4 G/DL (ref 3.5–5.2)
ALBUMIN/GLOB SERPL: 1.5 G/DL (ref 1.1–2.4)
ALP SERPL-CCNC: 48 U/L (ref 38–116)
ALT SERPL W P-5'-P-CCNC: 19 U/L (ref 0–33)
ANION GAP SERPL CALCULATED.3IONS-SCNC: 13.7 MMOL/L (ref 5–15)
AST SERPL-CCNC: 17 U/L (ref 0–32)
BASOPHILS # BLD AUTO: 0.03 10*3/MM3 (ref 0–0.2)
BASOPHILS NFR BLD AUTO: 0.5 % (ref 0–1.5)
BILIRUB SERPL-MCNC: 0.4 MG/DL (ref 0.2–1.2)
BUN SERPL-MCNC: 11 MG/DL (ref 6–20)
BUN/CREAT SERPL: 11.8 (ref 7.3–30)
CALCIUM SPEC-SCNC: 9.2 MG/DL (ref 8.5–10.2)
CHLORIDE SERPL-SCNC: 105 MMOL/L (ref 98–107)
CO2 SERPL-SCNC: 22.3 MMOL/L (ref 22–29)
CREAT SERPL-MCNC: 0.93 MG/DL (ref 0.6–1.1)
DEPRECATED RDW RBC AUTO: 42 FL (ref 37–54)
EGFRCR SERPLBLD CKD-EPI 2021: 85 ML/MIN/1.73
EOSINOPHIL # BLD AUTO: 0.19 10*3/MM3 (ref 0–0.4)
EOSINOPHIL NFR BLD AUTO: 3 % (ref 0.3–6.2)
ERYTHROCYTE [DISTWIDTH] IN BLOOD BY AUTOMATED COUNT: 13.9 % (ref 12.3–15.4)
GLOBULIN UR ELPH-MCNC: 2.9 GM/DL (ref 1.8–3.5)
GLUCOSE SERPL-MCNC: 101 MG/DL (ref 74–124)
HCT VFR BLD AUTO: 37.6 % (ref 34–46.6)
HGB BLD-MCNC: 12.7 G/DL (ref 12–15.9)
IMM GRANULOCYTES # BLD AUTO: 0.01 10*3/MM3 (ref 0–0.05)
IMM GRANULOCYTES NFR BLD AUTO: 0.2 % (ref 0–0.5)
LYMPHOCYTES # BLD AUTO: 3.39 10*3/MM3 (ref 0.7–3.1)
LYMPHOCYTES NFR BLD AUTO: 53.7 % (ref 19.6–45.3)
MCH RBC QN AUTO: 28.6 PG (ref 26.6–33)
MCHC RBC AUTO-ENTMCNC: 33.8 G/DL (ref 31.5–35.7)
MCV RBC AUTO: 84.7 FL (ref 79–97)
MONOCYTES # BLD AUTO: 0.48 10*3/MM3 (ref 0.1–0.9)
MONOCYTES NFR BLD AUTO: 7.6 % (ref 5–12)
NEUTROPHILS NFR BLD AUTO: 2.21 10*3/MM3 (ref 1.7–7)
NEUTROPHILS NFR BLD AUTO: 35 % (ref 42.7–76)
NRBC BLD AUTO-RTO: 0 /100 WBC (ref 0–0.2)
PLATELET # BLD AUTO: 198 10*3/MM3 (ref 140–450)
PMV BLD AUTO: 10.4 FL (ref 6–12)
POTASSIUM SERPL-SCNC: 3.7 MMOL/L (ref 3.5–4.7)
PROT SERPL-MCNC: 7.3 G/DL (ref 6.3–8)
RBC # BLD AUTO: 4.44 10*6/MM3 (ref 3.77–5.28)
SODIUM SERPL-SCNC: 141 MMOL/L (ref 134–145)
WBC NRBC COR # BLD: 6.31 10*3/MM3 (ref 3.4–10.8)

## 2023-06-16 PROCEDURE — 85025 COMPLETE CBC W/AUTO DIFF WBC: CPT

## 2023-06-16 PROCEDURE — 36415 COLL VENOUS BLD VENIPUNCTURE: CPT

## 2023-06-16 PROCEDURE — 80053 COMPREHEN METABOLIC PANEL: CPT

## 2023-06-16 NOTE — PROGRESS NOTES
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 provider found  RECORDS OBTAINED:  Records of the patients history including those from the electronic medical record were reviewed and summarized in detail.    HISTORY OF PRESENT ILLNESS:    The patient is a 32 y.o. year old female with medical history significant for asthma & depression who had first noted a swelling behind her right knee in December 2021. The swelling was progressive and became painful with knee flexion. A MRI right knee done 3/11/22 demonstrated a 10 x 3.5 x 4.4 cm enhancing soft tissue mass lesion along the posterolateral right knee interposed between the distal biceps femoris and the lateral gastrocnemius muscles. The mass appears to infiltrate or arise from the distal biceps femoris. The lesion displaces and closely abuts the common peroneal nerve.     Patient was seen by , ortho - who obtained a biopsy of the mass on 3/23/22. 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      recommended against upfront resection at this time & referred to this clinic to discuss systemic therapy options.     Patient is accompanied by her mother. C/o pain & swelling behind right knee. Denies any association with menstrual cycles. She does report of > 20 lbs unintentional weight loss. Denies any abdominal pain, nausea or vomiting. Denies any other swelling anywhere. No family history of FAP, colon cancer or any malignancies.     A CT chest abdomen and pelvis from 4/26/2020 showed an asymmetric 1 cm nodule in the outer quadrant of right breast, favoring benign etiology.  No other soft tissue masses noted in this patient with history of desmoid fibromatosis.    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     Patient was lost to follow-up after the June 2022 visit.  She was seen again in March 2023.  Patient states she had become pregnant and delivered a baby boy on 3/9/2023.  Normal vaginal delivery.  Normal postpartum period.     She did get an MRI of her right knee performed 1/29/2023, which noted significant increase in size of the right knee posterolateral mass, now measuring 8 x 6 x 18 cm.    4/13/2023: MRI Right knee demonstrates mild increase in size compared to January 2023.  4/17/2023: Started on tamoxifen 20 mg daily and sulindac 300 mg daily. Stopped in May d/t poor response.    End of May 2023: Started Pazopanib 800 mg daily. Developed GI AEs. Had to be hospitalized in early June 2023. Dose resumed at 200 mg daily.     INTERIM HISTORY:  Patient returns to the clinic for a follow-up visit.  She had been taking pazopanib 200 mg daily & tolerating it better.  She thinks the right knee pain/swelling has improved.  Has some N/V and occasional loose stools.    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- pt desires to proceed with treatments after delivery   • Gestational diabetes    • Gestational hypertension 2020   • Iron deficiency anemia    • Preeclampsia 2012     Past Surgical History:   Procedure Laterality Date   • APPENDECTOMY N/A 10/1/2021    Procedure: APPENDECTOMY LAPAROSCOPIC;  Surgeon: Ahmet Dong Jr., MD;  Location: [facility];  Service: General;  Laterality: N/A;   • KNEE ARTHROSCOPY      AGE 6   • LEEP N/A 4/25/2019    Procedure: LOOP ELECTROCAUTERY EXCISION PROCEDURE;  Surgeon: Arsh Ray MD;  Location: [facility];  Service: Obstetrics/Gynecology   • SOFT TISSUE BIOPSY Right 3/23/2022    Procedure: BIOPSY SOFT TISSUE THIGH / KNEE;  Surgeon: Chris Randall MD;  Location: [facility];  Service: Orthopedics;  Laterality: Right;   • WISDOM  "TOOTH EXTRACTION         MEDICATIONS    Current Outpatient Medications:   •  albuterol sulfate  (90 Base) MCG/ACT inhaler, Inhale 2 puffs Every 4 (Four) Hours As Needed for Wheezing., Disp: 18 g, Rfl: 0  •  Blood Glucose Monitoring Suppl (FreeStyle Lite) w/Device kit, USE 1 SEVEN TIMES DAILY, Disp: , Rfl:   •  glucose monitor monitoring kit, To check blood sugars 7x daily, Disp: 1 each, Rfl: 0  •  ibuprofen (ADVIL,MOTRIN) 600 MG tablet, Take 1 tablet by mouth Every 6 (Six) Hours As Needed for Mild Pain, Disp: 50 tablet, Rfl: 3  •  Lancets (freestyle) lancets, Pt to check blood sugar 7xs daily, Disp: 15 each, Rfl: 12  •  ondansetron ODT (ZOFRAN-ODT) 8 MG disintegrating tablet, Place 1 tablet on the tongue Every 8 (Eight) Hours As Needed for Nausea or Vomiting for up to 60 doses., Disp: 60 tablet, Rfl: 5  •  PAZOPanib (Votrient) 200 MG chemo tablet, Take 1 tablet by mouth Daily., Disp: 1 tablet, Rfl: 0  •  Prenatal Vit-Fe Fumarate-FA (Prenatabs FA) 29-1 MG tablet, Take 1 tablet by mouth Daily., Disp: 30 tablet, Rfl: 11  •  prochlorperazine (COMPAZINE) 10 MG tablet, Take 1 tablet by mouth Every 6 (Six) Hours As Needed for Nausea or Vomiting., Disp: 30 tablet, Rfl: 3    ALLERGIES:     Allergies   Allergen Reactions   • Penicillins Nausea And Vomiting   • Adhesive Tape Rash     \"SURGICAL TAPE\"  AS A CHILD       SOCIAL HISTORY:       Social History     Socioeconomic History   • Marital status: Single   Tobacco Use   • Smoking status: Former     Packs/day: 0.50     Years: 4.00     Pack years: 2.00     Types: Cigarettes     Quit date: 2/8/2020     Years since quitting: 3.3     Passive exposure: Never   • Smokeless tobacco: Never   Vaping Use   • Vaping Use: Former   • Substances: Nicotine   • Devices: Disposable   Substance and Sexual Activity   • Alcohol use: Not Currently     Comment: socially   • Drug use: Not Currently     Types: Marijuana     Comment: daily, did not smoke during pregnancy   • Sexual activity: " "Not Currently         FAMILY HISTORY:  Family History   Problem Relation Age of Onset   • Diabetes Father    • Arthritis Maternal Grandmother    • Lung cancer Maternal Grandfather    • Heart attack Maternal Grandfather    • Heart disease Maternal Grandfather    • Stroke Maternal Grandfather    • Hyperlipidemia Maternal Grandfather    • Malig Hyperthermia Neg Hx    • Breast cancer Neg Hx    • Ovarian cancer Neg Hx    • Uterine cancer Neg Hx    • Colon cancer Neg Hx        REVIEW OF SYSTEMS:  As per HPI       Vitals:    06/16/23 1357   BP: 128/87   Pulse: 65   Resp: 16   Temp: 98.2 °F (36.8 °C)   TempSrc: Temporal   SpO2: 99%   Weight: 76.7 kg (169 lb 3.2 oz)   Height: 177.8 cm (70\")   PainSc: 7  Comment: when pain starts   PainLoc: Back  Comment: pain on and off           6/16/2023     1:53 PM   Current Status   ECOG score 0      PHYSICAL EXAM:    CONSTITUTIONAL:  Vital signs reviewed.  No distress, looks comfortable.  EYES:  Conjunctiva and lids unremarkable.   EARS,NOSE,MOUTH,THROAT:  Ears and nose appear unremarkable.    RESPIRATORY:  Normal respiratory effort.  Lungs clear to auscultation bilaterally.  CARDIOVASCULAR:  Normal S1, S2.  No murmurs rubs or gallops.  No significant lower extremity edema.  GASTROINTESTINAL: Abdomen appears unremarkable.  Nondistended  LYMPHATIC:  No cervical, supraclavicular lymphadenopathy.  NEURO: AAO x 3, No focal deficits.  Appears to have equal strength all 4 extremities.  BREAST: No abnormal masses/lumps palpated in bilateral breasts or axilla  MUSCULOSKELETAL:  Unremarkable digits/nails.  No cyanosis or clubbing.  No apparent joint deformities. A ~10-15 cm hard palpable swelling noted postero-lateral to the right knee  SKIN:  Warm.  No rashes.  PSYCHIATRIC:  Normal judgment and insight.  Normal mood and affect.     RECENT LABS:        Lab on 06/16/2023   Component Date Value Ref Range Status   • Glucose 06/16/2023 101  74 - 124 mg/dL Final   • BUN 06/16/2023 11  6 - 20 mg/dL " Final   • Creatinine 06/16/2023 0.93  0.60 - 1.10 mg/dL Final   • Sodium 06/16/2023 141  134 - 145 mmol/L Final   • Potassium 06/16/2023 3.7  3.5 - 4.7 mmol/L Final   • Chloride 06/16/2023 105  98 - 107 mmol/L Final   • CO2 06/16/2023 22.3  22.0 - 29.0 mmol/L Final   • Calcium 06/16/2023 9.2  8.5 - 10.2 mg/dL Final   • Total Protein 06/16/2023 7.3  6.3 - 8.0 g/dL Final   • Albumin 06/16/2023 4.4  3.5 - 5.2 g/dL Final   • ALT (SGPT) 06/16/2023 19  0 - 33 U/L Final   • AST (SGOT) 06/16/2023 17  0 - 32 U/L Final   • Alkaline Phosphatase 06/16/2023 48  38 - 116 U/L Final   • Total Bilirubin 06/16/2023 0.4  0.2 - 1.2 mg/dL Final   • Globulin 06/16/2023 2.9  1.8 - 3.5 gm/dL Final   • A/G Ratio 06/16/2023 1.5  1.1 - 2.4 g/dL Final   • BUN/Creatinine Ratio 06/16/2023 11.8  7.3 - 30.0 Final   • Anion Gap 06/16/2023 13.7  5.0 - 15.0 mmol/L Final   • eGFR 06/16/2023 85.0  >60.0 mL/min/1.73 Final   • WBC 06/16/2023 6.31  3.40 - 10.80 10*3/mm3 Final   • RBC 06/16/2023 4.44  3.77 - 5.28 10*6/mm3 Final   • Hemoglobin 06/16/2023 12.7  12.0 - 15.9 g/dL Final   • Hematocrit 06/16/2023 37.6  34.0 - 46.6 % Final   • MCV 06/16/2023 84.7  79.0 - 97.0 fL Final   • MCH 06/16/2023 28.6  26.6 - 33.0 pg Final   • MCHC 06/16/2023 33.8  31.5 - 35.7 g/dL Final   • RDW 06/16/2023 13.9  12.3 - 15.4 % Final   • RDW-SD 06/16/2023 42.0  37.0 - 54.0 fl Final   • MPV 06/16/2023 10.4  6.0 - 12.0 fL Final   • Platelets 06/16/2023 198  140 - 450 10*3/mm3 Final   • Neutrophil % 06/16/2023 35.0 (L)  42.7 - 76.0 % Final   • Lymphocyte % 06/16/2023 53.7 (H)  19.6 - 45.3 % Final   • Monocyte % 06/16/2023 7.6  5.0 - 12.0 % Final   • Eosinophil % 06/16/2023 3.0  0.3 - 6.2 % Final   • Basophil % 06/16/2023 0.5  0.0 - 1.5 % Final   • Immature Grans % 06/16/2023 0.2  0.0 - 0.5 % Final   • Neutrophils, Absolute 06/16/2023 2.21  1.70 - 7.00 10*3/mm3 Final   • Lymphocytes, Absolute 06/16/2023 3.39 (H)  0.70 - 3.10 10*3/mm3 Final   • Monocytes, Absolute 06/16/2023 0.48   0.10 - 0.90 10*3/mm3 Final   • Eosinophils, Absolute 06/16/2023 0.19  0.00 - 0.40 10*3/mm3 Final   • Basophils, Absolute 06/16/2023 0.03  0.00 - 0.20 10*3/mm3 Final   • Immature Grans, Absolute 06/16/2023 0.01  0.00 - 0.05 10*3/mm3 Final   • nRBC 06/16/2023 0.0  0.0 - 0.2 /100 WBC Final   Admission on 06/08/2023, Discharged on 06/10/2023   Component Date Value Ref Range Status   • Glucose 06/08/2023 126 (H)  65 - 99 mg/dL Final   • BUN 06/08/2023 13  6 - 20 mg/dL Final   • Creatinine 06/08/2023 1.22 (H)  0.57 - 1.00 mg/dL Final   • Sodium 06/08/2023 138  136 - 145 mmol/L Final   • Potassium 06/08/2023 4.2  3.5 - 5.2 mmol/L Final   • Chloride 06/08/2023 99  98 - 107 mmol/L Final   • CO2 06/08/2023 22.1  22.0 - 29.0 mmol/L Final   • Calcium 06/08/2023 10.8 (H)  8.6 - 10.5 mg/dL Final   • Total Protein 06/08/2023 8.9 (H)  6.0 - 8.5 g/dL Final   • Albumin 06/08/2023 5.4 (H)  3.5 - 5.2 g/dL Final   • ALT (SGPT) 06/08/2023 21  1 - 33 U/L Final   • AST (SGOT) 06/08/2023 24  1 - 32 U/L Final   • Alkaline Phosphatase 06/08/2023 71  39 - 117 U/L Final   • Total Bilirubin 06/08/2023 1.5 (H)  0.0 - 1.2 mg/dL Final   • Globulin 06/08/2023 3.5  gm/dL Final   • A/G Ratio 06/08/2023 1.5  g/dL Final   • BUN/Creatinine Ratio 06/08/2023 10.7  7.0 - 25.0 Final   • Anion Gap 06/08/2023 16.9 (H)  5.0 - 15.0 mmol/L Final   • eGFR 06/08/2023 61.4  >60.0 mL/min/1.73 Final   • Lipase 06/08/2023 25  13 - 60 U/L Final   • Color, UA 06/08/2023 Dark Yellow (A)  Yellow, Straw Final   • Appearance, UA 06/08/2023 Slightly Cloudy (A)  Clear Final   • pH, UA 06/08/2023 5.5  5.0 - 8.0 Final   • Specific Gravity, UA 06/08/2023 >=1.030  1.005 - 1.030 Final   • Glucose, UA 06/08/2023 Negative  Negative Final   • Ketones, UA 06/08/2023 15 mg/dL (1+) (A)  Negative Final   • Bilirubin, UA 06/08/2023 Negative  Negative Final   • Blood, UA 06/08/2023 Trace (A)  Negative Final   • Protein, UA 06/08/2023 100 mg/dL (2+) (A)  Negative Final   • Leuk Esterase, UA  06/08/2023 Negative  Negative Final   • Nitrite, UA 06/08/2023 Positive (A)  Negative Final   • Urobilinogen, UA 06/08/2023 1.0 E.U./dL  0.2 - 1.0 E.U./dL Final   • HCG Qualitative 06/08/2023 Negative  Negative Final   • WBC 06/08/2023 22.63 (H)  3.40 - 10.80 10*3/mm3 Final   • RBC 06/08/2023 5.99 (H)  3.77 - 5.28 10*6/mm3 Final   • Hemoglobin 06/08/2023 17.2 (H)  12.0 - 15.9 g/dL Final   • Hematocrit 06/08/2023 49.9 (H)  34.0 - 46.6 % Final   • MCV 06/08/2023 83.3  79.0 - 97.0 fL Final   • MCH 06/08/2023 28.7  26.6 - 33.0 pg Final   • MCHC 06/08/2023 34.5  31.5 - 35.7 g/dL Final   • RDW 06/08/2023 14.0  12.3 - 15.4 % Final   • RDW-SD 06/08/2023 41.8  37.0 - 54.0 fl Final   • MPV 06/08/2023 10.6  6.0 - 12.0 fL Final   • Platelets 06/08/2023 283  140 - 450 10*3/mm3 Final   • Neutrophil % 06/08/2023 79.5 (H)  42.7 - 76.0 % Final   • Lymphocyte % 06/08/2023 14.1 (L)  19.6 - 45.3 % Final   • Monocyte % 06/08/2023 4.6 (L)  5.0 - 12.0 % Final   • Eosinophil % 06/08/2023 0.9  0.3 - 6.2 % Final   • Basophil % 06/08/2023 0.4  0.0 - 1.5 % Final   • Immature Grans % 06/08/2023 0.5  0.0 - 0.5 % Final   • Neutrophils, Absolute 06/08/2023 18.01 (H)  1.70 - 7.00 10*3/mm3 Final   • Lymphocytes, Absolute 06/08/2023 3.18 (H)  0.70 - 3.10 10*3/mm3 Final   • Monocytes, Absolute 06/08/2023 1.03 (H)  0.10 - 0.90 10*3/mm3 Final   • Eosinophils, Absolute 06/08/2023 0.21  0.00 - 0.40 10*3/mm3 Final   • Basophils, Absolute 06/08/2023 0.09  0.00 - 0.20 10*3/mm3 Final   • Immature Grans, Absolute 06/08/2023 0.11 (H)  0.00 - 0.05 10*3/mm3 Final   • nRBC 06/08/2023 0.0  0.0 - 0.2 /100 WBC Final   • RBC, UA 06/08/2023 None Seen  None Seen, 0-2 /HPF Final   • WBC, UA 06/08/2023 0-2  None Seen, 0-2 /HPF Final   • Bacteria, UA 06/08/2023 Trace (A)  None Seen /HPF Final   • Squamous Epithelial Cells, UA 06/08/2023 0-2  None Seen, 0-2 /HPF Final   • Hyaline Casts, UA 06/08/2023 0-2  None Seen /LPF Final   • Methodology 06/08/2023 Automated Microscopy    Final   • Glucose 06/09/2023 102 (H)  65 - 99 mg/dL Final   • BUN 06/09/2023 9  6 - 20 mg/dL Final   • Creatinine 06/09/2023 0.87  0.57 - 1.00 mg/dL Final   • Sodium 06/09/2023 138  136 - 145 mmol/L Final   • Potassium 06/09/2023 4.2  3.5 - 5.2 mmol/L Final   • Chloride 06/09/2023 106  98 - 107 mmol/L Final   • CO2 06/09/2023 25.3  22.0 - 29.0 mmol/L Final   • Calcium 06/09/2023 8.7  8.6 - 10.5 mg/dL Final   • BUN/Creatinine Ratio 06/09/2023 10.3  7.0 - 25.0 Final   • Anion Gap 06/09/2023 6.7  5.0 - 15.0 mmol/L Final   • eGFR 06/09/2023 92.1  >60.0 mL/min/1.73 Final   • WBC 06/09/2023 6.99  3.40 - 10.80 10*3/mm3 Final   • RBC 06/09/2023 4.16  3.77 - 5.28 10*6/mm3 Final   • Hemoglobin 06/09/2023 11.8 (L)  12.0 - 15.9 g/dL Final   • Hematocrit 06/09/2023 34.3  34.0 - 46.6 % Final   • MCV 06/09/2023 82.5  79.0 - 97.0 fL Final   • MCH 06/09/2023 28.4  26.6 - 33.0 pg Final   • MCHC 06/09/2023 34.4  31.5 - 35.7 g/dL Final   • RDW 06/09/2023 13.5  12.3 - 15.4 % Final   • RDW-SD 06/09/2023 39.8  37.0 - 54.0 fl Final   • MPV 06/09/2023 10.5  6.0 - 12.0 fL Final   • Platelets 06/09/2023 172  140 - 450 10*3/mm3 Final   • Neutrophil % 06/09/2023 43.6  42.7 - 76.0 % Final   • Lymphocyte % 06/09/2023 44.5  19.6 - 45.3 % Final   • Monocyte % 06/09/2023 6.0  5.0 - 12.0 % Final   • Eosinophil % 06/09/2023 5.3  0.3 - 6.2 % Final   • Basophil % 06/09/2023 0.3  0.0 - 1.5 % Final   • Immature Grans % 06/09/2023 0.3  0.0 - 0.5 % Final   • Neutrophils, Absolute 06/09/2023 3.05  1.70 - 7.00 10*3/mm3 Final   • Lymphocytes, Absolute 06/09/2023 3.11 (H)  0.70 - 3.10 10*3/mm3 Final   • Monocytes, Absolute 06/09/2023 0.42  0.10 - 0.90 10*3/mm3 Final   • Eosinophils, Absolute 06/09/2023 0.37  0.00 - 0.40 10*3/mm3 Final   • Basophils, Absolute 06/09/2023 0.02  0.00 - 0.20 10*3/mm3 Final   • Immature Grans, Absolute 06/09/2023 0.02  0.00 - 0.05 10*3/mm3 Final   • nRBC 06/09/2023 0.1  0.0 - 0.2 /100 WBC Final       ASSESSMENT:   is a  pleasant 33 y/o WF with medical history significant for asthma & depression who comes for 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     # Right knee desmoid tumor:  · Pt had first noted a swelling behind her right knee in December 2021. The swelling was progressive and became painful with knee flexion.   · A MRI right knee done 3/11/22 demonstrated a 10 x 3.5 x 4.4 cm enhancing soft tissue mass lesion along the posterolateral right knee interposed between the distal biceps femoris and the lateral gastrocnemius muscles. The mass appears to infiltrate or arise from the distal biceps femoris. The lesion displaces and closely abuts the common peroneal nerve.   · Patient was seen by , ortho - who obtained a biopsy of the mass on 3/23/22. 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   ·  recommended against upfront resection at this time & referred to this clinic to discuss systemic therapy options.   · I discussed the natural history of desmoid tumors including high rates of local recurrence (~ 50% cases) , specially R1 resection. Also discussed ~ 25% cases have spontaneous regression. Discussed role of systemic therapy options with TKIs and tamoxifen/NSAIDS.   · Patient was then seen by Dr. Solis, surgical oncology with the [facility].  Plan at that time was made for surveillance.  Patient then became pregnant, hence plan for any systemic treatment was deferred until delivery.  · Patient delivered her baby boy on 3/9/2023.  A MRI of her right knee performed 1/29/2023, which noted significant increase in size of the right knee posterolateral mass, now measuring 8 x 6 x 18 cm.  · 3/23/2023: Patient seen back in the clinic today.  The right knee posterolateral mass has significantly increased since last evaluation.  Patient reports of discomfort and numbness around the knee.  She has difficulty with walking and bending her knees.   Reviewed treatment options with tamoxifen + sulindac vs a TKI.  Given recent delivery and potential adverse effects related to TKI while managing 3 young children at home, will not start TKI at this time and consider it down the line. As this tumor appears to be driven/propagated by estrogen and recent pregnancy, plan made to start treatment with tamoxifen 20 mg daily and sulindac 300 mg daily.  I reviewed the risks associated with tamoxifen and sulindac, including increased risk of blood clots, gastritis/PUD.  Patient is agreeable to proceed with the treatment.   · Patient started taking tamoxifen/sulindac on 4/17/23.  · 5/19/2023: Seen for a follow-up.  Patient has been compliant with tamoxifen and sulindac.  Patient reports of no significant improvement in her symptoms and thinks the appear to be slightly worse than last month.  Discussed plan to switch treatment to pazopanib 800 mg daily.  Follow-up in 4 weeks or sooner if needed.  · End of May 2023: Started Pazopanib 800 mg daily. Developed GI AEs. Had to be hospitalized in early June 2023. Dose resumed at 200 mg daily.   · 6/16/23: Doing well on reduced dose pazopanib. Discussed plan to continue pazopanib 200 mg for another 2 weeks. If doing well, increase to 400 mg daily.     # Unintentional weight loss: Cause unclear.  CT C/A/P performed 4/26/22 did not show any major abnormalities.  It did show a 1 cm right outer breast lesion, likely benign.  Patient was encouraged to increase her nutritional intake.  We will consider referral to nutrition.    #Right breast lesion:   · US right breast performed 1/9/2023 noted a 1.1 cm probable benign fibroadenoma at 8 o'clock position, 4 cm from the nipple.  6 months sonographic follow-up is recommended.   · US breast from 6/9/23 noted stable 1 cm probable benign fibroadenoma in the right breast at 8'o clock position.   · Short-term sonographic follow-up at 6-9 month intervals is recommended through 01/09/2025 to  demonstrate 2-year stability    # Encounter for genetic & chromosomal abnormalities: Will her young age, family history (aunt) of breast cancer & desmoid tumors association with FAP and gardners syndrome,an [facility] germline testing was performed. 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   Will refer to genetic counseling.    PLAN:   1. Continue Pazopanib 200 mg daily. Re-evaluate in 2 weeks. If doing well, consider increase to 400 mg daily.  2. Right breast ultrasound will be due in Dec 2023  3. Plan to refer to genetic counseling at follow-up  4. F/u in 2 weeks or sooner if needed    Orders Placed This Encounter   Procedures   • Comprehensive Metabolic Panel     Standing Status:   Future     Standing Expiration Date:   6/15/2024     Order Specific Question:   Release to patient     Answer:   Routine Release   • CBC & Differential     Standing Status:   Future     Standing Expiration Date:   6/15/2024     Order Specific Question:   Manual Differential     Answer:   No   Total time spent during this patient encounter is 45 minutes. The total time spent with the patient includes at least one or more of the following: preparing to see the patient by reviewing of tests, prior notes or other relevant information, performing appropriate independent examination & evaluation, counseling, ordering of medications, tests or procedures, communicating with other healthcare professionals, when appropriate to coordinate care, documenting clinic information in the electronic medical records or other health records, independently interpreting results of tests and communicating the results to the patient/family or caregiver.

## 2023-06-19 ENCOUNTER — SPECIALTY PHARMACY (OUTPATIENT)
Dept: PHARMACY | Facility: HOSPITAL | Age: 33
End: 2023-06-19
Payer: COMMERCIAL

## 2023-07-13 ENCOUNTER — HOSPITAL ENCOUNTER (INPATIENT)
Facility: HOSPITAL | Age: 33
LOS: 3 days | Discharge: HOME | DRG: 392 | End: 2023-07-17
Attending: EMERGENCY MEDICINE | Admitting: INTERNAL MEDICINE
Payer: COMMERCIAL

## 2023-07-13 DIAGNOSIS — R10.84 GENERALIZED ABDOMINAL PAIN: ICD-10-CM

## 2023-07-13 DIAGNOSIS — T50.905A ADVERSE EFFECT OF DRUG, INITIAL ENCOUNTER: ICD-10-CM

## 2023-07-13 DIAGNOSIS — E87.20 LACTIC ACIDOSIS: ICD-10-CM

## 2023-07-13 DIAGNOSIS — R19.7 NAUSEA VOMITING AND DIARRHEA: Primary | ICD-10-CM

## 2023-07-13 DIAGNOSIS — R11.2 NAUSEA VOMITING AND DIARRHEA: Primary | ICD-10-CM

## 2023-07-13 PROCEDURE — 84703 CHORIONIC GONADOTROPIN ASSAY: CPT | Performed by: EMERGENCY MEDICINE

## 2023-07-13 PROCEDURE — 83605 ASSAY OF LACTIC ACID: CPT | Performed by: EMERGENCY MEDICINE

## 2023-07-13 PROCEDURE — 25010000002 ONDANSETRON PER 1 MG: Performed by: EMERGENCY MEDICINE

## 2023-07-13 PROCEDURE — 85025 COMPLETE CBC W/AUTO DIFF WBC: CPT | Performed by: EMERGENCY MEDICINE

## 2023-07-13 PROCEDURE — 99285 EMERGENCY DEPT VISIT HI MDM: CPT

## 2023-07-13 PROCEDURE — 83690 ASSAY OF LIPASE: CPT | Performed by: EMERGENCY MEDICINE

## 2023-07-13 PROCEDURE — 80053 COMPREHEN METABOLIC PANEL: CPT | Performed by: EMERGENCY MEDICINE

## 2023-07-13 RX ORDER — HYDROMORPHONE HYDROCHLORIDE 1 MG/ML
0.5 INJECTION, SOLUTION INTRAMUSCULAR; INTRAVENOUS; SUBCUTANEOUS ONCE
Status: COMPLETED | OUTPATIENT
Start: 2023-07-13 | End: 2023-07-14

## 2023-07-13 RX ORDER — ONDANSETRON 2 MG/ML
4 INJECTION INTRAMUSCULAR; INTRAVENOUS ONCE
Status: COMPLETED | OUTPATIENT
Start: 2023-07-13 | End: 2023-07-14

## 2023-07-13 RX ORDER — ONDANSETRON 2 MG/ML
4 INJECTION INTRAMUSCULAR; INTRAVENOUS ONCE
Status: COMPLETED | OUTPATIENT
Start: 2023-07-13 | End: 2023-07-13

## 2023-07-13 RX ORDER — SODIUM CHLORIDE 0.9 % (FLUSH) 0.9 %
10 SYRINGE (ML) INJECTION AS NEEDED
Status: DISCONTINUED | OUTPATIENT
Start: 2023-07-13 | End: 2023-07-17 | Stop reason: HOSPADM

## 2023-07-13 RX ADMIN — ONDANSETRON 4 MG: 2 INJECTION INTRAMUSCULAR; INTRAVENOUS at 22:57

## 2023-07-14 ENCOUNTER — APPOINTMENT (OUTPATIENT)
Dept: CT IMAGING | Facility: HOSPITAL | Age: 33
DRG: 392 | End: 2023-07-14
Payer: COMMERCIAL

## 2023-07-14 LAB
ALBUMIN SERPL-MCNC: 5.2 G/DL (ref 3.5–5.2)
ALBUMIN/GLOB SERPL: 1.6 G/DL
ALP SERPL-CCNC: 73 U/L (ref 39–117)
ALT SERPL W P-5'-P-CCNC: 16 U/L (ref 1–33)
ANION GAP SERPL CALCULATED.3IONS-SCNC: 10.8 MMOL/L (ref 5–15)
ANION GAP SERPL CALCULATED.3IONS-SCNC: 17 MMOL/L (ref 5–15)
AST SERPL-CCNC: 23 U/L (ref 1–32)
BACTERIA UR QL AUTO: NORMAL /HPF
BASOPHILS # BLD AUTO: 0.02 10*3/MM3 (ref 0–0.2)
BASOPHILS # BLD AUTO: 0.04 10*3/MM3 (ref 0–0.2)
BASOPHILS NFR BLD AUTO: 0.2 % (ref 0–1.5)
BASOPHILS NFR BLD AUTO: 0.2 % (ref 0–1.5)
BILIRUB SERPL-MCNC: 1.3 MG/DL (ref 0–1.2)
BILIRUB UR QL STRIP: NEGATIVE
BUN SERPL-MCNC: 13 MG/DL (ref 6–20)
BUN SERPL-MCNC: 15 MG/DL (ref 6–20)
BUN/CREAT SERPL: 11.3 (ref 7–25)
BUN/CREAT SERPL: 13.4 (ref 7–25)
CALCIUM SPEC-SCNC: 10.1 MG/DL (ref 8.6–10.5)
CALCIUM SPEC-SCNC: 8 MG/DL (ref 8.6–10.5)
CHLORIDE SERPL-SCNC: 102 MMOL/L (ref 98–107)
CHLORIDE SERPL-SCNC: 109 MMOL/L (ref 98–107)
CLARITY UR: ABNORMAL
CO2 SERPL-SCNC: 19 MMOL/L (ref 22–29)
CO2 SERPL-SCNC: 21.2 MMOL/L (ref 22–29)
COLOR UR: ABNORMAL
CREAT SERPL-MCNC: 0.97 MG/DL (ref 0.57–1)
CREAT SERPL-MCNC: 1.33 MG/DL (ref 0.57–1)
D-LACTATE SERPL-SCNC: 1.5 MMOL/L (ref 0.5–2)
D-LACTATE SERPL-SCNC: 3.6 MMOL/L (ref 0.5–2)
DEPRECATED RDW RBC AUTO: 43.1 FL (ref 37–54)
DEPRECATED RDW RBC AUTO: 45.9 FL (ref 37–54)
EGFRCR SERPLBLD CKD-EPI 2021: 55.3 ML/MIN/1.73
EGFRCR SERPLBLD CKD-EPI 2021: 80.8 ML/MIN/1.73
EOSINOPHIL # BLD AUTO: 0.01 10*3/MM3 (ref 0–0.4)
EOSINOPHIL # BLD AUTO: 0.02 10*3/MM3 (ref 0–0.4)
EOSINOPHIL NFR BLD AUTO: 0.1 % (ref 0.3–6.2)
EOSINOPHIL NFR BLD AUTO: 0.1 % (ref 0.3–6.2)
ERYTHROCYTE [DISTWIDTH] IN BLOOD BY AUTOMATED COUNT: 14.4 % (ref 12.3–15.4)
ERYTHROCYTE [DISTWIDTH] IN BLOOD BY AUTOMATED COUNT: 14.8 % (ref 12.3–15.4)
GLOBULIN UR ELPH-MCNC: 3.2 GM/DL
GLUCOSE SERPL-MCNC: 123 MG/DL (ref 65–99)
GLUCOSE SERPL-MCNC: 129 MG/DL (ref 65–99)
GLUCOSE UR STRIP-MCNC: NEGATIVE MG/DL
HBA1C MFR BLD: 5.1 % (ref 4.8–5.6)
HCG SERPL QL: NEGATIVE
HCT VFR BLD AUTO: 38.5 % (ref 34–46.6)
HCT VFR BLD AUTO: 47.6 % (ref 34–46.6)
HGB BLD-MCNC: 13.1 G/DL (ref 12–15.9)
HGB BLD-MCNC: 16.5 G/DL (ref 12–15.9)
HGB UR QL STRIP.AUTO: NEGATIVE
HYALINE CASTS UR QL AUTO: NORMAL /LPF
IMM GRANULOCYTES # BLD AUTO: 0.03 10*3/MM3 (ref 0–0.05)
IMM GRANULOCYTES # BLD AUTO: 0.08 10*3/MM3 (ref 0–0.05)
IMM GRANULOCYTES NFR BLD AUTO: 0.3 % (ref 0–0.5)
IMM GRANULOCYTES NFR BLD AUTO: 0.5 % (ref 0–0.5)
INR PPP: 1.03 (ref 0.9–1.1)
KETONES UR QL STRIP: ABNORMAL
LEUKOCYTE ESTERASE UR QL STRIP.AUTO: NEGATIVE
LIPASE SERPL-CCNC: 20 U/L (ref 13–60)
LYMPHOCYTES # BLD AUTO: 1.35 10*3/MM3 (ref 0.7–3.1)
LYMPHOCYTES # BLD AUTO: 1.75 10*3/MM3 (ref 0.7–3.1)
LYMPHOCYTES NFR BLD AUTO: 17.6 % (ref 19.6–45.3)
LYMPHOCYTES NFR BLD AUTO: 8 % (ref 19.6–45.3)
MCH RBC QN AUTO: 29 PG (ref 26.6–33)
MCH RBC QN AUTO: 29.1 PG (ref 26.6–33)
MCHC RBC AUTO-ENTMCNC: 34 G/DL (ref 31.5–35.7)
MCHC RBC AUTO-ENTMCNC: 34.7 G/DL (ref 31.5–35.7)
MCV RBC AUTO: 83.7 FL (ref 79–97)
MCV RBC AUTO: 85.6 FL (ref 79–97)
MONOCYTES # BLD AUTO: 0.29 10*3/MM3 (ref 0.1–0.9)
MONOCYTES # BLD AUTO: 0.84 10*3/MM3 (ref 0.1–0.9)
MONOCYTES NFR BLD AUTO: 2.9 % (ref 5–12)
MONOCYTES NFR BLD AUTO: 5 % (ref 5–12)
MUCOUS THREADS URNS QL MICRO: NORMAL /HPF
NEUTROPHILS NFR BLD AUTO: 14.47 10*3/MM3 (ref 1.7–7)
NEUTROPHILS NFR BLD AUTO: 7.82 10*3/MM3 (ref 1.7–7)
NEUTROPHILS NFR BLD AUTO: 78.9 % (ref 42.7–76)
NEUTROPHILS NFR BLD AUTO: 86.2 % (ref 42.7–76)
NITRITE UR QL STRIP: NEGATIVE
NRBC BLD AUTO-RTO: 0 /100 WBC (ref 0–0.2)
NRBC BLD AUTO-RTO: 0 /100 WBC (ref 0–0.2)
PH UR STRIP.AUTO: <=5 [PH] (ref 5–8)
PLATELET # BLD AUTO: 200 10*3/MM3 (ref 140–450)
PLATELET # BLD AUTO: 270 10*3/MM3 (ref 140–450)
PMV BLD AUTO: 10.3 FL (ref 6–12)
PMV BLD AUTO: 10.5 FL (ref 6–12)
POTASSIUM SERPL-SCNC: 4.2 MMOL/L (ref 3.5–5.2)
POTASSIUM SERPL-SCNC: 4.8 MMOL/L (ref 3.5–5.2)
PROT SERPL-MCNC: 8.4 G/DL (ref 6–8.5)
PROT UR QL STRIP: ABNORMAL
PROTHROMBIN TIME: 13.6 SECONDS (ref 11.7–14.2)
RBC # BLD AUTO: 4.5 10*6/MM3 (ref 3.77–5.28)
RBC # BLD AUTO: 5.69 10*6/MM3 (ref 3.77–5.28)
RBC # UR STRIP: NORMAL /HPF
REF LAB TEST METHOD: NORMAL
SODIUM SERPL-SCNC: 138 MMOL/L (ref 136–145)
SODIUM SERPL-SCNC: 141 MMOL/L (ref 136–145)
SP GR UR STRIP: 1.03 (ref 1–1.03)
SQUAMOUS #/AREA URNS HPF: NORMAL /HPF
UROBILINOGEN UR QL STRIP: ABNORMAL
WBC # UR STRIP: NORMAL /HPF
WBC NRBC COR # BLD: 16.8 10*3/MM3 (ref 3.4–10.8)
WBC NRBC COR # BLD: 9.92 10*3/MM3 (ref 3.4–10.8)

## 2023-07-14 PROCEDURE — 25010000002 HYDROMORPHONE 1 MG/ML SOLUTION: Performed by: INTERNAL MEDICINE

## 2023-07-14 PROCEDURE — 25510000001 IOPAMIDOL 61 % SOLUTION: Performed by: INTERNAL MEDICINE

## 2023-07-14 PROCEDURE — 25010000002 MORPHINE PER 10 MG: Performed by: NURSE PRACTITIONER

## 2023-07-14 PROCEDURE — 25010000002 ONDANSETRON PER 1 MG: Performed by: EMERGENCY MEDICINE

## 2023-07-14 PROCEDURE — 74177 CT ABD & PELVIS W/CONTRAST: CPT

## 2023-07-14 PROCEDURE — 25010000002 ONDANSETRON PER 1 MG: Performed by: NURSE PRACTITIONER

## 2023-07-14 PROCEDURE — 36415 COLL VENOUS BLD VENIPUNCTURE: CPT | Performed by: NURSE PRACTITIONER

## 2023-07-14 PROCEDURE — 81001 URINALYSIS AUTO W/SCOPE: CPT | Performed by: EMERGENCY MEDICINE

## 2023-07-14 PROCEDURE — 25010000002 HYDROMORPHONE PER 4 MG: Performed by: EMERGENCY MEDICINE

## 2023-07-14 PROCEDURE — 83036 HEMOGLOBIN GLYCOSYLATED A1C: CPT | Performed by: INTERNAL MEDICINE

## 2023-07-14 PROCEDURE — 99222 1ST HOSP IP/OBS MODERATE 55: CPT | Performed by: INTERNAL MEDICINE

## 2023-07-14 PROCEDURE — 83605 ASSAY OF LACTIC ACID: CPT | Performed by: EMERGENCY MEDICINE

## 2023-07-14 PROCEDURE — 85610 PROTHROMBIN TIME: CPT | Performed by: NURSE PRACTITIONER

## 2023-07-14 PROCEDURE — 80048 BASIC METABOLIC PNL TOTAL CA: CPT | Performed by: NURSE PRACTITIONER

## 2023-07-14 PROCEDURE — 85025 COMPLETE CBC W/AUTO DIFF WBC: CPT | Performed by: NURSE PRACTITIONER

## 2023-07-14 RX ORDER — ACETAMINOPHEN 160 MG/5ML
650 SOLUTION ORAL EVERY 4 HOURS PRN
Status: DISCONTINUED | OUTPATIENT
Start: 2023-07-14 | End: 2023-07-17 | Stop reason: HOSPADM

## 2023-07-14 RX ORDER — AMOXICILLIN 250 MG
2 CAPSULE ORAL 2 TIMES DAILY PRN
Status: DISCONTINUED | OUTPATIENT
Start: 2023-07-14 | End: 2023-07-14

## 2023-07-14 RX ORDER — SODIUM CHLORIDE 9 MG/ML
40 INJECTION, SOLUTION INTRAVENOUS AS NEEDED
Status: DISCONTINUED | OUTPATIENT
Start: 2023-07-14 | End: 2023-07-17 | Stop reason: HOSPADM

## 2023-07-14 RX ORDER — POLYETHYLENE GLYCOL 3350 17 G/17G
17 POWDER, FOR SOLUTION ORAL DAILY PRN
Status: DISCONTINUED | OUTPATIENT
Start: 2023-07-14 | End: 2023-07-14

## 2023-07-14 RX ORDER — ONDANSETRON 2 MG/ML
4 INJECTION INTRAMUSCULAR; INTRAVENOUS EVERY 6 HOURS PRN
Status: DISCONTINUED | OUTPATIENT
Start: 2023-07-14 | End: 2023-07-17 | Stop reason: HOSPADM

## 2023-07-14 RX ORDER — BISACODYL 10 MG
10 SUPPOSITORY, RECTAL RECTAL DAILY PRN
Status: DISCONTINUED | OUTPATIENT
Start: 2023-07-14 | End: 2023-07-14

## 2023-07-14 RX ORDER — PROCHLORPERAZINE MALEATE 10 MG
10 TABLET ORAL EVERY 6 HOURS PRN
Status: DISCONTINUED | OUTPATIENT
Start: 2023-07-14 | End: 2023-07-17 | Stop reason: HOSPADM

## 2023-07-14 RX ORDER — BISACODYL 5 MG/1
5 TABLET, DELAYED RELEASE ORAL DAILY PRN
Status: DISCONTINUED | OUTPATIENT
Start: 2023-07-14 | End: 2023-07-14

## 2023-07-14 RX ORDER — MORPHINE SULFATE 2 MG/ML
2 INJECTION, SOLUTION INTRAMUSCULAR; INTRAVENOUS EVERY 4 HOURS PRN
Status: DISCONTINUED | OUTPATIENT
Start: 2023-07-14 | End: 2023-07-14

## 2023-07-14 RX ORDER — CYCLOBENZAPRINE HCL 10 MG
10 TABLET ORAL ONCE
Status: COMPLETED | OUTPATIENT
Start: 2023-07-14 | End: 2023-07-14

## 2023-07-14 RX ORDER — ALBUTEROL SULFATE 2.5 MG/3ML
2.5 SOLUTION RESPIRATORY (INHALATION) EVERY 6 HOURS PRN
Status: DISCONTINUED | OUTPATIENT
Start: 2023-07-14 | End: 2023-07-17 | Stop reason: HOSPADM

## 2023-07-14 RX ORDER — AMOXICILLIN 250 MG
2 CAPSULE ORAL 2 TIMES DAILY
Status: DISCONTINUED | OUTPATIENT
Start: 2023-07-14 | End: 2023-07-14

## 2023-07-14 RX ORDER — PANTOPRAZOLE SODIUM 40 MG/10ML
40 INJECTION, POWDER, LYOPHILIZED, FOR SOLUTION INTRAVENOUS
Status: DISCONTINUED | OUTPATIENT
Start: 2023-07-14 | End: 2023-07-17 | Stop reason: HOSPADM

## 2023-07-14 RX ORDER — SODIUM CHLORIDE 0.9 % (FLUSH) 0.9 %
10 SYRINGE (ML) INJECTION EVERY 12 HOURS SCHEDULED
Status: DISCONTINUED | OUTPATIENT
Start: 2023-07-14 | End: 2023-07-17 | Stop reason: HOSPADM

## 2023-07-14 RX ORDER — SODIUM CHLORIDE 0.9 % (FLUSH) 0.9 %
10 SYRINGE (ML) INJECTION AS NEEDED
Status: DISCONTINUED | OUTPATIENT
Start: 2023-07-14 | End: 2023-07-17 | Stop reason: HOSPADM

## 2023-07-14 RX ORDER — ACETAMINOPHEN 325 MG/1
650 TABLET ORAL EVERY 4 HOURS PRN
Status: DISCONTINUED | OUTPATIENT
Start: 2023-07-14 | End: 2023-07-17 | Stop reason: HOSPADM

## 2023-07-14 RX ORDER — SODIUM CHLORIDE 9 MG/ML
75 INJECTION, SOLUTION INTRAVENOUS CONTINUOUS
Status: DISCONTINUED | OUTPATIENT
Start: 2023-07-14 | End: 2023-07-17 | Stop reason: HOSPADM

## 2023-07-14 RX ORDER — NITROGLYCERIN 0.4 MG/1
0.4 TABLET SUBLINGUAL
Status: DISCONTINUED | OUTPATIENT
Start: 2023-07-14 | End: 2023-07-17 | Stop reason: HOSPADM

## 2023-07-14 RX ADMIN — HYDROMORPHONE HYDROCHLORIDE 0.5 MG: 1 INJECTION, SOLUTION INTRAMUSCULAR; INTRAVENOUS; SUBCUTANEOUS at 00:09

## 2023-07-14 RX ADMIN — HYDROMORPHONE HYDROCHLORIDE 1 MG: 1 INJECTION, SOLUTION INTRAMUSCULAR; INTRAVENOUS; SUBCUTANEOUS at 15:32

## 2023-07-14 RX ADMIN — HYDROMORPHONE HYDROCHLORIDE 1 MG: 1 INJECTION, SOLUTION INTRAMUSCULAR; INTRAVENOUS; SUBCUTANEOUS at 12:35

## 2023-07-14 RX ADMIN — ONDANSETRON 4 MG: 2 INJECTION INTRAMUSCULAR; INTRAVENOUS at 00:09

## 2023-07-14 RX ADMIN — Medication 10 ML: at 20:57

## 2023-07-14 RX ADMIN — SODIUM CHLORIDE 100 ML/HR: 9 INJECTION, SOLUTION INTRAVENOUS at 12:38

## 2023-07-14 RX ADMIN — ONDANSETRON 4 MG: 2 INJECTION INTRAMUSCULAR; INTRAVENOUS at 14:02

## 2023-07-14 RX ADMIN — Medication 10 ML: at 09:21

## 2023-07-14 RX ADMIN — IOPAMIDOL 85 ML: 612 INJECTION, SOLUTION INTRAVENOUS at 15:21

## 2023-07-14 RX ADMIN — SODIUM CHLORIDE 1000 ML: 9 INJECTION, SOLUTION INTRAVENOUS at 01:44

## 2023-07-14 RX ADMIN — MORPHINE SULFATE 2 MG: 2 INJECTION, SOLUTION INTRAMUSCULAR; INTRAVENOUS at 04:40

## 2023-07-14 RX ADMIN — MORPHINE SULFATE 2 MG: 2 INJECTION, SOLUTION INTRAMUSCULAR; INTRAVENOUS at 09:21

## 2023-07-14 RX ADMIN — PANTOPRAZOLE SODIUM 40 MG: 40 INJECTION, POWDER, FOR SOLUTION INTRAVENOUS at 15:07

## 2023-07-14 RX ADMIN — SODIUM CHLORIDE 1000 ML: 9 INJECTION, SOLUTION INTRAVENOUS at 00:09

## 2023-07-14 RX ADMIN — HYDROMORPHONE HYDROCHLORIDE 1 MG: 1 INJECTION, SOLUTION INTRAMUSCULAR; INTRAVENOUS; SUBCUTANEOUS at 20:57

## 2023-07-14 RX ADMIN — SODIUM CHLORIDE 100 ML/HR: 9 INJECTION, SOLUTION INTRAVENOUS at 04:17

## 2023-07-14 RX ADMIN — CYCLOBENZAPRINE 10 MG: 10 TABLET, FILM COATED ORAL at 15:04

## 2023-07-14 NOTE — ED NOTES
Nursing report ED to floor  Beena Vincent  32 y.o.  female    HPI :   Chief Complaint   Patient presents with    Vomiting    Diarrhea       Admitting doctor:   Wil Pickard MD    Admitting diagnosis:   The primary encounter diagnosis was Nausea vomiting and diarrhea. Diagnoses of Generalized abdominal pain, Lactic acidosis, and Adverse effect of drug, initial encounter were also pertinent to this visit.    Code status:   Current Code Status       Date Active Code Status Order ID Comments User Context       Prior            Allergies:   Penicillins and Adhesive tape    Isolation:   No active isolations    Intake and Output    Intake/Output Summary (Last 24 hours) at 7/14/2023 0311  Last data filed at 7/14/2023 0300  Gross per 24 hour   Intake 2500 ml   Output --   Net 2500 ml       Weight:       07/13/23  2231   Weight: 77.1 kg (170 lb)       Most recent vitals:   Vitals:    07/14/23 0102 07/14/23 0201 07/14/23 0202 07/14/23 0301   BP:  138/85  120/85   Pulse: 70 78  62   Resp:       Temp:       TempSrc:       SpO2: 97%  100% 97%   Weight:       Height:           Active LDAs/IV Access:   Lines, Drains & Airways       Active LDAs       Name Placement date Placement time Site Days    Peripheral IV 07/14/23 0150 Right Antecubital 07/14/23  0150  Antecubital  less than 1                    Labs (abnormal labs have a star):   Labs Reviewed   COMPREHENSIVE METABOLIC PANEL - Abnormal; Notable for the following components:       Result Value    Glucose 129 (*)     Creatinine 1.33 (*)     CO2 19.0 (*)     Total Bilirubin 1.3 (*)     Anion Gap 17.0 (*)     eGFR 55.3 (*)     All other components within normal limits    Narrative:     GFR Normal >60  Chronic Kidney Disease <60  Kidney Failure <15     URINALYSIS W/ MICROSCOPIC IF INDICATED (NO CULTURE) - Abnormal; Notable for the following components:    Color, UA Dark Yellow (*)     Appearance, UA Cloudy (*)     Ketones, UA Trace (*)     Protein,  mg/dL (2+) (*)      All other components within normal limits   LACTIC ACID, PLASMA - Abnormal; Notable for the following components:    Lactate 3.6 (*)     All other components within normal limits   CBC WITH AUTO DIFFERENTIAL - Abnormal; Notable for the following components:    WBC 16.80 (*)     RBC 5.69 (*)     Hemoglobin 16.5 (*)     Hematocrit 47.6 (*)     Neutrophil % 86.2 (*)     Lymphocyte % 8.0 (*)     Eosinophil % 0.1 (*)     Neutrophils, Absolute 14.47 (*)     Immature Grans, Absolute 0.08 (*)     All other components within normal limits   LIPASE - Normal   HCG, SERUM, QUALITATIVE - Normal   URINALYSIS, MICROSCOPIC ONLY   LACTIC ACID, REFLEX   CBC AND DIFFERENTIAL    Narrative:     The following orders were created for panel order CBC & Differential.  Procedure                               Abnormality         Status                     ---------                               -----------         ------                     CBC Auto Differential[[id number]]        Abnormal            Final result                 Please view results for these tests on the individual orders.       EKG:   No orders to display       Meds given in ED:   Medications   sodium chloride 0.9 % flush 10 mL (has no administration in time range)   ondansetron (ZOFRAN) injection 4 mg (4 mg Intravenous Given 7/13/23 2257)   sodium chloride 0.9 % bolus 1,000 mL (0 mL Intravenous Stopped 7/14/23 0143)   ondansetron (ZOFRAN) injection 4 mg (4 mg Intravenous Given 7/14/23 0009)   HYDROmorphone (DILAUDID) injection 0.5 mg (0.5 mg Intravenous Given 7/14/23 0009)   sodium chloride 0.9 % bolus 1,000 mL (0 mL Intravenous Stopped 7/14/23 0300)       Imaging results:  No radiology results for the last day    Ambulatory status:   - Standby    Social issues:   Social History     Socioeconomic History    Marital status: Single   Tobacco Use    Smoking status: Former     Packs/day: 0.50     Years: 4.00     Pack years: 2.00     Types: Cigarettes     Quit date: 2/8/2020      Years since quitting: 3.4     Passive exposure: Never    Smokeless tobacco: Never   Vaping Use    Vaping Use: Former    Substances: Nicotine    Devices: Disposable   Substance and Sexual Activity    Alcohol use: Not Currently     Comment: socially    Drug use: Not Currently     Types: Marijuana     Comment: daily, did not smoke during pregnancy    Sexual activity: Not Currently       NIH Stroke Scale:       Rosalba Contreras RN  07/14/23 03:11 EDT     Call [facility] #[phone] with any questions

## 2023-07-14 NOTE — PAYOR COMM NOTE
"Beena Vincent (32 y.o. Female)      PLEASE SEE ATTACHED FOR INPT ADMIT.     PLEASE CALL   OR  [phone] WITH INPT AUTH.    THANK YOU    HILARIA REAL LPN CCP     Date of Birth   1990    Social Security Number       Address   [address] [facility] [address]    Home Phone       MRN   [id number]       Pentecostalism   Sikh    Marital Status   Single                            Admission Date   7/13/23    Admission Type   Emergency    Admitting Provider   Ronny Begum MD    Attending Provider   Ronny Begum MD    Department, Room/Bed   [facility] 4 EAST, E462/1       Discharge Date       Discharge Disposition       Discharge Destination                                 Attending Provider: Ronny Begum MD    Allergies: Penicillins, Adhesive Tape    Isolation: None   Infection: C.difficile (rule out) (07/14/23)   Code Status: CPR    Ht: 177.8 cm (70\")   Wt: 78.2 kg (172 lb 4.8 oz)    Admission Cmt: None   Principal Problem: Nausea vomiting and diarrhea [R11.2,R19.7]                   Active Insurance as of 7/13/2023       Primary Coverage       Payor Plan Insurance Group Employer/Plan Group    [employer] [place] [employer] [place]ICA[place]        Payor Plan Address Payor Plan Phone Number Payor Plan Fax Number Effective Dates    [address] [address] [phone]  10/1/2021 - None Entered    [facility] [id number]         Subscriber Name Subscriber Birth Date Member ID       BEENA VINCENT 1990 [id number]                     Emergency Contacts        (Rel.) Home Phone Work Phone Mobile Phone    DIEGO VINCENT (Mother) [phone] -- [phone]              Lucinda: [facility] [id number]  Tax ID [id number]  History & Physical    No notes of this type exist for this encounter.          Emergency Department Notes        Link, IVONE Navarrete at 07/14/23 0311          Nursing report ED to floor  Beena Vincent  32 y.o.  female    HPI :   Chief Complaint   Patient presents " with    Vomiting    Diarrhea       Admitting doctor:   Wil Pickard MD    Admitting diagnosis:   The primary encounter diagnosis was Nausea vomiting and diarrhea. Diagnoses of Generalized abdominal pain, Lactic acidosis, and Adverse effect of drug, initial encounter were also pertinent to this visit.    Code status:   Current Code Status       Date Active Code Status Order ID Comments User Context       Prior            Allergies:   Penicillins and Adhesive tape    Isolation:   No active isolations    Intake and Output    Intake/Output Summary (Last 24 hours) at 7/14/2023 0311  Last data filed at 7/14/2023 0300  Gross per 24 hour   Intake 2500 ml   Output --   Net 2500 ml       Weight:       07/13/23 2231   Weight: 77.1 kg (170 lb)       Most recent vitals:   Vitals:    07/14/23 0102 07/14/23 0201 07/14/23 0202 07/14/23 0301   BP:  138/85  120/85   Pulse: 70 78  62   Resp:       Temp:       TempSrc:       SpO2: 97%  100% 97%   Weight:       Height:           Active LDAs/IV Access:   Lines, Drains & Airways       Active LDAs       Name Placement date Placement time Site Days    Peripheral IV 07/14/23 0150 Right Antecubital 07/14/23  0150  Antecubital  less than 1                    Labs (abnormal labs have a star):   Labs Reviewed   COMPREHENSIVE METABOLIC PANEL - Abnormal; Notable for the following components:       Result Value    Glucose 129 (*)     Creatinine 1.33 (*)     CO2 19.0 (*)     Total Bilirubin 1.3 (*)     Anion Gap 17.0 (*)     eGFR 55.3 (*)     All other components within normal limits    Narrative:     GFR Normal >60  Chronic Kidney Disease <60  Kidney Failure <15     URINALYSIS W/ MICROSCOPIC IF INDICATED (NO CULTURE) - Abnormal; Notable for the following components:    Color, UA Dark Yellow (*)     Appearance, UA Cloudy (*)     Ketones, UA Trace (*)     Protein,  mg/dL (2+) (*)     All other components within normal limits   LACTIC ACID, PLASMA - Abnormal; Notable for the following  components:    Lactate 3.6 (*)     All other components within normal limits   CBC WITH AUTO DIFFERENTIAL - Abnormal; Notable for the following components:    WBC 16.80 (*)     RBC 5.69 (*)     Hemoglobin 16.5 (*)     Hematocrit 47.6 (*)     Neutrophil % 86.2 (*)     Lymphocyte % 8.0 (*)     Eosinophil % 0.1 (*)     Neutrophils, Absolute 14.47 (*)     Immature Grans, Absolute 0.08 (*)     All other components within normal limits   LIPASE - Normal   HCG, SERUM, QUALITATIVE - Normal   URINALYSIS, MICROSCOPIC ONLY   LACTIC ACID, REFLEX   CBC AND DIFFERENTIAL    Narrative:     The following orders were created for panel order CBC & Differential.  Procedure                               Abnormality         Status                     ---------                               -----------         ------                     CBC Auto Differential[[id number]]        Abnormal            Final result                 Please view results for these tests on the individual orders.       EKG:   No orders to display       Meds given in ED:   Medications   sodium chloride 0.9 % flush 10 mL (has no administration in time range)   ondansetron (ZOFRAN) injection 4 mg (4 mg Intravenous Given 7/13/23 2257)   sodium chloride 0.9 % bolus 1,000 mL (0 mL Intravenous Stopped 7/14/23 0143)   ondansetron (ZOFRAN) injection 4 mg (4 mg Intravenous Given 7/14/23 0009)   HYDROmorphone (DILAUDID) injection 0.5 mg (0.5 mg Intravenous Given 7/14/23 0009)   sodium chloride 0.9 % bolus 1,000 mL (0 mL Intravenous Stopped 7/14/23 0300)       Imaging results:  No radiology results for the last day    Ambulatory status:   - Standby    Social issues:   Social History     Socioeconomic History    Marital status: Single   Tobacco Use    Smoking status: Former     Packs/day: 0.50     Years: 4.00     Pack years: 2.00     Types: Cigarettes     Quit date: 2/8/2020     Years since quitting: 3.4     Passive exposure: Never    Smokeless tobacco: Never   Vaping Use     Vaping Use: Former    Substances: Nicotine    Devices: Disposable   Substance and Sexual Activity    Alcohol use: Not Currently     Comment: socially    Drug use: Not Currently     Types: Marijuana     Comment: daily, did not smoke during pregnancy    Sexual activity: Not Currently       NIH Stroke Scale:       Rosalba Contreras RN  23 03:11 EDT     Call Rosalba #[phone] with any questions     Electronically signed by Rosalba Contreras RN at 23 0311       Hao Lora MD at 23 2321           EMERGENCY DEPARTMENT ENCOUNTER    Room Number:  E462/1  PCP: Sissy Garibay MD  Historian: Patient      HPI:  Chief Complaint: Nausea/vomiting/diarrhea  A complete HPI/ROS/PMH/PSH/SH/FH are unobtainable due to: None  Context: Beena Vincent is a 32 y.o. female who presents to the ED c/o gradual in onset but progressively worsening episodes of nausea/vomiting/diarrhea that have been present now throughout the last 1 to 2 days.  She reports that she is a cancer patient under the care of [facility] and is currently on oral chemotherapy that is highly likely contributing to the symptoms.  She was recently admitted to the hospital for similar symptoms just a few weeks back.  She does report generalized abdominal discomfort associated with the symptoms.  She denies fever/chills, chest pain, back pain, headache, or shortness of breath.            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    Generalized abdominal pain 06/08/2023    Nausea vomiting and diarrhea 06/09/2023    Acute kidney injury 06/09/2023    Leukocytosis 06/09/2023     Resolved Ambulatory Problems     Diagnosis Date Noted    Edema of lower extremity, antepartum, third trimester 06/08/2020    Anemia affecting pregnancy in third trimester 06/29/2020    Pregnancy 08/07/2020    Medial epicondylitis of left elbow 05/30/2013     Past Medical History:   Diagnosis Date    Asthma     Cervical dysplasia     Depression     Gestational hypertension 2020    Iron deficiency anemia     Preeclampsia 2012         PAST SURGICAL HISTORY  Past Surgical History:   Procedure Laterality Date    APPENDECTOMY N/A 10/1/2021    Procedure: APPENDECTOMY LAPAROSCOPIC;  Surgeon: Ahmet Dong Jr., MD;  Location: [facility] OR;  Service: General;  Laterality: N/A;    KNEE ARTHROSCOPY      AGE 6    LEEP N/A 4/25/2019    Procedure: LOOP ELECTROCAUTERY EXCISION PROCEDURE;  Surgeon: Arsh Ray MD;  Location: [facility];  Service: Obstetrics/Gynecology    SOFT TISSUE BIOPSY Right 3/23/2022    Procedure: BIOPSY SOFT TISSUE THIGH / KNEE;  Surgeon: Chris Randall MD;  Location: [facility] OR;  Service: Orthopedics;  Laterality: Right;    WISDOM TOOTH EXTRACTION           FAMILY HISTORY  Family History   Problem Relation Age of Onset    Diabetes Father     Arthritis Maternal Grandmother     Lung cancer Maternal Grandfather     Heart attack Maternal Grandfather     Heart disease Maternal Grandfather     Stroke Maternal Grandfather     Hyperlipidemia Maternal Grandfather     Malig Hyperthermia Neg Hx     Breast cancer Neg Hx     Ovarian cancer Neg Hx     Uterine cancer Neg Hx     Colon cancer Neg Hx          SOCIAL HISTORY  Social History     Socioeconomic History    Marital status: Single   Tobacco Use    Smoking status: Former     Packs/day: 0.50     Years: 4.00     Pack years: 2.00     Types: Cigarettes     Quit date:  2/8/2020     Years since quitting: 3.4     Passive exposure: Never    Smokeless tobacco: Never   Vaping Use    Vaping Use: Former    Substances: Nicotine    Devices: Disposable   Substance and Sexual Activity    Alcohol use: Not Currently     Comment: socially    Drug use: Not Currently     Types: Marijuana     Comment: daily, did not smoke during pregnancy    Sexual activity: Not Currently         ALLERGIES  Penicillins and Adhesive tape        REVIEW OF SYSTEMS  Review of Systems   Constitutional:  Negative for fever.   HENT:  Negative for sore throat.    Eyes: Negative.    Respiratory:  Negative for cough and shortness of breath.    Cardiovascular:  Negative for chest pain.   Gastrointestinal:  Positive for abdominal pain, diarrhea, nausea and vomiting.   Genitourinary:  Negative for dysuria.   Musculoskeletal:  Negative for neck pain.   Skin:  Negative for rash.   Allergic/Immunologic: Negative.    Neurological:  Negative for weakness, numbness and headaches.   Hematological: Negative.    Psychiatric/Behavioral: Negative.     All other systems reviewed and are negative.         PHYSICAL EXAM  ED Triage Vitals [07/13/23 2231]   Temp Heart Rate Resp BP SpO2   97.8 °F (36.6 °C) 89 16 131/80 96 %      Temp src Heart Rate Source Patient Position BP Location FiO2 (%)   Tympanic -- -- -- --       Physical Exam  Constitutional:       General: She is not in acute distress.     Appearance: Normal appearance. She is not ill-appearing or toxic-appearing.   HENT:      Head: Normocephalic and atraumatic.      Mouth/Throat:      Mouth: Mucous membranes are dry.   Eyes:      Extraocular Movements: Extraocular movements intact.      Pupils: Pupils are equal, round, and reactive to light.   Cardiovascular:      Rate and Rhythm: Normal rate and regular rhythm.      Heart sounds: No murmur heard.    No friction rub. No gallop.   Pulmonary:      Effort: Pulmonary effort is normal.      Breath sounds: Normal breath sounds.    Abdominal:      General: Abdomen is flat. There is no distension.      Palpations: Abdomen is soft.      Tenderness: There is no abdominal tenderness.   Musculoskeletal:         General: No swelling or tenderness. Normal range of motion.      Cervical back: Normal range of motion and neck supple.   Skin:     General: Skin is warm and dry.   Neurological:      General: No focal deficit present.      Mental Status: She is alert and oriented to person, place, and time.      Sensory: No sensory deficit.      Motor: No weakness.   Psychiatric:         Mood and Affect: Mood normal.         Behavior: Behavior normal.         Vital signs and nursing notes reviewed.          LAB RESULTS  Recent Results (from the past 24 hour(s))   Comprehensive Metabolic Panel    Collection Time: 07/13/23 11:59 PM    Specimen: Blood   Result Value Ref Range    Glucose 129 (H) 65 - 99 mg/dL    BUN 15 6 - 20 mg/dL    Creatinine 1.33 (H) 0.57 - 1.00 mg/dL    Sodium 138 136 - 145 mmol/L    Potassium 4.2 3.5 - 5.2 mmol/L    Chloride 102 98 - 107 mmol/L    CO2 19.0 (L) 22.0 - 29.0 mmol/L    Calcium 10.1 8.6 - 10.5 mg/dL    Total Protein 8.4 6.0 - 8.5 g/dL    Albumin 5.2 3.5 - 5.2 g/dL    ALT (SGPT) 16 1 - 33 U/L    AST (SGOT) 23 1 - 32 U/L    Alkaline Phosphatase 73 39 - 117 U/L    Total Bilirubin 1.3 (H) 0.0 - 1.2 mg/dL    Globulin 3.2 gm/dL    A/G Ratio 1.6 g/dL    BUN/Creatinine Ratio 11.3 7.0 - 25.0    Anion Gap 17.0 (H) 5.0 - 15.0 mmol/L    eGFR 55.3 (L) >60.0 mL/min/1.73   Lipase    Collection Time: 07/13/23 11:59 PM    Specimen: Blood   Result Value Ref Range    Lipase 20 13 - 60 U/L   hCG, Serum, Qualitative    Collection Time: 07/13/23 11:59 PM    Specimen: Blood   Result Value Ref Range    HCG Qualitative Negative Negative   Lactic Acid, Plasma    Collection Time: 07/13/23 11:59 PM    Specimen: Blood   Result Value Ref Range    Lactate 3.6 (C) 0.5 - 2.0 mmol/L   CBC Auto Differential    Collection Time: 07/13/23 11:59 PM    Specimen:  Blood   Result Value Ref Range    WBC 16.80 (H) 3.40 - 10.80 10*3/mm3    RBC 5.69 (H) 3.77 - 5.28 10*6/mm3    Hemoglobin 16.5 (H) 12.0 - 15.9 g/dL    Hematocrit 47.6 (H) 34.0 - 46.6 %    MCV 83.7 79.0 - 97.0 fL    MCH 29.0 26.6 - 33.0 pg    MCHC 34.7 31.5 - 35.7 g/dL    RDW 14.4 12.3 - 15.4 %    RDW-SD 43.1 37.0 - 54.0 fl    MPV 10.5 6.0 - 12.0 fL    Platelets 270 140 - 450 10*3/mm3    Neutrophil % 86.2 (H) 42.7 - 76.0 %    Lymphocyte % 8.0 (L) 19.6 - 45.3 %    Monocyte % 5.0 5.0 - 12.0 %    Eosinophil % 0.1 (L) 0.3 - 6.2 %    Basophil % 0.2 0.0 - 1.5 %    Immature Grans % 0.5 0.0 - 0.5 %    Neutrophils, Absolute 14.47 (H) 1.70 - 7.00 10*3/mm3    Lymphocytes, Absolute 1.35 0.70 - 3.10 10*3/mm3    Monocytes, Absolute 0.84 0.10 - 0.90 10*3/mm3    Eosinophils, Absolute 0.02 0.00 - 0.40 10*3/mm3    Basophils, Absolute 0.04 0.00 - 0.20 10*3/mm3    Immature Grans, Absolute 0.08 (H) 0.00 - 0.05 10*3/mm3    nRBC 0.0 0.0 - 0.2 /100 WBC   Urinalysis With Microscopic If Indicated (No Culture) - Urine, Clean Catch    Collection Time: 07/14/23 12:48 AM    Specimen: Urine, Clean Catch   Result Value Ref Range    Color, UA Dark Yellow (A) Yellow, Straw    Appearance, UA Cloudy (A) Clear    pH, UA <=5.0 5.0 - 8.0    Specific Gravity, UA 1.028 1.005 - 1.030    Glucose, UA Negative Negative    Ketones, UA Trace (A) Negative    Bilirubin, UA Negative Negative    Blood, UA Negative Negative    Protein,  mg/dL (2+) (A) Negative    Leuk Esterase, UA Negative Negative    Nitrite, UA Negative Negative    Urobilinogen, UA 1.0 E.U./dL 0.2 - 1.0 E.U./dL   Urinalysis, Microscopic Only - Urine, Clean Catch    Collection Time: 07/14/23 12:48 AM    Specimen: Urine, Clean Catch   Result Value Ref Range    RBC, UA None Seen None Seen, 0-2 /HPF    WBC, UA 0-2 None Seen, 0-2 /HPF    Bacteria, UA None Seen None Seen /HPF    Squamous Epithelial Cells, UA 0-2 None Seen, 0-2 /HPF    Hyaline Casts, UA 13-20 None Seen /LPF    Mucus, UA Trace None  Seen, Trace /HPF    Methodology Manual Light Microscopy    STAT Lactic Acid, Reflex    Collection Time: 07/14/23  3:00 AM    Specimen: Blood   Result Value Ref Range    Lactate 1.5 0.5 - 2.0 mmol/L   Basic Metabolic Panel    Collection Time: 07/14/23  4:33 AM    Specimen: Blood   Result Value Ref Range    Glucose 123 (H) 65 - 99 mg/dL    BUN 13 6 - 20 mg/dL    Creatinine 0.97 0.57 - 1.00 mg/dL    Sodium 141 136 - 145 mmol/L    Potassium 4.8 3.5 - 5.2 mmol/L    Chloride 109 (H) 98 - 107 mmol/L    CO2 21.2 (L) 22.0 - 29.0 mmol/L    Calcium 8.0 (L) 8.6 - 10.5 mg/dL    BUN/Creatinine Ratio 13.4 7.0 - 25.0    Anion Gap 10.8 5.0 - 15.0 mmol/L    eGFR 80.8 >60.0 mL/min/1.73   CBC Auto Differential    Collection Time: 07/14/23  4:33 AM    Specimen: Blood   Result Value Ref Range    WBC 9.92 3.40 - 10.80 10*3/mm3    RBC 4.50 3.77 - 5.28 10*6/mm3    Hemoglobin 13.1 12.0 - 15.9 g/dL    Hematocrit 38.5 34.0 - 46.6 %    MCV 85.6 79.0 - 97.0 fL    MCH 29.1 26.6 - 33.0 pg    MCHC 34.0 31.5 - 35.7 g/dL    RDW 14.8 12.3 - 15.4 %    RDW-SD 45.9 37.0 - 54.0 fl    MPV 10.3 6.0 - 12.0 fL    Platelets 200 140 - 450 10*3/mm3    Neutrophil % 78.9 (H) 42.7 - 76.0 %    Lymphocyte % 17.6 (L) 19.6 - 45.3 %    Monocyte % 2.9 (L) 5.0 - 12.0 %    Eosinophil % 0.1 (L) 0.3 - 6.2 %    Basophil % 0.2 0.0 - 1.5 %    Immature Grans % 0.3 0.0 - 0.5 %    Neutrophils, Absolute 7.82 (H) 1.70 - 7.00 10*3/mm3    Lymphocytes, Absolute 1.75 0.70 - 3.10 10*3/mm3    Monocytes, Absolute 0.29 0.10 - 0.90 10*3/mm3    Eosinophils, Absolute 0.01 0.00 - 0.40 10*3/mm3    Basophils, Absolute 0.02 0.00 - 0.20 10*3/mm3    Immature Grans, Absolute 0.03 0.00 - 0.05 10*3/mm3    nRBC 0.0 0.0 - 0.2 /100 WBC   Protime-INR    Collection Time: 07/14/23  4:33 AM    Specimen: Blood   Result Value Ref Range    Protime 13.6 11.7 - 14.2 Seconds    INR 1.03 0.90 - 1.10       Ordered the above labs and reviewed the results.        RADIOLOGY  No Radiology Exams Resulted Within Past 24  Hours    Ordered the above noted radiological studies. Reviewed by me in PACS.            PROCEDURES  Procedures            MEDICATIONS GIVEN IN ER  Medications   sodium chloride 0.9 % flush 10 mL (has no administration in time range)   sodium chloride 0.9 % flush 10 mL (has no administration in time range)   sodium chloride 0.9 % flush 10 mL (has no administration in time range)   sodium chloride 0.9 % infusion 40 mL (has no administration in time range)   sennosides-docusate (PERICOLACE) 8.6-50 MG per tablet 2 tablet (has no administration in time range)     And   polyethylene glycol (MIRALAX) packet 17 g (has no administration in time range)     And   bisacodyl (DULCOLAX) EC tablet 5 mg (has no administration in time range)     And   bisacodyl (DULCOLAX) suppository 10 mg (has no administration in time range)   nitroglycerin (NITROSTAT) SL tablet 0.4 mg (has no administration in time range)   sodium chloride 0.9 % infusion (100 mL/hr Intravenous New Bag 7/14/23 0417)   acetaminophen (TYLENOL) tablet 650 mg (has no administration in time range)     Or   acetaminophen (TYLENOL) 160 MG/5ML solution 650 mg (has no administration in time range)     Or   acetaminophen (TYLENOL) suppository 650 mg (has no administration in time range)   ondansetron (ZOFRAN) injection 4 mg (has no administration in time range)   morphine injection 2 mg (2 mg Intravenous Given 7/14/23 0440)   ondansetron (ZOFRAN) injection 4 mg (4 mg Intravenous Given 7/13/23 2257)   sodium chloride 0.9 % bolus 1,000 mL (0 mL Intravenous Stopped 7/14/23 0143)   ondansetron (ZOFRAN) injection 4 mg (4 mg Intravenous Given 7/14/23 0009)   HYDROmorphone (DILAUDID) injection 0.5 mg (0.5 mg Intravenous Given 7/14/23 0009)   sodium chloride 0.9 % bolus 1,000 mL (0 mL Intravenous Stopped 7/14/23 0300)                   MEDICAL DECISION MAKING, PROGRESS, and CONSULTS    All labs have been independently reviewed by me.  All radiology studies have been reviewed by  me and I have also reviewed the radiology report.   EKG's independently viewed and interpreted by me.  Discussion below represents my analysis of pertinent findings related to patient's condition, differential diagnosis, treatment plan and final disposition.      Additional sources:  - Discussed/ obtained information from independent historians: History obtained from the patient herself at bedside    - External (non-ED) record review: Upon medical records review, the patient was admitted to the hospital from 6/8/2023 through 6/10/2023 where she was treated for generalized abdominal pain with associated nausea/vomiting/diarrhea.    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    Case discussed with BERTHA Guerrero for [facility], who will admit the patient to the hospital today for Dr. Pickard.        Orders placed during this visit:  Orders Placed This Encounter   Procedures    Comprehensive Metabolic Panel    Lipase    hCG, Serum, Qualitative    Urinalysis With Microscopic If Indicated (No Culture) - Urine, Clean Catch    Lactic Acid, Plasma    CBC Auto Differential    STAT Lactic Acid, Reflex    Urinalysis, Microscopic Only - Urine, Clean Catch    Basic Metabolic Panel    CBC Auto Differential    Protime-INR    Diet: Liquid Diets; Clear Liquid; Texture: Regular Texture (IDDSI 7); Fluid Consistency: Thin (IDDSI 0)    Vital Signs    Intake & Output    Weigh Patient    Oral Care    Place Sequential Compression Device    Maintain Sequential Compression Device    Telemetry - Maintain IV Access    Telemetry - Place Orders & Notify Provider of Results When Patient Experiences Acute Chest Pain, Dysrhythmia or Respiratory Distress    May Be Off Telemetry for Tests    Up With Assistance    Opioid Administration - Document EtCO2 and / or SpO2 With  Each Set of Vitals & Any Change in Patient Status    Opioid Administration - Notify Provider Hypercapnic Monitoring    Opioid Administration - Continuous Pulse Oximetry (SpO2)    Code Status and Medical Interventions:    [facility] (on-call MD unless specified) Details    Inpatient Hematology & Oncology Consult    SCANNED - TELEMETRY      Insert Peripheral IV    Insert Peripheral IV    Inpatient Admission    CBC & Differential               Differential diagnosis includes but is not limited to:    Differential diagnosis includes but is not limited to chemotherapy medication side effect/reaction, gastroenteritis, sepsis, dehydration, severe electrolyte disturbance, or acute renal failure.      Independent interpretation of labs, radiology studies, and discussions with consultants:    Lab values were independently interpreted by myself showing the patient to have significant elevation in her lactic acid at 3.6 as well as a CO2 of 19.  She also has marked leukocytosis at 16.8.      ED Course as of 07/14/23 0649   Fri Jul 14, 2023   0158 On reevaluation, the patient does report some improvement in symptoms following fluid administration as well as pain and nausea medication.  I did inform her that she is dehydrated with elevations in her lactic acid level.  We will admit her to the hospital today for further ongoing treatment of her symptoms.  The patient is in agreement with that plan and all questions have been answered. [BM]   0202 Lactate(!!): 3.6 [BM]   0202 CO2(!): 19.0 [BM]   0202 WBC(!): 16.80 [BM]   0202 Hemoglobin(!): 16.5 [BM]   0202 Hematocrit(!): 47.6 [BM]   0215 The patient's presentation, work-up, as well as diagnosis and treatment plan was discussed at length with BERTHA Guerrero for [facility].  She agrees to admit the patient to the hospital today for Dr. Pickard. [BM]      ED Course User Index  [BM] Hao Lora MD             DIAGNOSIS  Final diagnoses:   Nausea vomiting and diarrhea   Generalized  abdominal pain   Lactic acidosis   Adverse effect of drug, initial encounter         DISPOSITION  ADMISSION    Discussed treatment plan and reason for admission with pt/family and admitting physician.  Pt/family voiced understanding of the plan for admission for further testing/treatment as needed.               Latest Documented Vital Signs:  As of 06:49 EDT  BP- 122/81 HR- 64 Temp- 97.9 °F (36.6 °C) (Oral) O2 sat- 97%              --    Please note that portions of this were completed with a voice recognition program.       Note Disclaimer: At [facility] Hospital, we believe that sharing information builds trust and better relationships. You are receiving this note because you are receiving care at [facility] or recently visited. It is possible you will see health information before a provider has talked with you about it. This kind of information can be easy to misunderstand. To help you fully understand what it means for your health, we urge you to discuss this note with your provider.             Hao Lora MD  07/14/23 0649      Electronically signed by Hao Lora MD at 07/14/23 0649       Nicole Rivas RN at 07/13/23 9155          To ER via EMS from [facility].  C/o N/V/D that started today  Pt is on chemo Votrient 200mg daily.  Dose was increased 2 weeks ago.     Electronically signed by Nicole Rivas RN at 07/13/23 [id number]

## 2023-07-14 NOTE — CONSULTS
Subjective     REASON FOR CONSULTATION: Nausea vomiting and diarrhea due to chemotherapy  Provide an opinion on any further workup or treatment                             REQUESTING PHYSICIAN: [facility]    RECORDS OBTAINED:  Records of the patients history including those obtained from the referring provider were reviewed and summarized in detail.    HISTORY OF PRESENT ILLNESS:  The patient is a 32 y.o. year old female who is here for an opinion about the above issue.    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    Initial dose was 800 mg and she was admitted with similar symptoms with then resumed her at a 200 mg dose was doing well she was increased a little over a week ago to to 400 mg and symptoms started again yesterday.  She was taking oral nausea medicines once the vomiting started she can no longer take antiemetics.    She has noted significant regression of the tumor her knee thankfully and would like to stay on maximum tolerated dose because of this.    Her daughter had a GI bug a week ago but she has had no fever or other complaints and I do not think this is a GI bug but obviously this could also be the case    No hematemesis or melena  See BC stable      Past Medical History:   Diagnosis Date    Asthma     inhaler as needed    Cervical dysplasia     Depression     Desmoid tumor 2022    back of right knee- pt desires to proceed with treatments after delivery    Gestational diabetes     Gestational hypertension 2020    Iron deficiency anemia     Preeclampsia 2012        Past Surgical History:   Procedure Laterality Date    APPENDECTOMY N/A 10/1/2021    Procedure: APPENDECTOMY LAPAROSCOPIC;  Surgeon: Ahmet Dong Jr., MD;  Location: [facility];  Service: General;  Laterality: N/A;    KNEE ARTHROSCOPY      AGE 6    LEEP N/A 4/25/2019    Procedure: LOOP ELECTROCAUTERY EXCISION PROCEDURE;   "Surgeon: Arsh Ray MD;  [place] [facility] [place] [facility]  Service: Obstetrics/Gynecology    SOFT TISSUE BIOPSY Right 3/23/2022    Procedure: BIOPSY SOFT TISSUE THIGH / KNEE;  Surgeon: Chris Randall MD;  Location: [facility] OR;  Service: Orthopedics;  Laterality: Right;    WISDOM TOOTH EXTRACTION          No current facility-administered medications on file prior to encounter.     Current Outpatient Medications on File Prior to Encounter   Medication Sig Dispense Refill    albuterol sulfate  (90 Base) MCG/ACT inhaler Inhale 2 puffs Every 4 (Four) Hours As Needed for Wheezing. 18 g 0    ibuprofen (ADVIL,MOTRIN) 600 MG tablet Take 1 tablet by mouth Every 6 (Six) Hours As Needed for Mild Pain 50 tablet 3    ondansetron ODT (ZOFRAN-ODT) 8 MG disintegrating tablet Place 1 tablet on the tongue Every 8 (Eight) Hours As Needed for Nausea or Vomiting for up to 60 doses. 60 tablet 5    PAZOPanib (Votrient) 200 MG chemo tablet Take 2 tablets by mouth Daily. 60 tablet 5    prochlorperazine (COMPAZINE) 10 MG tablet Take 1 tablet by mouth Every 6 (Six) Hours As Needed for Nausea or Vomiting. 30 tablet 3    Blood Glucose Monitoring Suppl (FreeStyle Lite) w/Device kit USE 1 SEVEN TIMES DAILY      glucose monitor monitoring kit To check blood sugars 7x daily 1 each 0    Lancets (freestyle) lancets Pt to check blood sugar 7xs daily 15 each 12    [DISCONTINUED] Prenatal Vit-Fe Fumarate-FA (Prenatabs FA) 29-1 MG tablet Take 1 tablet by mouth Daily. 30 tablet 11        ALLERGIES:    Allergies   Allergen Reactions    Penicillins Nausea And Vomiting    Adhesive Tape Rash     \"SURGICAL TAPE\"  AS A CHILD        Social History     Socioeconomic History    Marital status: Single   Tobacco Use    Smoking status: Former     Packs/day: 0.50     Years: 4.00     Pack years: 2.00     Types: Cigarettes     Quit date: 2/8/2020     Years since quitting: 3.4     Passive exposure: Never    Smokeless tobacco: Never   Vaping Use    Vaping Use: " "Former    Substances: Nicotine    Devices: Disposable   Substance and Sexual Activity    Alcohol use: Not Currently     Comment: socially    Drug use: Not Currently     Types: Marijuana     Comment: daily, did not smoke during pregnancy    Sexual activity: Not Currently        Family History   Problem Relation Age of Onset    Diabetes Father     Arthritis Maternal Grandmother     Lung cancer Maternal Grandfather     Heart attack Maternal Grandfather     Heart disease Maternal Grandfather     Stroke Maternal Grandfather     Hyperlipidemia Maternal Grandfather     Malig Hyperthermia Neg Hx     Breast cancer Neg Hx     Ovarian cancer Neg Hx     Uterine cancer Neg Hx     Colon cancer Neg Hx         Review of Systems   Gastrointestinal:  Positive for abdominal pain, diarrhea, nausea and vomiting.      Objective     Vitals:    07/14/23 0202 07/14/23 0301 07/14/23 0404 07/14/23 0830   BP:  120/85 122/81 96/69   BP Location:   Right arm Left arm   Patient Position:    Lying   Pulse:  62 64 50   Resp:   16 16   Temp:   97.9 °F (36.6 °C) 98.1 °F (36.7 °C)   TempSrc:   Oral Oral   SpO2: 100% 97% 97% 99%   Weight:   78.2 kg (172 lb 4.8 oz)    Height:   177.8 cm (70\")          6/30/2023    11:54 AM   Current Status   ECOG score 0       Physical Exam   CONSTITUTIONAL:  Vital signs reviewed.  No distress, looks comfortable.  EYES:  Conjunctivae and lids unremarkable.  PERRLA  EARS,NOSE,MOUTH,THROAT:  Ears and nose appear unremarkable.  Lips, teeth, gums appear unremarkable.  RESPIRATORY:  Normal respiratory effort.  Lungs clear to auscultation bilaterally.  CARDIOVASCULAR:  Normal S1, S2.  No murmurs rubs or gallops.  No significant lower extremity edema.  Mass behind the right knee appears to be smaller per patient  GASTROINTESTINAL: Abdomen appears unremarkable.  Mild periumbilical tenderness.  No hepatomegaly.  No splenomegaly.  LYMPHATIC:  No cervical, supraclavicular, axillary lymphadenopathy.  SKIN:  Warm.  No " rashes.  PSYCHIATRIC:  Normal judgment and insight.  Normal mood and affect.      RECENT LABS:  Hematology WBC   Date Value Ref Range Status   07/14/2023 9.92 3.40 - 10.80 10*3/mm3 Final     RBC   Date Value Ref Range Status   07/14/2023 4.50 3.77 - 5.28 10*6/mm3 Final     Hemoglobin   Date Value Ref Range Status   07/14/2023 13.1 12.0 - 15.9 g/dL Final     Hematocrit   Date Value Ref Range Status   07/14/2023 38.5 34.0 - 46.6 % Final     Platelets   Date Value Ref Range Status   07/14/2023 200 140 - 450 10*3/mm3 Final          Assessment & Plan        # Right knee desmoid tumor:  Pt had first noted a swelling behind her right knee in December 2021. The swelling was progressive and became painful with knee flexion.   A MRI right knee done 3/11/22 demonstrated a 10 x 3.5 x 4.4 cm enhancing soft tissue mass lesion along the posterolateral right knee interposed between the distal biceps femoris and the lateral gastrocnemius muscles. The mass appears to infiltrate or arise from the distal biceps femoris. The lesion displaces and closely abuts the common peroneal nerve.   Patient was seen by , ortho - who obtained a biopsy of the mass on 3/23/22. 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    recommended against upfront resection at this time & referred to this clinic to discuss systemic therapy options.   I discussed the natural history of desmoid tumors including high rates of local recurrence (~ 50% cases) , specially R1 resection. Also discussed ~ 25% cases have spontaneous regression. Discussed role of systemic therapy options with TKIs and tamoxifen/NSAIDS.   Patient was then seen by Dr. Solis, surgical oncology with the [facility].  Plan at that time was made for surveillance.  Patient then became pregnant, hence plan for any systemic treatment was deferred until delivery.  Patient delivered her baby boy on 3/9/2023.  A MRI of her right  knee performed 1/29/2023, which noted significant increase in size of the right knee posterolateral mass, now measuring 8 x 6 x 18 cm.  3/23/2023: Patient seen back in the clinic today.  The right knee posterolateral mass has significantly increased since last evaluation.  Patient reports of discomfort and numbness around the knee.  She has difficulty with walking and bending her knees.  Reviewed treatment options with tamoxifen + sulindac vs a TKI.  Given recent delivery and potential adverse effects related to TKI while managing 3 young children at home, will not start TKI at this time and consider it down the line. As this tumor appears to be driven/propagated by estrogen and recent pregnancy, plan made to start treatment with tamoxifen 20 mg daily and sulindac 300 mg daily.  I reviewed the risks associated with tamoxifen and sulindac, including increased risk of blood clots, gastritis/PUD.  Patient is agreeable to proceed with the treatment.   Patient started taking tamoxifen/sulindac on 4/17/23.  5/19/2023: Seen for a follow-up.  Patient has been compliant with tamoxifen and sulindac.  Patient reports of no significant improvement in her symptoms and thinks the appear to be slightly worse than last month.  Discussed plan to switch treatment to pazopanib 800 mg daily.  Follow-up in 4 weeks or sooner if needed.  End of May 2023: Started Pazopanib 800 mg daily. Developed GI AEs. Had to be hospitalized in early June 2023. Dose resumed at 200 mg daily.   6/16/23: Doing well on reduced dose pazopanib. Discussed plan to continue pazopanib 200 mg for another 2 weeks. If doing well, increase to 400 mg daily.   Patient hospitalized with nausea and vomiting at the 400 mg dose after less than 2 weeks may go to 200 mg alternating with 400 when she recovers from the symptoms     # Unintentional weight loss: Cause unclear.  CT C/A/P performed 4/26/22 did not show any major abnormalities.  It did show a 1 cm right outer breast  lesion, likely benign.  Patient was encouraged to increase her nutritional intake.  We will consider referral to nutrition.     #Right breast lesion:   US right breast performed 1/9/2023 noted a 1.1 cm probable benign fibroadenoma at 8 o'clock position, 4 cm from the nipple.  6 months sonographic follow-up is recommended.   US breast from 6/9/23 noted stable 1 cm probable benign fibroadenoma in the right breast at 8'o clock position.   Short-term sonographic follow-up at 6-9 month intervals is recommended through 01/09/2025 to demonstrate 2-year stability     # Encounter for genetic & chromosomal abnormalities: Will her young age, family history (aunt) of breast cancer & desmoid tumors association with FAP and gardners syndrome,an [employer] germline testing was performed. 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   Will refer to genetic counseling.     PLAN:   1.  Agree with IV fluids and antiemetics  2.  Hopefully home in a day or 2  3.  Resume 200 of Votrient when she is recovered and after we can go to 200 mg alternating with 400    We will follow with you thank you for this consult

## 2023-07-14 NOTE — H&P
Patient Name:  Beena Vincent  YOB: 1990  MRN:  [id number]  Admit Date:  7/13/2023  Patient Care Team:  Sissy Garibay MD as PCP - General (Family Medicine)  Chris Randall MD as Referring Physician (Orthopedic Surgery)  Colin Molina MD as Consulting Physician (Hematology and Oncology)      Subjective   History Present Illness     Chief Complaint   Patient presents with    Vomiting    Diarrhea       Ms. Vincent is a 32 y.o. former smoker with a history of asthma, depression, desmoid tumor on oral chemo, NIDA that presents to [facility] complaining of n,v,d. Symptoms started 1-2 days prior to admission. She is undergoing oral chemo treatment for desmoid tumor, followed by Dr. Molina. Recently admitted to hospital for similar symptoms thought to be related to chemo. Denies fever, chills, soa, dysuria. N/V has improved w/antiemetics. She is c/o chest and back soreness. Has had 2 semi-watery stools since admitted.     Afebrile. HR controlled. BP acceptable. On room air. WBC 16.80, Hgb 16.5. Lactate 3.6-->1.5. HCG qual neg. Lipase 20. Gluc 129, Cr 1.33, CO2 19.0, anion gap 17. UA unremarkable.     Review of Systems   Constitutional:  Positive for appetite change. Negative for fever.   HENT:  Negative for congestion.    Respiratory:  Negative for shortness of breath.    Cardiovascular:  Negative for chest pain.   Gastrointestinal:  Positive for diarrhea, nausea and vomiting.   Genitourinary:  Negative for dysuria.   Musculoskeletal:  Positive for myalgias.   Skin:  Negative for rash.   Neurological:  Negative for headaches.   Psychiatric/Behavioral:  Negative for sleep disturbance.       Personal History     Past Medical History:   Diagnosis Date    Asthma     inhaler as needed    Cervical dysplasia     Depression     Desmoid tumor 2022    back of right knee- pt desires to proceed with treatments after delivery    Gestational diabetes     Gestational hypertension 2020    Iron  deficiency anemia     Preeclampsia 2012     Past Surgical History:   Procedure Laterality Date    APPENDECTOMY N/A 10/1/2021    Procedure: APPENDECTOMY LAPAROSCOPIC;  Surgeon: Ahmet Dong Jr., MD;  Location: [facility] MAIN OR;  Service: General;  Laterality: N/A;    KNEE ARTHROSCOPY      AGE 6    LEEP N/A 4/25/2019    Procedure: LOOP ELECTROCAUTERY EXCISION PROCEDURE;  Surgeon: Arsh Ray MD;  Location: [facility] OR [facility];  Service: Obstetrics/Gynecology    SOFT TISSUE BIOPSY Right 3/23/2022    Procedure: BIOPSY SOFT TISSUE THIGH / KNEE;  Surgeon: Chris Randall MD;  Location: [facility] MAIN OR;  Service: Orthopedics;  Laterality: Right;    WISDOM TOOTH EXTRACTION       Family History   Problem Relation Age of Onset    Diabetes Father     Arthritis Maternal Grandmother     Lung cancer Maternal Grandfather     Heart attack Maternal Grandfather     Heart disease Maternal Grandfather     Stroke Maternal Grandfather     Hyperlipidemia Maternal Grandfather     Malig Hyperthermia Neg Hx     Breast cancer Neg Hx     Ovarian cancer Neg Hx     Uterine cancer Neg Hx     Colon cancer Neg Hx      Social History     Tobacco Use    Smoking status: Former     Packs/day: 0.50     Years: 4.00     Pack years: 2.00     Types: Cigarettes     Quit date: 2/8/2020     Years since quitting: 3.4     Passive exposure: Never    Smokeless tobacco: Never   Vaping Use    Vaping Use: Former    Substances: Nicotine    Devices: Disposable   Substance Use Topics    Alcohol use: Not Currently     Comment: socially    Drug use: Not Currently     Types: Marijuana     Comment: daily, did not smoke during pregnancy     No current facility-administered medications on file prior to encounter.     Current Outpatient Medications on File Prior to Encounter   Medication Sig Dispense Refill    albuterol sulfate  (90 Base) MCG/ACT inhaler Inhale 2 puffs Every 4 (Four) Hours As Needed for Wheezing. 18 g 0    ibuprofen (ADVIL,MOTRIN) 600 MG tablet Take 1  "tablet by mouth Every 6 (Six) Hours As Needed for Mild Pain 50 tablet 3    ondansetron ODT (ZOFRAN-ODT) 8 MG disintegrating tablet Place 1 tablet on the tongue Every 8 (Eight) Hours As Needed for Nausea or Vomiting for up to 60 doses. 60 tablet 5    PAZOPanib (Votrient) 200 MG chemo tablet Take 2 tablets by mouth Daily. 60 tablet 5    prochlorperazine (COMPAZINE) 10 MG tablet Take 1 tablet by mouth Every 6 (Six) Hours As Needed for Nausea or Vomiting. 30 tablet 3    Blood Glucose Monitoring Suppl (FreeStyle Lite) w/Device kit USE 1 SEVEN TIMES DAILY      glucose monitor monitoring kit To check blood sugars 7x daily 1 each 0    Lancets (freestyle) lancets Pt to check blood sugar 7xs daily 15 each 12    [DISCONTINUED] Prenatal Vit-Fe Fumarate-FA (Prenatabs FA) 29-1 MG tablet Take 1 tablet by mouth Daily. 30 tablet 11     Allergies   Allergen Reactions    Penicillins Nausea And Vomiting    Adhesive Tape Rash     \"SURGICAL TAPE\"  AS A CHILD       Objective    Objective     Vital Signs  Temp:  [97.8 °F (36.6 °C)-97.9 °F (36.6 °C)] 97.9 °F (36.6 °C)  Heart Rate:  [62-89] 64  Resp:  [16] 16  BP: (120-138)/(80-90) 122/81  SpO2:  [96 %-100 %] 97 %  on   ;   Device (Oxygen Therapy): room air  Body mass index is 24.72 kg/m².    Physical Exam  Vitals and nursing note reviewed.   Constitutional:       General: She is not in acute distress.     Appearance: She is ill-appearing. She is not toxic-appearing.   HENT:      Head: Normocephalic.      Mouth/Throat:      Mouth: Mucous membranes are moist.   Eyes:      Conjunctiva/sclera: Conjunctivae normal.   Cardiovascular:      Rate and Rhythm: Normal rate and regular rhythm.   Pulmonary:      Effort: Pulmonary effort is normal. No respiratory distress.      Breath sounds: No wheezing or rales.   Chest:      Chest wall: Tenderness present.   Abdominal:      General: Bowel sounds are normal. There is no distension.      Palpations: Abdomen is soft.   Musculoskeletal:         General: " Tenderness present.      Cervical back: Neck supple.      Right lower leg: No edema.      Left lower leg: No edema.      Comments: Upper back muscle tenderness   Skin:     General: Skin is warm and dry.   Neurological:      Mental Status: She is alert and oriented to person, place, and time.   Psychiatric:         Mood and Affect: Mood normal.         Behavior: Behavior normal.       Results Review:  I reviewed the patient's new clinical results.  I reviewed the patient's new imaging results and agree with the interpretation.  I reviewed the patient's other test results and agree with the interpretation  I personally viewed and interpreted the patient's EKG/Telemetry data  Discussed with ED provider.    Lab Results (last 24 hours)       Procedure Component Value Units Date/Time    CBC & Differential [[id number]]  (Abnormal) Collected: 07/13/23 2359    Specimen: Blood Updated: 07/14/23 0024    Narrative:      The following orders were created for panel order CBC & Differential.  Procedure                               Abnormality         Status                     ---------                               -----------         ------                     CBC Auto Differential[[id number]]        Abnormal            Final result                 Please view results for these tests on the individual orders.    Comprehensive Metabolic Panel [[id number]]  (Abnormal) Collected: 07/13/23 2359    Specimen: Blood Updated: 07/14/23 0041     Glucose 129 mg/dL      BUN 15 mg/dL      Creatinine 1.33 mg/dL      Sodium 138 mmol/L      Potassium 4.2 mmol/L      Chloride 102 mmol/L      CO2 19.0 mmol/L      Calcium 10.1 mg/dL      Total Protein 8.4 g/dL      Albumin 5.2 g/dL      ALT (SGPT) 16 U/L      AST (SGOT) 23 U/L      Alkaline Phosphatase 73 U/L      Total Bilirubin 1.3 mg/dL      Globulin 3.2 gm/dL      A/G Ratio 1.6 g/dL      BUN/Creatinine Ratio 11.3     Anion Gap 17.0 mmol/L      eGFR 55.3 mL/min/1.73     Narrative:      GFR Normal  >60  Chronic Kidney Disease <60  Kidney Failure <15      Lipase [[id number]]  (Normal) Collected: 07/13/23 2359    Specimen: Blood Updated: 07/14/23 0041     Lipase 20 U/L     hCG, Serum, Qualitative [[id number]]  (Normal) Collected: 07/13/23 2359    Specimen: Blood Updated: 07/14/23 0109     HCG Qualitative Negative    Lactic Acid, Plasma [[id number]]  (Abnormal) Collected: 07/13/23 2359    Specimen: Blood Updated: 07/14/23 0050     Lactate 3.6 mmol/L     CBC Auto Differential [[id number]]  (Abnormal) Collected: 07/13/23 2359    Specimen: Blood Updated: 07/14/23 0024     WBC 16.80 10*3/mm3      RBC 5.69 10*6/mm3      Hemoglobin 16.5 g/dL      Hematocrit 47.6 %      MCV 83.7 fL      MCH 29.0 pg      MCHC 34.7 g/dL      RDW 14.4 %      RDW-SD 43.1 fl      MPV 10.5 fL      Platelets 270 10*3/mm3      Neutrophil % 86.2 %      Lymphocyte % 8.0 %      Monocyte % 5.0 %      Eosinophil % 0.1 %      Basophil % 0.2 %      Immature Grans % 0.5 %      Neutrophils, Absolute 14.47 10*3/mm3      Lymphocytes, Absolute 1.35 10*3/mm3      Monocytes, Absolute 0.84 10*3/mm3      Eosinophils, Absolute 0.02 10*3/mm3      Basophils, Absolute 0.04 10*3/mm3      Immature Grans, Absolute 0.08 10*3/mm3      nRBC 0.0 /100 WBC     Urinalysis With Microscopic If Indicated (No Culture) - Urine, Clean Catch [[id number]]  (Abnormal) Collected: 07/14/23 0048    Specimen: Urine, Clean Catch Updated: 07/14/23 0111     Color, UA Dark Yellow     Appearance, UA Cloudy     pH, UA <=5.0     Specific Gravity, UA 1.028     Glucose, UA Negative     Ketones, UA Trace     Bilirubin, UA Negative     Blood, UA Negative     Protein,  mg/dL (2+)     Leuk Esterase, UA Negative     Nitrite, UA Negative     Urobilinogen, UA 1.0 E.U./dL    Urinalysis, Microscopic Only - Urine, Clean Catch [[id number]] Collected: 07/14/23 0048    Specimen: Urine, Clean Catch Updated: 07/14/23 0131     RBC, UA None Seen /HPF      WBC, UA 0-2 /HPF      Bacteria, UA None Seen /HPF       Squamous Epithelial Cells, UA 0-2 /HPF      Hyaline Casts, UA 13-20 /LPF      Mucus, UA Trace /HPF      Methodology Manual Light Microscopy    STAT Lactic Acid, Reflex [[id number]]  (Normal) Collected: 07/14/23 0300    Specimen: Blood Updated: 07/14/23 0332     Lactate 1.5 mmol/L     Basic Metabolic Panel [[id number]]  (Abnormal) Collected: 07/14/23 0433    Specimen: Blood Updated: 07/14/23 0528     Glucose 123 mg/dL      BUN 13 mg/dL      Creatinine 0.97 mg/dL      Sodium 141 mmol/L      Potassium 4.8 mmol/L      Chloride 109 mmol/L      CO2 21.2 mmol/L      Calcium 8.0 mg/dL      BUN/Creatinine Ratio 13.4     Anion Gap 10.8 mmol/L      eGFR 80.8 mL/min/1.73     Narrative:      GFR Normal >60  Chronic Kidney Disease <60  Kidney Failure <15      CBC Auto Differential [[id number]]  (Abnormal) Collected: 07/14/23 0433    Specimen: Blood Updated: 07/14/23 0513     WBC 9.92 10*3/mm3      RBC 4.50 10*6/mm3      Hemoglobin 13.1 g/dL      Hematocrit 38.5 %      MCV 85.6 fL      MCH 29.1 pg      MCHC 34.0 g/dL      RDW 14.8 %      RDW-SD 45.9 fl      MPV 10.3 fL      Platelets 200 10*3/mm3      Neutrophil % 78.9 %      Lymphocyte % 17.6 %      Monocyte % 2.9 %      Eosinophil % 0.1 %      Basophil % 0.2 %      Immature Grans % 0.3 %      Neutrophils, Absolute 7.82 10*3/mm3      Lymphocytes, Absolute 1.75 10*3/mm3      Monocytes, Absolute 0.29 10*3/mm3      Eosinophils, Absolute 0.01 10*3/mm3      Basophils, Absolute 0.02 10*3/mm3      Immature Grans, Absolute 0.03 10*3/mm3      nRBC 0.0 /100 WBC     Protime-INR [[id number]]  (Normal) Collected: 07/14/23 0433    Specimen: Blood Updated: 07/14/23 0514     Protime 13.6 Seconds      INR 1.03            Imaging Results (Last 24 Hours)       ** No results found for the last 24 hours. **                SCANNED - TELEMETRY     Final Result           Assessment/Plan     Active Hospital Problems    Diagnosis  POA    **Nausea vomiting and diarrhea [R11.2, R19.7]  Yes    Acute kidney  injury [N17.9]  Yes    Desmoid tumor [D48.1]  Yes    Anxiety with depression [F41.8]  Yes    History of anemia [Z86.2]  Not Applicable      Resolved Hospital Problems   No resolved problems to display.       Ms. Vincent is a 32 y.o. non-smoker with a history of asthma, depression, desmoid tumor on oral chemo, NIDA who is admitted for n/v/d, LOUIE    N/V/D:  -Likely side effect from chemo. Antiemetics as needed. Continue IVF's. Lactic acid has normalized with IVF's. WBC has also normalized. Check stool studies if diarrhea continues     LOUIE:  -Cr 1.33 on arrival, down to 0.97 w/IVF's. Monitor    Desmoid tumor:  -Followed by Dr. Molina. Oncology consulted. Hold pazopanib until they evaluate    Hx anemia:  -Hgb 13.1, monitor    Further recommendations based on hospital course      I discussed the patient's findings and my recommendations with patient.    VTE Prophylaxis - SCDs.  Code Status - Full code.       BERTHA Harrison  [facility]  07/14/23  08:14 EDT

## 2023-07-14 NOTE — PLAN OF CARE
Problem: Adult Inpatient Plan of Care  Goal: Plan of Care Review  Outcome: Ongoing, Progressing  Flowsheets (Taken 7/14/2023 1611)  Progress: no change  Plan of Care Reviewed With: patient  Outcome Evaluation: Vitals stable. Pain and nausea controlled with prn meds. Pt having diarrhea, stool sample needed. CT abd&pelvis done. +ambulation. Family visited. Will continue to monitor.

## 2023-07-14 NOTE — ED TRIAGE NOTES
To ER via EMS from hotel.  C/o N/V/D that started today  Pt is on chemo Votrient 200mg daily.  Dose was increased 2 weeks ago.

## 2023-07-14 NOTE — CASE MANAGEMENT/SOCIAL WORK
Discharge Planning Assessment  [facility]     Patient Name: Beena Vincent  MRN: [id number]  Today's Date: 7/14/2023    Admit Date: 7/13/2023    Plan: Home. Denies any needs. Transport by family.       Discharge Plan       Row Name 07/14/23 1526       Plan    Plan Home. Denies any needs. Transport by family.    Patient/Family in Agreement with Plan yes    Plan Comments Met with pt. at bedside. Explained roll of . Face sheet and pharmacy verified. Pt lives with her 3 children ages 11 yrs., 2 yrs., and 4 months old.  There are 2 steps to enter home. Denies any home DME.  Pt is independent with ADLs. Pt has never been to Rehab or used HH. Pt's PCP is Sissy Garibay MD. Uses [facility] on [address]. Pt drives herself to appointments. At discharge, family will transport. Social Determinants of Health completed and pt denies any needs. Explained that CCP would follow to assess for discharge needs.  Oneil Richard RN-BC                  Continued Care and Services - Admitted Since 7/13/2023    Coordination has not been started for this encounter.       Selected Continued Care - Episodes Includes continued care and service providers with selected services from the active episodes listed below      Oncology Episode start date: 3/23/2023   There are no active outsourced providers for this episode.                 Expected Discharge Date and Time       Expected Discharge Date Expected Discharge Time    Jul 16, 2023                            Oneil Richard RN

## 2023-07-14 NOTE — ED PROVIDER NOTES
EMERGENCY DEPARTMENT ENCOUNTER    Room Number:  E462/1  PCP: Sissy Garibay MD  Historian: Patient      HPI:  Chief Complaint: Nausea/vomiting/diarrhea  A complete HPI/ROS/PMH/PSH/SH/FH are unobtainable due to: None  Context: Beena Vincent is a 32 y.o. female who presents to the ED c/o gradual in onset but progressively worsening episodes of nausea/vomiting/diarrhea that have been present now throughout the last 1 to 2 days.  She reports that she is a cancer patient under the care of [facility] and is currently on oral chemotherapy that is highly likely contributing to the symptoms.  She was recently admitted to the hospital for similar symptoms just a few weeks back.  She does report generalized abdominal discomfort associated with the symptoms.  She denies fever/chills, chest pain, back pain, headache, or shortness of breath.            PAST MEDICAL HISTORY  Active Ambulatory Problems     Diagnosis Date Noted    KYRA III (cervical intraepithelial neoplasia grade III) with severe dysplasia 2019    Gestational HTN 2020     (normal spontaneous vaginal delivery) 2020    Asthma exacerbation 2017    Anxiety with depression 2020    Postpartum depression 2020    Generalized anxiety disorder 2020    History of anemia 2020    Acute appendicitis with localized peritonitis, without perforation, abscess, or gangrene 10/01/2021    Soft tissue mass 2022    Gestational diabetes 2023    Desmoid tumor 2023    Generalized abdominal pain 2023    Nausea vomiting and diarrhea 2023    Acute kidney injury 2023    Leukocytosis 2023     Resolved Ambulatory Problems     Diagnosis Date Noted    Edema of lower extremity, antepartum, third trimester 2020    Anemia affecting pregnancy in third trimester 2020    Pregnancy 2020    Medial epicondylitis of left elbow 2013     Past Medical History:   Diagnosis Date    Asthma     Cervical  dysplasia     Depression     Gestational hypertension 2020    Iron deficiency anemia     Preeclampsia 2012         PAST SURGICAL HISTORY  Past Surgical History:   Procedure Laterality Date    APPENDECTOMY N/A 10/1/2021    Procedure: APPENDECTOMY LAPAROSCOPIC;  Surgeon: Ahmet Dong Jr., MD;  Location: [facility] OR;  Service: General;  Laterality: N/A;    KNEE ARTHROSCOPY      AGE 6    LEEP N/A 4/25/2019    Procedure: LOOP ELECTROCAUTERY EXCISION PROCEDURE;  Surgeon: Arsh Ray MD;  Location: [facility];  Service: Obstetrics/Gynecology    SOFT TISSUE BIOPSY Right 3/23/2022    Procedure: BIOPSY SOFT TISSUE THIGH / KNEE;  Surgeon: Chris Randall MD;  Location: [facility] MAIN OR;  Service: Orthopedics;  Laterality: Right;    WISDOM TOOTH EXTRACTION           FAMILY HISTORY  Family History   Problem Relation Age of Onset    Diabetes Father     Arthritis Maternal Grandmother     Lung cancer Maternal Grandfather     Heart attack Maternal Grandfather     Heart disease Maternal Grandfather     Stroke Maternal Grandfather     Hyperlipidemia Maternal Grandfather     Malig Hyperthermia Neg Hx     Breast cancer Neg Hx     Ovarian cancer Neg Hx     Uterine cancer Neg Hx     Colon cancer Neg Hx          SOCIAL HISTORY  Social History     Socioeconomic History    Marital status: Single   Tobacco Use    Smoking status: Former     Packs/day: 0.50     Years: 4.00     Pack years: 2.00     Types: Cigarettes     Quit date: 2/8/2020     Years since quitting: 3.4     Passive exposure: Never    Smokeless tobacco: Never   Vaping Use    Vaping Use: Former    Substances: Nicotine    Devices: Disposable   Substance and Sexual Activity    Alcohol use: Not Currently     Comment: socially    Drug use: Not Currently     Types: Marijuana     Comment: daily, did not smoke during pregnancy    Sexual activity: Not Currently         ALLERGIES  Penicillins and Adhesive tape        REVIEW OF SYSTEMS  Review of Systems   Constitutional:   Negative for fever.   HENT:  Negative for sore throat.    Eyes: Negative.    Respiratory:  Negative for cough and shortness of breath.    Cardiovascular:  Negative for chest pain.   Gastrointestinal:  Positive for abdominal pain, diarrhea, nausea and vomiting.   Genitourinary:  Negative for dysuria.   Musculoskeletal:  Negative for neck pain.   Skin:  Negative for rash.   Allergic/Immunologic: Negative.    Neurological:  Negative for weakness, numbness and headaches.   Hematological: Negative.    Psychiatric/Behavioral: Negative.     All other systems reviewed and are negative.         PHYSICAL EXAM  ED Triage Vitals [07/13/23 2231]   Temp Heart Rate Resp BP SpO2   97.8 °F (36.6 °C) 89 16 131/80 96 %      Temp src Heart Rate Source Patient Position BP Location FiO2 (%)   Tympanic -- -- -- --       Physical Exam  Constitutional:       General: She is not in acute distress.     Appearance: Normal appearance. She is not ill-appearing or toxic-appearing.   HENT:      Head: Normocephalic and atraumatic.      Mouth/Throat:      Mouth: Mucous membranes are dry.   Eyes:      Extraocular Movements: Extraocular movements intact.      Pupils: Pupils are equal, round, and reactive to light.   Cardiovascular:      Rate and Rhythm: Normal rate and regular rhythm.      Heart sounds: No murmur heard.    No friction rub. No gallop.   Pulmonary:      Effort: Pulmonary effort is normal.      Breath sounds: Normal breath sounds.   Abdominal:      General: Abdomen is flat. There is no distension.      Palpations: Abdomen is soft.      Tenderness: There is no abdominal tenderness.   Musculoskeletal:         General: No swelling or tenderness. Normal range of motion.      Cervical back: Normal range of motion and neck supple.   Skin:     General: Skin is warm and dry.   Neurological:      General: No focal deficit present.      Mental Status: She is alert and oriented to person, place, and time.      Sensory: No sensory deficit.       Motor: No weakness.   Psychiatric:         Mood and Affect: Mood normal.         Behavior: Behavior normal.         Vital signs and nursing notes reviewed.          LAB RESULTS  Recent Results (from the past 24 hour(s))   Comprehensive Metabolic Panel    Collection Time: 07/13/23 11:59 PM    Specimen: Blood   Result Value Ref Range    Glucose 129 (H) 65 - 99 mg/dL    BUN 15 6 - 20 mg/dL    Creatinine 1.33 (H) 0.57 - 1.00 mg/dL    Sodium 138 136 - 145 mmol/L    Potassium 4.2 3.5 - 5.2 mmol/L    Chloride 102 98 - 107 mmol/L    CO2 19.0 (L) 22.0 - 29.0 mmol/L    Calcium 10.1 8.6 - 10.5 mg/dL    Total Protein 8.4 6.0 - 8.5 g/dL    Albumin 5.2 3.5 - 5.2 g/dL    ALT (SGPT) 16 1 - 33 U/L    AST (SGOT) 23 1 - 32 U/L    Alkaline Phosphatase 73 39 - 117 U/L    Total Bilirubin 1.3 (H) 0.0 - 1.2 mg/dL    Globulin 3.2 gm/dL    A/G Ratio 1.6 g/dL    BUN/Creatinine Ratio 11.3 7.0 - 25.0    Anion Gap 17.0 (H) 5.0 - 15.0 mmol/L    eGFR 55.3 (L) >60.0 mL/min/1.73   Lipase    Collection Time: 07/13/23 11:59 PM    Specimen: Blood   Result Value Ref Range    Lipase 20 13 - 60 U/L   hCG, Serum, Qualitative    Collection Time: 07/13/23 11:59 PM    Specimen: Blood   Result Value Ref Range    HCG Qualitative Negative Negative   Lactic Acid, Plasma    Collection Time: 07/13/23 11:59 PM    Specimen: Blood   Result Value Ref Range    Lactate 3.6 (C) 0.5 - 2.0 mmol/L   CBC Auto Differential    Collection Time: 07/13/23 11:59 PM    Specimen: Blood   Result Value Ref Range    WBC 16.80 (H) 3.40 - 10.80 10*3/mm3    RBC 5.69 (H) 3.77 - 5.28 10*6/mm3    Hemoglobin 16.5 (H) 12.0 - 15.9 g/dL    Hematocrit 47.6 (H) 34.0 - 46.6 %    MCV 83.7 79.0 - 97.0 fL    MCH 29.0 26.6 - 33.0 pg    MCHC 34.7 31.5 - 35.7 g/dL    RDW 14.4 12.3 - 15.4 %    RDW-SD 43.1 37.0 - 54.0 fl    MPV 10.5 6.0 - 12.0 fL    Platelets 270 140 - 450 10*3/mm3    Neutrophil % 86.2 (H) 42.7 - 76.0 %    Lymphocyte % 8.0 (L) 19.6 - 45.3 %    Monocyte % 5.0 5.0 - 12.0 %    Eosinophil % 0.1  (L) 0.3 - 6.2 %    Basophil % 0.2 0.0 - 1.5 %    Immature Grans % 0.5 0.0 - 0.5 %    Neutrophils, Absolute 14.47 (H) 1.70 - 7.00 10*3/mm3    Lymphocytes, Absolute 1.35 0.70 - 3.10 10*3/mm3    Monocytes, Absolute 0.84 0.10 - 0.90 10*3/mm3    Eosinophils, Absolute 0.02 0.00 - 0.40 10*3/mm3    Basophils, Absolute 0.04 0.00 - 0.20 10*3/mm3    Immature Grans, Absolute 0.08 (H) 0.00 - 0.05 10*3/mm3    nRBC 0.0 0.0 - 0.2 /100 WBC   Urinalysis With Microscopic If Indicated (No Culture) - Urine, Clean Catch    Collection Time: 07/14/23 12:48 AM    Specimen: Urine, Clean Catch   Result Value Ref Range    Color, UA Dark Yellow (A) Yellow, Straw    Appearance, UA Cloudy (A) Clear    pH, UA <=5.0 5.0 - 8.0    Specific Gravity, UA 1.028 1.005 - 1.030    Glucose, UA Negative Negative    Ketones, UA Trace (A) Negative    Bilirubin, UA Negative Negative    Blood, UA Negative Negative    Protein,  mg/dL (2+) (A) Negative    Leuk Esterase, UA Negative Negative    Nitrite, UA Negative Negative    Urobilinogen, UA 1.0 E.U./dL 0.2 - 1.0 E.U./dL   Urinalysis, Microscopic Only - Urine, Clean Catch    Collection Time: 07/14/23 12:48 AM    Specimen: Urine, Clean Catch   Result Value Ref Range    RBC, UA None Seen None Seen, 0-2 /HPF    WBC, UA 0-2 None Seen, 0-2 /HPF    Bacteria, UA None Seen None Seen /HPF    Squamous Epithelial Cells, UA 0-2 None Seen, 0-2 /HPF    Hyaline Casts, UA 13-20 None Seen /LPF    Mucus, UA Trace None Seen, Trace /HPF    Methodology Manual Light Microscopy    STAT Lactic Acid, Reflex    Collection Time: 07/14/23  3:00 AM    Specimen: Blood   Result Value Ref Range    Lactate 1.5 0.5 - 2.0 mmol/L   Basic Metabolic Panel    Collection Time: 07/14/23  4:33 AM    Specimen: Blood   Result Value Ref Range    Glucose 123 (H) 65 - 99 mg/dL    BUN 13 6 - 20 mg/dL    Creatinine 0.97 0.57 - 1.00 mg/dL    Sodium 141 136 - 145 mmol/L    Potassium 4.8 3.5 - 5.2 mmol/L    Chloride 109 (H) 98 - 107 mmol/L    CO2 21.2 (L)  22.0 - 29.0 mmol/L    Calcium 8.0 (L) 8.6 - 10.5 mg/dL    BUN/Creatinine Ratio 13.4 7.0 - 25.0    Anion Gap 10.8 5.0 - 15.0 mmol/L    eGFR 80.8 >60.0 mL/min/1.73   CBC Auto Differential    Collection Time: 07/14/23  4:33 AM    Specimen: Blood   Result Value Ref Range    WBC 9.92 3.40 - 10.80 10*3/mm3    RBC 4.50 3.77 - 5.28 10*6/mm3    Hemoglobin 13.1 12.0 - 15.9 g/dL    Hematocrit 38.5 34.0 - 46.6 %    MCV 85.6 79.0 - 97.0 fL    MCH 29.1 26.6 - 33.0 pg    MCHC 34.0 31.5 - 35.7 g/dL    RDW 14.8 12.3 - 15.4 %    RDW-SD 45.9 37.0 - 54.0 fl    MPV 10.3 6.0 - 12.0 fL    Platelets 200 140 - 450 10*3/mm3    Neutrophil % 78.9 (H) 42.7 - 76.0 %    Lymphocyte % 17.6 (L) 19.6 - 45.3 %    Monocyte % 2.9 (L) 5.0 - 12.0 %    Eosinophil % 0.1 (L) 0.3 - 6.2 %    Basophil % 0.2 0.0 - 1.5 %    Immature Grans % 0.3 0.0 - 0.5 %    Neutrophils, Absolute 7.82 (H) 1.70 - 7.00 10*3/mm3    Lymphocytes, Absolute 1.75 0.70 - 3.10 10*3/mm3    Monocytes, Absolute 0.29 0.10 - 0.90 10*3/mm3    Eosinophils, Absolute 0.01 0.00 - 0.40 10*3/mm3    Basophils, Absolute 0.02 0.00 - 0.20 10*3/mm3    Immature Grans, Absolute 0.03 0.00 - 0.05 10*3/mm3    nRBC 0.0 0.0 - 0.2 /100 WBC   Protime-INR    Collection Time: 07/14/23  4:33 AM    Specimen: Blood   Result Value Ref Range    Protime 13.6 11.7 - 14.2 Seconds    INR 1.03 0.90 - 1.10       Ordered the above labs and reviewed the results.        RADIOLOGY  No Radiology Exams Resulted Within Past 24 Hours    Ordered the above noted radiological studies. Reviewed by me in PACS.            PROCEDURES  Procedures            MEDICATIONS GIVEN IN ER  Medications   sodium chloride 0.9 % flush 10 mL (has no administration in time range)   sodium chloride 0.9 % flush 10 mL (has no administration in time range)   sodium chloride 0.9 % flush 10 mL (has no administration in time range)   sodium chloride 0.9 % infusion 40 mL (has no administration in time range)   sennosides-docusate (PERICOLACE) 8.6-50 MG per tablet  2 tablet (has no administration in time range)     And   polyethylene glycol (MIRALAX) packet 17 g (has no administration in time range)     And   bisacodyl (DULCOLAX) EC tablet 5 mg (has no administration in time range)     And   bisacodyl (DULCOLAX) suppository 10 mg (has no administration in time range)   nitroglycerin (NITROSTAT) SL tablet 0.4 mg (has no administration in time range)   sodium chloride 0.9 % infusion (100 mL/hr Intravenous New Bag 7/14/23 0417)   acetaminophen (TYLENOL) tablet 650 mg (has no administration in time range)     Or   acetaminophen (TYLENOL) 160 MG/5ML solution 650 mg (has no administration in time range)     Or   acetaminophen (TYLENOL) suppository 650 mg (has no administration in time range)   ondansetron (ZOFRAN) injection 4 mg (has no administration in time range)   morphine injection 2 mg (2 mg Intravenous Given 7/14/23 0440)   ondansetron (ZOFRAN) injection 4 mg (4 mg Intravenous Given 7/13/23 2257)   sodium chloride 0.9 % bolus 1,000 mL (0 mL Intravenous Stopped 7/14/23 0143)   ondansetron (ZOFRAN) injection 4 mg (4 mg Intravenous Given 7/14/23 0009)   HYDROmorphone (DILAUDID) injection 0.5 mg (0.5 mg Intravenous Given 7/14/23 0009)   sodium chloride 0.9 % bolus 1,000 mL (0 mL Intravenous Stopped 7/14/23 0300)                   MEDICAL DECISION MAKING, PROGRESS, and CONSULTS    All labs have been independently reviewed by me.  All radiology studies have been reviewed by me and I have also reviewed the radiology report.   EKG's independently viewed and interpreted by me.  Discussion below represents my analysis of pertinent findings related to patient's condition, differential diagnosis, treatment plan and final disposition.      Additional sources:  - Discussed/ obtained information from independent historians: History obtained from the patient herself at bedside    - External (non-ED) record review: Upon medical records review, the patient was admitted to the hospital from  6/8/2023 through 6/10/2023 where she was treated for generalized abdominal pain with associated nausea/vomiting/diarrhea.    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    Case discussed with BERTHA Guererro for [facility], who will admit the patient to the hospital today for Dr. Pickard.        Orders placed during this visit:  Orders Placed This Encounter   Procedures    Comprehensive Metabolic Panel    Lipase    hCG, Serum, Qualitative    Urinalysis With Microscopic If Indicated (No Culture) - Urine, Clean Catch    Lactic Acid, Plasma    CBC Auto Differential    STAT Lactic Acid, Reflex    Urinalysis, Microscopic Only - Urine, Clean Catch    Basic Metabolic Panel    CBC Auto Differential    Protime-INR    Diet: Liquid Diets; Clear Liquid; Texture: Regular Texture (IDDSI 7); Fluid Consistency: Thin (IDDSI 0)    Vital Signs    Intake & Output    Weigh Patient    Oral Care    Place Sequential Compression Device    Maintain Sequential Compression Device    Telemetry - Maintain IV Access    Telemetry - Place Orders & Notify Provider of Results When Patient Experiences Acute Chest Pain, Dysrhythmia or Respiratory Distress    May Be Off Telemetry for Tests    Up With Assistance    Opioid Administration - Document EtCO2 and / or SpO2 With Each Set of Vitals & Any Change in Patient Status    Opioid Administration - Notify Provider Hypercapnic Monitoring    Opioid Administration - Continuous Pulse Oximetry (SpO2)    Code Status and Medical Interventions:    BRANDY (on-call MD unless specified) Details    Inpatient Hematology & Oncology Consult    SCANNED - TELEMETRY      Insert Peripheral IV    Insert Peripheral IV    Inpatient Admission    CBC & Differential               Differential diagnosis includes but is not limited to:    Differential  diagnosis includes but is not limited to chemotherapy medication side effect/reaction, gastroenteritis, sepsis, dehydration, severe electrolyte disturbance, or acute renal failure.      Independent interpretation of labs, radiology studies, and discussions with consultants:    Lab values were independently interpreted by myself showing the patient to have significant elevation in her lactic acid at 3.6 as well as a CO2 of 19.  She also has marked leukocytosis at 16.8.      ED Course as of 07/14/23 0649   Fri Jul 14, 2023   0158 On reevaluation, the patient does report some improvement in symptoms following fluid administration as well as pain and nausea medication.  I did inform her that she is dehydrated with elevations in her lactic acid level.  We will admit her to the hospital today for further ongoing treatment of her symptoms.  The patient is in agreement with that plan and all questions have been answered. [BM]   0202 Lactate(!!): 3.6 [BM]   0202 CO2(!): 19.0 [BM]   0202 WBC(!): 16.80 [BM]   0202 Hemoglobin(!): 16.5 [BM]   0202 Hematocrit(!): 47.6 [BM]   0215 The patient's presentation, work-up, as well as diagnosis and treatment plan was discussed at length with BERTHA Guerrero for A.  She agrees to admit the patient to the hospital today for Dr. Pickard. [BM]      ED Course User Index  [BM] Hao Lora MD             DIAGNOSIS  Final diagnoses:   Nausea vomiting and diarrhea   Generalized abdominal pain   Lactic acidosis   Adverse effect of drug, initial encounter         DISPOSITION  ADMISSION    Discussed treatment plan and reason for admission with pt/family and admitting physician.  Pt/family voiced understanding of the plan for admission for further testing/treatment as needed.               Latest Documented Vital Signs:  As of 06:49 EDT  BP- 122/81 HR- 64 Temp- 97.9 °F (36.6 °C) (Oral) O2 sat- 97%              --    Please note that portions of this were completed with a voice  recognition program.       Note Disclaimer: At [facility], we believe that sharing information builds trust and better relationships. You are receiving this note because you are receiving care at [facility] or recently visited. It is possible you will see health information before a provider has talked with you about it. This kind of information can be easy to misunderstand. To help you fully understand what it means for your health, we urge you to discuss this note with your provider.             Hao Lora MD  07/14/23 0649

## 2023-07-15 LAB
ALBUMIN SERPL-MCNC: 3.3 G/DL (ref 3.5–5.2)
ALP SERPL-CCNC: 43 U/L (ref 39–117)
ALT SERPL W P-5'-P-CCNC: 10 U/L (ref 1–33)
ANION GAP SERPL CALCULATED.3IONS-SCNC: 6.5 MMOL/L (ref 5–15)
AST SERPL-CCNC: 17 U/L (ref 1–32)
BILIRUB CONJ SERPL-MCNC: <0.2 MG/DL (ref 0–0.3)
BILIRUB INDIRECT SERPL-MCNC: ABNORMAL MG/DL
BILIRUB SERPL-MCNC: 0.7 MG/DL (ref 0–1.2)
BUN SERPL-MCNC: 5 MG/DL (ref 6–20)
BUN/CREAT SERPL: 6 (ref 7–25)
CALCIUM SPEC-SCNC: 8 MG/DL (ref 8.6–10.5)
CHLORIDE SERPL-SCNC: 109 MMOL/L (ref 98–107)
CO2 SERPL-SCNC: 24.5 MMOL/L (ref 22–29)
CREAT SERPL-MCNC: 0.84 MG/DL (ref 0.57–1)
DEPRECATED RDW RBC AUTO: 44.2 FL (ref 37–54)
EGFRCR SERPLBLD CKD-EPI 2021: 96 ML/MIN/1.73
EOSINOPHIL # BLD MANUAL: 0.22 10*3/MM3 (ref 0–0.4)
EOSINOPHIL NFR BLD MANUAL: 4 % (ref 0.3–6.2)
ERYTHROCYTE [DISTWIDTH] IN BLOOD BY AUTOMATED COUNT: 14.2 % (ref 12.3–15.4)
GLUCOSE SERPL-MCNC: 97 MG/DL (ref 65–99)
HCT VFR BLD AUTO: 32.7 % (ref 34–46.6)
HGB BLD-MCNC: 11.2 G/DL (ref 12–15.9)
LYMPHOCYTES # BLD MANUAL: 2.08 10*3/MM3 (ref 0.7–3.1)
LYMPHOCYTES NFR BLD MANUAL: 2 % (ref 5–12)
MCH RBC QN AUTO: 29.1 PG (ref 26.6–33)
MCHC RBC AUTO-ENTMCNC: 34.3 G/DL (ref 31.5–35.7)
MCV RBC AUTO: 84.9 FL (ref 79–97)
MONOCYTES # BLD: 0.11 10*3/MM3 (ref 0.1–0.9)
NEUTROPHILS # BLD AUTO: 3.15 10*3/MM3 (ref 1.7–7)
NEUTROPHILS NFR BLD MANUAL: 56.6 % (ref 42.7–76)
PLAT MORPH BLD: NORMAL
PLATELET # BLD AUTO: 158 10*3/MM3 (ref 140–450)
PMV BLD AUTO: 10 FL (ref 6–12)
POTASSIUM SERPL-SCNC: 3.9 MMOL/L (ref 3.5–5.2)
PROT SERPL-MCNC: 5.4 G/DL (ref 6–8.5)
RBC # BLD AUTO: 3.85 10*6/MM3 (ref 3.77–5.28)
RBC MORPH BLD: NORMAL
SODIUM SERPL-SCNC: 140 MMOL/L (ref 136–145)
VARIANT LYMPHS NFR BLD MANUAL: 37.4 % (ref 19.6–45.3)
WBC MORPH BLD: NORMAL
WBC NRBC COR # BLD: 5.57 10*3/MM3 (ref 3.4–10.8)

## 2023-07-15 PROCEDURE — 80048 BASIC METABOLIC PNL TOTAL CA: CPT | Performed by: INTERNAL MEDICINE

## 2023-07-15 PROCEDURE — 85007 BL SMEAR W/DIFF WBC COUNT: CPT | Performed by: INTERNAL MEDICINE

## 2023-07-15 PROCEDURE — 80076 HEPATIC FUNCTION PANEL: CPT | Performed by: INTERNAL MEDICINE

## 2023-07-15 PROCEDURE — 85025 COMPLETE CBC W/AUTO DIFF WBC: CPT | Performed by: INTERNAL MEDICINE

## 2023-07-15 PROCEDURE — 99232 SBSQ HOSP IP/OBS MODERATE 35: CPT | Performed by: INTERNAL MEDICINE

## 2023-07-15 PROCEDURE — 25010000002 HYDROMORPHONE 1 MG/ML SOLUTION: Performed by: INTERNAL MEDICINE

## 2023-07-15 RX ORDER — SODIUM CHLORIDE 9 MG/ML
75 INJECTION, SOLUTION INTRAVENOUS CONTINUOUS
Status: CANCELLED | OUTPATIENT
Start: 2023-07-15

## 2023-07-15 RX ADMIN — Medication 10 ML: at 20:56

## 2023-07-15 RX ADMIN — HYDROMORPHONE HYDROCHLORIDE 1 MG: 1 INJECTION, SOLUTION INTRAMUSCULAR; INTRAVENOUS; SUBCUTANEOUS at 12:10

## 2023-07-15 RX ADMIN — HYDROMORPHONE HYDROCHLORIDE 1 MG: 1 INJECTION, SOLUTION INTRAMUSCULAR; INTRAVENOUS; SUBCUTANEOUS at 17:20

## 2023-07-15 RX ADMIN — HYDROMORPHONE HYDROCHLORIDE 1 MG: 1 INJECTION, SOLUTION INTRAMUSCULAR; INTRAVENOUS; SUBCUTANEOUS at 00:10

## 2023-07-15 RX ADMIN — SODIUM CHLORIDE 100 ML/HR: 9 INJECTION, SOLUTION INTRAVENOUS at 08:56

## 2023-07-15 RX ADMIN — PANTOPRAZOLE SODIUM 40 MG: 40 INJECTION, POWDER, FOR SOLUTION INTRAVENOUS at 06:28

## 2023-07-15 RX ADMIN — HYDROMORPHONE HYDROCHLORIDE 1 MG: 1 INJECTION, SOLUTION INTRAMUSCULAR; INTRAVENOUS; SUBCUTANEOUS at 20:56

## 2023-07-15 RX ADMIN — HYDROMORPHONE HYDROCHLORIDE 1 MG: 1 INJECTION, SOLUTION INTRAMUSCULAR; INTRAVENOUS; SUBCUTANEOUS at 04:30

## 2023-07-15 RX ADMIN — Medication 10 ML: at 08:51

## 2023-07-15 RX ADMIN — SODIUM CHLORIDE 100 ML/HR: 9 INJECTION, SOLUTION INTRAVENOUS at 00:10

## 2023-07-15 RX ADMIN — HYDROMORPHONE HYDROCHLORIDE 1 MG: 1 INJECTION, SOLUTION INTRAMUSCULAR; INTRAVENOUS; SUBCUTANEOUS at 08:51

## 2023-07-15 NOTE — PROGRESS NOTES
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     Initial dose was 800 mg and she was admitted with similar symptoms with then resumed her at a 200 mg dose was doing well she was increased a little over a week ago to to 400 mg and symptoms started again yesterday.  She was taking oral nausea medicines once the vomiting started she can no longer take antiemetics.     She has noted significant regression of the tumor her knee thankfully and would like to stay on maximum tolerated dose because of this.     Her daughter had a GI bug a week ago but she has had no fever or other complaints and I do not think this is a GI bug but obviously this could also be the case     No hematemesis or melena  CBC stable    Interval history  7/15/2023  Afebrile blood pressure running a little bit on the high side for her-has a low-grade headache and still nauseated but no vomiting or diarrhea since yesterday  CT abdomen shows a 5 cm left ovarian cyst which is probably physiologic she has resumed her menstrual cycles after delivery 4 months ago the cyst was not seen on her CAT scan on 6/8/2023      Past Medical History, Past Surgical History, Social History, Family History have been reviewed and are without significant changes except as mentioned.    Review of Systems   Constitutional:  Positive for appetite change.   Gastrointestinal:  Positive for nausea.   Musculoskeletal:  Positive for back pain.   Neurological:  Positive for headaches.    A comprehensive 14 point review of systems was performed and was negative except as mentioned.    Medications:  The current medication list was reviewed in the EMR    ALLERGIES:    Allergies   Allergen Reactions    Penicillins Nausea And Vomiting    Adhesive Tape Rash     \"SURGICAL TAPE\"  AS A " CHILD       Objective      Vitals:    07/15/23 0115 07/15/23 0306 07/15/23 0523 07/15/23 0831   BP:    131/75   BP Location:    Left arm   Patient Position:    Lying   Pulse: 58 63 64 60   Resp:       Temp:       TempSrc:       SpO2: 95% 97% 94% 100%   Weight:       Height:             6/30/2023    11:54 AM   Current Status   ECOG score 0       Physical Exam    CONSTITUTIONAL:  Vital signs reviewed.  No distress, looks comfortable.  EYES:  Conjunctivae and lids unremarkable.  PERRLA  EARS,NOSE,MOUTH,THROAT:  Ears and nose appear unremarkable.  Lips, teeth, gums appear unremarkable.  RESPIRATORY:  Normal respiratory effort.  Lungs clear to auscultation bilaterally.  CARDIOVASCULAR:  Normal S1, S2.  No murmurs rubs or gallops.  No significant lower extremity edema.  Mass behind the right knee appears to be smaller per patient  GASTROINTESTINAL: Abdomen appears unremarkable.  Mild periumbilical tenderness.  No hepatomegaly.  No splenomegaly.  LYMPHATIC:  No cervical, supraclavicular, axillary lymphadenopathy.  SKIN:  Warm.  No rashes.  PSYCHIATRIC:  Normal judgment and insight.  Normal mood and affect.    RECENT LABS:  Hematology WBC   Date Value Ref Range Status   07/15/2023 5.57 3.40 - 10.80 10*3/mm3 Final     RBC   Date Value Ref Range Status   07/15/2023 3.85 3.77 - 5.28 10*6/mm3 Final     Hemoglobin   Date Value Ref Range Status   07/15/2023 11.2 (L) 12.0 - 15.9 g/dL Final     Hematocrit   Date Value Ref Range Status   07/15/2023 32.7 (L) 34.0 - 46.6 % Final     Platelets   Date Value Ref Range Status   07/15/2023 158 140 - 450 10*3/mm3 Final              Assessment & Plan   # Right knee desmoid tumor:  Pt had first noted a swelling behind her right knee in December 2021. The swelling was progressive and became painful with knee flexion.   A MRI right knee done 3/11/22 demonstrated a 10 x 3.5 x 4.4 cm enhancing soft tissue mass lesion along the posterolateral right knee interposed between the distal biceps femoris  and the lateral gastrocnemius muscles. The mass appears to infiltrate or arise from the distal biceps femoris. The lesion displaces and closely abuts the common peroneal nerve.   Patient was seen by , ortho - who obtained a biopsy of the mass on 3/23/22. 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    recommended against upfront resection at this time & referred to this clinic to discuss systemic therapy options.   I discussed the natural history of desmoid tumors including high rates of local recurrence (~ 50% cases) , specially R1 resection. Also discussed ~ 25% cases have spontaneous regression. Discussed role of systemic therapy options with TKIs and tamoxifen/NSAIDS.   Patient was then seen by Dr. Solis, surgical oncology with the [facility].  Plan at that time was made for surveillance.  Patient then became pregnant, hence plan for any systemic treatment was deferred until delivery.  Patient delivered her baby boy on 3/9/2023.  A MRI of her right knee performed 1/29/2023, which noted significant increase in size of the right knee posterolateral mass, now measuring 8 x 6 x 18 cm.  3/23/2023: Patient seen back in the clinic today.  The right knee posterolateral mass has significantly increased since last evaluation.  Patient reports of discomfort and numbness around the knee.  She has difficulty with walking and bending her knees.  Reviewed treatment options with tamoxifen + sulindac vs a TKI.  Given recent delivery and potential adverse effects related to TKI while managing 3 young children at home, will not start TKI at this time and consider it down the line. As this tumor appears to be driven/propagated by estrogen and recent pregnancy, plan made to start treatment with tamoxifen 20 mg daily and sulindac 300 mg daily.  I reviewed the risks associated with tamoxifen and sulindac, including increased risk of blood clots, gastritis/PUD.   Patient is agreeable to proceed with the treatment.   Patient started taking tamoxifen/sulindac on 4/17/23.  5/19/2023: Seen for a follow-up.  Patient has been compliant with tamoxifen and sulindac.  Patient reports of no significant improvement in her symptoms and thinks the appear to be slightly worse than last month.  Discussed plan to switch treatment to pazopanib 800 mg daily.  Follow-up in 4 weeks or sooner if needed.  End of May 2023: Started Pazopanib 800 mg daily. Developed GI AEs. Had to be hospitalized in early June 2023. Dose resumed at 200 mg daily.   6/16/23: Doing well on reduced dose pazopanib. Discussed plan to continue pazopanib 200 mg for another 2 weeks. If doing well, increase to 400 mg daily.   7/13/23 Patient hospitalized with nausea and vomiting at the 400 mg dose after less than 2 weeks may go to 200 mg alternating with 400 when she recovers from the symptoms  Vomiting and diarrhea improved still nauseated with mild headache on 7/15/2023     # Unintentional weight loss: Cause unclear.  CT C/A/P performed 4/26/22 did not show any major abnormalities.  It did show a 1 cm right outer breast lesion, likely benign.  Patient was encouraged to increase her nutritional intake.  We will consider referral to nutrition.     #Right breast lesion:   US right breast performed 1/9/2023 noted a 1.1 cm probable benign fibroadenoma at 8 o'clock position, 4 cm from the nipple.  6 months sonographic follow-up is recommended.   US breast from 6/9/23 noted stable 1 cm probable benign fibroadenoma in the right breast at 8'o clock position.   Short-term sonographic follow-up at 6-9 month intervals is recommended through 01/09/2025 to demonstrate 2-year stability     # Encounter for genetic & chromosomal abnormalities: Will her young age, family history (aunt) of breast cancer & desmoid tumors association with FAP and gardners syndrome,an [employer] germline testing was performed. 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   Will refer to genetic counseling.     PLAN:   1.  Agree with IV fluids and antiemetics  2.  Hopefully home in a day or 2  3.  Resume 200 of Votrient when she is recovered and after we can go to 200 mg alternating with 400                7/15/2023      CC:

## 2023-07-15 NOTE — PROGRESS NOTES
Name: Beena Vincent ADMIT: 2023   : 1990  PCP: Sissy Garibay MD    MRN: [id number] LOS: 1 days   AGE/SEX: 32 y.o. female  ROOM: [facility]     Subjective   Subjective   Patient feels better.  Continues with nausea but better than yesterday.  No vomiting.  No bowel movement since admission yesterday.  No fever or chills.  Decreased abdominal pain.  Tolerating clear liquids well.    Review of Systems  .  No dysuria or hematuria.  Cardiovascular/respiratory.  Positive occasional wheeze.  No shortness of breath.  No chest pain.  No cough.  No palpitation.       Objective   Objective   Vital Signs  Temp:  [97.3 °F (36.3 °C)-98.2 °F (36.8 °C)] 98.2 °F (36.8 °C)  Heart Rate:  [53-77] 53  Resp:  [14-18] 14  BP: (124-131)/(73-88) 129/86  SpO2:  [94 %-100 %] 97 %  on   ;   Device (Oxygen Therapy): room air    Intake/Output Summary (Last 24 hours) at 7/15/2023 1510  Last data filed at 7/15/2023 1333  Gross per 24 hour   Intake 1050 ml   Output --   Net 1050 ml     Body mass index is 24.72 kg/m².      23  2231 23  0404   Weight: 77.1 kg (170 lb) 78.2 kg (172 lb 4.8 oz)     Physical Exam  General.  Middle-aged female.  Alert and oriented x3.  No apparent pain/distress/diaphoresis.  Normal mood and affect.  Eyes.  Pupils equal round and reactive.  Intact extraocular musculature.  No pallor or jaundice.    Oral cavity.  Moist mucous membranes.  Neck.  Supple.  No JVD.  No lymphadenopathy or thyromegaly.  Cardiovascular.  Regular rate and rhythm with no gallops or murmurs.  Chest.  Scattered wheezing in the left lung.  No crackles.  Abdomen.  Soft lax.  No tenderness.  No organomegaly.  No guarding or rebound.  Normal bowel sounds.  Extremities.  No clubbing/cyanosis/edema.  CNS.  No acute focal neurological deficits.    Results Review:      Results from last 7 days   Lab Units 07/15/23  0426 23  0433 23  2359   SODIUM mmol/L 140 141 138   POTASSIUM mmol/L 3.9 4.8 4.2   CHLORIDE mmol/L 109*  109* 102   CO2 mmol/L 24.5 21.2* 19.0*   BUN mg/dL 5* 13 15   CREATININE mg/dL 0.84 0.97 1.33*   GLUCOSE mg/dL 97 123* 129*   CALCIUM mg/dL 8.0* 8.0* 10.1   AST (SGOT) U/L 17  --  23   ALT (SGPT) U/L 10  --  16     Estimated Creatinine Clearance: 120.9 mL/min (by C-G formula based on SCr of 0.84 mg/dL).  Results from last 7 days   Lab Units 07/14/23 0433   HEMOGLOBIN A1C % 5.10                           Invalid input(s):  PHOS        Invalid input(s): LDLCALC  Results from last 7 days   Lab Units 07/15/23  0426 07/14/23 0433 07/13/23  2359   WBC 10*3/mm3 5.57 9.92 16.80*   HEMOGLOBIN g/dL 11.2* 13.1 16.5*   HEMATOCRIT % 32.7* 38.5 47.6*   PLATELETS 10*3/mm3 158 200 270   MCV fL 84.9 85.6 83.7   MCH pg 29.1 29.1 29.0   MCHC g/dL 34.3 34.0 34.7   RDW % 14.2 14.8 14.4   RDW-SD fl 44.2 45.9 43.1   MPV fL 10.0 10.3 10.5   NEUTROPHIL % %  --  78.9* 86.2*   LYMPHOCYTE % %  --  17.6* 8.0*   MONOCYTES % %  --  2.9* 5.0   EOSINOPHIL % %  --  0.1* 0.1*   BASOPHIL % %  --  0.2 0.2   IMM GRAN % %  --  0.3 0.5   NEUTROS ABS 10*3/mm3 3.15 7.82* 14.47*   LYMPHS ABS 10*3/mm3  --  1.75 1.35   MONOS ABS 10*3/mm3  --  0.29 0.84   EOS ABS 10*3/mm3 0.22 0.01 0.02   BASOS ABS 10*3/mm3  --  0.02 0.04   IMMATURE GRANS (ABS) 10*3/mm3  --  0.03 0.08*   NRBC /100 WBC  --  0.0 0.0     Results from last 7 days   Lab Units 07/14/23  0433   INR  1.03         Results from last 7 days   Lab Units 07/14/23  0300 07/13/23  2359   LACTATE mmol/L 1.5 3.6*         Results from last 7 days   Lab Units 07/13/23  2359   LIPASE U/L 20             Results from last 7 days   Lab Units 07/14/23  0048   NITRITE UA  Negative   WBC UA /HPF 0-2   BACTERIA UA /HPF None Seen   SQUAM EPITHEL UA /HPF 0-2           Imaging:  Imaging Results (Last 24 Hours)       Procedure Component Value Units Date/Time    CT Abdomen Pelvis With Contrast [[id number]] Collected: 07/14/23 1647     Updated: 07/14/23 1654    Narrative:      CT ABDOMEN AND PELVIS WITH IV CONTRAST      HISTORY: Abdominal pain, nausea and vomiting     TECHNIQUE: Radiation dose reduction techniques were utilized, including  automated exposure control and exposure modulation based on body size.  Axial images were obtained through the abdomen and pelvis after the  administration of IV contrast. Coronal and sagittal reformatted images  obtained.     COMPARISON: 06/08/2023     FINDINGS:      ABDOMEN:  The lung bases are clear. The liver is unremarkable. The gallbladder and  spleen are both unremarkable. The kidneys and adrenal glands are  unremarkable. The pancreas is unremarkable. There is no evidence of  bowel obstruction. There are some fluid-filled loops of small bowel that  are not dilated or obstructed. These are nonspecific but can be seen  with enteritis.     PELVIS:  There is a 5 cm left ovarian cyst. There is trace free fluid in the  pelvis. No organized fluid collection. The colon is unremarkable.  Appendectomy. There is no acute bony abnormality       Impression:      1. No evidence of bowel obstruction  2. There are some fluid-filled loops of small bowel that are nondilated  or obstructed. These are nonspecific but can be seen with enteritis  3. 5 cm left ovarian cyst most likely physiologic     Radiation dose reduction techniques were utilized, including automated  exposure control and exposure modulation based on body size.     This report was finalized on 7/14/2023 4:51 PM by Dr. Dontae Paz M.D.                  I reviewed the patient's new clinical results / labs / tests / procedures      Assessment/Plan     Active Hospital Problems    Diagnosis  POA    **Nausea vomiting and diarrhea [R11.2, R19.7]  Yes    Acute kidney injury [N17.9]  Yes    Desmoid tumor [D48.1]  Yes    Anxiety with depression [F41.8]  Yes    History of anemia [Z86.2]  Not Applicable      Resolved Hospital Problems   No resolved problems to display.       1.  Nausea/vomiting/diarrhea/abdominal colic.  All suggestive of  gastroenteritis that is probably made worse by immunotherapy.  Improving with holding immunotherapy at this time.  Tolerating clear liquids.  Continue antiemetics and IV Protonix..  Unable to provide stool for stool studies.  CT scan of the abdomen pelvis without acute disease.  Will advance diet as tolerated.  2.  History of bronchial asthma..  Continue as needed DuoNebs.  Stable respiratory status  3.  Right knee desmoid tumor.  Hold immunotherapy at this time secondary to nausea and vomiting..  Will resume once the patient can tolerate as an outpatient..  Hematology oncology consult noted and appreciated.  4.  Acute kidney failure/dehydration/elevated lactic acid.  Hold because of GI blood loss.  Resolved acute kidney failure and dehydration and lactic acidosis with IV fluids.  Will monitor.  We will decrease IV fluid.  5.  Hyperglycemia.  Resolved.  6.  VTE prophylaxis.  Sequential compression devices        Discussed my findings and plan of treatment with the patient/nurse/hematology oncology  Disposition.  Anticipate home tomorrow if continues to improve..             Ronny Begum MD  [facility]  07/15/23  15:10 EDT

## 2023-07-15 NOTE — PLAN OF CARE
Goal Outcome Evaluation:               VSS.PRN given * 3 for pain.Denies N/V/D.Up ad shante.No acute events overnight.WCTM.

## 2023-07-15 NOTE — PLAN OF CARE
Problem: Adult Inpatient Plan of Care  Goal: Plan of Care Review  Outcome: Ongoing, Progressing  Flowsheets (Taken 7/15/2023 4570)  Progress: improving  Plan of Care Reviewed With: patient  Outcome Evaluation: Vitals stable. Pain controlled with prn dilaudid. +ambulation. IVF continued. Advancing diet - will have full liquids at dinner. Possible discharge tomorrow. Will continue to monitor.

## 2023-07-16 ENCOUNTER — APPOINTMENT (OUTPATIENT)
Dept: CARDIOLOGY | Facility: HOSPITAL | Age: 33
DRG: 392 | End: 2023-07-16
Payer: COMMERCIAL

## 2023-07-16 PROBLEM — R11.2 NAUSEA VOMITING AND DIARRHEA: Status: RESOLVED | Noted: 2023-06-09 | Resolved: 2023-07-16

## 2023-07-16 PROBLEM — R19.7 NAUSEA VOMITING AND DIARRHEA: Status: RESOLVED | Noted: 2023-06-09 | Resolved: 2023-07-16

## 2023-07-16 PROBLEM — N17.9 ACUTE KIDNEY INJURY: Status: RESOLVED | Noted: 2023-06-09 | Resolved: 2023-07-16

## 2023-07-16 LAB
ANION GAP SERPL CALCULATED.3IONS-SCNC: 4 MMOL/L (ref 5–15)
ANISOCYTOSIS BLD QL: NORMAL
BASOPHILS # BLD MANUAL: 0.06 10*3/MM3 (ref 0–0.2)
BASOPHILS NFR BLD MANUAL: 1 % (ref 0–1.5)
BUN SERPL-MCNC: 3 MG/DL (ref 6–20)
BUN/CREAT SERPL: 4.2 (ref 7–25)
CALCIUM SPEC-SCNC: 8.3 MG/DL (ref 8.6–10.5)
CHLORIDE SERPL-SCNC: 106 MMOL/L (ref 98–107)
CO2 SERPL-SCNC: 28 MMOL/L (ref 22–29)
CREAT SERPL-MCNC: 0.72 MG/DL (ref 0.57–1)
D DIMER PPP FEU-MCNC: <0.27 MCGFEU/ML (ref 0–0.5)
DEPRECATED RDW RBC AUTO: 43.6 FL (ref 37–54)
EGFRCR SERPLBLD CKD-EPI 2021: 115.5 ML/MIN/1.73
EOSINOPHIL # BLD MANUAL: 0.31 10*3/MM3 (ref 0–0.4)
EOSINOPHIL NFR BLD MANUAL: 5.2 % (ref 0.3–6.2)
ERYTHROCYTE [DISTWIDTH] IN BLOOD BY AUTOMATED COUNT: 14.2 % (ref 12.3–15.4)
GLUCOSE BLDC GLUCOMTR-MCNC: 136 MG/DL (ref 70–130)
GLUCOSE SERPL-MCNC: 99 MG/DL (ref 65–99)
HCT VFR BLD AUTO: 33.5 % (ref 34–46.6)
HGB BLD-MCNC: 11.5 G/DL (ref 12–15.9)
LYMPHOCYTES # BLD MANUAL: 2.39 10*3/MM3 (ref 0.7–3.1)
LYMPHOCYTES NFR BLD MANUAL: 6.2 % (ref 5–12)
MCH RBC QN AUTO: 28.9 PG (ref 26.6–33)
MCHC RBC AUTO-ENTMCNC: 34.3 G/DL (ref 31.5–35.7)
MCV RBC AUTO: 84.2 FL (ref 79–97)
MONOCYTES # BLD: 0.37 10*3/MM3 (ref 0.1–0.9)
NEUTROPHILS # BLD AUTO: 2.82 10*3/MM3 (ref 1.7–7)
NEUTROPHILS NFR BLD MANUAL: 47.4 % (ref 42.7–76)
NRBC BLD AUTO-RTO: 0 /100 WBC (ref 0–0.2)
PLAT MORPH BLD: NORMAL
PLATELET # BLD AUTO: 157 10*3/MM3 (ref 140–450)
PMV BLD AUTO: 9.6 FL (ref 6–12)
POTASSIUM SERPL-SCNC: 4.1 MMOL/L (ref 3.5–5.2)
RBC # BLD AUTO: 3.98 10*6/MM3 (ref 3.77–5.28)
SODIUM SERPL-SCNC: 138 MMOL/L (ref 136–145)
VARIANT LYMPHS NFR BLD MANUAL: 40.2 % (ref 19.6–45.3)
WBC MORPH BLD: NORMAL
WBC NRBC COR # BLD: 5.95 10*3/MM3 (ref 3.4–10.8)

## 2023-07-16 PROCEDURE — 93970 EXTREMITY STUDY: CPT

## 2023-07-16 PROCEDURE — 85007 BL SMEAR W/DIFF WBC COUNT: CPT | Performed by: INTERNAL MEDICINE

## 2023-07-16 PROCEDURE — 80048 BASIC METABOLIC PNL TOTAL CA: CPT | Performed by: INTERNAL MEDICINE

## 2023-07-16 PROCEDURE — 99232 SBSQ HOSP IP/OBS MODERATE 35: CPT | Performed by: INTERNAL MEDICINE

## 2023-07-16 PROCEDURE — 85025 COMPLETE CBC W/AUTO DIFF WBC: CPT | Performed by: INTERNAL MEDICINE

## 2023-07-16 PROCEDURE — 25010000002 HYDROMORPHONE 1 MG/ML SOLUTION: Performed by: INTERNAL MEDICINE

## 2023-07-16 PROCEDURE — 82948 REAGENT STRIP/BLOOD GLUCOSE: CPT

## 2023-07-16 PROCEDURE — 25010000002 ONDANSETRON PER 1 MG: Performed by: NURSE PRACTITIONER

## 2023-07-16 PROCEDURE — 85379 FIBRIN DEGRADATION QUANT: CPT | Performed by: INTERNAL MEDICINE

## 2023-07-16 RX ORDER — HYDROCODONE BITARTRATE AND ACETAMINOPHEN 5; 325 MG/1; MG/1
1 TABLET ORAL EVERY 4 HOURS PRN
Status: DISCONTINUED | OUTPATIENT
Start: 2023-07-17 | End: 2023-07-17 | Stop reason: HOSPADM

## 2023-07-16 RX ORDER — HYDROCODONE BITARTRATE AND ACETAMINOPHEN 5; 325 MG/1; MG/1
1 TABLET ORAL EVERY 6 HOURS PRN
Status: DISCONTINUED | OUTPATIENT
Start: 2023-07-16 | End: 2023-07-16

## 2023-07-16 RX ADMIN — HYDROCODONE BITARTRATE AND ACETAMINOPHEN 1 TABLET: 5; 325 TABLET ORAL at 19:59

## 2023-07-16 RX ADMIN — Medication 10 ML: at 08:31

## 2023-07-16 RX ADMIN — ONDANSETRON 4 MG: 2 INJECTION INTRAMUSCULAR; INTRAVENOUS at 08:30

## 2023-07-16 RX ADMIN — Medication 10 ML: at 20:00

## 2023-07-16 RX ADMIN — HYDROMORPHONE HYDROCHLORIDE 1 MG: 1 INJECTION, SOLUTION INTRAMUSCULAR; INTRAVENOUS; SUBCUTANEOUS at 03:28

## 2023-07-16 RX ADMIN — HYDROMORPHONE HYDROCHLORIDE 1 MG: 1 INJECTION, SOLUTION INTRAMUSCULAR; INTRAVENOUS; SUBCUTANEOUS at 08:30

## 2023-07-16 RX ADMIN — ACETAMINOPHEN 650 MG: 325 TABLET, FILM COATED ORAL at 23:16

## 2023-07-16 RX ADMIN — HYDROCODONE BITARTRATE AND ACETAMINOPHEN 1 TABLET: 5; 325 TABLET ORAL at 13:03

## 2023-07-16 RX ADMIN — PANTOPRAZOLE SODIUM 40 MG: 40 INJECTION, POWDER, FOR SOLUTION INTRAVENOUS at 06:52

## 2023-07-16 RX ADMIN — HYDROMORPHONE HYDROCHLORIDE 1 MG: 1 INJECTION, SOLUTION INTRAMUSCULAR; INTRAVENOUS; SUBCUTANEOUS at 00:13

## 2023-07-16 NOTE — PLAN OF CARE
Goal Outcome Evaluation:               VSS.Pt continues on IVF.PRN given * 3 for pain this shift.Denies N/V/D.Up ad shante.Tolerating full liquids well.Advanced to GI soft diet for breakfast .No acute events overnight.WCTM.

## 2023-07-16 NOTE — PROGRESS NOTES
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     Initial dose was 800 mg and she was admitted with similar symptoms with then resumed her at a 200 mg dose was doing well she was increased a little over a week ago to to 400 mg and symptoms started again yesterday.  She was taking oral nausea medicines once the vomiting started she can no longer take antiemetics.     She has noted significant regression of the tumor her knee thankfully and would like to stay on maximum tolerated dose because of this.     Her daughter had a GI bug a week ago but she has had no fever or other complaints and I do not think this is a GI bug but obviously this could also be the case     No hematemesis or melena  CBC stable    Interval history  7/15/2023  Afebrile blood pressure running a little bit on the high side for her-has a low-grade headache and still nauseated but no vomiting or diarrhea since yesterday  CT abdomen shows a 5 cm left ovarian cyst which is probably physiologic she has resumed her menstrual cycles after delivery 4 months ago the cyst was not seen on her CAT scan on 6/8/2023 7/16/2023  Afebrile vital signs stable blood pressure lower headache better  Ate breakfast today but slightly nauseous-complains of left shoulder and back pain and suprapubic discomfort  CBC normal    Past Medical History, Past Surgical History, Social History, Family History have been reviewed and are without significant changes except as mentioned.    Review of Systems   Constitutional:  Negative for appetite change.   Gastrointestinal:  Negative for nausea (Better).   Musculoskeletal:  Positive for back pain.   Neurological:  Positive for headaches (Better).    A comprehensive 14 point review of systems was performed and was negative except  "as mentioned.    Medications:  The current medication list was reviewed in the EMR    ALLERGIES:    Allergies   Allergen Reactions    Penicillins Nausea And Vomiting    Adhesive Tape Rash     \"SURGICAL TAPE\"  AS A CHILD       Objective      Vitals:    07/15/23 1333 07/15/23 1900 07/15/23 2339 07/16/23 0800   BP: 129/86 133/88 135/98 126/91   BP Location: Left arm Right arm Right arm Right arm   Patient Position: Lying Sitting Sitting Lying   Pulse: 53 73 84 62   Resp: 14 16 16 17   Temp: 98.2 °F (36.8 °C) 97.7 °F (36.5 °C) 98.4 °F (36.9 °C) 97.7 °F (36.5 °C)   TempSrc: Oral Oral Oral Oral   SpO2: 97% 93% 100% 100%   Weight:       Height:             6/30/2023    11:54 AM   Current Status   ECOG score 0       Physical Exam    CONSTITUTIONAL:  Vital signs reviewed.  No distress, looks comfortable.  EYES:  Conjunctivae and lids unremarkable.  PERRLA  EARS,NOSE,MOUTH,THROAT:  Ears and nose appear unremarkable.  Lips, teeth, gums appear unremarkable.  RESPIRATORY:  Normal respiratory effort.  Lungs clear to auscultation bilaterally.  CARDIOVASCULAR:  Normal S1, S2.  No murmurs rubs or gallops.  No significant lower extremity edema.  Mass behind the right knee appears to be smaller per patient  GASTROINTESTINAL: Abdomen appears unremarkable.  Mild suprapubic tenderness.  No hepatomegaly.  No splenomegaly.  LYMPHATIC:  No cervical, supraclavicular, axillary lymphadenopathy.  SKIN:  Warm.  No rashes.  PSYCHIATRIC:  Normal judgment and insight.  Normal mood and affect.    RECENT LABS:  Hematology WBC   Date Value Ref Range Status   07/16/2023 5.95 3.40 - 10.80 10*3/mm3 Final     RBC   Date Value Ref Range Status   07/16/2023 3.98 3.77 - 5.28 10*6/mm3 Final     Hemoglobin   Date Value Ref Range Status   07/16/2023 11.5 (L) 12.0 - 15.9 g/dL Final     Hematocrit   Date Value Ref Range Status   07/16/2023 33.5 (L) 34.0 - 46.6 % Final     Platelets   Date Value Ref Range Status   07/16/2023 157 140 - 450 10*3/mm3 Final        "       Assessment & Plan   # Right knee desmoid tumor:  Pt had first noted a swelling behind her right knee in December 2021. The swelling was progressive and became painful with knee flexion.   A MRI right knee done 3/11/22 demonstrated a 10 x 3.5 x 4.4 cm enhancing soft tissue mass lesion along the posterolateral right knee interposed between the distal biceps femoris and the lateral gastrocnemius muscles. The mass appears to infiltrate or arise from the distal biceps femoris. The lesion displaces and closely abuts the common peroneal nerve.   Patient was seen by , ortho - who obtained a biopsy of the mass on 3/23/22. 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    recommended against upfront resection at this time & referred to this clinic to discuss systemic therapy options.   I discussed the natural history of desmoid tumors including high rates of local recurrence (~ 50% cases) , specially R1 resection. Also discussed ~ 25% cases have spontaneous regression. Discussed role of systemic therapy options with TKIs and tamoxifen/NSAIDS.   Patient was then seen by Dr. Solis, surgical oncology with the [facility].  Plan at that time was made for surveillance.  Patient then became pregnant, hence plan for any systemic treatment was deferred until delivery.  Patient delivered her baby boy on 3/9/2023.  A MRI of her right knee performed 1/29/2023, which noted significant increase in size of the right knee posterolateral mass, now measuring 8 x 6 x 18 cm.  3/23/2023: Patient seen back in the clinic today.  The right knee posterolateral mass has significantly increased since last evaluation.  Patient reports of discomfort and numbness around the knee.  She has difficulty with walking and bending her knees.  Reviewed treatment options with tamoxifen + sulindac vs a TKI.  Given recent delivery and potential adverse effects related to TKI while  managing 3 young children at home, will not start TKI at this time and consider it down the line. As this tumor appears to be driven/propagated by estrogen and recent pregnancy, plan made to start treatment with tamoxifen 20 mg daily and sulindac 300 mg daily.  I reviewed the risks associated with tamoxifen and sulindac, including increased risk of blood clots, gastritis/PUD.  Patient is agreeable to proceed with the treatment.   Patient started taking tamoxifen/sulindac on 4/17/23.  5/19/2023: Seen for a follow-up.  Patient has been compliant with tamoxifen and sulindac.  Patient reports of no significant improvement in her symptoms and thinks the appear to be slightly worse than last month.  Discussed plan to switch treatment to pazopanib 800 mg daily.  Follow-up in 4 weeks or sooner if needed.  End of May 2023: Started Pazopanib 800 mg daily. Developed GI AEs. Had to be hospitalized in early June 2023. Dose resumed at 200 mg daily.   6/16/23: Doing well on reduced dose pazopanib. Discussed plan to continue pazopanib 200 mg for another 2 weeks. If doing well, increase to 400 mg daily.   7/13/23 Patient hospitalized with nausea and vomiting at the 400 mg dose after less than 2 weeks may go to 200 mg alternating with 400 when she recovers from the symptoms-lipase normal  Vomiting and diarrhea improved still nauseated with mild headache on 7/15/2023 CT abdomen shows no change except for left ovarian cyst  Slow improvement 7/16     # Unintentional weight loss: Cause unclear.  CT C/A/P performed 4/26/22 did not show any major abnormalities.  It did show a 1 cm right outer breast lesion, likely benign.  Patient was encouraged to increase her nutritional intake.  We will consider referral to nutrition.     #Right breast lesion:   US right breast performed 1/9/2023 noted a 1.1 cm probable benign fibroadenoma at 8 o'clock position, 4 cm from the nipple.  6 months sonographic follow-up is recommended.   US breast from  6/9/23 noted stable 1 cm probable benign fibroadenoma in the right breast at 8'o clock position.   Short-term sonographic follow-up at 6-9 month intervals is recommended through 01/09/2025 to demonstrate 2-year stability     # Encounter for genetic & chromosomal abnormalities: Will her young age, family history (aunt) of breast cancer & desmoid tumors association with FAP and gardners syndrome,an [employer] germline testing was performed. 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   Will refer to genetic counseling.     PLAN:   1.  Agree with IV fluids and antiemetics  2.  Check D-dimer and Dopplers   3.  Resume 200 of Votrient when she is recovered and after we can go to 200 mg alternating with 400  Hopefully home in a day or 2                7/16/2023      CC:

## 2023-07-16 NOTE — PLAN OF CARE
Problem: Adult Inpatient Plan of Care  Goal: Plan of Care Review  Outcome: Ongoing, Progressing  Flowsheets (Taken 7/16/2023 1602)  Progress: improving  Plan of Care Reviewed With: patient  Outcome Evaluation: Vitals stable. Started on regular diet today - still having nausea and pain. IVF continued. Dopplers ordered. Possible discharge in am. Will continue to monitor.

## 2023-07-16 NOTE — PROGRESS NOTES
Name: Beena Vincent ADMIT: 2023   : 1990  PCP: Sissy Garibay MD    MRN: [id number] LOS: 2 days   AGE/SEX: 32 y.o. female  ROOM: [facility]     Subjective   Subjective   No events overnight. Still having some nausea with food       Objective   Objective   Vital Signs  Temp:  [97.7 °F (36.5 °C)-98.4 °F (36.9 °C)] 97.7 °F (36.5 °C)  Heart Rate:  [53-84] 62  Resp:  [14-17] 17  BP: (126-135)/(86-98) 126/91  SpO2:  [93 %-100 %] 100 %  on   ;   Device (Oxygen Therapy): room air  Body mass index is 24.72 kg/m².  Physical Exam  Constitutional:       General: She is not in acute distress.     Appearance: She is not toxic-appearing.   Cardiovascular:      Rate and Rhythm: Normal rate and regular rhythm.      Heart sounds: Normal heart sounds.   Pulmonary:      Effort: Pulmonary effort is normal.      Breath sounds: Normal breath sounds.   Abdominal:      General: Bowel sounds are normal.      Palpations: Abdomen is soft.   Neurological:      Mental Status: She is alert.   Psychiatric:         Mood and Affect: Mood normal.         Behavior: Behavior normal.       Results Review     I reviewed the patient's new clinical results.  Results from last 7 days   Lab Units 23  0506 07/15/23  0426 23  2359   WBC 10*3/mm3 5.95 5.57 9.92 16.80*   HEMOGLOBIN g/dL 11.5* 11.2* 13.1 16.5*   PLATELETS 10*3/mm3 157 158 200 270     Results from last 7 days   Lab Units 23  0506 07/15/23  0426 23  0433 07/13/23  2359   SODIUM mmol/L 138 140 141 138   POTASSIUM mmol/L 4.1 3.9 4.8 4.2   CHLORIDE mmol/L 106 109* 109* 102   CO2 mmol/L 28.0 24.5 21.2* 19.0*   BUN mg/dL 3* 5* 13 15   CREATININE mg/dL 0.72 0.84 0.97 1.33*   GLUCOSE mg/dL 99 97 123* 129*   Estimated Creatinine Clearance: 141 mL/min (by C-G formula based on SCr of 0.72 mg/dL).  Results from last 7 days   Lab Units 07/15/23  0426 23  2359   ALBUMIN g/dL 3.3* 5.2   BILIRUBIN mg/dL 0.7 1.3*   ALK PHOS U/L 43 73   AST (SGOT) U/L 17  23   ALT (SGPT) U/L 10 16     Results from last 7 days   Lab Units 07/16/23  0506 07/15/23  0426 07/14/23  0433 07/13/23  2359   CALCIUM mg/dL 8.3* 8.0* 8.0* 10.1   ALBUMIN g/dL  --  3.3*  --  5.2     Results from last 7 days   Lab Units 07/14/23  0300 07/13/23  2359   LACTATE mmol/L 1.5 3.6*     COVID19   Date Value Ref Range Status   07/17/2022 Detected (C) Not Detected - Ref. Range Final   03/23/2022 Not Detected Not Detected - Ref. Range Final   10/01/2021 Not Detected Not Detected - Ref. Range Final   08/11/2020 Not Detected Not Detected - Ref. Range Final     Hemoglobin A1C   Date/Time Value Ref Range Status   07/14/2023 0433 5.10 4.80 - 5.60 % Final       CT Abdomen Pelvis With Contrast  Narrative: CT ABDOMEN AND PELVIS WITH IV CONTRAST     HISTORY: Abdominal pain, nausea and vomiting     TECHNIQUE: Radiation dose reduction techniques were utilized, including  automated exposure control and exposure modulation based on body size.  Axial images were obtained through the abdomen and pelvis after the  administration of IV contrast. Coronal and sagittal reformatted images  obtained.     COMPARISON: 06/08/2023     FINDINGS:      ABDOMEN:  The lung bases are clear. The liver is unremarkable. The gallbladder and  spleen are both unremarkable. The kidneys and adrenal glands are  unremarkable. The pancreas is unremarkable. There is no evidence of  bowel obstruction. There are some fluid-filled loops of small bowel that  are not dilated or obstructed. These are nonspecific but can be seen  with enteritis.     PELVIS:  There is a 5 cm left ovarian cyst. There is trace free fluid in the  pelvis. No organized fluid collection. The colon is unremarkable.  Appendectomy. There is no acute bony abnormality     Impression: 1. No evidence of bowel obstruction  2. There are some fluid-filled loops of small bowel that are nondilated  or obstructed. These are nonspecific but can be seen with enteritis  3. 5 cm left ovarian cyst  most likely physiologic     Radiation dose reduction techniques were utilized, including automated  exposure control and exposure modulation based on body size.     This report was finalized on 7/14/2023 4:51 PM by Dr. Dontae Paz M.D.       Scheduled Medications  pantoprazole, 40 mg, Intravenous, Q AM  sodium chloride, 10 mL, Intravenous, Q12H    Infusions  sodium chloride, 100 mL/hr, Last Rate: 100 mL/hr (07/15/23 0856)    Diet  Diet: Gastrointestinal Diets; Low Irritant; Texture: Regular Texture (IDDSI 7); Fluid Consistency: Thin (IDDSI 0)       Assessment/Plan     Active Hospital Problems    Diagnosis  POA    Desmoid tumor [D48.1]  Yes    Anxiety with depression [F41.8]  Yes    History of anemia [Z86.2]  Not Applicable      Resolved Hospital Problems    Diagnosis Date Resolved POA    Nausea vomiting and diarrhea [R11.2, R19.7] 07/16/2023 Yes    Acute kidney injury [N17.9] 07/16/2023 Yes       32 y.o. female admitted with <principal problem not specified>.    Nausea and vomiting/diarrhea/abdominal pain-gastroenteritis is suspected. She hasn't had any diarrhea to check stool studies this admission. CT abdomen/pelvis without acute process. Still having some nausea with food. Continue antiemetics and IVF  LOUIE-resolved with IVF  Right knee desmoid tumor-on immunotherapy, which is currently held. Plan to restart votrient 200 mg once she has recovered and then oncology plans to alternate 200 mg and 400 mg at a later date  Asthma-prn duonebs  SCDs for DVT prophylaxis.  Full code.  Discussed with patient and nursing staff.  Anticipate discharge home in 1-2 days.      Rahat Jernigan MD  [facility]  07/16/23  12:49 EDT    I wore protective equipment throughout this patient encounter including a face mask, gloves and protective eyewear.  Hand hygiene was performed before donning protective equipment and after removal when leaving the room.

## 2023-07-17 VITALS
OXYGEN SATURATION: 99 % | TEMPERATURE: 97.7 F | DIASTOLIC BLOOD PRESSURE: 87 MMHG | HEART RATE: 82 BPM | HEIGHT: 70 IN | WEIGHT: 172.3 LBS | BODY MASS INDEX: 24.67 KG/M2 | RESPIRATION RATE: 16 BRPM | SYSTOLIC BLOOD PRESSURE: 130 MMHG

## 2023-07-17 LAB
ANION GAP SERPL CALCULATED.3IONS-SCNC: 8.9 MMOL/L (ref 5–15)
BH CV LOWER VASCULAR LEFT COMMON FEMORAL AUGMENT: NORMAL
BH CV LOWER VASCULAR LEFT COMMON FEMORAL COMPETENT: NORMAL
BH CV LOWER VASCULAR LEFT COMMON FEMORAL COMPRESS: NORMAL
BH CV LOWER VASCULAR LEFT COMMON FEMORAL PHASIC: NORMAL
BH CV LOWER VASCULAR LEFT COMMON FEMORAL SPONT: NORMAL
BH CV LOWER VASCULAR LEFT DISTAL FEMORAL COMPRESS: NORMAL
BH CV LOWER VASCULAR LEFT GASTRONEMIUS COMPRESS: NORMAL
BH CV LOWER VASCULAR LEFT GREATER SAPH AK COMPRESS: NORMAL
BH CV LOWER VASCULAR LEFT GREATER SAPH BK COMPRESS: NORMAL
BH CV LOWER VASCULAR LEFT LESSER SAPH COMPRESS: NORMAL
BH CV LOWER VASCULAR LEFT MID FEMORAL AUGMENT: NORMAL
BH CV LOWER VASCULAR LEFT MID FEMORAL COMPETENT: NORMAL
BH CV LOWER VASCULAR LEFT MID FEMORAL COMPRESS: NORMAL
BH CV LOWER VASCULAR LEFT MID FEMORAL PHASIC: NORMAL
BH CV LOWER VASCULAR LEFT MID FEMORAL SPONT: NORMAL
BH CV LOWER VASCULAR LEFT PERONEAL COMPRESS: NORMAL
BH CV LOWER VASCULAR LEFT POPLITEAL AUGMENT: NORMAL
BH CV LOWER VASCULAR LEFT POPLITEAL COMPETENT: NORMAL
BH CV LOWER VASCULAR LEFT POPLITEAL COMPRESS: NORMAL
BH CV LOWER VASCULAR LEFT POPLITEAL PHASIC: NORMAL
BH CV LOWER VASCULAR LEFT POPLITEAL SPONT: NORMAL
BH CV LOWER VASCULAR LEFT POSTERIOR TIBIAL COMPRESS: NORMAL
BH CV LOWER VASCULAR LEFT PROFUNDA FEMORAL COMPRESS: NORMAL
BH CV LOWER VASCULAR LEFT PROXIMAL FEMORAL COMPRESS: NORMAL
BH CV LOWER VASCULAR LEFT SAPHENOFEMORAL JUNCTION COMPRESS: NORMAL
BH CV LOWER VASCULAR RIGHT COMMON FEMORAL AUGMENT: NORMAL
BH CV LOWER VASCULAR RIGHT COMMON FEMORAL COMPETENT: NORMAL
BH CV LOWER VASCULAR RIGHT COMMON FEMORAL COMPRESS: NORMAL
BH CV LOWER VASCULAR RIGHT COMMON FEMORAL PHASIC: NORMAL
BH CV LOWER VASCULAR RIGHT COMMON FEMORAL SPONT: NORMAL
BH CV LOWER VASCULAR RIGHT DISTAL FEMORAL COMPRESS: NORMAL
BH CV LOWER VASCULAR RIGHT GASTRONEMIUS COMPRESS: NORMAL
BH CV LOWER VASCULAR RIGHT GREATER SAPH AK COMPRESS: NORMAL
BH CV LOWER VASCULAR RIGHT GREATER SAPH BK COMPRESS: NORMAL
BH CV LOWER VASCULAR RIGHT LESSER SAPH COMPRESS: NORMAL
BH CV LOWER VASCULAR RIGHT MID FEMORAL AUGMENT: NORMAL
BH CV LOWER VASCULAR RIGHT MID FEMORAL COMPETENT: NORMAL
BH CV LOWER VASCULAR RIGHT MID FEMORAL COMPRESS: NORMAL
BH CV LOWER VASCULAR RIGHT MID FEMORAL PHASIC: NORMAL
BH CV LOWER VASCULAR RIGHT MID FEMORAL SPONT: NORMAL
BH CV LOWER VASCULAR RIGHT PERONEAL COMPRESS: NORMAL
BH CV LOWER VASCULAR RIGHT POPLITEAL AUGMENT: NORMAL
BH CV LOWER VASCULAR RIGHT POPLITEAL COMPETENT: NORMAL
BH CV LOWER VASCULAR RIGHT POPLITEAL COMPRESS: NORMAL
BH CV LOWER VASCULAR RIGHT POPLITEAL PHASIC: NORMAL
BH CV LOWER VASCULAR RIGHT POPLITEAL SPONT: NORMAL
BH CV LOWER VASCULAR RIGHT POSTERIOR TIBIAL COMPRESS: NORMAL
BH CV LOWER VASCULAR RIGHT PROFUNDA FEMORAL COMPRESS: NORMAL
BH CV LOWER VASCULAR RIGHT PROXIMAL FEMORAL COMPRESS: NORMAL
BH CV LOWER VASCULAR RIGHT SAPHENOFEMORAL JUNCTION COMPRESS: NORMAL
BUN SERPL-MCNC: 5 MG/DL (ref 6–20)
BUN/CREAT SERPL: 6.3 (ref 7–25)
CALCIUM SPEC-SCNC: 8.8 MG/DL (ref 8.6–10.5)
CHLORIDE SERPL-SCNC: 107 MMOL/L (ref 98–107)
CO2 SERPL-SCNC: 25.1 MMOL/L (ref 22–29)
CREAT SERPL-MCNC: 0.79 MG/DL (ref 0.57–1)
EGFRCR SERPLBLD CKD-EPI 2021: 103.3 ML/MIN/1.73
GLUCOSE SERPL-MCNC: 108 MG/DL (ref 65–99)
POTASSIUM SERPL-SCNC: 3.6 MMOL/L (ref 3.5–5.2)
SODIUM SERPL-SCNC: 141 MMOL/L (ref 136–145)

## 2023-07-17 PROCEDURE — 80048 BASIC METABOLIC PNL TOTAL CA: CPT | Performed by: INTERNAL MEDICINE

## 2023-07-17 PROCEDURE — 25010000002 HYDROMORPHONE 1 MG/ML SOLUTION: Performed by: NURSE PRACTITIONER

## 2023-07-17 RX ADMIN — SODIUM CHLORIDE 75 ML/HR: 9 INJECTION, SOLUTION INTRAVENOUS at 02:15

## 2023-07-17 RX ADMIN — Medication 10 ML: at 08:35

## 2023-07-17 RX ADMIN — HYDROCODONE BITARTRATE AND ACETAMINOPHEN 1 TABLET: 5; 325 TABLET ORAL at 08:35

## 2023-07-17 RX ADMIN — PANTOPRAZOLE SODIUM 40 MG: 40 INJECTION, POWDER, FOR SOLUTION INTRAVENOUS at 06:09

## 2023-07-17 RX ADMIN — HYDROMORPHONE HYDROCHLORIDE 1 MG: 1 INJECTION, SOLUTION INTRAMUSCULAR; INTRAVENOUS; SUBCUTANEOUS at 00:55

## 2023-07-17 NOTE — PROGRESS NOTES
"Enter Query Response Below      Query Response: Unable to determine              If applicable, please update the problem list.     Patient: Beena Vincent        : 1990  Account: [id number]           Admit Date:         How to Respond to this query:       a. Click New Note     b. Answer query within the yellow box.                c. Update the Problem List, if applicable.      If you have any questions about this query contact me at: [email or web address]         32 year old woman with desmoid tumor of the right knee on immunotherapy, admitted  with abdominal pain, nausea, vomiting, diarrhea, diagnosed with enteritis.   7/15 Progress note states: \"gastroenteritis  that is probably made worse by immunotherapy.  Improving with holding immunotherapy at this time.\"  Treatment included 2L NS boluses, holding immunotherapy, IV Zofran as needed, IV Protonix.      Please document if the enteritis was determined to be:  - Enteritis related to immunotherapy  - Enteritis related to other cause, please specify if known  - Other, please specify  - Unable to determine     By submitting this query, we are merely seeking further clarification of documentation to accurately reflect all conditions that you are monitoring, evaluating, treating or that extend the hospitalization or utilize additional resources of care. Please utilize your independent clinical judgment when addressing the question(s) above.     This query and your response, once completed, will be entered into the legal medical record.    Sincerely,   Iraida Mcnamara RN, BSN  [email or web address]  Clinical Documentation Integrity Program  "

## 2023-07-17 NOTE — PLAN OF CARE
Goal Outcome Evaluation:                 VSS.Pt reports Norco 5 not effective for pain.LHA ordered dilaudid IV *1 & increased frequency of Norco to Q4H for back pain.Denies N/V/D.Up ad shante. Advanced to GI soft diet,tolerating well per pt.Possible d/c today.WCTM.

## 2023-07-17 NOTE — DISCHARGE SUMMARY
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 Vincent is a 32 y.o. 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  Please see the admitting history and physical for further details.  She was found to have some fluid-filled loops of small bowel consistent with enteritis as well as an LOUIE and was admitted to the hospital for further evaluation and treatment.      She was started on IV fluids, pain medication and antiemetics.  Her diarrhea and LOUIE quickly resolved, and so we are unable to obtain stool samples.  She was seen in consultation by oncology who recommended that her votrient be held, and then be restarted once she is symptomatically improved to 200 mg a day, and then at a later date oncology plans to alternate between 200 and 400 mg.  She is improved symptomatically today, so she will be discharged home.  She will need to follow-up closely in the oncology clinic for further care.    For her back pain, I will prescribe some Voltaren gel at discharge.  I did encourage her to stretch and exercise is much as possible to help relieve this pain.    Day of Discharge     Subjective:  She reports some low back pain, but also reports that her nausea is improved. She's tolerating her diet without  difficulty. Duplexes yesterday were negative for DVT.  She has left-sided paraspinal muscle tenderness on exam.    Physical Exam:  Temp:  [97.7 °F (36.5 °C)-98.6 °F (37 °C)] 97.7 °F (36.5 °C)  Heart Rate:  [68-82] 82  Resp:  [16-17] 16  BP: (120-145)/(85-93) 130/87  Body mass index is 24.72 kg/m².  Physical Exam  Constitutional:       General: She is not in acute distress.     Appearance: She is not toxic-appearing.   Cardiovascular:      Rate and Rhythm: Normal rate and regular rhythm.      Heart sounds: Normal heart sounds.   Pulmonary:      Effort: Pulmonary effort is normal.      Breath sounds: Normal breath sounds.   Abdominal:      General: Bowel sounds are normal.      Palpations: Abdomen is soft.   Musculoskeletal:         General: No tenderness.      Right lower leg: No edema.      Left lower leg: No edema.      Comments: Left-sided paraspinal muscle tenderness in her lumbar back   Neurological:      Mental Status: She is alert.   Psychiatric:         Mood and Affect: Mood normal.         Behavior: Behavior normal.       Consultants     Consult Orders (all) (From admission, onward)       Start     Ordered    07/14/23 0335  Inpatient Hematology & Oncology Consult  Once        Specialty:  Hematology and Oncology  Provider:  Dontae Suarez MD    07/14/23 0335    07/14/23 0159  [facility] (on-call MD unless specified) Details  Once        Specialty:  Hospitalist  Provider:  (Not yet assigned)    07/14/23 0158                  Procedures     Imaging Results (All)       Procedure Component Value Units Date/Time    CT Abdomen Pelvis With Contrast [[id number]] Collected: 07/14/23 1647     Updated: 07/14/23 1654    Narrative:      CT ABDOMEN AND PELVIS WITH IV CONTRAST     HISTORY: Abdominal pain, nausea and vomiting     TECHNIQUE: Radiation dose reduction techniques were utilized, including  automated exposure control and exposure modulation based on body size.  Axial images were obtained through the abdomen and pelvis  after the  administration of IV contrast. Coronal and sagittal reformatted images  obtained.     COMPARISON: 06/08/2023     FINDINGS:      ABDOMEN:  The lung bases are clear. The liver is unremarkable. The gallbladder and  spleen are both unremarkable. The kidneys and adrenal glands are  unremarkable. The pancreas is unremarkable. There is no evidence of  bowel obstruction. There are some fluid-filled loops of small bowel that  are not dilated or obstructed. These are nonspecific but can be seen  with enteritis.     PELVIS:  There is a 5 cm left ovarian cyst. There is trace free fluid in the  pelvis. No organized fluid collection. The colon is unremarkable.  Appendectomy. There is no acute bony abnormality       Impression:      1. No evidence of bowel obstruction  2. There are some fluid-filled loops of small bowel that are nondilated  or obstructed. These are nonspecific but can be seen with enteritis  3. 5 cm left ovarian cyst most likely physiologic     Radiation dose reduction techniques were utilized, including automated  exposure control and exposure modulation based on body size.     This report was finalized on 7/14/2023 4:51 PM by Dr. Dontae Paz M.D.               Pertinent Labs     Results from last 7 days   Lab Units 07/16/23  0506 07/15/23  0426 07/14/23  0433 07/13/23  2359   WBC 10*3/mm3 5.95 5.57 9.92 16.80*   HEMOGLOBIN g/dL 11.5* 11.2* 13.1 16.5*   PLATELETS 10*3/mm3 157 158 200 270     Results from last 7 days   Lab Units 07/17/23  0426 07/16/23  0506 07/15/23  0426 07/14/23  0433   SODIUM mmol/L 141 138 140 141   POTASSIUM mmol/L 3.6 4.1 3.9 4.8   CHLORIDE mmol/L 107 106 109* 109*   CO2 mmol/L 25.1 28.0 24.5 21.2*   BUN mg/dL 5* 3* 5* 13   CREATININE mg/dL 0.79 0.72 0.84 0.97   GLUCOSE mg/dL 108* 99 97 123*   Estimated Creatinine Clearance: 128.5 mL/min (by C-G formula based on SCr of 0.79 mg/dL).  Results from last 7 days   Lab Units 07/15/23  0426 07/13/23  2359   ALBUMIN g/dL 3.3* 5.2  "  BILIRUBIN mg/dL 0.7 1.3*   ALK PHOS U/L 43 73   AST (SGOT) U/L 17 23   ALT (SGPT) U/L 10 16     Results from last 7 days   Lab Units 07/17/23  0426 07/16/23  0506 07/15/23  0426 07/14/23  0433 07/13/23  2359   CALCIUM mg/dL 8.8 8.3* 8.0* 8.0* 10.1   ALBUMIN g/dL  --   --  3.3*  --  5.2     Results from last 7 days   Lab Units 07/13/23  2359   LIPASE U/L 20     Results from last 7 days   Lab Units 07/16/23  1503   D DIMER QUANT MCGFEU/mL <0.27           Invalid input(s): LDLCALC        Test Results Pending at Discharge       Discharge Details        Discharge Medications        New Medications        Instructions Start Date   Diclofenac Sodium 1 % gel gel  Commonly known as: VOLTAREN   4 g, Topical, 4 Times Daily PRN             Continue These Medications        Instructions Start Date   albuterol sulfate  (90 Base) MCG/ACT inhaler  Commonly known as: PROVENTIL HFA;VENTOLIN HFA;PROAIR HFA   2 puffs, Inhalation, Every 4 Hours PRN      freestyle lancets   Pt to check blood sugar 7xs daily      FreeStyle Lite w/Device kit   USE 1 SEVEN TIMES DAILY      glucose monitor monitoring kit   To check blood sugars 7x daily      ibuprofen 600 MG tablet  Commonly known as: ADVIL,MOTRIN   Take 1 tablet by mouth Every 6 (Six) Hours As Needed for Mild Pain      ondansetron ODT 8 MG disintegrating tablet  Commonly known as: ZOFRAN-ODT   8 mg, Translingual, Every 8 Hours PRN      PAZOPanib 200 MG chemo tablet  Commonly known as: Votrient   400 mg, Oral, Daily      prochlorperazine 10 MG tablet  Commonly known as: COMPAZINE   10 mg, Oral, Every 6 Hours PRN               Allergies   Allergen Reactions    Penicillins Nausea And Vomiting    Adhesive Tape Rash     \"SURGICAL TAPE\"  AS A CHILD         Discharge Disposition:  Home or Self Care    Discharge Diet:  Diet Order   Procedures    Diet: Gastrointestinal Diets; Low Irritant; Texture: Regular Texture (IDDSI 7); Fluid Consistency: Thin (IDDSI 0)       Discharge Activity: "   Activity Instructions       Activity as Tolerated              CODE STATUS:    Code Status and Medical Interventions:   Ordered at: 07/14/23 0336     Code Status (Patient has no pulse and is not breathing):    CPR (Attempt to Resuscitate)     Medical Interventions (Patient has pulse or is breathing):    Full Support       No future appointments.  Additional Instructions for the Follow-ups that You Need to Schedule       Call MD With Problems / Concerns   As directed      Instructions: return to the hospital if you experience chest pain, shortness of breath, abdominal pain, nausea, vomiting, fevers, sweats, chills, or worsening of your symptoms    Order Comments: Instructions: return to the hospital if you experience chest pain, shortness of breath, abdominal pain, nausea, vomiting, fevers, sweats, chills, or worsening of your symptoms          Discharge Follow-up with Specialty: oncology, as directed   As directed      Specialty: oncology, as directed         Discharge Follow-up with Specialty: oncology, as directed   As directed      Specialty: oncology, as directed                Follow-up Information       Sissy Garibay MD .    Specialty: Family Medicine  Contact information:  [address] [place] [id number] [phone]               Colni Molina MD. Schedule an appointment as soon as possible for a visit.    Specialties: Hematology and Oncology, Oncology  Contact information:  [address] [facility] [address]  [facility] [id number] [phone]                             Additional Instructions for the Follow-ups that You Need to Schedule       Call MD With Problems / Concerns   As directed      Instructions: return to the hospital if you experience chest pain, shortness of breath, abdominal pain, nausea, vomiting, fevers, sweats, chills, or worsening of your symptoms    Order Comments: Instructions: return to the hospital if you experience chest pain, shortness of breath, abdominal pain, nausea,  vomiting, fevers, sweats, chills, or worsening of your symptoms          Discharge Follow-up with Specialty: oncology, as directed   As directed      Specialty: oncology, as directed         Discharge Follow-up with Specialty: oncology, as directed   As directed      Specialty: oncology, as directed             Time Spent on Discharge:  Greater than 30 minutes      Rahat Jernigan MD  [facility]  07/17/23  09:00 EDT

## 2023-07-18 NOTE — CASE MANAGEMENT/SOCIAL WORK
Case Management Discharge Note      Final Note: Home. Denies any needs. Family transport         Selected Continued Care - Discharged on 7/17/2023 Admission date: 7/13/2023 - Discharge disposition: Home or Self Care      Destination    No services have been selected for the patient.                Durable Medical Equipment    No services have been selected for the patient.                Dialysis/Infusion    No services have been selected for the patient.                Home Medical Care    No services have been selected for the patient.                Therapy    No services have been selected for the patient.                Community Resources    No services have been selected for the patient.                Community & DME    No services have been selected for the patient.                    Selected Continued Care - Episodes Includes continued care and service providers with selected services from the active episodes listed below      Oncology Episode start date: 3/23/2023   There are no active outsourced providers for this episode.                 Transportation Services  Private: Car    Final Discharge Disposition Code: 01 - home or self-care

## 2023-07-25 ENCOUNTER — SPECIALTY PHARMACY (OUTPATIENT)
Dept: PHARMACY | Facility: HOSPITAL | Age: 33
End: 2023-07-25
Payer: COMMERCIAL

## 2023-07-28 ENCOUNTER — DOCUMENTATION (OUTPATIENT)
Dept: PHARMACY | Facility: HOSPITAL | Age: 33
End: 2023-07-28
Payer: COMMERCIAL

## 2023-07-28 NOTE — PROGRESS NOTES
"I contacted Ms. Vincent to coordinate a refill shipment of Votrient. She has \"plenty\" on-hand due to the fluctuation in her dosing so we agreed that I would call back in two weeks to coordinate the next refill shipment.     Sarah Bryson - Care Coordinator   7/28/2023  14:00 EDT      "

## 2023-08-01 ENCOUNTER — TELEPHONE (OUTPATIENT)
Dept: ONCOLOGY | Facility: CLINIC | Age: 33
End: 2023-08-01
Payer: COMMERCIAL

## 2023-08-01 ENCOUNTER — SPECIALTY PHARMACY (OUTPATIENT)
Dept: PHARMACY | Facility: HOSPITAL | Age: 33
End: 2023-08-01
Payer: COMMERCIAL

## 2023-08-10 ENCOUNTER — SPECIALTY PHARMACY (OUTPATIENT)
Dept: PHARMACY | Facility: HOSPITAL | Age: 33
End: 2023-08-10
Payer: COMMERCIAL

## 2023-08-11 ENCOUNTER — TELEPHONE (OUTPATIENT)
Dept: ONCOLOGY | Facility: CLINIC | Age: 33
End: 2023-08-11
Payer: COMMERCIAL

## 2023-08-14 ENCOUNTER — SPECIALTY PHARMACY (OUTPATIENT)
Dept: PHARMACY | Facility: HOSPITAL | Age: 33
End: 2023-08-14
Payer: COMMERCIAL

## 2023-08-14 NOTE — PROGRESS NOTES
Specialty Pharmacy Refill Coordination Note     Beena is a 33 y.o. female contacted today regarding refills of  VOTRIENT specialty medication(s).    Reviewed and verified with patient:       Specialty medication(s) and dose(s) confirmed: yes    Refill Questions      Flowsheet Row Most Recent Value   Changes to allergies? No   Changes to medications? No   New conditions since last clinic visit No   Unplanned office visit, urgent care, ED, or hospital admission in the last 4 weeks  No   How does patient/caregiver feel medication is working? Good   Financial problems or insurance changes  No   Since the previous refill, were any specialty medication doses or scheduled injections missed or delayed?  No   Does this patient require a clinical escalation to a pharmacist? No            Delivery Questions      Flowsheet Row Most Recent Value   Delivery method Other (Comment)  [shp sig-Requied to the home on 8/16 to arrive 8/17. Address confirmed. $0 co-pay.]   Delivery address correct? Yes   Delivery phone number [phone]   Preferred delivery time? AM   Number of medications in delivery 1   Medication being filled and delivered VOTRIENT   Doses left of specialty medications 2   Is there any medication that is due not being filled? No   Supplies needed? No supplies needed   Cooler needed? No   Do any medications need mixed or dated? No   Copay form of payment Payment plan already set up   Additional comments N/A   Questions or concerns for the pharmacist? No   Explain any questions or concerns for the pharmacist N/A   Are any medications first time fills? No                   Follow-up: 3 week(s)     Sarah Bryson, Pharmacy Technician  Specialty Pharmacy Technician

## 2023-08-18 ENCOUNTER — TELEPHONE (OUTPATIENT)
Dept: ONCOLOGY | Facility: CLINIC | Age: 33
End: 2023-08-18
Payer: COMMERCIAL

## 2023-08-18 NOTE — TELEPHONE ENCOUNTER
Caller: Beena Vincent    Relationship to patient: Self    Best call back number: [phone]    Chief complaint: RESCHEDULE     Type of visit: HOSPITAL FOLLOW UP AND LAB     Requested date: IN THE MORNING AFTER 8:00     If rescheduling, when is the original appointment: 7-27     Additional notes:PLEASE ADVISE

## 2023-08-24 ENCOUNTER — TELEPHONE (OUTPATIENT)
Dept: ONCOLOGY | Facility: CLINIC | Age: 33
End: 2023-08-24
Payer: COMMERCIAL

## 2023-08-24 NOTE — TELEPHONE ENCOUNTER
Caller: Beena Vincent    Relationship to patient: Self    Best call back number: [phone]    Chief complaint: PT CALLED TO RESCHEDULE HOSPITAL FOLLOW UP    Type of visit: HOSPITAL FOLLOW UP    Requested date: MORNINGS     If rescheduling, when is the original appointment: 7-24-23

## 2023-08-25 ENCOUNTER — SPECIALTY PHARMACY (OUTPATIENT)
Dept: PHARMACY | Facility: HOSPITAL | Age: 33
End: 2023-08-25
Payer: COMMERCIAL

## 2023-08-31 ENCOUNTER — OFFICE VISIT (OUTPATIENT)
Dept: ONCOLOGY | Facility: CLINIC | Age: 33
End: 2023-08-31
Payer: COMMERCIAL

## 2023-08-31 ENCOUNTER — LAB (OUTPATIENT)
Dept: LAB | Facility: HOSPITAL | Age: 33
End: 2023-08-31
Payer: COMMERCIAL

## 2023-08-31 VITALS
RESPIRATION RATE: 18 BRPM | SYSTOLIC BLOOD PRESSURE: 122 MMHG | HEIGHT: 70 IN | OXYGEN SATURATION: 97 % | WEIGHT: 176.6 LBS | HEART RATE: 70 BPM | DIASTOLIC BLOOD PRESSURE: 81 MMHG | TEMPERATURE: 96.9 F | BODY MASS INDEX: 25.28 KG/M2

## 2023-08-31 DIAGNOSIS — D48.1 DESMOID TUMOR: ICD-10-CM

## 2023-08-31 DIAGNOSIS — D48.1 DESMOID TUMOR: Primary | ICD-10-CM

## 2023-08-31 LAB
ALBUMIN SERPL-MCNC: 4.3 G/DL (ref 3.5–5.2)
ALBUMIN/GLOB SERPL: 1.8 G/DL
ALP SERPL-CCNC: 53 U/L (ref 39–117)
ALT SERPL W P-5'-P-CCNC: 13 U/L (ref 1–33)
ANION GAP SERPL CALCULATED.3IONS-SCNC: 16 MMOL/L (ref 5–15)
AST SERPL-CCNC: 22 U/L (ref 1–32)
BASOPHILS # BLD AUTO: 0.02 10*3/MM3 (ref 0–0.2)
BASOPHILS NFR BLD AUTO: 0.3 % (ref 0–1.5)
BILIRUB SERPL-MCNC: 0.7 MG/DL (ref 0–1.2)
BUN SERPL-MCNC: 9 MG/DL (ref 6–20)
BUN/CREAT SERPL: 9 (ref 7–25)
CALCIUM SPEC-SCNC: 8.9 MG/DL (ref 8.6–10.5)
CHLORIDE SERPL-SCNC: 100 MMOL/L (ref 98–107)
CO2 SERPL-SCNC: 22 MMOL/L (ref 22–29)
CREAT SERPL-MCNC: 1 MG/DL (ref 0.6–1.1)
DEPRECATED RDW RBC AUTO: 39.8 FL (ref 37–54)
EGFRCR SERPLBLD CKD-EPI 2021: 77.4 ML/MIN/1.73
EOSINOPHIL # BLD AUTO: 0.11 10*3/MM3 (ref 0–0.4)
EOSINOPHIL NFR BLD AUTO: 1.4 % (ref 0.3–6.2)
ERYTHROCYTE [DISTWIDTH] IN BLOOD BY AUTOMATED COUNT: 12.9 % (ref 12.3–15.4)
GLOBULIN UR ELPH-MCNC: 2.4 GM/DL
GLUCOSE SERPL-MCNC: 88 MG/DL (ref 65–99)
HCT VFR BLD AUTO: 39 % (ref 34–46.6)
HGB BLD-MCNC: 13.6 G/DL (ref 12–15.9)
IMM GRANULOCYTES # BLD AUTO: 0.02 10*3/MM3 (ref 0–0.05)
IMM GRANULOCYTES NFR BLD AUTO: 0.3 % (ref 0–0.5)
LYMPHOCYTES # BLD AUTO: 2.99 10*3/MM3 (ref 0.7–3.1)
LYMPHOCYTES NFR BLD AUTO: 38.7 % (ref 19.6–45.3)
MCH RBC QN AUTO: 30 PG (ref 26.6–33)
MCHC RBC AUTO-ENTMCNC: 34.9 G/DL (ref 31.5–35.7)
MCV RBC AUTO: 85.9 FL (ref 79–97)
MONOCYTES # BLD AUTO: 0.53 10*3/MM3 (ref 0.1–0.9)
MONOCYTES NFR BLD AUTO: 6.9 % (ref 5–12)
NEUTROPHILS NFR BLD AUTO: 4.05 10*3/MM3 (ref 1.7–7)
NEUTROPHILS NFR BLD AUTO: 52.4 % (ref 42.7–76)
NRBC BLD AUTO-RTO: 0 /100 WBC (ref 0–0.2)
PLATELET # BLD AUTO: 247 10*3/MM3 (ref 140–450)
PMV BLD AUTO: 9.7 FL (ref 6–12)
POTASSIUM SERPL-SCNC: 4.1 MMOL/L (ref 3.5–5.2)
PROT SERPL-MCNC: 6.7 G/DL (ref 6–8.5)
RBC # BLD AUTO: 4.54 10*6/MM3 (ref 3.77–5.28)
SODIUM SERPL-SCNC: 138 MMOL/L (ref 136–145)
WBC NRBC COR # BLD: 7.72 10*3/MM3 (ref 3.4–10.8)

## 2023-08-31 PROCEDURE — 85025 COMPLETE CBC W/AUTO DIFF WBC: CPT

## 2023-08-31 PROCEDURE — 36415 COLL VENOUS BLD VENIPUNCTURE: CPT

## 2023-08-31 PROCEDURE — 80053 COMPREHEN METABOLIC PANEL: CPT

## 2023-08-31 NOTE — PROGRESS NOTES
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 provider found  RECORDS OBTAINED:  Records of the patients history including those from the electronic medical record were reviewed and summarized in detail.    HISTORY OF PRESENT ILLNESS:    The patient is a 33 y.o. year old female with medical history significant for asthma & depression who had first noted a swelling behind her right knee in December 2021. The swelling was progressive and became painful with knee flexion. A MRI right knee done 3/11/22 demonstrated a 10 x 3.5 x 4.4 cm enhancing soft tissue mass lesion along the posterolateral right knee interposed between the distal biceps femoris and the lateral gastrocnemius muscles. The mass appears to infiltrate or arise from the distal biceps femoris. The lesion displaces and closely abuts the common peroneal nerve.     Patient was seen by , ortho - who obtained a biopsy of the mass on 3/23/22. 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      recommended against upfront resection at this time & referred to this clinic to discuss systemic therapy options.     Patient is accompanied by her mother. C/o pain & swelling behind right knee. Denies any association with menstrual cycles. She does report of > 20 lbs unintentional weight loss. Denies any abdominal pain, nausea or vomiting. Denies any other swelling anywhere. No family history of FAP, colon cancer or any malignancies.     A CT chest abdomen and pelvis from 4/26/2020 showed an asymmetric 1 cm nodule in the outer quadrant of right breast, favoring benign etiology.  No other soft tissue masses noted in this patient with history of desmoid fibromatosis.    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     Patient was lost to follow-up after the June 2022 visit.  She was seen again in March 2023.  Patient states she had become pregnant and delivered a baby boy on 3/9/2023.  Normal vaginal delivery.  Normal postpartum period.     She did get an MRI of her right knee performed 1/29/2023, which noted significant increase in size of the right knee posterolateral mass, now measuring 8 x 6 x 18 cm.    4/13/2023: MRI Right knee demonstrates mild increase in size compared to January 2023.  4/17/2023: Started on tamoxifen 20 mg daily and sulindac 300 mg daily. Stopped in May d/t poor response.    End of May 2023: Started Pazopanib 800 mg daily. Developed GI AEs. Had to be hospitalized in early June 2023. Dose resumed at 200 mg daily.     6/30/2023: pazopanib increased to 400 mg daily.      INTERIM HISTORY:  The patient returns today for follow-up.  She continues on pazopanib 400 mg daily.  She is having some discoloration to her hair, otherwise tolerating the medication well.  Has occasional diarrhea, not requiring any intervention.  Appetite adequate, weight stable.  Denies fever or chills.  Denies nausea or vomiting.  Believes her right knee pain/swelling continues to improve.  Patient thinks the swelling has changed in shape and is moving downwards.  Is numb and warm to touch.  No new concerns today.    Past Medical History:   Diagnosis Date    Asthma     inhaler as needed    Cervical dysplasia     Depression     Desmoid tumor 2022    back of right knee- pt desires to proceed with treatments after delivery    Gestational diabetes     Gestational hypertension 2020    Iron deficiency anemia     Preeclampsia 2012     Past Surgical History:   Procedure Laterality Date    APPENDECTOMY N/A 10/1/2021    Procedure: APPENDECTOMY LAPAROSCOPIC;  Surgeon: Ahmet Dong Jr., MD;  Location: [facility] OR;  Service: General;  Laterality: N/A;    KNEE ARTHROSCOPY      AGE 6    LEEP N/A 4/25/2019     "Procedure: LOOP ELECTROCAUTERY EXCISION PROCEDURE;  Surgeon: Arsh Ray MD;  Location: [facility];  Service: Obstetrics/Gynecology    SOFT TISSUE BIOPSY Right 3/23/2022    Procedure: BIOPSY SOFT TISSUE THIGH / KNEE;  Surgeon: Chris Randall MD;  Location: [facility] OR;  Service: Orthopedics;  Laterality: Right;    WISDOM TOOTH EXTRACTION         MEDICATIONS    Current Outpatient Medications:     albuterol sulfate  (90 Base) MCG/ACT inhaler, Inhale 2 puffs Every 6 (Six) Hours As Needed for Wheezing or Shortness of Air., Disp: 18 g, Rfl: 0    Blood Glucose Monitoring Suppl (FreeStyle Lite) w/Device kit, USE 1 SEVEN TIMES DAILY, Disp: , Rfl:     glucose monitor monitoring kit, To check blood sugars 7x daily, Disp: 1 each, Rfl: 0    ibuprofen (ADVIL,MOTRIN) 600 MG tablet, Take 1 tablet by mouth Every 6 (Six) Hours As Needed for Mild Pain, Disp: 50 tablet, Rfl: 3    Lancets (freestyle) lancets, Pt to check blood sugar 7xs daily, Disp: 15 each, Rfl: 12    methylPREDNISolone (MEDROL) 4 MG dose pack, Take as directed on package instructions., Disp: 1 each, Rfl: 0    ondansetron ODT (ZOFRAN-ODT) 8 MG disintegrating tablet, Place 1 tablet on the tongue Every 8 (Eight) Hours As Needed for Nausea or Vomiting for up to 60 doses., Disp: 60 tablet, Rfl: 5    PAZOPanib (Votrient) 200 MG chemo tablet, Take 2 tablets by mouth Daily., Disp: 60 tablet, Rfl: 5    prochlorperazine (COMPAZINE) 10 MG tablet, Take 1 tablet by mouth Every 6 (Six) Hours As Needed for Nausea or Vomiting., Disp: 30 tablet, Rfl: 3  All current medications reviewed and updated as appropriate for today's encounter on 08/31/2023.     ALLERGIES:     Allergies   Allergen Reactions    Penicillins Nausea And Vomiting    Adhesive Tape Rash     \"SURGICAL TAPE\"  AS A CHILD       SOCIAL HISTORY:       Social History     Socioeconomic History    Marital status: Single   Tobacco Use    Smoking status: Former     Packs/day: 0.50     Years: 4.00     Pack " "years: 2.00     Types: Cigarettes     Quit date: 2/8/2020     Years since quitting: 3.5     Passive exposure: Never    Smokeless tobacco: Never   Vaping Use    Vaping Use: Former    Substances: Nicotine    Devices: Disposable   Substance and Sexual Activity    Alcohol use: Not Currently     Comment: socially    Drug use: Not Currently     Types: Marijuana     Comment: daily, did not smoke during pregnancy    Sexual activity: Not Currently         FAMILY HISTORY:  Family History   Problem Relation Age of Onset    Diabetes Father     Arthritis Maternal Grandmother     Lung cancer Maternal Grandfather     Heart attack Maternal Grandfather     Heart disease Maternal Grandfather     Stroke Maternal Grandfather     Hyperlipidemia Maternal Grandfather     Malig Hyperthermia Neg Hx     Breast cancer Neg Hx     Ovarian cancer Neg Hx     Uterine cancer Neg Hx     Colon cancer Neg Hx        REVIEW OF SYSTEMS:  As per HPI       Vitals:    08/31/23 1123   BP: 122/81   Pulse: 70   Resp: 18   Temp: 96.9 øF (36.1 øC)   TempSrc: Temporal   SpO2: 97%   Weight: 80.1 kg (176 lb 9.6 oz)   Height: 177.8 cm (70\")   PainSc: 0-No pain             8/31/2023    11:13 AM   Current Status   ECOG score 0      PHYSICAL EXAM:    CONSTITUTIONAL:  Vital signs reviewed.  No distress, looks comfortable.  EYES:  Conjunctiva and lids unremarkable.   EARS,NOSE,MOUTH,THROAT:  Ears and nose appear unremarkable.    RESPIRATORY:  Normal respiratory effort.  Lungs clear to auscultation bilaterally.  CARDIOVASCULAR:  Normal S1, S2.  No murmurs rubs or gallops.  No significant lower extremity edema.  GASTROINTESTINAL: Abdomen appears unremarkable.  Nondistended  LYMPHATIC:  No cervical, supraclavicular lymphadenopathy.  NEURO: AAO x 3, No focal deficits.  Appears to have equal strength all 4 extremities.  BREAST: No abnormal masses/lumps palpated in bilateral breasts or axilla  MUSCULOSKELETAL:  Unremarkable digits/nails.  No cyanosis or clubbing.  No apparent " joint deformities. A ~10-15 cm hard palpable swelling noted postero-lateral to the right knee  SKIN:  Warm.  No rashes.  PSYCHIATRIC:  Normal judgment and insight.  Normal mood and affect.     RECENT LABS:        Lab on 08/31/2023   Component Date Value Ref Range Status    Glucose 08/31/2023 88  65 - 99 mg/dL Final    BUN 08/31/2023 9  6 - 20 mg/dL Final    Creatinine 08/31/2023 1.00  0.60 - 1.10 mg/dL Final    Sodium 08/31/2023 138  136 - 145 mmol/L Final    Potassium 08/31/2023 4.1  3.5 - 5.2 mmol/L Final    Chloride 08/31/2023 100  98 - 107 mmol/L Final    CO2 08/31/2023 22.0  22.0 - 29.0 mmol/L Final    Calcium 08/31/2023 8.9  8.6 - 10.5 mg/dL Final    Total Protein 08/31/2023 6.7  6.0 - 8.5 g/dL Final    Albumin 08/31/2023 4.3  3.5 - 5.2 g/dL Final    ALT (SGPT) 08/31/2023 13  1 - 33 U/L Final    AST (SGOT) 08/31/2023 22  1 - 32 U/L Final    Alkaline Phosphatase 08/31/2023 53  39 - 117 U/L Final    Total Bilirubin 08/31/2023 0.7  0.0 - 1.2 mg/dL Final    Globulin 08/31/2023 2.4  gm/dL Final    A/G Ratio 08/31/2023 1.8  g/dL Final    BUN/Creatinine Ratio 08/31/2023 9.0  7.0 - 25.0 Final    Anion Gap 08/31/2023 16.0 (H)  5.0 - 15.0 mmol/L Final    eGFR 08/31/2023 77.4  >60.0 mL/min/1.73 Final    WBC 08/31/2023 7.72  3.40 - 10.80 10*3/mm3 Final    RBC 08/31/2023 4.54  3.77 - 5.28 10*6/mm3 Final    Hemoglobin 08/31/2023 13.6  12.0 - 15.9 g/dL Final    Hematocrit 08/31/2023 39.0  34.0 - 46.6 % Final    MCV 08/31/2023 85.9  79.0 - 97.0 fL Final    MCH 08/31/2023 30.0  26.6 - 33.0 pg Final    MCHC 08/31/2023 34.9  31.5 - 35.7 g/dL Final    RDW 08/31/2023 12.9  12.3 - 15.4 % Final    RDW-SD 08/31/2023 39.8  37.0 - 54.0 fl Final    MPV 08/31/2023 9.7  6.0 - 12.0 fL Final    Platelets 08/31/2023 247  140 - 450 10*3/mm3 Final    Neutrophil % 08/31/2023 52.4  42.7 - 76.0 % Final    Lymphocyte % 08/31/2023 38.7  19.6 - 45.3 % Final    Monocyte % 08/31/2023 6.9  5.0 - 12.0 % Final    Eosinophil % 08/31/2023 1.4  0.3 - 6.2 %  Final    Basophil % 08/31/2023 0.3  0.0 - 1.5 % Final    Immature Grans % 08/31/2023 0.3  0.0 - 0.5 % Final    Neutrophils, Absolute 08/31/2023 4.05  1.70 - 7.00 10*3/mm3 Final    Lymphocytes, Absolute 08/31/2023 2.99  0.70 - 3.10 10*3/mm3 Final    Monocytes, Absolute 08/31/2023 0.53  0.10 - 0.90 10*3/mm3 Final    Eosinophils, Absolute 08/31/2023 0.11  0.00 - 0.40 10*3/mm3 Final    Basophils, Absolute 08/31/2023 0.02  0.00 - 0.20 10*3/mm3 Final    Immature Grans, Absolute 08/31/2023 0.02  0.00 - 0.05 10*3/mm3 Final    nRBC 08/31/2023 0.0  0.0 - 0.2 /100 WBC Final       ASSESSMENT:   is a pleasant 33 y/o WF with medical history significant for asthma & depression who comes for 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     # Right knee desmoid tumor:  Pt had first noted a swelling behind her right knee in December 2021. The swelling was progressive and became painful with knee flexion.   A MRI right knee done 3/11/22 demonstrated a 10 x 3.5 x 4.4 cm enhancing soft tissue mass lesion along the posterolateral right knee interposed between the distal biceps femoris and the lateral gastrocnemius muscles. The mass appears to infiltrate or arise from the distal biceps femoris. The lesion displaces and closely abuts the common peroneal nerve.   Patient was seen by , ortho - who obtained a biopsy of the mass on 3/23/22. 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    recommended against upfront resection at this time & referred to this clinic to discuss systemic therapy options.   I discussed the natural history of desmoid tumors including high rates of local recurrence (~ 50% cases) , specially R1 resection. Also discussed ~ 25% cases have spontaneous regression. Discussed role of systemic therapy options with TKIs and tamoxifen/NSAIDS.   Patient was then seen by Dr. Solis, surgical oncology with the [facility].  Plan at that  time was made for surveillance.  Patient then became pregnant, hence plan for any systemic treatment was deferred until delivery.  Patient delivered her baby boy on 3/9/2023.  A MRI of her right knee performed 1/29/2023, which noted significant increase in size of the right knee posterolateral mass, now measuring 8 x 6 x 18 cm.  3/23/2023: Patient seen back in the clinic today.  The right knee posterolateral mass has significantly increased since last evaluation.  Patient reports of discomfort and numbness around the knee.  She has difficulty with walking and bending her knees.  Reviewed treatment options with tamoxifen + sulindac vs a TKI.  Given recent delivery and potential adverse effects related to TKI while managing 3 young children at home, will not start TKI at this time and consider it down the line. As this tumor appears to be driven/propagated by estrogen and recent pregnancy, plan made to start treatment with tamoxifen 20 mg daily and sulindac 300 mg daily.  I reviewed the risks associated with tamoxifen and sulindac, including increased risk of blood clots, gastritis/PUD.  Patient is agreeable to proceed with the treatment.   Patient started taking tamoxifen/sulindac on 4/17/23.  5/19/2023: Seen for a follow-up.  Patient has been compliant with tamoxifen and sulindac.  Patient reports of no significant improvement in her symptoms and thinks the appear to be slightly worse than last month.  Discussed plan to switch treatment to pazopanib 800 mg daily.  Follow-up in 4 weeks or sooner if needed.  End of May 2023: Started Pazopanib 800 mg daily. Developed GI AEs. Had to be hospitalized in early June 2023. Dose resumed at 200 mg daily.   6/16/23: Doing well on reduced dose pazopanib. Discussed plan to continue pazopanib 200 mg for another 2 weeks. If doing well, increase to 400 mg daily.   6/30/2023: Continuing on pazopanib 200 mg daily.  Tolerating well.  Will increase dose to 400 mg daily.   8/31/2023:  Doing well on pazopanib 400 mg daily.  Discussed plan to repeat MRI in 1-2 weeks time.  Follow-up in 4 weeks or sooner if needed.    # Unintentional weight loss: Cause unclear.  CT C/A/P performed 4/26/22 did not show any major abnormalities.  It did show a 1 cm right outer breast lesion, likely benign.  Patient was encouraged to increase her nutritional intake.  Weight today has increased to 171 pounds.    #Right breast lesion:   US right breast performed 1/9/2023 noted a 1.1 cm probable benign fibroadenoma at 8 o'clock position, 4 cm from the nipple.  6 months sonographic follow-up is recommended.   US breast from 6/9/23 noted stable 1 cm probable benign fibroadenoma in the right breast at 8'o clock position.   Short-term sonographic follow-up at 6-9 month intervals is recommended through 01/09/2025 to demonstrate 2-year stability    # Encounter for genetic & chromosomal abnormalities:   With her young age, family history (aunt) of breast cancer & desmoid tumors association with FAP and gardners syndrome,an [employer] germline testing was performed. 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   Was seen by genetic counselor 8/2/2022.  Genetic testing was negative for known deleterious mutations by sequencing and rearrangement testing of the 84 genes on the Multi-cancer panel including the APC gene.    PLAN:   Continue pazopanib 400 mg daily.    MRI right knee in 1-2 weeks  Right breast ultrasound will be due in Dec 2023  F/u in 4 or weeks with MD or sooner if needed.    Orders Placed This Encounter   Procedures    MRI Knee Right With & Without Contrast     Standing Status:   Future     Standing Expiration Date:   8/31/2024     Order Specific Question:   Release to patient     Answer:   Routine Release [[id number]]    Comprehensive Metabolic Panel     Standing Status:   Future     Standing Expiration Date:   8/30/2024     Order Specific  Question:   Release to patient     Answer:   Routine Release [[id number]]    CBC & Differential     Standing Status:   Future     Standing Expiration Date:   8/30/2024     Order Specific Question:   Manual Differential     Answer:   No     Order Specific Question:   Release to patient     Answer:   Routine Release [[id number]]   Total time spent during this patient encounter is 45 minutes. The total time spent with the patient includes at least one or more of the following: preparing to see the patient by reviewing of tests, prior notes or other relevant information, performing appropriate independent examination & evaluation, counseling, ordering of medications, tests or procedures, communicating with other healthcare professionals, when appropriate to coordinate care, documenting clinic information in the electronic medical records or other health records, independently interpreting results of tests and communicating the results to the patient/family or caregiver.

## 2023-09-01 ENCOUNTER — SPECIALTY PHARMACY (OUTPATIENT)
Dept: PHARMACY | Facility: HOSPITAL | Age: 33
End: 2023-09-01
Payer: COMMERCIAL

## 2023-09-10 ENCOUNTER — HOSPITAL ENCOUNTER (OUTPATIENT)
Dept: MRI IMAGING | Facility: HOSPITAL | Age: 33
Discharge: HOME | End: 2023-09-10
Admitting: INTERNAL MEDICINE
Payer: COMMERCIAL

## 2023-09-10 DIAGNOSIS — D48.1 DESMOID TUMOR: ICD-10-CM

## 2023-09-10 PROCEDURE — 73723 MRI JOINT LWR EXTR W/O&W/DYE: CPT

## 2023-09-10 PROCEDURE — 0 GADOBENATE DIMEGLUMINE 529 MG/ML SOLUTION: Performed by: INTERNAL MEDICINE

## 2023-09-10 PROCEDURE — A9577 INJ MULTIHANCE: HCPCS | Performed by: INTERNAL MEDICINE

## 2023-09-10 RX ADMIN — GADOBENATE DIMEGLUMINE 16 ML: 529 INJECTION, SOLUTION INTRAVENOUS at 09:50

## 2023-09-11 ENCOUNTER — SPECIALTY PHARMACY (OUTPATIENT)
Dept: PHARMACY | Facility: HOSPITAL | Age: 33
End: 2023-09-11
Payer: COMMERCIAL

## 2023-09-11 NOTE — PROGRESS NOTES
Specialty Pharmacy Refill Coordination Note     Beena is a 33 y.o. female contacted today regarding refills of  Votrient specialty medication(s).    Reviewed and verified with patient:       Specialty medication(s) and dose(s) confirmed: yes    Refill Questions      Flowsheet Row Most Recent Value   Changes to allergies? No   Changes to medications? No   New conditions since last clinic visit No   Unplanned office visit, urgent care, ED, or hospital admission in the last 4 weeks  No   How does patient/caregiver feel medication is working? Good   Financial problems or insurance changes  No   Since the previous refill, were any specialty medication doses or scheduled injections missed or delayed?  No   Does this patient require a clinical escalation to a pharmacist? No            Delivery Questions      Flowsheet Row Most Recent Value   Delivery method Other (Comment)  [shp-sig required-to the home on 9/12 to arrive 9/13. Address confirmed. $0 co-pay.]   Delivery address correct? Yes   Delivery phone number [phone]   Preferred delivery time? Anytime   Number of medications in delivery 1   Medication being filled and delivered VOTRIENT   Doses left of specialty medications 7   Is there any medication that is due not being filled? No   Supplies needed? No supplies needed   Cooler needed? No   Do any medications need mixed or dated? No   Copay form of payment Payment plan already set up   Additional comments N/A   Questions or concerns for the pharmacist? No   Explain any questions or concerns for the pharmacist N/A   Are any medications first time fills? No                   Follow-up: 3 week(s)     Sarah Bryson, Pharmacy Technician  Specialty Pharmacy Technician

## 2023-10-06 ENCOUNTER — DOCUMENTATION (OUTPATIENT)
Dept: PHARMACY | Facility: HOSPITAL | Age: 33
End: 2023-10-06
Payer: COMMERCIAL

## 2023-10-06 NOTE — PROGRESS NOTES
I contacted Ms. Vincent to coordinate a refill shipment of Votrient. She asked that I call her back in 1 week to discuss scheduling the shipment.     Sarah Bryson - Care Coordinator   10/6/2023  16:04 EDT

## 2023-10-09 ENCOUNTER — SPECIALTY PHARMACY (OUTPATIENT)
Dept: PHARMACY | Facility: HOSPITAL | Age: 33
End: 2023-10-09
Payer: COMMERCIAL

## 2023-10-20 ENCOUNTER — SPECIALTY PHARMACY (OUTPATIENT)
Dept: PHARMACY | Facility: HOSPITAL | Age: 33
End: 2023-10-20
Payer: COMMERCIAL

## 2023-10-20 NOTE — PROGRESS NOTES
Specialty Pharmacy Refill Coordination Note     Beena is a 33 y.o. female contacted today regarding refills of  Votrient specialty medication(s).    Reviewed and verified with patient:       Specialty medication(s) and dose(s) confirmed: yes    Refill Questions      Flowsheet Row Most Recent Value   Changes to allergies? No   Changes to medications? No   New conditions since last clinic visit No   Unplanned office visit, urgent care, ED, or hospital admission in the last 4 weeks  No   How does patient/caregiver feel medication is working? Good   Financial problems or insurance changes  No   Since the previous refill, were any specialty medication doses or scheduled injections missed or delayed?  No   Does this patient require a clinical escalation to a pharmacist? No            Delivery Questions      Flowsheet Row Most Recent Value   Delivery method Other (Comment)  [shp sig required to the home on 10/23 to arrive 10/24. Address confirmed. $0 co-pay.]   Delivery address correct? Yes   Delivery phone number [phone]   Preferred delivery time? Anytime   Number of medications in delivery 1   Medication(s) being filled and delivered Pazopanib Hcl   Doses left of specialty medications Patient is unsure   Is there any medication that is due not being filled? No   Supplies needed? No supplies needed   Cooler needed? No   Do any medications need mixed or dated? No   Copay form of payment Payment plan already set up   Additional comments N/A   Questions or concerns for the pharmacist? No   Explain any questions or concerns for the pharmacist N/A   Are any medications first time fills? No                   Follow-up: 3 week(s)     Sarah Bryson, Pharmacy Technician  Specialty Pharmacy Technician

## 2023-11-03 ENCOUNTER — TELEPHONE (OUTPATIENT)
Dept: ONCOLOGY | Facility: CLINIC | Age: 33
End: 2023-11-03
Payer: COMMERCIAL

## 2023-11-03 NOTE — PROGRESS NOTES
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 provider found  RECORDS OBTAINED:  Records of the patients history including those from the electronic medical record were reviewed and summarized in detail.    HISTORY OF PRESENT ILLNESS:    The patient is a 33 y.o. year old female with medical history significant for asthma & depression who had first noted a swelling behind her right knee in December 2021. The swelling was progressive and became painful with knee flexion. A MRI right knee done 3/11/22 demonstrated a 10 x 3.5 x 4.4 cm enhancing soft tissue mass lesion along the posterolateral right knee interposed between the distal biceps femoris and the lateral gastrocnemius muscles. The mass appears to infiltrate or arise from the distal biceps femoris. The lesion displaces and closely abuts the common peroneal nerve.     Patient was seen by , ortho - who obtained a biopsy of the mass on 3/23/22. 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      recommended against upfront resection at this time & referred to this clinic to discuss systemic therapy options.     Patient is accompanied by her mother. C/o pain & swelling behind right knee. Denies any association with menstrual cycles. She does report of > 20 lbs unintentional weight loss. Denies any abdominal pain, nausea or vomiting. Denies any other swelling anywhere. No family history of FAP, colon cancer or any malignancies.     A CT chest abdomen and pelvis from 4/26/2020 showed an asymmetric 1 cm nodule in the outer quadrant of right breast, favoring benign etiology.  No other soft tissue masses noted in this patient with history of desmoid fibromatosis.    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     Patient was lost to follow-up after the June 2022 visit.  She was seen again in March 2023.  Patient states she had become pregnant and delivered a baby boy on 3/9/2023.  Normal vaginal delivery.  Normal postpartum period.     She did get an MRI of her right knee performed 1/29/2023, which noted significant increase in size of the right knee posterolateral mass, now measuring 8 x 6 x 18 cm.    4/13/2023: MRI Right knee demonstrates mild increase in size compared to January 2023.  4/17/2023: Started on tamoxifen 20 mg daily and sulindac 300 mg daily. Stopped in May d/t poor response.    End of May 2023: Started Pazopanib 800 mg daily. Developed GI AEs. Had to be hospitalized in early June 2023. Dose resumed at 200 mg daily.     6/30/2023: pazopanib increased to 400 mg daily.      INTERIM HISTORY:  The patient returns today for follow-up.  She continues on pazopanib 400 mg daily.  MRI of the right knee on 9/10/2023 will be reviewed with her today.  She continues with occasional diarrhea, typically 2-3 days a week not requiring any medications.  She also continues to experience discoloration to her hair.  She has started to notice some worsening cramping in her right foot, and feels that her tumor has increased in size.  Appetite adequate, weight stable.  Denies fever or chills.  Denies nausea or vomiting.    Past Medical History:   Diagnosis Date    Asthma     inhaler as needed    Cervical dysplasia     Depression     Desmoid tumor 2022    back of right knee- pt desires to proceed with treatments after delivery    Gestational diabetes     Gestational hypertension 2020    Iron deficiency anemia     Preeclampsia 2012     Past Surgical History:   Procedure Laterality Date    APPENDECTOMY N/A 10/1/2021    Procedure: APPENDECTOMY LAPAROSCOPIC;  Surgeon: Ahmet Dong Jr., MD;  Location: [facility] OR;  Service: General;  Laterality: N/A;    KNEE ARTHROSCOPY      AGE 6    LEEP N/A 4/25/2019  "   Procedure: LOOP ELECTROCAUTERY EXCISION PROCEDURE;  Surgeon: Arsh Ray MD;  Location: [facility];  Service: Obstetrics/Gynecology    SOFT TISSUE BIOPSY Right 3/23/2022    Procedure: BIOPSY SOFT TISSUE THIGH / KNEE;  Surgeon: Chris Randall MD;  Location: [facility] OR;  Service: Orthopedics;  Laterality: Right;    WISDOM TOOTH EXTRACTION         MEDICATIONS    Current Outpatient Medications:     albuterol sulfate  (90 Base) MCG/ACT inhaler, Inhale 2 puffs Every 6 (Six) Hours As Needed for Wheezing or Shortness of Air., Disp: 18 g, Rfl: 0    ibuprofen (ADVIL,MOTRIN) 600 MG tablet, Take 1 tablet by mouth Every 6 (Six) Hours As Needed for Mild Pain, Disp: 50 tablet, Rfl: 3    ondansetron ODT (ZOFRAN-ODT) 8 MG disintegrating tablet, Place 1 tablet on the tongue Every 8 (Eight) Hours As Needed for Nausea or Vomiting for up to 60 doses., Disp: 60 tablet, Rfl: 5    PAZOPanib (Votrient) 200 MG chemo tablet, Take 2 tablets by mouth Daily., Disp: 60 tablet, Rfl: 5    prochlorperazine (COMPAZINE) 10 MG tablet, Take 1 tablet by mouth Every 6 (Six) Hours As Needed for Nausea or Vomiting., Disp: 30 tablet, Rfl: 3    Blood Glucose Monitoring Suppl (FreeStyle Lite) w/Device kit, USE 1 SEVEN TIMES DAILY (Patient not taking: Reported on 11/6/2023), Disp: , Rfl:     glucose monitor monitoring kit, To check blood sugars 7x daily (Patient not taking: Reported on 11/6/2023), Disp: 1 each, Rfl: 0    Lancets (freestyle) lancets, Pt to check blood sugar 7xs daily (Patient not taking: Reported on 11/6/2023), Disp: 15 each, Rfl: 12    methylPREDNISolone (MEDROL) 4 MG dose pack, Take as directed on package instructions. (Patient not taking: Reported on 11/6/2023), Disp: 1 each, Rfl: 0  All current medications reviewed and updated as appropriate for today's encounter on 11/06/2023.     ALLERGIES:     Allergies   Allergen Reactions    Penicillins Nausea And Vomiting    Adhesive Tape Rash     \"SURGICAL TAPE\"  AS A CHILD " "      SOCIAL HISTORY:       Social History     Socioeconomic History    Marital status: Single   Tobacco Use    Smoking status: Former     Packs/day: 0.50     Years: 4.00     Additional pack years: 0.00     Total pack years: 2.00     Types: Cigarettes     Quit date: 2/8/2020     Years since quitting: 3.7     Passive exposure: Never    Smokeless tobacco: Never   Vaping Use    Vaping Use: Former    Substances: Nicotine    Devices: Disposable   Substance and Sexual Activity    Alcohol use: Not Currently     Comment: socially    Drug use: Not Currently     Types: Marijuana     Comment: daily, did not smoke during pregnancy    Sexual activity: Not Currently         FAMILY HISTORY:  Family History   Problem Relation Age of Onset    Diabetes Father     Arthritis Maternal Grandmother     Lung cancer Maternal Grandfather     Heart attack Maternal Grandfather     Heart disease Maternal Grandfather     Stroke Maternal Grandfather     Hyperlipidemia Maternal Grandfather     Malig Hyperthermia Neg Hx     Breast cancer Neg Hx     Ovarian cancer Neg Hx     Uterine cancer Neg Hx     Colon cancer Neg Hx        REVIEW OF SYSTEMS:  As per HPI       Vitals:    11/06/23 1137   BP: 122/85   Pulse: 62   Resp: 16   Temp: 97.3 °F (36.3 °C)   TempSrc: Temporal   SpO2: 95%   Weight: 84.2 kg (185 lb 11.2 oz)   Height: 177.8 cm (70\")   PainSc: 0-No pain               11/6/2023    11:39 AM   Current Status   ECOG score 0      PHYSICAL EXAM:    CONSTITUTIONAL:  Vital signs reviewed.  No distress, looks comfortable.  EYES:  Conjunctiva and lids unremarkable.   EARS,NOSE,MOUTH,THROAT:  Ears and nose appear unremarkable.    RESPIRATORY:  Normal respiratory effort.  Lungs clear to auscultation bilaterally.  CARDIOVASCULAR:  Normal S1, S2.  No murmurs rubs or gallops.  No significant lower extremity edema.  GASTROINTESTINAL: Abdomen appears unremarkable.  Nondistended  LYMPHATIC:  No cervical, supraclavicular lymphadenopathy.  NEURO: AAO x 3, No focal " deficits.  Appears to have equal strength all 4 extremities.  BREAST: No abnormal masses/lumps palpated in bilateral breasts or axilla  MUSCULOSKELETAL:  Unremarkable digits/nails.  No cyanosis or clubbing.  No apparent joint deformities. A ~10-17 cm hard palpable swelling noted postero-lateral to the right knee  SKIN:  Warm.  No rashes.  PSYCHIATRIC:  Normal judgment and insight.  Normal mood and affect.     RECENT LABS:        Lab on 11/06/2023   Component Date Value Ref Range Status    WBC 11/06/2023 5.64  3.40 - 10.80 10*3/mm3 Final    RBC 11/06/2023 4.30  3.77 - 5.28 10*6/mm3 Final    Hemoglobin 11/06/2023 13.1  12.0 - 15.9 g/dL Final    Hematocrit 11/06/2023 37.8  34.0 - 46.6 % Final    MCV 11/06/2023 87.9  79.0 - 97.0 fL Final    MCH 11/06/2023 30.5  26.6 - 33.0 pg Final    MCHC 11/06/2023 34.7  31.5 - 35.7 g/dL Final    RDW 11/06/2023 13.4  12.3 - 15.4 % Final    RDW-SD 11/06/2023 42.4  37.0 - 54.0 fl Final    MPV 11/06/2023 10.1  6.0 - 12.0 fL Final    Platelets 11/06/2023 181  140 - 450 10*3/mm3 Final    Neutrophil % 11/06/2023 43.9  42.7 - 76.0 % Final    Lymphocyte % 11/06/2023 44.1  19.6 - 45.3 % Final    Monocyte % 11/06/2023 7.8  5.0 - 12.0 % Final    Eosinophil % 11/06/2023 3.5  0.3 - 6.2 % Final    Basophil % 11/06/2023 0.5  0.0 - 1.5 % Final    Immature Grans % 11/06/2023 0.2  0.0 - 0.5 % Final    Neutrophils, Absolute 11/06/2023 2.47  1.70 - 7.00 10*3/mm3 Final    Lymphocytes, Absolute 11/06/2023 2.49  0.70 - 3.10 10*3/mm3 Final    Monocytes, Absolute 11/06/2023 0.44  0.10 - 0.90 10*3/mm3 Final    Eosinophils, Absolute 11/06/2023 0.20  0.00 - 0.40 10*3/mm3 Final    Basophils, Absolute 11/06/2023 0.03  0.00 - 0.20 10*3/mm3 Final    Immature Grans, Absolute 11/06/2023 0.01  0.00 - 0.05 10*3/mm3 Final    nRBC 11/06/2023 0.0  0.0 - 0.2 /100 WBC Final       ASSESSMENT:   is a pleasant 33 y/o WF with medical history significant for asthma & depression who comes for right knee desmoid  fibromatosis evaluation & management.     # Right knee desmoid tumor:  Pt had first noted a swelling behind her right knee in December 2021. The swelling was progressive and became painful with knee flexion.   A MRI right knee done 3/11/22 demonstrated a 10 x 3.5 x 4.4 cm enhancing soft tissue mass lesion along the posterolateral right knee interposed between the distal biceps femoris and the lateral gastrocnemius muscles. The mass appears to infiltrate or arise from the distal biceps femoris. The lesion displaces and closely abuts the common peroneal nerve.   Patient was seen by , ortho - who obtained a biopsy of the mass on 3/23/22. 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    recommended against upfront resection at this time & referred to this clinic to discuss systemic therapy options.   I discussed the natural history of desmoid tumors including high rates of local recurrence (~ 50% cases) , specially R1 resection. Also discussed ~ 25% cases have spontaneous regression. Discussed role of systemic therapy options with TKIs and tamoxifen/NSAIDS.   Patient was then seen by Dr. Solis, surgical oncology with the [facility].  Plan at that time was made for surveillance.  Patient then became pregnant, hence plan for any systemic treatment was deferred until delivery.  Patient delivered her baby boy on 3/9/2023.  A MRI of her right knee performed 1/29/2023, which noted significant increase in size of the right knee posterolateral mass, now measuring 8 x 6 x 18 cm.  3/23/2023: Patient seen back in the clinic today.  The right knee posterolateral mass has significantly increased since last evaluation.  Patient reports of discomfort and numbness around the knee.  She has difficulty with walking and bending her knees.  Reviewed treatment options with tamoxifen + sulindac vs a TKI.  Given recent delivery and potential adverse effects related to  TKI while managing 3 young children at home, will not start TKI at this time and consider it down the line. As this tumor appears to be driven/propagated by estrogen and recent pregnancy, plan made to start treatment with tamoxifen 20 mg daily and sulindac 300 mg daily.  I reviewed the risks associated with tamoxifen and sulindac, including increased risk of blood clots, gastritis/PUD.  Patient is agreeable to proceed with the treatment.   Patient started taking tamoxifen/sulindac on 4/17/23.  5/19/2023: Seen for a follow-up.  Patient has been compliant with tamoxifen and sulindac.  Patient reports of no significant improvement in her symptoms and thinks the appear to be slightly worse than last month.  Discussed plan to switch treatment to pazopanib 800 mg daily.  Follow-up in 4 weeks or sooner if needed.  End of May 2023: Started Pazopanib 800 mg daily. Developed GI AEs. Had to be hospitalized in early June 2023. Dose resumed at 200 mg daily.   6/16/23: Doing well on reduced dose pazopanib. Discussed plan to continue pazopanib 200 mg for another 2 weeks. If doing well, increase to 400 mg daily.   6/30/2023: Continuing on pazopanib 200 mg daily.  Tolerating well.  Will increase dose to 400 mg daily.   9/10/2023: MRI right knee continues to exhibit mild increase in size now measuring 7.4 x 10.2 x 19.2 cm, previously 8.8 x 6.3 x 18.2 cm.   11/6/2023: Continues pazopanib 400 mg daily.  Tolerating well aside from hair discoloration and occasional diarrhea.  Unfortunately MRI is showing increase in the mass in the right knee, detailed above.  Reviewed with Dr. Molina and he recommends adjusting treatment to sorafenib 400 mg daily versus increasing pazopanib to 600 mg daily.  Patient would like to increase pazopanib to 600 mg daily and be scheduled for education session with [facility] pharmacy to review Sorafenib prior to making decision.  Plan to repeat imaging 8 to 10 weeks.    # Unintentional weight loss: Cause unclear.  CT  C/A/P performed 4/26/22 did not show any major abnormalities.  It did show a 1 cm right outer breast lesion, likely benign.  Patient was encouraged to increase her nutritional intake.  Weight today has increased to 185 lbs.    #Right breast lesion:   US right breast performed 1/9/2023 noted a 1.1 cm probable benign fibroadenoma at 8 o'clock position, 4 cm from the nipple.  6 months sonographic follow-up is recommended.   US breast from 6/9/23 noted stable 1 cm probable benign fibroadenoma in the right breast at 8'o clock position.   Short-term sonographic follow-up at 6-9 month intervals is recommended through 01/09/2025 to demonstrate 2-year stability  Due December 2023, ordered today.     # Encounter for genetic & chromosomal abnormalities:   With her young age, family history (aunt) of breast cancer & desmoid tumors association with FAP and gardners syndrome,an [employer] germline testing was performed. 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   Was seen by genetic counselor 8/2/2022.  Genetic testing was negative for known deleterious mutations by sequencing and rearrangement testing of the 84 genes on the Multi-cancer panel including the APC gene.    PLAN:   Increase pazopanib from 400 mg daily to 600 mg daily.  Schedule education with [facility] for Sorafenib 400 mg daily.  Patient will also need needs assessment completed with NP.  Right breast ultrasound will be due in Dec 2023.  Ordered today.   Return in 1-2 weeks for follow-up with Dr. Molina to discuss further plan of care.    Orders Placed This Encounter   Procedures    US Breast Right Limited     Standing Status:   Future     Standing Expiration Date:   11/6/2024     Order Specific Question:   Reason for Exam:     Answer:   follow up fibroadenoma on previous ultrasound     Order Specific Question:   Release to patient     Answer:   Routine Release [[id number]]     The patient is  on high risk medication that requires close monitoring for toxicity.  The patient was reviewed with Dr. Molina who is in agreement with today's plan of care.    I spent 55 minutes caring for Beena on this date of service. This time includes time spent by me in the following activities: preparing for the visit, reviewing tests, obtaining and/or reviewing a separately obtained history, performing a medically appropriate examination and/or evaluation, counseling and educating the patient/family/caregiver, ordering medications, tests, or procedures, documenting information in the medical record, and care coordination.       Yohana Hanna, APRN  11/06/23

## 2023-11-03 NOTE — TELEPHONE ENCOUNTER
"  Caller: Tim Vincent    Relationship: Self    Best call back number: [phone]    What is the best time to reach you: ANY    Who are you requesting to speak with (clinical staff, provider,  specific staff member): SCHEDULING        What was the call regarding: PATIENT CALLED TO SCHEDULE APPTS THAT WERE MISSED ON 10/23/23 FOR LAB, SPECIALTY PHARMACY, AND FOLLOW UP WITH DR CONROY. TIM SAID SHE CAN COME IN ANY DAY BUT THE APPT CAN'T BE \"ANY LATER THAN 12 PM\".     Is it okay if the provider responds through MyChart: YES.PREFERRED          "

## 2023-11-06 ENCOUNTER — LAB (OUTPATIENT)
Dept: LAB | Facility: HOSPITAL | Age: 33
End: 2023-11-06
Payer: COMMERCIAL

## 2023-11-06 ENCOUNTER — OFFICE VISIT (OUTPATIENT)
Dept: ONCOLOGY | Facility: CLINIC | Age: 33
End: 2023-11-06
Payer: COMMERCIAL

## 2023-11-06 ENCOUNTER — SPECIALTY PHARMACY (OUTPATIENT)
Dept: ONCOLOGY | Facility: HOSPITAL | Age: 33
End: 2023-11-06
Payer: COMMERCIAL

## 2023-11-06 VITALS
TEMPERATURE: 97.3 F | WEIGHT: 185.7 LBS | OXYGEN SATURATION: 95 % | DIASTOLIC BLOOD PRESSURE: 85 MMHG | SYSTOLIC BLOOD PRESSURE: 122 MMHG | RESPIRATION RATE: 16 BRPM | BODY MASS INDEX: 26.58 KG/M2 | HEART RATE: 62 BPM | HEIGHT: 70 IN

## 2023-11-06 DIAGNOSIS — D48.119 DESMOID TUMOR: Primary | ICD-10-CM

## 2023-11-06 DIAGNOSIS — N63.10 MASS OF RIGHT BREAST, UNSPECIFIED QUADRANT: ICD-10-CM

## 2023-11-06 DIAGNOSIS — Z79.899 HIGH RISK MEDICATION USE: ICD-10-CM

## 2023-11-06 DIAGNOSIS — D48.119 DESMOID TUMOR: ICD-10-CM

## 2023-11-06 LAB
ALBUMIN SERPL-MCNC: 4 G/DL (ref 3.5–5.2)
ALBUMIN/GLOB SERPL: 1.8 G/DL
ALP SERPL-CCNC: 66 U/L (ref 39–117)
ALT SERPL W P-5'-P-CCNC: 14 U/L (ref 1–33)
ANION GAP SERPL CALCULATED.3IONS-SCNC: 14.1 MMOL/L (ref 5–15)
AST SERPL-CCNC: 24 U/L (ref 1–32)
BASOPHILS # BLD AUTO: 0.03 10*3/MM3 (ref 0–0.2)
BASOPHILS NFR BLD AUTO: 0.5 % (ref 0–1.5)
BILIRUB SERPL-MCNC: 0.6 MG/DL (ref 0–1.2)
BUN SERPL-MCNC: 9 MG/DL (ref 6–20)
BUN/CREAT SERPL: 10.3 (ref 7–25)
CALCIUM SPEC-SCNC: 8.6 MG/DL (ref 8.6–10.5)
CHLORIDE SERPL-SCNC: 103 MMOL/L (ref 98–107)
CO2 SERPL-SCNC: 24.9 MMOL/L (ref 22–29)
CREAT SERPL-MCNC: 0.87 MG/DL (ref 0.6–1.1)
DEPRECATED RDW RBC AUTO: 42.4 FL (ref 37–54)
EGFRCR SERPLBLD CKD-EPI 2021: 91.5 ML/MIN/1.73
EOSINOPHIL # BLD AUTO: 0.2 10*3/MM3 (ref 0–0.4)
EOSINOPHIL NFR BLD AUTO: 3.5 % (ref 0.3–6.2)
ERYTHROCYTE [DISTWIDTH] IN BLOOD BY AUTOMATED COUNT: 13.4 % (ref 12.3–15.4)
GLOBULIN UR ELPH-MCNC: 2.2 GM/DL
GLUCOSE SERPL-MCNC: 84 MG/DL (ref 65–99)
HCT VFR BLD AUTO: 37.8 % (ref 34–46.6)
HGB BLD-MCNC: 13.1 G/DL (ref 12–15.9)
IMM GRANULOCYTES # BLD AUTO: 0.01 10*3/MM3 (ref 0–0.05)
IMM GRANULOCYTES NFR BLD AUTO: 0.2 % (ref 0–0.5)
LYMPHOCYTES # BLD AUTO: 2.49 10*3/MM3 (ref 0.7–3.1)
LYMPHOCYTES NFR BLD AUTO: 44.1 % (ref 19.6–45.3)
MCH RBC QN AUTO: 30.5 PG (ref 26.6–33)
MCHC RBC AUTO-ENTMCNC: 34.7 G/DL (ref 31.5–35.7)
MCV RBC AUTO: 87.9 FL (ref 79–97)
MONOCYTES # BLD AUTO: 0.44 10*3/MM3 (ref 0.1–0.9)
MONOCYTES NFR BLD AUTO: 7.8 % (ref 5–12)
NEUTROPHILS NFR BLD AUTO: 2.47 10*3/MM3 (ref 1.7–7)
NEUTROPHILS NFR BLD AUTO: 43.9 % (ref 42.7–76)
NRBC BLD AUTO-RTO: 0 /100 WBC (ref 0–0.2)
PLATELET # BLD AUTO: 181 10*3/MM3 (ref 140–450)
PMV BLD AUTO: 10.1 FL (ref 6–12)
POTASSIUM SERPL-SCNC: 4.4 MMOL/L (ref 3.5–5.2)
PROT SERPL-MCNC: 6.2 G/DL (ref 6–8.5)
RBC # BLD AUTO: 4.3 10*6/MM3 (ref 3.77–5.28)
SODIUM SERPL-SCNC: 142 MMOL/L (ref 136–145)
WBC NRBC COR # BLD: 5.64 10*3/MM3 (ref 3.4–10.8)

## 2023-11-06 PROCEDURE — 36415 COLL VENOUS BLD VENIPUNCTURE: CPT

## 2023-11-06 PROCEDURE — 80053 COMPREHEN METABOLIC PANEL: CPT

## 2023-11-06 PROCEDURE — 85025 COMPLETE CBC W/AUTO DIFF WBC: CPT

## 2023-11-06 NOTE — PROGRESS NOTES
Specialty Pharmacy Patient Management Program  Oncology 6-Month Clinical Assessment       Beena Vincent is a 33 y.o. female with desmoid knee tumor seen today to assess adherence and side effects.    Reason for Outreach: Routine medication check-in .    Regimen: Votrient 400 mg po daily    Specialty Pharmacy Goal   Goals Addressed Today        Specialty Pharmacy General Goal      Control disease  23  no therapy change after  MRI results            Problem List   Problem list reviewed by Edith Chen RP on 2023 at 11:50 AM  Patient Active Problem List   Diagnosis Code    KYRA III (cervical intraepithelial neoplasia grade III) with severe dysplasia D06.9    Gestational HTN O13.9     (normal spontaneous vaginal delivery) O80    Asthma exacerbation J45.901    Anxiety with depression F41.8    Postpartum depression F53.0    Generalized anxiety disorder F41.1    History of anemia Z86.2    Acute appendicitis with localized peritonitis, without perforation, abscess, or gangrene K35.30    Soft tissue mass M79.89    Gestational diabetes O24.419    Desmoid tumor D48.119    Generalized abdominal pain R10.84    Leukocytosis D72.829       Medication Assessment for Specialty Medication(s):  Medication Assessment  Follow Up Clinical Assessment  Linked Medication(s) Assessed: Pazopanib Hcl  Therapeutic appropriateness: Appropriate  Medication tolerability: Patient reported common adverse drug reaction  Common ADR(s) experienced: Fatigue- she reported it is manageable for her.  Occasional diarrhea- she also reported this is manageable.  Medication plan: Continue therapy with normal follow-up  Quality of Life Improvement Scale: Moderately better  Comments on Quality of Life: She stated she is feeling much better than when she first initiated the medication.  Administration: as prescribed .   Patient can self administer medications: yes  Medication Follow-Up Plan: Next clinical assessment  Lab Review: The labs  listed below have been reviewed. No dose adjustments are needed for the oral specialty medication(s) based on the labs.    Lab Results   Component Value Date    GLUCOSE 88 08/31/2023    CALCIUM 8.9 08/31/2023     08/31/2023    K 4.1 08/31/2023    CO2 22.0 08/31/2023     08/31/2023    BUN 9 08/31/2023    CREATININE 1.00 08/31/2023    EGFRIFAFRI 86 10/08/2021    EGFRIFNONA 71 10/08/2021    BCR 9.0 08/31/2023    ANIONGAP 16.0 (H) 08/31/2023     Lab Results   Component Value Date    WBC 5.64 11/06/2023    RBC 4.30 11/06/2023    HGB 13.1 11/06/2023    HCT 37.8 11/06/2023    MCV 87.9 11/06/2023    MCH 30.5 11/06/2023    MCHC 34.7 11/06/2023    RDW 13.4 11/06/2023    RDWSD 42.4 11/06/2023    MPV 10.1 11/06/2023     11/06/2023    NEUTRORELPCT 43.9 11/06/2023    LYMPHORELPCT 44.1 11/06/2023    MONORELPCT 7.8 11/06/2023    EOSRELPCT 3.5 11/06/2023    BASORELPCT 0.5 11/06/2023    AUTOIGPER 0.2 11/06/2023    NEUTROABS 2.47 11/06/2023    LYMPHSABS 2.49 11/06/2023    MONOSABS 0.44 11/06/2023    EOSABS 0.20 11/06/2023    BASOSABS 0.03 11/06/2023    AUTOIGNUM 0.01 11/06/2023    NRBC 0.0 11/06/2023     Drug-drug interactions  Completed medication reconciliation today to assess for drug interactions. Patient denies starting or stopping any medications.    Assessed medication list for interactions, no significant drug interactions noted.   Advised patient to call the clinic if any new medications are started so we can assess for drug-drug interactions.  Drug-food interactions discussed: eating grapefruit and drinking grapefruit juice  Vaccines are coordinated by the patient's oncologist and primary care provider.    Medications   Medicines reviewed by Edith Chen RPH on 11/6/2023 at 11:49 AM  Prior to Admission medications    Medication Sig Start Date End Date Taking? Authorizing Provider   albuterol sulfate  (90 Base) MCG/ACT inhaler Inhale 2 puffs Every 6 (Six) Hours As Needed for Wheezing or Shortness  "of Air. 8/19/23   My Elena APRN   Blood Glucose Monitoring Suppl (FreeStyle Lite) w/Device kit USE 1 SEVEN TIMES DAILY  Patient not taking: Reported on 11/6/2023 2/6/23   Rufina Astudillo MD   glucose monitor monitoring kit To check blood sugars 7x daily  Patient not taking: Reported on 11/6/2023 2/6/23   Leona Harkins APRN   ibuprofen (ADVIL,MOTRIN) 600 MG tablet Take 1 tablet by mouth Every 6 (Six) Hours As Needed for Mild Pain 3/11/23   Mt Almazan MD   Lancets (freestyle) lancets Pt to check blood sugar 7xs daily  Patient not taking: Reported on 11/6/2023 2/6/23   Leona Harkins APRN   methylPREDNISolone (MEDROL) 4 MG dose pack Take as directed on package instructions.  Patient not taking: Reported on 11/6/2023 8/19/23   My Elena APRN   ondansetron ODT (ZOFRAN-ODT) 8 MG disintegrating tablet Place 1 tablet on the tongue Every 8 (Eight) Hours As Needed for Nausea or Vomiting for up to 60 doses. 6/12/23   Colin Molina MD   PAZOPanib (Votrient) 200 MG chemo tablet Take 2 tablets by mouth Daily. 6/30/23   Colin Molina MD   prochlorperazine (COMPAZINE) 10 MG tablet Take 1 tablet by mouth Every 6 (Six) Hours As Needed for Nausea or Vomiting. 6/8/23   Colin Molina MD       Allergies  Known allergies and reactions were discussed with the patient. The patient's chart has been reviewed for allergy information and updated as necessary.   Allergies   Allergen Reactions    Penicillins Nausea And Vomiting    Adhesive Tape Rash     \"SURGICAL TAPE\"  AS A CHILD         Allergies reviewed by Edith Chen [facility] on 11/6/2023 at 11:49 AM    Hospitalizations and Urgent Care Visits Since Last Assessment:  Unplanned hospitalizations or inpatient admissions: no  ED Visits: no  Urgent Office Visits: no    Adherence Assessment:  Adherence Questions  Linked Medication(s) Assessed: Pazopanib Hcl  On average, how many doses/injections does the patient miss per " month?: 0  What are the identified reasons for non-adherence or missed doses? : no problems identfied  What is the estimated medication adherence level?: %  Based on the patient/caregiver response and refill history, does this patient require an MTP to track adherence improvements?: no    Quality of Life Assessment:  Quality of Life Assessment  Quality of Life Improvement Scale: Moderately better  Comments on Quality of Life: She stated she is feeling much better than when she first initiated the medication.  -- Quality of Life: 8/10    Financial Assessment:  Medication availability/affordability: Patient has had no issues obtaining medication from pharmacy.    Attestation:  I attest the patient was actively involved in and has agreed to the above plan of care. If the prescribed therapy is at any point deemed not appropriate based on the current or future assessments, a consultation will be initiated with the patient's specialty care provider to determine the best course of action. The revised plan of therapy will be documented along with any required assessments and/or additional patient education provided.       All questions addressed and patient had no additional concerns. Patient has pharmacy contact information.    Name/Credentials Edith Chen RPh, ALEKSANDAR    In person encounter      11/6/2023  11:55 EST

## 2023-11-07 ENCOUNTER — SPECIALTY PHARMACY (OUTPATIENT)
Dept: PHARMACY | Facility: HOSPITAL | Age: 33
End: 2023-11-07
Payer: COMMERCIAL

## 2023-11-07 ENCOUNTER — TRANSCRIBE ORDERS (OUTPATIENT)
Dept: ADMINISTRATIVE | Facility: HOSPITAL | Age: 33
End: 2023-11-07
Payer: COMMERCIAL

## 2023-11-07 DIAGNOSIS — N63.10 MASS OF RIGHT BREAST, UNSPECIFIED QUADRANT: Primary | ICD-10-CM

## 2023-11-16 ENCOUNTER — TELEPHONE (OUTPATIENT)
Dept: ONCOLOGY | Facility: CLINIC | Age: 33
End: 2023-11-16
Payer: COMMERCIAL

## 2023-11-16 ENCOUNTER — SPECIALTY PHARMACY (OUTPATIENT)
Dept: PHARMACY | Facility: HOSPITAL | Age: 33
End: 2023-11-16
Payer: COMMERCIAL

## 2023-11-16 DIAGNOSIS — Z79.899 HIGH RISK MEDICATION USE: ICD-10-CM

## 2023-11-16 DIAGNOSIS — D48.119 DESMOID TUMOR: Primary | ICD-10-CM

## 2023-11-16 NOTE — PROGRESS NOTES
Re: Refills of Oral Specialty Medication - Votrient (pazopanib)    Drug-Drug Interactions: The current medication list was reviewed and there are no relevant drug-drug interactions with the specialty medication.  Medication Allergies: The patient has no relevant allergies as it relates to their oral specialty medication  Review of Labs/Dose Adjustments: DOSE CHANGE - I reviewed the most recent note and labs. Due to progression MRI the dose is being increased until she can start Sorafenib. I sent refills as described below.    Drug: Votrient (pazopanib)  Strength: 200 mg  Directions: Take 3 tablets by mouth daily  Quantity: 90  Refills: 0  [facility]    Name/Credentials Edith Chen Rph, BCOP    11/16/2023  11:59 EST

## 2023-11-16 NOTE — PROGRESS NOTES
Drug: Votrient (pazopanib)  Strength: 200 mg  Directions: Take 3 tablets by mouth daily  Quantity: 90  Refills: 0  [facility] prescription [facility]    Completed independent double check on medication order/RX.  Ranjan Bear, DavidD, BCOP  Clinical Oncology Pharmacist

## 2023-11-17 NOTE — TELEPHONE ENCOUNTER
Edith Chen, [facility]  Kathleen Richardson, RN  Please add her on for mag, amylase, lipase and thyroid panel with her next visit.  She will need these prior to Sorafenib.      Thanks,  Jeannie    Orders placed.

## 2023-11-20 ENCOUNTER — DOCUMENTATION (OUTPATIENT)
Dept: PHARMACY | Facility: HOSPITAL | Age: 33
End: 2023-11-20
Payer: COMMERCIAL

## 2023-11-20 ENCOUNTER — TELEPHONE (OUTPATIENT)
Dept: OTHER | Facility: HOSPITAL | Age: 33
End: 2023-11-20
Payer: COMMERCIAL

## 2023-11-20 NOTE — TELEPHONE ENCOUNTER
----- Message from Fidel Yeh sent at 11/17/2023  2:13 PM EST -----  Regarding: RE: PLEASE BE AWARE  I WENT AHEAD AND SCHEDULED BUT I DID CALL PT AGAIN AND SHE DID NOT ANSWER I COULD NOT  LEAVE A VM DUE TO HER VM FULL.  I HAVE SCHEDULED AS A TELEMED APPT JUST IN CASE.  THANK YOU   #MAYBE ONE OF YOU COULD SEND HER A MYCHART MESSAGE TO EXPLAIN.    ----- Message -----  From: Kathleen Richardson RN  Sent: 11/17/2023  11:00 AM EST  To: Fidel Yeh  Subject: RE: PLEASE BE AWARE                              Reminding you ;)    ----- Message -----  From: Wilma Gonzalez RegSched Rep  Sent: 11/16/2023   4:31 PM EST  To: Edith Chen RPH; #  Subject: PLEASE BE AWARE                                  TRIED CALLING PT TO SCHEDULE APPT.(VM FULL. WILL TRY CALLING BACK TOMORROW)  REMIND ME TO DOUBLE CHECK TOMORROW#  ----- Message -----  From: Marge Carlton  Sent: 11/16/2023   1:05 PM EST  To: Fidel Yeh      ----- Message -----  From: Edith Chen RPH  Sent: 11/16/2023  12:59 PM EST  To: Con[facility]  Pool    Please schedule her for dual ed on Monday or Tuesday after Thanksgiving    ThanksJeannie

## 2023-11-20 NOTE — PROGRESS NOTES
I contacted MsSom Vincent to coordinate a refill shipment of Pazopanib because Dr. Molina has increased her dose to 600mg/day. She has at least a two week supply on-hand so we agreed that I would call back in 2 weeks to coordinate the next refill shipment.     Sarah Bryson - Care Coordinator   11/20/2023  11:15 EST

## 2023-11-28 ENCOUNTER — SPECIALTY PHARMACY (OUTPATIENT)
Dept: ONCOLOGY | Facility: HOSPITAL | Age: 33
End: 2023-11-28
Payer: COMMERCIAL

## 2023-11-28 NOTE — PROGRESS NOTES
Specialty Note- Sorafenib    Beena did not answer her phone for 0820 appointment this am.  MTM called her back later in the morning and she has agreed to telemedicine visit tomorrow.  Scheduling has been  notified.

## 2023-11-30 ENCOUNTER — SPECIALTY PHARMACY (OUTPATIENT)
Dept: PHARMACY | Facility: HOSPITAL | Age: 33
End: 2023-11-30
Payer: COMMERCIAL

## 2023-12-05 ENCOUNTER — SPECIALTY PHARMACY (OUTPATIENT)
Dept: ONCOLOGY | Facility: HOSPITAL | Age: 33
End: 2023-12-05
Payer: COMMERCIAL

## 2023-12-05 NOTE — PROGRESS NOTES
Specialty Note- Nexavar    Call placed twice today to attempt new patient education on Nexavar.  No answer and VM is full.

## 2023-12-06 ENCOUNTER — HOSPITAL ENCOUNTER (OUTPATIENT)
Dept: MAMMOGRAPHY | Facility: HOSPITAL | Age: 33
Discharge: HOME | End: 2023-12-06
Payer: COMMERCIAL

## 2023-12-06 ENCOUNTER — SPECIALTY PHARMACY (OUTPATIENT)
Dept: PHARMACY | Facility: HOSPITAL | Age: 33
End: 2023-12-06
Payer: COMMERCIAL

## 2023-12-06 ENCOUNTER — HOSPITAL ENCOUNTER (OUTPATIENT)
Dept: ULTRASOUND IMAGING | Facility: HOSPITAL | Age: 33
Discharge: HOME | End: 2023-12-06
Payer: COMMERCIAL

## 2023-12-06 NOTE — PROGRESS NOTES
Specialty Note- Nexavar     Call placed today to attempt new patient education on Nexavar.  No answer and VM is full.

## 2023-12-07 ENCOUNTER — SPECIALTY PHARMACY (OUTPATIENT)
Dept: PHARMACY | Facility: HOSPITAL | Age: 33
End: 2023-12-07
Payer: COMMERCIAL

## 2023-12-08 ENCOUNTER — SPECIALTY PHARMACY (OUTPATIENT)
Dept: PHARMACY | Facility: HOSPITAL | Age: 33
End: 2023-12-08
Payer: COMMERCIAL

## 2023-12-11 ENCOUNTER — SPECIALTY PHARMACY (OUTPATIENT)
Dept: PHARMACY | Facility: HOSPITAL | Age: 33
End: 2023-12-11
Payer: COMMERCIAL

## 2023-12-13 ENCOUNTER — SPECIALTY PHARMACY (OUTPATIENT)
Dept: PHARMACY | Facility: HOSPITAL | Age: 33
End: 2023-12-13
Payer: COMMERCIAL

## 2023-12-14 ENCOUNTER — SPECIALTY PHARMACY (OUTPATIENT)
Dept: PHARMACY | Facility: HOSPITAL | Age: 33
End: 2023-12-14
Payer: COMMERCIAL

## 2023-12-22 ENCOUNTER — TELEPHONE (OUTPATIENT)
Dept: ONCOLOGY | Facility: CLINIC | Age: 33
End: 2023-12-22
Payer: COMMERCIAL

## 2023-12-22 ENCOUNTER — SPECIALTY PHARMACY (OUTPATIENT)
Dept: PHARMACY | Facility: HOSPITAL | Age: 33
End: 2023-12-22
Payer: COMMERCIAL

## 2023-12-22 NOTE — TELEPHONE ENCOUNTER
----- Message from Colin Molina MD sent at 12/22/2023 10:33 AM EST -----  Regarding: RE: Votrient Refill  Thank you for the heads up!    Patient has been missing follow-up appointments with us as well.  jaden Wang try to schedule her with me sometime in January for follow-up and labs.  If she cancels again, we will have to discharge her from our clinic.    Thanks,  ----- Message -----  From: Sarah Bryson, Pharmacy Technician  Sent: 12/22/2023  10:21 AM EST  To: Edith Chen [facility]; #  Subject: Votrient Refill                                  Good Morning,    FYI,    I spoke with Ms. Vincent this morning to schedule a refill shipment of Votrient. She told me that she's not taking the medicine so I don't need to schedule a delivery.     I also asked if she was still interested in receiving information about treatment Nexavar, she said no.    Thank you,   Sarah Bryson - Care Coordinator   12/22/2023  10:21 EST        Tried to call back patient but no answer, [facility], unable to leave message. Coordinating with  to schedule an appointment and send appointment through mail. Patient has not recently log-in to [employer].   Impression: Nonexudative macular degeneration, early dry stage, bilateral: H35.3134. Plan: Recommend patient take an [employer]'s formula multiple vitamin daily. Observation is all that is indicated at this time. Patient to monitor vision changes with [employer]ler grid. RTC immediately if any changes in vision experienced. OCT MAC done today. No CME.

## 2023-12-22 NOTE — PROGRESS NOTES
I called Ms. Vincent this morning to schedule a refill shipment of Votrient. She advised me that she is no longer taking this medication so a refill shipment will not be necessary.    She also informed me that she is no longer interested in receiving education regarding treatment with nexavar.     I sent a staff message to Dr. Molina, [facility] Jeannie Chen, and Dr. Molina's nurse Kathleen to inform them of all of the above.     Sarah Bryson - Care Coordinator   12/22/2023  10:26 EST

## 2023-12-26 ENCOUNTER — TELEPHONE (OUTPATIENT)
Dept: ONCOLOGY | Facility: CLINIC | Age: 33
End: 2023-12-26
Payer: COMMERCIAL

## 2023-12-26 NOTE — TELEPHONE ENCOUNTER
----- Message from Fidel Chowdhury sent at 12/22/2023  3:27 PM EST -----  Regarding: FW: Votrient Refill    ----- Message -----  From: Kathleen Richardson RN  Sent: 12/22/2023   3:15 PM EST  To: Concetta Castaneda  Pool  Subject: FW: Votrient Refill                              Please kindly schedule a follow up appt on January for labs and to see Dr Molina. I tried to call her but no answer and VM full. Please mail letter about the appts. Thank you!    ----- Message -----  From: Colin Molina MD  Sent: 12/22/2023  10:35 AM EST  To: Edith Chen [facility]; #  Subject: RE: Votrient Refill                              Thank you for the heads up!    Patient has been missing follow-up appointments with us as well.  Kathleen, jaden try to schedule her with me sometime in January for follow-up and labs.  If she cancels again, we will have to discharge her from our clinic.    Thanks,  ----- Message -----  From: Sarah Bryson, Pharmacy Technician  Sent: 12/22/2023  10:21 AM EST  To: Edith Chen [facility]; #  Subject: Votrient Refill                                  Good Morning,    FYI,    I spoke with Ms. Vincent this morning to schedule a refill shipment of Votrient. She told me that she's not taking the medicine so I don't need to schedule a delivery.     I also asked if she was still interested in receiving information about treatment Nexavar, she said no.    Thank you,   Sarah Bryson - Care Coordinator   12/22/2023  10:21 EST

## 2024-07-16 ENCOUNTER — APPOINTMENT (OUTPATIENT)
Dept: CT IMAGING | Facility: HOSPITAL | Age: 34
DRG: 392 | End: 2024-07-16
Payer: MEDICAID

## 2024-07-16 ENCOUNTER — HOSPITAL ENCOUNTER (INPATIENT)
Facility: HOSPITAL | Age: 34
LOS: 5 days | Discharge: HOME | DRG: 392 | End: 2024-07-21
Attending: EMERGENCY MEDICINE | Admitting: STUDENT IN AN ORGANIZED HEALTH CARE EDUCATION/TRAINING PROGRAM
Payer: MEDICAID

## 2024-07-16 DIAGNOSIS — K52.9 ACUTE COLITIS: Primary | ICD-10-CM

## 2024-07-16 DIAGNOSIS — K62.5 BRBPR (BRIGHT RED BLOOD PER RECTUM): ICD-10-CM

## 2024-07-16 LAB
ALBUMIN SERPL-MCNC: 4.5 G/DL (ref 3.5–5.2)
ALBUMIN/GLOB SERPL: 1.8 G/DL
ALP SERPL-CCNC: 50 U/L (ref 39–117)
ALT SERPL W P-5'-P-CCNC: 9 U/L (ref 1–33)
ANION GAP SERPL CALCULATED.3IONS-SCNC: 13.3 MMOL/L (ref 5–15)
AST SERPL-CCNC: 14 U/L (ref 1–32)
BACTERIA UR QL AUTO: NORMAL /HPF
BASOPHILS # BLD AUTO: 0.03 10*3/MM3 (ref 0–0.2)
BASOPHILS NFR BLD AUTO: 0.2 % (ref 0–1.5)
BILIRUB SERPL-MCNC: 0.8 MG/DL (ref 0–1.2)
BILIRUB UR QL STRIP: NEGATIVE
BUN SERPL-MCNC: 11 MG/DL (ref 6–20)
BUN/CREAT SERPL: 12.4 (ref 7–25)
CALCIUM SPEC-SCNC: 9 MG/DL (ref 8.6–10.5)
CHLORIDE SERPL-SCNC: 103 MMOL/L (ref 98–107)
CLARITY UR: CLEAR
CO2 SERPL-SCNC: 22.7 MMOL/L (ref 22–29)
COLOR UR: YELLOW
CREAT SERPL-MCNC: 0.89 MG/DL (ref 0.57–1)
D-LACTATE SERPL-SCNC: 1.1 MMOL/L (ref 0.5–2)
DEPRECATED RDW RBC AUTO: 37.7 FL (ref 37–54)
EGFRCR SERPLBLD CKD-EPI 2021: 89 ML/MIN/1.73
EOSINOPHIL # BLD AUTO: 0.01 10*3/MM3 (ref 0–0.4)
EOSINOPHIL NFR BLD AUTO: 0.1 % (ref 0.3–6.2)
ERYTHROCYTE [DISTWIDTH] IN BLOOD BY AUTOMATED COUNT: 12.5 % (ref 12.3–15.4)
GLOBULIN UR ELPH-MCNC: 2.5 GM/DL
GLUCOSE SERPL-MCNC: 116 MG/DL (ref 65–99)
GLUCOSE UR STRIP-MCNC: NEGATIVE MG/DL
HCG SERPL QL: NEGATIVE
HCT VFR BLD AUTO: 42.4 % (ref 34–46.6)
HGB BLD-MCNC: 14.1 G/DL (ref 12–15.9)
HGB UR QL STRIP.AUTO: ABNORMAL
HOLD SPECIMEN: NORMAL
HYALINE CASTS UR QL AUTO: NORMAL /LPF
IMM GRANULOCYTES # BLD AUTO: 0.06 10*3/MM3 (ref 0–0.05)
IMM GRANULOCYTES NFR BLD AUTO: 0.4 % (ref 0–0.5)
KETONES UR QL STRIP: ABNORMAL
LEUKOCYTE ESTERASE UR QL STRIP.AUTO: NEGATIVE
LIPASE SERPL-CCNC: 16 U/L (ref 13–60)
LYMPHOCYTES # BLD AUTO: 2 10*3/MM3 (ref 0.7–3.1)
LYMPHOCYTES NFR BLD AUTO: 13.8 % (ref 19.6–45.3)
MCH RBC QN AUTO: 27.4 PG (ref 26.6–33)
MCHC RBC AUTO-ENTMCNC: 33.3 G/DL (ref 31.5–35.7)
MCV RBC AUTO: 82.5 FL (ref 79–97)
MONOCYTES # BLD AUTO: 0.79 10*3/MM3 (ref 0.1–0.9)
MONOCYTES NFR BLD AUTO: 5.4 % (ref 5–12)
NEUTROPHILS NFR BLD AUTO: 11.63 10*3/MM3 (ref 1.7–7)
NEUTROPHILS NFR BLD AUTO: 80.1 % (ref 42.7–76)
NITRITE UR QL STRIP: NEGATIVE
NRBC BLD AUTO-RTO: 0 /100 WBC (ref 0–0.2)
PH UR STRIP.AUTO: 5.5 [PH] (ref 5–8)
PLATELET # BLD AUTO: 351 10*3/MM3 (ref 140–450)
PMV BLD AUTO: 10.2 FL (ref 6–12)
POTASSIUM SERPL-SCNC: 3.4 MMOL/L (ref 3.5–5.2)
PROCALCITONIN SERPL-MCNC: 0.17 NG/ML (ref 0–0.25)
PROT SERPL-MCNC: 7 G/DL (ref 6–8.5)
PROT UR QL STRIP: ABNORMAL
RBC # BLD AUTO: 5.14 10*6/MM3 (ref 3.77–5.28)
RBC # UR STRIP: NORMAL /HPF
REF LAB TEST METHOD: NORMAL
SODIUM SERPL-SCNC: 139 MMOL/L (ref 136–145)
SP GR UR STRIP: 1.03 (ref 1–1.03)
SQUAMOUS #/AREA URNS HPF: NORMAL /HPF
UROBILINOGEN UR QL STRIP: ABNORMAL
WBC # UR STRIP: NORMAL /HPF
WBC NRBC COR # BLD AUTO: 14.52 10*3/MM3 (ref 3.4–10.8)
WHOLE BLOOD HOLD COAG: NORMAL
WHOLE BLOOD HOLD SPECIMEN: NORMAL

## 2024-07-16 PROCEDURE — 87040 BLOOD CULTURE FOR BACTERIA: CPT | Performed by: EMERGENCY MEDICINE

## 2024-07-16 PROCEDURE — 25510000001 IOPAMIDOL 61 % SOLUTION: Performed by: EMERGENCY MEDICINE

## 2024-07-16 PROCEDURE — 85025 COMPLETE CBC W/AUTO DIFF WBC: CPT | Performed by: EMERGENCY MEDICINE

## 2024-07-16 PROCEDURE — 83605 ASSAY OF LACTIC ACID: CPT | Performed by: EMERGENCY MEDICINE

## 2024-07-16 PROCEDURE — 80053 COMPREHEN METABOLIC PANEL: CPT | Performed by: EMERGENCY MEDICINE

## 2024-07-16 PROCEDURE — 84145 PROCALCITONIN (PCT): CPT | Performed by: EMERGENCY MEDICINE

## 2024-07-16 PROCEDURE — 81001 URINALYSIS AUTO W/SCOPE: CPT | Performed by: EMERGENCY MEDICINE

## 2024-07-16 PROCEDURE — 84703 CHORIONIC GONADOTROPIN ASSAY: CPT | Performed by: EMERGENCY MEDICINE

## 2024-07-16 PROCEDURE — 25010000002 PIPERACILLIN SOD-TAZOBACTAM PER 1 G: Performed by: EMERGENCY MEDICINE

## 2024-07-16 PROCEDURE — 36415 COLL VENOUS BLD VENIPUNCTURE: CPT

## 2024-07-16 PROCEDURE — 83690 ASSAY OF LIPASE: CPT | Performed by: EMERGENCY MEDICINE

## 2024-07-16 PROCEDURE — 25010000002 ONDANSETRON PER 1 MG: Performed by: EMERGENCY MEDICINE

## 2024-07-16 PROCEDURE — 25810000003 SODIUM CHLORIDE 0.9 % SOLUTION: Performed by: EMERGENCY MEDICINE

## 2024-07-16 PROCEDURE — 25010000002 HYDROMORPHONE PER 4 MG: Performed by: EMERGENCY MEDICINE

## 2024-07-16 PROCEDURE — 74177 CT ABD & PELVIS W/CONTRAST: CPT

## 2024-07-16 PROCEDURE — 99285 EMERGENCY DEPT VISIT HI MDM: CPT

## 2024-07-16 RX ORDER — HYDROMORPHONE HYDROCHLORIDE 1 MG/ML
0.5 INJECTION, SOLUTION INTRAMUSCULAR; INTRAVENOUS; SUBCUTANEOUS EVERY 4 HOURS PRN
Status: DISCONTINUED | OUTPATIENT
Start: 2024-07-16 | End: 2024-07-21 | Stop reason: HOSPADM

## 2024-07-16 RX ORDER — ONDANSETRON 2 MG/ML
4 INJECTION INTRAMUSCULAR; INTRAVENOUS ONCE
Status: COMPLETED | OUTPATIENT
Start: 2024-07-16 | End: 2024-07-16

## 2024-07-16 RX ORDER — SODIUM CHLORIDE 9 MG/ML
125 INJECTION, SOLUTION INTRAVENOUS CONTINUOUS
Status: DISCONTINUED | OUTPATIENT
Start: 2024-07-16 | End: 2024-07-21 | Stop reason: HOSPADM

## 2024-07-16 RX ORDER — HYDROMORPHONE HYDROCHLORIDE 1 MG/ML
0.5 INJECTION, SOLUTION INTRAMUSCULAR; INTRAVENOUS; SUBCUTANEOUS
Status: DISCONTINUED | OUTPATIENT
Start: 2024-07-16 | End: 2024-07-16

## 2024-07-16 RX ORDER — ONDANSETRON 2 MG/ML
4 INJECTION INTRAMUSCULAR; INTRAVENOUS EVERY 6 HOURS PRN
Status: DISCONTINUED | OUTPATIENT
Start: 2024-07-16 | End: 2024-07-21 | Stop reason: HOSPADM

## 2024-07-16 RX ORDER — MORPHINE SULFATE 2 MG/ML
2 INJECTION, SOLUTION INTRAMUSCULAR; INTRAVENOUS
Status: DISCONTINUED | OUTPATIENT
Start: 2024-07-16 | End: 2024-07-21 | Stop reason: HOSPADM

## 2024-07-16 RX ORDER — SODIUM CHLORIDE 0.9 % (FLUSH) 0.9 %
10 SYRINGE (ML) INJECTION AS NEEDED
Status: DISCONTINUED | OUTPATIENT
Start: 2024-07-16 | End: 2024-07-21 | Stop reason: HOSPADM

## 2024-07-16 RX ORDER — ACETAMINOPHEN 650 MG/1
650 SUPPOSITORY RECTAL EVERY 4 HOURS PRN
Status: DISCONTINUED | OUTPATIENT
Start: 2024-07-16 | End: 2024-07-21 | Stop reason: HOSPADM

## 2024-07-16 RX ORDER — ALBUTEROL SULFATE 2.5 MG/3ML
2.5 SOLUTION RESPIRATORY (INHALATION) EVERY 6 HOURS PRN
Status: DISCONTINUED | OUTPATIENT
Start: 2024-07-16 | End: 2024-07-21 | Stop reason: HOSPADM

## 2024-07-16 RX ORDER — HYDROMORPHONE HYDROCHLORIDE 1 MG/ML
0.5 INJECTION, SOLUTION INTRAMUSCULAR; INTRAVENOUS; SUBCUTANEOUS ONCE
Status: COMPLETED | OUTPATIENT
Start: 2024-07-16 | End: 2024-07-16

## 2024-07-16 RX ORDER — ONDANSETRON 4 MG/1
4 TABLET, ORALLY DISINTEGRATING ORAL EVERY 6 HOURS PRN
Status: DISCONTINUED | OUTPATIENT
Start: 2024-07-16 | End: 2024-07-21 | Stop reason: HOSPADM

## 2024-07-16 RX ORDER — ACETAMINOPHEN 325 MG/1
650 TABLET ORAL EVERY 4 HOURS PRN
Status: DISCONTINUED | OUTPATIENT
Start: 2024-07-16 | End: 2024-07-21 | Stop reason: HOSPADM

## 2024-07-16 RX ORDER — ACETAMINOPHEN 160 MG/5ML
650 SOLUTION ORAL EVERY 4 HOURS PRN
Status: DISCONTINUED | OUTPATIENT
Start: 2024-07-16 | End: 2024-07-21 | Stop reason: HOSPADM

## 2024-07-16 RX ADMIN — ONDANSETRON 4 MG: 2 INJECTION INTRAMUSCULAR; INTRAVENOUS at 14:58

## 2024-07-16 RX ADMIN — SODIUM CHLORIDE 125 ML/HR: 9 INJECTION, SOLUTION INTRAVENOUS at 23:11

## 2024-07-16 RX ADMIN — HYDROMORPHONE HYDROCHLORIDE 0.5 MG: 1 INJECTION, SOLUTION INTRAMUSCULAR; INTRAVENOUS; SUBCUTANEOUS at 14:58

## 2024-07-16 RX ADMIN — PIPERACILLIN AND TAZOBACTAM 3.38 G: 3; .375 INJECTION, POWDER, FOR SOLUTION INTRAVENOUS at 20:13

## 2024-07-16 RX ADMIN — HYDROMORPHONE HYDROCHLORIDE 0.5 MG: 1 INJECTION, SOLUTION INTRAMUSCULAR; INTRAVENOUS; SUBCUTANEOUS at 16:49

## 2024-07-16 RX ADMIN — IOPAMIDOL 85 ML: 612 INJECTION, SOLUTION INTRAVENOUS at 19:08

## 2024-07-16 RX ADMIN — HYDROMORPHONE HYDROCHLORIDE 0.5 MG: 1 INJECTION, SOLUTION INTRAMUSCULAR; INTRAVENOUS; SUBCUTANEOUS at 20:31

## 2024-07-16 RX ADMIN — SODIUM CHLORIDE 1000 ML: 9 INJECTION, SOLUTION INTRAVENOUS at 14:57

## 2024-07-16 NOTE — Clinical Note
Level of Care: Telemetry [5]   Diagnosis: Colitis [[id number]]   Admitting Physician: ISAEL MATA [[id number]]   Attending Physician: ISAEL MATA [[id number]]   Certification: I certify that inpatient services are medically necessary for this patient for a duration of greater than two midnights. See H&P and MD Progress Notes for additional information about the patient's course of treatment.

## 2024-07-16 NOTE — ED PROVIDER NOTES
EMERGENCY DEPARTMENT ENCOUNTER    Room Number:  34/34  Date of encounter:  7/16/2024  PCP: Sissy Garibay MD  Historian: Patient and mother  Relevant information and history provided by sources other than the patient will be included below and in the ED Course.  Review of pertinent past medical records may also be included in record below and ED Course.    HPI:  Chief Complaint: Abdominal pain with nausea vomiting and diarrhea  A complete HPI/ROS/PMH/PSH/SH/FH are unobtainable due to: Not applicable  Context: Beena Vincent is a 33 y.o. female who presents to the ED c/o this patient's symptoms started yesterday evening.  Started with some abdominal pain and then soon after vomiting.  Is vomited about 10 times.  Has not had anything to eat or drink since yesterday afternoon.  There was no blood in the vomit.  She mainly has pain in her left lower quadrant but a little bit of pain all over.  Then started with diarrhea soon after the vomiting.  She has had more episodes of diarrhea then she has had vomiting.  Greater than 10 episodes of diarrhea.  It is watery and has now become blood-tinged.  She has not been on any recent antibiotics.  She has had no travel history recently.  She is unaware of any bad food that she is consumed she has not consumed any raw or uncooked food or any shellfish.  Unaware of any ill contacts.  Again she is currently not on any chemotherapeutic medicine and has been off for 6 to 8 months.  She had some chills last night but no definitive fever.  Denies chest pain or shortness of breath.  She has had an appendectomy in the past but no other previous bowel surgery.  The pain is worse when you press on her belly as well as worse when she has the vomiting and the diarrhea.  Denies any coughs or colds.  No headaches or any focal weakness to arms or legs.        Previous Episodes: No  Current Symptoms: See above    MEDICAL HISTORY REVIEWED  Looking old records I can see this patient has had a  history of appendicitis in the past.  She had a history of anxiety gestational diabetes.  She also has a history of asthma.  She was diagnosed as desmoid fibromatosis in 3/23/2022 she was initially started on tamoxifen and that was stopped and then started on pazopanib she is followed by [facility]    As a obtain history from this patient she has been off the chemotherapeutic medicine for 6 months.  She stopped it because of the side effects.  She is currently not following with hematology oncology.  She informs me that it is a bone growth to her right lower extremity to the right lateral aspect of her knee that is been persistent and gradually worsening.  She informs me that it is not a tumor that can metastasize but can develop other tumors in different areas of her body's.  PAST MEDICAL HISTORY  Active Ambulatory Problems     Diagnosis Date Noted    KYRA III (cervical intraepithelial neoplasia grade III) with severe dysplasia 2019    Gestational HTN 2020     (normal spontaneous vaginal delivery) 2020    Asthma exacerbation 2017    Anxiety with depression 2020    Postpartum depression 2020    Generalized anxiety disorder 2020    History of anemia 2020    Acute appendicitis with localized peritonitis, without perforation, abscess, or gangrene 10/01/2021    Soft tissue mass 2022    Gestational diabetes 2023    Desmoid tumor 2023    Generalized abdominal pain 2023    Leukocytosis 2023     Resolved Ambulatory Problems     Diagnosis Date Noted    Edema of lower extremity, antepartum, third trimester 2020    Anemia affecting pregnancy in third trimester 2020    Pregnancy 2020    Medial epicondylitis of left elbow 2013    Nausea vomiting and diarrhea 2023    Acute kidney injury 2023     Past Medical History:   Diagnosis Date    Asthma     Cervical dysplasia     Depression     Gestational hypertension     Iron  deficiency anemia     Preeclampsia 2012         PAST SURGICAL HISTORY  Past Surgical History:   Procedure Laterality Date    APPENDECTOMY N/A 10/1/2021    Procedure: APPENDECTOMY LAPAROSCOPIC;  Surgeon: Ahmet Dong Jr., MD;  Location: [facility] OR;  Service: General;  Laterality: N/A;    KNEE ARTHROSCOPY      AGE 6    LEEP N/A 2019    Procedure: LOOP ELECTROCAUTERY EXCISION PROCEDURE;  Surgeon: Arsh Ray MD;  Location: [facility];  Service: Obstetrics/Gynecology    SOFT TISSUE BIOPSY Right 3/23/2022    Procedure: BIOPSY SOFT TISSUE THIGH / KNEE;  Surgeon: Chris Randall MD;  Location: [facility] MAIN OR;  Service: Orthopedics;  Laterality: Right;    WISDOM TOOTH EXTRACTION           FAMILY HISTORY  Family History   Problem Relation Age of Onset    Diabetes Father     Arthritis Maternal Grandmother     Lung cancer Maternal Grandfather     Heart attack Maternal Grandfather     Heart disease Maternal Grandfather     Stroke Maternal Grandfather     Hyperlipidemia Maternal Grandfather     Malig Hyperthermia Neg Hx     Breast cancer Neg Hx     Ovarian cancer Neg Hx     Uterine cancer Neg Hx     Colon cancer Neg Hx          SOCIAL HISTORY  Social History     Socioeconomic History    Marital status: Single   Tobacco Use    Smoking status: Former     Current packs/day: 0.00     Average packs/day: 0.5 packs/day for 4.0 years (2.0 ttl pk-yrs)     Types: Cigarettes     Start date: 2016     Quit date: 2020     Years since quittin.4     Passive exposure: Never    Smokeless tobacco: Never   Vaping Use    Vaping status: Former    Substances: Nicotine    Devices: Disposable   Substance and Sexual Activity    Alcohol use: Not Currently     Comment: socially    Drug use: Not Currently     Types: Marijuana     Comment: daily, did not smoke during pregnancy    Sexual activity: Not Currently         ALLERGIES  Penicillins and Adhesive tape        REVIEW OF SYSTEMS  Review of Systems     All systems reviewed  and negative except for those discussed in HPI.       PHYSICAL EXAM    I have reviewed the triage vital signs and nursing notes.    ED Triage Vitals   Temp Heart Rate Resp BP SpO2   07/16/24 1254 07/16/24 1254 07/16/24 1254 07/16/24 1256 07/16/24 1254   97.2 °F (36.2 °C) 113 20 125/84 100 %      Temp src Heart Rate Source Patient Position BP Location FiO2 (%)   07/16/24 1254 07/16/24 1322 -- -- --   Tympanic Monitor          GENERAL: This is a patient that looks sickly weak and tired.  No acute distress.Vital signs on my initial evaluation have been reviewed.  Heart rate is 90s to low 100s.  Oxygen saturation is 100% and she is afebrile.  Blood pressure is normal  HENT: nares patent  Head/neck/ face are symmetric without gross deformity, signs of trauma, or swelling  EYES: no scleral icterus, no conjunctival pallor.  NECK: Supple, no meningismus  CV: Regular rhythm with tachycardia.  No murmur or rub.  RESPIRATORY: normal effort and no respiratory distress.  Clear to auscultation bilaterally  ABDOMEN: soft and morbidly obese.  Tenderness is in the left mid to left lower quadrant.  There is no guarding or rebound.  Positive bowel sounds.  MUSCULOSKELETAL: Chronic soft tissue deformity or bony deformity to her right lower extremity lateral and inferior to her knee.  This is chronic.  Intact distal pulses that are equal strong and symmetric.  No edema.  No coolness cyanosis or pallor.  NEURO: alert and appropriate, moves all extremities, follows commands.  No focal motor or sensory changes  SKIN: warm, dry    Vital signs and nursing notes reviewed.        LAB RESULTS  Recent Results (from the past 24 hour(s))   Comprehensive Metabolic Panel    Collection Time: 07/16/24  1:18 PM    Specimen: Arm, Right; Blood   Result Value Ref Range    Glucose 116 (H) 65 - 99 mg/dL    BUN 11 6 - 20 mg/dL    Creatinine 0.89 0.57 - 1.00 mg/dL    Sodium 139 136 - 145 mmol/L    Potassium 3.4 (L) 3.5 - 5.2 mmol/L    Chloride 103 98 - 107  mmol/L    CO2 22.7 22.0 - 29.0 mmol/L    Calcium 9.0 8.6 - 10.5 mg/dL    Total Protein 7.0 6.0 - 8.5 g/dL    Albumin 4.5 3.5 - 5.2 g/dL    ALT (SGPT) 9 1 - 33 U/L    AST (SGOT) 14 1 - 32 U/L    Alkaline Phosphatase 50 39 - 117 U/L    Total Bilirubin 0.8 0.0 - 1.2 mg/dL    Globulin 2.5 gm/dL    A/G Ratio 1.8 g/dL    BUN/Creatinine Ratio 12.4 7.0 - 25.0    Anion Gap 13.3 5.0 - 15.0 mmol/L    eGFR 89.0 >60.0 mL/min/1.73   Lipase    Collection Time: 07/16/24  1:18 PM    Specimen: Arm, Right; Blood   Result Value Ref Range    Lipase 16 13 - 60 U/L   hCG, Serum, Qualitative    Collection Time: 07/16/24  1:18 PM    Specimen: Arm, Right; Blood   Result Value Ref Range    HCG Qualitative Negative Negative   Green Top (Gel)    Collection Time: 07/16/24  1:18 PM   Result Value Ref Range    Extra Tube Hold for add-ons.    Lavender Top    Collection Time: 07/16/24  1:18 PM   Result Value Ref Range    Extra Tube hold for add-on    Light Blue Top    Collection Time: 07/16/24  1:18 PM   Result Value Ref Range    Extra Tube Hold for add-ons.    CBC Auto Differential    Collection Time: 07/16/24  1:18 PM    Specimen: Arm, Right; Blood   Result Value Ref Range    WBC 14.52 (H) 3.40 - 10.80 10*3/mm3    RBC 5.14 3.77 - 5.28 10*6/mm3    Hemoglobin 14.1 12.0 - 15.9 g/dL    Hematocrit 42.4 34.0 - 46.6 %    MCV 82.5 79.0 - 97.0 fL    MCH 27.4 26.6 - 33.0 pg    MCHC 33.3 31.5 - 35.7 g/dL    RDW 12.5 12.3 - 15.4 %    RDW-SD 37.7 37.0 - 54.0 fl    MPV 10.2 6.0 - 12.0 fL    Platelets 351 140 - 450 10*3/mm3    Neutrophil % 80.1 (H) 42.7 - 76.0 %    Lymphocyte % 13.8 (L) 19.6 - 45.3 %    Monocyte % 5.4 5.0 - 12.0 %    Eosinophil % 0.1 (L) 0.3 - 6.2 %    Basophil % 0.2 0.0 - 1.5 %    Immature Grans % 0.4 0.0 - 0.5 %    Neutrophils, Absolute 11.63 (H) 1.70 - 7.00 10*3/mm3    Lymphocytes, Absolute 2.00 0.70 - 3.10 10*3/mm3    Monocytes, Absolute 0.79 0.10 - 0.90 10*3/mm3    Eosinophils, Absolute 0.01 0.00 - 0.40 10*3/mm3    Basophils, Absolute 0.03  0.00 - 0.20 10*3/mm3    Immature Grans, Absolute 0.06 (H) 0.00 - 0.05 10*3/mm3    nRBC 0.0 0.0 - 0.2 /100 WBC   Procalcitonin    Collection Time: 07/16/24  1:18 PM    Specimen: Arm, Right; Blood   Result Value Ref Range    Procalcitonin 0.17 0.00 - 0.25 ng/mL   Urinalysis With Microscopic If Indicated (No Culture) - Urine, Clean Catch    Collection Time: 07/16/24  1:33 PM    Specimen: Urine, Clean Catch   Result Value Ref Range    Color, UA Yellow Yellow, Straw    Appearance, UA Clear Clear    pH, UA 5.5 5.0 - 8.0    Specific Gravity, UA 1.030 1.005 - 1.030    Glucose, UA Negative Negative    Ketones, UA 15 mg/dL (1+) (A) Negative    Bilirubin, UA Negative Negative    Blood, UA Moderate (2+) (A) Negative    Protein, UA 30 mg/dL (1+) (A) Negative    Leuk Esterase, UA Negative Negative    Nitrite, UA Negative Negative    Urobilinogen, UA 1.0 E.U./dL 0.2 - 1.0 E.U./dL   Urinalysis, Microscopic Only - Urine, Clean Catch    Collection Time: 07/16/24  1:33 PM    Specimen: Urine, Clean Catch   Result Value Ref Range    RBC, UA 0-2 None Seen, 0-2 /HPF    WBC, UA 0-2 None Seen, 0-2 /HPF    Bacteria, UA None Seen None Seen /HPF    Squamous Epithelial Cells, UA 0-2 None Seen, 0-2 /HPF    Hyaline Casts, UA None Seen None Seen /LPF    Methodology Automated Microscopy    Lactic Acid, Plasma    Collection Time: 07/16/24  3:13 PM    Specimen: Arm, Left; Blood   Result Value Ref Range    Lactate 1.1 0.5 - 2.0 mmol/L       Ordered the above labs and independently reviewed the results.        RADIOLOGY  CT Abdomen Pelvis With Contrast    Result Date: 7/16/2024  CT ABDOMEN PELVIS W CONTRAST-  HISTORY: 33 years of age, Female.  Abdominal pain with nausea vomiting diarrhea.  Pain is mainly in the left lower side of the abdomen.  TECHNIQUE:  CT includes axial imaging from the lung bases to the trochanters with intravenous contrast and without use of oral contrast. Data reconstructed in coronal and sagittal planes. Radiation dose reduction  techniques were utilized, including automated exposure control and exposure modulation based on body size.  COMPARISON: CT abdomen and pelvis 06/08/2023  FINDINGS: Lung bases appear clear.  There is mild ascites with minimal perihepatic fluid anterior to the medial segment left lobe. Free fluid is present in the pelvis that is greater than typically seen for normal physiology. Free fluid surrounds the uterus and right adnexa. Small bowel loops extend adjacent to the right adnexa and are difficult to differentiate from the right adnexa. Left ovary appears within normal limits and the uterus appears within normal limits.  There is abnormal wall thickening involving the descending colon and proximal sigmoid colon with mild pericolonic stranding/inflammation consistent with acute colitis. No evidence for bowel obstruction.      1. Abnormal wall thickening involving the descending colon and proximal sigmoid colon consistent with acute colitis. There is mild pericolonic stranding/inflammation. 2. Mild ascites with greater volume of fluid within the pelvis than typically seen and mild fluid extending adjacent to the liver. Fluid surrounds the right adnexa and there are small bowel loops in the region of the right adnexa limiting evaluation of the right ovary and if there is suspicion for right ovarian abnormality, pelvic/transvaginal sonogram could be performed for further evaluation.  Radiation dose reduction techniques were utilized, including automated exposure control and exposure modulation based on body size.   This report was finalized on 7/16/2024 7:45 PM by Dr. Damien Mccarthy M.D on Workstation: [id number]       I ordered the above noted radiological studies. Reviewed by me and discussed with radiologist.  See dictation for official radiology interpretation.      PROCEDURES    Procedures      MEDICATIONS GIVEN IN ER    Medications   sodium chloride 0.9 % flush 10 mL (has no administration in time range)   sodium  chloride 0.9 % infusion (0 mL/hr Intravenous Hold 7/16/24 1459)   acetaminophen (TYLENOL) tablet 650 mg (has no administration in time range)     Or   acetaminophen (TYLENOL) 160 MG/5ML oral solution 650 mg (has no administration in time range)     Or   acetaminophen (TYLENOL) suppository 650 mg (has no administration in time range)   ondansetron ODT (ZOFRAN-ODT) disintegrating tablet 4 mg (has no administration in time range)     Or   ondansetron (ZOFRAN) injection 4 mg (has no administration in time range)   melatonin tablet 2.5 mg (has no administration in time range)   piperacillin-tazobactam (ZOSYN) 3.375 g IVPB in 100 mL NS MBP (CD) (3.375 g Intravenous New Bag 7/16/24 2013)   sodium chloride 0.9 % bolus 1,000 mL (0 mL Intravenous Stopped 7/16/24 1527)   HYDROmorphone (DILAUDID) injection 0.5 mg (0.5 mg Intravenous Given 7/16/24 1458)   ondansetron (ZOFRAN) injection 4 mg (4 mg Intravenous Given 7/16/24 1458)   HYDROmorphone (DILAUDID) injection 0.5 mg (0.5 mg Intravenous Given 7/16/24 1649)   iopamidol (ISOVUE-300) 61 % injection 85 mL (85 mL Intravenous Given 7/16/24 1908)         All labs have been independently reviewed by me.  All radiology studies have been reviewed by me and I discussed with radiologist dictating the report when indicated below.  All EKG's independently viewed and interpreted by me.  Discussion below represents my analysis of pertinent findings related to patient's condition, differential diagnosis, treatment plan and final disposition.        PROGRESS, DATA ANALYSIS, CONSULTS, AND MEDICAL DECISION MAKING    Differential diagnosis includes   - hepatobiliary pathology such as cholecystitis, cholangitis, and symptomatic cholelithiasis  -PUD  -Mesenteric ischemia  - Pancreatitis  - Dyspepsia  - Small bowel or large bowel obstruction  - Appendicitis  - Diverticulitis  - UTI including pyelonephritis  - Ureteral stone  - Zoster  - Colitis, including infectious and ischemic  - Atypical  ACS  Informed patient of my clinical concerns and the test that we will order.  She is requesting medicine for pain.  I will go and give her some Dilaudid as well as Zofran and IV fluids.  All questions answered at this time.      ED Course as of 07/16/24 2020 Tue Jul 16, 2024   1431 HCG Qualitative: Negative [MM]   1431 WBC(!): 14.52 [MM]   1431 Blood, UA(!): Moderate (2+) [MM]   1432 Ketones, UA(!): 15 mg/dL (1+) [MM]   1432 Protein, UA(!): 30 mg/dL (1+) [MM]   1432 WBC, UA: 0-2 [MM]   1432 Bacteria, UA: None Seen [MM]   1432 Squamous Epithelial Cells, UA: 0-2 [MM]   1432 Potassium(!): 3.4 [MM]   1625 Lactate: 1.1 [MM]   1625 WBC, UA: 0-2 [MM]   1625 Bacteria, UA: None Seen [MM]   1625 Procalcitonin: 0.17 [MM]   1918 I did discuss the case with Dr. Mast who agrees to admit the patient to the hospital.  Informed the patient presenting symptoms and results of test. [MM]   1952 I reviewed the CT scan report concerning the abdomen pelvis on this patient.  There is thickening involving the descending colon and proximal sigmoid colon consistent with acute colitis there is mild pericolonic stranding.  There is mild ascites.  Please see complete dictated report from radiologist. [MM]   1954 On reevaluation of the patient.  She is stable.  Still complains of some lower abdominal pain.  Informed about the results of the CT scan.  Informed of the treatment plan.  All questions answered at this time. [MM]      ED Course User Index  [MM] Sunny Lewis MD       AS OF 20:20 EDT VITALS:    BP - 105/88  HR - 73  TEMP - 97.2 °F (36.2 °C) (Tympanic)  02 SATS - 97%    SOCIAL DETERMINANTS OF HEALTH THAT IMPACT OR LIMIT CARE (For example..Homelessness,safe discharge, inability to obtain care, follow up, or prescriptions):      DIAGNOSIS  Final diagnoses:   Acute colitis         DISPOSITION  I have reviewed the test results with my patient and explained the current treatment plan.  I answered all the patient's questions and  concerns.  The patient is hemodynamically stable and is in no distress and is appropriate to be admitted to a medical/surgical floor bed at this time.            DICTATED UTILIZING DRAGON DICTATION    Note Disclaimer: At [facility], we believe that sharing information builds trust and better relationships. You are receiving this note because you recently visited [facility]. It is possible you will see health information before a provider has talked with you about it. This kind of information can be easy to misunderstand. To help you fully understand what it means for your health, we urge you to discuss this note with your provider.       Sunny Lewis MD  07/16/24 2020

## 2024-07-17 LAB
ADV 40+41 DNA STL QL NAA+NON-PROBE: NOT DETECTED
ANION GAP SERPL CALCULATED.3IONS-SCNC: 6.9 MMOL/L (ref 5–15)
ASTRO TYP 1-8 RNA STL QL NAA+NON-PROBE: NOT DETECTED
BUN SERPL-MCNC: 7 MG/DL (ref 6–20)
BUN/CREAT SERPL: 8.4 (ref 7–25)
C CAYETANENSIS DNA STL QL NAA+NON-PROBE: NOT DETECTED
C COLI+JEJ+UPSA DNA STL QL NAA+NON-PROBE: NOT DETECTED
CALCIUM SPEC-SCNC: 8.2 MG/DL (ref 8.6–10.5)
CHLORIDE SERPL-SCNC: 104 MMOL/L (ref 98–107)
CO2 SERPL-SCNC: 24.1 MMOL/L (ref 22–29)
CREAT SERPL-MCNC: 0.83 MG/DL (ref 0.57–1)
CRYPTOSP DNA STL QL NAA+NON-PROBE: NOT DETECTED
DEPRECATED RDW RBC AUTO: 37.3 FL (ref 37–54)
E HISTOLYT DNA STL QL NAA+NON-PROBE: NOT DETECTED
EAEC PAA PLAS AGGR+AATA ST NAA+NON-PRB: NOT DETECTED
EC STX1+STX2 GENES STL QL NAA+NON-PROBE: NOT DETECTED
EGFRCR SERPLBLD CKD-EPI 2021: 96.8 ML/MIN/1.73
EPEC EAE GENE STL QL NAA+NON-PROBE: NOT DETECTED
ERYTHROCYTE [DISTWIDTH] IN BLOOD BY AUTOMATED COUNT: 12.6 % (ref 12.3–15.4)
ETEC LTA+ST1A+ST1B TOX ST NAA+NON-PROBE: NOT DETECTED
G LAMBLIA DNA STL QL NAA+NON-PROBE: NOT DETECTED
GLUCOSE SERPL-MCNC: 98 MG/DL (ref 65–99)
HCT VFR BLD AUTO: 35.9 % (ref 34–46.6)
HEMOCCULT STL QL: POSITIVE
HGB BLD-MCNC: 11.6 G/DL (ref 12–15.9)
MCH RBC QN AUTO: 27.2 PG (ref 26.6–33)
MCHC RBC AUTO-ENTMCNC: 32.3 G/DL (ref 31.5–35.7)
MCV RBC AUTO: 84.1 FL (ref 79–97)
NOROVIRUS GI+II RNA STL QL NAA+NON-PROBE: NOT DETECTED
P SHIGELLOIDES DNA STL QL NAA+NON-PROBE: NOT DETECTED
PLATELET # BLD AUTO: 257 10*3/MM3 (ref 140–450)
PMV BLD AUTO: 10.4 FL (ref 6–12)
POTASSIUM SERPL-SCNC: 3 MMOL/L (ref 3.5–5.2)
POTASSIUM SERPL-SCNC: 4.1 MMOL/L (ref 3.5–5.2)
RBC # BLD AUTO: 4.27 10*6/MM3 (ref 3.77–5.28)
RVA RNA STL QL NAA+NON-PROBE: NOT DETECTED
S ENT+BONG DNA STL QL NAA+NON-PROBE: NOT DETECTED
SAPO I+II+IV+V RNA STL QL NAA+NON-PROBE: NOT DETECTED
SHIGELLA SP+EIEC IPAH ST NAA+NON-PROBE: NOT DETECTED
SODIUM SERPL-SCNC: 135 MMOL/L (ref 136–145)
V CHOL+PARA+VUL DNA STL QL NAA+NON-PROBE: NOT DETECTED
V CHOLERAE DNA STL QL NAA+NON-PROBE: NOT DETECTED
WBC NRBC COR # BLD AUTO: 11.17 10*3/MM3 (ref 3.4–10.8)
Y ENTEROCOL DNA STL QL NAA+NON-PROBE: NOT DETECTED

## 2024-07-17 PROCEDURE — 85027 COMPLETE CBC AUTOMATED: CPT | Performed by: INTERNAL MEDICINE

## 2024-07-17 PROCEDURE — 25010000002 MORPHINE PER 10 MG: Performed by: NURSE PRACTITIONER

## 2024-07-17 PROCEDURE — 80048 BASIC METABOLIC PNL TOTAL CA: CPT | Performed by: INTERNAL MEDICINE

## 2024-07-17 PROCEDURE — 84132 ASSAY OF SERUM POTASSIUM: CPT | Performed by: STUDENT IN AN ORGANIZED HEALTH CARE EDUCATION/TRAINING PROGRAM

## 2024-07-17 PROCEDURE — 99253 IP/OBS CNSLTJ NEW/EST LOW 45: CPT

## 2024-07-17 PROCEDURE — 82272 OCCULT BLD FECES 1-3 TESTS: CPT | Performed by: NURSE PRACTITIONER

## 2024-07-17 PROCEDURE — 94799 UNLISTED PULMONARY SVC/PX: CPT

## 2024-07-17 PROCEDURE — 94640 AIRWAY INHALATION TREATMENT: CPT

## 2024-07-17 PROCEDURE — 25010000002 ONDANSETRON PER 1 MG: Performed by: INTERNAL MEDICINE

## 2024-07-17 PROCEDURE — 87507 IADNA-DNA/RNA PROBE TQ 12-25: CPT | Performed by: EMERGENCY MEDICINE

## 2024-07-17 PROCEDURE — 25010000002 HYDROMORPHONE PER 4 MG: Performed by: INTERNAL MEDICINE

## 2024-07-17 PROCEDURE — 25010000002 PIPERACILLIN SOD-TAZOBACTAM PER 1 G: Performed by: INTERNAL MEDICINE

## 2024-07-17 PROCEDURE — 25810000003 SODIUM CHLORIDE 0.9 % SOLUTION: Performed by: EMERGENCY MEDICINE

## 2024-07-17 RX ORDER — DICYCLOMINE HYDROCHLORIDE 10 MG/1
20 CAPSULE ORAL 4 TIMES DAILY PRN
Status: DISCONTINUED | OUTPATIENT
Start: 2024-07-17 | End: 2024-07-21 | Stop reason: HOSPADM

## 2024-07-17 RX ORDER — POTASSIUM CHLORIDE 750 MG/1
40 TABLET, FILM COATED, EXTENDED RELEASE ORAL EVERY 4 HOURS
Status: COMPLETED | OUTPATIENT
Start: 2024-07-17 | End: 2024-07-17

## 2024-07-17 RX ADMIN — PIPERACILLIN AND TAZOBACTAM 3.38 G: 3; .375 INJECTION, POWDER, FOR SOLUTION INTRAVENOUS at 18:13

## 2024-07-17 RX ADMIN — POTASSIUM CHLORIDE 40 MEQ: 750 TABLET, EXTENDED RELEASE ORAL at 06:48

## 2024-07-17 RX ADMIN — SODIUM CHLORIDE 125 ML/HR: 9 INJECTION, SOLUTION INTRAVENOUS at 06:50

## 2024-07-17 RX ADMIN — HYDROMORPHONE HYDROCHLORIDE 0.5 MG: 1 INJECTION, SOLUTION INTRAMUSCULAR; INTRAVENOUS; SUBCUTANEOUS at 23:13

## 2024-07-17 RX ADMIN — PIPERACILLIN AND TAZOBACTAM 3.38 G: 3; .375 INJECTION, POWDER, FOR SOLUTION INTRAVENOUS at 02:16

## 2024-07-17 RX ADMIN — HYDROMORPHONE HYDROCHLORIDE 0.5 MG: 1 INJECTION, SOLUTION INTRAMUSCULAR; INTRAVENOUS; SUBCUTANEOUS at 14:20

## 2024-07-17 RX ADMIN — HYDROMORPHONE HYDROCHLORIDE 0.5 MG: 1 INJECTION, SOLUTION INTRAMUSCULAR; INTRAVENOUS; SUBCUTANEOUS at 00:07

## 2024-07-17 RX ADMIN — ALBUTEROL SULFATE 2.5 MG: 2.5 SOLUTION RESPIRATORY (INHALATION) at 20:22

## 2024-07-17 RX ADMIN — HYDROMORPHONE HYDROCHLORIDE 0.5 MG: 1 INJECTION, SOLUTION INTRAMUSCULAR; INTRAVENOUS; SUBCUTANEOUS at 18:51

## 2024-07-17 RX ADMIN — POTASSIUM CHLORIDE 40 MEQ: 750 TABLET, EXTENDED RELEASE ORAL at 14:20

## 2024-07-17 RX ADMIN — HYDROMORPHONE HYDROCHLORIDE 0.5 MG: 1 INJECTION, SOLUTION INTRAMUSCULAR; INTRAVENOUS; SUBCUTANEOUS at 06:53

## 2024-07-17 RX ADMIN — MORPHINE SULFATE 2 MG: 2 INJECTION, SOLUTION INTRAMUSCULAR; INTRAVENOUS at 18:13

## 2024-07-17 RX ADMIN — SODIUM CHLORIDE 125 ML/HR: 9 INJECTION, SOLUTION INTRAVENOUS at 15:29

## 2024-07-17 RX ADMIN — MORPHINE SULFATE 2 MG: 2 INJECTION, SOLUTION INTRAMUSCULAR; INTRAVENOUS at 02:43

## 2024-07-17 RX ADMIN — POTASSIUM CHLORIDE 40 MEQ: 750 TABLET, EXTENDED RELEASE ORAL at 10:09

## 2024-07-17 RX ADMIN — MORPHINE SULFATE 2 MG: 2 INJECTION, SOLUTION INTRAMUSCULAR; INTRAVENOUS at 15:29

## 2024-07-17 RX ADMIN — ONDANSETRON 4 MG: 2 INJECTION INTRAMUSCULAR; INTRAVENOUS at 18:51

## 2024-07-17 RX ADMIN — MORPHINE SULFATE 2 MG: 2 INJECTION, SOLUTION INTRAMUSCULAR; INTRAVENOUS at 10:13

## 2024-07-17 RX ADMIN — PIPERACILLIN AND TAZOBACTAM 3.38 G: 3; .375 INJECTION, POWDER, FOR SOLUTION INTRAVENOUS at 10:09

## 2024-07-17 RX ADMIN — SODIUM CHLORIDE 125 ML/HR: 9 INJECTION, SOLUTION INTRAVENOUS at 23:12

## 2024-07-17 NOTE — H&P
HISTORY AND PHYSICAL   [facility]        Date of Admission: 2024  Patient Identification:  Name: Beena Vincent  Age: 33 y.o.  Sex: female  :  1990  MRN: [id number]                     Primary Care Physician: Sissy Garibay MD    Chief Complaint:  33 year old female presented to the emergency room with nausea, vomiting and diarrhea which started last night; she has not been able to keep anything down; stools are watery but not black ; she has noted some blood; no pain;     History of Present Illness:   As above    Past Medical History:  Past Medical History:   Diagnosis Date    Asthma     inhaler as needed    Cervical dysplasia     Depression     Desmoid tumor     back of right knee- pt desires to proceed with treatments after delivery    Gestational diabetes     Gestational hypertension     Iron deficiency anemia     Preeclampsia      Past Surgical History:  Past Surgical History:   Procedure Laterality Date    APPENDECTOMY N/A 10/1/2021    Procedure: APPENDECTOMY LAPAROSCOPIC;  Surgeon: Ahmet Dong Jr., MD;  Location: [facility];  Service: General;  Laterality: N/A;    KNEE ARTHROSCOPY      AGE 6    LEEP N/A 2019    Procedure: LOOP ELECTROCAUTERY EXCISION PROCEDURE;  Surgeon: Arsh Ray MD;  Location: [facility];  Service: Obstetrics/Gynecology    SOFT TISSUE BIOPSY Right 3/23/2022    Procedure: BIOPSY SOFT TISSUE THIGH / KNEE;  Surgeon: Chris Randall MD;  Location: [facility];  Service: Orthopedics;  Laterality: Right;    WISDOM TOOTH EXTRACTION        Home Meds:  Medications Prior to Admission   Medication Sig Dispense Refill Last Dose    albuterol sulfate  (90 Base) MCG/ACT inhaler Inhale 2 puffs Every 6 (Six) Hours As Needed for Wheezing or Shortness of Air. 18 g 0     ondansetron ODT (ZOFRAN-ODT) 8 MG disintegrating tablet Place 1 tablet on the tongue Every 8 (Eight) Hours As Needed for Nausea or Vomiting for up to 60 doses. 60 tablet  "5     ibuprofen (ADVIL,MOTRIN) 600 MG tablet Take 1 tablet by mouth Every 6 (Six) Hours As Needed for Mild Pain (Patient not taking: Reported on 2024) 50 tablet 3 Not Taking    PAZOPanib (Votrient) 200 MG chemo tablet Take 3 tablets by mouth Daily. (Patient not taking: Reported on 2024) 90 tablet 0 Not Taking    prochlorperazine (COMPAZINE) 10 MG tablet Take 1 tablet by mouth Every 6 (Six) Hours As Needed for Nausea or Vomiting. (Patient not taking: Reported on 2024) 30 tablet 3 Not Taking    promethazine-dextromethorphan (PROMETHAZINE-DM) 6.25-15 MG/5ML syrup Take 5 mL by mouth 4 (Four) Times a Day As Needed for Cough. (Patient not taking: Reported on 2024) 240 mL 0 Not Taking       Allergies:  Allergies   Allergen Reactions    Penicillins Nausea And Vomiting    Adhesive Tape Rash     \"SURGICAL TAPE\"  AS A CHILD     Immunizations:  Immunization History   Administered Date(s) Administered    FLUAD TRI 65YR+ 2011    Flu Vaccine Intradermal Quad 18-64YR 2011    Fluzone (or Fluarix & Flulaval for VFC) >6mos 11/10/2022    Influenza MDCK Quadrivalent with Preserative 2011    Influenza Seasonal Injectable 2011    MMR 2020    RHO (D) IG IV 2020, 2022    Rho (D) Immune Globulin 2020, 2022    Tdap 2020    flucelvax quad pfs =>4 YRS 2020     Social History:   Social History     Social History Narrative    Not on file     Social History     Socioeconomic History    Marital status: Single   Tobacco Use    Smoking status: Former     Current packs/day: 0.00     Average packs/day: 0.5 packs/day for 4.0 years (2.0 ttl pk-yrs)     Types: Cigarettes     Start date: 2016     Quit date: 2020     Years since quittin.4     Passive exposure: Never    Smokeless tobacco: Never   Vaping Use    Vaping status: Former    Substances: Nicotine    Devices: Disposable   Substance and Sexual Activity    Alcohol use: Not Currently     Comment: socially " "   Drug use: Not Currently     Types: Marijuana     Comment: daily, did not smoke during pregnancy    Sexual activity: Not Currently       Family History:  Family History   Problem Relation Age of Onset    Diabetes Father     Arthritis Maternal Grandmother     Lung cancer Maternal Grandfather     Heart attack Maternal Grandfather     Heart disease Maternal Grandfather     Stroke Maternal Grandfather     Hyperlipidemia Maternal Grandfather     Malig Hyperthermia Neg Hx     Breast cancer Neg Hx     Ovarian cancer Neg Hx     Uterine cancer Neg Hx     Colon cancer Neg Hx         Review of Systems  See history of present illness and past medical history.  Patient denies headache, dizziness, syncope, falls, trauma, change in vision, change in hearing, change in taste, changes in weight, changes in appetite, focal weakness, numbness, or paresthesia.  Patient denies chest pain, palpitations, dyspnea, orthopnea, PND, cough, sinus pressure, rhinorrhea, epistaxis, hemoptysis, nausea, vomiting,hematemesis, diarrhea, constipation or hematochezia.  Denies cold or heat intolerance, polydipsia, polyuria, polyphagia. Denies hematuria, pyuria, dysuria, hesitancy, frequency or urgency. Denies consumption of raw and under cooked meats foods or change in water source.  Denies fever, chills, sweats, night sweats.  Denies missing any routine medications. Remainder of ROS is negative.    Objective:  T Max 24 hrs: Temp (24hrs), Av.1 °F (36.7 °C), Min:97.2 °F (36.2 °C), Max:99 °F (37.2 °C)    Vitals Ranges:   Temp:  [97.2 °F (36.2 °C)-99 °F (37.2 °C)] 99 °F (37.2 °C)  Heart Rate:  [] 72  Resp:  [18-20] 18  BP: (105-125)/(71-88) 124/73      Exam:  /73 (BP Location: Left arm, Patient Position: Lying)   Pulse 72   Temp 99 °F (37.2 °C) (Oral)   Resp 18   Ht 177.8 cm (70\")   Wt 82.7 kg (182 lb 6.4 oz)   LMP 07/15/2024   SpO2 98%   BMI 26.17 kg/m²     General Appearance:    Alert, cooperative, no distress, appears stated " age   Head:    Normocephalic, without obvious abnormality, atraumatic   Eyes:    PERRL, conjunctivae/corneas clear, EOM's intact, both eyes   Ears:    Normal external ear canals, both ears   Nose:   Nares normal, septum midline, mucosa normal, no drainage    or sinus tenderness   Throat:   Lips, mucosa, and tongue normal   Neck:   Supple, symmetrical, trachea midline, no adenopathy;     thyroid:  no enlargement/tenderness/nodules; no carotid    bruit or JVD   Back:     Symmetric, no curvature, ROM normal, no CVA tenderness   Lungs:     Clear to auscultation bilaterally, respirations unlabored   Chest Wall:    No tenderness or deformity    Heart:    Regular rate and rhythm, S1 and S2 normal, no murmur, rub   or gallop   Abdomen:     Soft, nontender, bowel sounds active all four quadrants,     no masses, no hepatomegaly, no splenomegaly   Extremities:   Extremities normal, atraumatic, no cyanosis or edema   Pulses:   2+ and symmetric all extremities   Skin:   Skin color, texture, turgor normal, no rashes or lesions   Lymph nodes:   Cervical, supraclavicular, and axillary nodes normal   Neurologic:   CNII-XII intact, normal strength, sensation intact throughout      .    Data Review:  Labs in chart were reviewed.  WBC   Date Value Ref Range Status   07/16/2024 14.52 (H) 3.40 - 10.80 10*3/mm3 Final     Hemoglobin   Date Value Ref Range Status   07/16/2024 14.1 12.0 - 15.9 g/dL Final     Hematocrit   Date Value Ref Range Status   07/16/2024 42.4 34.0 - 46.6 % Final     Platelets   Date Value Ref Range Status   07/16/2024 351 140 - 450 10*3/mm3 Final     Sodium   Date Value Ref Range Status   07/16/2024 139 136 - 145 mmol/L Final     Potassium   Date Value Ref Range Status   07/16/2024 3.4 (L) 3.5 - 5.2 mmol/L Final     Chloride   Date Value Ref Range Status   07/16/2024 103 98 - 107 mmol/L Final     CO2   Date Value Ref Range Status   07/16/2024 22.7 22.0 - 29.0 mmol/L Final     BUN   Date Value Ref Range Status    07/16/2024 11 6 - 20 mg/dL Final     Creatinine   Date Value Ref Range Status   07/16/2024 0.89 0.57 - 1.00 mg/dL Final     Glucose   Date Value Ref Range Status   07/16/2024 116 (H) 65 - 99 mg/dL Final     Calcium   Date Value Ref Range Status   07/16/2024 9.0 8.6 - 10.5 mg/dL Final     AST (SGOT)   Date Value Ref Range Status   07/16/2024 14 1 - 32 U/L Final     ALT (SGPT)   Date Value Ref Range Status   07/16/2024 9 1 - 33 U/L Final     Alkaline Phosphatase   Date Value Ref Range Status   07/16/2024 50 39 - 117 U/L Final                Imaging Results (All)       Procedure Component Value Units Date/Time    CT Abdomen Pelvis With Contrast [[id number]] Collected: 07/16/24 1936     Updated: 07/16/24 1948    Narrative:      CT ABDOMEN PELVIS W CONTRAST-     HISTORY: 33 years of age, Female.  Abdominal pain with nausea vomiting  diarrhea.  Pain is mainly in the left lower side of the abdomen.     TECHNIQUE:  CT includes axial imaging from the lung bases to the  trochanters with intravenous contrast and without use of oral contrast.  Data reconstructed in coronal and sagittal planes. Radiation dose  reduction techniques were utilized, including automated exposure control  and exposure modulation based on body size.     COMPARISON: CT abdomen and pelvis 06/08/2023     FINDINGS: Lung bases appear clear.     There is mild ascites with minimal perihepatic fluid anterior to the  medial segment left lobe. Free fluid is present in the pelvis that is  greater than typically seen for normal physiology. Free fluid surrounds  the uterus and right adnexa. Small bowel loops extend adjacent to the  right adnexa and are difficult to differentiate from the right adnexa.  Left ovary appears within normal limits and the uterus appears within  normal limits.     There is abnormal wall thickening involving the descending colon and  proximal sigmoid colon with mild pericolonic stranding/inflammation  consistent with acute colitis. No  evidence for bowel obstruction.        Impression:      1. Abnormal wall thickening involving the descending colon and proximal  sigmoid colon consistent with acute colitis. There is mild pericolonic  stranding/inflammation.  2. Mild ascites with greater volume of fluid within the pelvis than  typically seen and mild fluid extending adjacent to the liver. Fluid  surrounds the right adnexa and there are small bowel loops in the region  of the right adnexa limiting evaluation of the right ovary and if there  is suspicion for right ovarian abnormality, pelvic/transvaginal sonogram  could be performed for further evaluation.     Radiation dose reduction techniques were utilized, including automated  exposure control and exposure modulation based on body size.        This report was finalized on 7/16/2024 7:45 PM by Dr. Damien Mccarthy M.D on Workstation: [id number]                 Assessment:  Active Hospital Problems    Diagnosis  POA    **Colitis [K52.9]  Yes    Acute colitis [K52.9]  Yes      Resolved Hospital Problems   No resolved problems to display.   Nausea and vomiting  Diarrhea  Hematochezia  Abdominal pain    Plan:  Ask gi to see her  Continue antibiotics  Trend labs heme occult stool  Fluids  Dw patient and ed provider    Soo Mast MD  7/16/2024  23:53 EDT

## 2024-07-17 NOTE — PLAN OF CARE
Goal Outcome Evaluation:  Plan of Care Reviewed With: patient        Progress: no change  Outcome Evaluation: vitals stable.  pt expressed pain.  meds given per orders.  stool specimen sent.  bloody stool noted.  gastroenterology consulted.  will continue to monitor.

## 2024-07-17 NOTE — ED NOTES
Nursing report ED to floor  Beena Vincent  33 y.o.  female    HPI :  HPI (Adult)  Stated Reason for Visit: abd pain  History Obtained From: patient    Chief Complaint  Chief Complaint   Patient presents with    Abdominal Pain   Context: Beena Vincent is a 33 y.o. female who presents to the ED c/o this patient's symptoms started yesterday evening.  Started with some abdominal pain and then soon after vomiting.  Is vomited about 10 times.  Has not had anything to eat or drink since yesterday afternoon.  There was no blood in the vomit.  She mainly has pain in her left lower quadrant but a little bit of pain all over.  Then started with diarrhea soon after the vomiting.  She has had more episodes of diarrhea then she has had vomiting.  Greater than 10 episodes of diarrhea.  It is watery and has now become blood-tinged.  She has not been on any recent antibiotics.  She has had no travel history recently.  She is unaware of any bad food that she is consumed she has not consumed any raw or uncooked food or any shellfish.  Unaware of any ill contacts.  Again she is currently not on any chemotherapeutic medicine and has been off for 6 to 8 months.  She had some chills last night but no definitive fever.  Denies chest pain or shortness of breath.  She has had an appendectomy in the past but no other previous bowel surgery.  The pain is worse when you press on her belly as well as worse when she has the vomiting and the diarrhea.  Denies any coughs or colds.  No headaches or any focal weakness to arms or legs.     Admitting doctor:   Soo Mast MD    Admitting diagnosis:   The encounter diagnosis was Acute colitis.    Code status:   Current Code Status       Date Active Code Status Order ID Comments User Context       7/16/2024 1946 CPR (Attempt to Resuscitate) [id number]  Soo Mast MD ED        Question Answer    Code Status (Patient has no pulse and is not breathing) CPR (Attempt to Resuscitate)     Medical Interventions (Patient has pulse or is breathing) Full                    Allergies:   Penicillins and Adhesive tape    Isolation:   No active isolations    Intake and Output    Intake/Output Summary (Last 24 hours) at 7/16/2024 2006  Last data filed at 7/16/2024 1527  Gross per 24 hour   Intake 1000 ml   Output --   Net 1000 ml       Weight:       07/16/24  1254   Weight: 79.4 kg (175 lb)       Most recent vitals:   Vitals:    07/16/24 1401 07/16/24 1531 07/16/24 1928 07/16/24 1929   BP: 122/79 105/88     Pulse: 74  76 73   Resp: 18      Temp:       TempSrc:       SpO2:   98% 97%   Weight:       Height:           Active LDAs/IV Access:   Lines, Drains & Airways       Active LDAs       Name Placement date Placement time Site Days    Peripheral IV 07/16/24 1321 Right Antecubital 07/16/24  1321  Antecubital  less than 1                    Labs (abnormal labs have a star):   Labs Reviewed   COMPREHENSIVE METABOLIC PANEL - Abnormal; Notable for the following components:       Result Value    Glucose 116 (*)     Potassium 3.4 (*)     All other components within normal limits    Narrative:     GFR Normal >60  Chronic Kidney Disease <60  Kidney Failure <15     URINALYSIS W/ MICROSCOPIC IF INDICATED (NO CULTURE) - Abnormal; Notable for the following components:    Ketones, UA 15 mg/dL (1+) (*)     Blood, UA Moderate (2+) (*)     Protein, UA 30 mg/dL (1+) (*)     All other components within normal limits   CBC WITH AUTO DIFFERENTIAL - Abnormal; Notable for the following components:    WBC 14.52 (*)     Neutrophil % 80.1 (*)     Lymphocyte % 13.8 (*)     Eosinophil % 0.1 (*)     Neutrophils, Absolute 11.63 (*)     Immature Grans, Absolute 0.06 (*)     All other components within normal limits   LIPASE - Normal   HCG, SERUM, QUALITATIVE - Normal   LACTIC ACID, PLASMA - Normal   PROCALCITONIN - Normal    Narrative:     As a Marker for Sepsis (Non-Neonates):    1. <0.5 ng/mL represents a low risk of severe sepsis and/or  "septic shock.  2. >2 ng/mL represents a high risk of severe sepsis and/or septic shock.    As a Marker for Lower Respiratory Tract Infections that require antibiotic therapy:    PCT on Admission    Antibiotic Therapy       6-12 Hrs later    >0.5                Strongly Recommended  >0.25 - <0.5        Recommended   0.1 - 0.25          Discouraged              Remeasure/reassess PCT  <0.1                Strongly Discouraged     Remeasure/reassess PCT    As 28 day mortality risk marker: \"Change in Procalcitonin Result\" (>80% or <=80%) if Day 0 (or Day 1) and Day 4 values are available. Refer to http://[email or web address]-pct-calculator.[email or web address]    Change in PCT <=80%  A decrease of PCT levels below or equal to 80% defines a positive change in PCT test result representing a higher risk for 28-day all-cause mortality of patients diagnosed with severe sepsis for septic shock.    Change in PCT >80%  A decrease of PCT levels of more than 80% defines a negative change in PCT result representing a lower risk for 28-day all-cause mortality of patients diagnosed with severe sepsis or septic shock.      GASTROINTESTINAL PANEL, PCR (PREFERRED) DOES NOT INCLUDE CDIFF   BLOOD CULTURE   BLOOD CULTURE   RAINBOW DRAW    Narrative:     The following orders were created for panel order [place] Draw.  Procedure                               Abnormality         Status                     ---------                               -----------         ------                     Green Top (Gel)[[id number]]                                  Final result               Lavender Top[[id number]]                                     Final result               Gold Top - SST[[id number]]                                                              Light Blue Top[[id number]]                                   Final result                 Please view results for these tests on the individual orders.   URINALYSIS, MICROSCOPIC ONLY   CBC AND DIFFERENTIAL    Narrative:     The " following orders were created for panel order CBC & Differential.  Procedure                               Abnormality         Status                     ---------                               -----------         ------                     CBC Auto Differential[[id number]]        Abnormal            Final result                 Please view results for these tests on the individual orders.   GREEN TOP   LAVENDER TOP   LIGHT BLUE TOP       EKG:   No orders to display       Meds given in ED:   Medications   sodium chloride 0.9 % flush 10 mL (has no administration in time range)   sodium chloride 0.9 % infusion (0 mL/hr Intravenous Hold 7/16/24 1459)   acetaminophen (TYLENOL) tablet 650 mg (has no administration in time range)     Or   acetaminophen (TYLENOL) 160 MG/5ML oral solution 650 mg (has no administration in time range)     Or   acetaminophen (TYLENOL) suppository 650 mg (has no administration in time range)   ondansetron ODT (ZOFRAN-ODT) disintegrating tablet 4 mg (has no administration in time range)     Or   ondansetron (ZOFRAN) injection 4 mg (has no administration in time range)   melatonin tablet 2.5 mg (has no administration in time range)   piperacillin-tazobactam (ZOSYN) 3.375 g IVPB in 100 mL NS MBP (CD) (has no administration in time range)   sodium chloride 0.9 % bolus 1,000 mL (0 mL Intravenous Stopped 7/16/24 1527)   HYDROmorphone (DILAUDID) injection 0.5 mg (0.5 mg Intravenous Given 7/16/24 1458)   ondansetron (ZOFRAN) injection 4 mg (4 mg Intravenous Given 7/16/24 1458)   HYDROmorphone (DILAUDID) injection 0.5 mg (0.5 mg Intravenous Given 7/16/24 1649)   iopamidol (ISOVUE-300) 61 % injection 85 mL (85 mL Intravenous Given 7/16/24 1908)       Imaging results:  CT Abdomen Pelvis With Contrast    Result Date: 7/16/2024  1. Abnormal wall thickening involving the descending colon and proximal sigmoid colon consistent with acute colitis. There is mild pericolonic stranding/inflammation. 2. Mild ascites  with greater volume of fluid within the pelvis than typically seen and mild fluid extending adjacent to the liver. Fluid surrounds the right adnexa and there are small bowel loops in the region of the right adnexa limiting evaluation of the right ovary and if there is suspicion for right ovarian abnormality, pelvic/transvaginal sonogram could be performed for further evaluation.  Radiation dose reduction techniques were utilized, including automated exposure control and exposure modulation based on body size.   This report was finalized on 2024 7:45 PM by Dr. Damien Mccarthy M.D on Workstation: [email or web address]       Ambulatory status:   - independent    Social issues:   Social History     Socioeconomic History    Marital status: Single   Tobacco Use    Smoking status: Former     Current packs/day: 0.00     Average packs/day: 0.5 packs/day for 4.0 years (2.0 ttl pk-yrs)     Types: Cigarettes     Start date: 2016     Quit date: 2020     Years since quittin.4     Passive exposure: Never    Smokeless tobacco: Never   Vaping Use    Vaping status: Former    Substances: Nicotine    Devices: Disposable   Substance and Sexual Activity    Alcohol use: Not Currently     Comment: socially    Drug use: Not Currently     Types: Marijuana     Comment: daily, did not smoke during pregnancy    Sexual activity: Not Currently       Peripheral Neurovascular  Peripheral Neurovascular (Adult)  Peripheral Neurovascular WDL: WDL    Neuro Cognitive  Neuro Cognitive (Adult)  Cognitive/Neuro/Behavioral WDL: .WDL except, mood/behavior  Mood/Behavior: other (see comments) (tearful)    Learning  Learning Assessment (Adult)  Learning Readiness and Ability: no barriers identified    Respiratory  Respiratory WDL  Respiratory WDL: WDL    Abdominal Pain       Pain Assessments  Pain (Adult)  (0-10) Pain Rating: Rest: 7  (0-10) Pain Rating: Activity: 8  Pain Location: abdomen  Pain Description: constant, sharp, cramping  Response to Pain  Interventions: intervention not effective per patient    NIH Stroke Scale       Mauricio Diaz RN  07/16/24 20:06 EDT

## 2024-07-17 NOTE — CONSULTS
[facility]  Initial Inpatient Consult Note    Referring Provider:     Soo Mast MD       Reason for Consultation:     bloody stool       Subjective     History of present illness:    33 y.o. female who presented to the ED with abdominal pain and associated nausea, vomiting and diarrhea.    Seen this morning, patient reports that Monday night she had sudden onset of nausea, multiple episodes of vomiting and multiple episodes of diarrhea.  She reports that she had many episodes of diarrhea and then started noticing large amounts of bright red blood in the toilet.  She reports a cramping on her left side associated with noting the bright red blood in the toilet.  She has had decreased appetite since this started.  Denies fevers, chills, sick contacts or consuming any questionable food.  GI PCR has so far been negative.  She has not ever had a colonoscopy and denies any family history of colon cancer, Crohn's disease or ulcerative colitis.    Hemoglobin 14.1 yesterday down to 11.6 today.  WBC elevated at 11.17 today but improved from 14.52 yesterday.    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;  Service: General;  Laterality: N/A;    KNEE ARTHROSCOPY      AGE 6    LEEP N/A 4/25/2019    Procedure: LOOP ELECTROCAUTERY EXCISION PROCEDURE;  Surgeon: Arsh Ray MD;  Location: [facility];  Service: Obstetrics/Gynecology    SOFT TISSUE BIOPSY Right 3/23/2022    Procedure: BIOPSY SOFT  TISSUE THIGH / KNEE;  Surgeon: Chris Randall MD;  Location: [facility] OR;  Service: Orthopedics;  Laterality: Right;    WISDOM TOOTH EXTRACTION        Social History:   Social History     Tobacco Use    Smoking status: Former     Current packs/day: 0.00     Average packs/day: 0.5 packs/day for 4.0 years (2.0 ttl pk-yrs)     Types: Cigarettes     Start date: 2016     Quit date: 2020     Years since quittin.4     Passive exposure: Never    Smokeless tobacco: Never   Substance Use Topics    Alcohol use: Not Currently     Comment: socially      Family History:  Family History   Problem Relation Age of Onset    Diabetes Father     Arthritis Maternal Grandmother     Lung cancer Maternal Grandfather     Heart attack Maternal Grandfather     Heart disease Maternal Grandfather     Stroke Maternal Grandfather     Hyperlipidemia Maternal Grandfather     Malig Hyperthermia Neg Hx     Breast cancer Neg Hx     Ovarian cancer Neg Hx     Uterine cancer Neg Hx     Colon cancer Neg Hx        Home Meds:  Medications Prior to Admission   Medication Sig Dispense Refill Last Dose    albuterol sulfate  (90 Base) MCG/ACT inhaler Inhale 2 puffs Every 6 (Six) Hours As Needed for Wheezing or Shortness of Air. 18 g 0     ondansetron ODT (ZOFRAN-ODT) 8 MG disintegrating tablet Place 1 tablet on the tongue Every 8 (Eight) Hours As Needed for Nausea or Vomiting for up to 60 doses. 60 tablet 5     ibuprofen (ADVIL,MOTRIN) 600 MG tablet Take 1 tablet by mouth Every 6 (Six) Hours As Needed for Mild Pain (Patient not taking: Reported on 2024) 50 tablet 3 Not Taking    PAZOPanib (Votrient) 200 MG chemo tablet Take 3 tablets by mouth Daily. (Patient not taking: Reported on 2024) 90 tablet 0 Not Taking    prochlorperazine (COMPAZINE) 10 MG tablet Take 1 tablet by mouth Every 6 (Six) Hours As Needed for Nausea or Vomiting. (Patient not taking: Reported on 2024) 30 tablet 3 Not Taking    promethazine-dextromethorphan  "(PROMETHAZINE-DM) 6.25-15 MG/5ML syrup Take 5 mL by mouth 4 (Four) Times a Day As Needed for Cough. (Patient not taking: Reported on 7/16/2024) 240 mL 0 Not Taking     Current Meds:   piperacillin-tazobactam, 3.375 g, Intravenous, Q8H  potassium chloride ER, 40 mEq, Oral, Q4H      Allergies:  Allergies   Allergen Reactions    Penicillins Nausea And Vomiting    Adhesive Tape Rash     \"SURGICAL TAPE\"  AS A CHILD       Objective     Vital Signs  Temp:  [97.2 °F (36.2 °C)-99 °F (37.2 °C)] 98.2 °F (36.8 °C)  Heart Rate:  [] 64  Resp:  [16-20] 18  BP: (104-125)/(63-88) 112/71    Physical Exam:   General: patient awake, alert and cooperative   Eyes: Normal lids and lashes, no scleral icterus   Cardiovascular: regular rhythm and rate   Pulm: clear to auscultation bilaterally, regular and unlabored   Abdomen: soft, generalized tenderness to palpation but worse on the left side in upper and lower quadrants, nondistended; normal bowel sounds    Results Review:   I reviewed the patient's new clinical results.    Results from last 7 days   Lab Units 07/17/24  0437 07/16/24  1318   WBC 10*3/mm3 11.17* 14.52*   HEMOGLOBIN g/dL 11.6* 14.1   HEMATOCRIT % 35.9 42.4   PLATELETS 10*3/mm3 257 351     Results from last 7 days   Lab Units 07/17/24  0437 07/16/24  1318   SODIUM mmol/L 135* 139   POTASSIUM mmol/L 3.0* 3.4*   CHLORIDE mmol/L 104 103   CO2 mmol/L 24.1 22.7   BUN mg/dL 7 11   CREATININE mg/dL 0.83 0.89   CALCIUM mg/dL 8.2* 9.0   BILIRUBIN mg/dL  --  0.8   ALK PHOS U/L  --  50   ALT (SGPT) U/L  --  9   AST (SGOT) U/L  --  14   GLUCOSE mg/dL 98 116*         Lab Results   Lab Value Date/Time    LIPASE 16 07/16/2024 1318    LIPASE 20 07/13/2023 2359    LIPASE 25 06/08/2023 1516    LIPASE 26 10/01/2021 0959       Radiology:  CT Abdomen Pelvis With Contrast   Final Result   1. Abnormal wall thickening involving the descending colon and proximal   sigmoid colon consistent with acute colitis. There is mild pericolonic "   stranding/inflammation.   2. Mild ascites with greater volume of fluid within the pelvis than   typically seen and mild fluid extending adjacent to the liver. Fluid   surrounds the right adnexa and there are small bowel loops in the region   of the right adnexa limiting evaluation of the right ovary and if there   is suspicion for right ovarian abnormality, pelvic/transvaginal sonogram   could be performed for further evaluation.       Radiation dose reduction techniques were utilized, including automated   exposure control and exposure modulation based on body size.           This report was finalized on 7/16/2024 7:45 PM by Dr. Damien Mccarthy M.D on Workstation: [id number]              Assessment & Plan   Active Hospital Problems    Diagnosis     **Colitis     Acute colitis        Assessment:  Abdominal pain  Nausea/vomiting  Diarrhea - colitis on CT     Plan:  Patient denied any further blood per rectum today upon discussion. She would benefit from colonoscopy at some point - preferably in 6-8 weeks given findings of colitis on CT scan. Will  monitor for further bleeding   Recommend continuing supportive care per primary team  Monitor Hgb   Can use bentyl as needed for abdominal cramping     Patient and plan of care discussed w/ attending, Dr. Duran.     Imelda Noe PA-C

## 2024-07-17 NOTE — PLAN OF CARE
Goal Outcome Evaluation:  Plan of Care Reviewed With: patient        Progress: no change  Outcome Evaluation: Continued treatment for colitis. C/o abdominal cramping pain in the LLQ exacerbated with bowel movements. PRN dilaudid and morphine provided. Bentyl added per GI. Had 2 bloody, crampy bowel movements today. Potassium to be rechecked tonight. IV fluids and Zosyn continued. Remains on clear liquid diet with no issues. No further issues at this time.

## 2024-07-18 ENCOUNTER — APPOINTMENT (OUTPATIENT)
Dept: ULTRASOUND IMAGING | Facility: HOSPITAL | Age: 34
DRG: 392 | End: 2024-07-18
Payer: MEDICAID

## 2024-07-18 PROBLEM — K62.5 BRBPR (BRIGHT RED BLOOD PER RECTUM): Status: ACTIVE | Noted: 2024-07-16

## 2024-07-18 LAB
ANION GAP SERPL CALCULATED.3IONS-SCNC: 13 MMOL/L (ref 5–15)
BASOPHILS # BLD AUTO: 0.04 10*3/MM3 (ref 0–0.2)
BASOPHILS NFR BLD AUTO: 0.4 % (ref 0–1.5)
BUN SERPL-MCNC: 4 MG/DL (ref 6–20)
BUN/CREAT SERPL: 5.5 (ref 7–25)
CALCIUM SPEC-SCNC: 8 MG/DL (ref 8.6–10.5)
CALCIUM SPEC-SCNC: 8.2 MG/DL (ref 8.6–10.5)
CHLORIDE SERPL-SCNC: 105 MMOL/L (ref 98–107)
CO2 SERPL-SCNC: 18 MMOL/L (ref 22–29)
CREAT SERPL-MCNC: 0.73 MG/DL (ref 0.57–1)
DEPRECATED RDW RBC AUTO: 38.1 FL (ref 37–54)
EGFRCR SERPLBLD CKD-EPI 2021: 112.9 ML/MIN/1.73
EOSINOPHIL # BLD AUTO: 0.29 10*3/MM3 (ref 0–0.4)
EOSINOPHIL NFR BLD AUTO: 2.7 % (ref 0.3–6.2)
ERYTHROCYTE [DISTWIDTH] IN BLOOD BY AUTOMATED COUNT: 12.2 % (ref 12.3–15.4)
GLUCOSE SERPL-MCNC: 78 MG/DL (ref 65–99)
HCT VFR BLD AUTO: 34.5 % (ref 34–46.6)
HGB BLD-MCNC: 11.2 G/DL (ref 12–15.9)
IMM GRANULOCYTES # BLD AUTO: 0.04 10*3/MM3 (ref 0–0.05)
IMM GRANULOCYTES NFR BLD AUTO: 0.4 % (ref 0–0.5)
LYMPHOCYTES # BLD AUTO: 2.03 10*3/MM3 (ref 0.7–3.1)
LYMPHOCYTES NFR BLD AUTO: 19.2 % (ref 19.6–45.3)
MAGNESIUM SERPL-MCNC: 2.1 MG/DL (ref 1.6–2.6)
MCH RBC QN AUTO: 27.6 PG (ref 26.6–33)
MCHC RBC AUTO-ENTMCNC: 32.5 G/DL (ref 31.5–35.7)
MCV RBC AUTO: 85 FL (ref 79–97)
MONOCYTES # BLD AUTO: 0.56 10*3/MM3 (ref 0.1–0.9)
MONOCYTES NFR BLD AUTO: 5.3 % (ref 5–12)
NEUTROPHILS NFR BLD AUTO: 7.61 10*3/MM3 (ref 1.7–7)
NEUTROPHILS NFR BLD AUTO: 72 % (ref 42.7–76)
NRBC BLD AUTO-RTO: 0 /100 WBC (ref 0–0.2)
PHOSPHATE SERPL-MCNC: 2.4 MG/DL (ref 2.5–4.5)
PLATELET # BLD AUTO: 226 10*3/MM3 (ref 140–450)
PMV BLD AUTO: 10.2 FL (ref 6–12)
POTASSIUM SERPL-SCNC: 4.1 MMOL/L (ref 3.5–5.2)
POTASSIUM SERPL-SCNC: 4.2 MMOL/L (ref 3.5–5.2)
RBC # BLD AUTO: 4.06 10*6/MM3 (ref 3.77–5.28)
SODIUM SERPL-SCNC: 136 MMOL/L (ref 136–145)
WBC NRBC COR # BLD AUTO: 10.57 10*3/MM3 (ref 3.4–10.8)

## 2024-07-18 PROCEDURE — 25010000002 PIPERACILLIN SOD-TAZOBACTAM PER 1 G: Performed by: INTERNAL MEDICINE

## 2024-07-18 PROCEDURE — 93976 VASCULAR STUDY: CPT

## 2024-07-18 PROCEDURE — 82310 ASSAY OF CALCIUM: CPT | Performed by: STUDENT IN AN ORGANIZED HEALTH CARE EDUCATION/TRAINING PROGRAM

## 2024-07-18 PROCEDURE — 76856 US EXAM PELVIC COMPLETE: CPT

## 2024-07-18 PROCEDURE — 25010000002 MORPHINE PER 10 MG: Performed by: NURSE PRACTITIONER

## 2024-07-18 PROCEDURE — 83735 ASSAY OF MAGNESIUM: CPT | Performed by: STUDENT IN AN ORGANIZED HEALTH CARE EDUCATION/TRAINING PROGRAM

## 2024-07-18 PROCEDURE — 84132 ASSAY OF SERUM POTASSIUM: CPT | Performed by: STUDENT IN AN ORGANIZED HEALTH CARE EDUCATION/TRAINING PROGRAM

## 2024-07-18 PROCEDURE — 76830 TRANSVAGINAL US NON-OB: CPT

## 2024-07-18 PROCEDURE — 84100 ASSAY OF PHOSPHORUS: CPT | Performed by: STUDENT IN AN ORGANIZED HEALTH CARE EDUCATION/TRAINING PROGRAM

## 2024-07-18 PROCEDURE — 25010000002 HYDROMORPHONE PER 4 MG: Performed by: INTERNAL MEDICINE

## 2024-07-18 PROCEDURE — 25810000003 SODIUM CHLORIDE 0.9 % SOLUTION: Performed by: EMERGENCY MEDICINE

## 2024-07-18 PROCEDURE — 99232 SBSQ HOSP IP/OBS MODERATE 35: CPT

## 2024-07-18 PROCEDURE — 80048 BASIC METABOLIC PNL TOTAL CA: CPT | Performed by: STUDENT IN AN ORGANIZED HEALTH CARE EDUCATION/TRAINING PROGRAM

## 2024-07-18 PROCEDURE — 85025 COMPLETE CBC W/AUTO DIFF WBC: CPT | Performed by: STUDENT IN AN ORGANIZED HEALTH CARE EDUCATION/TRAINING PROGRAM

## 2024-07-18 RX ADMIN — HYDROMORPHONE HYDROCHLORIDE 0.5 MG: 1 INJECTION, SOLUTION INTRAMUSCULAR; INTRAVENOUS; SUBCUTANEOUS at 22:44

## 2024-07-18 RX ADMIN — HYDROMORPHONE HYDROCHLORIDE 0.5 MG: 1 INJECTION, SOLUTION INTRAMUSCULAR; INTRAVENOUS; SUBCUTANEOUS at 03:47

## 2024-07-18 RX ADMIN — SODIUM CHLORIDE 125 ML/HR: 9 INJECTION, SOLUTION INTRAVENOUS at 07:32

## 2024-07-18 RX ADMIN — MORPHINE SULFATE 2 MG: 2 INJECTION, SOLUTION INTRAMUSCULAR; INTRAVENOUS at 12:50

## 2024-07-18 RX ADMIN — HYDROMORPHONE HYDROCHLORIDE 0.5 MG: 1 INJECTION, SOLUTION INTRAMUSCULAR; INTRAVENOUS; SUBCUTANEOUS at 15:58

## 2024-07-18 RX ADMIN — PIPERACILLIN AND TAZOBACTAM 3.38 G: 3; .375 INJECTION, POWDER, FOR SOLUTION INTRAVENOUS at 09:44

## 2024-07-18 RX ADMIN — MORPHINE SULFATE 2 MG: 2 INJECTION, SOLUTION INTRAMUSCULAR; INTRAVENOUS at 21:10

## 2024-07-18 RX ADMIN — MORPHINE SULFATE 2 MG: 2 INJECTION, SOLUTION INTRAMUSCULAR; INTRAVENOUS at 06:23

## 2024-07-18 RX ADMIN — PIPERACILLIN AND TAZOBACTAM 3.38 G: 3; .375 INJECTION, POWDER, FOR SOLUTION INTRAVENOUS at 02:10

## 2024-07-18 RX ADMIN — DICYCLOMINE HYDROCHLORIDE 20 MG: 10 CAPSULE ORAL at 03:49

## 2024-07-18 RX ADMIN — HYDROMORPHONE HYDROCHLORIDE 0.5 MG: 1 INJECTION, SOLUTION INTRAMUSCULAR; INTRAVENOUS; SUBCUTANEOUS at 09:53

## 2024-07-18 RX ADMIN — PIPERACILLIN AND TAZOBACTAM 3.38 G: 3; .375 INJECTION, POWDER, FOR SOLUTION INTRAVENOUS at 21:10

## 2024-07-18 RX ADMIN — SODIUM CHLORIDE 125 ML/HR: 9 INJECTION, SOLUTION INTRAVENOUS at 15:54

## 2024-07-18 RX ADMIN — DICYCLOMINE HYDROCHLORIDE 20 MG: 10 CAPSULE ORAL at 15:58

## 2024-07-18 NOTE — CASE MANAGEMENT/SOCIAL WORK
Continued Stay Note  [facility]     Patient Name: Beena Vincent  MRN: [id number]  Today's Date: 7/18/2024    Admit Date: 7/16/2024    Plan: Home with family. Denies any needs. Family transport at D/C.   Discharge Plan       Row Name 07/18/24 1710       Plan    Plan Home with family. Denies any needs. Family transport at D/C.    Patient/Family in Agreement with Plan yes    Plan Comments Met with pt. at bedside. Explained roll of . Face sheet and pharmacy verified. Pt lives with her 3 children, ages 16 months, 4yrs, and 12 yrs. Old, and her father.  Denies any Home DME.  Pt is independent with ADLs and primary caregiver for her 3 children. Pt has never been to Rehab or used HH. Pt's PCP is Sissy Garibay MD. Enrolled in meds to bed. Pt drives herself to appointments. At discharge, pt's mother will transport. Denies any D/C needs at this time. Explained that CCP would follow to assess for discharge needs.  Oneil Richard RN-BC                   Discharge Codes    No documentation.                 Expected Discharge Date and Time       Expected Discharge Date Expected Discharge Time    Jul 19, 2024               Oneil Richard RN

## 2024-07-18 NOTE — PLAN OF CARE
Goal Outcome Evaluation:  Plan of Care Reviewed With: patient           Outcome Evaluation: Transvaginal US completed this afternoon. On clear liquid diet, NPO at midnight for flexible sigmoidoscopy tomorrow. Contact spore isolation in place for c. diff r/o; unable to produce stool sample at this time. Dilaudid given x2, morphine x1, bentyl x1 this shift. Receiving zosyn Q8H. VSS.

## 2024-07-18 NOTE — PROGRESS NOTES
Name: Beena Vincent ADMIT: 2024   : 1990  PCP: Sissy Garibay MD    MRN: [id number] LOS: 2 days   AGE/SEX: 33 y.o. female  ROOM: [facility]     Subjective     Ni significant changes. She is endoring diarrhea with tarry and bloody stools.   Objective   Objective   Vital Signs  Temp:  [97.6 °F (36.4 °C)-98.8 °F (37.1 °C)] 97.6 °F (36.4 °C)  Heart Rate:  [66-88] 66  Resp:  [16-18] 18  BP: ()/(38-73) 104/66  SpO2:  [97 %-100 %] 97 %  on   ;   Device (Oxygen Therapy): room air  Body mass index is 26.27 kg/m².  Physical Exam  Constitutional:       General: She is not in acute distress.  HENT:      Head: Normocephalic and atraumatic.   Cardiovascular:      Rate and Rhythm: Normal rate and regular rhythm.   Pulmonary:      Effort: Pulmonary effort is normal. No respiratory distress.   Abdominal:      General: There is no distension.      Tenderness: There is abdominal tenderness.   Neurological:      General: No focal deficit present.      Mental Status: She is alert and oriented to person, place, and time.         Results Review     I reviewed the patient's new clinical results.  Results from last 7 days   Lab Units 24  0941 24  0437 24  1318   WBC 10*3/mm3 10.57 11.17* 14.52*   HEMOGLOBIN g/dL 11.2* 11.6* 14.1   PLATELETS 10*3/mm3 226 257 351     Results from last 7 days   Lab Units 24  0313 24  0437 24  1318   SODIUM mmol/L 136  --  135* 139   POTASSIUM mmol/L 4.2  4.1 4.1 3.0* 3.4*   CHLORIDE mmol/L 105  --  104 103   CO2 mmol/L 18.0*  --  24.1 22.7   BUN mg/dL 4*  --  7 11   CREATININE mg/dL 0.73  --  0.83 0.89   GLUCOSE mg/dL 78  --  98 116*   Estimated Creatinine Clearance: 131 mL/min (by C-G formula based on SCr of 0.73 mg/dL).  Results from last 7 days   Lab Units 24  1318   ALBUMIN g/dL 4.5   BILIRUBIN mg/dL 0.8   ALK PHOS U/L 50   AST (SGOT) U/L 14   ALT (SGPT) U/L 9     Results from last 7 days   Lab Units 24  0313  "07/17/24  0437 07/16/24  1318   CALCIUM mg/dL 8.2*  8.0* 8.2* 9.0   ALBUMIN g/dL  --   --  4.5   MAGNESIUM mg/dL 2.1  --   --    PHOSPHORUS mg/dL 2.4*  --   --      Results from last 7 days   Lab Units 07/16/24  1513 07/16/24  1318   PROCALCITONIN ng/mL  --  0.17   LACTATE mmol/L 1.1  --      COVID19   Date Value Ref Range Status   07/17/2022 Detected (C) Not Detected - Ref. Range Final   03/23/2022 Not Detected Not Detected - Ref. Range Final     No results found for: \"HGBA1C\", \"POCGLU\"  Results for orders placed or performed during the hospital encounter of 07/16/24   Blood Culture - Blood, Arm, Left    Specimen: Arm, Left; Blood   Result Value Ref Range    Blood Culture No growth at 24 hours          CT Abdomen Pelvis With Contrast  Narrative: CT ABDOMEN PELVIS W CONTRAST-     HISTORY: 33 years of age, Female.  Abdominal pain with nausea vomiting  diarrhea.  Pain is mainly in the left lower side of the abdomen.     TECHNIQUE:  CT includes axial imaging from the lung bases to the  trochanters with intravenous contrast and without use of oral contrast.  Data reconstructed in coronal and sagittal planes. Radiation dose  reduction techniques were utilized, including automated exposure control  and exposure modulation based on body size.     COMPARISON: CT abdomen and pelvis 06/08/2023     FINDINGS: Lung bases appear clear.     There is mild ascites with minimal perihepatic fluid anterior to the  medial segment left lobe. Free fluid is present in the pelvis that is  greater than typically seen for normal physiology. Free fluid surrounds  the uterus and right adnexa. Small bowel loops extend adjacent to the  right adnexa and are difficult to differentiate from the right adnexa.  Left ovary appears within normal limits and the uterus appears within  normal limits.     There is abnormal wall thickening involving the descending colon and  proximal sigmoid colon with mild pericolonic stranding/inflammation  consistent " with acute colitis. No evidence for bowel obstruction.      Impression: 1. Abnormal wall thickening involving the descending colon and proximal  sigmoid colon consistent with acute colitis. There is mild pericolonic  stranding/inflammation.  2. Mild ascites with greater volume of fluid within the pelvis than  typically seen and mild fluid extending adjacent to the liver. Fluid  surrounds the right adnexa and there are small bowel loops in the region  of the right adnexa limiting evaluation of the right ovary and if there  is suspicion for right ovarian abnormality, pelvic/transvaginal sonogram  could be performed for further evaluation.     Radiation dose reduction techniques were utilized, including automated  exposure control and exposure modulation based on body size.        This report was finalized on 7/16/2024 7:45 PM by Dr. Damien Mccarthy M.D on Workstation: [id number]       Scheduled Medications  piperacillin-tazobactam, 3.375 g, Intravenous, Q8H    Infusions  sodium chloride, 125 mL/hr, Last Rate: 125 mL/hr (07/18/24 0732)    Diet  Diet: Liquid; Clear Liquid; Fluid Consistency: Thin (IDDSI 0)       Assessment/Plan     Active Hospital Problems    Diagnosis  POA    **Colitis [K52.9]  Yes    Acute colitis [K52.9]  Yes      Resolved Hospital Problems   No resolved problems to display.       33 y.o. female admitted with Colitis.    Colitis  Blood cultures have been no growth to date  GI evaluated the patient.  Recommended colonoscopy in 6 to 8 weeks  Currently on Zosyn, continue Imburgia therapy for 7-day course. Gi panel PCR negative   Trend electrolytes  IV fluids   R/o C. diff    Abnormal CT scan  Fluid was seen surrounding the right adnexa.  Obtain pelvic ultrasound to rule any gynecological abnormalities.          SCDs for DVT prophylaxis.  Full code.  Discussed with patient.        Jose Greenfield MD  [facility]

## 2024-07-18 NOTE — PROGRESS NOTES
Name: Beena Vincent ADMIT: 2024   : 1990  PCP: Sissy Garibay MD    MRN: [id number] LOS: 2 days   AGE/SEX: 33 y.o. female  ROOM: [facility]     Subjective     Endorsing abdominal pain and diarrhea.   Objective   Objective   Vital Signs  Temp:  [97.6 °F (36.4 °C)-98.8 °F (37.1 °C)] 97.6 °F (36.4 °C)  Heart Rate:  [66-88] 66  Resp:  [16-18] 18  BP: ()/(38-73) 104/66  SpO2:  [97 %-100 %] 97 %  on   ;   Device (Oxygen Therapy): room air  Body mass index is 26.27 kg/m².  Physical Exam  Constitutional:       General: She is not in acute distress.  HENT:      Head: Normocephalic and atraumatic.   Cardiovascular:      Rate and Rhythm: Normal rate and regular rhythm.   Pulmonary:      Effort: Pulmonary effort is normal. No respiratory distress.   Abdominal:      General: There is no distension.      Palpations: Abdomen is soft.      Tenderness: There is no abdominal tenderness.   Neurological:      General: No focal deficit present.      Mental Status: She is alert and oriented to person, place, and time.         Results Review     I reviewed the patient's new clinical results.  Results from last 7 days   Lab Units 24  0941 24  04324  1318   WBC 10*3/mm3 10.57 11.17* 14.52*   HEMOGLOBIN g/dL 11.2* 11.6* 14.1   PLATELETS 10*3/mm3 226 257 351     Results from last 7 days   Lab Units 24  0313 24  1318   SODIUM mmol/L 136  --  135* 139   POTASSIUM mmol/L 4.2  4.1 4.1 3.0* 3.4*   CHLORIDE mmol/L 105  --  104 103   CO2 mmol/L 18.0*  --  24.1 22.7   BUN mg/dL 4*  --  7 11   CREATININE mg/dL 0.73  --  0.83 0.89   GLUCOSE mg/dL 78  --  98 116*   Estimated Creatinine Clearance: 131 mL/min (by C-G formula based on SCr of 0.73 mg/dL).  Results from last 7 days   Lab Units 24  1318   ALBUMIN g/dL 4.5   BILIRUBIN mg/dL 0.8   ALK PHOS U/L 50   AST (SGOT) U/L 14   ALT (SGPT) U/L 9     Results from last 7 days   Lab Units 24  0313 24  0439  "07/16/24  1318   CALCIUM mg/dL 8.2*  8.0* 8.2* 9.0   ALBUMIN g/dL  --   --  4.5   MAGNESIUM mg/dL 2.1  --   --    PHOSPHORUS mg/dL 2.4*  --   --      Results from last 7 days   Lab Units 07/16/24  1513 07/16/24  1318   PROCALCITONIN ng/mL  --  0.17   LACTATE mmol/L 1.1  --      COVID19   Date Value Ref Range Status   07/17/2022 Detected (C) Not Detected - Ref. Range Final   03/23/2022 Not Detected Not Detected - Ref. Range Final     No results found for: \"HGBA1C\", \"POCGLU\"  Results for orders placed or performed during the hospital encounter of 07/16/24   Blood Culture - Blood, Arm, Left    Specimen: Arm, Left; Blood   Result Value Ref Range    Blood Culture No growth at 24 hours          CT Abdomen Pelvis With Contrast  Narrative: CT ABDOMEN PELVIS W CONTRAST-     HISTORY: 33 years of age, Female.  Abdominal pain with nausea vomiting  diarrhea.  Pain is mainly in the left lower side of the abdomen.     TECHNIQUE:  CT includes axial imaging from the lung bases to the  trochanters with intravenous contrast and without use of oral contrast.  Data reconstructed in coronal and sagittal planes. Radiation dose  reduction techniques were utilized, including automated exposure control  and exposure modulation based on body size.     COMPARISON: CT abdomen and pelvis 06/08/2023     FINDINGS: Lung bases appear clear.     There is mild ascites with minimal perihepatic fluid anterior to the  medial segment left lobe. Free fluid is present in the pelvis that is  greater than typically seen for normal physiology. Free fluid surrounds  the uterus and right adnexa. Small bowel loops extend adjacent to the  right adnexa and are difficult to differentiate from the right adnexa.  Left ovary appears within normal limits and the uterus appears within  normal limits.     There is abnormal wall thickening involving the descending colon and  proximal sigmoid colon with mild pericolonic stranding/inflammation  consistent with acute " colitis. No evidence for bowel obstruction.      Impression: 1. Abnormal wall thickening involving the descending colon and proximal  sigmoid colon consistent with acute colitis. There is mild pericolonic  stranding/inflammation.  2. Mild ascites with greater volume of fluid within the pelvis than  typically seen and mild fluid extending adjacent to the liver. Fluid  surrounds the right adnexa and there are small bowel loops in the region  of the right adnexa limiting evaluation of the right ovary and if there  is suspicion for right ovarian abnormality, pelvic/transvaginal sonogram  could be performed for further evaluation.     Radiation dose reduction techniques were utilized, including automated  exposure control and exposure modulation based on body size.        This report was finalized on 7/16/2024 7:45 PM by Dr. Damien Mccarthy M.D on Workstation: [id number]       Scheduled Medications  piperacillin-tazobactam, 3.375 g, Intravenous, Q8H    Infusions  sodium chloride, 125 mL/hr, Last Rate: 125 mL/hr (07/18/24 0732)    Diet  Diet: Liquid; Clear Liquid; Fluid Consistency: Thin (IDDSI 0)       Assessment/Plan     Active Hospital Problems    Diagnosis  POA    **Colitis [K52.9]  Yes    Acute colitis [K52.9]  Yes      Resolved Hospital Problems   No resolved problems to display.       33 y.o. female admitted with Colitis.    Continue Abx  GI to see   Pelvic ultrasound to investigate abnormal CT scan      SCDs for DVT prophylaxis.  Full code.  Discussed with patient.        Jose Greenfield MD  [facility]

## 2024-07-18 NOTE — CASE MANAGEMENT/SOCIAL WORK
Discharge Planning Assessment  [facility]     Patient Name: Beena Vincent  MRN: [id number]  Today's Date: 7/18/2024    Admit Date: 7/16/2024    Plan: Home with family. Denies any needs. Family transport at D/C.   Discharge Needs Assessment       Row Name 07/18/24 1705       Living Environment    People in Home child(mario), dependent;parent(s)    Name(s) of People in Home Pt lives with her 3 children, ages 16 months, 4yrs, and 12 yrs Old, and her father    Current Living Arrangements home    Potentially Unsafe Housing Conditions none    Primary Care Provided by self    Provides Primary Care For child(mario)    Family Caregiver if Needed parent(s)    Quality of Family Relationships unable to assess    Able to Return to Prior Arrangements yes       Resource/Environmental Concerns    Resource/Environmental Concerns none    Transportation Concerns none       Transition Planning    Patient/Family Anticipates Transition to home with family    Patient/Family Anticipated Services at Transition none    Transportation Anticipated family or friend will provide       Discharge Needs Assessment    Readmission Within the Last 30 Days no previous admission in last 30 days    Equipment Currently Used at Home none    Concerns to be Addressed discharge planning;denies needs/concerns at this time    Anticipated Changes Related to Illness none    Equipment Needed After Discharge none    Provided Post Acute Provider List? N/A    Provided Post Acute Provider Quality & Resource List? N/A                   Discharge Plan       Row Name 07/18/24 1712       Plan    Plan Home with family. Denies any needs. Family transport at D/C.    Patient/Family in Agreement with Plan yes    Plan Comments Met with pt. at bedside. Explained roll of . Face sheet and pharmacy verified. Pt lives with her 3 children, ages 16 months, 4yrs, and 12 yrs. Old, and her father.  Denies any Home DME.  Pt is independent with ADLs and primary caregiver for her  3 children. Pt has never been to Rehab or used HH. Pt's PCP is Sissy Garibay MD. Enrolled in meds to bed. Pt drives herself to appointments. At discharge, pt's mother will transport. Denies any D/C needs at this time. Explained that CCP would follow to assess for discharge needs.  Oneil Richard RN-BC                  Continued Care and Services - Admitted Since 7/16/2024    No active coordination exists for this encounter.       Expected Discharge Date and Time       Expected Discharge Date Expected Discharge Time    Jul 19, 2024            Demographic Summary       Row Name 07/18/24 170       General Information    Admission Type inpatient    Arrived From emergency department    Reason for Consult care coordination/care conference;discharge planning    Preferred Language English                   Functional Status       Row Name 07/18/24 1705       Functional Status    Usual Activity Tolerance excellent    Current Activity Tolerance good       Functional Status, IADL    Medications independent    Meal Preparation independent    Housekeeping independent    Laundry independent    Shopping independent       Mental Status    General Appearance WDL WDL       Mental Status Summary    Recent Changes in Mental Status/Cognitive Functioning no changes       Employment/    Employment Status unemployed                          Oneil Richrad, RN

## 2024-07-18 NOTE — PLAN OF CARE
Goal Outcome Evaluation:  Plan of Care Reviewed With: patient        Progress: no change  Outcome Evaluation: vitals stable.  pt expressed pain and shortness of breath.  meds given per orders.  will continue to monitor.

## 2024-07-18 NOTE — PROGRESS NOTES
[facility]  Inpatient Progress Note    Reason for Follow Up:  Bloody stool     Subjective     Interval History:   Still having abdominal discomfort/cramping. Relieved some w/ Bentyl. She does not have an appetite and is still having diarrhea w/ dark maroon/bright red stools. She denies fevers/chills. No shortness of breath, fatigue, dizziness.     Current Facility-Administered Medications:     acetaminophen (TYLENOL) tablet 650 mg, 650 mg, Oral, Q4H PRN **OR** acetaminophen (TYLENOL) 160 MG/5ML oral solution 650 mg, 650 mg, Oral, Q4H PRN **OR** acetaminophen (TYLENOL) suppository 650 mg, 650 mg, Rectal, Q4H PRN, Soo Mast MD    albuterol (PROVENTIL) nebulizer solution 0.083% 2.5 mg/3mL, 2.5 mg, Nebulization, Q6H PRN, Soo Mast MD, 2.5 mg at 07/17/24 2022    Calcium Replacement - Follow Nurse / BPA Driven Protocol, , Does not apply, PRN, Soo Mast MD    dicyclomine (BENTYL) capsule 20 mg, 20 mg, Oral, 4x Daily PRN, Brooke Diamond MD, 20 mg at 07/18/24 0349    HYDROmorphone (DILAUDID) injection 0.5 mg, 0.5 mg, Intravenous, Q4H PRN, Soo Mast MD, 0.5 mg at 07/18/24 0953    Magnesium Standard Dose Replacement - Follow Nurse / BPA Driven Protocol, , Does not apply, PRN, Soo Mast MD    melatonin tablet 2.5 mg, 2.5 mg, Oral, Nightly PRNPro Renate Andrea, MD    morphine injection 2 mg, 2 mg, Intravenous, Q2H PRN, Emma Santana APRN, 2 mg at 07/18/24 1250    ondansetron ODT (ZOFRAN-ODT) disintegrating tablet 4 mg, 4 mg, Oral, Q6H PRN **OR** ondansetron (ZOFRAN) injection 4 mg, 4 mg, Intravenous, Q6H PRN, Soo Mast MD, 4 mg at 07/17/24 1851    Phosphorus Replacement - Follow Nurse / BPA Driven Protocol, , Does not apply, Pro CASTELAN Renate Andrea, MD    piperacillin-tazobactam (ZOSYN) 3.375 g IVPB in 100 mL NS MBP (CD), 3.375 g, Intravenous, Q8H, Soo Mast MD, 3.375 g at 07/18/24 3640     Potassium Replacement - Follow Nurse / BPA Driven Protocol, , Does not apply, PRN, StinglSoo MD    sodium chloride 0.9 % flush 10 mL, 10 mL, Intravenous, PRN, Sunny Lewis MD    sodium chloride 0.9 % infusion, 125 mL/hr, Intravenous, Continuous, Sunny Lewis MD, Last Rate: 125 mL/hr at 07/18/24 0732, 125 mL/hr at 07/18/24 0732      Objective     Vital Signs  Temp:  [97.6 °F (36.4 °C)-98.8 °F (37.1 °C)] 97.6 °F (36.4 °C)  Heart Rate:  [66-88] 66  Resp:  [16-18] 18  BP: ()/(38-71) 104/66  Body mass index is 26.27 kg/m².    Intake/Output Summary (Last 24 hours) at 7/18/2024 1339  Last data filed at 7/18/2024 0944  Gross per 24 hour   Intake 4144 ml   Output --   Net 4144 ml     I/O this shift:  In: 100 [IV Piggyback:100]  Out: -      Physical Exam:   General: patient awake, alert and cooperative   Eyes: Normal lids and lashes, no scleral icterus   Skin: warm and dry, not jaundiced   Pulm: regular and unlabored   Abdomen: soft, LLQ tenderness to palpation, nondistended; normal bowel sounds     Results Review:     I reviewed the patient's new clinical results.    Results from last 7 days   Lab Units 07/18/24  0941 07/17/24 0437 07/16/24  1318   WBC 10*3/mm3 10.57 11.17* 14.52*   HEMOGLOBIN g/dL 11.2* 11.6* 14.1   HEMATOCRIT % 34.5 35.9 42.4   PLATELETS 10*3/mm3 226 257 351     Results from last 7 days   Lab Units 07/18/24  0313 07/17/24 2011 07/17/24 0437 07/16/24  1318   SODIUM mmol/L 136  --  135* 139   POTASSIUM mmol/L 4.2  4.1 4.1 3.0* 3.4*   CHLORIDE mmol/L 105  --  104 103   CO2 mmol/L 18.0*  --  24.1 22.7   BUN mg/dL 4*  --  7 11   CREATININE mg/dL 0.73  --  0.83 0.89   CALCIUM mg/dL 8.2*  8.0*  --  8.2* 9.0   BILIRUBIN mg/dL  --   --   --  0.8   ALK PHOS U/L  --   --   --  50   ALT (SGPT) U/L  --   --   --  9   AST (SGOT) U/L  --   --   --  14   GLUCOSE mg/dL 78  --  98 116*         Lab Results   Lab Value Date/Time    LIPASE 16 07/16/2024 1318    LIPASE 20 07/13/2023 2359    LIPASE  25 06/08/2023 1516    LIPASE 26 10/01/2021 0959       Radiology:  CT Abdomen Pelvis With Contrast   Final Result   1. Abnormal wall thickening involving the descending colon and proximal   sigmoid colon consistent with acute colitis. There is mild pericolonic   stranding/inflammation.   2. Mild ascites with greater volume of fluid within the pelvis than   typically seen and mild fluid extending adjacent to the liver. Fluid   surrounds the right adnexa and there are small bowel loops in the region   of the right adnexa limiting evaluation of the right ovary and if there   is suspicion for right ovarian abnormality, pelvic/transvaginal sonogram   could be performed for further evaluation.       Radiation dose reduction techniques were utilized, including automated   exposure control and exposure modulation based on body size.           This report was finalized on 7/16/2024 7:45 PM by Dr. Damien Mccarthy M.D on Workstation: [id number]           Non-ob Transvaginal    (Results Pending)       Assessment & Plan     Active Hospital Problems    Diagnosis     **Colitis     Acute colitis        Assessment:  Abdominal pain  Nausea/vomiting  Diarrhea - colitis on CT     Plan:  Given ongoing symptoms will plan for flex sig tomorrow for further evaluation. Risks/benefits discussed with patient and she is agreeable.   NPO after midnight  Continue supoprtive care per primary   Bentyl every 4 hours as needed for abdominal cramping   Monitor Hgb and transfuse as needed per primary team   On antibiotics     Patient and plan of care discussed with attending, Dr. Duran.     Imelda Noe PA-C

## 2024-07-19 LAB
C DIFF TOX GENS STL QL NAA+PROBE: NEGATIVE
GLUCOSE BLDC GLUCOMTR-MCNC: 94 MG/DL (ref 70–130)

## 2024-07-19 PROCEDURE — 45331 SIGMOIDOSCOPY AND BIOPSY: CPT | Performed by: INTERNAL MEDICINE

## 2024-07-19 PROCEDURE — 0DBG8ZX EXCISION OF LEFT LARGE INTESTINE, VIA NATURAL OR ARTIFICIAL OPENING ENDOSCOPIC, DIAGNOSTIC: ICD-10-PCS | Performed by: INTERNAL MEDICINE

## 2024-07-19 PROCEDURE — 25010000002 PIPERACILLIN SOD-TAZOBACTAM PER 1 G: Performed by: INTERNAL MEDICINE

## 2024-07-19 PROCEDURE — 25010000002 MORPHINE PER 10 MG: Performed by: NURSE PRACTITIONER

## 2024-07-19 PROCEDURE — 25010000002 ONDANSETRON PER 1 MG: Performed by: INTERNAL MEDICINE

## 2024-07-19 PROCEDURE — 88342 IMHCHEM/IMCYTCHM 1ST ANTB: CPT | Performed by: STUDENT IN AN ORGANIZED HEALTH CARE EDUCATION/TRAINING PROGRAM

## 2024-07-19 PROCEDURE — 88305 TISSUE EXAM BY PATHOLOGIST: CPT | Performed by: STUDENT IN AN ORGANIZED HEALTH CARE EDUCATION/TRAINING PROGRAM

## 2024-07-19 PROCEDURE — 25010000002 HYDROMORPHONE 1 MG/ML SOLUTION: Performed by: INTERNAL MEDICINE

## 2024-07-19 PROCEDURE — 25810000003 SODIUM CHLORIDE 0.9 % SOLUTION: Performed by: EMERGENCY MEDICINE

## 2024-07-19 PROCEDURE — 87493 C DIFF AMPLIFIED PROBE: CPT | Performed by: STUDENT IN AN ORGANIZED HEALTH CARE EDUCATION/TRAINING PROGRAM

## 2024-07-19 PROCEDURE — 82948 REAGENT STRIP/BLOOD GLUCOSE: CPT

## 2024-07-19 PROCEDURE — 25010000002 HYDROMORPHONE PER 4 MG: Performed by: INTERNAL MEDICINE

## 2024-07-19 RX ADMIN — MORPHINE SULFATE 2 MG: 2 INJECTION, SOLUTION INTRAMUSCULAR; INTRAVENOUS at 05:29

## 2024-07-19 RX ADMIN — PIPERACILLIN AND TAZOBACTAM 3.38 G: 3; .375 INJECTION, POWDER, FOR SOLUTION INTRAVENOUS at 17:40

## 2024-07-19 RX ADMIN — PIPERACILLIN AND TAZOBACTAM 3.38 G: 3; .375 INJECTION, POWDER, FOR SOLUTION INTRAVENOUS at 01:42

## 2024-07-19 RX ADMIN — SODIUM CHLORIDE 125 ML/HR: 9 INJECTION, SOLUTION INTRAVENOUS at 17:41

## 2024-07-19 RX ADMIN — ONDANSETRON 4 MG: 2 INJECTION INTRAMUSCULAR; INTRAVENOUS at 21:43

## 2024-07-19 RX ADMIN — MORPHINE SULFATE 2 MG: 2 INJECTION, SOLUTION INTRAMUSCULAR; INTRAVENOUS at 09:23

## 2024-07-19 RX ADMIN — MORPHINE SULFATE 2 MG: 2 INJECTION, SOLUTION INTRAMUSCULAR; INTRAVENOUS at 01:42

## 2024-07-19 RX ADMIN — HYDROMORPHONE HYDROCHLORIDE 0.5 MG: 1 INJECTION, SOLUTION INTRAMUSCULAR; INTRAVENOUS; SUBCUTANEOUS at 17:38

## 2024-07-19 RX ADMIN — PIPERACILLIN AND TAZOBACTAM 3.38 G: 3; .375 INJECTION, POWDER, FOR SOLUTION INTRAVENOUS at 09:23

## 2024-07-19 RX ADMIN — HYDROMORPHONE HYDROCHLORIDE 0.5 MG: 1 INJECTION, SOLUTION INTRAMUSCULAR; INTRAVENOUS; SUBCUTANEOUS at 21:29

## 2024-07-19 RX ADMIN — SODIUM CHLORIDE 125 ML/HR: 9 INJECTION, SOLUTION INTRAVENOUS at 09:23

## 2024-07-19 RX ADMIN — HYDROMORPHONE HYDROCHLORIDE 0.5 MG: 1 INJECTION, SOLUTION INTRAMUSCULAR; INTRAVENOUS; SUBCUTANEOUS at 13:55

## 2024-07-19 NOTE — PLAN OF CARE
Problem: Adult Inpatient Plan of Care  Goal: Plan of Care Review  Outcome: Ongoing, Progressing   Goal Outcome Evaluation:   Vss.Had sigmoidoscopy today.Remains on continuous ivf's, iv abx.Clear liquid diet.remains in contact spore isolation for r/o c-diff.Stool specimen sent.Prn dilaudid and prn morphine given as ordered for c/o left lower abdominal pain.No c/o n/v.Up ad shante.

## 2024-07-19 NOTE — PLAN OF CARE
Goal Outcome Evaluation:  Plan of Care Reviewed With: patient        Progress: improving  Outcome Evaluation: NPO since midnight. PRN pain meds given. Spore contact isolation maintained. Call light within reach.

## 2024-07-19 NOTE — DOWNTIME EVENT NOTE
The EMR was down for 15 hours on 7/19/2024.    Zulema PAUL was responsible for completing the paper charting during this time period.     The following information was re-entered into the system by Zulema Park RN: Orders and MAR.    The following information will remain in the paper chart: flowsheet assessments and safety rounds.    Zulema Park RN  7/19/2024

## 2024-07-20 LAB
ALBUMIN SERPL-MCNC: 3.8 G/DL (ref 3.5–5.2)
ALBUMIN/GLOB SERPL: 1.5 G/DL
ALP SERPL-CCNC: 48 U/L (ref 39–117)
ALT SERPL W P-5'-P-CCNC: 71 U/L (ref 1–33)
ANION GAP SERPL CALCULATED.3IONS-SCNC: 14 MMOL/L (ref 5–15)
AST SERPL-CCNC: 90 U/L (ref 1–32)
BILIRUB SERPL-MCNC: 1.1 MG/DL (ref 0–1.2)
BUN SERPL-MCNC: 3 MG/DL (ref 6–20)
BUN/CREAT SERPL: 5 (ref 7–25)
CALCIUM SPEC-SCNC: 8.7 MG/DL (ref 8.6–10.5)
CHLORIDE SERPL-SCNC: 103 MMOL/L (ref 98–107)
CO2 SERPL-SCNC: 20 MMOL/L (ref 22–29)
CREAT SERPL-MCNC: 0.6 MG/DL (ref 0.57–1)
DEPRECATED RDW RBC AUTO: 35.7 FL (ref 37–54)
EGFRCR SERPLBLD CKD-EPI 2021: 123.2 ML/MIN/1.73
ERYTHROCYTE [DISTWIDTH] IN BLOOD BY AUTOMATED COUNT: 11.9 % (ref 12.3–15.4)
GLOBULIN UR ELPH-MCNC: 2.5 GM/DL
GLUCOSE SERPL-MCNC: 77 MG/DL (ref 65–99)
HCT VFR BLD AUTO: 36 % (ref 34–46.6)
HGB BLD-MCNC: 12.2 G/DL (ref 12–15.9)
MAGNESIUM SERPL-MCNC: 2.9 MG/DL (ref 1.6–2.6)
MCH RBC QN AUTO: 27.8 PG (ref 26.6–33)
MCHC RBC AUTO-ENTMCNC: 33.9 G/DL (ref 31.5–35.7)
MCV RBC AUTO: 82 FL (ref 79–97)
PHOSPHATE SERPL-MCNC: 3.3 MG/DL (ref 2.5–4.5)
PLATELET # BLD AUTO: 246 10*3/MM3 (ref 140–450)
PMV BLD AUTO: 10.3 FL (ref 6–12)
POTASSIUM SERPL-SCNC: 3.8 MMOL/L (ref 3.5–5.2)
PROT SERPL-MCNC: 6.3 G/DL (ref 6–8.5)
RBC # BLD AUTO: 4.39 10*6/MM3 (ref 3.77–5.28)
SODIUM SERPL-SCNC: 137 MMOL/L (ref 136–145)
WBC NRBC COR # BLD AUTO: 7.1 10*3/MM3 (ref 3.4–10.8)

## 2024-07-20 PROCEDURE — 25010000002 HYDROMORPHONE PER 4 MG: Performed by: INTERNAL MEDICINE

## 2024-07-20 PROCEDURE — 99232 SBSQ HOSP IP/OBS MODERATE 35: CPT | Performed by: INTERNAL MEDICINE

## 2024-07-20 PROCEDURE — 83735 ASSAY OF MAGNESIUM: CPT | Performed by: STUDENT IN AN ORGANIZED HEALTH CARE EDUCATION/TRAINING PROGRAM

## 2024-07-20 PROCEDURE — 25010000002 MORPHINE PER 10 MG: Performed by: NURSE PRACTITIONER

## 2024-07-20 PROCEDURE — 80053 COMPREHEN METABOLIC PANEL: CPT | Performed by: STUDENT IN AN ORGANIZED HEALTH CARE EDUCATION/TRAINING PROGRAM

## 2024-07-20 PROCEDURE — 25810000003 SODIUM CHLORIDE 0.9 % SOLUTION: Performed by: EMERGENCY MEDICINE

## 2024-07-20 PROCEDURE — 84100 ASSAY OF PHOSPHORUS: CPT | Performed by: STUDENT IN AN ORGANIZED HEALTH CARE EDUCATION/TRAINING PROGRAM

## 2024-07-20 PROCEDURE — 85027 COMPLETE CBC AUTOMATED: CPT | Performed by: STUDENT IN AN ORGANIZED HEALTH CARE EDUCATION/TRAINING PROGRAM

## 2024-07-20 PROCEDURE — 25010000002 ONDANSETRON PER 1 MG: Performed by: INTERNAL MEDICINE

## 2024-07-20 PROCEDURE — 25010000002 PIPERACILLIN SOD-TAZOBACTAM PER 1 G: Performed by: INTERNAL MEDICINE

## 2024-07-20 RX ORDER — ESCITALOPRAM OXALATE 20 MG/1
20 TABLET ORAL DAILY
Status: DISCONTINUED | OUTPATIENT
Start: 2024-07-20 | End: 2024-07-21 | Stop reason: HOSPADM

## 2024-07-20 RX ADMIN — SODIUM CHLORIDE 125 ML/HR: 9 INJECTION, SOLUTION INTRAVENOUS at 17:44

## 2024-07-20 RX ADMIN — PIPERACILLIN AND TAZOBACTAM 3.38 G: 3; .375 INJECTION, POWDER, FOR SOLUTION INTRAVENOUS at 09:55

## 2024-07-20 RX ADMIN — SODIUM CHLORIDE 125 ML/HR: 9 INJECTION, SOLUTION INTRAVENOUS at 02:07

## 2024-07-20 RX ADMIN — HYDROMORPHONE HYDROCHLORIDE 0.5 MG: 1 INJECTION, SOLUTION INTRAMUSCULAR; INTRAVENOUS; SUBCUTANEOUS at 23:22

## 2024-07-20 RX ADMIN — ESCITALOPRAM 20 MG: 20 TABLET, FILM COATED ORAL at 14:41

## 2024-07-20 RX ADMIN — HYDROMORPHONE HYDROCHLORIDE 0.5 MG: 1 INJECTION, SOLUTION INTRAMUSCULAR; INTRAVENOUS; SUBCUTANEOUS at 08:17

## 2024-07-20 RX ADMIN — HYDROMORPHONE HYDROCHLORIDE 0.5 MG: 1 INJECTION, SOLUTION INTRAMUSCULAR; INTRAVENOUS; SUBCUTANEOUS at 14:41

## 2024-07-20 RX ADMIN — SODIUM CHLORIDE 125 ML/HR: 9 INJECTION, SOLUTION INTRAVENOUS at 11:56

## 2024-07-20 RX ADMIN — HYDROMORPHONE HYDROCHLORIDE 0.5 MG: 1 INJECTION, SOLUTION INTRAMUSCULAR; INTRAVENOUS; SUBCUTANEOUS at 19:32

## 2024-07-20 RX ADMIN — ONDANSETRON 4 MG: 2 INJECTION INTRAMUSCULAR; INTRAVENOUS at 19:31

## 2024-07-20 RX ADMIN — PIPERACILLIN AND TAZOBACTAM 3.38 G: 3; .375 INJECTION, POWDER, FOR SOLUTION INTRAVENOUS at 17:33

## 2024-07-20 RX ADMIN — PIPERACILLIN AND TAZOBACTAM 3.38 G: 3; .375 INJECTION, POWDER, FOR SOLUTION INTRAVENOUS at 02:07

## 2024-07-20 RX ADMIN — MORPHINE SULFATE 2 MG: 2 INJECTION, SOLUTION INTRAMUSCULAR; INTRAVENOUS at 01:06

## 2024-07-20 NOTE — PROGRESS NOTES
[facility]  Inpatient Progress Note    Reason for Follow Up: Hematochezia    Subjective     Interval History:   Patient reports no evidence of active bleeding, tolerating p.o.  Reviewed findings on endoscopic evaluation, evidently was able to be advanced to the proximal colon where inflammatory changes were noted.  Biopsies are pending.  Currently on antibiotic regimen.  Workup for C. difficile in progress.    Current Facility-Administered Medications:     acetaminophen (TYLENOL) tablet 650 mg, 650 mg, Oral, Q4H PRN **OR** acetaminophen (TYLENOL) 160 MG/5ML oral solution 650 mg, 650 mg, Oral, Q4H PRN **OR** acetaminophen (TYLENOL) suppository 650 mg, 650 mg, Rectal, Q4H PRN, Soo Mast MD    albuterol (PROVENTIL) nebulizer solution 0.083% 2.5 mg/3mL, 2.5 mg, Nebulization, Q6H PRN, Soo Mast MD, 2.5 mg at 07/17/24 2022    Calcium Replacement - Follow Nurse / BPA Driven Protocol, , Does not apply, PRN, Soo Mast MD    dicyclomine (BENTYL) capsule 20 mg, 20 mg, Oral, 4x Daily PRN, Brooke Diamond MD, 20 mg at 07/18/24 1558    HYDROmorphone (DILAUDID) injection 0.5 mg, 0.5 mg, Intravenous, Q4H PRN, Soo Mast MD, 0.5 mg at 07/20/24 0817    Magnesium Standard Dose Replacement - Follow Nurse / BPA Driven Protocol, , Does not apply, PRN, Soo Mast MD    melatonin tablet 2.5 mg, 2.5 mg, Oral, Nightly PRNPro Renate Andrea, MD    morphine injection 2 mg, 2 mg, Intravenous, Q2H PRN, Emma Santana APRN, 2 mg at 07/20/24 0106    ondansetron ODT (ZOFRAN-ODT) disintegrating tablet 4 mg, 4 mg, Oral, Q6H PRN **OR** ondansetron (ZOFRAN) injection 4 mg, 4 mg, Intravenous, Q6H PRN, Soo Mast MD, 4 mg at 07/19/24 8687    Phosphorus Replacement - Follow Nurse / BPA Driven Protocol, , Does not apply, PRN, Soo Mast MD    piperacillin-tazobactam (ZOSYN) 3.375 g IVPB in 100 mL NS MBP (CD), 3.375 g, Intravenous,  Q8H, Soo Mast MD, 3.375 g at 07/20/24 0207    Potassium Replacement - Follow Nurse / BPA Driven Protocol, , Does not apply, PRN, Soo Mast MD    sodium chloride 0.9 % flush 10 mL, 10 mL, Intravenous, PRN, Sunny Lewis MD    sodium chloride 0.9 % infusion, 125 mL/hr, Intravenous, Continuous, Sunny Lewis MD, Last Rate: 125 mL/hr at 07/20/24 0207, 125 mL/hr at 07/20/24 0207  Review of Systems:    All systems were reviewed and negative except for:  Gastrointestinal: positive for  See HPI    Objective     Vital Signs  Temp:  [97.9 °F (36.6 °C)-98.6 °F (37 °C)] 98.1 °F (36.7 °C)  Heart Rate:  [53-75] 67  Resp:  [18] 18  BP: (114-133)/(67-93) 114/72  Body mass index is 25.58 kg/m².    Intake/Output Summary (Last 24 hours) at 7/20/2024 0858  Last data filed at 7/20/2024 0805  Gross per 24 hour   Intake 3594.17 ml   Output --   Net 3594.17 ml     No intake/output data recorded.     Physical Exam:   General: patient awake, alert and cooperative   Eyes: Normal lids and lashes, no scleral icterus   Neck: supple, normal ROM   Skin: warm and dry, not jaundiced   Cardiovascular: regular rhythm and rate, no murmurs auscultated   Pulm: clear to auscultation bilaterally, regular and unlabored   Abdomen: soft, nontender, nondistended; normal bowel sounds   Extremities: no rash or edema   Psychiatric: Normal mood and behavior; memory intact     Results Review:     I reviewed the patient's new clinical results.    Results from last 7 days   Lab Units 07/20/24  0323 07/18/24  0941 07/17/24  0437   WBC 10*3/mm3 7.10 10.57 11.17*   HEMOGLOBIN g/dL 12.2 11.2* 11.6*   HEMATOCRIT % 36.0 34.5 35.9   PLATELETS 10*3/mm3 246 226 257     Results from last 7 days   Lab Units 07/20/24  0323 07/18/24  0313 07/17/24 2011 07/17/24  0437 07/16/24  1318   SODIUM mmol/L 137 136  --  135* 139   POTASSIUM mmol/L 3.8 4.2  4.1 4.1 3.0* 3.4*   CHLORIDE mmol/L 103 105  --  104 103   CO2 mmol/L 20.0* 18.0*  --  24.1 22.7   BUN  mg/dL 3* 4*  --  7 11   CREATININE mg/dL 0.60 0.73  --  0.83 0.89   CALCIUM mg/dL 8.7 8.2*  8.0*  --  8.2* 9.0   BILIRUBIN mg/dL 1.1  --   --   --  0.8   ALK PHOS U/L 48  --   --   --  50   ALT (SGPT) U/L 71*  --   --   --  9   AST (SGOT) U/L 90*  --   --   --  14   GLUCOSE mg/dL 77 78  --  98 116*         Lab Results   Lab Value Date/Time    LIPASE 16 07/16/2024 1318    LIPASE 20 07/13/2023 2359    LIPASE 25 06/08/2023 1516    LIPASE 26 10/01/2021 0959       Radiology:  US Pelvis Transvaginal Non OB   Final Result       1. Normal uterus and ovaries.   2. Mild to moderate free fluid in the pelvis       This report was finalized on 7/18/2024 4:02 PM by Dr. Arvin Ba M.D on Workstation: [id number]          CT Abdomen Pelvis With Contrast   Final Result   1. Abnormal wall thickening involving the descending colon and proximal   sigmoid colon consistent with acute colitis. There is mild pericolonic   stranding/inflammation.   2. Mild ascites with greater volume of fluid within the pelvis than   typically seen and mild fluid extending adjacent to the liver. Fluid   surrounds the right adnexa and there are small bowel loops in the region   of the right adnexa limiting evaluation of the right ovary and if there   is suspicion for right ovarian abnormality, pelvic/transvaginal sonogram   could be performed for further evaluation.       Radiation dose reduction techniques were utilized, including automated   exposure control and exposure modulation based on body size.           This report was finalized on 7/16/2024 7:45 PM by Dr. Damien Mccarthy M.D on Workstation: [id number]          US Testicular or Ovarian Vascular Limited    (Results Pending)       Assessment & Plan     Active Hospital Problems    Diagnosis     **Colitis     Acute colitis     BRBPR (bright red blood per rectum)        Assessment:  Colitis: Acute process, etiology yet to be defined.  Abdominal pain: Improved  Nausea with vomiting:  Subsiding      Plan:  Awaiting path results as well as outstanding lab results  Continue current medical regimen for the present  I discussed the patients findings and my recommendations with patient.    Arvin Avina MD

## 2024-07-20 NOTE — PROGRESS NOTES
Name: Beena Vincent ADMIT: 2024   : 1990  PCP: Sissy Garibay MD    MRN: [id number] LOS: 4 days   AGE/SEX: 33 y.o. female  ROOM: [facility]     Subjective   Subjective   Patient lying comfortably in bed.  Ready to eat more than clear liquids.  Nausea is improved.  No vomiting.  Last bowel movement was yesterday without any blood.  Abdominal pain improving.  Patient eager to leave the hospital.    Review of Systems  As above     Objective   Objective   Vital Signs  Temp:  [97.9 °F (36.6 °C)-98.6 °F (37 °C)] 98.4 °F (36.9 °C)  Heart Rate:  [53-75] 70  Resp:  [18] 18  BP: (114-133)/(67-93) 120/80  SpO2:  [99 %-100 %] 99 %  on   ;   Device (Oxygen Therapy): room air  Body mass index is 25.58 kg/m².  Physical Exam  Constitutional:       General: She is not in acute distress.     Appearance: She is not ill-appearing.   Cardiovascular:      Rate and Rhythm: Normal rate and regular rhythm.   Pulmonary:      Effort: Pulmonary effort is normal. No respiratory distress.   Abdominal:      General: Abdomen is flat. There is no distension.      Tenderness: There is abdominal tenderness (Mild).   Musculoskeletal:         General: No swelling or deformity. Normal range of motion.   Skin:     General: Skin is warm and dry.   Neurological:      General: No focal deficit present.      Mental Status: She is alert. Mental status is at baseline.         Results Review     I reviewed the patient's new clinical results.  Results from last 7 days   Lab Units 24  0323 24  0941 24  0437 24  1318   WBC 10*3/mm3 7.10 10.57 11.17* 14.52*   HEMOGLOBIN g/dL 12.2 11.2* 11.6* 14.1   PLATELETS 10*3/mm3 246 226 257 351     Results from last 7 days   Lab Units 24  0323 24  0313 24  0437 24  1318   SODIUM mmol/L 137 136  --  135* 139   POTASSIUM mmol/L 3.8 4.2  4.1 4.1 3.0* 3.4*   CHLORIDE mmol/L 103 105  --  104 103   CO2 mmol/L 20.0* 18.0*  --  24.1 22.7   BUN mg/dL 3* 4*   --  7 11   CREATININE mg/dL 0.60 0.73  --  0.83 0.89   GLUCOSE mg/dL 77 78  --  98 116*   Estimated Creatinine Clearance: 173.5 mL/min (by C-G formula based on SCr of 0.6 mg/dL).  Results from last 7 days   Lab Units 07/20/24  0323 07/16/24  1318   ALBUMIN g/dL 3.8 4.5   BILIRUBIN mg/dL 1.1 0.8   ALK PHOS U/L 48 50   AST (SGOT) U/L 90* 14   ALT (SGPT) U/L 71* 9     Results from last 7 days   Lab Units 07/20/24  0323 07/18/24  0313 07/17/24  0437 07/16/24  1318   CALCIUM mg/dL 8.7 8.2*  8.0* 8.2* 9.0   ALBUMIN g/dL 3.8  --   --  4.5   MAGNESIUM mg/dL 2.9* 2.1  --   --    PHOSPHORUS mg/dL 3.3 2.4*  --   --      Results from last 7 days   Lab Units 07/16/24  1513 07/16/24  1318   PROCALCITONIN ng/mL  --  0.17   LACTATE mmol/L 1.1  --      COVID19   Date Value Ref Range Status   07/17/2022 Detected (C) Not Detected - Ref. Range Final   03/23/2022 Not Detected Not Detected - Ref. Range Final     Glucose   Date/Time Value Ref Range Status   07/19/2024 0827 94 70 - 130 mg/dL Final       US Pelvis Transvaginal Non OB  Narrative: Ultrasound pelvis complete     INDICATION: Abnormal CT. Pelvic fluid.     TECHNIQUE:  Real-time transabdominal imaging for general overview of the  pelvis followed by transvaginal imaging for more detailed evaluation of  the pelvic organs and adnexa. Grayscale and color-flow imaging  performed. Representative images were saved for review.     FINDINGS:  UTERUS:  Position: Anteverted.  Size: 8.7 x 3.5 x 6.0 cm. No mass seen.  Myometrium: Homogeneous echotexture.  Endometrium: 0.9 cm. Normal. No discrete mucosal lesion or abnormal  vascularity.  Cervix: Unremarkable.     RIGHT OVARY: 3.3 x 2.1 x 2.3 cm.  Normal echotexture. Color flow is  present. Contains normal follicles.     LEFT OVARY: 3.2 x 1.5 x 3.0 cm.  Normal echotexture. Color flow is  present. Contains normal follicles.     FREE FLUID: Mild to moderate amount of free fluid seen in the cul-de-sac  and adnexa.     Impression:    1. Normal  uterus and ovaries.  2. Mild to moderate free fluid in the pelvis     This report was finalized on 7/18/2024 4:02 PM by Dr. Arvin Ba M.D on Workstation: [id number]       I reviewed the patient's daily medications.  Scheduled Medications  escitalopram, 20 mg, Oral, Daily  piperacillin-tazobactam, 3.375 g, Intravenous, Q8H    Infusions  sodium chloride, 125 mL/hr, Last Rate: 125 mL/hr (07/20/24 1156)    Diet  Diet: Gastrointestinal; Fiber-Restricted; Texture: Soft to Chew (NDD 3); Soft to Chew: Whole Meat; Fluid Consistency: Thin (IDDSI 0)         I have personally reviewed:  [x]  Laboratory   [x]  Microbiology   [x]  Radiology   []  EKG/Telemetry   []  Cardiology/Vascular   []  Pathology   [x]  Records     Assessment/Plan     Active Hospital Problems    Diagnosis  POA    **Colitis [K52.9]  Yes    Acute colitis [K52.9]  Yes    BRBPR (bright red blood per rectum) [K62.5]  Unknown      Resolved Hospital Problems   No resolved problems to display.       33 y.o. female admitted with Colitis.    Acute colitis  Blood cultures have been no growth to date  GI evaluated the patient-flex sig done on 7/19 few biopsies were done.  No source of bleeding  Currently on Zosyn, plan for 7-day course. Gi panel PCR negative.  C. difficile negative.  Trend electrolytes  IV fluids   Advance diet as tolerated  Hgb uptrending       Abnormal CT scan  Fluid was seen surrounding the right adnexa.  Obtain pelvic ultrasound okay    Depression-restarting Lexapro at patient's request.    SCDs for DVT prophylaxis.  Full code.  Discussed with patient, family, and nursing staff.  Anticipate discharge home tomorrow.  If tolerating a diet and okay with GI    Expected Discharge Date: 7/20/2024; Expected Discharge Time:       Aden Bull MD  [facility]  07/20/24  13:55 EDT

## 2024-07-20 NOTE — PLAN OF CARE
Goal Outcome Evaluation:  Plan of Care Reviewed With: patient           Outcome Evaluation: Clear liquid. C/o intermittent abdominal pain more on the left. Nausea at times.IVF.Abx therapy. VSS

## 2024-07-20 NOTE — PLAN OF CARE
Goal Outcome Evaluation:  Plan of Care Reviewed With: patient        Progress: improving  Outcome Evaluation: Vitals stable. Prn dilaudid given. Pt upadlib. IVF and IV abx cont'd. Pt tolerating soft chew diet. Pt needs met and questions answered. Will continue to monitor.

## 2024-07-21 ENCOUNTER — READMISSION MANAGEMENT (OUTPATIENT)
Dept: CALL CENTER | Facility: HOSPITAL | Age: 34
End: 2024-07-21
Payer: MEDICAID

## 2024-07-21 VITALS
DIASTOLIC BLOOD PRESSURE: 74 MMHG | HEIGHT: 70 IN | BODY MASS INDEX: 25.48 KG/M2 | HEART RATE: 61 BPM | SYSTOLIC BLOOD PRESSURE: 115 MMHG | RESPIRATION RATE: 16 BRPM | OXYGEN SATURATION: 98 % | TEMPERATURE: 97.5 F | WEIGHT: 178 LBS

## 2024-07-21 LAB
ALBUMIN SERPL-MCNC: 3.7 G/DL (ref 3.5–5.2)
ALBUMIN/GLOB SERPL: 1.5 G/DL
ALP SERPL-CCNC: 49 U/L (ref 39–117)
ALT SERPL W P-5'-P-CCNC: 57 U/L (ref 1–33)
ANION GAP SERPL CALCULATED.3IONS-SCNC: 9 MMOL/L (ref 5–15)
AST SERPL-CCNC: 50 U/L (ref 1–32)
BACTERIA SPEC AEROBE CULT: NORMAL
BACTERIA SPEC AEROBE CULT: NORMAL
BILIRUB SERPL-MCNC: 0.7 MG/DL (ref 0–1.2)
BUN SERPL-MCNC: 4 MG/DL (ref 6–20)
BUN/CREAT SERPL: 6.7 (ref 7–25)
CALCIUM SPEC-SCNC: 8.7 MG/DL (ref 8.6–10.5)
CHLORIDE SERPL-SCNC: 103 MMOL/L (ref 98–107)
CO2 SERPL-SCNC: 22 MMOL/L (ref 22–29)
CREAT SERPL-MCNC: 0.6 MG/DL (ref 0.57–1)
DEPRECATED RDW RBC AUTO: 37.9 FL (ref 37–54)
EGFRCR SERPLBLD CKD-EPI 2021: 123.2 ML/MIN/1.73
ERYTHROCYTE [DISTWIDTH] IN BLOOD BY AUTOMATED COUNT: 12.5 % (ref 12.3–15.4)
GLOBULIN UR ELPH-MCNC: 2.4 GM/DL
GLUCOSE SERPL-MCNC: 107 MG/DL (ref 65–99)
HCT VFR BLD AUTO: 36.6 % (ref 34–46.6)
HGB BLD-MCNC: 11.9 G/DL (ref 12–15.9)
MAGNESIUM SERPL-MCNC: 2 MG/DL (ref 1.6–2.6)
MCH RBC QN AUTO: 27.2 PG (ref 26.6–33)
MCHC RBC AUTO-ENTMCNC: 32.5 G/DL (ref 31.5–35.7)
MCV RBC AUTO: 83.8 FL (ref 79–97)
PHOSPHATE SERPL-MCNC: 3.6 MG/DL (ref 2.5–4.5)
PLATELET # BLD AUTO: 246 10*3/MM3 (ref 140–450)
PMV BLD AUTO: 10 FL (ref 6–12)
POTASSIUM SERPL-SCNC: 3.9 MMOL/L (ref 3.5–5.2)
PROT SERPL-MCNC: 6.1 G/DL (ref 6–8.5)
RBC # BLD AUTO: 4.37 10*6/MM3 (ref 3.77–5.28)
SODIUM SERPL-SCNC: 134 MMOL/L (ref 136–145)
WBC NRBC COR # BLD AUTO: 6.46 10*3/MM3 (ref 3.4–10.8)

## 2024-07-21 PROCEDURE — 25810000003 SODIUM CHLORIDE 0.9 % SOLUTION: Performed by: EMERGENCY MEDICINE

## 2024-07-21 PROCEDURE — 83735 ASSAY OF MAGNESIUM: CPT | Performed by: STUDENT IN AN ORGANIZED HEALTH CARE EDUCATION/TRAINING PROGRAM

## 2024-07-21 PROCEDURE — 85027 COMPLETE CBC AUTOMATED: CPT | Performed by: STUDENT IN AN ORGANIZED HEALTH CARE EDUCATION/TRAINING PROGRAM

## 2024-07-21 PROCEDURE — 80053 COMPREHEN METABOLIC PANEL: CPT | Performed by: STUDENT IN AN ORGANIZED HEALTH CARE EDUCATION/TRAINING PROGRAM

## 2024-07-21 PROCEDURE — 25010000002 HYDROMORPHONE PER 4 MG: Performed by: INTERNAL MEDICINE

## 2024-07-21 PROCEDURE — 25010000002 PIPERACILLIN SOD-TAZOBACTAM PER 1 G: Performed by: INTERNAL MEDICINE

## 2024-07-21 PROCEDURE — 84100 ASSAY OF PHOSPHORUS: CPT | Performed by: STUDENT IN AN ORGANIZED HEALTH CARE EDUCATION/TRAINING PROGRAM

## 2024-07-21 RX ORDER — ONDANSETRON 8 MG/1
8 TABLET, ORALLY DISINTEGRATING ORAL EVERY 8 HOURS PRN
Qty: 21 TABLET | Refills: 0 | Status: SHIPPED | OUTPATIENT
Start: 2024-07-21 | End: 2024-07-28

## 2024-07-21 RX ORDER — ESCITALOPRAM OXALATE 20 MG/1
20 TABLET ORAL DAILY
Qty: 30 TABLET | Refills: 0 | Status: SHIPPED | OUTPATIENT
Start: 2024-07-22 | End: 2024-07-21

## 2024-07-21 RX ORDER — LEVOFLOXACIN 750 MG/1
750 TABLET, FILM COATED ORAL DAILY
Qty: 3 TABLET | Refills: 0 | Status: SHIPPED | OUTPATIENT
Start: 2024-07-21 | End: 2024-07-24

## 2024-07-21 RX ORDER — DICYCLOMINE HYDROCHLORIDE 10 MG/1
20 CAPSULE ORAL 4 TIMES DAILY PRN
Qty: 21 CAPSULE | Refills: 0 | Status: SHIPPED | OUTPATIENT
Start: 2024-07-21 | End: 2024-07-28

## 2024-07-21 RX ORDER — ESCITALOPRAM OXALATE 20 MG/1
20 TABLET ORAL DAILY
Qty: 30 TABLET | Refills: 0 | Status: SHIPPED | OUTPATIENT
Start: 2024-07-22 | End: 2024-08-21

## 2024-07-21 RX ORDER — LEVOFLOXACIN 750 MG/1
750 TABLET, FILM COATED ORAL DAILY
Qty: 3 TABLET | Refills: 0 | Status: SHIPPED | OUTPATIENT
Start: 2024-07-21 | End: 2024-07-21

## 2024-07-21 RX ORDER — ONDANSETRON 8 MG/1
8 TABLET, ORALLY DISINTEGRATING ORAL EVERY 8 HOURS PRN
Qty: 21 TABLET | Refills: 0 | Status: SHIPPED | OUTPATIENT
Start: 2024-07-21 | End: 2024-07-21

## 2024-07-21 RX ORDER — CALCIUM CARBONATE 500 MG/1
2 TABLET, CHEWABLE ORAL 3 TIMES DAILY PRN
Status: DISCONTINUED | OUTPATIENT
Start: 2024-07-21 | End: 2024-07-21 | Stop reason: HOSPADM

## 2024-07-21 RX ORDER — DICYCLOMINE HYDROCHLORIDE 10 MG/1
20 CAPSULE ORAL 4 TIMES DAILY PRN
Qty: 21 CAPSULE | Refills: 0 | Status: SHIPPED | OUTPATIENT
Start: 2024-07-21 | End: 2024-07-21

## 2024-07-21 RX ADMIN — ESCITALOPRAM 20 MG: 20 TABLET, FILM COATED ORAL at 08:10

## 2024-07-21 RX ADMIN — SODIUM CHLORIDE 125 ML/HR: 9 INJECTION, SOLUTION INTRAVENOUS at 09:23

## 2024-07-21 RX ADMIN — HYDROMORPHONE HYDROCHLORIDE 0.5 MG: 1 INJECTION, SOLUTION INTRAMUSCULAR; INTRAVENOUS; SUBCUTANEOUS at 06:39

## 2024-07-21 RX ADMIN — ANTACID TABLETS 2 TABLET: 500 TABLET, CHEWABLE ORAL at 11:55

## 2024-07-21 RX ADMIN — HYDROMORPHONE HYDROCHLORIDE 0.5 MG: 1 INJECTION, SOLUTION INTRAMUSCULAR; INTRAVENOUS; SUBCUTANEOUS at 11:27

## 2024-07-21 RX ADMIN — PIPERACILLIN AND TAZOBACTAM 3.38 G: 3; .375 INJECTION, POWDER, FOR SOLUTION INTRAVENOUS at 01:47

## 2024-07-21 RX ADMIN — PIPERACILLIN AND TAZOBACTAM 3.38 G: 3; .375 INJECTION, POWDER, FOR SOLUTION INTRAVENOUS at 09:21

## 2024-07-21 RX ADMIN — HYDROMORPHONE HYDROCHLORIDE 0.5 MG: 1 INJECTION, SOLUTION INTRAMUSCULAR; INTRAVENOUS; SUBCUTANEOUS at 15:04

## 2024-07-21 NOTE — OUTREACH NOTE
Prep Survey      Flowsheet Row Responses   [facility] patient discharged from? [place]   Is LACE score < 7 ? No   Eligibility [facility]   Date of Admission 07/16/24   Date of Discharge 07/21/24   Discharge Disposition Home or Self Care   Discharge diagnosis Colitis   Does the patient have one of the following disease processes/diagnoses(primary or secondary)? Other   Does the patient have Home health ordered? No   Is there a DME ordered? No   Prep survey completed? Yes            Elvira KINSEY - Registered Nurse

## 2024-07-21 NOTE — PLAN OF CARE
Goal Outcome Evaluation:  Plan of Care Reviewed With: patient        Progress: improving  Outcome Evaluation: Pt stable. Pt to dc home w family

## 2024-07-21 NOTE — PLAN OF CARE
Goal Outcome Evaluation:   Dilaudid administered after she asked for it , gave her 2 doses. Able to sleep and rest after.VS stable , no other complaints made.

## 2024-07-21 NOTE — PROCEDURES
PREOPERATIVE DIAGNOSIS:  Colitis by CT scan  Rectal bleeding    POSTOPERATIVE DIAGNOSIS AND FINDINGS:  Moderately severe L sided colitis    PROCEDURE:  Flexible sigmoidoscopy to hepatic flexure    SURGEON:  Rohan Duran MD    ANESTHESIA:  MAC    SPECIMEN(S):  Left colon biopsies    DESCRIPTION    Digital rectal exam was normal. Colonoscope inserted under direct visualization to hepatic flexure.  This was an unprepared exam.  There was 10cm segment of moderately severe inflammation characterized by ulceration and erythema from splenic flexure to descending colon.  Remainder of the examined colonic mucosa was normal.  Multiple biopsies obtained from L colon.      RECOMMENDATION:  Continue current abx therapy  Await pathology results          Rohan Duran M.D.  [facility]  [address] [place] [address]  Office: [phone]

## 2024-07-21 NOTE — DISCHARGE SUMMARY
Patient Name: Beena Vincent  : 1990  MRN: [id number]    Date of Admission: 2024  Date of Discharge:  2024  Primary Care Physician: Sissy Garibay MD      Chief Complaint:   Abdominal Pain      Discharge Diagnoses     Active Hospital Problems    Diagnosis  POA    **Colitis [K52.9]  Yes    Acute colitis [K52.9]  Yes    BRBPR (bright red blood per rectum) [K62.5]  Unknown      Resolved Hospital Problems   No resolved problems to display.        Hospital Course     Ms. Vincent is a 33 y.o. female with a history of depression, desmoid tumor presenting with abdominal pain, nausea, vomiting found to have acute colitis.  C. difficile and GI panel PCR were both negative.  Had a mild leukocytosis that improved with antibiotics, plan for 7-day course of antibiotics.  Represcribed patient's Lexapro.  Patient a flex sig done on , biopsies pending.  No source of bleeding.    Had abnormal CT with fluid around the right adnexa.  Pelvic undersign was done without any abnormalities.    Patient tolerating diet with some nausea but no vomiting.  Plan to discharge home on Levaquin,  Zofran, Bentyl.    At the time of discharge patient was told to take all medications as prescribed, keep all follow-up appointments, and call their doctor or return to the hospital with any worsening or concerning symptoms.    Day of Discharge     Subjective:  Patient resting comfortably in bed.  Discussed discharge later today if okay with GI.  Had some nausea after eating dinner but no vomiting.  Ate breakfast okay without any nausea or vomiting.        Physical Exam:  Temp:  [97.5 °F (36.4 °C)-98.1 °F (36.7 °C)] 97.5 °F (36.4 °C)  Heart Rate:  [61-84] 61  Resp:  [16-18] 16  BP: (112-115)/(66-74) 115/74  Body mass index is 25.54 kg/m².  Physical Exam  Constitutional:       General: She is not in acute distress.     Appearance: She is not ill-appearing.   Cardiovascular:      Rate and Rhythm: Normal rate and regular rhythm.    Pulmonary:      Effort: Pulmonary effort is normal. No respiratory distress.   Abdominal:      General: Abdomen is flat. There is no distension.      Tenderness: There is no abdominal tenderness.   Musculoskeletal:         General: No swelling or deformity. Normal range of motion.   Skin:     General: Skin is warm and dry.   Neurological:      General: No focal deficit present.      Mental Status: She is alert. Mental status is at baseline.         Consultants     Consult Orders (all) (From admission, onward)       Start     Ordered    07/16/24 2356  Inpatient Gastroenterology Consult  Once        Specialty:  Gastroenterology  Provider:  Brooke Diamond MD    07/16/24 2356                  Procedures     Imaging Results (All)       Procedure Component Value Units Date/Time    US Pelvis Transvaginal Non OB [[id number]] Collected: 07/18/24 1558     Updated: 07/18/24 1606    Narrative:      Ultrasound pelvis complete     INDICATION: Abnormal CT. Pelvic fluid.     TECHNIQUE:  Real-time transabdominal imaging for general overview of the  pelvis followed by transvaginal imaging for more detailed evaluation of  the pelvic organs and adnexa. Grayscale and color-flow imaging  performed. Representative images were saved for review.     FINDINGS:  UTERUS:  Position: Anteverted.  Size: 8.7 x 3.5 x 6.0 cm. No mass seen.  Myometrium: Homogeneous echotexture.  Endometrium: 0.9 cm. Normal. No discrete mucosal lesion or abnormal  vascularity.  Cervix: Unremarkable.     RIGHT OVARY: 3.3 x 2.1 x 2.3 cm.  Normal echotexture. Color flow is  present. Contains normal follicles.     LEFT OVARY: 3.2 x 1.5 x 3.0 cm.  Normal echotexture. Color flow is  present. Contains normal follicles.     FREE FLUID: Mild to moderate amount of free fluid seen in the cul-de-sac  and adnexa.       Impression:         1. Normal uterus and ovaries.  2. Mild to moderate free fluid in the pelvis     This report was finalized on 7/18/2024 4:02 PM by   Arvin Ba M.D on Workstation: [id number]        Testicular or Ovarian Vascular Limited [[id number]] Resulted: 07/18/24 1435     Updated: 07/18/24 1548    CT Abdomen Pelvis With Contrast [[id number]] Collected: 07/16/24 1936     Updated: 07/16/24 1948    Narrative:      CT ABDOMEN PELVIS W CONTRAST-     HISTORY: 33 years of age, Female.  Abdominal pain with nausea vomiting  diarrhea.  Pain is mainly in the left lower side of the abdomen.     TECHNIQUE:  CT includes axial imaging from the lung bases to the  trochanters with intravenous contrast and without use of oral contrast.  Data reconstructed in coronal and sagittal planes. Radiation dose  reduction techniques were utilized, including automated exposure control  and exposure modulation based on body size.     COMPARISON: CT abdomen and pelvis 06/08/2023     FINDINGS: Lung bases appear clear.     There is mild ascites with minimal perihepatic fluid anterior to the  medial segment left lobe. Free fluid is present in the pelvis that is  greater than typically seen for normal physiology. Free fluid surrounds  the uterus and right adnexa. Small bowel loops extend adjacent to the  right adnexa and are difficult to differentiate from the right adnexa.  Left ovary appears within normal limits and the uterus appears within  normal limits.     There is abnormal wall thickening involving the descending colon and  proximal sigmoid colon with mild pericolonic stranding/inflammation  consistent with acute colitis. No evidence for bowel obstruction.        Impression:      1. Abnormal wall thickening involving the descending colon and proximal  sigmoid colon consistent with acute colitis. There is mild pericolonic  stranding/inflammation.  2. Mild ascites with greater volume of fluid within the pelvis than  typically seen and mild fluid extending adjacent to the liver. Fluid  surrounds the right adnexa and there are small bowel loops in the region  of the right adnexa  "limiting evaluation of the right ovary and if there  is suspicion for right ovarian abnormality, pelvic/transvaginal sonogram  could be performed for further evaluation.     Radiation dose reduction techniques were utilized, including automated  exposure control and exposure modulation based on body size.        This report was finalized on 7/16/2024 7:45 PM by Dr. Damien Mccarthy M.D on Workstation: [id number]               Pertinent Labs     Results from last 7 days   Lab Units 07/21/24 0247 07/20/24 0323 07/18/24 0941 07/17/24 0437   WBC 10*3/mm3 6.46 7.10 10.57 11.17*   HEMOGLOBIN g/dL 11.9* 12.2 11.2* 11.6*   PLATELETS 10*3/mm3 246 246 226 257     Results from last 7 days   Lab Units 07/21/24 0247 07/20/24 0323 07/18/24 0313 07/17/24 2011 07/17/24 0437   SODIUM mmol/L 134* 137 136  --  135*   POTASSIUM mmol/L 3.9 3.8 4.2  4.1 4.1 3.0*   CHLORIDE mmol/L 103 103 105  --  104   CO2 mmol/L 22.0 20.0* 18.0*  --  24.1   BUN mg/dL 4* 3* 4*  --  7   CREATININE mg/dL 0.60 0.60 0.73  --  0.83   GLUCOSE mg/dL 107* 77 78  --  98   Estimated Creatinine Clearance: 173.1 mL/min (by C-G formula based on SCr of 0.6 mg/dL).  Results from last 7 days   Lab Units 07/21/24 0247 07/20/24 0323 07/16/24  1318   ALBUMIN g/dL 3.7 3.8 4.5   BILIRUBIN mg/dL 0.7 1.1 0.8   ALK PHOS U/L 49 48 50   AST (SGOT) U/L 50* 90* 14   ALT (SGPT) U/L 57* 71* 9     Results from last 7 days   Lab Units 07/21/24 0247 07/20/24 0323 07/18/24  0313 07/17/24  0437 07/16/24  1318   CALCIUM mg/dL 8.7 8.7 8.2*  8.0* 8.2* 9.0   ALBUMIN g/dL 3.7 3.8  --   --  4.5   MAGNESIUM mg/dL 2.0 2.9* 2.1  --   --    PHOSPHORUS mg/dL 3.6 3.3 2.4*  --   --      Results from last 7 days   Lab Units 07/16/24  1318   LIPASE U/L 16             Invalid input(s): \"LDLCALC\"  Results from last 7 days   Lab Units 07/16/24  1514 07/16/24  1513   BLOODCX  No growth at 4 days No growth at 4 days       Test Results Pending at Discharge     Pending Labs       Order Current " "Status    Tissue Pathology Exam In process    Blood Culture - Blood, Arm, Left Preliminary result    Blood Culture - Blood, Hand, Right Preliminary result            Discharge Details        Discharge Medications        New Medications        Instructions Start Date   dicyclomine 10 MG capsule  Commonly known as: BENTYL   20 mg, Oral, 4 Times Daily PRN      escitalopram 20 MG tablet  Commonly known as: LEXAPRO   20 mg, Oral, Daily   Start Date: July 22, 2024     levoFLOXacin 750 MG tablet  Commonly known as: Levaquin   750 mg, Oral, Daily             Continue These Medications        Instructions Start Date   albuterol sulfate  (90 Base) MCG/ACT inhaler  Commonly known as: PROVENTIL HFA;VENTOLIN HFA;PROAIR HFA   2 puffs, Inhalation, Every 6 Hours PRN      ondansetron ODT 8 MG disintegrating tablet  Commonly known as: ZOFRAN-ODT   8 mg, Translingual, Every 8 Hours PRN             Stop These Medications      ibuprofen 600 MG tablet  Commonly known as: ADVIL,MOTRIN     PAZOPanib 200 MG chemo tablet  Commonly known as: Votrient     prochlorperazine 10 MG tablet  Commonly known as: COMPAZINE     promethazine-dextromethorphan 6.25-15 MG/5ML syrup  Commonly known as: PROMETHAZINE-DM              Allergies   Allergen Reactions    Penicillins Nausea And Vomiting    Adhesive Tape Rash     \"SURGICAL TAPE\"  AS A CHILD         Discharge Disposition:  Home or Self Care    Discharge Diet:  Diet Order   Procedures    Diet: Gastrointestinal; Fiber-Restricted; Texture: Soft to Chew (NDD 3); Soft to Chew: Whole Meat; Fluid Consistency: Thin (IDDSI 0)       Discharge Activity:   As tolerated    CODE STATUS:    Code Status and Medical Interventions:   Ordered at: 07/16/24 1946     Code Status (Patient has no pulse and is not breathing):    CPR (Attempt to Resuscitate)     Medical Interventions (Patient has pulse or is breathing):    Full       No future appointments.   Follow-up Information       Sissy Garibay MD .  "   Specialty: Family Medicine  Contact information:  [address] [facility] [address]  [phone]                             Time Spent on Discharge:  Greater than 30 minutes      Aden Bull MD  [facility]  07/21/24  13:58 EDT

## 2024-07-22 ENCOUNTER — TRANSITIONAL CARE MANAGEMENT TELEPHONE ENCOUNTER (OUTPATIENT)
Dept: CALL CENTER | Facility: HOSPITAL | Age: 34
End: 2024-07-22

## 2024-07-22 NOTE — CASE MANAGEMENT/SOCIAL WORK
Case Management Discharge Note      Final Note: Discharge to home with family support. Ynes OWEN RN CCP    Provided Post Acute Provider List?: N/A  Provided Post Acute Provider Quality & Resource List?: N/A    Selected Continued Care - Discharged on 7/21/2024 Admission date: 7/16/2024 - Discharge disposition: Home or Self Care      Destination    No services have been selected for the patient.                Durable Medical Equipment    No services have been selected for the patient.                Dialysis/Infusion    No services have been selected for the patient.                Home Medical Care    No services have been selected for the patient.                Therapy    No services have been selected for the patient.                Community Resources    No services have been selected for the patient.                Community & DME    No services have been selected for the patient.                    Transportation Services  Private: Car    Final Discharge Disposition Code: 01 - home or self-care

## 2024-07-22 NOTE — OUTREACH NOTE
Call Center TCM Note      Flowsheet Row Responses   [facility] patient discharged from? [facility]   Does the patient have one of the following disease processes/diagnoses(primary or secondary)? Other   TCM attempt successful? Yes   Call start time 1512   Call end time 1518   Discharge diagnosis Colitis   Person spoke with today (if not patient) and relationship pt   Meds reviewed with patient/caregiver? Yes   Does the patient have all medications ordered at discharge? Yes   Is the patient taking all medications as directed (includes completed medication regime)? Yes   Comments no appt available to see pcp please advise.   Does the patient have an appointment with their PCP within 7-14 days of discharge? No appointments available   Nursing Interventions Routed TCM call to PCP office   Has home health visited the patient within 72 hours of discharge? N/A   Psychosocial issues? No   Did the patient receive a copy of their discharge instructions? Yes   Nursing interventions Reviewed instructions with patient   What is the patient's perception of their health status since discharge? Improving   Is the patient/caregiver able to teach back signs and symptoms related to disease process for when to call PCP? Yes   Is the patient/caregiver able to teach back signs and symptoms related to disease process for when to call 911? Yes   Is the patient/caregiver able to teach back the hierarchy of who to call/visit for symptoms/problems? PCP, Specialist, Home health nurse, Urgent Care, ED, 911 Yes   TCM call completed? Yes   Wrap up additional comments pt reports doing well no concerns or questions noted.   Call end time 1518   Would this patient benefit from a Referral to Amb Social Work? No   Is the patient interested in additional calls from an ambulatory ? No            Raya Sigala RN    7/22/2024, 15:19 EDT

## 2024-07-23 LAB
LAB AP CASE REPORT: NORMAL
LAB AP SPECIAL STAINS: NORMAL
PATH REPORT.FINAL DX SPEC: NORMAL
PATH REPORT.GROSS SPEC: NORMAL

## 2024-08-01 NOTE — PROGRESS NOTES
Biopsies confirmed colitis but were non specific.  There were no features to suggest IBD, possibly this was infectious or due to a transient lack of blood flow.      Please schedule office f/u with SM as soon as possible and can we get an update on how patient is doing post-discharge

## 2024-08-27 ENCOUNTER — TELEPHONE (OUTPATIENT)
Dept: GASTROENTEROLOGY | Facility: CLINIC | Age: 34
End: 2024-08-27

## 2024-08-27 NOTE — TELEPHONE ENCOUNTER
Patient called. Advised as per Dr. Duran's note. She verb understanding. She states she is feeling much better. Follow up with Carie scheduled for 9/12@8419.

## 2024-08-27 NOTE — TELEPHONE ENCOUNTER
----- Message from Rohan Duran sent at 8/1/2024  7:50 AM EDT -----  Biopsies confirmed colitis but were non specific.  There were no features to suggest IBD, possibly this was infectious or due to a transient lack of blood flow.      Please schedule office f/u with SM as soon as possible and can we get an update on how patient is doing post-discharge

## 2024-12-05 ENCOUNTER — PATIENT ROUNDING (BHMG ONLY) (OUTPATIENT)
Dept: URGENT CARE | Facility: CLINIC | Age: 34
End: 2024-12-05
Payer: COMMERCIAL

## 2024-12-05 NOTE — ED NOTES
Thank you for letting us care for you during your recent visit at [facility]. We would love to hear about your experience with us.         We’re always looking for ways to make our patients’ experiences even better. Do you have any recommendations on ways we may improve?         I appreciate you taking the time to respond. Please be on the lookout for a survey about your recent visit from [facility] via text or email. We would greatly appreciate if you could fill that out and turn it back in. We want your voice to be heard and we value your feedback.         Thank you for choosing [facility] for your healthcare needs.

## 2025-02-02 ENCOUNTER — APPOINTMENT (OUTPATIENT)
Dept: CARDIOLOGY | Facility: HOSPITAL | Age: 35
End: 2025-02-02
Payer: COMMERCIAL

## 2025-02-02 ENCOUNTER — APPOINTMENT (OUTPATIENT)
Dept: MRI IMAGING | Facility: HOSPITAL | Age: 35
End: 2025-02-02
Payer: COMMERCIAL

## 2025-02-02 ENCOUNTER — APPOINTMENT (OUTPATIENT)
Dept: GENERAL RADIOLOGY | Facility: HOSPITAL | Age: 35
End: 2025-02-02
Payer: COMMERCIAL

## 2025-02-02 ENCOUNTER — HOSPITAL ENCOUNTER (OUTPATIENT)
Facility: HOSPITAL | Age: 35
Setting detail: OBSERVATION
Discharge: HOME | End: 2025-02-03
Attending: EMERGENCY MEDICINE | Admitting: EMERGENCY MEDICINE
Payer: COMMERCIAL

## 2025-02-02 DIAGNOSIS — M25.571 ACUTE RIGHT ANKLE PAIN: Primary | ICD-10-CM

## 2025-02-02 DIAGNOSIS — R22.41 KNEE MASS, RIGHT: ICD-10-CM

## 2025-02-02 DIAGNOSIS — G62.9 NEUROPATHY: ICD-10-CM

## 2025-02-02 DIAGNOSIS — D48.119 DESMOID TUMOR: ICD-10-CM

## 2025-02-02 LAB
ALBUMIN SERPL-MCNC: 4.4 G/DL (ref 3.5–5.2)
ALBUMIN/GLOB SERPL: 1.9 G/DL
ALP SERPL-CCNC: 41 U/L (ref 39–117)
ALT SERPL W P-5'-P-CCNC: 8 U/L (ref 1–33)
ANION GAP SERPL CALCULATED.3IONS-SCNC: 8 MMOL/L (ref 5–15)
AST SERPL-CCNC: 14 U/L (ref 1–32)
BASOPHILS # BLD AUTO: 0.06 10*3/MM3 (ref 0–0.2)
BASOPHILS NFR BLD AUTO: 0.7 % (ref 0–1.5)
BH CV LOWER VASCULAR LEFT COMMON FEMORAL AUGMENT: NORMAL
BH CV LOWER VASCULAR LEFT COMMON FEMORAL COMPETENT: NORMAL
BH CV LOWER VASCULAR LEFT COMMON FEMORAL COMPRESS: NORMAL
BH CV LOWER VASCULAR LEFT COMMON FEMORAL PHASIC: NORMAL
BH CV LOWER VASCULAR LEFT COMMON FEMORAL SPONT: NORMAL
BH CV LOWER VASCULAR RIGHT COMMON FEMORAL AUGMENT: NORMAL
BH CV LOWER VASCULAR RIGHT COMMON FEMORAL COMPETENT: NORMAL
BH CV LOWER VASCULAR RIGHT COMMON FEMORAL COMPRESS: NORMAL
BH CV LOWER VASCULAR RIGHT COMMON FEMORAL PHASIC: NORMAL
BH CV LOWER VASCULAR RIGHT COMMON FEMORAL SPONT: NORMAL
BH CV LOWER VASCULAR RIGHT DISTAL FEMORAL COMPRESS: NORMAL
BH CV LOWER VASCULAR RIGHT GASTRONEMIUS COMPRESS: NORMAL
BH CV LOWER VASCULAR RIGHT GREATER SAPH AK COMPRESS: NORMAL
BH CV LOWER VASCULAR RIGHT GREATER SAPH BK COMPRESS: NORMAL
BH CV LOWER VASCULAR RIGHT LESSER SAPH COMPRESS: NORMAL
BH CV LOWER VASCULAR RIGHT MID FEMORAL AUGMENT: NORMAL
BH CV LOWER VASCULAR RIGHT MID FEMORAL COMPETENT: NORMAL
BH CV LOWER VASCULAR RIGHT MID FEMORAL COMPRESS: NORMAL
BH CV LOWER VASCULAR RIGHT MID FEMORAL PHASIC: NORMAL
BH CV LOWER VASCULAR RIGHT MID FEMORAL SPONT: NORMAL
BH CV LOWER VASCULAR RIGHT PERONEAL COMPRESS: NORMAL
BH CV LOWER VASCULAR RIGHT POPLITEAL AUGMENT: NORMAL
BH CV LOWER VASCULAR RIGHT POPLITEAL COMPETENT: NORMAL
BH CV LOWER VASCULAR RIGHT POPLITEAL COMPRESS: NORMAL
BH CV LOWER VASCULAR RIGHT POPLITEAL PHASIC: NORMAL
BH CV LOWER VASCULAR RIGHT POPLITEAL SPONT: NORMAL
BH CV LOWER VASCULAR RIGHT POSTERIOR TIBIAL COMPRESS: NORMAL
BH CV LOWER VASCULAR RIGHT PROFUNDA FEMORAL COMPRESS: NORMAL
BH CV LOWER VASCULAR RIGHT PROXIMAL FEMORAL COMPRESS: NORMAL
BH CV LOWER VASCULAR RIGHT SAPHENOFEMORAL JUNCTION COMPRESS: NORMAL
BH CV VAS PRELIMINARY FINDINGS SCRIPTING: 1
BILIRUB SERPL-MCNC: 0.4 MG/DL (ref 0–1.2)
BUN SERPL-MCNC: 7 MG/DL (ref 6–20)
BUN/CREAT SERPL: 10 (ref 7–25)
CALCIUM SPEC-SCNC: 9.2 MG/DL (ref 8.6–10.5)
CHLORIDE SERPL-SCNC: 102 MMOL/L (ref 98–107)
CO2 SERPL-SCNC: 26 MMOL/L (ref 22–29)
CREAT SERPL-MCNC: 0.7 MG/DL (ref 0.57–1)
DEPRECATED RDW RBC AUTO: 39.6 FL (ref 37–54)
EGFRCR SERPLBLD CKD-EPI 2021: 118 ML/MIN/1.73
EOSINOPHIL # BLD AUTO: 0.28 10*3/MM3 (ref 0–0.4)
EOSINOPHIL NFR BLD AUTO: 3.5 % (ref 0.3–6.2)
ERYTHROCYTE [DISTWIDTH] IN BLOOD BY AUTOMATED COUNT: 12.8 % (ref 12.3–15.4)
GLOBULIN UR ELPH-MCNC: 2.3 GM/DL
GLUCOSE SERPL-MCNC: 92 MG/DL (ref 65–99)
HCT VFR BLD AUTO: 39.3 % (ref 34–46.6)
HGB BLD-MCNC: 13 G/DL (ref 12–15.9)
IMM GRANULOCYTES # BLD AUTO: 0.02 10*3/MM3 (ref 0–0.05)
IMM GRANULOCYTES NFR BLD AUTO: 0.2 % (ref 0–0.5)
LYMPHOCYTES # BLD AUTO: 2.9 10*3/MM3 (ref 0.7–3.1)
LYMPHOCYTES NFR BLD AUTO: 36 % (ref 19.6–45.3)
MCH RBC QN AUTO: 28.3 PG (ref 26.6–33)
MCHC RBC AUTO-ENTMCNC: 33.1 G/DL (ref 31.5–35.7)
MCV RBC AUTO: 85.4 FL (ref 79–97)
MONOCYTES # BLD AUTO: 0.69 10*3/MM3 (ref 0.1–0.9)
MONOCYTES NFR BLD AUTO: 8.6 % (ref 5–12)
NEUTROPHILS NFR BLD AUTO: 4.11 10*3/MM3 (ref 1.7–7)
NEUTROPHILS NFR BLD AUTO: 51 % (ref 42.7–76)
NRBC BLD AUTO-RTO: 0 /100 WBC (ref 0–0.2)
PLATELET # BLD AUTO: 256 10*3/MM3 (ref 140–450)
PMV BLD AUTO: 10.1 FL (ref 6–12)
POTASSIUM SERPL-SCNC: 3.8 MMOL/L (ref 3.5–5.2)
PROT SERPL-MCNC: 6.7 G/DL (ref 6–8.5)
RBC # BLD AUTO: 4.6 10*6/MM3 (ref 3.77–5.28)
SODIUM SERPL-SCNC: 136 MMOL/L (ref 136–145)
WBC NRBC COR # BLD AUTO: 8.06 10*3/MM3 (ref 3.4–10.8)

## 2025-02-02 PROCEDURE — 73610 X-RAY EXAM OF ANKLE: CPT

## 2025-02-02 PROCEDURE — 25510000002 GADOBENATE DIMEGLUMINE 529 MG/ML SOLUTION: Performed by: EMERGENCY MEDICINE

## 2025-02-02 PROCEDURE — 73723 MRI JOINT LWR EXTR W/O&W/DYE: CPT

## 2025-02-02 PROCEDURE — 99285 EMERGENCY DEPT VISIT HI MDM: CPT

## 2025-02-02 PROCEDURE — 80053 COMPREHEN METABOLIC PANEL: CPT | Performed by: EMERGENCY MEDICINE

## 2025-02-02 PROCEDURE — G0378 HOSPITAL OBSERVATION PER HR: HCPCS

## 2025-02-02 PROCEDURE — 93971 EXTREMITY STUDY: CPT

## 2025-02-02 PROCEDURE — 25010000002 HYDROMORPHONE PER 4 MG

## 2025-02-02 PROCEDURE — 93971 EXTREMITY STUDY: CPT | Performed by: SURGERY

## 2025-02-02 PROCEDURE — 85025 COMPLETE CBC W/AUTO DIFF WBC: CPT | Performed by: EMERGENCY MEDICINE

## 2025-02-02 PROCEDURE — A9577 INJ MULTIHANCE: HCPCS | Performed by: EMERGENCY MEDICINE

## 2025-02-02 PROCEDURE — 96374 THER/PROPH/DIAG INJ IV PUSH: CPT

## 2025-02-02 RX ORDER — ALBUTEROL SULFATE 0.83 MG/ML
2.5 SOLUTION RESPIRATORY (INHALATION) EVERY 6 HOURS PRN
Status: DISCONTINUED | OUTPATIENT
Start: 2025-02-02 | End: 2025-02-03 | Stop reason: HOSPADM

## 2025-02-02 RX ORDER — AMOXICILLIN 250 MG
2 CAPSULE ORAL 2 TIMES DAILY PRN
Status: DISCONTINUED | OUTPATIENT
Start: 2025-02-02 | End: 2025-02-03 | Stop reason: HOSPADM

## 2025-02-02 RX ORDER — ACETAMINOPHEN 325 MG/1
650 TABLET ORAL EVERY 6 HOURS PRN
Status: DISCONTINUED | OUTPATIENT
Start: 2025-02-02 | End: 2025-02-03 | Stop reason: HOSPADM

## 2025-02-02 RX ORDER — HYDROCODONE BITARTRATE AND ACETAMINOPHEN 5; 325 MG/1; MG/1
1 TABLET ORAL EVERY 6 HOURS PRN
Status: DISCONTINUED | OUTPATIENT
Start: 2025-02-02 | End: 2025-02-03 | Stop reason: HOSPADM

## 2025-02-02 RX ORDER — SODIUM CHLORIDE 0.9 % (FLUSH) 0.9 %
10 SYRINGE (ML) INJECTION EVERY 12 HOURS SCHEDULED
Status: DISCONTINUED | OUTPATIENT
Start: 2025-02-02 | End: 2025-02-03 | Stop reason: HOSPADM

## 2025-02-02 RX ORDER — BISACODYL 5 MG/1
5 TABLET, DELAYED RELEASE ORAL DAILY PRN
Status: DISCONTINUED | OUTPATIENT
Start: 2025-02-02 | End: 2025-02-03 | Stop reason: HOSPADM

## 2025-02-02 RX ORDER — SODIUM CHLORIDE 0.9 % (FLUSH) 0.9 %
10 SYRINGE (ML) INJECTION AS NEEDED
Status: DISCONTINUED | OUTPATIENT
Start: 2025-02-02 | End: 2025-02-03 | Stop reason: HOSPADM

## 2025-02-02 RX ORDER — HYDROCODONE BITARTRATE AND ACETAMINOPHEN 5; 325 MG/1; MG/1
1 TABLET ORAL ONCE
Status: COMPLETED | OUTPATIENT
Start: 2025-02-02 | End: 2025-02-02

## 2025-02-02 RX ORDER — SODIUM CHLORIDE 9 MG/ML
40 INJECTION, SOLUTION INTRAVENOUS AS NEEDED
Status: DISCONTINUED | OUTPATIENT
Start: 2025-02-02 | End: 2025-02-03 | Stop reason: HOSPADM

## 2025-02-02 RX ORDER — ESCITALOPRAM OXALATE 20 MG/1
20 TABLET ORAL DAILY
Status: DISCONTINUED | OUTPATIENT
Start: 2025-02-02 | End: 2025-02-03 | Stop reason: HOSPADM

## 2025-02-02 RX ORDER — BISACODYL 10 MG
10 SUPPOSITORY, RECTAL RECTAL DAILY PRN
Status: DISCONTINUED | OUTPATIENT
Start: 2025-02-02 | End: 2025-02-03 | Stop reason: HOSPADM

## 2025-02-02 RX ORDER — LIDOCAINE 4 G/G
1 PATCH TOPICAL
Status: DISCONTINUED | OUTPATIENT
Start: 2025-02-03 | End: 2025-02-03

## 2025-02-02 RX ORDER — HYDROMORPHONE HYDROCHLORIDE 1 MG/ML
0.25 INJECTION, SOLUTION INTRAMUSCULAR; INTRAVENOUS; SUBCUTANEOUS EVERY 4 HOURS PRN
Status: DISCONTINUED | OUTPATIENT
Start: 2025-02-02 | End: 2025-02-03

## 2025-02-02 RX ORDER — POLYETHYLENE GLYCOL 3350 17 G/17G
17 POWDER, FOR SOLUTION ORAL DAILY PRN
Status: DISCONTINUED | OUTPATIENT
Start: 2025-02-02 | End: 2025-02-03 | Stop reason: HOSPADM

## 2025-02-02 RX ADMIN — GADOBENATE DIMEGLUMINE 15 ML: 529 INJECTION, SOLUTION INTRAVENOUS at 20:45

## 2025-02-02 RX ADMIN — HYDROCODONE BITARTRATE AND ACETAMINOPHEN 1 TABLET: 5; 325 TABLET ORAL at 18:37

## 2025-02-02 RX ADMIN — HYDROMORPHONE HYDROCHLORIDE 0.25 MG: 1 INJECTION, SOLUTION INTRAMUSCULAR; INTRAVENOUS; SUBCUTANEOUS at 21:32

## 2025-02-02 RX ADMIN — HYDROCODONE BITARTRATE AND ACETAMINOPHEN 1 TABLET: 5; 325 TABLET ORAL at 14:00

## 2025-02-02 RX ADMIN — Medication 10 ML: at 21:33

## 2025-02-02 NOTE — ED PROVIDER NOTES
EMERGENCY DEPARTMENT ENCOUNTER      PCP: Sissy Garibay MD  Patient Care Team:  Sissy Garibay MD as PCP - General (Family Medicine)  Chris Randall MD as Referring Physician (Orthopedic Surgery)  Colin Molina MD as Consulting Physician (Hematology and Oncology)   Independent Historians: Patient    HPI:  Chief Complaint: Ankle pain  A complete HPI/ROS/PMH/PSH/SH/FH are unobtainable due to: None    Chronic or social conditions impacting patient care (social determinants of health): None    Context: Beena Vincent is a 34 y.o. female who presents to the ED c/o acute right ankle pain.  Patient reports known mass to right knee area.  Previously followed by orthopedics and oncology.  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  She states she has chronic nerve pain related to this but symptoms have been worsening and she is now having severe right ankle pain without known injury.  No fevers or chills.  No chest pain or shortness of breath.  She was previously taking oral chemotherapy to see if they would be able to shrink the mass however medication was making her very sick so she discontinued and has not followed up since .    Review of prior external notes and/or external test results outside of this encounter: MRI of the right knee on 9/10/2023 showed solid enhancing posterior lateral right knee mass continuing to exhibit mild increase in size.  CMP on 2024 showed creatinine of 0.6.      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    Generalized abdominal pain 06/08/2023    Leukocytosis 06/09/2023    Colitis 07/16/2024    Acute colitis 07/16/2024    BRBPR (bright red blood per rectum) 07/16/2024     Resolved Ambulatory Problems     Diagnosis Date Noted    Edema of lower extremity, antepartum, third trimester 06/08/2020    Anemia affecting pregnancy in third trimester 06/29/2020    Pregnancy 08/07/2020    Medial epicondylitis of left elbow 05/30/2013    Nausea vomiting and diarrhea 06/09/2023    Acute kidney injury 06/09/2023     Past Medical History:   Diagnosis Date    Asthma     Cervical dysplasia     Depression     Gestational hypertension 2020    Iron deficiency anemia     Preeclampsia 2012       The patient qualifies to receive the vaccine, but they have not yet received it.    PAST SURGICAL HISTORY  Past Surgical History:   Procedure Laterality Date    APPENDECTOMY N/A 10/1/2021    Procedure: APPENDECTOMY LAPAROSCOPIC;  Surgeon: Ahmet Dong Jr., MD;  Location: [facility] OR;  Service: General;  Laterality: N/A;    KNEE ARTHROSCOPY      AGE 6    LEEP N/A 4/25/2019    Procedure: LOOP ELECTROCAUTERY EXCISION PROCEDURE;  Surgeon: Arsh Ray MD;  Location: [facility] OR O[facility];  Service: Obstetrics/Gynecology    SIGMOIDOSCOPY N/A 7/19/2024    Procedure: SIGMOIDOSCOPY FLEXIBLE WITH BIOPSIES;  Surgeon: Rohan Duran MD;  Location: [facility] ENDOSCOPY;  Service: Gastroenterology;  Laterality: N/A;  PRE: COLITIS  POST: SAME    SOFT TISSUE BIOPSY Right 3/23/2022    Procedure: BIOPSY SOFT TISSUE THIGH / KNEE;  Surgeon: Chris Randall MD;  Location: [facility] OR;  Service: Orthopedics;  Laterality: Right;    WISDOM TOOTH EXTRACTION           FAMILY HISTORY  Family History   Problem Relation Age of Onset    Diabetes Father     Arthritis Maternal Grandmother     Lung  cancer Maternal Grandfather     Heart attack Maternal Grandfather     Heart disease Maternal Grandfather     Stroke Maternal Grandfather     Hyperlipidemia Maternal Grandfather     Malig Hyperthermia Neg Hx     Breast cancer Neg Hx     Ovarian cancer Neg Hx     Uterine cancer Neg Hx     Colon cancer Neg Hx          SOCIAL HISTORY  Social History     Socioeconomic History    Marital status: Single   Tobacco Use    Smoking status: Former     Current packs/day: 0.00     Average packs/day: 0.5 packs/day for 4.0 years (2.0 ttl pk-yrs)     Types: Cigarettes     Start date: 2016     Quit date: 2020     Years since quittin.9     Passive exposure: Never    Smokeless tobacco: Never   Vaping Use    Vaping status: Former    Substances: Nicotine    Devices: Disposable   Substance and Sexual Activity    Alcohol use: Not Currently     Comment: socially    Drug use: Not Currently     Types: Marijuana     Comment: daily, did not smoke during pregnancy    Sexual activity: Not Currently         ALLERGIES  Penicillins and Adhesive tape        REVIEW OF SYSTEMS  Review of Systems   Musculoskeletal:  Positive for arthralgias (Right lateral ankle).   Skin:         Right lower extremity anterolateral soft tissue mass        All systems reviewed and negative except for those discussed in HPI.       PHYSICAL EXAM    I have reviewed the triage vital signs and nursing notes.    ED Triage Vitals   Temp Heart Rate Resp BP SpO2   25 1156 25 1156 25 1156 25 1159 25 1156   97.8 °F (36.6 °C) (!) 124 18 141/92 98 %      Temp src Heart Rate Source Patient Position BP Location FiO2 (%)   -- -- 25 1159 25 1159 --     Sitting Right arm        Physical Exam  GENERAL: alert, no acute distress  SKIN: Warm, dry  HENT: Normocephalic, atraumatic  EYES: no scleral icterus  CV: regular rhythm, regular rate, distal pulses are 2+ and symmetric  RESPIRATORY: normal effort, lungs clear  ABDOMEN: soft, nontender,  nondistended  MUSCULOSKELETAL: There is large soft tissue mass noted to the right anterior lateral lower extremity, there is tenderness just inferior of the right lateral malleolus, there is no overlying erythema, no deformity  NEURO: alert, moves all extremities, follows commands          LAB RESULTS  Recent Results (from the past 24 hours)   Comprehensive Metabolic Panel    Collection Time: 02/02/25  2:04 PM    Specimen: Blood   Result Value Ref Range    Glucose 92 65 - 99 mg/dL    BUN 7 6 - 20 mg/dL    Creatinine 0.70 0.57 - 1.00 mg/dL    Sodium 136 136 - 145 mmol/L    Potassium 3.8 3.5 - 5.2 mmol/L    Chloride 102 98 - 107 mmol/L    CO2 26.0 22.0 - 29.0 mmol/L    Calcium 9.2 8.6 - 10.5 mg/dL    Total Protein 6.7 6.0 - 8.5 g/dL    Albumin 4.4 3.5 - 5.2 g/dL    ALT (SGPT) 8 1 - 33 U/L    AST (SGOT) 14 1 - 32 U/L    Alkaline Phosphatase 41 39 - 117 U/L    Total Bilirubin 0.4 0.0 - 1.2 mg/dL    Globulin 2.3 gm/dL    A/G Ratio 1.9 g/dL    BUN/Creatinine Ratio 10.0 7.0 - 25.0    Anion Gap 8.0 5.0 - 15.0 mmol/L    eGFR 118.0 >60.0 mL/min/1.73   CBC Auto Differential    Collection Time: 02/02/25  2:04 PM    Specimen: Blood   Result Value Ref Range    WBC 8.06 3.40 - 10.80 10*3/mm3    RBC 4.60 3.77 - 5.28 10*6/mm3    Hemoglobin 13.0 12.0 - 15.9 g/dL    Hematocrit 39.3 34.0 - 46.6 %    MCV 85.4 79.0 - 97.0 fL    MCH 28.3 26.6 - 33.0 pg    MCHC 33.1 31.5 - 35.7 g/dL    RDW 12.8 12.3 - 15.4 %    RDW-SD 39.6 37.0 - 54.0 fl    MPV 10.1 6.0 - 12.0 fL    Platelets 256 140 - 450 10*3/mm3    Neutrophil % 51.0 42.7 - 76.0 %    Lymphocyte % 36.0 19.6 - 45.3 %    Monocyte % 8.6 5.0 - 12.0 %    Eosinophil % 3.5 0.3 - 6.2 %    Basophil % 0.7 0.0 - 1.5 %    Immature Grans % 0.2 0.0 - 0.5 %    Neutrophils, Absolute 4.11 1.70 - 7.00 10*3/mm3    Lymphocytes, Absolute 2.90 0.70 - 3.10 10*3/mm3    Monocytes, Absolute 0.69 0.10 - 0.90 10*3/mm3    Eosinophils, Absolute 0.28 0.00 - 0.40 10*3/mm3    Basophils, Absolute 0.06 0.00 - 0.20 10*3/mm3     Immature Grans, Absolute 0.02 0.00 - 0.05 10*3/mm3    nRBC 0.0 0.0 - 0.2 /100 WBC   Duplex Venous Lower Extremity - RIGHT    Collection Time: 02/02/25  3:24 PM   Result Value Ref Range    Right Common Femoral Spont Y     Right Common Femoral Competent Y     Right Common Femoral Phasic Y     Right Common Femoral Compress C     Right Common Femoral Augment Y     Right Saphenofemoral Junction Compress C     Right Profunda Femoral Compress C     Right Proximal Femoral Compress C     Right Mid Femoral Spont Y     Right Mid Femoral Competent Y     Right Mid Femoral Phasic Y     Right Mid Femoral Compress C     Right Mid Femoral Augment Y     Right Distal Femoral Compress C     Right Popliteal Spont Y     Right Popliteal Competent Y     Right Popliteal Phasic Y     Right Popliteal Compress C     Right Popliteal Augment Y     Right Posterior Tibial Compress C     Right Peroneal Compress C     Right Gastronemius Compress C     Right Greater Saph AK Compress C     Right Greater Saph BK Compress C     Right Lesser Saph Compress C     Left Common Femoral Spont Y     Left Common Femoral Competent Y     Left Common Femoral Phasic Y     Left Common Femoral Compress C     Left Common Femoral Augment Y     BH CV VAS PRELIMINARY FINDINGS SCRIPTING 1.0        Ordered the above labs and independently reviewed and interpreted the results.        RADIOLOGY  Duplex Venous Lower Extremity - RIGHT    Result Date: 2/2/2025    Normal right lower extremity venous duplex scan.   Incidental right calf mass noted measuring approximately 15.7 cm x 10.3 cm x 8.2 cm. This was seen on prior MRI on 9/10/23.     XR Ankle 3+ View Right    Result Date: 2/2/2025  XR ANKLE 3+ VW RIGHT-   INDICATION: Ankle pain, no known injury  COMPARISON: None  TECHNIQUE: 3 view right ankle  FINDINGS:  No fracture. No bone lesion. No dislocation. Symmetric mortise. Preserved tibiotalar joint.      No fracture or dislocation. Preserved tibiotalar joint.  This report was  finalized on 2/2/2025 12:36 PM by Dr. Arvin Ba M.D on Workstation: [id number]       I ordered the above noted radiological studies. Independently reviewed and interpreted by me.  See dictation for official radiology interpretation.      PROCEDURES    Procedures      MEDICATIONS GIVEN IN ER    Medications   sodium chloride 0.9 % flush 10 mL (has no administration in time range)   sodium chloride 0.9 % flush 10 mL (has no administration in time range)   sodium chloride 0.9 % flush 10 mL (has no administration in time range)   sodium chloride 0.9 % infusion 40 mL (has no administration in time range)   sennosides-docusate (PERICOLACE) 8.6-50 MG per tablet 2 tablet (has no administration in time range)     And   polyethylene glycol (MIRALAX) packet 17 g (has no administration in time range)     And   bisacodyl (DULCOLAX) EC tablet 5 mg (has no administration in time range)     And   bisacodyl (DULCOLAX) suppository 10 mg (has no administration in time range)   acetaminophen (TYLENOL) tablet 650 mg (has no administration in time range)   HYDROcodone-acetaminophen (NORCO) 5-325 MG per tablet 1 tablet (1 tablet Oral Given 2/2/25 1837)   escitalopram (LEXAPRO) tablet 20 mg (20 mg Oral Not Given 2/2/25 1819)   albuterol (PROVENTIL) nebulizer solution 0.083% 2.5 mg/3mL (has no administration in time range)   gadobenate dimeglumine (MULTIHANCE) injection 15 mL (has no administration in time range)   HYDROcodone-acetaminophen (NORCO) 5-325 MG per tablet 1 tablet (1 tablet Oral Given 2/2/25 1400)         PROGRESS, DATA ANALYSIS, CONSULTS, AND MEDICAL DECISION MAKING    All labs have been independently reviewed and interpreted by me.  All radiology studies have been independently reviewed and interpreted by me and discussed with radiologist dictating the report.   EKG's independently reviewed and interpreted by me.  Discussion below represents my analysis of pertinent findings related to patient's condition,  differential diagnosis, treatment plan and final disposition.    Differential diagnosis: Increased size of soft tissue mass, nerve compression, claudication, DVT, abscess, osteomyelitis, tendinitis, fracture, dislocation    ED Course as of 02/02/25 2020   Sun Feb 02, 2025   1255 XR Ankle 3+ View Right  Radiology study independently interpreted by me and my findings are no acute fracture.   [DC]   1418 WBC: 8.06 [DC]   1418 Hemoglobin: 13.0 [DC]   1444 Glucose: 92 [DC]   1444 Creatinine: 0.70 [DC]   1444 Sodium: 136 [DC]   1444 Potassium: 3.8 [DC]   1610 Discussed case with Gisselle Dunbar [facility], who will admit the patient. [DC]      ED Course User Index  [DC] Asia Liu PA             AS OF 20:20 EST VITALS:    BP - 122/77  HR - 71  TEMP - 98.1 °F (36.7 °C) (Oral)  O2 SATS - 97%        DIAGNOSIS  Final diagnoses:   Acute right ankle pain   Knee mass, right         DISPOSITION  ED Disposition       ED Disposition   Decision to Admit    Condition   --    Comment   --                  Note Disclaimer: At [facility], we believe that sharing information builds trust and better relationships. You are receiving this note because you recently visited [facility]. It is possible you will see health information before a provider has talked with you about it. This kind of information can be easy to misunderstand. To help you fully understand what it means for your health, we urge you to discuss this note with your provider.         Asia Liu PA  02/02/25 2021

## 2025-02-02 NOTE — ED PROVIDER NOTES
The ESME and I have discussed this patient's history, physical exam and treatment plan.  I provided a substantive portion of the care of this patient.  I have reviewed the documentation and personally had a face to face interaction with the patient and personally performed the physical exam, in its entirety.  I affirm the documentation and agree with the treatment and plan.  The following describes my personal findings.  SHARED VISIT: This visit was performed by BOTH a physician and an APC. The substantive portion of the medical decision making was performed by this attesting physician who made or approved the management plan and takes responsibility for patient management. All studies in the APC note (if performed) were independently interpreted by me.       The patient presents complaining of pain in right lower leg worse than usual for her over the past week.  Patient denies chest pain, shortness of air, fever, redness, recent injury.  Patient denies weakness, numbness.  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  Patient reports she was seeing Dr. Hope, was receiving chemotherapy treatment but did not feel it was improving her symptoms, became stressed/over loaded with other things and discontinued her therapies at the beginning of 2024.    Patient reports the range of motion of her right knee is not significantly different than her baseline.    Comprehensive Physical exam:  Patient is nontoxic appearing oriented, conversant, awake, alert  HEENT: normocephalic, atraumatic  Neck: no goiter, no pain with ROM  Pulmonary: Nontachypneic  cardiovascular: Nontachycardic  Abdomen: Soft, nontender  musculoskeletal: Right lower extremity with significant firm irregular swelling up to 30+ centimeters in dimension to posterior lateral aspect of right distal thigh, knee, proximal calf without fluctuance, erythema nor warmth  Dorsalis pedis and posterior tibial pulses palpable bilateral lower extremities,  no peripheral edema of feet and ankles, plantar and dorsiflexion of bilateral feet intact, sensation intact, patient with knee extension intact bilaterally, right knee flexion significantly impaired-to 150 degrees.  Neuro/psychiatric:calm, appropriate, cooperative  Skin:warm, dry      Given extent of pain, progression of patient's tumor, urgent need to reestablish therapy regimen, will consider observation, oncology consult.  Plain films and labs negative, DVT study pending     Keyanna Huang MD  02/02/25 [id number]

## 2025-02-02 NOTE — PLAN OF CARE
Goal Outcome Evaluation:   Pt admitted for pain from her desmoid tumor. VSS. Mom at bedside. Oncology consulted. Pt is having trouble ambulating due to the pain.

## 2025-02-03 ENCOUNTER — APPOINTMENT (OUTPATIENT)
Dept: GENERAL RADIOLOGY | Facility: HOSPITAL | Age: 35
End: 2025-02-03
Payer: COMMERCIAL

## 2025-02-03 ENCOUNTER — READMISSION MANAGEMENT (OUTPATIENT)
Dept: CALL CENTER | Facility: HOSPITAL | Age: 35
End: 2025-02-03
Payer: COMMERCIAL

## 2025-02-03 VITALS
TEMPERATURE: 96.8 F | HEART RATE: 50 BPM | SYSTOLIC BLOOD PRESSURE: 111 MMHG | WEIGHT: 178 LBS | BODY MASS INDEX: 25.48 KG/M2 | RESPIRATION RATE: 18 BRPM | DIASTOLIC BLOOD PRESSURE: 68 MMHG | HEIGHT: 70 IN | OXYGEN SATURATION: 96 %

## 2025-02-03 PROCEDURE — G0378 HOSPITAL OBSERVATION PER HR: HCPCS

## 2025-02-03 PROCEDURE — 25010000002 ONDANSETRON PER 1 MG: Performed by: NURSE PRACTITIONER

## 2025-02-03 PROCEDURE — 25010000002 HYDROMORPHONE 1 MG/ML SOLUTION: Performed by: NURSE PRACTITIONER

## 2025-02-03 PROCEDURE — 73562 X-RAY EXAM OF KNEE 3: CPT

## 2025-02-03 PROCEDURE — 96376 TX/PRO/DX INJ SAME DRUG ADON: CPT

## 2025-02-03 PROCEDURE — 96375 TX/PRO/DX INJ NEW DRUG ADDON: CPT

## 2025-02-03 PROCEDURE — 25010000002 HYDROMORPHONE PER 4 MG

## 2025-02-03 RX ORDER — LIDOCAINE 4 G/G
1 PATCH TOPICAL
Status: DISCONTINUED | OUTPATIENT
Start: 2025-02-03 | End: 2025-02-03 | Stop reason: HOSPADM

## 2025-02-03 RX ORDER — GABAPENTIN 100 MG/1
100 CAPSULE ORAL 3 TIMES DAILY
Qty: 90 CAPSULE | Refills: 0 | Status: SHIPPED | OUTPATIENT
Start: 2025-02-03 | End: 2025-03-05

## 2025-02-03 RX ORDER — GABAPENTIN 100 MG/1
100 CAPSULE ORAL 3 TIMES DAILY
Status: DISCONTINUED | OUTPATIENT
Start: 2025-02-03 | End: 2025-02-03 | Stop reason: HOSPADM

## 2025-02-03 RX ORDER — ONDANSETRON 2 MG/ML
4 INJECTION INTRAMUSCULAR; INTRAVENOUS EVERY 6 HOURS PRN
Status: DISCONTINUED | OUTPATIENT
Start: 2025-02-03 | End: 2025-02-03 | Stop reason: HOSPADM

## 2025-02-03 RX ADMIN — LIDOCAINE 1 PATCH: 4 PATCH TOPICAL at 00:48

## 2025-02-03 RX ADMIN — HYDROMORPHONE HYDROCHLORIDE 1 MG: 1 INJECTION, SOLUTION INTRAMUSCULAR; INTRAVENOUS; SUBCUTANEOUS at 14:54

## 2025-02-03 RX ADMIN — HYDROCODONE BITARTRATE AND ACETAMINOPHEN 1 TABLET: 5; 325 TABLET ORAL at 09:09

## 2025-02-03 RX ADMIN — ESCITALOPRAM 20 MG: 20 TABLET, FILM COATED ORAL at 09:09

## 2025-02-03 RX ADMIN — ONDANSETRON 4 MG: 2 INJECTION INTRAMUSCULAR; INTRAVENOUS at 10:53

## 2025-02-03 RX ADMIN — HYDROCODONE BITARTRATE AND ACETAMINOPHEN 1 TABLET: 5; 325 TABLET ORAL at 18:10

## 2025-02-03 RX ADMIN — HYDROMORPHONE HYDROCHLORIDE 0.25 MG: 1 INJECTION, SOLUTION INTRAMUSCULAR; INTRAVENOUS; SUBCUTANEOUS at 04:51

## 2025-02-03 RX ADMIN — GABAPENTIN 100 MG: 100 CAPSULE ORAL at 14:54

## 2025-02-03 RX ADMIN — HYDROMORPHONE HYDROCHLORIDE 1 MG: 1 INJECTION, SOLUTION INTRAMUSCULAR; INTRAVENOUS; SUBCUTANEOUS at 10:53

## 2025-02-03 RX ADMIN — Medication 10 ML: at 09:09

## 2025-02-03 RX ADMIN — HYDROCODONE BITARTRATE AND ACETAMINOPHEN 1 TABLET: 5; 325 TABLET ORAL at 00:48

## 2025-02-03 NOTE — H&P
[facility]   HISTORY AND PHYSICAL    Patient Name: Beena Vincent  : 1990  MRN: [id number]  Primary Care Physician:  Sissy Garibay MD  Date of admission: 2025    Subjective   Subjective     Chief Complaint:   Chief Complaint   Patient presents with    Ankle Pain         HPI:    Beena Vincent is a 34 y.o. female, with a past medical history including, limited to, anxiety, depression, desmoid tumor of the right knee, was admitted to the observation unit with a complaint of right foot pain.  She states that she played in the snow with her children 2 weeks ago when the pain began.  She denies any injury, slip and fall, twisting, or stepping.  She states the pain is a sharp shooting pain that goes from her ankle to the tips of her toes.  She denies any swelling.  Nothing makes the pain better but it is worsened by light touch or activity.  MRI of right knee with and without contrast is pending at this time.  Hematology/oncology have been consulted to see the patient in the AM.    Review of Systems   All systems were reviewed and negative except for: What was mentioned above in the HPI.    Personal History     Past Medical History:   Diagnosis Date    Asthma     inhaler as needed    Cervical dysplasia     Depression     Desmoid tumor     back of right knee- pt desires to proceed with treatments after delivery    Gestational diabetes     Gestational hypertension     Iron deficiency anemia     Preeclampsia        Past Surgical History:   Procedure Laterality Date    APPENDECTOMY N/A 10/1/2021    Procedure: APPENDECTOMY LAPAROSCOPIC;  Surgeon: Ahmet Dong Jr., MD;  Location: [facility];  Service: General;  Laterality: N/A;    KNEE ARTHROSCOPY      AGE 6    LEEP N/A 2019    Procedure: LOOP ELECTROCAUTERY EXCISION PROCEDURE;  Surgeon: Arsh Ray MD;  Location: [facility];  Service: Obstetrics/Gynecology    SIGMOIDOSCOPY N/A 2024    Procedure: SIGMOIDOSCOPY FLEXIBLE WITH  "BIOPSIES;  Surgeon: Rohan Duran MD;  Location: [facility] ENDOSCOPY;  Service: Gastroenterology;  Laterality: N/A;  PRE: COLITIS  POST: SAME    SOFT TISSUE BIOPSY Right 3/23/2022    Procedure: BIOPSY SOFT TISSUE THIGH / KNEE;  Surgeon: Chris Randall MD;  Location: [facility] MAIN OR;  Service: Orthopedics;  Laterality: Right;    WISDOM TOOTH EXTRACTION         Family History: family history includes Arthritis in her maternal grandmother; Diabetes in her father; Heart attack in her maternal grandfather; Heart disease in her maternal grandfather; Hyperlipidemia in her maternal grandfather; Lung cancer in her maternal grandfather; Stroke in her maternal grandfather. Otherwise pertinent FHx was reviewed and not pertinent to current issue.    Social History:  reports that she quit smoking about 4 years ago. Her smoking use included cigarettes. She started smoking about 8 years ago. She has a 2 pack-year smoking history. She has never been exposed to tobacco smoke. She has never used smokeless tobacco. She reports that she does not currently use alcohol. She reports that she does not currently use drugs after having used the following drugs: Marijuana.    Home Medications:  albuterol sulfate HFA and escitalopram    Allergies:  Allergies   Allergen Reactions    Penicillins Nausea And Vomiting    Adhesive Tape Rash     \"SURGICAL TAPE\"  AS A CHILD       Objective   Objective     Vitals:   Temp:  [97.3 °F (36.3 °C)-97.8 °F (36.6 °C)] 97.3 °F (36.3 °C)  Heart Rate:  [] 80  Resp:  [15-18] 18  BP: (129-141)/(81-92) 131/92  Physical Exam   Constitutional: Awake, alert   Eyes: PERRLA   HENT: NCAT, mucous membranes moist   Neck: Supple   Respiratory: Clear to auscultation bilaterally, nonlabored respirations    Cardiovascular: regular rate, palpable pedal pulses bilaterally   Gastrointestinal: Positive bowel sounds, soft, nontender, nondistended   Musculoskeletal: No bilateral ankle edema, left knee swollen  Psychiatric: " Appropriate affect, cooperative   Neurologic: Oriented x 3, speech clear   Skin: No rashes      Result Review    Result Review:  I have personally reviewed the results from the time of this admission to 2/2/2025 19:13 EST and agree with these findings:  [x]  Laboratory list / accordion  []  Microbiology  [x]  Radiology  []  EKG/Telemetry   []  Cardiology/Vascular   []  Pathology  [x]  Old records  []  Other:    Emergency department is unremarkable.  X-ray of the right ankle shows no fracture or dislocation.  Preserved tibiotalar joint.  Venous Doppler right lower extremity is negative but shows an incidental right calf mass measuring approximately 15.7 cm x 10.3 cm x 8.2 cm.      Assessment & Plan   Assessment / Plan     Brief Patient Summary:  Beena Vincent is a 34 y.o. female who was admitted to the observation unit for further evaluation and treatment of her right ankle and leg pain.    Active Hospital Problems:  Active Hospital Problems    Diagnosis     **Right ankle pain      Plan:     Right ankle/leg pain  -Vital signs every 4 hours  -Pain control  -MRI right knee with and without contrast-pending  -Hematology/oncology consult    Anxiety/depression  -Continue lexapro        VTE Prophylaxis:  Mechanical VTE prophylaxis orders are present.        CODE STATUS:    Level Of Support Discussed With: Patient  Code Status (Patient has no pulse and is not breathing): CPR (Attempt to Resuscitate)  Medical Interventions (Patient has pulse or is breathing): Full Support    Admission Status:  I believe this patient meets observation status.    76 minutes have been spent by [facility] providers in the care of this patient while under observation status.      Appropriate PPE worn during patient encounter.  Hand hygeine performed before and after seeing the patient.      Electronically signed by BERTHA Baxter, 02/02/25, 7:13 PM EST.

## 2025-02-03 NOTE — DISCHARGE INSTRUCTIONS
Return to the emergency department for symptoms recurrence or exacerbation  Referral has been placed to [facility] per your request  Follow-up with your PCP in 1 week  Gabapentin 100 mg 3 times daily x 3 days supplies prescription has been sent to your pharmacy

## 2025-02-03 NOTE — PLAN OF CARE
Goal Outcome Evaluation:  Plan of Care Reviewed With: patient        Progress: improving  Outcome Evaluation: Pt stable for discharge home. AVS given, instructed follow up. Transport by family.

## 2025-02-03 NOTE — DISCHARGE SUMMARY
ED OBSERVATION PROGRESS/DISCHARGE SUMMARY    Date of Admission: 2/2/2025   LOS: 0 days   PCP: Sissy Garibay MD          Subjective     Hospital Outcome:   34-year-old female was seen and examined at bedside following admission and observation unit due to worsening right ankle pain.  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  Laboratory evaluation is of unremarkable and MRI with and without showed enlarged large heterogenous mass in the posterior lateral knee and mild bone edema which could be reactive.    Oncology evaluated patient and has sent prescription for gabapentin 100 mg 3 times daily x 30 days and patient has requested referral to  or [place] or [place] if covered by her insurance, if not then [facility] for her [facility]moid tumor management.  Patient is not interested in following with Dr. Molina with oncology therefore referral has been placed and oncology has since signed off.  Patient will be discharged home on to follow-up with her PCP.    ROS:  General: no fevers, chills  Respiratory: no cough, dyspnea  Cardiovascular: no chest pain, palpitations  Abdomen: No abdominal pain, nausea, vomiting, or diarrhea  Neurologic: No focal weakness    Objective   Physical Exam:  I have reviewed the vital signs.  Temp:  [97.3 °F (36.3 °C)-98.4 °F (36.9 °C)] 97.6 °F (36.4 °C)  Heart Rate:  [] 50  Resp:  [15-18] 18  BP: (102-141)/(68-92) 110/69  General Appearance:    Alert, cooperative, no distress  Head:    Normocephalic, atraumatic  Eyes:    Sclerae anicteric  Neck:   Supple, no mass  Lungs: Clear to auscultation bilaterally, respirations unlabored  Heart: Regular rate and rhythm, S1 and S2 normal, no murmur, rub or gallop  Abdomen:  Soft, nontender, bowel sounds active, nondistended  Extremities: No clubbing, cyanosis, or edema to lower extremities  Pulses:  2+ and symmetric in distal lower  extremities  Skin: No rashes   Neurologic: Oriented x3, Normal strength to extremities    Results Review:    I have reviewed the labs, radiology results and diagnostic studies.    Results from last 7 days   Lab Units 02/02/25  1404   WBC 10*3/mm3 8.06   HEMOGLOBIN g/dL 13.0   HEMATOCRIT % 39.3   PLATELETS 10*3/mm3 256     Results from last 7 days   Lab Units 02/02/25  1404   SODIUM mmol/L 136   POTASSIUM mmol/L 3.8   CHLORIDE mmol/L 102   CO2 mmol/L 26.0   BUN mg/dL 7   CREATININE mg/dL 0.70   CALCIUM mg/dL 9.2   BILIRUBIN mg/dL 0.4   ALK PHOS U/L 41   ALT (SGPT) U/L 8   AST (SGOT) U/L 14   GLUCOSE mg/dL 92     Imaging Results (Last 24 Hours)       Procedure Component Value Units Date/Time    MRI Knee Right With & Without Contrast [[id number]] Collected: 02/03/25 0705     Updated: 02/03/25 0731    Narrative:      MRI KNEE RIGHT W WO CONTRAST-     Date of Exam: 2/2/2025 8:09 PM     Indication: Desmoid tumor. Right knee pain and swelling.     Comparison: MRI 9/10/2023, 4/13/2023, 1/29/2023, and 3/11/2022.     Technique: Multiplanar, multisequence MR imaging of the knee was  performed before and following the intravenous administration of 15 mL  of MultiHance.     FINDINGS:     Soft tissues: Trace knee joint fluid. No discrete intra-articular body  is identified. No popliteal cyst. Continued enlargement of the partially  imaged large heterogeneous enhancing mass in the posterolateral right  knee soft tissues, now measuring approximately 12.5 cm x 8.5 cm in axial  dimensions (axial series 19 image 16), previously 10.2 cm x 7.4 cm. The  proximal and distal aspect of the mass are not included in the  field-of-view. The mass again demonstrates diffuse heterogeneous  enhancement with an ill-defined central region of low signal  nonenhancement. The mass involves and obscures the lateral head  gastrocnemius muscle and tendon and likely the peroneal nerve, which is  not definitively visualized. The mass abuts the posterolateral  femur,  posterior lateral tibial plateau, and to the proximal fibula. Severe  mass effect on the distal biceps femoris muscle and tendon, with the  tendon coursing through the lateral aspect of the mass to attach on the  fibular head. The mass also results in mass effect on the popliteal  neurovascular structures with no fat plane between the mass and the  popliteal artery at the level of the intercondylar knee joint space. The  mass also extends into the proximal soleus muscle and muscles of the  anterior compartment of the lower leg.     Menisci: Longitudinal vertical tear of the medial meniscus posterior  horn extending to the intra-articular margin. The lateral meniscus is  intact. No displaced meniscal fragment.     Cruciate Ligaments: The ACL is intact.  The PCL is intact.     Collateral ligaments: The MCL is intact. The LCL and popliteus tendon  are intact with the mass abutting the posterior popliteal tendon. The  distal biceps femoris tendon extends through the mass to attach on the  fibular head.     Extensor Mechanism: The quadriceps tendon is intact.  The patellar  tendon is intact.     Articular cartilage: Focal full-thickness significant chondral fissuring  at the medial patellar facet. The articular cartilage in the medial and  lateral compartments is fairly well-maintained.     Bones: Mild bone marrow edema like signal in the posterior lateral  femoral condyle, posterior lateral tibial plateau, and the fibular head  and neck, which could be reactive. No fracture. No concerning bone  marrow lesions or marrow replacing process.       Impression:         1. Continued enlargement of the partially imaged large heterogeneous  enhancing mass at the posterolateral knee, as above.  2. Mild bone edema-like signal in the posterior lateral femoral condyle,  posterior lateral tibial plateau, and the fibular head and neck, which  could be reactive. Recommend correlating with radiographs to evaluate  for cortical  involvement with the mass.           This report was finalized on 2/3/2025 7:28 AM by Colt Ho MD on  Workstation: [id number]       XR Ankle 3+ View Right [[id number]] Collected: 02/02/25 1234     Updated: 02/02/25 1239    Narrative:      XR ANKLE 3+ VW RIGHT-        INDICATION: Ankle pain, no known injury     COMPARISON: None     TECHNIQUE: 3 view right ankle     FINDINGS:      No fracture. No bone lesion. No dislocation. Symmetric mortise.  Preserved tibiotalar joint.       Impression:      No fracture or dislocation. Preserved tibiotalar joint.     This report was finalized on 2/2/2025 12:36 PM by Dr. Arvin Ba M.D on Workstation: [id number]               I have reviewed the medications.     Discharge Medications        Continue These Medications        Instructions Start Date   albuterol sulfate  (90 Base) MCG/ACT inhaler  Commonly known as: PROVENTIL HFA;VENTOLIN HFA;PROAIR HFA   2 puffs, Inhalation, Every 6 Hours PRN      escitalopram 20 MG tablet  Commonly known as: LEXAPRO   20 mg, Oral, Daily              ---------------------------------------------------------------------------------------------  Assessment & Plan   Assessment/Problem List    Right ankle pain      Plan:    Right ankle/leg pain  -Vital signs every 4 hours  -Pain control  -MRI right knee with and without showed enlarged large heterogenous mass in the posterior lateral knee and mild bone edema which could be reactive.  -Hematology/oncology consult     Anxiety/depression  -Continue lexapro     Disposition: Home    Follow-up after Discharge:Home     This note will serve as a discharge summary    BERTHA Irizarry 02/03/25 09:04 EST    I have worn appropriate PPE during this patient encounter, sanitized my hands both with entering and exiting patient's room.      30 minutes has been spent by [facility] providers in the care of this patient while under observation status

## 2025-02-03 NOTE — CONSULTS
[facility] INITIAL INPATIENT CONSULTATION NOTE    REASON FOR CONSULTATION:      desmoid tumor, right knee       HISTORY OF PRESENT ILLNESS:  Beena Vincent is a 34 y.o. female who we are asked to see today in consultation for desmoid tumor.  Patient was previously followed by Dr. Molina from our group.  Her last appointment with our office was in November 2023 with plans to increase her pazopanib from 400 mg daily to 600 mg daily.  Patient was since lost to follow-up.  She now presented to ER with increased/worsening in the tumor size on her right knee.  She reports she is having shooting pain down her leg up to her toes, pain is especially worse at night.  She has 3 [id number], 4-year-old and 2-year-old at home.  Currently her  and mother is helping take care off her kids.  She previously worked as a manager at a shop.  Patient is requesting referral to desmoid tumor specialist either at [place] or  or [place] if covered by her insurance.  If not covered by her insurance, she would like to get a referral to [facility].  Patient reports she did not follow through on her chemotherapy recommendations as she was experiencing a lot of side effects in terms of nausea, hair turning gray, and being 3 months postpartum at that time, her mental state was not the greatest.  Duplex done yesterday negative for any DVT.  MRI of her right knee with and without contrast showed continued enlargement of the partially imaged large heterogeneous enhancing mass at posterior lateral knee.  Mild bone edema-like signal in posterior lateral femoral condyle, posterior lateral tibial plateau and fibular head and neck which could be reactive.  Further x-ray of the knee done today showed oblique soft tissue opacity at the lateral aspect of the knee.  No definite cortical destructive process, but CT would be more sensitive for evaluation of cortex And is advised as indicated.    Past Medical History:   Diagnosis Date     Asthma     inhaler as needed    Cervical dysplasia     Depression     Desmoid tumor 2022    back of right knee- pt desires to proceed with treatments after delivery    Gestational diabetes     Gestational hypertension 2020    Iron deficiency anemia     Preeclampsia 2012       Past Surgical History:   Procedure Laterality Date    APPENDECTOMY N/A 10/1/2021    Procedure: APPENDECTOMY LAPAROSCOPIC;  Surgeon: Ahmet Dong Jr., MD;  Location: [facility] OR;  Service: General;  Laterality: N/A;    KNEE ARTHROSCOPY      AGE 6    LEEP N/A 4/25/2019    Procedure: LOOP ELECTROCAUTERY EXCISION PROCEDURE;  Surgeon: Arsh Ray MD;  Location: [facility] OR OSC;  Service: Obstetrics/Gynecology    SIGMOIDOSCOPY N/A 7/19/2024    Procedure: SIGMOIDOSCOPY FLEXIBLE WITH BIOPSIES;  Surgeon: Rohan Duran MD;  Location: [facility] ENDOSCOPY;  Service: Gastroenterology;  Laterality: N/A;  PRE: COLITIS  POST: SAME    SOFT TISSUE BIOPSY Right 3/23/2022    Procedure: BIOPSY SOFT TISSUE THIGH / KNEE;  Surgeon: Chris Randall MD;  Location: [facility] OR;  Service: Orthopedics;  Laterality: Right;    WISDOM TOOTH EXTRACTION         SOCIAL HISTORY:   reports that she quit smoking about 4 years ago. Her smoking use included cigarettes. She started smoking about 8 years ago. She has a 2 pack-year smoking history. She has never been exposed to tobacco smoke. She has never used smokeless tobacco. She reports that she does not currently use alcohol. She reports that she does not currently use drugs after having used the following drugs: Marijuana.    FAMILY HISTORY:  family history includes Arthritis in her maternal grandmother; Diabetes in her father; Heart attack in her maternal grandfather; Heart disease in her maternal grandfather; Hyperlipidemia in her maternal grandfather; Lung cancer in her maternal grandfather; Stroke in her maternal grandfather.    ALLERGIES:  Allergies   Allergen Reactions    Penicillins Nausea And Vomiting     "Adhesive Tape Rash     \"SURGICAL TAPE\"  AS A CHILD       MEDICATIONS:  As listed in the electronic medical record.    Review of Systems   Constitutional:  Positive for fatigue. Negative for unexpected weight change.   Respiratory: Negative.     Cardiovascular: Negative.    Gastrointestinal: Negative.    Genitourinary: Negative.    Musculoskeletal:         Increasing right knee tumor size.  Reports shooting pain from her knee down to her ankle and toes.  Pain especially excruciating at night   Hematological: Negative.        Vitals:    02/02/25 1936 02/03/25 0040 02/03/25 0441 02/03/25 0724   BP: 122/77 111/71 102/68 110/69   BP Location: Right arm Right arm Left arm Left arm   Patient Position: Lying Lying Lying Lying   Pulse: 71 75 67 50   Resp: 18 18 17 18   Temp: 98.1 °F (36.7 °C) 98.4 °F (36.9 °C) 97.7 °F (36.5 °C) 97.6 °F (36.4 °C)   TempSrc: Oral Oral Oral Tympanic   SpO2: 97% 99% 100% 99%   Weight:       Height:           Physical Exam  Constitutional:       General: She is not in acute distress.     Appearance: Normal appearance. She is not ill-appearing.   HENT:      Head: Normocephalic and atraumatic.      Mouth/Throat:      Mouth: Mucous membranes are moist. Mucous membranes are dry.   Eyes:      Pupils: Pupils are equal, round, and reactive to light.   Cardiovascular:      Rate and Rhythm: Normal rate and regular rhythm.      Heart sounds: Normal heart sounds. No murmur heard.  Pulmonary:      Effort: Pulmonary effort is normal. No respiratory distress.      Breath sounds: Normal breath sounds. No wheezing or rhonchi.   Musculoskeletal:      Comments: Please see picture below   Skin:     General: Skin is warm and dry.   Neurological:      General: No focal deficit present.      Mental Status: She is alert and oriented to person, place, and time.         DIAGNOSTIC DATA:    Results from last 7 days   Lab Units 02/02/25  1404   WBC 10*3/mm3 8.06   HEMOGLOBIN g/dL 13.0   HEMATOCRIT % 39.3   PLATELETS " 10*3/mm3 256      Results from last 7 days   Lab Units 02/02/25  1404   SODIUM mmol/L 136   POTASSIUM mmol/L 3.8   CHLORIDE mmol/L 102   CO2 mmol/L 26.0   BUN mg/dL 7   CREATININE mg/dL 0.70   CALCIUM mg/dL 9.2   BILIRUBIN mg/dL 0.4   ALK PHOS U/L 41   ALT (SGPT) U/L 8   AST (SGOT) U/L 14   GLUCOSE mg/dL 92          IMAGING:    XR Knee 3 View Right (02/03/2025 13:13)  MRI Knee Right With & Without Contrast (02/02/2025 20:51)  Duplex Venous Lower Extremity - RIGHT (02/02/2025 15:24)    Assessment & Plan   ASSESSMENT:  This is a 34 y.o. female with:    *Desmoid tumor, right lower extremity  Previously followed by Dr. Molina from our office, last seen in November 2023.  Patient was also seen by surgical oncology Dr. Solis at [facility] in 2023 with plans for surveillance.  May 2023-patient was started on pazopanib, unfortunately had issues with tolerance requiring dose reduction to 400.  Plans were to increase her dose to 600 versus switching her to sorafenib 400 daily in November 2023.  However patient was lost to follow-up since  2/3/2025: Patient presented with increasing tumor size and shooting pain from her knee down to her ankle. MRI of her right knee with and without contrast showed continued enlargement of the partially imaged large heterogeneous enhancing mass at posterior lateral knee.  Mild bone edema-like signal in posterior lateral femoral condyle, posterior lateral tibial plateau and fibular head and neck which could be reactive.  Further x-ray of the knee done today showed oblique soft tissue opacity at the lateral aspect of the knee.  No definite cortical destructive process, but CT would be more sensitive for evaluation of cortex And is advised as indicated.  Patient requesting referral to  or [place] or [place] if covered by her insurance; if not then referral to [facility] for her desmoid tumor management.  She is not interested in following with Dr. Molina or our office.   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  Multicancer panel of 84 genes negative.    *Right breast lesion:   US right breast performed 1/9/2023 noted a 1.1 cm probable benign fibroadenoma at 8 o'clock position, 4 cm from the nipple.  6 months sonographic follow-up is recommended.   US breast from 6/9/23 noted stable 1 cm probable benign fibroadenoma in the right breast at 8'o clock position.   Short-term sonographic follow-up at 6-9 month intervals is recommended through 01/09/2025 to demonstrate 2-year stability  Ultrasound was ordered in November 2023, patient lost to follow-up.  Recommended patient to follow-up with the imaging.    RECOMMENDATIONS/PLAN:   Reviewed results of MRI as well as x-ray with the patient  For her neuropathy pain secondary to desmoid tumor, will start gabapentin 100 mg 3 times daily.  Prescription for 30-day supply sent to her pharmacy.  Reviewed possible side effects of gabapentin including dizziness.  Recommended against driving and or operating heavy machinery after taking gabapentin.  Patient requesting referral to  or [place] or [place] if covered by her insurance; if not then referral to [facility] for her desmoid tumor management.  She is not interested in following with Dr. Molina or our office.  Referral has been placed, and I have contacted our office to assist in making appointments  Recommended patient to follow through with her right breast lesion imaging   nothing else to add from oncology standpoint.  We will sign off    Ro Grewal MD

## 2025-02-03 NOTE — PLAN OF CARE
Goal Outcome Evaluation:              Outcome Evaluation: Patient was admitted due to due to right leg and swelling. She is alert and oriented times 4, on room air and independent baseline. Pain medicine and patch given as needed for her right leg. Hematology and oncology consult in am.

## 2025-02-04 ENCOUNTER — TRANSITIONAL CARE MANAGEMENT TELEPHONE ENCOUNTER (OUTPATIENT)
Dept: CALL CENTER | Facility: HOSPITAL | Age: 35
End: 2025-02-04
Payer: COMMERCIAL

## 2025-02-04 ENCOUNTER — HOSPITAL ENCOUNTER (OUTPATIENT)
Facility: HOSPITAL | Age: 35
Setting detail: OBSERVATION
Discharge: HOME | End: 2025-02-07
Attending: STUDENT IN AN ORGANIZED HEALTH CARE EDUCATION/TRAINING PROGRAM | Admitting: EMERGENCY MEDICINE
Payer: COMMERCIAL

## 2025-02-04 ENCOUNTER — APPOINTMENT (OUTPATIENT)
Dept: CT IMAGING | Facility: HOSPITAL | Age: 35
End: 2025-02-04
Payer: COMMERCIAL

## 2025-02-04 DIAGNOSIS — K52.9 ACUTE COLITIS: Primary | ICD-10-CM

## 2025-02-04 DIAGNOSIS — N83.209 HEMORRHAGIC CYST OF OVARY: ICD-10-CM

## 2025-02-04 DIAGNOSIS — R52 INTRACTABLE PAIN: ICD-10-CM

## 2025-02-04 LAB
ALBUMIN SERPL-MCNC: 4.2 G/DL (ref 3.5–5.2)
ALBUMIN/GLOB SERPL: 1.4 G/DL
ALP SERPL-CCNC: 48 U/L (ref 39–117)
ALT SERPL W P-5'-P-CCNC: 99 U/L (ref 1–33)
ANION GAP SERPL CALCULATED.3IONS-SCNC: 13.6 MMOL/L (ref 5–15)
AST SERPL-CCNC: 90 U/L (ref 1–32)
BASOPHILS # BLD AUTO: 0.05 10*3/MM3 (ref 0–0.2)
BASOPHILS NFR BLD AUTO: 0.3 % (ref 0–1.5)
BILIRUB SERPL-MCNC: 0.4 MG/DL (ref 0–1.2)
BUN SERPL-MCNC: 9 MG/DL (ref 6–20)
BUN/CREAT SERPL: 10.6 (ref 7–25)
CALCIUM SPEC-SCNC: 9.2 MG/DL (ref 8.6–10.5)
CHLORIDE SERPL-SCNC: 104 MMOL/L (ref 98–107)
CO2 SERPL-SCNC: 20.4 MMOL/L (ref 22–29)
CREAT SERPL-MCNC: 0.85 MG/DL (ref 0.57–1)
D-LACTATE SERPL-SCNC: 1 MMOL/L (ref 0.5–2)
DEPRECATED RDW RBC AUTO: 40.4 FL (ref 37–54)
EGFRCR SERPLBLD CKD-EPI 2021: 93.5 ML/MIN/1.73
EOSINOPHIL # BLD AUTO: 0.13 10*3/MM3 (ref 0–0.4)
EOSINOPHIL NFR BLD AUTO: 0.9 % (ref 0.3–6.2)
ERYTHROCYTE [DISTWIDTH] IN BLOOD BY AUTOMATED COUNT: 12.7 % (ref 12.3–15.4)
GLOBULIN UR ELPH-MCNC: 3 GM/DL
GLUCOSE SERPL-MCNC: 139 MG/DL (ref 65–99)
HCG SERPL QL: NEGATIVE
HCT VFR BLD AUTO: 45.1 % (ref 34–46.6)
HGB BLD-MCNC: 14.2 G/DL (ref 12–15.9)
HOLD SPECIMEN: NORMAL
IMM GRANULOCYTES # BLD AUTO: 0.07 10*3/MM3 (ref 0–0.05)
IMM GRANULOCYTES NFR BLD AUTO: 0.5 % (ref 0–0.5)
LIPASE SERPL-CCNC: 25 U/L (ref 13–60)
LYMPHOCYTES # BLD AUTO: 2.14 10*3/MM3 (ref 0.7–3.1)
LYMPHOCYTES NFR BLD AUTO: 14.6 % (ref 19.6–45.3)
MCH RBC QN AUTO: 27.5 PG (ref 26.6–33)
MCHC RBC AUTO-ENTMCNC: 31.5 G/DL (ref 31.5–35.7)
MCV RBC AUTO: 87.2 FL (ref 79–97)
MONOCYTES # BLD AUTO: 1.01 10*3/MM3 (ref 0.1–0.9)
MONOCYTES NFR BLD AUTO: 6.9 % (ref 5–12)
NEUTROPHILS NFR BLD AUTO: 11.27 10*3/MM3 (ref 1.7–7)
NEUTROPHILS NFR BLD AUTO: 76.8 % (ref 42.7–76)
NRBC BLD AUTO-RTO: 0 /100 WBC (ref 0–0.2)
PLATELET # BLD AUTO: 322 10*3/MM3 (ref 140–450)
PMV BLD AUTO: 10.5 FL (ref 6–12)
POTASSIUM SERPL-SCNC: 3.9 MMOL/L (ref 3.5–5.2)
PROT SERPL-MCNC: 7.2 G/DL (ref 6–8.5)
RBC # BLD AUTO: 5.17 10*6/MM3 (ref 3.77–5.28)
SODIUM SERPL-SCNC: 138 MMOL/L (ref 136–145)
WBC NRBC COR # BLD AUTO: 14.67 10*3/MM3 (ref 3.4–10.8)
WHOLE BLOOD HOLD COAG: NORMAL
WHOLE BLOOD HOLD SPECIMEN: NORMAL

## 2025-02-04 PROCEDURE — 96374 THER/PROPH/DIAG INJ IV PUSH: CPT

## 2025-02-04 PROCEDURE — 25510000001 IOPAMIDOL 61 % SOLUTION: Performed by: STUDENT IN AN ORGANIZED HEALTH CARE EDUCATION/TRAINING PROGRAM

## 2025-02-04 PROCEDURE — 25010000002 ONDANSETRON PER 1 MG: Performed by: STUDENT IN AN ORGANIZED HEALTH CARE EDUCATION/TRAINING PROGRAM

## 2025-02-04 PROCEDURE — 83690 ASSAY OF LIPASE: CPT

## 2025-02-04 PROCEDURE — 36415 COLL VENOUS BLD VENIPUNCTURE: CPT

## 2025-02-04 PROCEDURE — 99285 EMERGENCY DEPT VISIT HI MDM: CPT

## 2025-02-04 PROCEDURE — 85025 COMPLETE CBC W/AUTO DIFF WBC: CPT

## 2025-02-04 PROCEDURE — 84703 CHORIONIC GONADOTROPIN ASSAY: CPT

## 2025-02-04 PROCEDURE — 96375 TX/PRO/DX INJ NEW DRUG ADDON: CPT

## 2025-02-04 PROCEDURE — 80053 COMPREHEN METABOLIC PANEL: CPT

## 2025-02-04 PROCEDURE — 25010000002 MORPHINE PER 10 MG: Performed by: STUDENT IN AN ORGANIZED HEALTH CARE EDUCATION/TRAINING PROGRAM

## 2025-02-04 PROCEDURE — 83605 ASSAY OF LACTIC ACID: CPT | Performed by: STUDENT IN AN ORGANIZED HEALTH CARE EDUCATION/TRAINING PROGRAM

## 2025-02-04 PROCEDURE — 74177 CT ABD & PELVIS W/CONTRAST: CPT

## 2025-02-04 PROCEDURE — 25810000003 SODIUM CHLORIDE 0.9 % SOLUTION: Performed by: STUDENT IN AN ORGANIZED HEALTH CARE EDUCATION/TRAINING PROGRAM

## 2025-02-04 RX ORDER — SODIUM CHLORIDE 0.9 % (FLUSH) 0.9 %
10 SYRINGE (ML) INJECTION AS NEEDED
Status: DISCONTINUED | OUTPATIENT
Start: 2025-02-04 | End: 2025-02-07 | Stop reason: HOSPADM

## 2025-02-04 RX ORDER — ONDANSETRON 2 MG/ML
4 INJECTION INTRAMUSCULAR; INTRAVENOUS ONCE
Status: COMPLETED | OUTPATIENT
Start: 2025-02-04 | End: 2025-02-04

## 2025-02-04 RX ORDER — MORPHINE SULFATE 2 MG/ML
4 INJECTION, SOLUTION INTRAMUSCULAR; INTRAVENOUS ONCE
Status: COMPLETED | OUTPATIENT
Start: 2025-02-04 | End: 2025-02-04

## 2025-02-04 RX ORDER — IOPAMIDOL 612 MG/ML
100 INJECTION, SOLUTION INTRAVASCULAR
Status: COMPLETED | OUTPATIENT
Start: 2025-02-04 | End: 2025-02-04

## 2025-02-04 RX ADMIN — SODIUM CHLORIDE 1000 ML: 9 INJECTION, SOLUTION INTRAVENOUS at 23:18

## 2025-02-04 RX ADMIN — IOPAMIDOL 85 ML: 612 INJECTION, SOLUTION INTRAVENOUS at 23:31

## 2025-02-04 RX ADMIN — ONDANSETRON 4 MG: 2 INJECTION INTRAMUSCULAR; INTRAVENOUS at 23:15

## 2025-02-04 RX ADMIN — MORPHINE SULFATE 4 MG: 2 INJECTION, SOLUTION INTRAMUSCULAR; INTRAVENOUS at 23:16

## 2025-02-04 NOTE — OUTREACH NOTE
Call Center TCM Note      Flowsheet Row Responses   [facility] patient discharged from? [place]   Does the patient have one of the following disease processes/diagnoses(primary or secondary)? Other   TCM attempt successful? No  [Nobody listed on verbal]   Unsuccessful attempts Attempt 1            Angelina Wilkes RN    2/4/2025, 11:16 EST

## 2025-02-04 NOTE — OUTREACH NOTE
Call Center TCM Note      Flowsheet Row Responses   [facility] patient discharged from? [place]   Does the patient have one of the following disease processes/diagnoses(primary or secondary)? Other   TCM attempt successful? Yes   Call start time 1428   Call end time 1432   Discharge diagnosis Right ankle pain   Person spoke with today (if not patient) and relationship pt   Meds reviewed with patient/caregiver? Yes   Is the patient having any side effects they believe may be caused by any medication additions or changes? No   Does the patient have all medications ordered at discharge? Yes   Is the patient taking all medications as directed (includes completed medication regime)? Yes   Comments Pt waiting on Oncology referrals   Does the patient have an appointment with their PCP within 7-14 days of discharge? Yes  [2/14/2025  1:00 PM]   Psychosocial issues? No   Did the patient receive a copy of their discharge instructions? Yes   Nursing interventions Reviewed instructions with patient   What is the patient's perception of their health status since discharge? Improving   Is the patient/caregiver able to teach back signs and symptoms related to disease process for when to call PCP? Yes   Is the patient/caregiver able to teach back signs and symptoms related to disease process for when to call 911? Yes   Is the patient/caregiver able to teach back the hierarchy of who to call/visit for symptoms/problems? PCP, Specialist, Home health nurse, Urgent Care, ED, 911 Yes   If the patient is a current smoker, are they able to teach back resources for cessation? Not a smoker   TCM call completed? Yes   Wrap up additional comments Pt is still having right ankle pain. Pt is trying to get into see Oncologist that is out of town. Reviewed AVS/meds with pt. Pt verified PCP fu appt.   Call end time 1432   Would this patient benefit from a Referral to [facility] Social Work? No   Is the patient interested in additional calls from  an ambulatory ? No            Angelina Wilkes RN    2/4/2025, 14:34 EST

## 2025-02-04 NOTE — CASE MANAGEMENT/SOCIAL WORK
Case Management Discharge Note      Final Note: Home; self-care. Denisse W[place]         Selected Continued Care - Discharged on 2/3/2025 Admission date: 2/2/2025 - Discharge disposition: Home or Self Care      Destination    No services have been selected for the patient.                Durable Medical Equipment    No services have been selected for the patient.                Dialysis/Infusion    No services have been selected for the patient.                Home Medical Care    No services have been selected for the patient.                Therapy    No services have been selected for the patient.                Community Resources    No services have been selected for the patient.                Community & DME    No services have been selected for the patient.                         Final Discharge Disposition Code: 01 - home or self-care

## 2025-02-04 NOTE — OUTREACH NOTE
Prep Survey      Flowsheet Row Responses   [facility] patient discharged from? [place]   Is LACE score < 7 ? Yes   Eligibility [facility]   Date of Admission 02/02/25   Date of Discharge 02/03/25   Discharge Disposition Home or Self Care   Discharge diagnosis Right ankle pain   Does the patient have one of the following disease processes/diagnoses(primary or secondary)? Other   Does the patient have Home health ordered? No   Is there a DME ordered? No   Prep survey completed? Yes            Elvira KINSEY - Registered Nurse

## 2025-02-05 LAB
ALBUMIN SERPL-MCNC: 4.2 G/DL (ref 3.5–5.2)
ALBUMIN/GLOB SERPL: 1.6 G/DL
ALP SERPL-CCNC: 43 U/L (ref 39–117)
ALT SERPL W P-5'-P-CCNC: 78 U/L (ref 1–33)
ANION GAP SERPL CALCULATED.3IONS-SCNC: 10.8 MMOL/L (ref 5–15)
AST SERPL-CCNC: 55 U/L (ref 1–32)
BILIRUB SERPL-MCNC: 0.5 MG/DL (ref 0–1.2)
BILIRUB UR QL STRIP: NEGATIVE
BUN SERPL-MCNC: 8 MG/DL (ref 6–20)
BUN/CREAT SERPL: 11.8 (ref 7–25)
CALCIUM SPEC-SCNC: 8.8 MG/DL (ref 8.6–10.5)
CHLORIDE SERPL-SCNC: 105 MMOL/L (ref 98–107)
CLARITY UR: CLEAR
CO2 SERPL-SCNC: 25.2 MMOL/L (ref 22–29)
COLOR UR: YELLOW
CREAT SERPL-MCNC: 0.68 MG/DL (ref 0.57–1)
DEPRECATED RDW RBC AUTO: 38.3 FL (ref 37–54)
EGFRCR SERPLBLD CKD-EPI 2021: 118.8 ML/MIN/1.73
ERYTHROCYTE [DISTWIDTH] IN BLOOD BY AUTOMATED COUNT: 12.5 % (ref 12.3–15.4)
GLOBULIN UR ELPH-MCNC: 2.6 GM/DL
GLUCOSE SERPL-MCNC: 91 MG/DL (ref 65–99)
GLUCOSE UR STRIP-MCNC: NEGATIVE MG/DL
HCT VFR BLD AUTO: 39.5 % (ref 34–46.6)
HGB BLD-MCNC: 12.9 G/DL (ref 12–15.9)
HGB UR QL STRIP.AUTO: NEGATIVE
KETONES UR QL STRIP: NEGATIVE
LEUKOCYTE ESTERASE UR QL STRIP.AUTO: NEGATIVE
MCH RBC QN AUTO: 27.9 PG (ref 26.6–33)
MCHC RBC AUTO-ENTMCNC: 32.7 G/DL (ref 31.5–35.7)
MCV RBC AUTO: 85.3 FL (ref 79–97)
NITRITE UR QL STRIP: NEGATIVE
PH UR STRIP.AUTO: 6.5 [PH] (ref 5–8)
PLATELET # BLD AUTO: 249 10*3/MM3 (ref 140–450)
PMV BLD AUTO: 10.3 FL (ref 6–12)
POTASSIUM SERPL-SCNC: 3.7 MMOL/L (ref 3.5–5.2)
PROT SERPL-MCNC: 6.8 G/DL (ref 6–8.5)
PROT UR QL STRIP: NEGATIVE
RBC # BLD AUTO: 4.63 10*6/MM3 (ref 3.77–5.28)
SODIUM SERPL-SCNC: 141 MMOL/L (ref 136–145)
SP GR UR STRIP: >1.03 (ref 1–1.03)
UROBILINOGEN UR QL STRIP: ABNORMAL
WBC NRBC COR # BLD AUTO: 9.02 10*3/MM3 (ref 3.4–10.8)

## 2025-02-05 PROCEDURE — 83520 IMMUNOASSAY QUANT NOS NONAB: CPT | Performed by: INTERNAL MEDICINE

## 2025-02-05 PROCEDURE — 96376 TX/PRO/DX INJ SAME DRUG ADON: CPT

## 2025-02-05 PROCEDURE — 88346 IMFLUOR 1ST 1ANTB STAIN PX: CPT | Performed by: INTERNAL MEDICINE

## 2025-02-05 PROCEDURE — 81479 UNLISTED MOLECULAR PATHOLOGY: CPT | Performed by: INTERNAL MEDICINE

## 2025-02-05 PROCEDURE — 25010000002 METOCLOPRAMIDE PER 10 MG: Performed by: STUDENT IN AN ORGANIZED HEALTH CARE EDUCATION/TRAINING PROGRAM

## 2025-02-05 PROCEDURE — 25010000002 KETOROLAC TROMETHAMINE PER 15 MG: Performed by: STUDENT IN AN ORGANIZED HEALTH CARE EDUCATION/TRAINING PROGRAM

## 2025-02-05 PROCEDURE — G0378 HOSPITAL OBSERVATION PER HR: HCPCS

## 2025-02-05 PROCEDURE — 86140 C-REACTIVE PROTEIN: CPT | Performed by: INTERNAL MEDICINE

## 2025-02-05 PROCEDURE — 82397 CHEMILUMINESCENT ASSAY: CPT | Performed by: INTERNAL MEDICINE

## 2025-02-05 PROCEDURE — 25810000003 LACTATED RINGERS PER 1000 ML: Performed by: EMERGENCY MEDICINE

## 2025-02-05 PROCEDURE — 81003 URINALYSIS AUTO W/O SCOPE: CPT | Performed by: STUDENT IN AN ORGANIZED HEALTH CARE EDUCATION/TRAINING PROGRAM

## 2025-02-05 PROCEDURE — 88350 IMFLUOR EA ADDL 1ANTB STN PX: CPT | Performed by: INTERNAL MEDICINE

## 2025-02-05 PROCEDURE — 96375 TX/PRO/DX INJ NEW DRUG ADDON: CPT

## 2025-02-05 PROCEDURE — 85027 COMPLETE CBC AUTOMATED: CPT | Performed by: NURSE PRACTITIONER

## 2025-02-05 PROCEDURE — 25010000002 ONDANSETRON PER 1 MG: Performed by: EMERGENCY MEDICINE

## 2025-02-05 PROCEDURE — 81405 MOPATH PROCEDURE LEVEL 6: CPT | Performed by: INTERNAL MEDICINE

## 2025-02-05 PROCEDURE — 80053 COMPREHEN METABOLIC PANEL: CPT | Performed by: NURSE PRACTITIONER

## 2025-02-05 PROCEDURE — 25010000002 MORPHINE PER 10 MG: Performed by: STUDENT IN AN ORGANIZED HEALTH CARE EDUCATION/TRAINING PROGRAM

## 2025-02-05 PROCEDURE — 25010000002 HYDROMORPHONE PER 4 MG: Performed by: EMERGENCY MEDICINE

## 2025-02-05 PROCEDURE — 99214 OFFICE O/P EST MOD 30 MIN: CPT | Performed by: PHYSICIAN ASSISTANT

## 2025-02-05 RX ORDER — SODIUM CHLORIDE 0.9 % (FLUSH) 0.9 %
10 SYRINGE (ML) INJECTION AS NEEDED
Status: DISCONTINUED | OUTPATIENT
Start: 2025-02-05 | End: 2025-02-07 | Stop reason: HOSPADM

## 2025-02-05 RX ORDER — NALOXONE HCL 0.4 MG/ML
0.4 VIAL (ML) INJECTION
Status: DISCONTINUED | OUTPATIENT
Start: 2025-02-05 | End: 2025-02-07

## 2025-02-05 RX ORDER — GABAPENTIN 100 MG/1
100 CAPSULE ORAL 3 TIMES DAILY
Status: DISCONTINUED | OUTPATIENT
Start: 2025-02-05 | End: 2025-02-07 | Stop reason: HOSPADM

## 2025-02-05 RX ORDER — ONDANSETRON 2 MG/ML
4 INJECTION INTRAMUSCULAR; INTRAVENOUS EVERY 6 HOURS PRN
Status: DISCONTINUED | OUTPATIENT
Start: 2025-02-05 | End: 2025-02-07 | Stop reason: HOSPADM

## 2025-02-05 RX ORDER — SODIUM CHLORIDE, SODIUM LACTATE, POTASSIUM CHLORIDE, CALCIUM CHLORIDE 600; 310; 30; 20 MG/100ML; MG/100ML; MG/100ML; MG/100ML
125 INJECTION, SOLUTION INTRAVENOUS CONTINUOUS
Status: ACTIVE | OUTPATIENT
Start: 2025-02-05 | End: 2025-02-05

## 2025-02-05 RX ORDER — ESCITALOPRAM OXALATE 20 MG/1
20 TABLET ORAL DAILY
Status: DISCONTINUED | OUTPATIENT
Start: 2025-02-05 | End: 2025-02-07 | Stop reason: HOSPADM

## 2025-02-05 RX ORDER — SODIUM CHLORIDE 9 MG/ML
40 INJECTION, SOLUTION INTRAVENOUS AS NEEDED
Status: DISCONTINUED | OUTPATIENT
Start: 2025-02-05 | End: 2025-02-07 | Stop reason: HOSPADM

## 2025-02-05 RX ORDER — ONDANSETRON 4 MG/1
4 TABLET, ORALLY DISINTEGRATING ORAL EVERY 6 HOURS PRN
Status: DISCONTINUED | OUTPATIENT
Start: 2025-02-05 | End: 2025-02-07 | Stop reason: HOSPADM

## 2025-02-05 RX ORDER — HYDROMORPHONE HYDROCHLORIDE 1 MG/ML
0.5 INJECTION, SOLUTION INTRAMUSCULAR; INTRAVENOUS; SUBCUTANEOUS
Status: DISCONTINUED | OUTPATIENT
Start: 2025-02-05 | End: 2025-02-07

## 2025-02-05 RX ORDER — DICYCLOMINE HYDROCHLORIDE 10 MG/1
20 CAPSULE ORAL 4 TIMES DAILY
Status: DISCONTINUED | OUTPATIENT
Start: 2025-02-05 | End: 2025-02-07 | Stop reason: HOSPADM

## 2025-02-05 RX ORDER — MORPHINE SULFATE 2 MG/ML
4 INJECTION, SOLUTION INTRAMUSCULAR; INTRAVENOUS ONCE
Status: COMPLETED | OUTPATIENT
Start: 2025-02-05 | End: 2025-02-05

## 2025-02-05 RX ORDER — KETOROLAC TROMETHAMINE 15 MG/ML
15 INJECTION, SOLUTION INTRAMUSCULAR; INTRAVENOUS ONCE
Status: COMPLETED | OUTPATIENT
Start: 2025-02-05 | End: 2025-02-05

## 2025-02-05 RX ORDER — ALBUTEROL SULFATE 0.83 MG/ML
2.5 SOLUTION RESPIRATORY (INHALATION) EVERY 6 HOURS PRN
Status: DISCONTINUED | OUTPATIENT
Start: 2025-02-05 | End: 2025-02-07 | Stop reason: HOSPADM

## 2025-02-05 RX ORDER — METOCLOPRAMIDE HYDROCHLORIDE 5 MG/ML
10 INJECTION INTRAMUSCULAR; INTRAVENOUS ONCE
Status: COMPLETED | OUTPATIENT
Start: 2025-02-05 | End: 2025-02-05

## 2025-02-05 RX ORDER — SODIUM CHLORIDE 0.9 % (FLUSH) 0.9 %
10 SYRINGE (ML) INJECTION EVERY 12 HOURS SCHEDULED
Status: DISCONTINUED | OUTPATIENT
Start: 2025-02-05 | End: 2025-02-07 | Stop reason: HOSPADM

## 2025-02-05 RX ADMIN — HYDROMORPHONE HYDROCHLORIDE 0.5 MG: 1 INJECTION, SOLUTION INTRAMUSCULAR; INTRAVENOUS; SUBCUTANEOUS at 04:13

## 2025-02-05 RX ADMIN — HYDROMORPHONE HYDROCHLORIDE 0.5 MG: 1 INJECTION, SOLUTION INTRAMUSCULAR; INTRAVENOUS; SUBCUTANEOUS at 15:08

## 2025-02-05 RX ADMIN — ONDANSETRON 4 MG: 2 INJECTION, SOLUTION INTRAMUSCULAR; INTRAVENOUS at 15:08

## 2025-02-05 RX ADMIN — HYDROMORPHONE HYDROCHLORIDE 0.5 MG: 1 INJECTION, SOLUTION INTRAMUSCULAR; INTRAVENOUS; SUBCUTANEOUS at 23:25

## 2025-02-05 RX ADMIN — SODIUM CHLORIDE, POTASSIUM CHLORIDE, SODIUM LACTATE AND CALCIUM CHLORIDE 125 ML/HR: 600; 310; 30; 20 INJECTION, SOLUTION INTRAVENOUS at 04:02

## 2025-02-05 RX ADMIN — DICYCLOMINE HYDROCHLORIDE 20 MG: 10 CAPSULE ORAL at 20:40

## 2025-02-05 RX ADMIN — HYDROMORPHONE HYDROCHLORIDE 0.5 MG: 1 INJECTION, SOLUTION INTRAMUSCULAR; INTRAVENOUS; SUBCUTANEOUS at 07:59

## 2025-02-05 RX ADMIN — Medication 10 ML: at 20:40

## 2025-02-05 RX ADMIN — ONDANSETRON 4 MG: 2 INJECTION, SOLUTION INTRAMUSCULAR; INTRAVENOUS at 07:59

## 2025-02-05 RX ADMIN — Medication 10 ML: at 08:00

## 2025-02-05 RX ADMIN — GABAPENTIN 100 MG: 100 CAPSULE ORAL at 15:08

## 2025-02-05 RX ADMIN — HYDROMORPHONE HYDROCHLORIDE 0.5 MG: 1 INJECTION, SOLUTION INTRAMUSCULAR; INTRAVENOUS; SUBCUTANEOUS at 11:13

## 2025-02-05 RX ADMIN — HYDROMORPHONE HYDROCHLORIDE 0.5 MG: 1 INJECTION, SOLUTION INTRAMUSCULAR; INTRAVENOUS; SUBCUTANEOUS at 20:40

## 2025-02-05 RX ADMIN — KETOROLAC TROMETHAMINE 15 MG: 15 INJECTION, SOLUTION INTRAMUSCULAR; INTRAVENOUS at 01:43

## 2025-02-05 RX ADMIN — METOCLOPRAMIDE 10 MG: 5 INJECTION, SOLUTION INTRAMUSCULAR; INTRAVENOUS at 22:29

## 2025-02-05 RX ADMIN — GABAPENTIN 100 MG: 100 CAPSULE ORAL at 20:40

## 2025-02-05 RX ADMIN — MORPHINE SULFATE 4 MG: 2 INJECTION, SOLUTION INTRAMUSCULAR; INTRAVENOUS at 01:04

## 2025-02-05 RX ADMIN — HYDROMORPHONE HYDROCHLORIDE 0.5 MG: 1 INJECTION, SOLUTION INTRAMUSCULAR; INTRAVENOUS; SUBCUTANEOUS at 17:47

## 2025-02-05 RX ADMIN — DICYCLOMINE HYDROCHLORIDE 20 MG: 10 CAPSULE ORAL at 17:44

## 2025-02-05 NOTE — PLAN OF CARE
Goal Outcome Evaluation:  Plan of Care Reviewed With: patient        Progress: improving  Outcome Evaluation: pt came in for abdominal pain and nausea. AO4, vss, up ad shante. GI seen, advanced diet to clear lquids and plan for egd as outpatient,

## 2025-02-05 NOTE — CONSULTS
[facility]  Initial Inpatient Consult Note    Referring Provider: Ava Jordan PA-C     Reason for Consultation: Colitis    Subjective     History of present illness:      Thank you for allowing us to participate in the care of this patient.  34 y.o. female with history of asthma, cervical dysplasia, Jarrod tumor-right knee, and NIDA presented to the ED on 2/4 with severe lower abdominal pain left greater than right, nausea, and vomiting.  Associated with chills and cold sweats.  Contrasted CT scan showed mildly thick-walled appearance to the colon extending from the splenic flexure to the proximal sigmoid colon with pericolonic soft tissue stranding suggesting colitis.  Mildly patulous loops of small bowel suggesting enteritis.  Initial CBC with WBC 14.6-improved to 9, normal lactate and lipase.  CMP with AST 90, ALT 99-both improving today.  Urinalysis unremarkable.  GI PCR pending.    She was seen by our GI service inpatient in July for colitis with similar findings on CT scan.  GI PCR negative.  7/19/2024 flexible sigmoidoscope he showed 10 cm of moderately severe colitis from splenic flexure to descending colon.  Pathology showed nonspecific colitis.  No features to suggest IBD. Patient reports milder symptoms since July--N/V and loose stools intermittently, lower abdominal cramping, better with dicyclomine.  But then they flared prior to this admission.    Denies melena, hematochezia, hematemesis, coffee-ground emesis, fever, upper respiratory symptoms, lightheadedness/dizziness, urinary symptoms, sick contacts    Surgical history of appendectomy.  Sigmoidoscopy on 7/19/2024 Dr. Duran showed colitis.  Denies family history of colon cancer or IBD.    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- pt desires to proceed with treatments after  delivery    Gestational diabetes     Gestational hypertension 2020    Iron deficiency anemia     Preeclampsia      Past Surgical History:  Past Surgical History:   Procedure Laterality Date    APPENDECTOMY N/A 10/1/2021    Procedure: APPENDECTOMY LAPAROSCOPIC;  Surgeon: Ahmet Dong Jr., MD;  Location: [facility] MAIN OR;  Service: General;  Laterality: N/A;    KNEE ARTHROSCOPY      AGE 6    LEEP N/A 2019    Procedure: LOOP ELECTROCAUTERY EXCISION PROCEDURE;  Surgeon: Arsh Ray MD;  Location: [facility] OR OSC;  Service: Obstetrics/Gynecology    SIGMOIDOSCOPY N/A 2024    Procedure: SIGMOIDOSCOPY FLEXIBLE WITH BIOPSIES;  Surgeon: Rohan Duran MD;  Location: [facility] ENDOSCOPY;  Service: Gastroenterology;  Laterality: N/A;  PRE: COLITIS  POST: SAME    SOFT TISSUE BIOPSY Right 3/23/2022    Procedure: BIOPSY SOFT TISSUE THIGH / KNEE;  Surgeon: Chris Randall MD;  Location: [facility] OR;  Service: Orthopedics;  Laterality: Right;    WISDOM TOOTH EXTRACTION        Social History:   Social History     Tobacco Use    Smoking status: Former     Current packs/day: 0.00     Average packs/day: 0.5 packs/day for 4.0 years (2.0 ttl pk-yrs)     Types: Cigarettes     Start date: 2016     Quit date: 2020     Years since quittin.9     Passive exposure: Never    Smokeless tobacco: Never   Substance Use Topics    Alcohol use: Not Currently     Comment: socially      Family History:  Family History   Problem Relation Age of Onset    Diabetes Father     Arthritis Maternal Grandmother     Lung cancer Maternal Grandfather     Heart attack Maternal Grandfather     Heart disease Maternal Grandfather     Stroke Maternal Grandfather     Hyperlipidemia Maternal Grandfather     Malig Hyperthermia Neg Hx     Breast cancer Neg Hx     Ovarian cancer Neg Hx     Uterine cancer Neg Hx     Colon cancer Neg Hx        Home Meds:  Medications Prior to Admission   Medication Sig Dispense Refill Last Dose/Taking     "albuterol sulfate  (90 Base) MCG/ACT inhaler Inhale 2 puffs Every 6 (Six) Hours As Needed for Wheezing or Shortness of Air. 18 g 0 Past Month    gabapentin (NEURONTIN) 100 MG capsule Take 1 capsule by mouth 3 (Three) Times a Day for 30 days. 90 capsule 0 2/4/2025 Evening    escitalopram (LEXAPRO) 20 MG tablet Take 1 tablet by mouth Daily for 30 days. 30 tablet 0      Current Meds:   dicyclomine, 20 mg, Oral, 4x Daily  escitalopram, 20 mg, Oral, Daily  gabapentin, 100 mg, Oral, TID  sodium chloride, 10 mL, Intravenous, Q12H      Allergies:  Allergies   Allergen Reactions    Penicillins Nausea And Vomiting    Adhesive Tape Rash     \"SURGICAL TAPE\"  AS A CHILD       Objective:  Objective     Vital Signs  Temp:  [97.9 °F (36.6 °C)-98.8 °F (37.1 °C)] 98.1 °F (36.7 °C)  Heart Rate:  [] 61  Resp:  [18-22] 18  BP: (108-137)/(62-93) 127/81  Physical Exam:   Constitutional:    Alert, cooperative, in no acute distress    Eyes:          Conjunctivae and sclerae normal, no icterus    HENT:   Atraumatic, normal hearing, mucosa moist    Lungs:     Normal respiratory effort    Abdomen:     Soft, nondistended, Guarding in LLQ and RLQ; normal bowel sounds , no organomegaly    Skin:   No jaundice, rash, or pallor   Psychiatric:  Normal mood and affect; A&O x3     Results Review:   I reviewed the patient's new clinical results.    Results from last 7 days   Lab Units 02/05/25  0757 02/04/25 2106 02/02/25  1404   WBC 10*3/mm3 9.02 14.67* 8.06   HEMOGLOBIN g/dL 12.9 14.2 13.0   HEMATOCRIT % 39.5 45.1 39.3   PLATELETS 10*3/mm3 249 322 256     Results from last 7 days   Lab Units 02/05/25  0757 02/04/25  2106 02/02/25  1404   SODIUM mmol/L 141 138 136   POTASSIUM mmol/L 3.7 3.9 3.8   CHLORIDE mmol/L 105 104 102   CO2 mmol/L 25.2 20.4* 26.0   BUN mg/dL 8 9 7   CREATININE mg/dL 0.68 0.85 0.70   CALCIUM mg/dL 8.8 9.2 9.2   BILIRUBIN mg/dL 0.5 0.4 0.4   ALK PHOS U/L 43 48 41   ALT (SGPT) U/L 78* 99* 8   AST (SGOT) U/L 55* 90* 14 "   GLUCOSE mg/dL 91 139* 92         Lab Results   Lab Value Date/Time    LIPASE 25 02/04/2025 2106    LIPASE 16 07/16/2024 1318    LIPASE 20 07/13/2023 2359    LIPASE 25 06/08/2023 1516    LIPASE 26 10/01/2021 0959       Radiology:  CT Abdomen Pelvis With Contrast   Final Result       1. Mildly thick-walled appearance to the colon, extending from the   splenic flexure to at least the proximal sigmoid colon, with some   adjacent pericolonic soft tissue stranding. Appearance suggests colitis.   There are also some mildly patulous loops of small bowel as well, and   concomitant enteritis may be present.   2. Small hemorrhagic right ovarian cyst.       Radiation dose reduction techniques were utilized, including automated   exposure control and exposure modulation based on body size.           This report was finalized on 2/5/2025 12:12 AM by Dr. Veronica Lombardi M.D on Workstation: [id number]              Assessment & Plan   Active Hospital Problems    Diagnosis     **Acute colitis        Assessment:  Left-sided colitis and enteritis  Lower abdominal pain, N/V  Elevated LFTs, improving  Leukocytosis at admission, resolved  Jarrod tumor, knee-oncology consulted, plan for outpatient referral    Plan:  History of left-sided colitis in July with nonspecific colitis on biopsies.  Lost to follow-up after that.  Await GI PCR--needs to be collected  Supportive care per primary hospital team.  Advance diet to clear liquids  She will need follow-up in our office after discharge.  To ensure resolution and enterocolitis and other GI symptoms.  Check IBD panel and fecal calprotectin for baseline.      I discussed the patients findings and my recommendations with patient.    Dragon dictation used throughout this note.            Torie Montano PA-C  [facility]  [address] [place] [address]  Office: [phone]

## 2025-02-05 NOTE — DISCHARGE SUMMARY
ED OBSERVATION PROGRESS/DISCHARGE SUMMARY    Date of Admission: 2/4/2025   LOS: 0 days   PCP: Sissy Garibay MD    Final Diagnosis ***      Subjective     Hospital Outcome:     Beena Vincent is a 34 y.o. female who is being admitted the observation unit with acute colitis.  CT the abdomen pelvis with contrast shows mildly thick-walled appearance to the colon extending from the splenic flexure to at least the proximal sigmoid colon with some adjacent pericolonic soft tissue stranding suggesting colitis as well as some mildly patulous loops of small bowel suggestive of enteritis.  There is a small hemorrhagic right ovarian cyst.  Lab work notes a white blood cell count of 14.6 with a normal lactate.  Mild transaminitis with an AST at 90 and ALT 99.  Urinalysis without evidence for infection.  Will treat the patient supportively with analgesics and antiemetics as needed as well as IV fluid hydration.  Stool culture PCR pending and GI consulted.     Of note on chart review, patient was admitted 7/16/2024 to 7/21/2024 for acute colitis in which she underwent flex sigmoidoscopy on 719 with no source of bleeding identified.  Patient was treated with Zosyn and discharged home on Levaquin.     Will hold off on starting antibiotics at this time as patient has reported no fevers or bloody stools.  Lactate normal and vital signs stable.  Will defer to gastroenterology    ROS:  General: no fevers, chills  Respiratory: no cough, dyspnea  Cardiovascular: no chest pain, palpitations  Abdomen: No abdominal pain, nausea, vomiting, or diarrhea  Neurologic: No focal weakness    Objective   Physical Exam:  I have reviewed the vital signs.  Temp:  [97.9 °F (36.6 °C)-98.8 °F (37.1 °C)] 98.8 °F (37.1 °C)  Heart Rate:  [] 94  Resp:  [18-22] 18  BP: (108-137)/(62-93) 137/77  General Appearance:    Alert, cooperative, no distress  Head:    Normocephalic, atraumatic  Eyes:    Sclerae anicteric  Neck:   Supple, no mass  Lungs: Clear to  auscultation bilaterally, respirations unlabored  Heart: Regular rate and rhythm, S1 and S2 normal, no murmur, rub or gallop  Abdomen:  Soft, nontender, bowel sounds active, nondistended  Extremities: No clubbing, cyanosis, or edema to lower extremities  Pulses:  2+ and symmetric in distal lower extremities  Skin: No rashes   Neurologic: Oriented x3, Normal strength to extremities    Results Review:    I have reviewed the labs, radiology results and diagnostic studies.    Results from last 7 days   Lab Units 02/04/25  2106   WBC 10*3/mm3 14.67*   HEMOGLOBIN g/dL 14.2   HEMATOCRIT % 45.1   PLATELETS 10*3/mm3 322     Results from last 7 days   Lab Units 02/04/25  2106 02/02/25  1404   SODIUM mmol/L 138 136   POTASSIUM mmol/L 3.9 3.8   CHLORIDE mmol/L 104 102   CO2 mmol/L 20.4* 26.0   BUN mg/dL 9 7   CREATININE mg/dL 0.85 0.70   CALCIUM mg/dL 9.2 9.2   BILIRUBIN mg/dL 0.4 0.4   ALK PHOS U/L 48 41   ALT (SGPT) U/L 99* 8   AST (SGOT) U/L 90* 14   GLUCOSE mg/dL 139* 92     Imaging Results (Last 24 Hours)       Procedure Component Value Units Date/Time    CT Abdomen Pelvis With Contrast [[id number]] Collected: 02/05/25 0007     Updated: 02/05/25 0015    Narrative:      CT OF THE ABDOMEN/PELVIS WITH CONTRAST     HISTORY: Abdominal pain. Nausea, vomiting, and diarrhea     COMPARISON: July 16, 2024     TECHNIQUE: Axial CT imaging was obtained through the abdomen and pelvis.  IV contrast was administered.     FINDINGS:  Images through the lung bases are clear. No suspicious hepatic lesions  are seen. The stomach, duodenum, adrenal glands, spleen, pancreas, and  gallbladder are all normal. The kidneys enhance symmetrically. No  hydronephrosis is seen. The uterus appears normal. There does appear to  be a small hemorrhagic cyst on the right ovary, measuring 1.6 cm. There  is free fluid noted within the pelvis, although this may be physiologic.  There is no bowel obstruction. The patient's colon is thick walled,  extending from  the splenic flexure to at least the proximal sigmoid  colon, and there is adjacent pericolonic soft tissue stranding,  suggesting colitis. The appendix is absent. There is a small  fat-containing umbilical hernia. There is no bowel obstruction. No  pneumatosis or free air is seen. Small bowel loops are also mildly  patulous, and enteritis may also be present. No acute osseous  abnormalities are seen.       Impression:         1. Mildly thick-walled appearance to the colon, extending from the  splenic flexure to at least the proximal sigmoid colon, with some  adjacent pericolonic soft tissue stranding. Appearance suggests colitis.  There are also some mildly patulous loops of small bowel as well, and  concomitant enteritis may be present.  2. Small hemorrhagic right ovarian cyst.     Radiation dose reduction techniques were utilized, including automated  exposure control and exposure modulation based on body size.        This report was finalized on 2/5/2025 12:12 AM by Dr. Veronica Lombardi M.D on Workstation: [id number]               I have reviewed the medications.     Discharge Medications        ASK your doctor about these medications        Instructions Start Date   albuterol sulfate  (90 Base) MCG/ACT inhaler  Commonly known as: PROVENTIL HFA;VENTOLIN HFA;PROAIR HFA   2 puffs, Inhalation, Every 6 Hours PRN      escitalopram 20 MG tablet  Commonly known as: LEXAPRO   20 mg, Oral, Daily      gabapentin 100 MG capsule  Commonly known as: NEURONTIN   100 mg, Oral, 3 Times Daily              ---------------------------------------------------------------------------------------------  Assessment & Plan   Assessment/Problem List    Acute colitis    Plan:    Acute colitis  -CT shows suggestive of colitis from the splenic flexure to the proximal sigmoid colon as well as some mildly patulous loops of small bowel suggestive of enteritis and a small hemorrhagic right ovarian cyst  -Urinalysis without evidence  for infection  -Stool culture PCR pending  -GI consulted  -Analgesics and antiemetics as needed  -N.p.o.  -IV fluids     Mild transaminitis  -AST 90, ALT 99.  Bilirubin normal  -Trend a.m. labs     Desmoid tumor  -Continue home gabapentin  -Continue outpatient oncology follow-up     Asthma  -No acute exacerbation noted  -Albuterol as needed     VTE Prophylaxis:  Mechanical VTE prophylaxis orders are present.    Disposition: ***    Follow-up after Discharge: ***    This note will serve as a {OBS_DispoNotes:93501}    Gisselle Dunbar, APRN 02/05/25 07:43 EST    I have worn appropriate PPE during this patient encounter, sanitized my hands both with entering and exiting patient's room.    *** minutes has been spent by [facility] providers in the care of this patient while under observation status

## 2025-02-05 NOTE — H&P
[facility]   HISTORY AND PHYSICAL    Patient Name: Beena Vinecnt  : 1990  MRN: [id number]  Primary Care Physician:  Sissy Garibay MD  Date of admission: 2025    Subjective   Subjective     Chief Complaint:   Chief Complaint   Patient presents with    Abdominal Pain         HPI:    Beena Vincent is a 34 y.o. female with a past medical history including but not limited to  desmoid tumor of the right knee, and asthma who presented to [facility] ER with abdominal pain.  Patient states pain started yesterday evening.  She reports severe pain across the lower abdomen worse on the left than the right.  She reports nausea and multiple episodes of nonbloody vomiting.  She also reports she has had multiple loose nonbloody stools.  Denies black tarry stools.  She does report chills and cold sweats but denies any known fever.  Denies lightheadedness and dizziness.  Denies cough and sore throat.  Denies lower extremity swelling.  Denies pain with urination or change in frequency.    Review of Systems   All systems were reviewed and negative except for: what is mentioned above in the HPI.     Personal History     Past Medical History:   Diagnosis Date    Asthma     inhaler as needed    Cervical dysplasia     Depression     Desmoid tumor     back of right knee- pt desires to proceed with treatments after delivery    Gestational diabetes     Gestational hypertension     Iron deficiency anemia     Preeclampsia        Past Surgical History:   Procedure Laterality Date    APPENDECTOMY N/A 10/1/2021    Procedure: APPENDECTOMY LAPAROSCOPIC;  Surgeon: Ahmet Dong Jr., MD;  Location: [facility] OR;  Service: General;  Laterality: N/A;    KNEE ARTHROSCOPY      AGE 6    LEEP N/A 2019    Procedure: LOOP ELECTROCAUTERY EXCISION PROCEDURE;  Surgeon: Arsh Ray MD;  [facility];  Service: Obstetrics/Gynecology    SIGMOIDOSCOPY N/A 2024    Procedure: SIGMOIDOSCOPY  "FLEXIBLE WITH BIOPSIES;  Surgeon: Rohan Duran MD;  Location: [facility] ENDOSCOPY;  Service: Gastroenterology;  Laterality: N/A;  PRE: COLITIS  POST: SAME    SOFT TISSUE BIOPSY Right 3/23/2022    Procedure: BIOPSY SOFT TISSUE THIGH / KNEE;  Surgeon: Chris Randall MD;  Location: [facility] MAIN OR;  Service: Orthopedics;  Laterality: Right;    WISDOM TOOTH EXTRACTION         Family History: family history includes Arthritis in her maternal grandmother; Diabetes in her father; Heart attack in her maternal grandfather; Heart disease in her maternal grandfather; Hyperlipidemia in her maternal grandfather; Lung cancer in her maternal grandfather; Stroke in her maternal grandfather. Otherwise pertinent FHx was reviewed and not pertinent to current issue.    Social History:  reports that she quit smoking about 4 years ago. Her smoking use included cigarettes. She started smoking about 9 years ago. She has a 2 pack-year smoking history. She has never been exposed to tobacco smoke. She has never used smokeless tobacco. She reports that she does not currently use alcohol. She reports that she does not currently use drugs after having used the following drugs: Marijuana.    Home Medications:  albuterol sulfate HFA, escitalopram, and gabapentin    Allergies:  Allergies   Allergen Reactions    Penicillins Nausea And Vomiting    Adhesive Tape Rash     \"SURGICAL TAPE\"  AS A CHILD       Objective   Objective     Vitals:   Temp:  [97.9 °F (36.6 °C)-98.8 °F (37.1 °C)] 98.8 °F (37.1 °C)  Heart Rate:  [] 94  Resp:  [18-22] 18  BP: (108-137)/(62-93) 137/77  Physical Exam    Constitutional: 34-year-old female in no acute distress on room air   Eyes: PERRLA, sclerae anicteric, no conjunctival injection   HENT: NCAT, mucous membranes moist   Neck: Supple, no thyromegaly, no lymphadenopathy, trachea midline   Respiratory: Clear to auscultation bilaterally, nonlabored respirations    Cardiovascular: RRR, no murmurs, rubs, or " gallops, palpable pedal pulses bilaterally   Gastrointestinal: Positive bowel sounds, soft, tenderness in the left and right lower quadrants without guarding or rebound, nondistended   Musculoskeletal: No bilateral ankle edema, no clubbing or cyanosis to extremities   Psychiatric: Appropriate affect, cooperative   Neurologic: Oriented x 3, strength symmetric in all extremities, Cranial Nerves grossly intact to confrontation, speech clear   Skin: No rashes     Result Review    Result Review:  I have personally reviewed the results from the time of this admission to 2/5/2025 03:47 EST and agree with these findings:  [x]  Laboratory list / accordion  []  Microbiology  [x]  Radiology  []  EKG/Telemetry   []  Cardiology/Vascular   []  Pathology  [x]  Old records  []  Other:  Most notable findings include:     CT Abdomen Pelvis With Contrast    Result Date: 2/5/2025  CT OF THE ABDOMEN/PELVIS WITH CONTRAST  HISTORY: Abdominal pain. Nausea, vomiting, and diarrhea  COMPARISON: July 16, 2024  TECHNIQUE: Axial CT imaging was obtained through the abdomen and pelvis. IV contrast was administered.  FINDINGS: Images through the lung bases are clear. No suspicious hepatic lesions are seen. The stomach, duodenum, adrenal glands, spleen, pancreas, and gallbladder are all normal. The kidneys enhance symmetrically. No hydronephrosis is seen. The uterus appears normal. There does appear to be a small hemorrhagic cyst on the right ovary, measuring 1.6 cm. There is free fluid noted within the pelvis, although this may be physiologic. There is no bowel obstruction. The patient's colon is thick walled, extending from the splenic flexure to at least the proximal sigmoid colon, and there is adjacent pericolonic soft tissue stranding, suggesting colitis. The appendix is absent. There is a small fat-containing umbilical hernia. There is no bowel obstruction. No pneumatosis or free air is seen. Small bowel loops are also mildly patulous, and  enteritis may also be present. No acute osseous abnormalities are seen.       1. Mildly thick-walled appearance to the colon, extending from the splenic flexure to at least the proximal sigmoid colon, with some adjacent pericolonic soft tissue stranding. Appearance suggests colitis. There are also some mildly patulous loops of small bowel as well, and concomitant enteritis may be present. 2. Small hemorrhagic right ovarian cyst.  Radiation dose reduction techniques were utilized, including automated exposure control and exposure modulation based on body size.   This report was finalized on 2/5/2025 12:12 AM by Dr. Veronica Lombardi M.D on Workstation: [id number]            Assessment & Plan   Assessment / Plan     Brief Patient Summary:  Beena Vincent is a 34 y.o. female who is being admitted the observation unit with acute colitis.  CT the abdomen pelvis with contrast shows mildly thick-walled appearance to the colon extending from the splenic flexure to at least the proximal sigmoid colon with some adjacent pericolonic soft tissue stranding suggesting colitis as well as some mildly patulous loops of small bowel suggestive of enteritis.  There is a small hemorrhagic right ovarian cyst.  Lab work notes a white blood cell count of 14.6 with a normal lactate.  Mild transaminitis with an AST at 90 and ALT 99.  Urinalysis without evidence for infection.  Will treat the patient supportively with analgesics and antiemetics as needed as well as IV fluid hydration.  Stool culture PCR pending and GI consulted.    Of note on chart review, patient was admitted 7/16/2024 to 7/21/2024 for acute colitis in which she underwent flex sigmoidoscopy on 719 with no source of bleeding identified.  Patient was treated with Zosyn and discharged home on Levaquin.    Will hold off on starting antibiotics at this time as patient has reported no fevers or bloody stools.  Lactate normal and vital signs stable.  Will defer to  gastroenterology    Active Hospital Problems:  Active Hospital Problems    Diagnosis     **Acute colitis      Plan:     Acute colitis  -CT shows suggestive of colitis from the splenic flexure to the proximal sigmoid colon as well as some mildly patulous loops of small bowel suggestive of enteritis and a small hemorrhagic right ovarian cyst  -Urinalysis without evidence for infection  -Stool culture PCR pending  -GI consulted  -Analgesics and antiemetics as needed  -N.p.o.  -IV fluids    Mild transaminitis  -AST 90, ALT 99.  Bilirubin normal  -Trend a.m. labs    Desmoid tumor  -Continue home gabapentin  -Continue outpatient oncology follow-up    Asthma  -No acute exacerbation noted  -Albuterol as needed    VTE Prophylaxis:  Mechanical VTE prophylaxis orders are present.        CODE STATUS:    Level Of Support Discussed With: Patient  Code Status (Patient has no pulse and is not breathing): CPR (Attempt to Resuscitate)  Medical Interventions (Patient has pulse or is breathing): Full Support    Admission Status:  I believe this patient meets observation status.    75 minutes have been spent by [facility] providers in the care of this patient while under observation status.      Appropriate PPE worn during patient encounter.  Hand hygeine performed before and after seeing the patient.      Electronically signed by Ava Jordan PA-C, 02/05/25, 3:47 AM EST.

## 2025-02-05 NOTE — ED PROVIDER NOTES
EMERGENCY DEPARTMENT ENCOUNTER  Room Number:  HC1/E  PCP: Sissy Garibay MD  Independent Historians: Patient      HPI:  Chief Complaint: had concerns including Abdominal Pain.     A complete HPI/ROS/PMH/PSH/SH/FH are unobtainable due to: None    Chronic or social conditions impacting patient care (Social Determinants of Health): None      Context: Beena Vincent is a 34 y.o. female with a medical history of appendicitis, nonspecific colitis, who presents to the ED c/o acute lower abdominal pain with nausea vomiting diarrhea and chills as her onset 1700 today.  States the pain is severe.  She was in her normal state of health prior to this.  Denies any urinary symptoms.      Review of prior external notes (non-ED) -and- Review of prior external test results outside of this encounter:  I reviewed discharge note from yesterday, patient was seen for mass on right lateral knee.    I viewed discharge summary 2024 as well as GI note from same day.  Patient with acute colitis and BRBPR.  Biopsies done by GI did not show any evidence of inflammatory bowel disease.  Potential infectious versus transient ischemia    Prescription drug monitoring program review:         PAST MEDICAL HISTORY  Active Ambulatory Problems     Diagnosis Date Noted    KYRA III (cervical intraepithelial neoplasia grade III) with severe dysplasia 2019    Gestational HTN 2020     (normal spontaneous vaginal delivery) 2020    Asthma exacerbation 2017    Anxiety with depression 2020    Postpartum depression 2020    Generalized anxiety disorder 2020    History of anemia 2020    Acute appendicitis with localized peritonitis, without perforation, abscess, or gangrene 10/01/2021    Soft tissue mass 2022    Gestational diabetes 2023    Desmoid tumor 2023    Generalized abdominal pain 2023    Leukocytosis 2023    Colitis 2024    Acute colitis 2024    BRBPR (bright  red blood per rectum) 07/16/2024    Right ankle pain 02/02/2025     Resolved Ambulatory Problems     Diagnosis Date Noted    Edema of lower extremity, antepartum, third trimester 06/08/2020    Anemia affecting pregnancy in third trimester 06/29/2020    Pregnancy 08/07/2020    Medial epicondylitis of left elbow 05/30/2013    Nausea vomiting and diarrhea 06/09/2023    Acute kidney injury 06/09/2023     Past Medical History:   Diagnosis Date    Asthma     Cervical dysplasia     Depression     Gestational hypertension 2020    Iron deficiency anemia     Preeclampsia 2012         PAST SURGICAL HISTORY  Past Surgical History:   Procedure Laterality Date    APPENDECTOMY N/A 10/1/2021    Procedure: APPENDECTOMY LAPAROSCOPIC;  Surgeon: Ahmet Dong Jr., MD;  Location: [facility] OR;  Service: General;  Laterality: N/A;    KNEE ARTHROSCOPY      AGE 6    LEEP N/A 4/25/2019    Procedure: LOOP ELECTROCAUTERY EXCISION PROCEDURE;  Surgeon: Arsh Ray MD;  Location: [facility] OR [facility];  Service: Obstetrics/Gynecology    SIGMOIDOSCOPY N/A 7/19/2024    Procedure: SIGMOIDOSCOPY FLEXIBLE WITH BIOPSIES;  Surgeon: Rohan Duran MD;  Location: [facility] ENDOSCOPY;  Service: Gastroenterology;  Laterality: N/A;  PRE: COLITIS  POST: SAME    SOFT TISSUE BIOPSY Right 3/23/2022    Procedure: BIOPSY SOFT TISSUE THIGH / KNEE;  Surgeon: Chris Randall MD;  Location: [facility] OR;  Service: Orthopedics;  Laterality: Right;    WISDOM TOOTH EXTRACTION           FAMILY HISTORY  Family History   Problem Relation Age of Onset    Diabetes Father     Arthritis Maternal Grandmother     Lung cancer Maternal Grandfather     Heart attack Maternal Grandfather     Heart disease Maternal Grandfather     Stroke Maternal Grandfather     Hyperlipidemia Maternal Grandfather     Malig Hyperthermia Neg Hx     Breast cancer Neg Hx     Ovarian cancer Neg Hx     Uterine cancer Neg Hx     Colon cancer Neg Hx          SOCIAL HISTORY  Social History      Socioeconomic History    Marital status: Single   Tobacco Use    Smoking status: Former     Current packs/day: 0.00     Average packs/day: 0.5 packs/day for 4.0 years (2.0 ttl pk-yrs)     Types: Cigarettes     Start date: 2016     Quit date: 2020     Years since quittin.9     Passive exposure: Never    Smokeless tobacco: Never   Vaping Use    Vaping status: Former    Substances: Nicotine    Devices: Disposable   Substance and Sexual Activity    Alcohol use: Not Currently     Comment: socially    Drug use: Not Currently     Types: Marijuana     Comment: daily, did not smoke during pregnancy    Sexual activity: Not Currently         ALLERGIES  Penicillins and Adhesive tape      REVIEW OF SYSTEMS  Review of Systems  Included in HPI  All systems reviewed and negative except for those discussed in HPI.      PHYSICAL EXAM    I have reviewed the triage vital signs and nursing notes.    ED Triage Vitals   Temp Heart Rate Resp BP SpO2   25   97.9 °F (36.6 °C) (!) 124 22 127/93 100 %      Temp src Heart Rate Source Patient Position BP Location FiO2 (%)   25 --   Tympanic Monitor Sitting Right arm        Physical Exam  GENERAL: alert,  uncomfortable appearing, nontoxic  SKIN: Warm, dry  HENT: Normocephalic, atraumatic  EYES: no scleral icterus  CV: regular rhythm, tachycardia  RESPIRATORY: normal effort, lungs clear  ABDOMEN: soft, nondistended, moderate lower abdominal tenderness to palpation without rebound  MUSCULOSKELETAL: no deformity  NEURO: alert, moves all extremities, follows commands            LAB RESULTS  Recent Results (from the past 24 hours)   Comprehensive Metabolic Panel    Collection Time: 25  9:06 PM    Specimen: Blood   Result Value Ref Range    Glucose 139 (H) 65 - 99 mg/dL    BUN 9 6 - 20 mg/dL    Creatinine 0.85 0.57 - 1.00 mg/dL    Sodium 138 136 - 145 mmol/L     Potassium 3.9 3.5 - 5.2 mmol/L    Chloride 104 98 - 107 mmol/L    CO2 20.4 (L) 22.0 - 29.0 mmol/L    Calcium 9.2 8.6 - 10.5 mg/dL    Total Protein 7.2 6.0 - 8.5 g/dL    Albumin 4.2 3.5 - 5.2 g/dL    ALT (SGPT) 99 (H) 1 - 33 U/L    AST (SGOT) 90 (H) 1 - 32 U/L    Alkaline Phosphatase 48 39 - 117 U/L    Total Bilirubin 0.4 0.0 - 1.2 mg/dL    Globulin 3.0 gm/dL    A/G Ratio 1.4 g/dL    BUN/Creatinine Ratio 10.6 7.0 - 25.0    Anion Gap 13.6 5.0 - 15.0 mmol/L    eGFR 93.5 >60.0 mL/min/1.73   Lipase    Collection Time: 02/04/25  9:06 PM    Specimen: Blood   Result Value Ref Range    Lipase 25 13 - 60 U/L   hCG, Serum, Qualitative    Collection Time: 02/04/25  9:06 PM    Specimen: Blood   Result Value Ref Range    HCG Qualitative Negative Negative   Green Top (Gel)    Collection Time: 02/04/25  9:06 PM   Result Value Ref Range    Extra Tube Hold for add-ons.    Lavender Top    Collection Time: 02/04/25  9:06 PM   Result Value Ref Range    Extra Tube hold for add-on    Light Blue Top    Collection Time: 02/04/25  9:06 PM   Result Value Ref Range    Extra Tube Hold for add-ons.    CBC Auto Differential    Collection Time: 02/04/25  9:06 PM    Specimen: Blood   Result Value Ref Range    WBC 14.67 (H) 3.40 - 10.80 10*3/mm3    RBC 5.17 3.77 - 5.28 10*6/mm3    Hemoglobin 14.2 12.0 - 15.9 g/dL    Hematocrit 45.1 34.0 - 46.6 %    MCV 87.2 79.0 - 97.0 fL    MCH 27.5 26.6 - 33.0 pg    MCHC 31.5 31.5 - 35.7 g/dL    RDW 12.7 12.3 - 15.4 %    RDW-SD 40.4 37.0 - 54.0 fl    MPV 10.5 6.0 - 12.0 fL    Platelets 322 140 - 450 10*3/mm3    Neutrophil % 76.8 (H) 42.7 - 76.0 %    Lymphocyte % 14.6 (L) 19.6 - 45.3 %    Monocyte % 6.9 5.0 - 12.0 %    Eosinophil % 0.9 0.3 - 6.2 %    Basophil % 0.3 0.0 - 1.5 %    Immature Grans % 0.5 0.0 - 0.5 %    Neutrophils, Absolute 11.27 (H) 1.70 - 7.00 10*3/mm3    Lymphocytes, Absolute 2.14 0.70 - 3.10 10*3/mm3    Monocytes, Absolute 1.01 (H) 0.10 - 0.90 10*3/mm3    Eosinophils, Absolute 0.13 0.00 - 0.40  10*3/mm3    Basophils, Absolute 0.05 0.00 - 0.20 10*3/mm3    Immature Grans, Absolute 0.07 (H) 0.00 - 0.05 10*3/mm3    nRBC 0.0 0.0 - 0.2 /100 WBC   Lactic Acid, Plasma    Collection Time: 02/04/25 11:20 PM    Specimen: Blood   Result Value Ref Range    Lactate 1.0 0.5 - 2.0 mmol/L   Urinalysis With Microscopic If Indicated (No Culture) - Urine, Clean Catch    Collection Time: 02/05/25 12:17 AM    Specimen: Urine, Clean Catch   Result Value Ref Range    Color, UA Yellow Yellow, Straw    Appearance, UA Clear Clear    pH, UA 6.5 5.0 - 8.0    Specific Gravity, UA >1.030 (H) 1.005 - 1.030    Glucose, UA Negative Negative    Ketones, UA Negative Negative    Bilirubin, UA Negative Negative    Blood, UA Negative Negative    Protein, UA Negative Negative    Leuk Esterase, UA Negative Negative    Nitrite, UA Negative Negative    Urobilinogen, UA 0.2 E.U./dL 0.2 - 1.0 E.U./dL         RADIOLOGY  CT Abdomen Pelvis With Contrast    Result Date: 2/5/2025  CT OF THE ABDOMEN/PELVIS WITH CONTRAST  HISTORY: Abdominal pain. Nausea, vomiting, and diarrhea  COMPARISON: July 16, 2024  TECHNIQUE: Axial CT imaging was obtained through the abdomen and pelvis. IV contrast was administered.  FINDINGS: Images through the lung bases are clear. No suspicious hepatic lesions are seen. The stomach, duodenum, adrenal glands, spleen, pancreas, and gallbladder are all normal. The kidneys enhance symmetrically. No hydronephrosis is seen. The uterus appears normal. There does appear to be a small hemorrhagic cyst on the right ovary, measuring 1.6 cm. There is free fluid noted within the pelvis, although this may be physiologic. There is no bowel obstruction. The patient's colon is thick walled, extending from the splenic flexure to at least the proximal sigmoid colon, and there is adjacent pericolonic soft tissue stranding, suggesting colitis. The appendix is absent. There is a small fat-containing umbilical hernia. There is no bowel obstruction. No  pneumatosis or free air is seen. Small bowel loops are also mildly patulous, and enteritis may also be present. No acute osseous abnormalities are seen.       1. Mildly thick-walled appearance to the colon, extending from the splenic flexure to at least the proximal sigmoid colon, with some adjacent pericolonic soft tissue stranding. Appearance suggests colitis. There are also some mildly patulous loops of small bowel as well, and concomitant enteritis may be present. 2. Small hemorrhagic right ovarian cyst.  Radiation dose reduction techniques were utilized, including automated exposure control and exposure modulation based on body size.   This report was finalized on 2/5/2025 12:12 AM by Dr. Veronica Lombardi M.D on Workstation: [id number]         MEDICATIONS GIVEN IN ER  Medications   sodium chloride 0.9 % flush 10 mL (has no administration in time range)   sodium chloride 0.9 % bolus 1,000 mL (0 mL Intravenous Stopped 2/5/25 0052)   morphine injection 4 mg (4 mg Intravenous Given 2/4/25 2316)   ondansetron (ZOFRAN) injection 4 mg (4 mg Intravenous Given 2/4/25 2315)   iopamidol (ISOVUE-300) 61 % injection 100 mL (85 mL Intravenous Given by Other 2/4/25 2331)   morphine injection 4 mg (4 mg Intravenous Given 2/5/25 0104)   ketorolac (TORADOL) injection 15 mg (15 mg Intravenous Given 2/5/25 0143)         ORDERS PLACED DURING THIS VISIT:  Orders Placed This Encounter   Procedures    CT Abdomen Pelvis With Contrast    [place] Draw    Comprehensive Metabolic Panel    Lipase    Urinalysis With Microscopic If Indicated (No Culture) - Urine, Clean Catch    hCG, Serum, Qualitative    CBC Auto Differential    Lactic Acid, Plasma    NPO Diet NPO Type: Strict NPO    Undress & Gown    Insert Peripheral IV    CBC & Differential    Green Top (Gel)    Lavender Top    Light Blue Top         OUTPATIENT MEDICATION MANAGEMENT:  Current Facility-Administered Medications Ordered in Epic   Medication Dose Route Frequency Provider  Last Rate Last Admin    sodium chloride 0.9 % flush 10 mL  10 mL Intravenous PRN Abdias Castaneda MD         Current Outpatient Medications Ordered in Epic   Medication Sig Dispense Refill    albuterol sulfate  (90 Base) MCG/ACT inhaler Inhale 2 puffs Every 6 (Six) Hours As Needed for Wheezing or Shortness of Air. 18 g 0    escitalopram (LEXAPRO) 20 MG tablet Take 1 tablet by mouth Daily for 30 days. 30 tablet 0    gabapentin (NEURONTIN) 100 MG capsule Take 1 capsule by mouth 3 (Three) Times a Day for 30 days. 90 capsule 0         PROCEDURES  Procedures            PROGRESS, DATA ANALYSIS, CONSULTS, AND MEDICAL DECISION MAKING  All labs have been independently interpreted by me.  All radiology studies have been reviewed by me. All EKG's have been independently viewed and interpreted by me.  Discussion below represents my analysis of pertinent findings related to patient's condition, differential diagnosis, treatment plan and final disposition.    Differential diagnosis includes but is not limited to viral gastroenteritis, colitis, diverticulitis, UTI, sepsis, hepatitis.    Clinical Scores:                                       ED Course as of 02/05/25 0215 Tue Feb 04, 2025 2240 Patient presents to the ED for evaluation of severe abdominal pain, nausea vomiting diarrhea, onset at 1700 today.  Chills, no fever.  No urinary symptoms.    On exam patient is tachycardic, moderate lower abdominal tenderness, no rebound or guarding    Will obtain labs, urine, hCG, give IV fluids morphine and Zofran, obtain CT.  Patient and family are agreeable with plan [DN]   2242 ALT (SGPT)(!): 99 [DN]   2242 AST (SGOT)(!): 90 [DN]   2242 Transient elevation of AST and ALT 6 months ago which had resolved 2 days ago [DN]   2242 WBC(!): 14.67  Up from 8 on blood work 2 days ago [DN]   2242 HCG Qualitative: Negative [DN]   2340 CT Abdomen Pelvis With Contrast  I reviewed CT images, no evidence of obstruction or significant free  fluid, interpreted by self  I reviewed radiologist interpretation of CT images [DN]   2349 Lactate: 1.0 [DN]   Wed Feb 05, 2025   0044 Urinalysis With Microscopic If Indicated (No Culture) - Urine, Clean Catch(!)  bland [DN]   0100 Patient complaining of severe pain, will give additional dose of morphine [DN]   0116 Patient reassessed, still severe pain, will give IV Toradol [DN]   0212 Patient reassessed, still complaining of severe lower abdominal pain despite multiple doses of IV pain medication.  Does not feel compelled discharge home due to intractable pain.  Will admit for observation [DN]   0214 I discussed patient with on-call ESME for observation unit, agreeable with plan to admit for intractable pain in the setting of acute colitis and hemorrhagic ovarian cyst [DN]      ED Course User Index  [DN] Abdias Castaneda MD             AS OF 02:15 EST VITALS:    BP - 108/62  HR - 79  TEMP - 97.9 °F (36.6 °C) (Tympanic)  O2 SATS - 95%    COMPLEXITY OF CARE  The patient requires admission.      DIAGNOSIS  Final diagnoses:   Acute colitis   Hemorrhagic cyst of ovary   Intractable pain         DISPOSITION  ED Disposition       ED Disposition   Decision to Admit    Condition   --    Comment   --                Please note that portions of this document were completed with a voice recognition program.    Note Disclaimer: At [facility], we believe that sharing information builds trust and better relationships. You are receiving this note because you recently visited [facility]. It is possible you will see health information before a provider has talked with you about it. This kind of information can be easy to misunderstand. To help you fully understand what it means for your health, we urge you to discuss this note with your provider.         Abdias Castaneda MD  02/04/25 3541       Abdias Castaneda MD  02/05/25 7415

## 2025-02-05 NOTE — PLAN OF CARE
Goal Outcome Evaluation:     Patient admitted for acute colitis.  Patient c/o pain 8/10, meds given.  Patient sleeping between care with family at bedside.  GI consult today.

## 2025-02-06 LAB
ALBUMIN SERPL-MCNC: 3.8 G/DL (ref 3.5–5.2)
ALBUMIN/GLOB SERPL: 1.7 G/DL
ALP SERPL-CCNC: 38 U/L (ref 39–117)
ALT SERPL W P-5'-P-CCNC: 50 U/L (ref 1–33)
ANION GAP SERPL CALCULATED.3IONS-SCNC: 9.9 MMOL/L (ref 5–15)
AST SERPL-CCNC: 29 U/L (ref 1–32)
BILIRUB SERPL-MCNC: 0.6 MG/DL (ref 0–1.2)
BUN SERPL-MCNC: 4 MG/DL (ref 6–20)
BUN/CREAT SERPL: 6.5 (ref 7–25)
CALCIUM SPEC-SCNC: 8.6 MG/DL (ref 8.6–10.5)
CHLORIDE SERPL-SCNC: 106 MMOL/L (ref 98–107)
CO2 SERPL-SCNC: 23.1 MMOL/L (ref 22–29)
CREAT SERPL-MCNC: 0.62 MG/DL (ref 0.57–1)
DEPRECATED RDW RBC AUTO: 38.2 FL (ref 37–54)
EGFRCR SERPLBLD CKD-EPI 2021: 121.5 ML/MIN/1.73
ERYTHROCYTE [DISTWIDTH] IN BLOOD BY AUTOMATED COUNT: 12.6 % (ref 12.3–15.4)
GLOBULIN UR ELPH-MCNC: 2.2 GM/DL
GLUCOSE SERPL-MCNC: 91 MG/DL (ref 65–99)
HCT VFR BLD AUTO: 36 % (ref 34–46.6)
HGB BLD-MCNC: 12.1 G/DL (ref 12–15.9)
MCH RBC QN AUTO: 28.3 PG (ref 26.6–33)
MCHC RBC AUTO-ENTMCNC: 33.6 G/DL (ref 31.5–35.7)
MCV RBC AUTO: 84.1 FL (ref 79–97)
PLATELET # BLD AUTO: 226 10*3/MM3 (ref 140–450)
PMV BLD AUTO: 9.8 FL (ref 6–12)
POTASSIUM SERPL-SCNC: 3.6 MMOL/L (ref 3.5–5.2)
PROT SERPL-MCNC: 6 G/DL (ref 6–8.5)
RBC # BLD AUTO: 4.28 10*6/MM3 (ref 3.77–5.28)
SODIUM SERPL-SCNC: 139 MMOL/L (ref 136–145)
WBC NRBC COR # BLD AUTO: 7.19 10*3/MM3 (ref 3.4–10.8)

## 2025-02-06 PROCEDURE — 80053 COMPREHEN METABOLIC PANEL: CPT | Performed by: NURSE PRACTITIONER

## 2025-02-06 PROCEDURE — 25010000002 ONDANSETRON PER 1 MG: Performed by: EMERGENCY MEDICINE

## 2025-02-06 PROCEDURE — 85027 COMPLETE CBC AUTOMATED: CPT | Performed by: NURSE PRACTITIONER

## 2025-02-06 PROCEDURE — G0378 HOSPITAL OBSERVATION PER HR: HCPCS

## 2025-02-06 PROCEDURE — 99214 OFFICE O/P EST MOD 30 MIN: CPT | Performed by: PHYSICIAN ASSISTANT

## 2025-02-06 PROCEDURE — 96376 TX/PRO/DX INJ SAME DRUG ADON: CPT

## 2025-02-06 PROCEDURE — 25010000002 HYDROMORPHONE PER 4 MG: Performed by: EMERGENCY MEDICINE

## 2025-02-06 RX ADMIN — Medication 10 ML: at 10:56

## 2025-02-06 RX ADMIN — GABAPENTIN 100 MG: 100 CAPSULE ORAL at 17:18

## 2025-02-06 RX ADMIN — ESCITALOPRAM 20 MG: 20 TABLET, FILM COATED ORAL at 08:18

## 2025-02-06 RX ADMIN — ONDANSETRON 4 MG: 2 INJECTION, SOLUTION INTRAMUSCULAR; INTRAVENOUS at 17:18

## 2025-02-06 RX ADMIN — Medication 10 ML: at 08:19

## 2025-02-06 RX ADMIN — HYDROMORPHONE HYDROCHLORIDE 0.5 MG: 1 INJECTION, SOLUTION INTRAMUSCULAR; INTRAVENOUS; SUBCUTANEOUS at 21:33

## 2025-02-06 RX ADMIN — Medication 10 ML: at 20:11

## 2025-02-06 RX ADMIN — HYDROMORPHONE HYDROCHLORIDE 0.5 MG: 1 INJECTION, SOLUTION INTRAMUSCULAR; INTRAVENOUS; SUBCUTANEOUS at 13:13

## 2025-02-06 RX ADMIN — HYDROMORPHONE HYDROCHLORIDE 0.5 MG: 1 INJECTION, SOLUTION INTRAMUSCULAR; INTRAVENOUS; SUBCUTANEOUS at 07:40

## 2025-02-06 RX ADMIN — HYDROMORPHONE HYDROCHLORIDE 0.5 MG: 1 INJECTION, SOLUTION INTRAMUSCULAR; INTRAVENOUS; SUBCUTANEOUS at 17:18

## 2025-02-06 RX ADMIN — DICYCLOMINE HYDROCHLORIDE 20 MG: 10 CAPSULE ORAL at 12:46

## 2025-02-06 RX ADMIN — GABAPENTIN 100 MG: 100 CAPSULE ORAL at 20:10

## 2025-02-06 RX ADMIN — Medication 10 ML: at 13:13

## 2025-02-06 RX ADMIN — DICYCLOMINE HYDROCHLORIDE 20 MG: 10 CAPSULE ORAL at 20:10

## 2025-02-06 RX ADMIN — HYDROMORPHONE HYDROCHLORIDE 0.5 MG: 1 INJECTION, SOLUTION INTRAMUSCULAR; INTRAVENOUS; SUBCUTANEOUS at 04:26

## 2025-02-06 RX ADMIN — Medication 10 ML: at 17:19

## 2025-02-06 RX ADMIN — GABAPENTIN 100 MG: 100 CAPSULE ORAL at 08:18

## 2025-02-06 RX ADMIN — DICYCLOMINE HYDROCHLORIDE 20 MG: 10 CAPSULE ORAL at 17:19

## 2025-02-06 RX ADMIN — DICYCLOMINE HYDROCHLORIDE 20 MG: 10 CAPSULE ORAL at 08:18

## 2025-02-06 RX ADMIN — Medication 10 ML: at 07:41

## 2025-02-06 RX ADMIN — ONDANSETRON 4 MG: 2 INJECTION, SOLUTION INTRAMUSCULAR; INTRAVENOUS at 07:40

## 2025-02-06 RX ADMIN — HYDROMORPHONE HYDROCHLORIDE 0.5 MG: 1 INJECTION, SOLUTION INTRAMUSCULAR; INTRAVENOUS; SUBCUTANEOUS at 10:56

## 2025-02-06 NOTE — PROGRESS NOTES
[facility]  Inpatient Progress Note    Reason for Follow-up: Colitis    Subjective     Interval History:   Patient reports improvement in her lower abdominal pain.  Still with nausea but improved with Zofran regularly.  Has not had a bowel movement yet.  Would like to advance her diet.    Current Facility-Administered Medications:     albuterol (PROVENTIL) nebulizer solution 0.083% 2.5 mg/3mL, 2.5 mg, Nebulization, Q6H PRN, Ava Jordan PA-C    dicyclomine (BENTYL) capsule 20 mg, 20 mg, Oral, 4x Daily, Richard Sierra MD, 20 mg at 02/06/25 1246    escitalopram (LEXAPRO) tablet 20 mg, 20 mg, Oral, Daily, Richard Sierra MD, 20 mg at 02/06/25 0818    gabapentin (NEURONTIN) capsule 100 mg, 100 mg, Oral, TID, Richard Sierra MD, 100 mg at 02/06/25 0818    HYDROmorphone (DILAUDID) injection 0.5 mg, 0.5 mg, Intravenous, Q2H PRN, 0.5 mg at 02/06/25 1056 **AND** naloxone (NARCAN) injection 0.4 mg, 0.4 mg, Intravenous, Q5 Min PRN, Richard Sierra MD    ondansetron ODT (ZOFRAN-ODT) disintegrating tablet 4 mg, 4 mg, Oral, Q6H PRN **OR** ondansetron (ZOFRAN) injection 4 mg, 4 mg, Intravenous, Q6H PRN, Richard Sierra MD, 4 mg at 02/06/25 0740    sodium chloride 0.9 % flush 10 mL, 10 mL, Intravenous, PRN, Abdias Castaneda MD    sodium chloride 0.9 % flush 10 mL, 10 mL, Intravenous, Q12H, Richard Sierra MD, 10 mL at 02/06/25 0819    sodium chloride 0.9 % flush 10 mL, 10 mL, Intravenous, PRN, Richard Sierra MD, 10 mL at 02/06/25 1056    sodium chloride 0.9 % infusion 40 mL, 40 mL, Intravenous, PRN, Richard Sierra MD    Objective     Vital Signs  Temp:  [97.9 °F (36.6 °C)-99.1 °F (37.3 °C)] 98.6 °F (37 °C)  Heart Rate:  [49-66] 54  Resp:  [16-20] 16  BP: ()/(64-83) 117/76  Body mass index is 24.97 kg/m².  No intake or output data in the 24 hours ending 02/06/25 1258  No intake/output data recorded.     Physical Exam:    General: patient awake, alert and  cooperative   Eyes: Normal lids and lashes, no scleral icterus   Skin: warm and dry, not jaundiced   Pulm: regular and unlabored   Abdomen: soft, lower abdominal TTP without guarding, nondistended; normal bowel sounds   Psychiatric: Normal mood and behavior; memory intact     Results Review:     I reviewed the patient's new clinical results.    Results from last 7 days   Lab Units 02/06/25  0538 02/05/25 0757 02/04/25  2106   WBC 10*3/mm3 7.19 9.02 14.67*   HEMOGLOBIN g/dL 12.1 12.9 14.2   HEMATOCRIT % 36.0 39.5 45.1   PLATELETS 10*3/mm3 226 249 322     Results from last 7 days   Lab Units 02/06/25 0538 02/05/25 0757 02/04/25  2106   SODIUM mmol/L 139 141 138   POTASSIUM mmol/L 3.6 3.7 3.9   CHLORIDE mmol/L 106 105 104   CO2 mmol/L 23.1 25.2 20.4*   BUN mg/dL 4* 8 9   CREATININE mg/dL 0.62 0.68 0.85   CALCIUM mg/dL 8.6 8.8 9.2   BILIRUBIN mg/dL 0.6 0.5 0.4   ALK PHOS U/L 38* 43 48   ALT (SGPT) U/L 50* 78* 99*   AST (SGOT) U/L 29 55* 90*   GLUCOSE mg/dL 91 91 139*         Lab Results   Lab Value Date/Time    LIPASE 25 02/04/2025 2106    LIPASE 16 07/16/2024 1318    LIPASE 20 07/13/2023 2359    LIPASE 25 06/08/2023 1516    LIPASE 26 10/01/2021 0959       Radiology:  CT Abdomen Pelvis With Contrast   Final Result       1. Mildly thick-walled appearance to the colon, extending from the   splenic flexure to at least the proximal sigmoid colon, with some   adjacent pericolonic soft tissue stranding. Appearance suggests colitis.   There are also some mildly patulous loops of small bowel as well, and   concomitant enteritis may be present.   2. Small hemorrhagic right ovarian cyst.       Radiation dose reduction techniques were utilized, including automated   exposure control and exposure modulation based on body size.           This report was finalized on 2/5/2025 12:12 AM by Dr. Veronica Lombardi M.D on Workstation: [id number]              Assessment & Plan     Active Hospital Problems    Diagnosis     **Acute  colitis        Assessment:  Left-sided colitis and enteritis  Lower abdominal pain, N/V  Elevated LFTs, improving  Leukocytosis at admission, resolved  Jarrod tumor, knee-oncology consulted, plan for outpatient referral      Plan:  History of left-sided colitis in July with nonspecific colitis on biopsies.  Lost to follow-up after that.  Await GI PCR--needs to be collected  Pending IBD panel.  Supportive care per primary hospital team.  Advance diet to GI soft  Office follow-up made in March.  To ensure resolution and enterocolitis and other GI symptoms.    Likely discharge soon if she can tolerate advancing her diet.  Would prefer if she gets bowel movement to assess baseline fecal calprotectin prior to discharge if possible.    I discussed the patients findings and my recommendations with patient.    Dragon dictation used throughout this note.            Torie Montano PA-C  [facility]  [address] [place] [address]  Office: [phone]

## 2025-02-06 NOTE — PLAN OF CARE
Goal Outcome Evaluation:      Patient received meds throughout the day. Patient also received medications for pain. Patient was pleasant throughout the day.

## 2025-02-06 NOTE — PLAN OF CARE
Goal Outcome Evaluation:    Patient admitted for acute colitis.  Patient continues to have pain 7/10  (Meds given)  Patient sleeping some between care with family at bedside.  GI consulting.

## 2025-02-06 NOTE — PROGRESS NOTES
MD ATTESTATION NOTE    The ESME and I have discussed this patient's history, physical exam, and treatment plan.  I have reviewed the documentation and personally had a face to face interaction with the patient. I affirm the documentation and agree with the treatment and plan.  The attached note describes my personal findings.      I provided a substantive portion of the care of the patient.  I personally performed the physical exam in its entirety, and below are my findings.       Brief HPI: Patient admitted with abdominal pain.  Found to have acute colitis and mild transaminitis.    PHYSICAL EXAM  ED Triage Vitals   Temp Heart Rate Resp BP SpO2   02/04/25 2051 02/04/25 2056 02/04/25 2051 02/04/25 2056 02/04/25 2051   97.9 °F (36.6 °C) (!) 124 22 127/93 100 %      Temp src Heart Rate Source Patient Position BP Location FiO2 (%)   02/04/25 2051 02/04/25 2056 02/04/25 2056 02/04/25 2056 --   Tympanic Monitor Sitting Right arm          GENERAL: no acute distress  HENT: nares patent  EYES: no scleral icterus  CV: regular rhythm, normal rate  RESPIRATORY: normal effort  ABDOMEN: soft  MUSCULOSKELETAL: no deformity  NEURO: alert, moves all extremities, follows commands  PSYCH:  calm, cooperative  SKIN: warm, dry    Vital signs and nursing notes reviewed.        Plan: GI consult.  Stool PCR pending.  Transaminitis improving.  Advancing diet.

## 2025-02-06 NOTE — PROGRESS NOTES
ED OBSERVATION PROGRESS/DISCHARGE SUMMARY    Date of Admission: 2/4/2025   LOS: 0 days   PCP: Sissy Garibay MD      Subjective: Patient reports feeling tired this morning.  Abdominal pain still present but improving somewhat.  Was able to tolerate a small amount of clear liquids this morning.  Still having nausea but improved with Zofran.    Hospital Outcome:   34-year-old female with a past medical history including but not limited to desmoid tumor of the right knee, and asthma who was admitted to the observation unit with acute colitis.  CT of the abdomen and pelvis with contrast revealed mildly thick walled appearance of the colon extending from the splenic flexure to at least the proximal sigmoid colon with some adjacent pericolonic soft tissue stranding suggesting colitis as well as some mildly patulous loops of small bowel suggestive of enteritis.  There was also note of a small hemorrhagic right ovarian cyst.  Initial lab work noted a white blood cell count of 14.6 with a normal lactate.  Mild transaminitis and urinalysis showed no evidence of infection.  Patient was treated supportively with IV fluid hydration.     Of note patient was admitted 7/16 to 7/21/2024 for acute colitis in which she underwent flex sigmoidoscopy with no source of bleeding identified and was treated with Zosyn and discharged home on Levaquin.    Gastroenterology consulted agreeing to continue with supportive care, no antibiotics initiated.  Continue to advance diet as tolerated.  Awaiting GI PCR.    2/6/2025: Patient was seen by GI again today.  They would really like to have stool sample before discharge.  Will advance diet this evening in hopes to produce a stool sample.  I anticipate patient will be discharged home tomorrow.    ROS:  General: no fevers, chills  Respiratory: no cough, dyspnea  Cardiovascular: no chest pain, palpitations  Abdomen: No abdominal pain, nausea, vomiting, or diarrhea  Neurologic: No focal  weakness    Objective   Physical Exam:  I have reviewed the vital signs.  Temp:  [97.8 °F (36.6 °C)-99.1 °F (37.3 °C)] 99.1 °F (37.3 °C)  Heart Rate:  [49-66] 55  Resp:  [18-20] 20  BP: ()/(64-83) 105/66  General Appearance:    Alert, cooperative, no distress  Head:    Normocephalic, atraumatic  Eyes:    Sclerae anicteric  Neck:   Supple, no mass  Lungs: Clear to auscultation bilaterally, respirations unlabored  Heart: Regular rate and rhythm, S1 and S2 normal, no murmur, rub or gallop  Abdomen:  Soft, nontender, bowel sounds active, nondistended  Extremities: No clubbing, cyanosis, or edema to lower extremities  Pulses:  2+ and symmetric in distal lower extremities  Skin: No rashes   Neurologic: Oriented x3, Normal strength to extremities    Results Review:    I have reviewed the labs, radiology results and diagnostic studies.    Results from last 7 days   Lab Units 02/05/25  0757   WBC 10*3/mm3 9.02   HEMOGLOBIN g/dL 12.9   HEMATOCRIT % 39.5   PLATELETS 10*3/mm3 249     Results from last 7 days   Lab Units 02/05/25  0757 02/04/25  2106 02/02/25  1404   SODIUM mmol/L 141 138 136   POTASSIUM mmol/L 3.7 3.9 3.8   CHLORIDE mmol/L 105 104 102   CO2 mmol/L 25.2 20.4* 26.0   BUN mg/dL 8 9 7   CREATININE mg/dL 0.68 0.85 0.70   CALCIUM mg/dL 8.8 9.2 9.2   BILIRUBIN mg/dL 0.5 0.4 0.4   ALK PHOS U/L 43 48 41   ALT (SGPT) U/L 78* 99* 8   AST (SGOT) U/L 55* 90* 14   GLUCOSE mg/dL 91 139* 92     Imaging Results (Last 24 Hours)       ** No results found for the last 24 hours. **            I have reviewed the medications.     Discharge Medications        ASK your doctor about these medications        Instructions Start Date   albuterol sulfate  (90 Base) MCG/ACT inhaler  Commonly known as: PROVENTIL HFA;VENTOLIN HFA;PROAIR HFA   2 puffs, Inhalation, Every 6 Hours PRN      escitalopram 20 MG tablet  Commonly known as: LEXAPRO   20 mg, Oral, Daily      gabapentin 100 MG capsule  Commonly known as: NEURONTIN   100 mg,  Oral, 3 Times Daily             ---------------------------------------------------------------------------------------------  Assessment & Plan   Assessment/Problem List    Acute colitis      Plan:    Acute colitis  -CT shows suggestive of colitis from the splenic flexure to the proximal sigmoid colon as well as some mildly patulous loops of small bowel suggestive of enteritis and a small hemorrhagic right ovarian cyst  -Urinalysis without evidence for infection  -Stool culture PCR pending  -GI following  -Analgesics and antiemetics as needed  -Advance diet per GI  -IV fluids     Mild transaminitis  -Improving  -AST 90--> 55--> 29  -ALT 99--> 78--> 50     Desmoid tumor  -Continue home gabapentin  -Continue outpatient oncology follow-up     Asthma  -No acute exacerbation noted  -Albuterol as needed    Disposition: Anticipate patient will be discharged home tomorrow    This note will serve as progress note    39 minutes have been spent by [facility] providers in the care of this patient while under observation status.    Appropriate PPE worn during patient encounter.  Hand hygeine performed before and after seeing the patient.      FRANKLIN Siu 02/06/25 17:44 EST

## 2025-02-07 ENCOUNTER — READMISSION MANAGEMENT (OUTPATIENT)
Dept: CALL CENTER | Facility: HOSPITAL | Age: 35
End: 2025-02-07
Payer: COMMERCIAL

## 2025-02-07 VITALS
OXYGEN SATURATION: 96 % | HEIGHT: 70 IN | RESPIRATION RATE: 18 BRPM | DIASTOLIC BLOOD PRESSURE: 76 MMHG | TEMPERATURE: 98.2 F | HEART RATE: 67 BPM | SYSTOLIC BLOOD PRESSURE: 121 MMHG | BODY MASS INDEX: 24.91 KG/M2 | WEIGHT: 174 LBS

## 2025-02-07 DIAGNOSIS — K52.9 COLITIS: Primary | ICD-10-CM

## 2025-02-07 LAB
ALBUMIN SERPL-MCNC: 3.6 G/DL (ref 3.5–5.2)
ALBUMIN/GLOB SERPL: 1.2 G/DL
ALP SERPL-CCNC: 39 U/L (ref 39–117)
ALT SERPL W P-5'-P-CCNC: 39 U/L (ref 1–33)
ANION GAP SERPL CALCULATED.3IONS-SCNC: 9.4 MMOL/L (ref 5–15)
AST SERPL-CCNC: 22 U/L (ref 1–32)
BILIRUB SERPL-MCNC: 0.5 MG/DL (ref 0–1.2)
BUN SERPL-MCNC: 4 MG/DL (ref 6–20)
BUN/CREAT SERPL: 5.3 (ref 7–25)
CALCIUM SPEC-SCNC: 9.4 MG/DL (ref 8.6–10.5)
CHLORIDE SERPL-SCNC: 107 MMOL/L (ref 98–107)
CO2 SERPL-SCNC: 24.6 MMOL/L (ref 22–29)
CREAT SERPL-MCNC: 0.76 MG/DL (ref 0.57–1)
DEPRECATED RDW RBC AUTO: 40.8 FL (ref 37–54)
EGFRCR SERPLBLD CKD-EPI 2021: 106.9 ML/MIN/1.73
ERYTHROCYTE [DISTWIDTH] IN BLOOD BY AUTOMATED COUNT: 13 % (ref 12.3–15.4)
GLOBULIN UR ELPH-MCNC: 2.9 GM/DL
GLUCOSE SERPL-MCNC: 95 MG/DL (ref 65–99)
HCT VFR BLD AUTO: 40.1 % (ref 34–46.6)
HGB BLD-MCNC: 12.8 G/DL (ref 12–15.9)
MCH RBC QN AUTO: 27.8 PG (ref 26.6–33)
MCHC RBC AUTO-ENTMCNC: 31.9 G/DL (ref 31.5–35.7)
MCV RBC AUTO: 87.2 FL (ref 79–97)
PLATELET # BLD AUTO: 242 10*3/MM3 (ref 140–450)
PMV BLD AUTO: 10 FL (ref 6–12)
POTASSIUM SERPL-SCNC: 3.8 MMOL/L (ref 3.5–5.2)
PROT SERPL-MCNC: 6.5 G/DL (ref 6–8.5)
RBC # BLD AUTO: 4.6 10*6/MM3 (ref 3.77–5.28)
SODIUM SERPL-SCNC: 141 MMOL/L (ref 136–145)
WBC NRBC COR # BLD AUTO: 8.67 10*3/MM3 (ref 3.4–10.8)

## 2025-02-07 PROCEDURE — 96376 TX/PRO/DX INJ SAME DRUG ADON: CPT

## 2025-02-07 PROCEDURE — 25010000002 ONDANSETRON PER 1 MG: Performed by: EMERGENCY MEDICINE

## 2025-02-07 PROCEDURE — G0378 HOSPITAL OBSERVATION PER HR: HCPCS

## 2025-02-07 PROCEDURE — 85027 COMPLETE CBC AUTOMATED: CPT | Performed by: PHYSICIAN ASSISTANT

## 2025-02-07 PROCEDURE — 80053 COMPREHEN METABOLIC PANEL: CPT | Performed by: PHYSICIAN ASSISTANT

## 2025-02-07 PROCEDURE — 25010000002 HYDROMORPHONE PER 4 MG: Performed by: EMERGENCY MEDICINE

## 2025-02-07 RX ORDER — DICYCLOMINE HYDROCHLORIDE 10 MG/1
20 CAPSULE ORAL 4 TIMES DAILY PRN
Qty: 30 CAPSULE | Refills: 0 | Status: SHIPPED | OUTPATIENT
Start: 2025-02-07

## 2025-02-07 RX ORDER — NALOXONE HCL 0.4 MG/ML
0.4 VIAL (ML) INJECTION
Status: DISCONTINUED | OUTPATIENT
Start: 2025-02-07 | End: 2025-02-07 | Stop reason: HOSPADM

## 2025-02-07 RX ORDER — HYDROCODONE BITARTRATE AND ACETAMINOPHEN 5; 325 MG/1; MG/1
1 TABLET ORAL EVERY 6 HOURS PRN
Status: DISCONTINUED | OUTPATIENT
Start: 2025-02-07 | End: 2025-02-07 | Stop reason: HOSPADM

## 2025-02-07 RX ORDER — HYDROMORPHONE HYDROCHLORIDE 1 MG/ML
0.5 INJECTION, SOLUTION INTRAMUSCULAR; INTRAVENOUS; SUBCUTANEOUS EVERY 8 HOURS PRN
Status: DISCONTINUED | OUTPATIENT
Start: 2025-02-07 | End: 2025-02-07

## 2025-02-07 RX ORDER — HYDROCODONE BITARTRATE AND ACETAMINOPHEN 5; 325 MG/1; MG/1
1 TABLET ORAL EVERY 6 HOURS PRN
Qty: 6 TABLET | Refills: 0 | Status: SHIPPED | OUTPATIENT
Start: 2025-02-07

## 2025-02-07 RX ADMIN — GABAPENTIN 100 MG: 100 CAPSULE ORAL at 08:41

## 2025-02-07 RX ADMIN — ESCITALOPRAM 20 MG: 20 TABLET, FILM COATED ORAL at 08:41

## 2025-02-07 RX ADMIN — ONDANSETRON 4 MG: 2 INJECTION, SOLUTION INTRAMUSCULAR; INTRAVENOUS at 06:49

## 2025-02-07 RX ADMIN — Medication 10 ML: at 03:49

## 2025-02-07 RX ADMIN — DICYCLOMINE HYDROCHLORIDE 20 MG: 10 CAPSULE ORAL at 11:52

## 2025-02-07 RX ADMIN — HYDROCODONE BITARTRATE AND ACETAMINOPHEN 1 TABLET: 5; 325 TABLET ORAL at 06:52

## 2025-02-07 RX ADMIN — HYDROCODONE BITARTRATE AND ACETAMINOPHEN 1 TABLET: 5; 325 TABLET ORAL at 11:59

## 2025-02-07 RX ADMIN — DICYCLOMINE HYDROCHLORIDE 20 MG: 10 CAPSULE ORAL at 08:41

## 2025-02-07 RX ADMIN — HYDROMORPHONE HYDROCHLORIDE 0.5 MG: 1 INJECTION, SOLUTION INTRAMUSCULAR; INTRAVENOUS; SUBCUTANEOUS at 03:47

## 2025-02-07 RX ADMIN — Medication 10 ML: at 08:41

## 2025-02-07 NOTE — PLAN OF CARE
Problem: Adult Inpatient Plan of Care  Goal: Plan of Care Review  Outcome: Progressing  Flowsheets (Taken 2/7/2025 0712)  Progress: improving  Outcome Evaluation: Pt admitted for ABD pain and nausea. AOx4, VSS and able to ambulate. Has reported continued pain and nausea overnight, requiring meds. Stool sample pending, no BM overnight.  Plan of Care Reviewed With: patient  Goal: Patient-Specific Goal (Individualized)  Outcome: Progressing  Goal: Absence of Hospital-Acquired Illness or Injury  Outcome: Progressing  Intervention: Identify and Manage Fall Risk  Recent Flowsheet Documentation  Taken 2/7/2025 0430 by Prema Benson RN  Safety Promotion/Fall Prevention:   clutter free environment maintained   nonskid shoes/slippers when out of bed   room organization consistent   safety round/check completed  Taken 2/7/2025 0347 by Prema Benson RN  Safety Promotion/Fall Prevention:   assistive device/personal items within reach   clutter free environment maintained   nonskid shoes/slippers when out of bed   room organization consistent   safety round/check completed  Taken 2/7/2025 0220 by Prema Benson RN  Safety Promotion/Fall Prevention:   clutter free environment maintained   nonskid shoes/slippers when out of bed   room organization consistent   safety round/check completed  Taken 2/7/2025 0000 by Prema Benson RN  Safety Promotion/Fall Prevention:   clutter free environment maintained   nonskid shoes/slippers when out of bed   room organization consistent   safety round/check completed  Taken 2/6/2025 2203 by Prema Benson RN  Safety Promotion/Fall Prevention:   clutter free environment maintained   nonskid shoes/slippers when out of bed   room organization consistent   safety round/check completed  Taken 2/6/2025 2133 by Prema Benson RN  Safety Promotion/Fall Prevention:   clutter free environment maintained   nonskid shoes/slippers when out of bed   room organization consistent   safety  round/check completed  Taken 2/6/2025 2010 by Prema Benson RN  Safety Promotion/Fall Prevention:   clutter free environment maintained   assistive device/personal items within reach   lighting adjusted   nonskid shoes/slippers when out of bed   room organization consistent   safety round/check completed  Intervention: Prevent Skin Injury  Recent Flowsheet Documentation  Taken 2/7/2025 0430 by Prema Benson RN  Body Position: position changed independently  Taken 2/7/2025 0347 by Prema Benson RN  Body Position: position changed independently  Taken 2/7/2025 0220 by Prema Benson RN  Body Position: position changed independently  Taken 2/7/2025 0000 by Prema Benson RN  Body Position: position changed independently  Taken 2/6/2025 2203 by Prema Benson RN  Body Position: position changed independently  Taken 2/6/2025 2133 by Prema Benson RN  Body Position: position changed independently  Taken 2/6/2025 2010 by Prema Benson RN  Body Position: position changed independently  Intervention: Prevent Infection  Recent Flowsheet Documentation  Taken 2/7/2025 0430 by Prema Benson RN  Infection Prevention:   hand hygiene promoted   personal protective equipment utilized   rest/sleep promoted   single patient room provided  Taken 2/7/2025 0347 by Prema Benson RN  Infection Prevention:   hand hygiene promoted   personal protective equipment utilized   rest/sleep promoted   single patient room provided  Taken 2/7/2025 0000 by Prema Benson RN  Infection Prevention:   hand hygiene promoted   personal protective equipment utilized   rest/sleep promoted   single patient room provided  Taken 2/6/2025 2203 by Prema Benson RN  Infection Prevention:   hand hygiene promoted   personal protective equipment utilized   rest/sleep promoted   single patient room provided  Taken 2/6/2025 2133 by Prema Benson RN  Infection Prevention:   hand hygiene promoted   personal protective  equipment utilized   rest/sleep promoted   single patient room provided  Taken 2/6/2025 2010 by Prema Benson RN  Infection Prevention:   hand hygiene promoted   personal protective equipment utilized   rest/sleep promoted   single patient room provided  Goal: Optimal Comfort and Wellbeing  Outcome: Progressing  Intervention: Monitor Pain and Promote Comfort  Recent Flowsheet Documentation  Taken 2/7/2025 0347 by Prema Benson RN  Pain Management Interventions:   pain medication given   quiet environment facilitated  Taken 2/6/2025 2203 by Prema Benson RN  Pain Management Interventions: no interventions per patient request  Taken 2/6/2025 2133 by Prema Benson RN  Pain Management Interventions: pain medication given  Taken 2/6/2025 2010 by Prema Benson RN  Pain Management Interventions:   pain management plan reviewed with patient/caregiver   no interventions per patient request   quiet environment facilitated  Intervention: Provide Person-Centered Care  Recent Flowsheet Documentation  Taken 2/6/2025 2010 by Prema Benson RN  Trust Relationship/Rapport:   care explained   choices provided   questions answered   questions encouraged   reassurance provided   thoughts/feelings acknowledged  Goal: Readiness for Transition of Care  Outcome: Progressing   Goal Outcome Evaluation:  Plan of Care Reviewed With: patient        Progress: improving  Outcome Evaluation: Pt admitted for ABD pain and nausea. AOx4, VSS and able to ambulate. Has reported continued pain and nausea overnight, requiring meds. Stool sample pending, no BM overnight.

## 2025-02-07 NOTE — DISCHARGE SUMMARY
ED OBSERVATION PROGRESS/DISCHARGE SUMMARY    Date of Admission: 2/4/2025   LOS: 0 days   PCP: Sissy Garibay MD    Final Diagnosis left-sided colitis and enteritis      Subjective     Hospital Outcome:     Beena Vincent is a 34-year-old female with a past medical history including but not limited to desmoid tumor of the right knee, and asthma who was admitted to the observation unit with acute colitis.  CT of the abdomen and pelvis with contrast revealed mildly thick walled appearance of the colon extending from the splenic flexure to at least the proximal sigmoid colon with some adjacent pericolonic soft tissue stranding suggesting colitis as well as some mildly patulous loops of small bowel suggestive of enteritis.  There was also note of a small hemorrhagic right ovarian cyst.  Initial lab work noted a white blood cell count of 14.6 with a normal lactate.  Mild transaminitis and urinalysis showed no evidence of infection.  Patient was treated supportively with IV fluid hydration.      Of note patient was admitted 7/16 to 7/21/2024 for acute colitis in which she underwent flex sigmoidoscopy with no source of bleeding identified and was treated with Zosyn and discharged home on Levaquin.     Gastroenterology consulted agreeing to continue with supportive care, no antibiotics initiated.  Continue to advance diet as tolerated.  Awaiting GI PCR.     2/6/2025: Patient was seen by GI again today.  They would really like to have stool sample before discharge.  Will advance diet this evening in hopes to produce a stool sample.  I anticipate patient will be discharged home tomorrow.    02/07/25  Persistent abdominal pain.  Still has not had a bowel movement.  She is able to tolerate p.o. fluids and food without significant discomfort.  Discussed with GI and patient okay to discharge home without the inpatient stool sample, but they would like for the patient to have a stool sample kit for them to try and get a fecal  Calprotectin outpatient.  Patient will need to keep her outpatient appointment in March with GI.  All labs and imaging findings discussed with patient as well as specialist recommendations and patient is agreeable for discharge home at this time.    Review of Systems:   Constitutional:  No weight changes, fever, or chills. No night sweats, no fatigue, no malaise.    Cardiovascular:  No chest pain, no palpitations, no edema.      Respiratory:  No cough, no smoke exposure, no dyspnea, no orthopnea.   Gastrointestinal:  + Abdominal pain   Neuro:  No weakness, no numbness, no paresthesias, no loss of consciousness, no syncope, no dizziness, no headache.     Objective   Physical Exam:   Constitutional: Awake, alert. Well developed for age. Nontoxic appearing.   Eyes: PERRL, sclerae anicteric, no conjunctival injection.  EOMI  HENT: NCAT, mucous membranes moist, normal hearing  Neck: Supple, nontender, trachea midline  Respiratory: Clear to auscultation bilaterally, nonlabored respirations on room air  Cardiovascular: RRR, no murmurs, palpable pedal pulses bilaterally. No appreciable edema.   Gastrointestinal: Positive bowel sounds, soft, nontender, not distended.   Musculoskeletal: No bilateral ankle edema, no clubbing or cyanosis to extremities. No obvious deformities.   Psychiatric: Appropriate affect, cooperative. Converses appropriately for age.   Neurologic: Oriented x 3, strength symmetric in all extremities. Cranial nerves grossly intact to confrontation, speech clear  Skin: No rashes, skin intact.     Results Review:    I have reviewed the labs, radiology results and diagnostic studies.    Results from last 7 days   Lab Units 02/07/25  0356   WBC 10*3/mm3 8.67   HEMOGLOBIN g/dL 12.8   HEMATOCRIT % 40.1   PLATELETS 10*3/mm3 242     Results from last 7 days   Lab Units 02/07/25  0356 02/06/25  0538 02/05/25  0757   SODIUM mmol/L 141 139 141   POTASSIUM mmol/L 3.8 3.6 3.7   CHLORIDE mmol/L 107 106 105   CO2 mmol/L  24.6 23.1 25.2   BUN mg/dL 4* 4* 8   CREATININE mg/dL 0.76 0.62 0.68   CALCIUM mg/dL 9.4 8.6 8.8   BILIRUBIN mg/dL 0.5 0.6 0.5   ALK PHOS U/L 39 38* 43   ALT (SGPT) U/L 39* 50* 78*   AST (SGOT) U/L 22 29 55*   GLUCOSE mg/dL 95 91 91     Imaging Results (Last 24 Hours)       ** No results found for the last 24 hours. **            I have reviewed the medications.     Discharge Medications        ASK your doctor about these medications        Instructions Start Date   albuterol sulfate  (90 Base) MCG/ACT inhaler  Commonly known as: PROVENTIL HFA;VENTOLIN HFA;PROAIR HFA   2 puffs, Inhalation, Every 6 Hours PRN      escitalopram 20 MG tablet  Commonly known as: LEXAPRO   20 mg, Oral, Daily      gabapentin 100 MG capsule  Commonly known as: NEURONTIN   100 mg, Oral, 3 Times Daily              ---------------------------------------------------------------------------------------------  Assessment & Plan   Assessment/Problem List    Acute colitis      Plan:    Acute colitis  -CT shows suggestive of colitis from the splenic flexure to the proximal sigmoid colon as well as some mildly patulous loops of small bowel suggestive of enteritis and a small hemorrhagic right ovarian cyst  -Urinalysis without evidence for infection  -GI has seen and evaluated the patient and since unable to collect stool sample inpatient, want patient to discharge home with a stool sample kit to try and get fecal Calprotectin outpatient.  Okay with patient discharging home since she has been tolerating p.o. fluid and food and patient will need to keep her follow-up appointment with them in March.     Mild transaminitis  -Improving  -AST 90--> 55--> 29  -ALT 99--> 78--> 50     Desmoid tumor  -Continue home gabapentin  -Continue outpatient oncology follow-up     Asthma  -No acute exacerbation noted  -Albuterol as needed    Disposition: Discharge    Follow-up after Discharge: PCP in 1 to 2 weeks, GI in 1 month    This note will serve as a  discharge summary    Qing Lora PA-C 02/07/25 07:24 EST    I have worn appropriate PPE during this patient encounter, sanitized my hands both with entering and exiting patient's room.      35 minutes has been spent by [facility] providers in the care of this patient while under observation status

## 2025-02-07 NOTE — PROGRESS NOTES
MD ATTESTATION NOTE - Observation progress    The ESME and I have discussed this patient's history, physical exam, and treatment plan.  I have reviewed the documentation and personally had a face to face interaction with the patient. I affirm the documentation and agree with the treatment and plan.  The attached note describes my personal findings.        SHARED APC FACE TO FACE: I performed a substantive part of the MDM during the patient's E/M visit. I personally evaluated and examined the patient. I personally made or approved the documented management plan and acknowledge its risk of complications.      Brief HPI: Patient states she had a fair night.  Patient continues to have abdominal pain.  Patient has been unable to give stool specimen throughout the night.  Has tolerated some food and fluids.  A.m. labs unremarkable. No fevers            GENERAL: no acute distress  HENT: nares patent  EYES: no scleral icterus  CV: regular rhythm, normal rate  RESPIRATORY: normal effort  ABDOMEN: soft.  Mild right-sided tenderness  MUSCULOSKELETAL: no deformity  NEURO: alert, moves all extremities, follows commands  PSYCH:  calm, cooperative  SKIN: warm, dry    Vital signs and nursing notes reviewed.        Plan: Patient mated for colitis.  GI following.  Awaiting stool specimen

## 2025-02-10 ENCOUNTER — TRANSITIONAL CARE MANAGEMENT TELEPHONE ENCOUNTER (OUTPATIENT)
Dept: CALL CENTER | Facility: HOSPITAL | Age: 35
End: 2025-02-10
Payer: COMMERCIAL

## 2025-02-10 NOTE — OUTREACH NOTE
Call Center TCM Note      Flowsheet Row Responses   [facility] patient discharged from? [place]   Does the patient have one of the following disease processes/diagnoses(primary or secondary)? Other   TCM attempt successful? No   Unsuccessful attempts Attempt 2   Call Status Left message            Nikki Burden RN    2/10/2025, 15:45 EST

## 2025-02-10 NOTE — OUTREACH NOTE
Call Center TCM Note      Flowsheet Row Responses   [facility] patient discharged from? [place]   Does the patient have one of the following disease processes/diagnoses(primary or secondary)? Other   TCM attempt successful? No   Unsuccessful attempts Attempt 1   Call Status Left message            Nikki Burden RN    2/10/2025, 11:02 EST

## 2025-02-11 ENCOUNTER — TRANSITIONAL CARE MANAGEMENT TELEPHONE ENCOUNTER (OUTPATIENT)
Dept: CALL CENTER | Facility: HOSPITAL | Age: 35
End: 2025-02-11
Payer: COMMERCIAL

## 2025-02-11 NOTE — OUTREACH NOTE
Call Center TCM Note      Flowsheet Row Responses   [facility] patient discharged from? [place]   Does the patient have one of the following disease processes/diagnoses(primary or secondary)? Other   TCM attempt successful? No   Unsuccessful attempts Attempt 3   Revoked Reason Other  [attempted x 3, unsuccessful. Revoked per unit policy.]   Comments PCP HOSPITAL f/u appt on 2/14/25 at 1:00 PM with Dr. Sissy Garibay.   Does the patient have an appointment with their PCP within 7-14 days of discharge? Yes            Misty Crawford RN    2/11/2025, 14:31 EST

## 2025-02-14 ENCOUNTER — OFFICE VISIT (OUTPATIENT)
Dept: FAMILY MEDICINE CLINIC | Facility: CLINIC | Age: 35
End: 2025-02-14
Payer: COMMERCIAL

## 2025-02-14 VITALS
WEIGHT: 184 LBS | HEART RATE: 68 BPM | BODY MASS INDEX: 26.34 KG/M2 | DIASTOLIC BLOOD PRESSURE: 52 MMHG | SYSTOLIC BLOOD PRESSURE: 94 MMHG | OXYGEN SATURATION: 98 % | HEIGHT: 70 IN | TEMPERATURE: 97.3 F

## 2025-02-14 DIAGNOSIS — Z78.9 MEDICALLY COMPLEX PATIENT: ICD-10-CM

## 2025-02-14 DIAGNOSIS — D48.119 DESMOID TUMOR: ICD-10-CM

## 2025-02-14 DIAGNOSIS — N83.201 CYST OF RIGHT OVARY: ICD-10-CM

## 2025-02-14 DIAGNOSIS — K52.9 ACUTE COLITIS: ICD-10-CM

## 2025-02-14 DIAGNOSIS — Z09 HOSPITAL DISCHARGE FOLLOW-UP: Primary | ICD-10-CM

## 2025-02-14 DIAGNOSIS — Z82.61 FAMILY HISTORY OF RHEUMATOID ARTHRITIS: ICD-10-CM

## 2025-02-14 DIAGNOSIS — M25.571 ACUTE RIGHT ANKLE PAIN: ICD-10-CM

## 2025-02-14 DIAGNOSIS — Z91.199 POOR COMPLIANCE: ICD-10-CM

## 2025-02-14 DIAGNOSIS — G89.3 CHRONIC PAIN DUE TO NEOPLASM: ICD-10-CM

## 2025-02-14 LAB — REF LAB TEST METHOD: NORMAL

## 2025-02-14 NOTE — PROGRESS NOTES
"Transitional Care Follow Up Visit  Subjective     Beena Vincent is a 34 y.o. female who presents for a transitional care management visit.    Within 48 business hours after discharge our office contacted her via telephone to coordinate her care and needs.      I reviewed and discussed the details of that call along with the discharge summary, hospital problems, inpatient lab results, inpatient diagnostic studies, and consultation reports with Beena.     Current outpatient and discharge medications have been reconciled for the patient.  Reviewed by: Sissy Garibay MD          2/7/2025     4:24 PM   Date of TCM Phone Call   [facility]   Date of Admission 2/4/2025   Date of Discharge 2/7/2025   Discharge Disposition Home or Self Care     Risk for Readmission (LACE) Score: 6 (2/7/2025  6:00 AM)      Knee Pain     NEEDS MULTIPLE HOSPITAL FOLLOW-UPS, ER FOLLOW-UPS, SPECIALIST FOLLOW-UPS    She presents to today's visit with companied by her mother, whom she gives permission to participate in the visit.  Lost to follow-up, last visit with me 2020, for wellness exam and WILLIAM/depression.    Last seen here approximately 3 years ago, February 2022 by NP/Kaur for a new mass of right knee.  At that visit, sent for Doppler was negative for DVT.  However ultrasound concerning for tumor and therefore was referred to Ortho.  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  Referred to oncologist/Dr. Molina who referred to [facility] surgical oncology team/Dr. Lyn.  Has been receiving systemic treatment for the last several years, but on hold x 9 months during a pregnancy.  However, she stopped treatment and has not been seen by oncology team x the last 15 to 18 months because she thought last treatment was causing recurrent nausea vomiting/abdominal pain episodes/\"colitis\"??  And, because \"it was not working.\"  Last visit with oncology/Dr. Molina was November 2023.  At that time, plan was to " increase Pazopanib to 600 mg     MOST RECENT ER visits and hospitalizations:    S/P hospitalization x 2 in June 2023 and July 2023 --- admitted for abdominal pain, nausea and vomiting.  Conservative treatment workup completed.    HOSPITALIZATION JULY 2024, [facility] --Dx acute colitis.  Seen by GI/Dr. Rohan Wilkerson, completed sigmoidoscopy  Endoscopy, Int (07/18/2024) --findings consistent with nonspecific colitis.  No features suggestive of IBD.  However requested close follow-up and hospital follow-up but this never happened.    Currently, she has been admitted back to [facility] x 2 within the last 2 weeks.  Detailed hospital course reviewed and summarized below x 2    HOSPITAL FOLLOW-UP #1 --- [facility], 2/2/2025 through 2/3/2025  ED to Hosp-Admission (Discharged) with Keyanna Huang MD (02/02/2025) --this was a 30 hours observation  Dx --- acute right ankle pain  Admitted for observation due to right ankle pain and enlarging right knee desmoid tumor.  S/P right ankle x-rays completed, no acute findings.  S/P Doppler, negative for DVT.  MRI of right knee with and without contrast completed and consistent with an enlarging heterogeneous irregular mass, some mild bone marrow edema which could be reactive.?  Seen in consultation by oncology/Dr. Grewal.  Patient requested referral to a desmoid tumor specialist    Then she was readmitted back to [facility] the next day with acute abdominal pain.   HOSPITAL FOLLOW-UP #2 --- this is the most current and recent hospitalization for this TCM encounter  ED to Hosp-Admission (Discharged) with Ricardo Collier MD; Abdias Castaneda MD (02/04/2025)   Discharge diagnosis --- acute colitis, mild transaminitis and leukocytosis.  Presented back to the hospital with acute abdominal pain.  CT abdomen/pelvis consistent with findings of left-sided colitis/enteritis.  Also found to have a hemorrhagic right ovarian cyst.  No UTI.   GI consulted.  Mild transaminitis and leukocytosis, but resolved  prior to discharge.  Plan was to continue conservative treatment with IV hydration.  No antibiotics.  Also, plan was to obtain a fecal Calprotectin stool sample but was unable to complete this prior to discharge.  Thus, will collect outpatient and follow-up with GI next month  Continued on gabapentin which was started 2 days prior by oncology team for her desmoid tumor pain.    Today, she is post hospital day #7 and doing better in terms of her acute colitis.  No longer having abdominal pain.  No nausea, vomiting, fever.  No diarrhea.  Still has not collected stool sample, but has the kit.  Does have follow-up appointment with GI in approximately 2 to 4 weeks.    Not much has changed with her enlarging right knee/lower extremity desmoid tumor.  The gabapentin that was initiated by Dr. Grewal 10 days ago does help with the pain somewhat.  Her gait has been affected.  Still awaiting referral to desmoid tumor specialist.  This has been placed by Dr. Grewal.  Patient has not heard back from their office yet.  No follow-up has been scheduled with Dr. Molina/Dr. Grewal.  Referral to Hematology and Oncology for Desmoid tumor (02/03/2025) --referral pending    No further episodes of right ankle pain or swelling.  Denies pain and swelling of other joints.  Family history is significant for rheumatoid, grandmother.  Never screened for autoimmune or inflammatory markers (besides CRP and sed rate that was done at initial diagnosis of desmoid tumor in 2022.)  She did have genetic testing in 2022, negative for FAP and other cancer genes.    Currently, not working.  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  Remains on [employer]/Medicaid services.  G3, P3.  Ages of children 13, 4 and 2.  Mood remains good through all this.  No longer on WILLIAM/antidepressants.  Mother is inquiring about short and long-term disability.?      The following portions of the patient's history were reviewed and updated as appropriate: allergies, current  medications, past family history, past medical history, past social history, past surgical history, and problem list.    Review of Systems   Constitutional:  Negative for fever and unexpected weight change.   Respiratory:  Negative for cough and shortness of breath.    Cardiovascular:  Negative for chest pain.       Objective   Physical Exam  Vitals and nursing note reviewed.   Constitutional:       Appearance: Normal appearance. She is well-developed and overweight.   HENT:      Head: Normocephalic and atraumatic.      Nose: Nose normal.   Eyes:      Conjunctiva/sclera: Conjunctivae normal.      Pupils: Pupils are equal, round, and reactive to light.   Neck:      Thyroid: No thyromegaly.   Cardiovascular:      Rate and Rhythm: Normal rate and regular rhythm.      Heart sounds: Normal heart sounds. No murmur heard.  Pulmonary:      Effort: Pulmonary effort is normal. No respiratory distress.      Breath sounds: Normal breath sounds. No wheezing or rales.   Abdominal:      General: Abdomen is flat. Bowel sounds are normal. There is no distension.      Palpations: Abdomen is soft. There is no hepatomegaly, splenomegaly or mass.      Tenderness: There is no abdominal tenderness. There is no guarding or rebound.      Hernia: No hernia is present.   Musculoskeletal:         General: Normal range of motion.      Cervical back: Normal range of motion and neck supple.      Right lower leg: No edema.      Left lower leg: No edema.      Comments: Right knee --- very large firm irregular mass involving the posterior lateral aspect of her knee, this extends to the inferior/posterior aspect of her thigh and calf.  No warmth, erythema.  Pulses intact.  Normal strength of lower extremity and foot intact.    Right ankle, bilateral hands ----no edema, no erythema, no evidence of synovitis   Lymphadenopathy:      Cervical: No cervical adenopathy.   Skin:     General: Skin is warm.   Neurological:      General: No focal deficit  present.      Mental Status: She is alert.      Comments: Antalgic gait due to large tumor of right knee   Psychiatric:         Mood and Affect: Mood normal.         Behavior: Behavior normal.         Thought Content: Thought content normal.         Judgment: Judgment normal.         Common labs          2/5/2025    07:57 2/6/2025    05:38 2/7/2025    03:56   Common Labs   Glucose 91  91  95    BUN 8  4  4    Creatinine 0.68  0.62  0.76    Sodium 141  139  141    Potassium 3.7  3.6  3.8    Chloride 105  106  107    Calcium 8.8  8.6  9.4    Albumin 4.2  3.8  3.6    Total Bilirubin 0.5  0.6  0.5    Alkaline Phosphatase 43  38  39    AST (SGOT) 55  29  22    ALT (SGPT) 78  50  39    WBC 9.02  7.19  8.67    Hemoglobin 12.9  12.1  12.8    Hematocrit 39.5  36.0  40.1    Platelets 249  226  242           Lab Results   Component Value Date    FERRITIN 22.20 06/29/2020    FOLATE >20.00 06/29/2020        Endoscopy, Int (07/18/2024)    Rohan Duran MD  8/1/2024  7:50 AM EDT Back to Top      Biopsies confirmed colitis but were non specific.  There were no features to suggest IBD, possibly this was infectious or due to a transient lack of blood flow.       Please schedule office f/u with SM as soon as possible and can we get an update on how patient is doing post-discharge       XR Ankle 3+ View Right (02/02/2025 12:31) negative    Duplex Venous Lower Extremity - RIGHT (02/02/2025 15:24) negative  MRI Knee Right With & Without Contrast (02/02/2025 20:51) enlarging heterogeneous mass, mild bone marrow edema which could be reactive  XR Knee 3 View Right (02/03/2025 13:13)     CT Abdomen Pelvis With Contrast (02/04/2025 23:33) consistent with colitis, hemorrhagic right ovarian cyst      Procedures     Assessment & Plan   Diagnoses and all orders for this visit:    1. Hospital discharge follow-up (Primary) --S/P multiple ER and hospital follow-ups reviewed and detailed x last 4 to 5 years.  Lost to follow-up with me.   Essentially new patient visit, last visit with me greater than 5 years ago!  Most recent 2 hospitalizations within the last 2 weeks, [facility].  Both sets of discharge summaries, imaging studies, labs reviewed.  Today, post hospital day #7 from acute colitis/enteritis  -     CBC (No Diff)  -     Comprehensive Metabolic Panel    2. Acute colitis --post hospital day #7  Clinically much improved from this most recent hospitalization for another bout of recurrent colitis.  Treated conservatively.  Clinically doing well now.  However she has had multiple bouts of colitis within the last 1 to 2 years sending her to the ER, admitted on more than 3 occasions.  Assessment & Plan:  Multiple ER visits and admissions x last 2 years for nonspecific colitis.  S/P sigmoidoscopy done July 2024 consistent with colitis but no evidence of IBD disease.  Plan was to follow-up outpatient for full colonoscopy, but failed to follow-up with GI.  Currently planning outpatient fecal Calprotectin stool studies and has follow-up planned in the next 2 weeks with GI.  Clinically stable now.    Orders:  -     CBC (No Diff)  -     Comprehensive Metabolic Panel    3. Acute right ankle pain --S/P hospitalization 10 days ago for acute right ankle pain.  Likely this was referred pain from her ongoing and enlarging right knee desmoid tumor.  X-rays, Doppler all negative.  S/P MRI of right knee with enlarging mass.  Given her family history of RA, recurrent colitis episodes, and most recently an episode of acute joint pain sending her to the ER that resulted in admission would like to workup inflammatory and autoimmune markers.  Sed rate and CRP done in the past, likely would remain high given recent events.  -     Rheumatoid Factor  -     ILDA    4. Desmoid tumor --right knee/posterior thigh/calf remains uncontrolled and enlarging.  Assessment & Plan:  Enlarging, becoming more painful, affecting gait.  Diagnosed March 2022, S/P systemic treatment under the  direction of Dr. Molina.  However lost to follow-up x 15 months.  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  This referral has been placed by Dr. Grewal, awaiting appointment.  Likely [facility], or [facility].  Recently started on gabapentin, helping with discomfort.  May add Motrin 600 mg every 8 hours as needed pain, inflammation.  Stressed the importance that she really needs better compliance with specialist team.       Orders:  -     Rheumatoid Factor  -     ILDA    5. Chronic pain due to neoplasm --uncontrolled, see above plan.  Recently started on gabapentin.  May add Motrin provided CBC and renal function remain normal.    6. Cyst of right ovary --new finding.  Benign.  Asymptomatic    7. Family history of rheumatoid arthritis --see above plan for acute right ankle pain workup    8. Poor compliance  Assessment & Plan:  Lost to follow-up here x 5 years. Poor follow-up with specialist after multiple ER visits and hospital admissions for acute colitis. Lack of follow-up with oncologist regarding her desmoid tumor x the last 15 months.   Counseled in detail today that she must do a better job with her follow-up and compliance.  Hopefully this will improve.      9. Medically complex patient -- See all the above active new and chronic high risk diagnoses that require close monitoring and longitudinal care.      In addition to current TCM for acute colitis episode, also seen and treated for multiple other ER visits, hospitalizations over the last several years for abdominal pain, acute ankle pain, and uncontrolled desmoid tumor pain/growth       Needs a follow-up with me in 3 months    Sissy Garibay MD

## 2025-02-14 NOTE — PROGRESS NOTES
Inflammatory bowel disease panel came back negative which is good.  We can discuss more at your March follow-up.  We still need to make sure that this inflammation in your small intestines and colon resolve.

## 2025-02-14 NOTE — PATIENT INSTRUCTIONS
Make sure you hear back from a Dr. Grewal office with oncology referral to specialist at [facility], [facility]    Follow-up with GI doctor as planned, will need additional colonoscopy    Further recommendations based on today's blood work with me

## 2025-02-15 PROBLEM — Z91.199 POOR COMPLIANCE: Status: ACTIVE | Noted: 2025-02-15

## 2025-02-15 PROBLEM — D72.829 LEUKOCYTOSIS: Status: RESOLVED | Noted: 2023-06-09 | Resolved: 2025-02-15

## 2025-02-15 PROBLEM — Z86.32 HISTORY OF GESTATIONAL DIABETES: Status: ACTIVE | Noted: 2025-02-15

## 2025-02-15 PROBLEM — Z82.61 FAMILY HISTORY OF RHEUMATOID ARTHRITIS: Status: ACTIVE | Noted: 2025-02-15

## 2025-02-15 PROBLEM — G89.3 CHRONIC PAIN DUE TO NEOPLASM: Status: ACTIVE | Noted: 2025-02-15

## 2025-02-15 PROBLEM — D48.119 DESMOID FIBROMATOSIS: Status: ACTIVE | Noted: 2025-02-15

## 2025-02-15 PROBLEM — N83.201 CYST OF RIGHT OVARY: Status: ACTIVE | Noted: 2025-02-15

## 2025-02-15 PROBLEM — K62.5 BRBPR (BRIGHT RED BLOOD PER RECTUM): Status: RESOLVED | Noted: 2024-07-16 | Resolved: 2025-02-15

## 2025-02-15 PROBLEM — R10.84 GENERALIZED ABDOMINAL PAIN: Status: RESOLVED | Noted: 2023-06-08 | Resolved: 2025-02-15

## 2025-02-15 NOTE — ASSESSMENT & PLAN NOTE
Multiple ER visits and admissions x last 2 years for nonspecific colitis.  Status post sigmoidoscopy done July 2024 consistent with colitis but no evidence of IBD disease.  Plan was to follow-up outpatient for full colonoscopy, but failed to follow-up with GI.  Currently planning outpatient fecal Rojas protein stool studies and has follow-up planned in the next 2 weeks with GI.  Clinically stable now.

## 2025-02-15 NOTE — ASSESSMENT & PLAN NOTE
Lost to follow-up here x 5 years. Poor follow-up with specialist after multiple ER visits and hospital admissions for acute colitis. Lack of follow-up with oncologist regarding her desmoid tumor x the last 18 months.

## 2025-02-15 NOTE — ASSESSMENT & PLAN NOTE
Enlarging, becoming more painful, affecting gait.  Diagnosed March 2022, status post systemic treatment under the direction of Dr. Molina.  However lost to follow-up x 18 months.  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  This referral has been placed by Dr. Grewal, awaiting appointment.  Likely [facility], or [facility].  Recently started on gabapentin, helping with discomfort.  May add Motrin 600 mg every 8 hours as needed pain, inflammation.  Stressed the importance that she really needs better compliance with specialist team.

## 2025-02-17 LAB
ALBUMIN SERPL-MCNC: 4.6 G/DL (ref 3.5–5.2)
ALBUMIN/GLOB SERPL: 2.1 G/DL
ALP SERPL-CCNC: 48 U/L (ref 39–117)
ALT SERPL-CCNC: 17 U/L (ref 1–33)
ANA SER QL: NEGATIVE
AST SERPL-CCNC: 19 U/L (ref 1–32)
BILIRUB SERPL-MCNC: 0.3 MG/DL (ref 0–1.2)
BUN SERPL-MCNC: 7 MG/DL (ref 6–20)
BUN/CREAT SERPL: 9.9 (ref 7–25)
CALCIUM SERPL-MCNC: 9.3 MG/DL (ref 8.6–10.5)
CHLORIDE SERPL-SCNC: 103 MMOL/L (ref 98–107)
CO2 SERPL-SCNC: 25.3 MMOL/L (ref 22–29)
CREAT SERPL-MCNC: 0.71 MG/DL (ref 0.57–1)
EGFRCR SERPLBLD CKD-EPI 2021: 116 ML/MIN/1.73
ERYTHROCYTE [DISTWIDTH] IN BLOOD BY AUTOMATED COUNT: 12.9 % (ref 12.3–15.4)
GLOBULIN SER CALC-MCNC: 2.2 GM/DL
GLUCOSE SERPL-MCNC: 90 MG/DL (ref 65–99)
HCT VFR BLD AUTO: 39.9 % (ref 34–46.6)
HGB BLD-MCNC: 12.7 G/DL (ref 12–15.9)
MCH RBC QN AUTO: 27.4 PG (ref 26.6–33)
MCHC RBC AUTO-ENTMCNC: 31.8 G/DL (ref 31.5–35.7)
MCV RBC AUTO: 86 FL (ref 79–97)
PLATELET # BLD AUTO: 284 10*3/MM3 (ref 140–450)
POTASSIUM SERPL-SCNC: 4.6 MMOL/L (ref 3.5–5.2)
PROT SERPL-MCNC: 6.8 G/DL (ref 6–8.5)
RBC # BLD AUTO: 4.64 10*6/MM3 (ref 3.77–5.28)
RHEUMATOID FACT SERPL-ACNC: 10.1 IU/ML
SODIUM SERPL-SCNC: 137 MMOL/L (ref 136–145)
WBC # BLD AUTO: 7.6 10*3/MM3 (ref 3.4–10.8)

## 2025-03-03 LAB — CALPROTECTIN STL-MCNT: 15 UG/G (ref 0–120)

## 2025-03-24 ENCOUNTER — HOSPITAL ENCOUNTER (EMERGENCY)
Facility: HOSPITAL | Age: 35
Discharge: HOME | End: 2025-03-24
Attending: EMERGENCY MEDICINE | Admitting: EMERGENCY MEDICINE
Payer: COMMERCIAL

## 2025-03-24 ENCOUNTER — APPOINTMENT (OUTPATIENT)
Dept: GENERAL RADIOLOGY | Facility: HOSPITAL | Age: 35
End: 2025-03-24
Payer: COMMERCIAL

## 2025-03-24 VITALS
TEMPERATURE: 100 F | SYSTOLIC BLOOD PRESSURE: 109 MMHG | HEART RATE: 106 BPM | WEIGHT: 169.75 LBS | BODY MASS INDEX: 24.3 KG/M2 | OXYGEN SATURATION: 91 % | RESPIRATION RATE: 18 BRPM | DIASTOLIC BLOOD PRESSURE: 70 MMHG | HEIGHT: 70 IN

## 2025-03-24 DIAGNOSIS — J06.9 VIRAL URI: Primary | ICD-10-CM

## 2025-03-24 DIAGNOSIS — E87.6 HYPOKALEMIA: ICD-10-CM

## 2025-03-24 DIAGNOSIS — K52.9 GASTROENTERITIS: ICD-10-CM

## 2025-03-24 LAB
ALBUMIN SERPL-MCNC: 4.2 G/DL (ref 3.5–5.2)
ALBUMIN/GLOB SERPL: 1.5 G/DL
ALP SERPL-CCNC: 71 U/L (ref 39–117)
ALT SERPL W P-5'-P-CCNC: 11 U/L (ref 1–33)
ANION GAP SERPL CALCULATED.3IONS-SCNC: 9.5 MMOL/L (ref 5–15)
AST SERPL-CCNC: 20 U/L (ref 1–32)
B PARAPERT DNA SPEC QL NAA+PROBE: NOT DETECTED
B PERT DNA SPEC QL NAA+PROBE: NOT DETECTED
BASOPHILS # BLD AUTO: 0.03 10*3/MM3 (ref 0–0.2)
BASOPHILS NFR BLD AUTO: 0.3 % (ref 0–1.5)
BILIRUB SERPL-MCNC: 0.5 MG/DL (ref 0–1.2)
BUN SERPL-MCNC: 3 MG/DL (ref 6–20)
BUN/CREAT SERPL: 3.9 (ref 7–25)
C PNEUM DNA NPH QL NAA+NON-PROBE: NOT DETECTED
CALCIUM SPEC-SCNC: 8.7 MG/DL (ref 8.6–10.5)
CHLORIDE SERPL-SCNC: 97 MMOL/L (ref 98–107)
CO2 SERPL-SCNC: 23.5 MMOL/L (ref 22–29)
CREAT SERPL-MCNC: 0.77 MG/DL (ref 0.57–1)
DEPRECATED RDW RBC AUTO: 40.4 FL (ref 37–54)
EGFRCR SERPLBLD CKD-EPI 2021: 105.3 ML/MIN/1.73
EOSINOPHIL # BLD AUTO: 0.41 10*3/MM3 (ref 0–0.4)
EOSINOPHIL NFR BLD AUTO: 3.9 % (ref 0.3–6.2)
ERYTHROCYTE [DISTWIDTH] IN BLOOD BY AUTOMATED COUNT: 13.1 % (ref 12.3–15.4)
FLUAV SUBTYP SPEC NAA+PROBE: NOT DETECTED
FLUBV RNA ISLT QL NAA+PROBE: NOT DETECTED
GLOBULIN UR ELPH-MCNC: 2.8 GM/DL
GLUCOSE SERPL-MCNC: 121 MG/DL (ref 65–99)
HADV DNA SPEC NAA+PROBE: NOT DETECTED
HCG SERPL QL: NEGATIVE
HCOV 229E RNA SPEC QL NAA+PROBE: NOT DETECTED
HCOV HKU1 RNA SPEC QL NAA+PROBE: NOT DETECTED
HCOV NL63 RNA SPEC QL NAA+PROBE: NOT DETECTED
HCOV OC43 RNA SPEC QL NAA+PROBE: NOT DETECTED
HCT VFR BLD AUTO: 40.9 % (ref 34–46.6)
HGB BLD-MCNC: 13.3 G/DL (ref 12–15.9)
HMPV RNA NPH QL NAA+NON-PROBE: NOT DETECTED
HPIV1 RNA ISLT QL NAA+PROBE: NOT DETECTED
HPIV2 RNA SPEC QL NAA+PROBE: NOT DETECTED
HPIV3 RNA NPH QL NAA+PROBE: NOT DETECTED
HPIV4 P GENE NPH QL NAA+PROBE: NOT DETECTED
IMM GRANULOCYTES # BLD AUTO: 0.04 10*3/MM3 (ref 0–0.05)
IMM GRANULOCYTES NFR BLD AUTO: 0.4 % (ref 0–0.5)
LIPASE SERPL-CCNC: 13 U/L (ref 13–60)
LYMPHOCYTES # BLD AUTO: 0.97 10*3/MM3 (ref 0.7–3.1)
LYMPHOCYTES NFR BLD AUTO: 9.2 % (ref 19.6–45.3)
M PNEUMO IGG SER IA-ACNC: NOT DETECTED
MAGNESIUM SERPL-MCNC: 1.8 MG/DL (ref 1.6–2.6)
MCH RBC QN AUTO: 27.5 PG (ref 26.6–33)
MCHC RBC AUTO-ENTMCNC: 32.5 G/DL (ref 31.5–35.7)
MCV RBC AUTO: 84.7 FL (ref 79–97)
MONOCYTES # BLD AUTO: 1 10*3/MM3 (ref 0.1–0.9)
MONOCYTES NFR BLD AUTO: 9.4 % (ref 5–12)
NEUTROPHILS NFR BLD AUTO: 76.8 % (ref 42.7–76)
NEUTROPHILS NFR BLD AUTO: 8.15 10*3/MM3 (ref 1.7–7)
NRBC BLD AUTO-RTO: 0 /100 WBC (ref 0–0.2)
PLATELET # BLD AUTO: 235 10*3/MM3 (ref 140–450)
PMV BLD AUTO: 10.6 FL (ref 6–12)
POTASSIUM SERPL-SCNC: 2.5 MMOL/L (ref 3.5–5.2)
PROT SERPL-MCNC: 7 G/DL (ref 6–8.5)
RBC # BLD AUTO: 4.83 10*6/MM3 (ref 3.77–5.28)
RHINOVIRUS RNA SPEC NAA+PROBE: DETECTED
RSV RNA NPH QL NAA+NON-PROBE: NOT DETECTED
SARS-COV-2 RNA NPH QL NAA+NON-PROBE: NOT DETECTED
SODIUM SERPL-SCNC: 130 MMOL/L (ref 136–145)
WBC NRBC COR # BLD AUTO: 10.6 10*3/MM3 (ref 3.4–10.8)

## 2025-03-24 PROCEDURE — 80053 COMPREHEN METABOLIC PANEL: CPT | Performed by: PHYSICIAN ASSISTANT

## 2025-03-24 PROCEDURE — 84703 CHORIONIC GONADOTROPIN ASSAY: CPT | Performed by: PHYSICIAN ASSISTANT

## 2025-03-24 PROCEDURE — 25810000003 SODIUM CHLORIDE 0.9 % SOLUTION: Performed by: PHYSICIAN ASSISTANT

## 2025-03-24 PROCEDURE — 0202U NFCT DS 22 TRGT SARS-COV-2: CPT | Performed by: PHYSICIAN ASSISTANT

## 2025-03-24 PROCEDURE — 85025 COMPLETE CBC W/AUTO DIFF WBC: CPT | Performed by: PHYSICIAN ASSISTANT

## 2025-03-24 PROCEDURE — 83735 ASSAY OF MAGNESIUM: CPT | Performed by: PHYSICIAN ASSISTANT

## 2025-03-24 PROCEDURE — 71045 X-RAY EXAM CHEST 1 VIEW: CPT

## 2025-03-24 PROCEDURE — 25010000002 ONDANSETRON PER 1 MG: Performed by: PHYSICIAN ASSISTANT

## 2025-03-24 PROCEDURE — 36415 COLL VENOUS BLD VENIPUNCTURE: CPT

## 2025-03-24 PROCEDURE — 96374 THER/PROPH/DIAG INJ IV PUSH: CPT

## 2025-03-24 PROCEDURE — 99283 EMERGENCY DEPT VISIT LOW MDM: CPT

## 2025-03-24 PROCEDURE — 83690 ASSAY OF LIPASE: CPT | Performed by: PHYSICIAN ASSISTANT

## 2025-03-24 RX ORDER — POTASSIUM CHLORIDE 1.5 G/1.58G
40 POWDER, FOR SOLUTION ORAL ONCE
Status: COMPLETED | OUTPATIENT
Start: 2025-03-24 | End: 2025-03-24

## 2025-03-24 RX ORDER — PROCHLORPERAZINE MALEATE 10 MG
10 TABLET ORAL EVERY 6 HOURS PRN
COMMUNITY
Start: 2025-03-11

## 2025-03-24 RX ORDER — NIROGACESTAT 150 MG/1
150 TABLET, FILM COATED ORAL 2 TIMES DAILY
COMMUNITY
Start: 2025-03-03

## 2025-03-24 RX ORDER — POTASSIUM CHLORIDE 1500 MG/1
40 TABLET, EXTENDED RELEASE ORAL ONCE
Status: DISCONTINUED | OUTPATIENT
Start: 2025-03-24 | End: 2025-03-24

## 2025-03-24 RX ORDER — PROMETHAZINE HYDROCHLORIDE 25 MG/1
25 TABLET ORAL EVERY 6 HOURS PRN
Qty: 20 TABLET | Refills: 0 | Status: SHIPPED | OUTPATIENT
Start: 2025-03-24

## 2025-03-24 RX ORDER — SODIUM CHLORIDE 0.9 % (FLUSH) 0.9 %
10 SYRINGE (ML) INJECTION AS NEEDED
Status: DISCONTINUED | OUTPATIENT
Start: 2025-03-24 | End: 2025-03-24 | Stop reason: HOSPADM

## 2025-03-24 RX ORDER — ONDANSETRON 2 MG/ML
4 INJECTION INTRAMUSCULAR; INTRAVENOUS ONCE
Status: COMPLETED | OUTPATIENT
Start: 2025-03-24 | End: 2025-03-24

## 2025-03-24 RX ORDER — ACETAMINOPHEN 500 MG
1000 TABLET ORAL ONCE
Status: COMPLETED | OUTPATIENT
Start: 2025-03-24 | End: 2025-03-24

## 2025-03-24 RX ORDER — POTASSIUM CHLORIDE 1500 MG/1
20 TABLET, EXTENDED RELEASE ORAL DAILY
Qty: 3 TABLET | Refills: 0 | Status: SHIPPED | OUTPATIENT
Start: 2025-03-24

## 2025-03-24 RX ORDER — DULOXETIN HYDROCHLORIDE 60 MG/1
60 CAPSULE, DELAYED RELEASE ORAL DAILY
COMMUNITY
Start: 2025-03-10

## 2025-03-24 RX ORDER — OXYCODONE HYDROCHLORIDE 5 MG/1
1 TABLET ORAL
COMMUNITY
Start: 2025-03-10

## 2025-03-24 RX ADMIN — SODIUM CHLORIDE 1000 ML: 9 INJECTION, SOLUTION INTRAVENOUS at 11:58

## 2025-03-24 RX ADMIN — POTASSIUM CHLORIDE 40 MEQ: 1.5 POWDER, FOR SOLUTION ORAL at 13:15

## 2025-03-24 RX ADMIN — ACETAMINOPHEN 1000 MG: 500 TABLET, FILM COATED ORAL at 12:34

## 2025-03-24 RX ADMIN — POTASSIUM CHLORIDE 40 MEQ: 1.5 POWDER, FOR SOLUTION ORAL at 14:28

## 2025-03-24 RX ADMIN — ONDANSETRON 4 MG: 2 INJECTION, SOLUTION INTRAMUSCULAR; INTRAVENOUS at 12:18

## 2025-03-24 NOTE — ED PROVIDER NOTES
EMERGENCY DEPARTMENT ENCOUNTER    Room Number:  41/41  Date of encounter:  3/24/2025  PCP: Sissy Garibay MD  Historian: Patient  Chronic or social conditions impacting care (social determinants of health): None      HPI:  Chief Complaint: URI symptoms, vomiting, diarrhea  A complete HPI/ROS/PMH/PSH/SH/FH are unobtainable due to: Nothing    Context: Beena Vincent is a 34 y.o. female with a history of desmoid tumor, who presents to the ED c/o acute cough, congestion, ear pain, nausea, vomiting and acute on chronic diarrhea for the past several days.  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  Patient is currently taking Nirogacestat for this.  She typically has chronic diarrhea as a side effect.  She states over the past several days she has had worse diarrhea as well as nausea, vomiting.  She does complain of crampy diffuse abdominal pain.    Review of prior external notes (non-ED):   I reviewed primary care office visit from 2025.  Patient seen for routine medical care, desmoid tumor    Review of prior external test results outside of this encounter:  Reviewed a CMP dated 3/20/2025.  Patient's potassium 3.3, creatinine 0.66.    PAST MEDICAL HISTORY  Active Ambulatory Problems     Diagnosis Date Noted    KYRA III (cervical intraepithelial neoplasia grade III) with severe dysplasia 2019    Gestational HTN 2020     (normal spontaneous vaginal delivery) 2020    Asthma exacerbation 2017    Anxiety with depression 2020    Postpartum depression 2020    Generalized anxiety disorder 2020    History of anemia 2020    Acute appendicitis with localized peritonitis, without perforation, abscess, or gangrene 10/01/2021    Soft tissue mass 2022    Gestational diabetes 2023    Desmoid tumor 2023    Colitis 2024    Acute colitis 2024    Right ankle pain 2025     History of gestational diabetes 02/15/2025    Cyst of right ovary 02/15/2025    Family history of rheumatoid arthritis 02/15/2025    Poor compliance 02/15/2025    Chronic pain due to neoplasm 02/15/2025    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     Resolved Ambulatory Problems     Diagnosis Date Noted    Edema of lower extremity, antepartum, third trimester 06/08/2020    Anemia affecting pregnancy in third trimester 06/29/2020    Pregnancy 08/07/2020    Medial epicondylitis of left elbow 05/30/2013    Generalized abdominal pain 06/08/2023    Nausea vomiting and diarrhea 06/09/2023    Acute kidney injury 06/09/2023    Leukocytosis 06/09/2023    BRBPR (bright red blood per rectum) 07/16/2024     Past Medical History:   Diagnosis Date    Anxiety 2009    Asthma     Cervical dysplasia     Depression     Gestational hypertension 2020    Iron deficiency anemia     Preeclampsia 2012         PAST SURGICAL HISTORY  Past Surgical History:   Procedure Laterality Date    APPENDECTOMY N/A 10/1/2021    Procedure: APPENDECTOMY LAPAROSCOPIC;  Surgeon: Ahmet Dong Jr., MD;  Location: [facility] OR;  Service: General;  Laterality: N/A;    KNEE ARTHROSCOPY      AGE 6    LEEP N/A 4/25/2019    Procedure: LOOP ELECTROCAUTERY EXCISION PROCEDURE;  Surgeon: Arsh Ray MD;  Location: [facility] OR OSC;  Service: Obstetrics/Gynecology    SIGMOIDOSCOPY N/A 7/19/2024    Procedure: SIGMOIDOSCOPY FLEXIBLE WITH BIOPSIES;  Surgeon: Rohan Duran MD;  Location: [facility] ENDOSCOPY;  Service: Gastroenterology;  Laterality: N/A;  PRE: COLITIS  POST: SAME    SOFT TISSUE BIOPSY Right 3/23/2022    Procedure: BIOPSY SOFT TISSUE THIGH / KNEE;  Surgeon: Chris Randall MD;  Location: [facility] OR;  Service: Orthopedics;  Laterality: Right;    WISDOM TOOTH EXTRACTION           FAMILY HISTORY  Family History   Problem Relation Age of Onset    Diabetes Father     Arthritis Maternal Grandmother     Lung cancer Maternal Grandfather     Heart attack  Maternal Grandfather     Heart disease Maternal Grandfather     Stroke Maternal Grandfather     Hyperlipidemia Maternal Grandfather     Malig Hyperthermia Neg Hx     Breast cancer Neg Hx     Ovarian cancer Neg Hx     Uterine cancer Neg Hx     Colon cancer Neg Hx          SOCIAL HISTORY  Social History     Socioeconomic History    Marital status: Single   Tobacco Use    Smoking status: Former     Current packs/day: 0.00     Average packs/day: 0.5 packs/day for 10.0 years (5.0 ttl pk-yrs)     Types: Cigarettes     Start date: 2016     Quit date: 2020     Years since quittin.1     Passive exposure: Never    Smokeless tobacco: Never   Vaping Use    Vaping status: Former    Substances: Nicotine    Devices: Disposable   Substance and Sexual Activity    Alcohol use: Not Currently     Comment: socially    Drug use: Not Currently     Types: Marijuana     Comment: daily, did not smoke during pregnancy    Sexual activity: Not Currently     Partners: Male     Birth control/protection: None         ALLERGIES  Penicillins and Adhesive tape        REVIEW OF SYSTEMS  All systems reviewed and negative except for those discussed in HPI.       PHYSICAL EXAM    I have reviewed the triage vital signs and nursing notes.    ED Triage Vitals   Temp Heart Rate Resp BP SpO2   25 1131 25 1131 25 1131 25 1132 25 1131   100 °F (37.8 °C) (!) 128 18 139/96 100 %      Temp src Heart Rate Source Patient Position BP Location FiO2 (%)   -- -- -- -- --              Physical Exam  GENERAL: Alert, oriented, not distressed  HENT: head atraumatic, no nuchal rigidity  EYES: no scleral icterus, EOMI  CV: regular rhythm, regular rate, no murmur  RESPIRATORY: normal effort, CTA  ABDOMEN: soft, minimal diffuse abdominal tenderness without guarding or rebound  MUSCULOSKELETAL: Large desmoid tumor to the right lower extremity.  NEURO: alert, moves all extremities, follows commands  SKIN: warm, dry        LAB  RESULTS  Recent Results (from the past 24 hours)   Comprehensive Metabolic Panel    Collection Time: 03/24/25 11:56 AM    Specimen: Blood   Result Value Ref Range    Glucose 121 (H) 65 - 99 mg/dL    BUN 3 (L) 6 - 20 mg/dL    Creatinine 0.77 0.57 - 1.00 mg/dL    Sodium 130 (L) 136 - 145 mmol/L    Potassium 2.5 (C) 3.5 - 5.2 mmol/L    Chloride 97 (L) 98 - 107 mmol/L    CO2 23.5 22.0 - 29.0 mmol/L    Calcium 8.7 8.6 - 10.5 mg/dL    Total Protein 7.0 6.0 - 8.5 g/dL    Albumin 4.2 3.5 - 5.2 g/dL    ALT (SGPT) 11 1 - 33 U/L    AST (SGOT) 20 1 - 32 U/L    Alkaline Phosphatase 71 39 - 117 U/L    Total Bilirubin 0.5 0.0 - 1.2 mg/dL    Globulin 2.8 gm/dL    A/G Ratio 1.5 g/dL    BUN/Creatinine Ratio 3.9 (L) 7.0 - 25.0    Anion Gap 9.5 5.0 - 15.0 mmol/L    eGFR 105.3 >60.0 mL/min/1.73   hCG, Serum, Qualitative    Collection Time: 03/24/25 11:56 AM    Specimen: Blood   Result Value Ref Range    HCG Qualitative Negative Negative   Lipase    Collection Time: 03/24/25 11:56 AM    Specimen: Blood   Result Value Ref Range    Lipase 13 13 - 60 U/L   Respiratory Panel PCR w/COVID-19(SARS-CoV-2) ARIELA/JIMENEZ/SYMONE/PAD/COR/AGAPITO In-House, NP Swab in [facility], 2 HR TAT - Swab, Nasopharynx    Collection Time: 03/24/25 11:56 AM    Specimen: Nasopharynx; Swab   Result Value Ref Range    ADENOVIRUS, PCR Not Detected Not Detected    Coronavirus 229E Not Detected Not Detected    Coronavirus HKU1 Not Detected Not Detected    Coronavirus NL63 Not Detected Not Detected    Coronavirus OC43 Not Detected Not Detected    COVID19 Not Detected Not Detected - Ref. Range    Human Metapneumovirus Not Detected Not Detected    Human Rhinovirus/Enterovirus Detected (A) Not Detected    Influenza A PCR Not Detected Not Detected    Influenza B PCR Not Detected Not Detected    Parainfluenza Virus 1 Not Detected Not Detected    Parainfluenza Virus 2 Not Detected Not Detected    Parainfluenza Virus 3 Not Detected Not Detected    Parainfluenza Virus 4 Not Detected Not  Detected    RSV, PCR Not Detected Not Detected    Bordetella pertussis pcr Not Detected Not Detected    Bordetella parapertussis PCR Not Detected Not Detected    Chlamydophila pneumoniae PCR Not Detected Not Detected    Mycoplasma pneumo by PCR Not Detected Not Detected   CBC Auto Differential    Collection Time: 03/24/25 11:56 AM    Specimen: Blood   Result Value Ref Range    WBC 10.60 3.40 - 10.80 10*3/mm3    RBC 4.83 3.77 - 5.28 10*6/mm3    Hemoglobin 13.3 12.0 - 15.9 g/dL    Hematocrit 40.9 34.0 - 46.6 %    MCV 84.7 79.0 - 97.0 fL    MCH 27.5 26.6 - 33.0 pg    MCHC 32.5 31.5 - 35.7 g/dL    RDW 13.1 12.3 - 15.4 %    RDW-SD 40.4 37.0 - 54.0 fl    MPV 10.6 6.0 - 12.0 fL    Platelets 235 140 - 450 10*3/mm3    Neutrophil % 76.8 (H) 42.7 - 76.0 %    Lymphocyte % 9.2 (L) 19.6 - 45.3 %    Monocyte % 9.4 5.0 - 12.0 %    Eosinophil % 3.9 0.3 - 6.2 %    Basophil % 0.3 0.0 - 1.5 %    Immature Grans % 0.4 0.0 - 0.5 %    Neutrophils, Absolute 8.15 (H) 1.70 - 7.00 10*3/mm3    Lymphocytes, Absolute 0.97 0.70 - 3.10 10*3/mm3    Monocytes, Absolute 1.00 (H) 0.10 - 0.90 10*3/mm3    Eosinophils, Absolute 0.41 (H) 0.00 - 0.40 10*3/mm3    Basophils, Absolute 0.03 0.00 - 0.20 10*3/mm3    Immature Grans, Absolute 0.04 0.00 - 0.05 10*3/mm3    nRBC 0.0 0.0 - 0.2 /100 WBC   Magnesium    Collection Time: 03/24/25 11:56 AM    Specimen: Blood   Result Value Ref Range    Magnesium 1.8 1.6 - 2.6 mg/dL       Ordered the above labs and independently reviewed the results.        RADIOLOGY  XR Chest 1 View  Result Date: 3/24/2025  XR CHEST 1 VW-3/24/2025  HISTORY: Cough.  Heart size is within normal limits. Lungs appear free of acute infiltrates. Bones and soft tissues are unremarkable.      1. No acute process.   This report was finalized on 3/24/2025 12:44 PM by Dr. Oscar Pruitt M.D on Workstation: [id number]        I ordered the above noted radiological studies. Reviewed by me and discussed with radiologist.  See dictation for official  radiology interpretation.      MEDICATIONS GIVEN IN ER    Medications   sodium chloride 0.9 % bolus 1,000 mL (0 mL Intravenous Stopped 3/24/25 1316)   acetaminophen (TYLENOL) tablet 1,000 mg (1,000 mg Oral Given 3/24/25 1234)   ondansetron (ZOFRAN) injection 4 mg (4 mg Intravenous Given 3/24/25 1218)   potassium chloride (KLOR-CON) packet 40 mEq (40 mEq Oral Given 3/24/25 1315)   potassium chloride (KLOR-CON) packet 40 mEq (40 mEq Oral Given 3/24/25 1428)         ADDITIONAL ORDERS CONSIDERED BUT NOT ORDERED:  Admission was considered but after careful review of the patient's presentation, physical examination, diagnostic results, and response to treatment the patient may be safely discharged with outpatient follow-up.       PROGRESS, DATA ANALYSIS, CONSULTS, AND MEDICAL DECISION MAKING    All labs have been independently interpreted by myself.  All radiology studies have been independently interpreted by myself and discussed with radiologist dictating the report.   EKGs independently interpreted by myself.  Discussion below represents my analysis of pertinent findings related to patient's condition, differential diagnosis, treatment plan and final disposition.    I have discussed case with Dr. Edwards, emergency room physician.  He has performed his own bedside examination and agrees with treatment plan.    ED Course as of 03/24/25 1925   Mon Mar 24, 2025   1150 Patient presents with a several day history of URI symptoms, vomiting, nausea, fever.  Patient tachycardic with stable oxygen sats.  Patient is being treated for a desmoid tumor.  Diagnoses include but not limited to viral URI, pneumonia, electrolyte abnormality. [EE]   1200 Heart Rate(!): 128 [TR]   1200 Temp: 100 °F (37.8 °C) [TR]   1213 WBC: 10.60 [EE]   1213 Hemoglobin: 13.3 [EE]   1248 Potassium(!!): 2.5 [TR]   1254 XR Chest 1 View  My independent interpretation of the imaging study is no dense consolidation [TR]   1354 Human Rhinovirus/Enterovirus(!):  Detected [EE]   1556 Patient has been able to tolerate fluids here.  I believe she is safe for discharge.  We will give her 3 days worth of potassium to take at home.  She does have appropriate primary care follow-up. [EE]      ED Course User Index  [EE] Geovani Valadez PA  [TR] Alexandro Edwards MD       AS OF 19:25 EDT VITALS:    BP - 109/70  HR - 106  TEMP - 100 °F (37.8 °C)  O2 SATS - 91%        DIAGNOSIS  Final diagnoses:   Viral URI   Gastroenteritis   Hypokalemia         DISPOSITION  Discharged    Admission was considered but after careful review of the patient's presentation, physical examination, diagnostic results, and response to treatment the patient may be safely discharged with outpatient follow-up.         Dictated utilizing Dragon dictation     Geovani Valadez PA  03/24/25 1926

## 2025-03-24 NOTE — ED PROVIDER NOTES
EMERGENCY DEPARTMENT MD ATTESTATION NOTE    Room Number:  41/41  PCP: Sissy Garibay MD  Independent Historians: Patient    HPI:  A complete HPI/ROS/PMH/PSH/SH/FH are unobtainable due to: None    Chronic or social conditions impacting patient care (Social Determinants of Health): None      Context: Beena Vincent is a 34 y.o. female with a medical history of desmoid fibromatosis, gestational diabetes, appendicitis who presents to the ED c/o acute nausea vomiting and diarrhea.  The patient reports since Friday she has had subjective fevers, upper respiratory symptoms with facial pain, cough, earaches as well as nausea vomiting diarrhea.  She denies any sick contacts.          Review of prior external notes (non-ED) -and- Review of prior external test results outside of this encounter:  Laboratory evaluation dated 2/14/2025 shows normal CMP    Prescription drug monitoring program review:           PHYSICAL EXAM    I have reviewed the triage vital signs and nursing notes.    ED Triage Vitals   Temp Heart Rate Resp BP SpO2   03/24/25 1131 03/24/25 1131 03/24/25 1131 03/24/25 1132 03/24/25 1131   100 °F (37.8 °C) (!) 128 18 139/96 100 %      Temp src Heart Rate Source Patient Position BP Location FiO2 (%)   -- -- -- -- --              Physical Exam  GENERAL: Awake, alert, no acute distress  SKIN: Warm, dry  HENT: Normocephalic, atraumatic  EYES: no scleral icterus  CV: regular rhythm, regular rate  RESPIRATORY: normal effort, lungs clear  ABDOMEN: soft, nontender, nondistended  MUSCULOSKELETAL: no deformity  NEURO: alert, moves all extremities, follows commands            MEDICATIONS GIVEN IN ER  Medications   sodium chloride 0.9 % flush 10 mL (has no administration in time range)   sodium chloride 0.9 % bolus 1,000 mL (0 mL Intravenous Stopped 3/24/25 1316)   acetaminophen (TYLENOL) tablet 1,000 mg (1,000 mg Oral Given 3/24/25 1234)   ondansetron (ZOFRAN) injection 4 mg (4 mg Intravenous Given 3/24/25 1218)    potassium chloride (KLOR-CON) packet 40 mEq (40 mEq Oral Given 3/24/25 1315)   potassium chloride (KLOR-CON) packet 40 mEq (40 mEq Oral Given 3/24/25 1428)         ORDERS PLACED DURING THIS VISIT:  Orders Placed This Encounter   Procedures    Respiratory Panel PCR w/COVID-19(SARS-CoV-2) ARIELA/JIMENEZ/SYMONE/PAD/COR/AGAPITO In-House, NP Swab in UTM/VTM, 2 HR TAT - Swab, Nasopharynx    XR Chest 1 View    Comprehensive Metabolic Panel    hCG, Serum, Qualitative    Lipase    CBC Auto Differential    Magnesium    Insert Peripheral IV    CBC & Differential         PROCEDURES  Procedures            PROGRESS, DATA ANALYSIS, CONSULTS, AND MEDICAL DECISION MAKING  All labs have been independently interpreted by me.  All radiology studies have been reviewed by me. All EKG's have been independently viewed and interpreted by me.  Discussion below represents my analysis of pertinent findings related to patient's condition, differential diagnosis, treatment plan and final disposition.    Differential diagnosis includes but is not limited to flu, COVID, viral syndrome, pneumonia.    Clinical Scores:                                     ED Course as of 03/24/25 1612   Mon Mar 24, 2025   1150 Patient presents with a several day history of URI symptoms, vomiting, nausea, fever.  Patient tachycardic with stable oxygen sats.  Patient is being treated for a desmoid tumor.  Diagnoses include but not limited to viral URI, pneumonia, electrolyte abnormality. [EE]   1200 Heart Rate(!): 128 [TR]   1200 Temp: 100 °F (37.8 °C) [TR]   1213 WBC: 10.60 [EE]   1213 Hemoglobin: 13.3 [EE]   1248 Potassium(!!): 2.5 [TR]   1254 XR Chest 1 View  My independent interpretation of the imaging study is no dense consolidation [TR]   1354 Human Rhinovirus/Enterovirus(!): Detected [EE]   1556 Patient has been able to tolerate fluids here.  I believe she is safe for discharge.  We will give her 3 days worth of potassium to take at home.  She does have appropriate primary  care follow-up. [EE]      ED Course User Index  [EE] Geovani Valadez PA  [TR] Alexandro Edwards MD       MDM: Plan laboratory evaluation including respiratory viral panel, chemistries and blood counts.  Will provide IV fluids.  Will obtain chest x-ray to rule out pneumonia.      COMPLEXITY OF CARE  Admission was considered but after careful review of the patient's presentation, physical examination, diagnostic results, and response to treatment the patient may be safely discharged with outpatient follow-up.    Please note that portions of this document were completed with a voice recognition program.    Note Disclaimer: At [facility], we believe that sharing information builds trust and better relationships. You are receiving this note because you recently visited [facility]. It is possible you will see health information before a provider has talked with you about it. This kind of information can be easy to misunderstand. To help you fully understand what it means for your health, we urge you to discuss this note with your provider.         Alexandro Edwards MD  03/24/25 2011

## 2025-03-24 NOTE — DISCHARGE INSTRUCTIONS
Do not take your promethazine with your prochlorperazine    Return to ER for any worsening symptoms

## 2025-03-26 NOTE — OUTREACH NOTE
Prep Survey      Flowsheet Row Responses   [facility] patient discharged from? [place]   Is LACE score < 7 ? Yes   Eligibility [facility]   Date of Admission 02/04/25   Date of Discharge 02/07/25   Discharge Disposition Home or Self Care   Discharge diagnosis Acute colitis   Does the patient have one of the following disease processes/diagnoses(primary or secondary)? Other   Does the patient have Home health ordered? No   Is there a DME ordered? No   Prep survey completed? Yes            JUAN STILL - Registered Nurse          
  Diagnosis Date    Allergic     Anxiety     Arthritis     C. difficile colitis 02/08/2018; 3/23/2018    Depression     aniexty.    DVT (deep venous thrombosis) ([facility]) age 62    LLE whie on hormone therapy    Fracture, sacrum/coccyx ([facility]) 05/2018    H/O migraine     Hx of blood clots     Hyperlipidemia     Movement disorder     osteoporosis    Unspecified cerebral artery occlusion with cerebral infarction     tia---march 2009, 10/30/2014    Unspecified diseases of blood and blood-forming organs     Factyor V Leiden    Vitamin D deficiency         Past Surgical History:   Procedure Laterality Date    CARPAL TUNNEL RELEASE      right    COLONOSCOPY      COSMETIC SURGERY      eye lids lifted years ago    FRACTURE SURGERY  01/08/2018    orif rt ankle    HIP SURGERY Right 4/21/15.    KNEE ARTHROSCOPY Bilateral     OTHER SURGICAL HISTORY Right     Gamma nail right hip    TONSILLECTOMY         Allergies   Allergen Reactions    Flagyl [Metronidazole] Diarrhea     Pt son said, \"she was given flagyl in feb 2018\". He would like this documented in her chart.     Omnicef Diarrhea    Cephalexin Diarrhea    Penicillins Hives       Social History:  Reviewed.  reports that she has never smoked. She has never used smokeless tobacco. She reports that she does not drink alcohol and does not use drugs.     Family History:  Reviewed. Reviewed. No family history of SCD.      Relevant and available labs, and cardiovascular diagnostics reviewed.   Reviewed.

## 2025-04-21 ENCOUNTER — RESULTS FOLLOW-UP (OUTPATIENT)
Dept: URGENT CARE | Facility: CLINIC | Age: 35
End: 2025-04-21

## 2025-04-23 ENCOUNTER — PATIENT ROUNDING (BHMG ONLY) (OUTPATIENT)
Dept: URGENT CARE | Facility: CLINIC | Age: 35
End: 2025-04-23
Payer: COMMERCIAL

## 2025-04-23 NOTE — ED NOTES
Thank you for letting us care for you during your recent visit at [facility]. We would love to hear about your experience with us.         We’re always looking for ways to make our patients’ experiences even better. Do you have any recommendations on ways we may improve?         I appreciate you taking the time to respond. Please be on the lookout for a survey about your recent visit from [facility] via text or email. We would greatly appreciate if you could fill that out and turn it back in. We want your voice to be heard and we value your feedback.         Thank you for choosing [facility] for your healthcare needs.

## 2025-06-04 ENCOUNTER — TELEPHONE (OUTPATIENT)
Dept: OBSTETRICS AND GYNECOLOGY | Facility: CLINIC | Age: 35
End: 2025-06-04
Payer: COMMERCIAL

## 2025-06-25 ENCOUNTER — OFFICE VISIT (OUTPATIENT)
Dept: FAMILY MEDICINE CLINIC | Facility: CLINIC | Age: 35
End: 2025-06-25
Payer: COMMERCIAL

## 2025-06-25 VITALS
WEIGHT: 179.2 LBS | TEMPERATURE: 98.4 F | BODY MASS INDEX: 25.65 KG/M2 | DIASTOLIC BLOOD PRESSURE: 80 MMHG | HEIGHT: 70 IN | OXYGEN SATURATION: 98 % | HEART RATE: 80 BPM | SYSTOLIC BLOOD PRESSURE: 112 MMHG

## 2025-06-25 DIAGNOSIS — N63.10 MASS OF RIGHT BREAST, UNSPECIFIED QUADRANT: ICD-10-CM

## 2025-06-25 DIAGNOSIS — D06.9 CIN III (CERVICAL INTRAEPITHELIAL NEOPLASIA GRADE III) WITH SEVERE DYSPLASIA: ICD-10-CM

## 2025-06-25 DIAGNOSIS — R06.00 DYSPNEA, UNSPECIFIED TYPE: ICD-10-CM

## 2025-06-25 DIAGNOSIS — Z78.9 MEDICALLY COMPLEX PATIENT: ICD-10-CM

## 2025-06-25 DIAGNOSIS — G89.3 CHRONIC PAIN DUE TO NEOPLASM: ICD-10-CM

## 2025-06-25 DIAGNOSIS — Z87.19 HISTORY OF COLITIS: ICD-10-CM

## 2025-06-25 DIAGNOSIS — O13.9 GESTATIONAL HYPERTENSION, ANTEPARTUM: ICD-10-CM

## 2025-06-25 DIAGNOSIS — F41.8 ANXIETY WITH DEPRESSION: ICD-10-CM

## 2025-06-25 DIAGNOSIS — D48.119 DESMOID TUMOR: Primary | ICD-10-CM

## 2025-06-25 DIAGNOSIS — Z82.61 FAMILY HISTORY OF RHEUMATOID ARTHRITIS: ICD-10-CM

## 2025-06-25 DIAGNOSIS — Z86.32 HISTORY OF GESTATIONAL DIABETES: ICD-10-CM

## 2025-06-25 PROBLEM — O24.419 GESTATIONAL DIABETES: Status: RESOLVED | Noted: 2023-03-08 | Resolved: 2025-06-25

## 2025-06-25 RX ORDER — DILTIAZEM HYDROCHLORIDE 60 MG/1
2 TABLET, FILM COATED ORAL 2 TIMES DAILY
Status: CANCELLED | OUTPATIENT
Start: 2025-06-25

## 2025-06-25 RX ORDER — DILTIAZEM HYDROCHLORIDE 60 MG/1
2 TABLET, FILM COATED ORAL 2 TIMES DAILY
COMMUNITY
Start: 2025-06-13

## 2025-06-25 NOTE — PROGRESS NOTES
Chief Complaint  Desmoid tumor (Follow up going to [facility] cancer center), Anxiety, and Depression    4-month follow-up on desmoid tumor, anxiety/depression, acute joint pain, recurrent episodes of colitis, uncontrolled chronic pain, and multiple specialist visits    Last visit with me in February 2025 to reestablish care (lost to follow-up greater than 5 years with me and greater than 18 months with specialist/oncologist,) follow-up on multiple ER visits for recurrent colitis, acute right ankle pain, and uncontrolled enlarging desmoid tumor affecting gait, quality of life, and uncontrolled pain  -- Extensive review of multiple sets of records, hospitalizations, ERs, and specialist visits  -- Most recently seen for an episode of acute colitis/recurrent episodes, plan was to follow-up with GI outpatient for full C-scope and fecal Rojas protein.  S/P sigmoidoscopy only  -- Enlarging desmoid tumor, more pain, affecting gait quality of life.  Lost to follow-up with oncologist greater than 15 months.  --Referred to new oncology specialist at [facility]  --Started on gabapentin for uncontrolled chronic pain and Motrin  -- Given acute ankle pain (thought to be referred from the desmoid tumor) autoimmune and screening labs done, especially given family history for rheumatoid    Other interval history since last seen by me --- has consulted with new oncology providers at [facility], several urgent care visits, and now seeing [facility] palliative care provider as well.  Multiple sets of records reviewed.    S/P for urgent care visits x within the last 3 months for recurrent URI and shortness of air symptoms  ED with Alexandro Edwards MD (03/24/2025) viral URI  UC with Fabio Milian PA-C (04/21/2025) acute bronchitis    New oncology provider/at [facility]/Dr. Cooley now in charge of care, presented at [facility] tumor board 3 months ago  ONCOLOGY/HEMATOLOGY - SCAN - TUMOR, [facility], [id number] (03/13/2025) --updated imaging studies,  significant mass effect.  Nonresectable.  Begin systemic therapy.  Follow-up CT chest/abdomen/pelvis  Genetic testing negative.  Right breast mass consistent with fibroadenoma however recommended ultrasound follow-up    SEEN [facility] PALLIATIVE CARE, about 2 months ago on 4/7/2025 --started on Roxicodone 5 mg QID and Cymbalta for nerve pain, along with WILLIAM and depression.    Most recent visit with [facility] SURGICAL ONCOLOGIST/Dr. Solis 2 months ago  ONCOLOGY/HEMATOLOGY - SCAN - RT KNEE, [facility],[id number] (04/25/2025) --unresectable, discussed possible amputation but this was deferred until restaging scans after completion of her systemic treatment.    Has upcoming appointment with GYNECOLOGIST, OVERDUE FOR PAP SMEAR, HISTORY OF KYRA  Appointment with Arsh Ray MD (07/09/2025)     Still has not followed up with GI??    Doing much better overall!  Has been on this new systemic treatment x 2 to 3 months now, and seems to be really helping.  Tumor size appears to be shrinking, gait has improved.  Pain is now manageable with current medications prescribed by specialist.    Although having some side effects from Ogsiveo, such as recurrent URIs and shortness of air.  She has been started on Symbicort BID and albuterol as needed, states this is really helping.  Although planning a pulmonary consultation due to other possible side effects such as pleural effusion?     No further episodes of right ankle pain or swelling.  Denies pain and swelling of other joints.  Family history is significant for rheumatoid, grandmother.  Screening done at last visit, negative RF and ILDA  She did have genetic testing in 2022, negative for FAP and other cancer genes.     Currently, not working.  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  Seeking long-term disability.  Now living with her father, who is helping.  G3, P3.  Ages of children 13, 4 and 2.  Mood has improved.  Sleep is  "adequate.  No chest pain, increased shortness of air, or increased edema.  No abdominal pain, nausea, vomiting, diarrhea episodes    Lab work up to date, CBC/CMP/magnesium/phosphorus done every 6 weeks by specialist.  Compliant with and tolerating current medications without side effects.        Review of Systems   Constitutional:  Negative for fever and unexpected weight change.   Respiratory:  Negative for cough and shortness of breath.    Cardiovascular:  Negative for chest pain.        Kennedy Vincent presents to [facility]    Objective   Vital Signs:   Vitals:    06/25/25 1540   BP: 112/80   BP Location: Right arm   Patient Position: Sitting   Cuff Size: Adult   Pulse: 80   Temp: 98.4 °F (36.9 °C)   SpO2: 98%   Weight: 81.3 kg (179 lb 3.2 oz)   Height: 177.8 cm (70\")      Body mass index is 25.71 kg/m².   Physical Exam  Vitals and nursing note reviewed.   Constitutional:       General: She is not in acute distress.     Appearance: Normal appearance. She is well-developed and normal weight. She is not ill-appearing.   HENT:      Head: Normocephalic and atraumatic.      Nose: Nose normal.   Eyes:      Conjunctiva/sclera: Conjunctivae normal.      Pupils: Pupils are equal, round, and reactive to light.   Neck:      Thyroid: No thyromegaly.   Cardiovascular:      Rate and Rhythm: Normal rate and regular rhythm.      Heart sounds: Normal heart sounds. No murmur heard.  Pulmonary:      Effort: Pulmonary effort is normal. No respiratory distress.      Breath sounds: Normal breath sounds. No wheezing or rales.   Abdominal:      General: Abdomen is flat. Bowel sounds are normal. There is no distension.      Palpations: Abdomen is soft. There is no hepatomegaly, splenomegaly or mass.      Tenderness: There is no abdominal tenderness. There is no guarding or rebound.      Hernia: No hernia is present.   Musculoskeletal:         General: Normal range of motion.      Cervical " back: Normal range of motion and neck supple.      Right lower leg: No edema.      Left lower leg: No edema.      Comments: Right lower extremity/knee --- enlarged firm tumor along entire lateral compartment of the knee that extends into right lower extremity  Although does seems to be much smaller than examination 4 months ago.   Lymphadenopathy:      Cervical: No cervical adenopathy.   Skin:     General: Skin is warm.   Neurological:      General: No focal deficit present.      Mental Status: She is alert.   Psychiatric:         Mood and Affect: Mood normal.         Behavior: Behavior normal.         Thought Content: Thought content normal.         Judgment: Judgment normal.        Result Review :     Common labs          2/7/2025    03:56 2/14/2025    14:17 3/24/2025    11:56   Common Labs   Glucose 95  90  121    BUN 4  7  3    Creatinine 0.76  0.71  0.77    Sodium 141  137  130    Potassium 3.8  4.6  2.5    Chloride 107  103  97    Calcium 9.4  9.3  8.7    Albumin 3.6  4.6  4.2    Total Bilirubin 0.5  0.3  0.5    Alkaline Phosphatase 39  48  71    AST (SGOT) 22  19  20    ALT (SGPT) 39  17  11    WBC 8.67  7.60  10.60    Hemoglobin 12.8  12.7  13.3    Hematocrit 40.1  39.9  40.9    Platelets 242  284  235                Lab Results   Component Value Date    ANADIRECT Negative 02/14/2025    FERRITIN 26 02/27/2025    FOLATE 11.3 02/27/2025        BCC CBC W/ AUTO DIFF (05/15/2025 10:21) WBC count 11.4, otherwise normal    COMPREHENSIVE METABOLIC PANEL (05/15/2025 10:21) --glucose 129, otherwise normal    IMAGING SCANNED (03/06/2025) --CT chest, abdomen, pelvis negative for metastatic disease      February 2025 --- CK, RF, ILDA, sed rate negative             Assessment and Plan    Diagnoses and all orders for this visit:    1. Desmoid tumor (Primary) --- right lower extremity, diagnosed 2022  Clinically doing much better now with current treatment plan, systemic Ogsiveo  Assessment & Plan:  Currently seeing [facility]  oncology team (Shahab)  Case evaluated at [facility] tumor board  Started on Ogsiveo April 2025, seems to be responding/decreased size/pain under better control, gait better  However having some side effects from medication, including shortness of breath.  This has responded with the addition of Symbicort BID, awaiting referral to pulmonary  Plan is to continue with systemic treatment, unfortunately unresectable.  Await restaging scans, possible amputation decision deferred for now.  Maintains regular follow-up with [facility] oncology, labs every 6 weeks, tolerating.  Seeing palliative care for chronic pain management and treatment of WILLIAM/depression  Long-term disability pending      2. Chronic pain due to neoplasm --controlled  Doing much better with overall treatment plan.  Now on long-term oxycodone 5 mg IR 4/day, per palliative care specialist.  Also still takes Motrin as needed    3. Dyspnea, unspecified type --new Dx  Assessment & Plan:  Side effects from systemic treatment with Ogsiveo  Symptoms under much better control since the addition of Symbicort BID and albuterol as needed.  Planning to see pulmonary for consultation soon      4. Anxiety with depression --controlled  Much improved since starting Cymbalta 60 mg daily  Follows with [facility] palliative care specialist    5. 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 Family history of rheumatoid arthritis --S/P recent workup completed in February 2025, negative    7. History of gestational diabetes --asymptomatic  Routine CMP WNL  Plan A1c at annual wellness    9. History of colitis  Assessment & Plan:  Multiple ER visits and admissions x last 2 years for nonspecific colitis.  Status post sigmoidoscopy done July 2024  consistent with colitis but no evidence of IBD disease.  Plan was to follow-up outpatient for full colonoscopy, but failed to follow-up with GI.  Currently planning outpatient fecal Rojas protein stool studies and has follow-up planned in the next 2 weeks with GI.  Clinically stable now.  New referral placed    Orders:  -     Ambulatory Referral to Gastroenterology    10. Mass of right breast, unspecified quadrant --asymptomatic  After review of multiple specialist visits [facility] oncology, requested follow-up for suspected right breast fibroadenoma through January 2025 review of records shows this has not been completed.  Will schedule  -     US breast right complete; Future    11. Medically complex patient ---See all the above active chronic diagnoses that require close monitoring and longitudinal care.  Seeking long-term disability due to all the above.  Documentation supported and provided.  Detailed review of multiple specialist visits, incidental findings, high risk meds, coordination of care.    If doing well and maintaining follow-up with all specialist as discussed in detail today then may follow-up with me in 6 months for annual wellness.  Will need fasting lipids, A1c      I spent 70 minutes caring for Beena on this date of service. This time includes time spent by me in the following activities:preparing for the visit, reviewing tests, obtaining and/or reviewing a separately obtained history, performing a medically appropriate examination and/or evaluation , counseling and educating the patient/family/caregiver, ordering medications, tests, or procedures, documenting information in the medical record, independently interpreting results and communicating that information with the patient/family/caregiver, care coordination, and very extensive review of records, incidental findings, documentation.  And education, counseling, and management/coordination of care of multiple high risk active diagnoses. Detailed  documentation for long-term disability needed  Follow Up   Return in about 6 months (around 12/25/2025) for Annual physical, needs a.m. appointment and do not eat.  Patient was given instructions and counseling regarding her condition or for health maintenance advice. Please see specific information pulled into the AVS if appropriate.

## 2025-06-26 PROBLEM — N63.10 MASS OF RIGHT BREAST: Status: ACTIVE | Noted: 2025-06-26

## 2025-06-26 PROBLEM — D48.119 DESMOID FIBROMATOSIS: Status: RESOLVED | Noted: 2025-02-15 | Resolved: 2025-06-26

## 2025-06-26 PROBLEM — R06.00 DYSPNEA: Status: ACTIVE | Noted: 2025-06-26

## 2025-06-26 PROBLEM — F41.1 GENERALIZED ANXIETY DISORDER: Status: RESOLVED | Noted: 2020-09-29 | Resolved: 2025-06-26

## 2025-06-26 PROBLEM — Z87.19 HISTORY OF COLITIS: Status: ACTIVE | Noted: 2025-06-26

## 2025-06-26 NOTE — ASSESSMENT & PLAN NOTE
Effects from systemic treatment with Ogsiveo  Symptoms under much better control since the addition of Symbicort BID and albuterol as needed.  Planning to see pulmonary for consultation soon

## 2025-06-26 NOTE — ASSESSMENT & PLAN NOTE
Currently seeing [facility] oncology team (Shahab)  Case evaluated at [facility] tumor board  Started on Ogsiveo April 2025, seems to be responding/decreased size/pain under better control, gait better  However having some side effects from medication, including shortness of breath.  This has responded with the addition of Symbicort BID, awaiting referral to pulmonary  Plan is to continue with systemic treatment, unfortunately unresectable.  Await restaging scans, possible amputation decision deferred for now.  Maintains regular follow-up with [facility] oncology, labs every 6 weeks, tolerating.  Seeing palliative care for chronic pain management and treatment of WILLIAM/depression  Long-term disability pending

## 2025-07-21 ENCOUNTER — HOSPITAL ENCOUNTER (OUTPATIENT)
Dept: ULTRASOUND IMAGING | Facility: HOSPITAL | Age: 35
Discharge: HOME | End: 2025-07-21
Admitting: FAMILY MEDICINE
Payer: COMMERCIAL

## 2025-07-21 DIAGNOSIS — N63.10 MASS OF RIGHT BREAST, UNSPECIFIED QUADRANT: ICD-10-CM

## 2025-07-21 PROCEDURE — 76642 ULTRASOUND BREAST LIMITED: CPT

## 2025-07-23 ENCOUNTER — OFFICE VISIT (OUTPATIENT)
Dept: OBSTETRICS AND GYNECOLOGY | Facility: CLINIC | Age: 35
End: 2025-07-23
Payer: COMMERCIAL

## 2025-07-23 VITALS
BODY MASS INDEX: 26.28 KG/M2 | SYSTOLIC BLOOD PRESSURE: 118 MMHG | DIASTOLIC BLOOD PRESSURE: 76 MMHG | WEIGHT: 183.6 LBS | HEIGHT: 70 IN

## 2025-07-23 DIAGNOSIS — Z01.419 WELL WOMAN EXAM WITH ROUTINE GYNECOLOGICAL EXAM: Primary | ICD-10-CM

## 2025-07-23 DIAGNOSIS — Z30.09 STERILIZATION CONSULT: ICD-10-CM

## 2025-07-23 RX ORDER — SODIUM CHLORIDE 9 MG/ML
40 INJECTION, SOLUTION INTRAVENOUS AS NEEDED
OUTPATIENT
Start: 2025-07-23

## 2025-07-23 RX ORDER — SODIUM CHLORIDE 0.9 % (FLUSH) 0.9 %
10 SYRINGE (ML) INJECTION AS NEEDED
OUTPATIENT
Start: 2025-07-23

## 2025-07-23 RX ORDER — DICYCLOMINE HYDROCHLORIDE 10 MG/1
10 CAPSULE ORAL 4 TIMES DAILY
COMMUNITY
Start: 2025-02-27

## 2025-07-23 RX ORDER — SODIUM CHLORIDE 0.9 % (FLUSH) 0.9 %
10 SYRINGE (ML) INJECTION EVERY 12 HOURS SCHEDULED
OUTPATIENT
Start: 2025-07-23

## 2025-07-23 NOTE — PROGRESS NOTES
HPI   Beena Vincent  is a 35 y.o. female who presents for 2 reasons.  First, she would like to have a routine gynecologic exam.  Overall, she is feeling well.  Bowels and bladder are functioning normally.  Cycles are regular and predictable.  The patient has a desmoid tumor of the leg.  This is being treated with chemotherapy.  Next, the patient would like to have a tubal sterilization.  She would like to discuss this today.    Chief Complaint   Patient presents with    Gynecologic Exam     Patient here for annual exam. Last pap 2022-neg; Patient would like to discuss getting her tubes tied.       Past Medical History:   Diagnosis Date    Abnormal Pap smear of cervix 2018    Anxiety 2009    Asthma     inhaler as needed    Cervical dysplasia     Depression     Desmoid tumor 2022    back of right knee- pt desires to proceed with treatments after delivery    Gestational diabetes     Gestational hypertension 2020    History of cervical dysplasia 2019    Iron deficiency anemia     PMS (premenstrual syndrome) Na    Preeclampsia 2012    Varicella 1994       Past Surgical History:   Procedure Laterality Date    APPENDECTOMY N/A 10/01/2021    Procedure: APPENDECTOMY LAPAROSCOPIC;  Surgeon: Ahmet Dong Jr., MD;  Location: [facility];  Service: General;  Laterality: N/A;    CERVICAL BIOPSY  W/ LOOP ELECTRODE EXCISION  4/2020    KNEE ARTHROSCOPY      AGE 6    LEEP N/A 04/25/2019    Procedure: LOOP ELECTROCAUTERY EXCISION PROCEDURE;  Surgeon: Arsh Ray MD;  Location: [facility];  Service: Obstetrics/Gynecology    SIGMOIDOSCOPY N/A 07/19/2024    Procedure: SIGMOIDOSCOPY FLEXIBLE WITH BIOPSIES;  Surgeon: Rohan Duran MD;  Location: [facility] ENDOSCOPY;  Service: Gastroenterology;  Laterality: N/A;  PRE: COLITIS  POST: SAME    SOFT TISSUE BIOPSY Right 03/23/2022    Procedure: BIOPSY SOFT TISSUE THIGH / KNEE;  Surgeon: Chris Randall MD;  Location: [facility] OR;  Service: Orthopedics;  Laterality: Right;     WISDOM TOOTH EXTRACTION         Social History     Socioeconomic History    Marital status: Single   Tobacco Use    Smoking status: Former     Current packs/day: 0.00     Average packs/day: 0.5 packs/day for 10.0 years (5.0 ttl pk-yrs)     Types: Cigarettes     Start date: 2016     Quit date: 2020     Years since quittin.4     Passive exposure: Never    Smokeless tobacco: Never   Vaping Use    Vaping status: Former    Substances: Nicotine    Devices: Disposable   Substance and Sexual Activity    Alcohol use: Not Currently     Comment: socially    Drug use: Not Currently     Types: Marijuana     Comment: daily, did not smoke during pregnancy    Sexual activity: Not Currently     Partners: Male     Birth control/protection: None       The following portions of the patient's history were reviewed and updated as appropriate: allergies, current medications, past family history, past medical history, past social history, past surgical history and problem list.    Review of Systems   Constitutional: Negative.    HENT: Negative.     Respiratory: Negative.     Cardiovascular: Negative.    Gastrointestinal: Negative.    Genitourinary: Negative.    Musculoskeletal: Negative.    Skin: Negative.    Allergic/Immunologic: Negative.    Psychiatric/Behavioral: Negative.               Physical Exam  Vitals and nursing note reviewed.   Constitutional:       Appearance: She is well-developed.   HENT:      Head: Normocephalic and atraumatic.   Cardiovascular:      Rate and Rhythm: Normal rate and regular rhythm.   Pulmonary:      Effort: Pulmonary effort is normal.      Breath sounds: Normal breath sounds. No wheezing or rales.   Chest:      Comments: The breasts are homogeneous.  There are no palpable lumps.  Nipple discharge and axillary adenopathy are absent.  Abdominal:      General: There is no distension.      Palpations: Abdomen is soft.      Tenderness: There is no abdominal tenderness.   Genitourinary:     Labia:          Right: No lesion.         Left: No lesion.       Vagina: Normal. No vaginal discharge.      Cervix: No cervical motion tenderness.      Uterus: Normal. Not enlarged and not tender.       Adnexa:         Right: No mass or tenderness.          Left: No mass or tenderness.     Skin:     General: Skin is warm and dry.   Neurological:      Mental Status: She is alert and oriented to person, place, and time.         Assessment    Diagnoses and all orders for this visit:    1. Well woman exam with routine gynecological exam (Primary)  -     IGP, Aptima HPV, Rfx 16 / 18,45    2. Sterilization consult  -     sodium chloride 0.9 % flush 10 mL  -     sodium chloride 0.9 % flush 10 mL  -     sodium chloride 0.9 % infusion 40 mL  -     Case Request; Standing  -     Case Request    Other orders  -     Follow Anesthesia Guidelines / Protocol; Future  -     Follow Anesthesia Guidelines / Protocol; Standing  -     Chlorhexidine Skin Prep; Future  -     Insert Peripheral IV; Standing  -     Saline Lock & Maintain IV Access; Standing  -     Place Sequential Compression Device; Standing  -     Maintain Sequential Compression Device; Standing        Plan  Annual examination performed  The patient desires permanent sterility.  This is due to the need to continue chemotherapy to treat her desmoid tumor.  We reviewed the procedure of laparoscopic tubal cautery and I demonstrated it on a diagram.  We discussed the procedure itself as well as its benefits, risks and alternatives.  I answered the patient's questions and gave her a brochure.  She would like to proceed.  A case request has been placed.    No follow-ups on file.    Social History     Tobacco Use   Smoking Status Former    Current packs/day: 0.00    Average packs/day: 0.5 packs/day for 10.0 years (5.0 ttl pk-yrs)    Types: Cigarettes    Start date: 2016    Quit date: 2020    Years since quittin.4    Passive exposure: Never   Smokeless Tobacco Never

## 2025-07-25 ENCOUNTER — TELEPHONE (OUTPATIENT)
Dept: OBSTETRICS AND GYNECOLOGY | Facility: CLINIC | Age: 35
End: 2025-07-25
Payer: COMMERCIAL

## 2025-07-25 NOTE — TELEPHONE ENCOUNTER
Called to verify if pt signed tubal consent at last visit. Pt stated she did not advised that with pt insurance a consent needs to be signed and there is a 30 day waiting period in order to schedule sx. Pt stated she will go to to [place] location to sign tubal consent

## 2025-07-28 LAB
CYTOLOGIST CVX/VAG CYTO: NORMAL
CYTOLOGY CVX/VAG DOC CYTO: NORMAL
CYTOLOGY CVX/VAG DOC THIN PREP: NORMAL
DX ICD CODE: NORMAL
HPV GENOTYPE REFLEX: NORMAL
HPV I/H RISK 4 DNA CVX QL PROBE+SIG AMP: NEGATIVE
Lab: NORMAL
OTHER STN SPEC: NORMAL
SERVICE CMNT-IMP: NORMAL
STAT OF ADQ CVX/VAG CYTO-IMP: NORMAL

## 2025-08-13 ENCOUNTER — TELEPHONE (OUTPATIENT)
Dept: OBSTETRICS AND GYNECOLOGY | Facility: CLINIC | Age: 35
End: 2025-08-13
Payer: COMMERCIAL

## (undated) DEVICE — Device

## (undated) DEVICE — GLV SURG BIOGEL LTX PF 7 1/2

## (undated) DEVICE — ADAPT CLN BIOGUARD AIR/H2O DISP

## (undated) DEVICE — LAPAROSCOPIC SMOKE FILTRATION SYSTEM: Brand: PALL LAPAROSHIELD® PLUS LAPAROSCOPIC SMOKE FILTRATION SYSTEM

## (undated) DEVICE — GLV SURG PREMIERPRO ORTHO LTX PF SZ7.5 BRN

## (undated) DEVICE — PENCL E/S HNDSWCH ROCKR CB

## (undated) DEVICE — PREMIUM WET SKIN PREP TRAY: Brand: MEDLINE INDUSTRIES, INC.

## (undated) DEVICE — KT ORCA ORCAPOD DISP STRL

## (undated) DEVICE — ELECTRD BALL EZ CLN STD 5IN

## (undated) DEVICE — SENSR O2 OXIMAX FNGR A/ 18IN NONSTR

## (undated) DEVICE — 3M™ STERI-STRIP™ COMPOUND BENZOIN TINCTURE 40 BAGS/CARTON 4 CARTONS/CASE C1544: Brand: 3M™ STERI-STRIP™

## (undated) DEVICE — ENDOPATH XCEL UNIVERSAL TROCAR STABLILITY SLEEVES: Brand: ENDOPATH XCEL

## (undated) DEVICE — ENDOPATH ETS-FLEX45 ARTICULATING ENDOSCOPIC LINEAR CUTTER, NO RELOAD: Brand: ENDOPATH

## (undated) DEVICE — ENDOCUT SCISSOR TIP, DISPOSABLE: Brand: RENEW

## (undated) DEVICE — APPL CHLORAPREP HI/LITE 26ML ORNG

## (undated) DEVICE — OSC HYSTEROSCOPY: Brand: MEDLINE INDUSTRIES, INC.

## (undated) DEVICE — PAD GRND REM POLYHESIVE A/ DISP

## (undated) DEVICE — ENDOPATH PNEUMONEEDLE INSUFFLATION NEEDLES WITH LUER LOCK CONNECTORS 120MM: Brand: ENDOPATH

## (undated) DEVICE — SUT MNCRYL PLS ANTIB UD 4/0 PS2 18IN

## (undated) DEVICE — ENSEAL TRIO TEMPERATURE CONTOLLED TISSUE SEALING TECHNOLOGY DISPOSABLE TISSUE SEALING DEVICE TAPTRONIC TRIGGER ACTIVATED POWER 3MM CURVED JAW: Brand: ENSEAL

## (undated) DEVICE — ENDOPATH XCEL BLADELESS TROCARS WITH STABILITY SLEEVES: Brand: ENDOPATH XCEL

## (undated) DEVICE — CANN O2 ETCO2 FITS ALL CONN CO2 SMPL A/ 7IN DISP LF

## (undated) DEVICE — MEDI-VAC YANKAUER SUCTION HANDLE W/BULBOUS TIP: Brand: CARDINAL HEALTH

## (undated) DEVICE — NDL SPINE 20G 3 1/2 YEL STRL 1P/U

## (undated) DEVICE — LOU LAP CHOLE: Brand: MEDLINE INDUSTRIES, INC.

## (undated) DEVICE — NDL BIOP W/DEPTH MARK 14G 4 1/4IN

## (undated) DEVICE — ENDOPOUCH RETRIEVER SPECIMEN RETRIEVAL BAGS: Brand: ENDOPOUCH RETRIEVER

## (undated) DEVICE — SINGLE-USE BIOPSY FORCEPS: Brand: RADIAL JAW 4

## (undated) DEVICE — LN SMPL CO2 SHTRM SD STREAM W/M LUER

## (undated) DEVICE — TUBING, SUCTION, 1/4" X 10', STRAIGHT: Brand: MEDLINE